# Patient Record
Sex: FEMALE | Race: WHITE | NOT HISPANIC OR LATINO | Employment: OTHER | URBAN - METROPOLITAN AREA
[De-identification: names, ages, dates, MRNs, and addresses within clinical notes are randomized per-mention and may not be internally consistent; named-entity substitution may affect disease eponyms.]

---

## 2017-01-18 ENCOUNTER — ALLSCRIPTS OFFICE VISIT (OUTPATIENT)
Dept: OTHER | Facility: OTHER | Age: 72
End: 2017-01-18

## 2017-01-26 ENCOUNTER — TRANSCRIBE ORDERS (OUTPATIENT)
Dept: LAB | Facility: CLINIC | Age: 72
End: 2017-01-26

## 2017-01-26 ENCOUNTER — APPOINTMENT (OUTPATIENT)
Dept: LAB | Facility: CLINIC | Age: 72
End: 2017-01-26
Payer: MEDICARE

## 2017-01-26 DIAGNOSIS — I10 ESSENTIAL (PRIMARY) HYPERTENSION: ICD-10-CM

## 2017-01-26 DIAGNOSIS — E03.9 HYPOTHYROIDISM: ICD-10-CM

## 2017-01-26 DIAGNOSIS — R20.2 PARESTHESIA OF SKIN: ICD-10-CM

## 2017-01-26 DIAGNOSIS — E11.43 TYPE 2 DIABETES MELLITUS WITH AUTONOMIC NEUROPATHY (HCC): ICD-10-CM

## 2017-01-26 DIAGNOSIS — E13.43 DIABETIC AUTONOMIC NEUROPATHY ASSOCIATED WITH OTHER SPECIFIED DIABETES MELLITUS (HCC): Primary | ICD-10-CM

## 2017-01-26 LAB
ALBUMIN SERPL BCP-MCNC: 3.2 G/DL (ref 3.5–5)
ALP SERPL-CCNC: 70 U/L (ref 46–116)
ALT SERPL W P-5'-P-CCNC: 22 U/L (ref 12–78)
ANION GAP SERPL CALCULATED.3IONS-SCNC: 6 MMOL/L (ref 4–13)
AST SERPL W P-5'-P-CCNC: 19 U/L (ref 5–45)
BILIRUB SERPL-MCNC: 0.4 MG/DL (ref 0.2–1)
BUN SERPL-MCNC: 39 MG/DL (ref 5–25)
CALCIUM SERPL-MCNC: 9.2 MG/DL (ref 8.3–10.1)
CHLORIDE SERPL-SCNC: 104 MMOL/L (ref 100–108)
CHOLEST SERPL-MCNC: 207 MG/DL (ref 50–200)
CO2 SERPL-SCNC: 28 MMOL/L (ref 21–32)
CREAT SERPL-MCNC: 1.74 MG/DL (ref 0.6–1.3)
CREAT UR-MCNC: 218 MG/DL
EST. AVERAGE GLUCOSE BLD GHB EST-MCNC: 163 MG/DL
GFR SERPL CREATININE-BSD FRML MDRD: 28.7 ML/MIN/1.73SQ M
GLUCOSE SERPL-MCNC: 111 MG/DL (ref 65–140)
HBA1C MFR BLD: 7.3 % (ref 4.2–6.3)
HDLC SERPL-MCNC: 48 MG/DL (ref 40–60)
LDLC SERPL CALC-MCNC: 131 MG/DL (ref 0–100)
MICROALBUMIN UR-MCNC: 11.5 MG/L (ref 0–20)
MICROALBUMIN/CREAT 24H UR: 5 MG/G CREATININE (ref 0–30)
POTASSIUM SERPL-SCNC: 3.8 MMOL/L (ref 3.5–5.3)
PROT SERPL-MCNC: 6.4 G/DL (ref 6.4–8.2)
SODIUM SERPL-SCNC: 138 MMOL/L (ref 136–145)
TRIGL SERPL-MCNC: 140 MG/DL
TSH SERPL DL<=0.05 MIU/L-ACNC: 2.18 UIU/ML (ref 0.36–3.74)

## 2017-01-26 PROCEDURE — 82043 UR ALBUMIN QUANTITATIVE: CPT | Performed by: FAMILY MEDICINE

## 2017-01-26 PROCEDURE — 36415 COLL VENOUS BLD VENIPUNCTURE: CPT

## 2017-01-26 PROCEDURE — 84443 ASSAY THYROID STIM HORMONE: CPT

## 2017-01-26 PROCEDURE — 83036 HEMOGLOBIN GLYCOSYLATED A1C: CPT

## 2017-01-26 PROCEDURE — 80053 COMPREHEN METABOLIC PANEL: CPT

## 2017-01-26 PROCEDURE — 80061 LIPID PANEL: CPT

## 2017-01-26 PROCEDURE — 82570 ASSAY OF URINE CREATININE: CPT | Performed by: FAMILY MEDICINE

## 2017-02-07 ENCOUNTER — ALLSCRIPTS OFFICE VISIT (OUTPATIENT)
Dept: OTHER | Facility: OTHER | Age: 72
End: 2017-02-07

## 2017-02-07 DIAGNOSIS — E03.9 HYPOTHYROIDISM: ICD-10-CM

## 2017-02-07 DIAGNOSIS — E11.9 TYPE 2 DIABETES MELLITUS WITHOUT COMPLICATIONS (HCC): ICD-10-CM

## 2017-02-07 DIAGNOSIS — I10 ESSENTIAL (PRIMARY) HYPERTENSION: ICD-10-CM

## 2017-02-13 ENCOUNTER — GENERIC CONVERSION - ENCOUNTER (OUTPATIENT)
Dept: OTHER | Facility: OTHER | Age: 72
End: 2017-02-13

## 2017-03-15 ENCOUNTER — ALLSCRIPTS OFFICE VISIT (OUTPATIENT)
Dept: OTHER | Facility: OTHER | Age: 72
End: 2017-03-15

## 2017-03-23 ENCOUNTER — ALLSCRIPTS OFFICE VISIT (OUTPATIENT)
Dept: OTHER | Facility: OTHER | Age: 72
End: 2017-03-23

## 2017-03-30 ENCOUNTER — GENERIC CONVERSION - ENCOUNTER (OUTPATIENT)
Dept: OTHER | Facility: OTHER | Age: 72
End: 2017-03-30

## 2017-04-12 ENCOUNTER — ALLSCRIPTS OFFICE VISIT (OUTPATIENT)
Dept: OTHER | Facility: OTHER | Age: 72
End: 2017-04-12

## 2017-04-19 ENCOUNTER — ALLSCRIPTS OFFICE VISIT (OUTPATIENT)
Dept: OTHER | Facility: OTHER | Age: 72
End: 2017-04-19

## 2017-04-26 ENCOUNTER — ALLSCRIPTS OFFICE VISIT (OUTPATIENT)
Dept: OTHER | Facility: OTHER | Age: 72
End: 2017-04-26

## 2017-04-28 ENCOUNTER — ALLSCRIPTS OFFICE VISIT (OUTPATIENT)
Dept: OTHER | Facility: OTHER | Age: 72
End: 2017-04-28

## 2017-05-16 ENCOUNTER — ALLSCRIPTS OFFICE VISIT (OUTPATIENT)
Dept: OTHER | Facility: OTHER | Age: 72
End: 2017-05-16

## 2017-05-16 ENCOUNTER — TRANSCRIBE ORDERS (OUTPATIENT)
Dept: ADMINISTRATIVE | Facility: HOSPITAL | Age: 72
End: 2017-05-16

## 2017-05-16 ENCOUNTER — HOSPITAL ENCOUNTER (OUTPATIENT)
Dept: RADIOLOGY | Facility: HOSPITAL | Age: 72
Discharge: HOME | End: 2017-05-16
Payer: MEDICARE

## 2017-05-16 DIAGNOSIS — M75.121 COMPLETE TEAR OF RIGHT ROTATOR CUFF: ICD-10-CM

## 2017-05-16 DIAGNOSIS — M25.512 PAIN IN LEFT SHOULDER: ICD-10-CM

## 2017-05-16 PROCEDURE — 73030 X-RAY EXAM OF SHOULDER: CPT

## 2017-05-21 ENCOUNTER — GENERIC CONVERSION - ENCOUNTER (OUTPATIENT)
Dept: OTHER | Facility: OTHER | Age: 72
End: 2017-05-21

## 2017-05-23 ENCOUNTER — TRANSCRIBE ORDERS (OUTPATIENT)
Dept: ADMINISTRATIVE | Facility: HOSPITAL | Age: 72
End: 2017-05-23

## 2017-05-23 ENCOUNTER — ALLSCRIPTS OFFICE VISIT (OUTPATIENT)
Dept: OTHER | Facility: OTHER | Age: 72
End: 2017-05-23

## 2017-05-23 DIAGNOSIS — M75.22 BICIPITAL TENDINITIS OF LEFT SHOULDER: ICD-10-CM

## 2017-05-23 DIAGNOSIS — M25.512 ACUTE PAIN OF LEFT SHOULDER: Primary | ICD-10-CM

## 2017-05-30 ENCOUNTER — HOSPITAL ENCOUNTER (OUTPATIENT)
Dept: MRI IMAGING | Facility: HOSPITAL | Age: 72
Discharge: HOME | End: 2017-05-30
Payer: MEDICARE

## 2017-05-30 DIAGNOSIS — M75.22 BICIPITAL TENDINITIS OF LEFT SHOULDER: ICD-10-CM

## 2017-05-30 DIAGNOSIS — M25.512 ACUTE PAIN OF LEFT SHOULDER: ICD-10-CM

## 2017-05-30 PROCEDURE — 73221 MRI JOINT UPR EXTREM W/O DYE: CPT

## 2017-05-31 ENCOUNTER — GENERIC CONVERSION - ENCOUNTER (OUTPATIENT)
Dept: OTHER | Facility: OTHER | Age: 72
End: 2017-05-31

## 2017-06-01 ENCOUNTER — APPOINTMENT (OUTPATIENT)
Dept: LAB | Facility: CLINIC | Age: 72
End: 2017-06-01
Payer: MEDICARE

## 2017-06-01 DIAGNOSIS — E03.9 HYPOTHYROIDISM: ICD-10-CM

## 2017-06-01 DIAGNOSIS — I10 ESSENTIAL (PRIMARY) HYPERTENSION: ICD-10-CM

## 2017-06-01 DIAGNOSIS — E11.9 TYPE 2 DIABETES MELLITUS WITHOUT COMPLICATIONS (HCC): ICD-10-CM

## 2017-06-01 LAB
ALBUMIN SERPL BCP-MCNC: 3.1 G/DL (ref 3.5–5)
ALP SERPL-CCNC: 70 U/L (ref 46–116)
ALT SERPL W P-5'-P-CCNC: 26 U/L (ref 12–78)
ANION GAP SERPL CALCULATED.3IONS-SCNC: 8 MMOL/L (ref 4–13)
AST SERPL W P-5'-P-CCNC: 18 U/L (ref 5–45)
BILIRUB SERPL-MCNC: 0.49 MG/DL (ref 0.2–1)
BUN SERPL-MCNC: 26 MG/DL (ref 5–25)
CALCIUM SERPL-MCNC: 9.5 MG/DL (ref 8.3–10.1)
CHLORIDE SERPL-SCNC: 108 MMOL/L (ref 100–108)
CHOLEST SERPL-MCNC: 217 MG/DL (ref 50–200)
CO2 SERPL-SCNC: 29 MMOL/L (ref 21–32)
CREAT SERPL-MCNC: 1.69 MG/DL (ref 0.6–1.3)
EST. AVERAGE GLUCOSE BLD GHB EST-MCNC: 180 MG/DL
GFR SERPL CREATININE-BSD FRML MDRD: 29.7 ML/MIN/1.73SQ M
GLUCOSE P FAST SERPL-MCNC: 111 MG/DL (ref 65–99)
HBA1C MFR BLD: 7.9 % (ref 4.2–6.3)
HDLC SERPL-MCNC: 53 MG/DL (ref 40–60)
LDLC SERPL CALC-MCNC: 131 MG/DL (ref 0–100)
POTASSIUM SERPL-SCNC: 4.1 MMOL/L (ref 3.5–5.3)
PROT SERPL-MCNC: 6.1 G/DL (ref 6.4–8.2)
SODIUM SERPL-SCNC: 145 MMOL/L (ref 136–145)
TRIGL SERPL-MCNC: 165 MG/DL
TSH SERPL DL<=0.05 MIU/L-ACNC: 3.17 UIU/ML (ref 0.36–3.74)

## 2017-06-01 PROCEDURE — 80053 COMPREHEN METABOLIC PANEL: CPT

## 2017-06-01 PROCEDURE — 84443 ASSAY THYROID STIM HORMONE: CPT

## 2017-06-01 PROCEDURE — 80061 LIPID PANEL: CPT

## 2017-06-01 PROCEDURE — 83036 HEMOGLOBIN GLYCOSYLATED A1C: CPT

## 2017-06-01 PROCEDURE — 36415 COLL VENOUS BLD VENIPUNCTURE: CPT

## 2017-06-05 ENCOUNTER — GENERIC CONVERSION - ENCOUNTER (OUTPATIENT)
Dept: OTHER | Facility: OTHER | Age: 72
End: 2017-06-05

## 2017-06-07 ENCOUNTER — ALLSCRIPTS OFFICE VISIT (OUTPATIENT)
Dept: OTHER | Facility: OTHER | Age: 72
End: 2017-06-07

## 2017-06-08 ENCOUNTER — GENERIC CONVERSION - ENCOUNTER (OUTPATIENT)
Dept: OTHER | Facility: OTHER | Age: 72
End: 2017-06-08

## 2017-06-14 ENCOUNTER — ALLSCRIPTS OFFICE VISIT (OUTPATIENT)
Dept: OTHER | Facility: OTHER | Age: 72
End: 2017-06-14

## 2017-06-23 ENCOUNTER — APPOINTMENT (OUTPATIENT)
Dept: PHYSICAL THERAPY | Facility: CLINIC | Age: 72
End: 2017-06-23
Payer: MEDICARE

## 2017-06-23 DIAGNOSIS — M75.121 COMPLETE TEAR OF RIGHT ROTATOR CUFF: ICD-10-CM

## 2017-06-23 PROCEDURE — 97162 PT EVAL MOD COMPLEX 30 MIN: CPT

## 2017-06-23 PROCEDURE — 97110 THERAPEUTIC EXERCISES: CPT

## 2017-06-23 PROCEDURE — G8990 OTHER PT/OT CURRENT STATUS: HCPCS

## 2017-06-23 PROCEDURE — G8991 OTHER PT/OT GOAL STATUS: HCPCS

## 2017-06-26 ENCOUNTER — GENERIC CONVERSION - ENCOUNTER (OUTPATIENT)
Dept: OTHER | Facility: OTHER | Age: 72
End: 2017-06-26

## 2017-06-27 ENCOUNTER — APPOINTMENT (OUTPATIENT)
Dept: PHYSICAL THERAPY | Facility: CLINIC | Age: 72
End: 2017-06-27
Payer: MEDICARE

## 2017-06-29 ENCOUNTER — APPOINTMENT (OUTPATIENT)
Dept: PHYSICAL THERAPY | Facility: CLINIC | Age: 72
End: 2017-06-29
Payer: MEDICARE

## 2017-06-29 PROCEDURE — 97110 THERAPEUTIC EXERCISES: CPT | Performed by: PHYSICAL THERAPIST

## 2017-06-29 PROCEDURE — 97110 THERAPEUTIC EXERCISES: CPT

## 2017-07-03 ENCOUNTER — APPOINTMENT (OUTPATIENT)
Dept: PHYSICAL THERAPY | Facility: CLINIC | Age: 72
End: 2017-07-03
Payer: MEDICARE

## 2017-07-03 PROCEDURE — 97110 THERAPEUTIC EXERCISES: CPT

## 2017-07-06 ENCOUNTER — APPOINTMENT (OUTPATIENT)
Dept: PHYSICAL THERAPY | Facility: CLINIC | Age: 72
End: 2017-07-06
Payer: MEDICARE

## 2017-07-06 PROCEDURE — 97110 THERAPEUTIC EXERCISES: CPT

## 2017-07-10 ENCOUNTER — APPOINTMENT (OUTPATIENT)
Dept: PHYSICAL THERAPY | Facility: CLINIC | Age: 72
End: 2017-07-10
Payer: MEDICARE

## 2017-07-12 ENCOUNTER — GENERIC CONVERSION - ENCOUNTER (OUTPATIENT)
Dept: OTHER | Facility: OTHER | Age: 72
End: 2017-07-12

## 2017-07-13 ENCOUNTER — APPOINTMENT (OUTPATIENT)
Dept: PHYSICAL THERAPY | Facility: CLINIC | Age: 72
End: 2017-07-13
Payer: MEDICARE

## 2017-07-17 ENCOUNTER — APPOINTMENT (OUTPATIENT)
Dept: PHYSICAL THERAPY | Facility: CLINIC | Age: 72
End: 2017-07-17
Payer: MEDICARE

## 2017-07-20 ENCOUNTER — APPOINTMENT (OUTPATIENT)
Dept: PHYSICAL THERAPY | Facility: CLINIC | Age: 72
End: 2017-07-20
Payer: MEDICARE

## 2017-07-24 ENCOUNTER — APPOINTMENT (OUTPATIENT)
Dept: PHYSICAL THERAPY | Facility: CLINIC | Age: 72
End: 2017-07-24
Payer: MEDICARE

## 2017-07-26 ENCOUNTER — ALLSCRIPTS OFFICE VISIT (OUTPATIENT)
Dept: OTHER | Facility: OTHER | Age: 72
End: 2017-07-26

## 2017-07-27 ENCOUNTER — APPOINTMENT (OUTPATIENT)
Dept: PHYSICAL THERAPY | Facility: CLINIC | Age: 72
End: 2017-07-27
Payer: MEDICARE

## 2017-07-31 ENCOUNTER — APPOINTMENT (OUTPATIENT)
Dept: PHYSICAL THERAPY | Facility: CLINIC | Age: 72
End: 2017-07-31
Payer: MEDICARE

## 2017-08-18 RX ORDER — FUROSEMIDE 40 MG/1
40 TABLET ORAL
COMMUNITY
End: 2018-01-29 | Stop reason: SDUPTHER

## 2017-08-18 RX ORDER — LEVOTHYROXINE SODIUM 0.1 MG/1
100 TABLET ORAL DAILY
COMMUNITY
End: 2018-01-24 | Stop reason: SDUPTHER

## 2017-08-18 RX ORDER — GLIMEPIRIDE 4 MG/1
8 TABLET ORAL
COMMUNITY
End: 2018-10-08 | Stop reason: SDUPTHER

## 2017-08-18 RX ORDER — METFORMIN HYDROCHLORIDE 1000 MG/1
1000 TABLET, FILM COATED, EXTENDED RELEASE ORAL 2 TIMES DAILY WITH MEALS
Status: ON HOLD | COMMUNITY
End: 2017-08-21 | Stop reason: ALTCHOICE

## 2017-08-18 RX ORDER — SPIRONOLACTONE 50 MG/1
50 TABLET, FILM COATED ORAL DAILY PRN
COMMUNITY
End: 2018-12-17 | Stop reason: ALTCHOICE

## 2017-08-18 RX ORDER — PREDNISONE 1 MG/1
5 TABLET ORAL DAILY
COMMUNITY
End: 2018-12-17 | Stop reason: ALTCHOICE

## 2017-08-18 RX ORDER — BACLOFEN 10 MG/1
10 TABLET ORAL
Status: ON HOLD | COMMUNITY
End: 2017-08-21 | Stop reason: ALTCHOICE

## 2017-08-18 RX ORDER — DIPHENOXYLATE HYDROCHLORIDE AND ATROPINE SULFATE 2.5; .025 MG/1; MG/1
1 TABLET ORAL DAILY
COMMUNITY
End: 2018-03-28 | Stop reason: SDUPTHER

## 2017-08-18 RX ORDER — GABAPENTIN 300 MG/1
600 CAPSULE ORAL 2 TIMES DAILY
COMMUNITY
End: 2018-09-19 | Stop reason: SDUPTHER

## 2017-08-18 RX ORDER — NEBIVOLOL 5 MG/1
5 TABLET ORAL DAILY
COMMUNITY
End: 2018-08-15 | Stop reason: ALTCHOICE

## 2017-08-18 RX ORDER — POTASSIUM CHLORIDE 1.5 G/1.77G
20 POWDER, FOR SOLUTION ORAL DAILY
COMMUNITY
End: 2018-01-30 | Stop reason: SDUPTHER

## 2017-08-18 RX ORDER — VENLAFAXINE 75 MG/1
75 TABLET ORAL 3 TIMES DAILY
COMMUNITY
End: 2018-08-15 | Stop reason: ALTCHOICE

## 2017-08-18 RX ORDER — ALBUTEROL SULFATE 90 UG/1
2 AEROSOL, METERED RESPIRATORY (INHALATION) EVERY 6 HOURS PRN
COMMUNITY
End: 2018-03-28 | Stop reason: CLARIF

## 2017-08-18 RX ORDER — PANTOPRAZOLE SODIUM 40 MG/1
40 TABLET, DELAYED RELEASE ORAL DAILY
COMMUNITY
End: 2018-03-28 | Stop reason: SDUPTHER

## 2017-08-20 ENCOUNTER — ANESTHESIA EVENT (OUTPATIENT)
Dept: GASTROENTEROLOGY | Facility: HOSPITAL | Age: 72
End: 2017-08-20
Payer: MEDICARE

## 2017-08-20 RX ORDER — SODIUM CHLORIDE, SODIUM LACTATE, POTASSIUM CHLORIDE, CALCIUM CHLORIDE 600; 310; 30; 20 MG/100ML; MG/100ML; MG/100ML; MG/100ML
125 INJECTION, SOLUTION INTRAVENOUS CONTINUOUS
Status: CANCELLED | OUTPATIENT
Start: 2017-08-20

## 2017-08-21 ENCOUNTER — ANESTHESIA (OUTPATIENT)
Dept: GASTROENTEROLOGY | Facility: HOSPITAL | Age: 72
End: 2017-08-21
Payer: MEDICARE

## 2017-08-21 ENCOUNTER — GENERIC CONVERSION - ENCOUNTER (OUTPATIENT)
Dept: OTHER | Facility: OTHER | Age: 72
End: 2017-08-21

## 2017-08-21 ENCOUNTER — HOSPITAL ENCOUNTER (OUTPATIENT)
Facility: HOSPITAL | Age: 72
Setting detail: OUTPATIENT SURGERY
Discharge: HOME | End: 2017-08-21
Attending: COLON & RECTAL SURGERY | Admitting: COLON & RECTAL SURGERY
Payer: MEDICARE

## 2017-08-21 VITALS
HEIGHT: 65 IN | OXYGEN SATURATION: 96 % | WEIGHT: 278 LBS | HEART RATE: 71 BPM | TEMPERATURE: 97.6 F | SYSTOLIC BLOOD PRESSURE: 134 MMHG | DIASTOLIC BLOOD PRESSURE: 64 MMHG | BODY MASS INDEX: 46.32 KG/M2 | RESPIRATION RATE: 20 BRPM

## 2017-08-21 DIAGNOSIS — Z86.010 HISTORY OF COLONIC POLYPS: ICD-10-CM

## 2017-08-21 LAB — GLUCOSE SERPL-MCNC: 186 MG/DL (ref 65–140)

## 2017-08-21 PROCEDURE — 82948 REAGENT STRIP/BLOOD GLUCOSE: CPT

## 2017-08-21 PROCEDURE — 88305 TISSUE EXAM BY PATHOLOGIST: CPT | Performed by: COLON & RECTAL SURGERY

## 2017-08-21 RX ORDER — SODIUM CHLORIDE 9 MG/ML
125 INJECTION, SOLUTION INTRAVENOUS CONTINUOUS
Status: DISCONTINUED | OUTPATIENT
Start: 2017-08-21 | End: 2017-08-21 | Stop reason: HOSPADM

## 2017-08-21 RX ORDER — PROPOFOL 10 MG/ML
INJECTION, EMULSION INTRAVENOUS CONTINUOUS PRN
Status: DISCONTINUED | OUTPATIENT
Start: 2017-08-21 | End: 2017-08-21 | Stop reason: SURG

## 2017-08-21 RX ORDER — PROPOFOL 10 MG/ML
INJECTION, EMULSION INTRAVENOUS AS NEEDED
Status: DISCONTINUED | OUTPATIENT
Start: 2017-08-21 | End: 2017-08-21 | Stop reason: SURG

## 2017-08-21 RX ORDER — GLYCOPYRROLATE 0.2 MG/ML
INJECTION INTRAMUSCULAR; INTRAVENOUS AS NEEDED
Status: DISCONTINUED | OUTPATIENT
Start: 2017-08-21 | End: 2017-08-21 | Stop reason: SURG

## 2017-08-21 RX ORDER — KETAMINE HYDROCHLORIDE 50 MG/ML
INJECTION, SOLUTION, CONCENTRATE INTRAMUSCULAR; INTRAVENOUS AS NEEDED
Status: DISCONTINUED | OUTPATIENT
Start: 2017-08-21 | End: 2017-08-21 | Stop reason: SURG

## 2017-08-21 RX ADMIN — PROPOFOL 130 MG: 10 INJECTION, EMULSION INTRAVENOUS at 09:54

## 2017-08-21 RX ADMIN — SODIUM CHLORIDE: 0.9 INJECTION, SOLUTION INTRAVENOUS at 09:15

## 2017-08-21 RX ADMIN — KETAMINE HYDROCHLORIDE 20 MG: 50 INJECTION, SOLUTION INTRAMUSCULAR; INTRAVENOUS at 10:04

## 2017-08-21 RX ADMIN — PROPOFOL 100 MCG/KG/MIN: 10 INJECTION, EMULSION INTRAVENOUS at 09:54

## 2017-08-21 RX ADMIN — GLYCOPYRROLATE 0.2 MG: 0.2 INJECTION, SOLUTION INTRAMUSCULAR; INTRAVENOUS at 09:54

## 2017-08-21 RX ADMIN — KETAMINE HYDROCHLORIDE 25 MG: 50 INJECTION, SOLUTION INTRAMUSCULAR; INTRAVENOUS at 09:54

## 2017-08-21 RX ADMIN — SODIUM CHLORIDE: 0.9 INJECTION, SOLUTION INTRAVENOUS at 10:14

## 2017-08-31 ENCOUNTER — GENERIC CONVERSION - ENCOUNTER (OUTPATIENT)
Dept: OTHER | Facility: OTHER | Age: 72
End: 2017-08-31

## 2017-09-21 ENCOUNTER — GENERIC CONVERSION - ENCOUNTER (OUTPATIENT)
Dept: OTHER | Facility: OTHER | Age: 72
End: 2017-09-21

## 2017-10-04 ENCOUNTER — GENERIC CONVERSION - ENCOUNTER (OUTPATIENT)
Dept: OTHER | Facility: OTHER | Age: 72
End: 2017-10-04

## 2017-10-04 DIAGNOSIS — E03.9 HYPOTHYROIDISM: ICD-10-CM

## 2017-10-04 DIAGNOSIS — E78.00 PURE HYPERCHOLESTEROLEMIA: ICD-10-CM

## 2017-10-04 DIAGNOSIS — E11.9 TYPE 2 DIABETES MELLITUS WITHOUT COMPLICATIONS (HCC): ICD-10-CM

## 2017-10-04 DIAGNOSIS — I10 ESSENTIAL (PRIMARY) HYPERTENSION: ICD-10-CM

## 2017-10-05 ENCOUNTER — GENERIC CONVERSION - ENCOUNTER (OUTPATIENT)
Dept: OTHER | Facility: OTHER | Age: 72
End: 2017-10-05

## 2017-10-05 ENCOUNTER — APPOINTMENT (OUTPATIENT)
Dept: LAB | Facility: CLINIC | Age: 72
End: 2017-10-05
Payer: MEDICARE

## 2017-10-05 DIAGNOSIS — E78.00 PURE HYPERCHOLESTEROLEMIA: ICD-10-CM

## 2017-10-05 DIAGNOSIS — E11.9 TYPE 2 DIABETES MELLITUS WITHOUT COMPLICATIONS (HCC): ICD-10-CM

## 2017-10-05 DIAGNOSIS — I10 ESSENTIAL (PRIMARY) HYPERTENSION: ICD-10-CM

## 2017-10-05 DIAGNOSIS — E03.9 HYPOTHYROIDISM: ICD-10-CM

## 2017-10-05 LAB
ALBUMIN SERPL BCP-MCNC: 3.1 G/DL (ref 3.5–5)
ALP SERPL-CCNC: 83 U/L (ref 46–116)
ALT SERPL W P-5'-P-CCNC: 18 U/L (ref 12–78)
ANION GAP SERPL CALCULATED.3IONS-SCNC: 7 MMOL/L (ref 4–13)
AST SERPL W P-5'-P-CCNC: 14 U/L (ref 5–45)
BILIRUB SERPL-MCNC: 0.35 MG/DL (ref 0.2–1)
BUN SERPL-MCNC: 32 MG/DL (ref 5–25)
CALCIUM SERPL-MCNC: 9.5 MG/DL (ref 8.3–10.1)
CHLORIDE SERPL-SCNC: 108 MMOL/L (ref 100–108)
CHOLEST SERPL-MCNC: 209 MG/DL (ref 50–200)
CO2 SERPL-SCNC: 26 MMOL/L (ref 21–32)
CREAT SERPL-MCNC: 1.36 MG/DL (ref 0.6–1.3)
GFR SERPL CREATININE-BSD FRML MDRD: 38 ML/MIN/1.73SQ M
GLUCOSE P FAST SERPL-MCNC: 94 MG/DL (ref 65–99)
HDLC SERPL-MCNC: 53 MG/DL (ref 40–60)
LDLC SERPL CALC-MCNC: 131 MG/DL (ref 0–100)
POTASSIUM SERPL-SCNC: 4.8 MMOL/L (ref 3.5–5.3)
PROT SERPL-MCNC: 6.7 G/DL (ref 6.4–8.2)
SODIUM SERPL-SCNC: 141 MMOL/L (ref 136–145)
TRIGL SERPL-MCNC: 123 MG/DL
TSH SERPL DL<=0.05 MIU/L-ACNC: 1.03 UIU/ML (ref 0.36–3.74)

## 2017-10-05 PROCEDURE — 84443 ASSAY THYROID STIM HORMONE: CPT

## 2017-10-05 PROCEDURE — 83036 HEMOGLOBIN GLYCOSYLATED A1C: CPT

## 2017-10-05 PROCEDURE — 80053 COMPREHEN METABOLIC PANEL: CPT

## 2017-10-05 PROCEDURE — 36415 COLL VENOUS BLD VENIPUNCTURE: CPT

## 2017-10-05 PROCEDURE — 80061 LIPID PANEL: CPT

## 2017-10-06 LAB
EST. AVERAGE GLUCOSE BLD GHB EST-MCNC: 166 MG/DL
HBA1C MFR BLD: 7.4 % (ref 4.2–6.3)

## 2017-10-09 ENCOUNTER — ALLSCRIPTS OFFICE VISIT (OUTPATIENT)
Dept: OTHER | Facility: OTHER | Age: 72
End: 2017-10-09

## 2017-10-10 NOTE — PROGRESS NOTES
Assessment  1  Atopic dermatitis (691 8) (L20 9)   2  Controlled diabetes mellitus (250 00) (E11 9)   3  Hypercholesterolemia (272 0) (E78 00)   4  Hypertension (401 9) (I10)   5  Hypothyroidism (244 9) (E03 9)    Plan  Atopic dermatitis    · Triamcinolone Acetonide 0 025 % External Cream; APPLY 2-3 TIMES DAILY TO  AFFECTED AREA(S)  Controlled diabetes mellitus    · *VB - Foot Exam; Status:Complete;   Done: 48KIK8309 11:46AM  Flu vaccine need    · Fluzone High-Dose 0 5 ML Intramuscular Suspension Prefilled Syringe    Discussion/Summary    I will see the patient back in 4 months and recheck her labs prior to that visit  If the rash does not resolve within the next 3-5 days time, the patient is to call  Possible side effects of new medications were reviewed with the patient/guardian today  The treatment plan was reviewed with the patient/guardian  The patient/guardian understands and agrees with the treatment plan      History of Present Illness  Patient presents for four-month follow-up of type 2 diabetes, hypertension, and hypercholesterolemia  She is following closely with podiatry for Charcot foot on the left and is currently in an immobilizer  She does have known diabetic peripheral neuropathy and is on gabapentin therapy which seems to be controlling her symptoms  She is an itchy rash on her right lower leg that has not responded to over-the-counter hydrocortisone cream       Review of Systems    Constitutional: No fever, no chills, feels well, no tiredness, no recent weight gain or weight loss  Eyes: No complaints of eye pain, no red eyes, no eyesight problems, no discharge, no dry eyes, no itching of eyes  ENT: no complaints of earache, no loss of hearing, no nose bleeds, no nasal discharge, no sore throat, no hoarseness  Cardiovascular: No complaints of slow heart rate, no fast heart rate, no chest pain, no palpitations, no leg claudication, no lower extremity edema     Respiratory: No complaints of shortness of breath, no wheezing, no cough, no SOB on exertion, no orthopnea, no PND  Gastrointestinal: No complaints of abdominal pain, no constipation, no nausea or vomiting, no diarrhea, no bloody stools  Genitourinary: No complaints of dysuria, no incontinence, no pelvic pain, no dysmenorrhea, no vaginal discharge or bleeding  Musculoskeletal: No complaints of arthralgias, no myalgias, no joint swelling or stiffness, no limb pain or swelling  Integumentary: a rash,-itching-and-Right lower extremity itchy rash without drainage or blistering, but-No complaints of skin rash or lesions, no itching, no skin wounds, no breast pain or lump  Neurological: No complaints of headache, no confusion, no convulsions, no numbness, no dizziness or fainting, no tingling, no limb weakness, no difficulty walking  Psychiatric: Not suicidal, no sleep disturbance, no anxiety or depression, no change in personality, no emotional problems  Endocrine: No complaints of proptosis, no hot flashes, no muscle weakness, no deepening of the voice, no feelings of weakness  Hematologic/Lymphatic: No complaints of swollen glands, no swollen glands in the neck, does not bleed easily, does not bruise easily  Active Problems  1  Acquired polyneuropathy (356 9) (G62 9)   2  Acute bronchitis (466 0) (J20 9)   3  Acute renal insufficiency (593 9) (N28 9)   4  A-fib (427 31) (I48 91)   5  Arteriosclerosis of coronary artery (414 00) (I25 10)   6  Arthritis (716 90) (M19 90)   7  Asthma (493 90) (J45 909)   8  Asthma with acute exacerbation (493 92) (J45 901)   9  Azotemia (790 6) (R79 89)   10  Bicipital tendinitis of left shoulder (726 12) (M75 22)   11  Bilateral knee pain (719 46) (M25 561,M25 562)   12  Calf cramp (729 82) (R25 2)   13  Candidiasis, cutaneous (112 3) (B37 2)   14  Chest pain (786 50) (R07 9)   15  Complete tear of right rotator cuff (727 61) (M75 121)   16  Contact dermatitis (692 9) (L25 9)   17   Controlled diabetes mellitus (250 00) (E11 9)   18  COPD, mild (496) (J44 9)   19  Cough (786 2) (R05)   20  Cramps of lower extremity (729 82) (R25 2)   21  Degenerative disc disease, lumbar (722 52) (M51 36)   22  Depression (311) (F32 9)   23  Difficulty breathing (786 09) (R06 89)   24  Difficulty breathing (786 09) (R06 89)   25  Edema (782 3) (R60 9)   26  Encounter for screening mammogram for malignant neoplasm of breast (V76 12)    (Z12 31)   27  Esophageal reflux (530 81) (K21 9)   28  Flu vaccine need (V04 81) (Z23)   29  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 83)   30  Herpes simplex infection (054 9) (B00 9)   31  Herpes zoster (053 9) (B02 9)   32  Hypercholesterolemia (272 0) (E78 00)   33  Hypertension (401 9) (I10)   34  Hypothyroidism (244 9) (E03 9)   35  Intermittent claudication (443 9) (I73 9)   36  Left shoulder pain (719 41) (M25 512)   37  Lower back pain (724 2) (M54 5)   38  Medicare annual wellness visit, initial (V70 0) (Z00 00)   39  Medicare annual wellness visit, subsequent (V70 0) (Z00 00)   40  Need for influenza vaccination (V04 81) (Z23)   41  Need for pneumococcal vaccine (V03 82) (Z23)   42  Obesity (278 00) (E66 9)   43  Paresthesia of left foot (782 0) (R20 2)   44  Peripheral retinal edema (362 83) (H35 81)   45  Preoperative clearance (V72 84) (Z01 818)   46  Primary osteoarthritis of both knees (715 16) (M17 0)   47  Rotator cuff tear, left (840 4) (M75 102)   48  Screening for genitourinary condition (V81 6) (Z13 89)   49  Screening for neurological condition (V80 09) (Z13 89)   50  Shortness of breath (786 05) (R06 02)   51  Tendon tear (848 9) (T14 8XXA)   52  Tinea corporis (110 5) (B35 4)   53  Toe abrasion, infected (917 1) (S90 416A,L08 9)    Past Medical History  1  History of Diabetic peripheral neuropathy (250 60,357 2) (E11 42)   2  History of Gallbladder disease (575 9) (K82 9)   3  History of hemorrhoids (V13 89) (Z87 19)   4   Old myocardial infarction (12) (I25 2)   5  History of Type 2 diabetes mellitus with autonomic neuropathy, unspecified long term   insulin use status (250 60,337 1) (E11 43)    The active problems and past medical history were reviewed and updated today  Surgical History  1  History of Cardiovascular Stress Test   2  History of Carotid Artery Catheterization   3  History of Cholecystectomy   4  History of Foot Surgery   5  History of Hemorrhoidectomy   6  History of Knee Arthroscopy (Therapeutic)    The surgical history was reviewed and updated today  Family History  Mother    1  Family history of Stroke Syndrome (V17 1)  Father    2  Family history of Cancer  Son    3  Family history of Diabetes Mellitus (V18 0)  Maternal Grandmother    4  Family history of Diabetes Mellitus (V18 0)  Maternal Grandfather    5  Family history of Heart Disease (V17 49)  Family History    6  Family history of Lymphoma Of The Head, Face, And Neck    The family history was reviewed and updated today  Social History   · Denied: History of Alcohol Use (History)   · Being A Social Drinker   · Denied: History of Daily Cola Consumption (___ Cans/Day)   · Daily Tea Consumption (___ Cups/Day)   · 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)   · Never A Smoker   · Patient has living will (V49 89) (Z78 9)   · Power of  in existence   · Denied: History of Tobacco Use   · Uses Safety Equipment - Seatbelts  The social history was reviewed and updated today  The social history was reviewed and is unchanged  Current Meds   1  Albuterol Sulfate (2 5 MG/3ML) 0 083% Inhalation Nebulization Solution; USE 1 AMPULE   EVERY 6 HOURS AS NEEDED AS DIRECTED BY MD;   Therapy: 26TSC1881 to (Evaluate:24Mar2016)  Requested for: 71JZD3289; Last   Rx:25Nov2015 Ordered   2  Baclofen 10 MG Oral Tablet; TAKE 1 TABLET Bedtime; Therapy: 28Apr2017 to (Chelsie Mccoy)  Requested for: 94AQV0175; Last   Rx:46Vld7733 Ordered   3  Bystolic 5 MG Oral Tablet; 1 TAB PO QD;    Therapy: 83CMU0854 to (Last CR:03USE5057)  Requested for: 97BYB3981 Ordered   4  Daily Multivitamin TABS; Take 1 tablet daily Recorded   5  Diclofenac Sodium 1 % Transdermal Gel; apply sparingly to affected area(s) twice daily; Therapy: 46SUB0997 to (Last Rx:64Ggj8311)  Requested for: 12QEJ4001 Ordered   6  Diclofenac-Misoprostol 50-0 2 MG Oral Tablet Delayed Release; TAKE 1 TABLET BY   MOUTH TWO TIMES DAILY; Therapy: 63GNR6723 to (Evaluate:30Jan2018)  Requested for: 31NQP9381; Last   Rx:02Qpe2665 Ordered   7  Furosemide 40 MG Oral Tablet; TAKE 1 TABLET DAILY; Therapy: 71RSC8944 to (Renew:13Uzn0124)  Requested for: 04CYC6505; Last   Rx:47Bye2719 Ordered   8  Gabapentin 300 MG Oral Capsule; 3 in am, 2 in afternoon, 3 at bedtime MDD:8 TDD:8    Requested for: 93HXM8247; Last SC:29RTX0508 Ordered   9  Glimepiride 4 MG Oral Tablet; TAKE TWO TABLETS BY MOUTH EVERY DAY; Therapy: 69QNX5418 to (Evaluate:23Xmm3991)  Requested for: 75Evk8291; Last   Rx:20Smc7833 Ordered   10  Levothyroxine Sodium 100 MCG Oral Tablet; TAKE 1 TABLET DAILY AS DIRECTED; Therapy: 26FPP1265 to (RQFWRYGY:87FIF2235)  Requested for: 04HQA9052; Last    Rx:75Mjh1900 Ordered   11  MetFORMIN HCl - 1000 MG Oral Tablet; take one tablet by mouth twice daily; Therapy: 56Oxs4916 to (Quilla Beatriz)  Requested for: 92DUH5165; Last    Rx:68Vxq7535 Ordered   12  Microlet Lancets Miscellaneous; ONE  EVERY DAY; Therapy: 80IMU8263 to (Renew:19Hqm3420); Last Rx:15Evc9907 Ordered   13  Pantoprazole Sodium 40 MG Oral Tablet Delayed Release; take 1 tablet by mouth every    day; Therapy: 75TWA4286 to (MJSSVTKO:63PEC5019)  Requested for: 88SPA0618; Last    Rx:56Iro9790 Ordered   14  Potassium Chloride Keiko ER 20 MEQ Oral Tablet Extended Release; Take 1 tablet daily; Therapy: 98JCW9099 to (Cierra Abarca)  Requested for: 26ZZA9727; Last    Rx:18Faq0549 Ordered   15  PredniSONE 5 MG Oral Tablet; TAKE 1 TABLET DAILY AS DIRECTED;     Therapy: 01AGH8703 to (Seema Dire)  Requested for: 92QBQ3730; Last    Rx:28Ixd4769 Ordered   16  ProAir  (90 Base) MCG/ACT Inhalation Aerosol Solution; INHALE 2 PUFFS 4    TIMES A DAY AS NEEDED; Therapy: 27QFB7546 to (Last Rx:45Jmg2439)  Requested for: 78Wge1385 Ordered   17  Spironolactone 50 MG Oral Tablet; TAKE 1 TABLET Daily PRN swelling; Therapy: 90EZB8728 to (Evaluate:07Jun2017)  Requested for: 10YFA5263; Last    Rx:90Esq9723 Ordered   18  TRUEtest Test In Vitro Strip; TEST ONCE DAILY; Therapy: 64UUO8890 to (Evaluate:05Oct2017); Last Rx:79Sjp6677 Ordered   19  ValACYclovir HCl - 1 GM Oral Tablet; TAKE 1 TABLET 3 TIMES DAILY; Therapy: 73MHM4646 to (Evaluate:25Mar2017)  Requested for: 17GPG9280; Last    Rx:15Mar2017 Ordered   20  Venlafaxine HCl ER 75 MG Oral Capsule Extended Release 24 Hour; TAKE 1 CAPSULE    3 times daily; Therapy: 36LIB8176 to (Yani Dan)  Requested for: 28Mar2017; Last    Rx:28Mar2017 Ordered    The medication list was reviewed and updated today  Allergies  1  Lyrica CAPS   2  Sulfa Drugs    Physical Exam    Constitutional   General appearance: No acute distress, well appearing and well nourished  Head and Face   Head and face: Normal     Eyes   Conjunctiva and lids: No swelling, erythema or discharge  Pupils and irises: Equal, round, reactive to light  Ophthalmoscopic examination: Normal fundi and optic discs  Ears, Nose, Mouth, and Throat   External inspection of ears and nose: Normal     Otoscopic examination: Tympanic membranes translucent with normal light reflex  Canals patent without erythema  Nasal mucosa, septum, and turbinates: Normal without edema or erythema  Lips, teeth, and gums: Normal, good dentition  Oropharynx: Normal with no erythema, edema, exudate or lesions  Neck   Neck: Supple, symmetric, trachea midline, no masses  Thyroid: Normal, no thyromegaly      Pulmonary   Respiratory effort: No increased work of breathing or signs of respiratory distress  Auscultation of lungs: Clear to auscultation  Cardiovascular   Auscultation of heart: Normal rate and rhythm, normal S1 and S2, no murmurs  Carotid pulses: 2+ bilaterally  Pedal pulses: 2+ bilaterally  Peripheral vascular exam: Normal     Examination of extremities for edema and/or varicosities: Normal     Abdomen   Abdomen: Non-tender, no masses  Liver and spleen: No hepatomegaly or splenomegaly  Lymphatic   Palpation of lymph nodes in neck: No lymphadenopathy  Musculoskeletal   Digits and nails: Normal without clubbing or cyanosis  Skin   Skin and subcutaneous tissue: Normal without rashes or lesions  Examination of the skin for lesions: Abnormal  -Erythematous papular rash on the right lower calf area with some scattered excoriations noted but no drainage no warmth no edema noted  Palpation of skin and subcutaneous tissue: Normal turgor  Neurologic   Sensation: No sensory loss  Psychiatric   Judgment and insight: Normal     Orientation to person, place, and time: Normal     Recent and remote memory: Intact  Mood and affect: Normal       Socks and shoes removed, Right Foot Findings: normal foot, no swelling, no erythema  The right toes were normal    The sensory exam showed diminished vibratory sensation at the level of the toes-and-diminished position sense at the level of the toes  Normal tactile sensation with monofilament testing throughout the right foot  Diminished tactile sensation with monofilament testing throughout the right foot  Socks and shoes removed, Left Foot Findings: normal foot, no swelling, no erythema  The left toes were normal    The sensory exam showed diminished vibratory sensation at the level of the toes-and-diminished position sense at the level of the toes  Normal tactile sensation with monofilament testing throughout the left foot  Diminished tactile sensation with monofilament testing throughout the left foot  Capillary refills findings on the right were normal in the toes  Pulses:   2+ in the posterior tibialis on the right   2+ in the dorsalis pedis on the right  Capillary refills findings on the left were normal in the toes  Pulses:   2+ in the posterior tibialis on the left   2+ in the dorsalis pedis on the left  Brachial Systolic Pressure: (R) -and-(L)   Ankle Systolic Pressure: (R) -and-(L)   Assign Risk Category: 2: Loss of protective sensation with or without weakness, deformity, callus, pre-ulcer, or history of ulceration  High risk  Future Appointments    Date/Time Provider Specialty Site   02/07/2018 10:00 AM Starlin Sicard, M D   Orthopedic Surgery [facility]     Signatures   Electronically signed by : Jez Onofre DO; Oct  9 2017 11:51AM EST                       (Author)

## 2017-11-09 ENCOUNTER — GENERIC CONVERSION - ENCOUNTER (OUTPATIENT)
Dept: OTHER | Facility: OTHER | Age: 72
End: 2017-11-09

## 2017-11-10 ENCOUNTER — ALLSCRIPTS OFFICE VISIT (OUTPATIENT)
Dept: OTHER | Facility: OTHER | Age: 72
End: 2017-11-10

## 2017-11-10 ENCOUNTER — APPOINTMENT (OUTPATIENT)
Dept: LAB | Facility: CLINIC | Age: 72
End: 2017-11-10
Payer: MEDICARE

## 2017-11-10 DIAGNOSIS — R60.9 EDEMA: ICD-10-CM

## 2017-11-10 DIAGNOSIS — R79.9 ABNORMAL FINDING OF BLOOD CHEMISTRY: ICD-10-CM

## 2017-11-10 DIAGNOSIS — R06.09 OTHER FORMS OF DYSPNEA: ICD-10-CM

## 2017-11-10 LAB — NT-PROBNP SERPL-MCNC: 508 PG/ML

## 2017-11-10 PROCEDURE — 36415 COLL VENOUS BLD VENIPUNCTURE: CPT

## 2017-11-10 PROCEDURE — 83880 ASSAY OF NATRIURETIC PEPTIDE: CPT

## 2017-11-13 NOTE — PROGRESS NOTES
Assessment    1  Peripheral edema (782 3) (R60 9)    Plan   Controlled diabetes mellitus    · MetFORMIN HCl - 500 MG Oral Tablet; TAKE 1 TABLET EVERY MORNING  Edema    · (1) NT- BNP (PRO BRAIN NATRIURETIC PEPTIDE); Status:Resulted - RequiresVerification;   Done: 65DQW8584 11:12AM  ECHO STRESS TEST W CONTRAST IF INDICATED; Status:Hold For - Scheduling; Requested for:10Nov2017;  Perform:[facility] Radiology; [phone]; Ordered;   Christina Khan; Ordered By:Pedro Siegel; Discussion/Summary    Controlled diabetes mellitusMetformin with prescription sent to designated pharmacyedemawith patient and her  treatment plan - requested patient increase her Furosemide to 80 mg a day for next two days along with Potassium Chloride, then return to previous dose  instructed to weight herself today and next three days on home scale and call office on 11/14/17 with readings  instructed to obtain a BNP to check fluid status with lab slip given to patient  Will call patient with results and treat accordinglyinstructed to schedule herself for a Stress Echo to further assess heart contractility and valves - will call patient with results and treat accordinglyPatient to call office on 11/14/17 with weight readings or earlier for problems or concerns  Possible side effects of new medications were reviewed with the patient/guardian today  The treatment plan was reviewed with the patient/guardian  The patient/guardian understands and agrees with the treatment plan      Chief Complaint    1  Edema  c/o edema b/l legs & feet      History of Present Illness  HPI: 74year old female presenting with 5 days of +2 pitting edema in bilateral lower extremities  Patient states that she was a podiatrist yesterday who instructed her to see her PCP to control edema  Patient took an extra furosemide the last three days and also took a spirolactone yesterday   Patient denies chest pain, shortness of breath, increased dyspnea on exertion or orthopnea  Patient does not recall eating any processed food items, pork products, canned soups or other high salt content items over the last week  Patient as not had any recent cardiac testing per the chart and patient's memory  Patient as noted a four pound weight gain in last week  Review of Systems   Constitutional: No fever, no chills, feels well, no tiredness, no recent weight gain or loss  ENT: no ear ache, no loss of hearing, no nosebleeds or nasal discharge, no sore throat or hoarseness  Cardiovascular: lower extremity edema, but-- the heart rate was not slow,-- no chest pain,-- the heart rate was not fast-- and-- no palpitations  Respiratory: no complaints of shortness of breath, no wheezing, no dyspnea on exertion, no orthopnea or PND  Breasts: no complaints of breast pain, breast lump or nipple discharge  Gastrointestinal: no complaints of abdominal pain, no constipation, no nausea or diarrhea, no vomiting, no bloody stools  Genitourinary: no complaints of dysuria, no incontinence, no pelvic pain, no dysmenorrhea, no vaginal discharge or abnormal vaginal bleeding  Musculoskeletal: no complaints of arthralgia, no myalgia, no joint swelling or stiffness, no limb pain or swelling  Integumentary: no complaints of skin rash or lesion, no itching or dry skin, no skin wounds  Neurological: no complaints of headache, no confusion, no numbness or tingling, no dizziness or fainting  Active Problems    1  Acquired polyneuropathy (356 9) (G62 9)   2  Acute bronchitis (466 0) (J20 9)   3  Acute renal insufficiency (593 9) (N28 9)   4  A-fib (427 31) (I48 91)   5  Arteriosclerosis of coronary artery (414 00) (I25 10)   6  Arthritis (716 90) (M19 90)   7  Asthma (493 90) (J45 909)   8  Asthma with acute exacerbation (493 92) (J45 901)   9  Atopic dermatitis (691 8) (L20 9)   10  Azotemia (790 6) (R79 89)   11  Bicipital tendinitis of left shoulder (726 12) (M75 22)   12   Bilateral knee pain (719 46) (M25 561,M25 562)   13  Calf cramp (729 82) (R25 2)   14  Candidiasis, cutaneous (112 3) (B37 2)   15  Chest pain (786 50) (R07 9)   16  Complete tear of right rotator cuff (727 61) (M75 121)   17  Contact dermatitis (692 9) (L25 9)   18  Controlled diabetes mellitus (250 00) (E11 9)   19  COPD, mild (496) (J44 9)   20  Cough (786 2) (R05)   21  Cramps of lower extremity (729 82) (R25 2)   22  Degenerative disc disease, lumbar (722 52) (M51 36)   23  Depression (311) (F32 9)   24  Difficulty breathing (786 09) (R06 89)   25  Difficulty breathing (786 09) (R06 89)   26  Edema (782 3) (R60 9)   27  Encounter for screening mammogram for malignant neoplasm of breast (V76 12)  (Z12 31)   28  Esophageal reflux (530 81) (K21 9)   29  Flu vaccine need (V04 81) (Z23)   30  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 83)   31  Herpes simplex infection (054 9) (B00 9)   32  Herpes zoster (053 9) (B02 9)   33  Hypercholesterolemia (272 0) (E78 00)   34  Hypertension (401 9) (I10)   35  Hypothyroidism (244 9) (E03 9)   36  Intermittent claudication (443 9) (I73 9)   37  Left shoulder pain (719 41) (M25 512)   38  Lower back pain (724 2) (M54 5)   39  Medicare annual wellness visit, initial (V70 0) (Z00 00)   40  Medicare annual wellness visit, subsequent (V70 0) (Z00 00)   41  Need for influenza vaccination (V04 81) (Z23)   42  Need for pneumococcal vaccine (V03 82) (Z23)   43  Obesity (278 00) (E66 9)   44  Paresthesia of left foot (782 0) (R20 2)   45  Peripheral retinal edema (362 83) (H35 81)   46  Preoperative clearance (V72 84) (Z01 818)   47  Primary osteoarthritis of both knees (715 16) (M17 0)   48  Rotator cuff tear, left (840 4) (M75 102)   49  Screening for genitourinary condition (V81 6) (Z13 89)   50  Screening for neurological condition (V80 09) (Z13 89)   51  Shortness of breath (786 05) (R06 02)   52  Tendon tear (848 9) (T14 8XXA)   53  Tinea corporis (110 5) (B35 4)   54   Toe abrasion, infected (917 1) (S90 416A,L08 9)    Past Medical History    1  History of Diabetic peripheral neuropathy (250 60,357 2) (E11 42)   2  History of Gallbladder disease (575 9) (K82 9)   3  History of hemorrhoids (V13 89) (Z87 19)   4  Old myocardial infarction (412) (I25 2)   5  History of Type 2 diabetes mellitus with autonomic neuropathy, unspecified long term insulin use status (250 60,337 1) (E11 43)  Active Problems And Past Medical History Reviewed: The active problems and past medical history were reviewed and updated today  Family History  Mother    1  Family history of Stroke Syndrome (V17 1)  Father    2  Family history of Cancer  Son    3  Family history of Diabetes Mellitus (V18 0)  Maternal Grandmother    4  Family history of Diabetes Mellitus (V18 0)  Maternal Grandfather    5  Family history of Heart Disease (V17 49)  Family History    6  Family history of Lymphoma Of The Head, Face, And Neck  Family History Reviewed: The family history was reviewed and updated today  Social History   · Denied: History of Alcohol Use (History)   · Being A Social Drinker   · Denied: History of Daily Cola Consumption (___ Cans/Day)   · Daily Tea Consumption (___ Cups/Day)   · 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)   · Never A Smoker   · Patient has living will (V49 89) (Z78 9)   · Power of  in existence   · Denied: History of Tobacco Use   · Uses Safety Equipment - Seatbelts  The social history was reviewed and updated today  The social history was reviewed and is unchanged  Surgical History    1  History of Cardiovascular Stress Test   2  History of Carotid Artery Catheterization   3  History of Cholecystectomy   4  History of Foot Surgery   5  History of Hemorrhoidectomy   6  History of Knee Arthroscopy (Therapeutic)  Surgical History Reviewed: The surgical history was reviewed and updated today  Current Meds   1   Albuterol Sulfate (2 5 MG/3ML) 0 083% Inhalation Nebulization Solution; USE 1 AMPULE EVERY 6 HOURS AS NEEDED AS DIRECTED BY MD; Therapy: 89IFK2758 to (Leena Jaeger)  Requested for: 63NQZ5829; Last Rx:25Nov2015 Ordered   2  Baclofen 10 MG Oral Tablet; TAKE 1 TABLET Bedtime; Therapy: 04Phn2687 to (Amparo Bergeron)  Requested for: 53SCR4716; Last Rx:85Yes6310 Ordered   3  Bystolic 5 MG Oral Tablet; 1 TAB PO QD; Therapy: 72KCK1166 to (Last Rx:31Oct2017)  Requested for: 33Ezh4050 Ordered   4  Daily Multivitamin TABS; Take 1 tablet daily Recorded   5  Diclofenac Sodium 1 % Transdermal Gel; apply sparingly to affected area(s) twice daily; Therapy: 67VGG8659 to (Last Rx:31May2017)  Requested for: 22OHQ6900 Ordered   6  Diclofenac-Misoprostol 50-0 2 MG Oral Tablet Delayed Release; TAKE 1 TABLET BY MOUTH TWO TIMES DAILY; Therapy: 47PYC3407 to (Evaluate:30Jan2018)  Requested for: 79DUL4787; Last Rx:02Oct2017 Ordered   7  Furosemide 40 MG Oral Tablet; TAKE 1 TABLET DAILY; Therapy: 66YGV1945 to (Renew:37Ajt1538)  Requested for: 83NMP5596; Last Rx:22Jun2017 Ordered   8  Gabapentin 300 MG Oral Capsule; 3 in am, 2 in afternoon, 3 at bedtime MDD:8 TDD:8  Requested for: 26ZIE8323; Last WP:29RIH5698 Ordered   9  Glimepiride 4 MG Oral Tablet; TAKE 2 TABLETS BY MOUTH EVERY DAY; Therapy: 69IGC8969 to (Maryruth Pallas)  Requested for: 26VWP0160; Last Rx:07Nov2017 Ordered   10  Levothyroxine Sodium 100 MCG Oral Tablet; TAKE 1 TABLET DAILY AS DIRECTED; Therapy: 52YYJ5086 to (FLKBGGJS:22FHK9194)  Requested for: 18WJT8219; Last  Rx:95Rpk4698 Ordered   11  MetFORMIN HCl - 500 MG Oral Tablet; TAKE ONE TABLET BY MOUTH TWICE A DAY  WITH MEALS; Therapy: 68GWC4903 to (Evaluate:11Oct2018)  Requested for: 46JPX1952; Last  Rx:16Oct2017 Ordered   12  Microlet Lancets Miscellaneous; ONE  EVERY DAY; Therapy: 41ZKY3425 to (Renew:60Kbk2133); Last Rx:17Wfp9432 Ordered   13  Pantoprazole Sodium 40 MG Oral Tablet Delayed Release; take 1 tablet by mouth every  day;   Therapy: 83YNP7368 to (UXLBEIHO:04DQR7534)  Requested for: 68VPM1604; Last  Rx:23Dsu1330 Ordered   14  Potassium Chloride Keiko ER 20 MEQ Oral Tablet Extended Release; Take 1 tablet daily; Therapy: 88HYW3420 to (95 321902)  Requested for: 16OPW6851; Last  Rx:99Ena1932 Ordered   15  PredniSONE 5 MG Oral Tablet; TAKE 1 TABLET DAILY AS DIRECTED; Therapy: 29GZF7570 to ([facility])  Requested for: 79MVG0301; Last  Rx:07Oqi5012 Ordered   16  ProAir  (90 Base) MCG/ACT Inhalation Aerosol Solution; INHALE 2 PUFFS 4  TIMES A DAY AS NEEDED; Therapy: 18XXI0425 to (Last Rx:79Poa7437)  Requested for: 29Ruk7647 Ordered   17  Spironolactone 50 MG Oral Tablet; TAKE 1 TABLET Daily PRN swelling; Therapy: 83XTP3594 to (Evaluate:07Jun2017)  Requested for: 83YPD6144; Last  Rx:22Aan8154 Ordered   18  Triamcinolone Acetonide 0 025 % External Cream; APPLY 2-3 TIMES DAILY TO  AFFECTED AREA(S); Therapy: 14JOH9470 to (Last Rx:09Oct2017)  Requested for: 53LYC5678 Ordered   19  TRUEtest Test In Vitro Strip; TEST ONCE DAILY; Therapy: 91FGI7862 to (Evaluate:05Oct2017); Last Rx:94Poq1246 Ordered   20  ValACYclovir HCl - 1 GM Oral Tablet; TAKE 1 TABLET 3 TIMES DAILY; Therapy: 92SHK4929 to (Evaluate:25Mar2017)  Requested for: 01DGF1630; Last  Rx:15Mar2017 Ordered   21  Venlafaxine HCl ER 75 MG Oral Capsule Extended Release 24 Hour; TAKE 1 CAPSULE  3 times daily; Therapy: 14UTA8937 to (Daniela Gomez)  Requested for: 28Mar2017; Last  Rx:28Mar2017 Ordered    The medication list was reviewed and updated today  Allergies  1  Lyrica CAPS   2  Sulfa Drugs    Vitals   Recorded: 94IIZ0990 10:33AM   Temperature 96 7 F   Heart Rate 72   Systolic 98   Diastolic 64   Height 5 ft 2 5 in   Weight 288 lb 2 oz   BMI Calculated 51 86   BSA Calculated 2 25       Physical Exam   Constitutional  General appearance: Abnormal  -- morbidly obese female  Eyes  Conjunctiva and lids: No swelling, erythema or discharge  Pupils and irises: Equal, round and reactive to light     Pulmonary  Respiratory effort: No increased work of breathing or signs of respiratory distress  Auscultation of lungs: Clear to auscultation  Cardiovascular  Auscultation of heart: Normal rate and rhythm, normal S1 and S2, without murmurs  Examination of extremities for edema and/or varicosities: Abnormal  -- + 2 pitting edema bilateral lower extremities -slightly more on left  Carotid pulses: Normal    Abdomen  Abdomen: Non-tender, no masses  Lymphatic  Palpation of lymph nodes in neck: No lymphadenopathy  Musculoskeletal  Digits and nails: Normal without clubbing or cyanosis  Neurologic  Reflexes: 2+ and symmetric  Psychiatric  Orientation to person, place, and time: Normal    Mood and affect: Normal          Results/Data  (1) NT- BNP (PRO BRAIN NATRIURETIC PEPTIDE) 17JDB9123 11:12AM Viridiana Zayas Order Number: [id number]     Test Name Result Flag Reference   NT-PRO  pg/mL H <125       Future Appointments    Date/Time Provider Specialty Site   02/07/2018 10:00 AM AUTUMN [place]   Orthopedic Surgery [facility]       Signatures   Electronically signed by : Rudi Alberto DO; Nov 12 2017  6:25PM EST                       (Author)

## 2017-11-17 ENCOUNTER — GENERIC CONVERSION - ENCOUNTER (OUTPATIENT)
Dept: OTHER | Facility: OTHER | Age: 72
End: 2017-11-17

## 2017-11-17 ENCOUNTER — APPOINTMENT (OUTPATIENT)
Dept: LAB | Facility: CLINIC | Age: 72
End: 2017-11-17
Payer: MEDICARE

## 2017-11-17 DIAGNOSIS — R60.9 EDEMA: ICD-10-CM

## 2017-11-17 LAB
ANION GAP SERPL CALCULATED.3IONS-SCNC: 7 MMOL/L (ref 4–13)
BUN SERPL-MCNC: 33 MG/DL (ref 5–25)
CALCIUM SERPL-MCNC: 9.8 MG/DL (ref 8.3–10.1)
CHLORIDE SERPL-SCNC: 103 MMOL/L (ref 100–108)
CO2 SERPL-SCNC: 31 MMOL/L (ref 21–32)
CREAT SERPL-MCNC: 1.77 MG/DL (ref 0.6–1.3)
GFR SERPL CREATININE-BSD FRML MDRD: 28 ML/MIN/1.73SQ M
GLUCOSE P FAST SERPL-MCNC: 111 MG/DL (ref 65–99)
NT-PROBNP SERPL-MCNC: 246 PG/ML
POTASSIUM SERPL-SCNC: 4 MMOL/L (ref 3.5–5.3)
SODIUM SERPL-SCNC: 141 MMOL/L (ref 136–145)

## 2017-11-17 PROCEDURE — 83880 ASSAY OF NATRIURETIC PEPTIDE: CPT

## 2017-11-17 PROCEDURE — 80048 BASIC METABOLIC PNL TOTAL CA: CPT

## 2017-11-17 PROCEDURE — 36415 COLL VENOUS BLD VENIPUNCTURE: CPT

## 2017-11-22 ENCOUNTER — HOSPITAL ENCOUNTER (OUTPATIENT)
Dept: NON INVASIVE DIAGNOSTICS | Facility: CLINIC | Age: 72
Discharge: HOME | End: 2017-11-22
Payer: MEDICARE

## 2017-11-22 DIAGNOSIS — R60.9 EDEMA: ICD-10-CM

## 2017-11-27 ENCOUNTER — APPOINTMENT (OUTPATIENT)
Dept: LAB | Facility: CLINIC | Age: 72
End: 2017-11-27
Payer: MEDICARE

## 2017-11-27 DIAGNOSIS — R79.9 ABNORMAL FINDING OF BLOOD CHEMISTRY: ICD-10-CM

## 2017-11-27 LAB
CREAT 24H UR-MRATE: 0.9 G/24HR (ref 0.6–1.8)
SPECIMEN VOL UR: 1000 ML

## 2017-11-27 PROCEDURE — 82570 ASSAY OF URINE CREATININE: CPT

## 2017-12-06 ENCOUNTER — ALLSCRIPTS OFFICE VISIT (OUTPATIENT)
Dept: OTHER | Facility: OTHER | Age: 72
End: 2017-12-06

## 2017-12-07 ENCOUNTER — GENERIC CONVERSION - ENCOUNTER (OUTPATIENT)
Dept: FAMILY MEDICINE CLINIC | Facility: CLINIC | Age: 72
End: 2017-12-07

## 2017-12-07 ENCOUNTER — GENERIC CONVERSION - ENCOUNTER (OUTPATIENT)
Dept: OTHER | Facility: OTHER | Age: 72
End: 2017-12-07

## 2017-12-07 ENCOUNTER — HOSPITAL ENCOUNTER (OUTPATIENT)
Dept: NON INVASIVE DIAGNOSTICS | Facility: CLINIC | Age: 72
Discharge: HOME | End: 2017-12-07
Payer: MEDICARE

## 2017-12-07 DIAGNOSIS — R06.09 OTHER FORMS OF DYSPNEA: ICD-10-CM

## 2017-12-07 DIAGNOSIS — R60.9 EDEMA: ICD-10-CM

## 2017-12-07 LAB
MAX DIASTOLIC BP: 74 MMHG
MAX HEART RATE: 61 BPM
MAX PREDICTED HEART RATE: 146 BPM
MAX. SYSTOLIC BP: 164 MMHG
PROTOCOL NAME: NORMAL
REASON FOR TERMINATION: NORMAL
TARGET HR FORMULA: NORMAL
TEST INDICATION: NORMAL
TIME IN EXERCISE PHASE: NORMAL

## 2017-12-07 PROCEDURE — A9502 TC99M TETROFOSMIN: HCPCS

## 2017-12-07 PROCEDURE — 78452 HT MUSCLE IMAGE SPECT MULT: CPT

## 2017-12-07 PROCEDURE — 93017 CV STRESS TEST TRACING ONLY: CPT

## 2017-12-07 PROCEDURE — 93306 TTE W/DOPPLER COMPLETE: CPT

## 2017-12-07 RX ADMIN — REGADENOSON 0.4 MG: 0.08 INJECTION, SOLUTION INTRAVENOUS at 13:49

## 2017-12-12 NOTE — PROGRESS NOTES
Assessment    1  Primary osteoarthritis of both knees (715 16) (M17 0)    Plan  Primary osteoarthritis of both knees    · Synvisc One 48 MG/6ML Intra-articular Solution Prefilled Syringe   · Synvisc One 48 MG/6ML Intra-articular Solution Prefilled Syringe   · Follow-up PRN Evaluation and Treatment  Follow-up  Status: Complete  Done:75Sse1190    Discussion/Summary    Bilateral knee DJD    Synvisc-One injection performed today into the bilateral knees  rest, ice, anti-inflammatories as needed  follow-up as needed  The patient was counseled regarding instructions for management  The patient has the current Goals: Reduced bilateral knee pain  Patient is able to Self-Care  Chief Complaint  1  Knee Pain  Bilateral knee DJD      History of Present Illness  HPI: 26-year-old female returns to the office today for follow-up regarding her bilateral knee DJD  She is here today for a Synvisc ONE injection into bilateral knees  She notes persistent bilateral knee pain  Active Problems    1  Abnormal blood creatinine level (790 99) (R79 9)   2  Acquired polyneuropathy (356 9) (G62 9)   3  Acute bronchitis (466 0) (J20 9)   4  Acute renal insufficiency (593 9) (N28 9)   5  A-fib (427 31) (I48 91)   6  Arteriosclerosis of coronary artery (414 00) (I25 10)   7  Arthritis (716 90) (M19 90)   8  Asthma (493 90) (J45 909)   9  Asthma with acute exacerbation (493 92) (J45 901)   10  Atopic dermatitis (691 8) (L20 9)   11  Azotemia (790 6) (R79 89)   12  Bicipital tendinitis of left shoulder (726 12) (M75 22)   13  Bilateral knee pain (719 46) (M25 561,M25 562)   14  Calf cramp (729 82) (R25 2)   15  Candidiasis, cutaneous (112 3) (B37 2)   16  Chest pain (786 50) (R07 9)   17  Complete tear of right rotator cuff (727 61) (M75 121)   18  Contact dermatitis (692 9) (L25 9)   19  Controlled diabetes mellitus (250 00) (E11 9)   20  COPD, mild (496) (J44 9)   21  Cough (786 2) (R05)   22   Cramps of lower extremity (729 82) (R25 2)   23  Degenerative disc disease, lumbar (722 52) (M51 36)   24  Depression (311) (F32 9)   25  Difficulty breathing (786 09) (R06 89)   26  Difficulty breathing (786 09) (R06 89)   27  Dyspnea on exertion (786 09) (R06 09)   28  Edema (782 3) (R60 9)   29  Edema, peripheral (782 3) (R60 9)   30  Encounter for screening mammogram for malignant neoplasm of breast (V76 12)  (Z12 31)   31  Esophageal reflux (530 81) (K21 9)   32  Flu vaccine need (V04 81) (Z23)   33  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 83)   34  Herpes simplex infection (054 9) (B00 9)   35  Herpes zoster (053 9) (B02 9)   36  Hypercholesterolemia (272 0) (E78 00)   37  Hypertension (401 9) (I10)   38  Hypothyroidism (244 9) (E03 9)   39  Intermittent claudication (443 9) (I73 9)   40  Left shoulder pain (719 41) (M25 512)   41  Lower back pain (724 2) (M54 5)   42  Medicare annual wellness visit, initial (V70 0) (Z00 00)   43  Medicare annual wellness visit, subsequent (V70 0) (Z00 00)   44  Need for influenza vaccination (V04 81) (Z23)   45  Need for pneumococcal vaccine (V03 82) (Z23)   46  Obesity (278 00) (E66 9)   47  Paresthesia of left foot (782 0) (R20 2)   48  Peripheral edema (782 3) (R60 9)   49  Peripheral retinal edema (362 83) (H35 81)   50  Preoperative clearance (V72 84) (Z01 818)   51  Primary osteoarthritis of both knees (715 16) (M17 0)   52  Rotator cuff tear, left (840 4) (M75 102)   53  Screening for genitourinary condition (V81 6) (Z13 89)   54  Screening for neurological condition (V80 09) (Z13 89)   55  Shortness of breath (786 05) (R06 02)   56  Tendon tear (848 9) (T14 8XXA)   57  Tinea corporis (110 5) (B35 4)   58   Toe abrasion, infected (917 1) (S90 416A,L08 9)    Past Medical History     · History of Diabetic peripheral neuropathy (250 60,357 2) (E11 42)   · History of Gallbladder disease (575 9) (K82 9)   · History of hemorrhoids (V13 89) (Z87 19)   · Old myocardial infarction (412) (I25 2)   · History of Type 2 diabetes mellitus with autonomic neuropathy, unspecified long terminsulin use status (250 60,337 1) (E11 43)    The active problems and past medical history were reviewed and updated today  Surgical History   · History of Cardiovascular Stress Test   · History of Carotid Artery Catheterization   · History of Cholecystectomy   · History of Foot Surgery   · History of Hemorrhoidectomy   · History of Knee Arthroscopy (Therapeutic)    The surgical history was reviewed and updated today  Family History  Mother    · Family history of Stroke Syndrome (V17 1)  Father    · Family history of Cancer  Son    · Family history of Diabetes Mellitus (V18 0)  Maternal Grandmother    · Family history of Diabetes Mellitus (V18 0)  Maternal Grandfather    · Family history of Heart Disease (V17 49)  Family History    · Family history of Lymphoma Of The Head, Face, And Neck    The family history was reviewed and updated today  Social History     · Denied: History of Alcohol Use (History)   · Being A Social Drinker   · Denied: History of Daily Cola Consumption (___ Cans/Day)   · Daily Tea Consumption (___ Cups/Day)   · 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)   · Never A Smoker   · Patient has living will (V49 89) (Z78 9)   · Power of  in existence   · Denied: History of Tobacco Use   · Uses Safety Equipment - Seatbelts  The social history was reviewed and updated today  The social history was reviewed and is unchanged  Current Meds   1  Albuterol Sulfate (2 5 MG/3ML) 0 083% Inhalation Nebulization Solution; USE 1 AMPULE EVERY 6 HOURS AS NEEDED AS DIRECTED BY MD; Therapy: 31LIN2580 to (Evaluate:24Mar2016)  Requested for: 22MUX7909; Last Rx:25Nov2015 Ordered   2  Baclofen 10 MG Oral Tablet; TAKE 1 TABLET Bedtime; Therapy: 28Apr2017 to (Rena Swenson)  Requested for: 85KHD1807; Last Rx:58Lnl4656 Ordered   3  Bystolic 5 MG Oral Tablet; 1 TAB PO QD;  Therapy: 94DJL0066 to (Last Rx:31Oct2017) Requested for: 35DAG9094 Ordered   4  Daily Multivitamin TABS; Take 1 tablet daily Recorded   5  Diclofenac Sodium 1 % Transdermal Gel; apply sparingly to affected area(s) twice daily; Therapy: 87KNO7884 to (Last Rx:13Nov2017)  Requested for: 87XLS7627 Ordered   6  Diclofenac-Misoprostol 50-0 2 MG Oral Tablet Delayed Release; TAKE 1 TABLET BY MOUTH TWO TIMES DAILY; Therapy: 21FQF5845 to (Evaluate:30Jan2018)  Requested for: 77BZW6061; Last Rx:54Ikk6135 Ordered   7  Furosemide 40 MG Oral Tablet; TAKE 1 TABLET DAILY; Therapy: 30XDR4219 to (Renew:77Nzv1323)  Requested for: 03CGJ6176; Last Rx:22Jun2017 Ordered   8  Gabapentin 300 MG Oral Capsule; 3 in am, 2 in afternoon, 3 at bedtime MDD:8 TDD:8  Requested for: 54HHY0031; Last Rx:27Nov2017 Ordered   9  Glimepiride 4 MG Oral Tablet; TAKE 2 TABLETS BY MOUTH EVERY DAY; Therapy: 33VMZ2847 to (Matthews Canavan)  Requested for: 90BEC4173; Last Rx:07Nov2017 Ordered   10  Levothyroxine Sodium 100 MCG Oral Tablet; TAKE 1 TABLET DAILY AS DIRECTED; Therapy: 63KDT8623 to (MUOLCFXJ:91JUO2162)  Requested for: 85OLK7343; Last  Rx:56Sqm9038 Ordered   11  MetFORMIN HCl - 500 MG Oral Tablet; TAKE 1 TABLET EVERY MORNING; Therapy: 91MNC8576 to (Matthews Canavan)  Requested for: 11HZN6170; Last  Rx:10Nov2017 Ordered   12  Microlet Lancets Miscellaneous; ONE  EVERY DAY; Therapy: 06GVY8744 to (Renew:35Hzi4818); Last Rx:41Syo0436 Ordered   13  Pantoprazole Sodium 40 MG Oral Tablet Delayed Release; take 1 tablet by mouth every  day; Therapy: 62LXH0874 to (TOOVXTXS:92AEY6740)  Requested for: 63YZE5384; Last  Rx:47Dzk2910 Ordered   14  Potassium Chloride Keiko ER 20 MEQ Oral Tablet Extended Release; Take 1 tablet daily; Therapy: 89INY2671 to (Liliana German)  Requested for: 68SRB2923; Last  Rx:34Vvj5183 Ordered   15  PredniSONE 5 MG Oral Tablet; TAKE 1 TABLET DAILY AS DIRECTED; Therapy: 84OCK8703 to (Minor Villareal)  Requested for: 00OHF0279; Last  Rx:22Hen2853 Ordered   16  ProAir  (90 Base) MCG/ACT Inhalation Aerosol Solution; INHALE 2 PUFFS 4  TIMES A DAY AS NEEDED; Therapy: 66LXA2196 to (Last Rx:81Xgu0418)  Requested for: 02Oob2449 Ordered   17  Spironolactone 50 MG Oral Tablet; TAKE 1 TABLET Daily PRN swelling; Therapy: 39QZR7497 to (Evaluate:07Jun2017)  Requested for: 22ZQI2567; Last  Rx:72Kdg4029 Ordered   18  Triamcinolone Acetonide 0 025 % External Cream; APPLY 2-3 TIMES DAILY TO  AFFECTED AREA(S); Therapy: 75VIZ3497 to (Last Rx:09Oct2017)  Requested for: 80LTO9986 Ordered   19  TRUEtest Test In Vitro Strip; TEST ONCE DAILY; Therapy: 99VOJ0915 to (Evaluate:05Oct2017); Last Rx:68Zfe7190 Ordered   20  ValACYclovir HCl - 1 GM Oral Tablet; TAKE 1 TABLET 3 TIMES DAILY; Therapy: 28MQS9174 to (Evaluate:25Mar2017)  Requested for: 76YXJ7658; Last  Rx:15Mar2017 Ordered   21  Venlafaxine HCl ER 75 MG Oral Capsule Extended Release 24 Hour; TAKE 1 CAPSULE  3 times daily; Therapy: 21NLL6560 to (Lisa Anguiano)  Requested for: 57TSF9069; Last  Rx:24Nov2017 Ordered    The medication list was reviewed and updated today  Allergies  1  Lyrica CAPS   2  Sulfa Drugs    Vitals  Signs     Heart Rate: 80  Systolic: 923  Diastolic: 90  Height: 5 ft 2 5 in  Weight: 288 lb 2 08 oz  BMI Calculated: 51 86  BSA Calculated: 2 25    Procedure    Procedure: Injection of bilateral knee joint  Indication:  Osteoarthritis  Potential complications include bleeding  Risk were discussed with the patient  Verbal consent was obtained prior to the procedure  Alcohol was used to prep the area  ethyl chloride spray was used as a topical anesthetic  Using sterile technique, the aspiration/injection needle was then directed from a Anterolateral aspect  A 20-gauge was used to inject 6 mL Synvisc One   A bandage was applied  the patient tolerated the procedure well  Complications: none        Attending Note  Collaborating Physician Note: I discussed the case with the Advanced Practitioner and reviewed the AP note      Future Appointments    Date/Time Provider Specialty Site   02/07/2018 10:00 AM AUTUMN [place]   Orthopedic Surgery [facility]       Signatures   Electronically signed by : Maisha Christiansen [facility]; Dec  6 2017 11:16AM EST                       (Author)    Electronically signed by : Silverio Sanders DO; Dec 11 2017 11:12AM EST                       (Author)

## 2018-01-11 NOTE — RESULT NOTES
Verified Results  (1) BASIC METABOLIC PROFILE [id number] 08:58AM Star Elliott Order Number: [id number]     Test Name Result Flag Reference   SODIUM 141 mmol/L  136-145   POTASSIUM 4 0 mmol/L  3 5-5 3   CHLORIDE 103 mmol/L  100-108   CARBON DIOXIDE 31 mmol/L  21-32   ANION GAP (CALC) 7 mmol/L  4-13   BLOOD UREA NITROGEN 33 mg/dL H 5-25   CREATININE 1 77 mg/dL H 0 60-1 30   Standardized to IDMS reference method   CALCIUM 9 8 mg/dL  8 3-10 1   eGFR 28 ml/min/1 73sq m     National Kidney Disease Education Program recommendations are as follows:  GFR calculation is accurate only with a steady state creatinine  Chronic Kidney disease less than 60 ml/min/1 73 sq  meters  Kidney failure less than 15 ml/min/1 73 sq  meters  GLUCOSE FASTING 111 mg/dL H 65-99   Specimen collection should occur prior to Sulfasalazine administration due to the potential for falsely depressed results  Specimen collection should occur prior to Sulfapyridine administration due to the potential for falsely elevated results

## 2018-01-12 VITALS
RESPIRATION RATE: 20 BRPM | DIASTOLIC BLOOD PRESSURE: 82 MMHG | SYSTOLIC BLOOD PRESSURE: 154 MMHG | TEMPERATURE: 97.8 F | HEART RATE: 74 BPM | WEIGHT: 293 LBS | BODY MASS INDEX: 50.02 KG/M2 | HEIGHT: 64 IN

## 2018-01-13 VITALS
TEMPERATURE: 96.1 F | HEART RATE: 68 BPM | HEIGHT: 64 IN | SYSTOLIC BLOOD PRESSURE: 150 MMHG | BODY MASS INDEX: 48.51 KG/M2 | RESPIRATION RATE: 24 BRPM | DIASTOLIC BLOOD PRESSURE: 78 MMHG | WEIGHT: 284.13 LBS

## 2018-01-13 VITALS
WEIGHT: 287.38 LBS | BODY MASS INDEX: 49.06 KG/M2 | TEMPERATURE: 96.9 F | SYSTOLIC BLOOD PRESSURE: 142 MMHG | DIASTOLIC BLOOD PRESSURE: 82 MMHG | RESPIRATION RATE: 20 BRPM | HEART RATE: 56 BPM | HEIGHT: 64 IN

## 2018-01-13 VITALS
TEMPERATURE: 97.4 F | RESPIRATION RATE: 21 BRPM | HEART RATE: 58 BPM | HEIGHT: 64 IN | BODY MASS INDEX: 48.49 KG/M2 | WEIGHT: 284 LBS | SYSTOLIC BLOOD PRESSURE: 130 MMHG | DIASTOLIC BLOOD PRESSURE: 82 MMHG

## 2018-01-13 VITALS
HEIGHT: 64 IN | DIASTOLIC BLOOD PRESSURE: 66 MMHG | WEIGHT: 290 LBS | SYSTOLIC BLOOD PRESSURE: 115 MMHG | BODY MASS INDEX: 49.51 KG/M2 | HEART RATE: 63 BPM

## 2018-01-13 NOTE — RESULT NOTES
Verified Results  * XR SHOULDER 2+ VIEW LEFT [id number] 02:34PM Kaitlin Mahan Order Number: [id number]     Test Name Result Flag Reference   XR SHOULDER 2+ VW LEFT (Report)     LEFT SHOULDER     INDICATION: Lateral left shoulder pain     COMPARISON: None     VIEWS: 3     IMAGES: 3     FINDINGS:     There is no acute fracture or dislocation  There are moderate degenerative changes of the acromioclavicular joint  Mild decrease of the acromiohumeral interval      No lytic or blastic lesions are seen  Soft tissues are unremarkable  IMPRESSION:       1  No acute osseous abnormality  Mild narrowing of the acromiohumeral interval which can be indicative of rotator cuff pathology  MRI could be considered if clinically warranted  2  Degenerative changes as described         Workstation performed: [id number]     Signed by:   Jordy Sams MD   5/19/17

## 2018-01-14 VITALS
HEART RATE: 142 BPM | HEIGHT: 64 IN | WEIGHT: 290 LBS | SYSTOLIC BLOOD PRESSURE: 105 MMHG | BODY MASS INDEX: 49.51 KG/M2 | DIASTOLIC BLOOD PRESSURE: 69 MMHG

## 2018-01-14 VITALS
HEIGHT: 64 IN | WEIGHT: 290 LBS | BODY MASS INDEX: 49.51 KG/M2 | SYSTOLIC BLOOD PRESSURE: 106 MMHG | HEART RATE: 70 BPM | DIASTOLIC BLOOD PRESSURE: 65 MMHG

## 2018-01-14 VITALS
BODY MASS INDEX: 50.02 KG/M2 | DIASTOLIC BLOOD PRESSURE: 60 MMHG | WEIGHT: 293 LBS | TEMPERATURE: 98.4 F | HEART RATE: 76 BPM | SYSTOLIC BLOOD PRESSURE: 140 MMHG | HEIGHT: 64 IN

## 2018-01-14 VITALS
BODY MASS INDEX: 48.83 KG/M2 | SYSTOLIC BLOOD PRESSURE: 137 MMHG | HEIGHT: 64 IN | DIASTOLIC BLOOD PRESSURE: 72 MMHG | HEART RATE: 61 BPM | WEIGHT: 286 LBS

## 2018-01-14 VITALS
TEMPERATURE: 96.7 F | HEART RATE: 72 BPM | DIASTOLIC BLOOD PRESSURE: 64 MMHG | HEIGHT: 63 IN | WEIGHT: 288.13 LBS | SYSTOLIC BLOOD PRESSURE: 98 MMHG | BODY MASS INDEX: 51.05 KG/M2

## 2018-01-14 VITALS
HEART RATE: 65 BPM | SYSTOLIC BLOOD PRESSURE: 160 MMHG | BODY MASS INDEX: 47.97 KG/M2 | WEIGHT: 281 LBS | HEIGHT: 64 IN | DIASTOLIC BLOOD PRESSURE: 80 MMHG

## 2018-01-15 VITALS
SYSTOLIC BLOOD PRESSURE: 154 MMHG | HEIGHT: 64 IN | DIASTOLIC BLOOD PRESSURE: 77 MMHG | HEART RATE: 67 BPM | BODY MASS INDEX: 50.02 KG/M2 | WEIGHT: 293 LBS

## 2018-01-16 NOTE — RESULT NOTES
Verified Results  * MRI SHOULDER LEFT WO CONTRAST [id number] 02:33PM Taylor Carcamo     Test Name Result Flag Reference   MRI SHOULDER LEFT WO CONTRAST (Report)     This is a summary report  The complete report is available in the patient's medical record  If you cannot access the medical record, please contact the sending organization for a detailed fax or copy  MRI LEFT SHOULDER     INDICATION: M25 512: Pain in left shoulder   M75 22: Bicipital tendinitis, left shoulder  History taken directly from the electronic ordering system  COMPARISON: None  TECHNIQUE:  The following MR sequences were obtained of the left shoulder: Localizer, axial GRE/PD fat sat, oblique coronal T2 fat sat, oblique sagittal T1/T2 fat sat  Images were acquired on a 1 5 Kelly unit  Gadolinium was not used  FINDINGS:     SUBCUTANEOUS TISSUES: Normal     JOINT EFFUSION: Moderate-sized joint effusion seen     ACROMION PROCESS: Type I acromion seen     ROTATOR CUFF: There is massive rotator cuff tear with the complete tear of the supraspinatus there is associated severe atrophy of the supraspinatus  There is marked retraction of the supraspinatus tendon with the stump of the torn tendon lying at the    level of the glenohumeral joint   There is complete tear of the infraspinatus with severe retraction of the tendon   There is a T2 hyperintensity noted within the infraspinatus muscle belly, this may be due to hematoma or delaminated fibers of the infraspinatus   The teres minor is intact   Atrophy of the infraspinatus seen   There is partial tearing of the subscapularis   SUBACROMIAL/SUBDELTOID BURSA: There is fluid in the subacromial subdeltoid bursa      LONG HEAD OF BICEPS TENDON: There is subluxation of the long head of the biceps tendon which is seen lying perched at the level of the biceps pulley   There is tendinosis of the long head of the biceps tendon     GLENOID LABRUM: Degenerative changes are seen in the anterosuperior and posterior superior labrum     GLENOHUMERAL JOINT: There is superior subluxation of the humeral head  There is articular cartilage wear from the humeral head suggesting early arthritic changes     ACROMIOCLAVICULAR JOINT: Moderate acromioclavicular joint hypertrophy seen     BONE MARROW SIGNAL:    There is no abnormal more marrow signal intensity noted  Mild erosive changes at the level of the greater tuberosity are noted       IMPRESSION:     There is massive rotator cuff tear with this large full-thickness tear of the supraspinatus with severe atrophy] severe retraction of the stump  The stump of the supraspinatus lies at the level of the glenohumeral joint     There is complete full-thickness tear of the infraspinatus     A lobulated T2 hyperintensity noted within the substance the infraspinatus at the level of the infraspinatus fossa and musculotendinous junction may be due to retracted delaminated fibers of infraspinatus or intramuscular hematoma      Partial tear subscapularis with the tendinosis         Workstation performed: [id number]     Signed by:   Rubia Galindo MD   5/30/17

## 2018-01-16 NOTE — RESULT NOTES
Verified Results  (1) COMPREHENSIVE METABOLIC PANEL [id number] 24:38LR Gladis Runner   National Kidney Disease Education Program recommendations are as follows:  GFR calculation is accurate only with a steady state creatinine  Chronic Kidney disease less than 60 ml/min/1 73 sq  meters  Kidney failure less than 15 ml/min/1 73 sq  meters  Test Name Result Flag Reference   GLUCOSE,RANDM 144 mg/dL H    If the patient is fasting, the ADA then defines impaired fasting glucose as > 100 mg/dL and diabetes as > or equal to 123 mg/dL     SODIUM 144 mmol/L  136-145   POTASSIUM 4 4 mmol/L  3 5-5 3   CHLORIDE 106 mmol/L  100-108   CARBON DIOXIDE 29 mmol/L  21-32   ANION GAP (CALC) 9 mmol/L  4-13   BLOOD UREA NITROGEN 27 mg/dL H 5-25   CREATININE 1 57 mg/dL H 0 60-1 30   Standardized to IDMS reference method   CALCIUM 9 2 mg/dL  8 3-10 1   BILI, TOTAL 0 38 mg/dL  0 20-1 00   ALK PHOSPHATAS 70 U/L     ALT (SGPT) 25 U/L  12-78   AST(SGOT) 15 U/L  5-45   ALBUMIN 3 1 g/dL L 3 5-5 0   TOTAL PROTEIN 6 4 g/dL  6 4-8 2   eGFR Non-African American 32 4 ml/min/1 73sq m

## 2018-01-22 VITALS
HEART RATE: 79 BPM | HEIGHT: 64 IN | DIASTOLIC BLOOD PRESSURE: 72 MMHG | SYSTOLIC BLOOD PRESSURE: 114 MMHG | WEIGHT: 288 LBS | BODY MASS INDEX: 49.17 KG/M2

## 2018-01-22 VITALS
RESPIRATION RATE: 20 BRPM | SYSTOLIC BLOOD PRESSURE: 118 MMHG | BODY MASS INDEX: 47.81 KG/M2 | TEMPERATURE: 97 F | WEIGHT: 280.06 LBS | HEIGHT: 64 IN | HEART RATE: 80 BPM | DIASTOLIC BLOOD PRESSURE: 74 MMHG

## 2018-01-23 VITALS
DIASTOLIC BLOOD PRESSURE: 90 MMHG | HEART RATE: 80 BPM | HEIGHT: 63 IN | WEIGHT: 288.13 LBS | BODY MASS INDEX: 51.05 KG/M2 | SYSTOLIC BLOOD PRESSURE: 145 MMHG

## 2018-01-23 NOTE — RESULT NOTES
Verified Results  ECHO COMPLETE WITH CONTRAST IF INDICATED [id number] 10:15AM Francine Negron Order Number: [id number]    - Patient Instructions: To schedule this appointment, please contact Central Scheduling at [phone]  Test Name Result Flag Reference   ECHO COMPLETE WITH CONTRAST IF INDICATED (Report)     [address]   [facility], [address] [place] [address]   [phone]     Transthoracic Echocardiogram   2D, M-mode, Doppler, and Color Doppler     Study date: 07-Dec-2017     Patient: Sean Rollins   MR number: [id number]   Account number: [de-identified]   : 1945   Age: 74 years   Gender: Female   Status: Outpatient   Location: [address] [place] [address] [facility]   Height: 62 in   Weight: 288 lb   BP: 98/ 64 mmHg     Indications: Dyspnea, edema     Diagnoses: R06 09 - Other forms of dyspnea, R60 9 - Edema, unspecified     Sonographer: VERONICA Navarrete   Referring Physician: Charity Kumar DO   Group: [facility]   Interpreting Physician: Paris Cooper DO     SUMMARY     LEFT VENTRICLE:   Systolic function was normal  Ejection fraction was estimated to be 60 %  There were no regional wall motion abnormalities  Wall thickness was mildly increased  Doppler parameters were consistent with abnormal left ventricular relaxation (grade 1 diastolic dysfunction)  LEFT ATRIUM:   The atrium was mildly dilated  MITRAL VALVE:   There was mild regurgitation  AORTIC VALVE:   There was very mild stenosis  HISTORY: PRIOR HISTORY: Hypertension, high cholesterol, CAD, atrial fibrillation, asthma, COPD, DM, edema, hypothyroidism     PROCEDURE: The study was performed in the [address] [facility]  This was a routine study  The transthoracic approach was used  The study included complete 2D imaging, M-mode, complete spectral Doppler, and color Doppler  This   was a technically difficult study       LEFT VENTRICLE: Size was normal  Systolic function was normal  Ejection fraction was estimated to be 60 %  There were no regional wall motion abnormalities  Wall thickness was mildly increased  DOPPLER: Doppler parameters were consistent   with abnormal left ventricular relaxation (grade 1 diastolic dysfunction)  RIGHT VENTRICLE: The size was normal  Systolic function was normal  Wall thickness was normal      LEFT ATRIUM: The atrium was mildly dilated  RIGHT ATRIUM: Size was normal      MITRAL VALVE: Valve structure was normal  There was normal leaflet separation  DOPPLER: The transmitral velocity was within the normal range  There was no evidence for stenosis  There was mild regurgitation  AORTIC VALVE: The valve was trileaflet  Leaflets exhibited mildly increased thickness, mild calcification, and lower normal cuspal separation  DOPPLER: Transaortic velocity was minimally increased  There was very mild stenosis  There was   no significant regurgitation  TRICUSPID VALVE: The valve structure was normal  There was normal leaflet separation  DOPPLER: The transtricuspid velocity was within the normal range  There was no evidence for stenosis  There was no significant regurgitation  PULMONIC VALVE: Leaflets exhibited normal thickness, no calcification, and normal cuspal separation  DOPPLER: The transpulmonic velocity was within the normal range  There was no significant regurgitation  PERICARDIUM: There was no pericardial effusion  The pericardium was normal in appearance  AORTA: The root exhibited normal size  SYSTEMIC VEINS: IVC: The inferior vena cava was normal in size       SYSTEM MEASUREMENT TABLES     2D   %FS: 29 48 %   AV Diam: 3 07 cm   EDV(Teich): 123 01 ml   EF(Cube): 64 93 %   EF(Teich): 56 15 %   ESV(Cube): 46 13 ml   ESV(Teich): 53 94 ml   IVSd: 1 13 cm   LA Area: 22 33 cm2   LA Diam: 3 71 cm   LVEDV MOD A4C: 143 23 ml   LVEF MOD A4C: 59 57 %   LVESV MOD A4C: 57 91 ml   LVIDd: 5 09 cm   LVIDs: 3 59 cm   LVLd A4C: 7 57 cm   LVLs A4C: 6 34 cm   LVOT Diam: 2 09 cm   LVPWd: 1 24 cm   RA Area: 15 34 cm2   RV Diam : 3 79 cm   SV MOD A4C: 85 32 ml   SV(Cube): 85 41 ml   SV(Teich): 69 06 ml     CW   AV Env  Ti: 379 54 ms   AV SV: 364 79 ml   AV VTI: 49 28 cm   AV Vmax: 2 01 m/s   AV Vmean: 1 3 m/s   AV maxP 16 mmHg   AV meanP 79 mmHg     MM   TAPSE: 2 25 cm     PW   ANTONIO (VTI): 1 9 cm2   ANTONIO Vmax: 1 8 cm2   E': 0 05 m/s   E/E': 18 98   LVOT Env  Ti: 423 91 ms   LVOT VTI: 27 28 cm   LVOT Vmax: 1 05 m/s   LVOT Vmean: 0 65 m/s   LVOT maxP 44 mmHg   LVOT meanP 03 mmHg   LVSV Dopp: 93 59 ml   MV A Nadeem: 0 92 m/s   MV Dec [place]: 5 65 m/s2   MV DecT: 163 82 ms   MV E Nadeem: 0 93 m/s   MV E/A Ratio: 1 01     [facility] Accredited Echocardiography Laboratory     Prepared and electronically signed by     Tip Lunsford DO   Signed 07-Dec-2017 13:53:25

## 2018-01-24 DIAGNOSIS — E03.9 HYPOTHYROIDISM, UNSPECIFIED TYPE: Primary | ICD-10-CM

## 2018-01-24 RX ORDER — LEVOTHYROXINE SODIUM 0.1 MG/1
100 TABLET ORAL DAILY
Qty: 30 TABLET | Refills: 0 | Status: SHIPPED | OUTPATIENT
Start: 2018-01-24 | End: 2018-02-23 | Stop reason: SDUPTHER

## 2018-01-24 RX ORDER — LEVOTHYROXINE SODIUM 0.1 MG/1
100 TABLET ORAL DAILY
Qty: 30 TABLET | Refills: 3 | Status: SHIPPED | OUTPATIENT
Start: 2018-01-24 | End: 2018-01-24 | Stop reason: SDUPTHER

## 2018-01-24 RX ORDER — BACLOFEN 10 MG/1
TABLET ORAL
COMMUNITY
Start: 2017-04-28 | End: 2020-10-27 | Stop reason: ALTCHOICE

## 2018-01-26 RX ORDER — ERGOCALCIFEROL (VITAMIN D2) 10 MCG
1 TABLET ORAL DAILY
COMMUNITY
End: 2022-01-02 | Stop reason: ALTCHOICE

## 2018-01-29 ENCOUNTER — OFFICE VISIT (OUTPATIENT)
Dept: CARDIOLOGY CLINIC | Facility: CLINIC | Age: 73
End: 2018-01-29
Payer: MEDICARE

## 2018-01-29 VITALS
HEIGHT: 65 IN | SYSTOLIC BLOOD PRESSURE: 118 MMHG | DIASTOLIC BLOOD PRESSURE: 80 MMHG | HEART RATE: 60 BPM | BODY MASS INDEX: 48.27 KG/M2 | WEIGHT: 289.7 LBS

## 2018-01-29 DIAGNOSIS — I48.0 PAROXYSMAL ATRIAL FIBRILLATION (HCC): ICD-10-CM

## 2018-01-29 DIAGNOSIS — Z71.9 ENCOUNTER FOR CONSULTATION: Primary | ICD-10-CM

## 2018-01-29 DIAGNOSIS — I10 ESSENTIAL HYPERTENSION: ICD-10-CM

## 2018-01-29 DIAGNOSIS — E78.00 HYPERCHOLESTEROLEMIA: ICD-10-CM

## 2018-01-29 DIAGNOSIS — R60.9 EDEMA, UNSPECIFIED TYPE: ICD-10-CM

## 2018-01-29 DIAGNOSIS — I25.10 ARTERIOSCLEROSIS OF CORONARY ARTERY: ICD-10-CM

## 2018-01-29 DIAGNOSIS — I35.0 NONRHEUMATIC AORTIC VALVE STENOSIS: ICD-10-CM

## 2018-01-29 PROCEDURE — 99204 OFFICE O/P NEW MOD 45 MIN: CPT | Performed by: INTERNAL MEDICINE

## 2018-01-29 PROCEDURE — 93000 ELECTROCARDIOGRAM COMPLETE: CPT | Performed by: INTERNAL MEDICINE

## 2018-01-29 RX ORDER — FUROSEMIDE 40 MG/1
40 TABLET ORAL
Qty: 180 TABLET | Refills: 3 | Status: SHIPPED | OUTPATIENT
Start: 2018-01-29 | End: 2018-03-16 | Stop reason: SDUPTHER

## 2018-01-29 NOTE — PROGRESS NOTES
Cardiology Consultation     Elaine Reed  [id number]  1945  [facility] [address]    1  Encounter for consultation  POCT ECG   2  Paroxysmal atrial fibrillation (HCC)     3  Arteriosclerosis of coronary artery     4  Essential hypertension     5  Edema, unspecified type  furosemide (LASIX) 40 mg tablet   6  Hypercholesterolemia     7  Nonrheumatic aortic valve stenosis       HPI: Patient is here for evaluation and management of lower extremity edema  She has been started on furosemide 40mg and spironolactone by her PCP (Dr Blanka Bianchi) - which she does not take every day due to her schedule  She started on her left leg with 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 in August - swollen since then  Her right side does not have Charcot-Gayathri, but it has also started swelling as well  Patient Active Problem List   Diagnosis    Arteriosclerosis of coronary artery    Edema    Hypercholesterolemia    Hypertension    Aortic stenosis     Past Medical History:   Diagnosis Date    Aortic stenosis     Coronary artery disease     Myocardial infarction      Social History     Social History    Marital status: /Civil Union     Spouse name: N/A    Number of children: N/A    Years of education: N/A     Occupational History    Not on file       Social History Main Topics    Smoking status: Never Smoker    Smokeless tobacco: Never Used    Alcohol use No    Drug use: No    Sexual activity: Not on file     Other Topics Concern    Not on file     Social History Narrative    No narrative on file      Family History   Problem Relation Age of Onset    Hypertension Father      Past Surgical History:   Procedure Laterality Date    BUNIONECTOMY      CHOLECYSTECTOMY      KY COLONOSCOPY FLX DX W/COLLJ SPEC WHEN PFRMD N/A 8/21/2017    Procedure: COLONOSCOPY;  Surgeon: Elza Gu MD;  Location: [facility];   Service: Colorectal    REPAIR KNEE LIGAMENT         Current Outpatient Prescriptions:     albuterol (PROVENTIL HFA,VENTOLIN HFA) 90 mcg/act inhaler, Inhale 2 puffs every 6 (six) hours as needed for wheezing, Disp: , Rfl:     diclofenac sodium (VOLTAREN) 1 %, Place 1 g on the skin 2 (two) times a day, Disp: , Rfl:     DICLOFENAC-MISOPROSTOL PO, Take by mouth 2 (two) times a day, Disp: , Rfl:     furosemide (LASIX) 40 mg tablet, Take 1 tablet (40 mg total) by mouth 2 (two) times a day, Disp: 180 tablet, Rfl: 3    gabapentin (NEURONTIN) 300 mg capsule, Take 600 mg by mouth 2 (two) times a day  , Disp: , Rfl:     glimepiride (AMARYL) 4 mg tablet, Take 8 mg by mouth daily with breakfast, Disp: , Rfl:     levothyroxine 100 mcg tablet, Take 1 tablet by mouth daily, Disp: 30 tablet, Rfl: 0    metFORMIN (GLUCOPHAGE) 500 mg tablet, Take 500 mg by mouth daily with breakfast, Disp: , Rfl:     Multiple Vitamin (DAILY VALUE MULTIVITAMIN) TABS, Take 1 tablet by mouth daily, Disp: , Rfl:     multivitamin (THERAGRAN) TABS, Take 1 tablet by mouth daily, Disp: , Rfl:     nebivolol (BYSTOLIC) 5 mg tablet, Take 5 mg by mouth daily, Disp: , Rfl:     pantoprazole (PROTONIX) 40 mg tablet, Take 40 mg by mouth daily, Disp: , Rfl:     potassium chloride (KLOR-CON) 20 mEq packet, Take 20 mEq by mouth daily, Disp: , Rfl:     predniSONE 5 mg tablet, Take 5 mg by mouth daily, Disp: , Rfl:     spironolactone (ALDACTONE) 50 mg tablet, Take 50 mg by mouth daily as needed 1 to 1/2 daily , Disp: , Rfl:     venlafaxine (EFFEXOR) 75 mg tablet, Take 75 mg by mouth 3 (three) times a day, Disp: , Rfl:     baclofen 10 mg tablet, Take by mouth, Disp: , Rfl:   Allergies   Allergen Reactions    Lyrica [Pregabalin] Swelling    Sulfa Antibiotics Swelling     Vitals:    01/29/18 0919   BP: 118/80   BP Location: Left arm   Patient Position: Sitting   Cuff Size: Large   Pulse: 60   Weight: 131 kg (289 lb 11 2 oz)   Height: 5' 5" (1 651 m)       Labs:  Hospital Outpatient Visit on 12/07/2017   Component Date Value    Protocol Name 12/07/2017 NAGI SIT     Time In Exercise Phase 12/07/2017 00:03:00     MAX  SYSTOLIC BP 60/43/8308 238     Max Diastolic Bp 29/38/6637 74     Max Heart Rate 12/07/2017 61     Max Predicted Heart Rate 12/07/2017 146     Reason for Termination 12/07/2017 TEST COMPLETE        Test Indication 12/07/2017 Peripheral edema     Target Hr Formular 12/07/2017 (220 - Age)*100%    Appointment on 11/27/2017   Component Date Value    Creatinine, 24H Ur 11/27/2017 0 9     TOTAL URINE VOLUME 11/27/2017 1000    Appointment on 11/17/2017   Component Date Value    Sodium 11/17/2017 141     Potassium 11/17/2017 4 0     Chloride 11/17/2017 103     CO2 11/17/2017 31     Anion Gap 11/17/2017 7     BUN 11/17/2017 33*    Creatinine 11/17/2017 1 77*    Glucose, Fasting 11/17/2017 111*    Calcium 11/17/2017 9 8     eGFR 11/17/2017 28     NT-proBNP 11/17/2017 246*   Appointment on 11/10/2017   Component Date Value    NT-proBNP 11/10/2017 508*   Appointment on 10/05/2017   Component Date Value    TSH 3RD GENERATON 10/05/2017 1 030     Sodium 10/05/2017 141     Potassium 10/05/2017 4 8     Chloride 10/05/2017 108     CO2 10/05/2017 26     Anion Gap 10/05/2017 7     BUN 10/05/2017 32*    Creatinine 10/05/2017 1 36*    Glucose, Fasting 10/05/2017 94     Calcium 10/05/2017 9 5     AST 10/05/2017 14     ALT 10/05/2017 18     Alkaline Phosphatase 10/05/2017 83     Total Protein 10/05/2017 6 7     Albumin 10/05/2017 3 1*    Total Bilirubin 10/05/2017 0 35     eGFR 10/05/2017 38     Cholesterol 10/05/2017 209*    Triglycerides 10/05/2017 123     HDL, Direct 10/05/2017 53     LDL Calculated 10/05/2017 131*    Hemoglobin A1C 10/05/2017 7 4*    EAG 10/05/2017 166    Admission on 08/21/2017, Discharged on 08/21/2017   Component Date Value    POC Glucose 08/21/2017 186*    Case Report 08/21/2017 Value:Surgical Pathology Report                         Case: [id number]                                   Authorizing Provider:  Nivia Che MD      Collected:           08/21/2017 1008              Ordering Location:     [address]      [facility]            [facility]                                                           Pathologist:           Tonie Murphy MD                                                               Specimen:    Polyp, Colorectal, Recto sigmoid x 3                                                       Final Diagnosis 08/21/2017                      Value: This result contains rich text formatting which cannot be displayed here   Additional Information 08/21/2017                      Value: This result contains rich text formatting which cannot be displayed here  [place] Description 08/21/2017                      Value: This result contains rich text formatting which cannot be displayed here  Lab Results   Component Value Date    CHOL 209 (H) 10/05/2017    CHOL 184 09/19/2015    TRIG 123 10/05/2017    TRIG 113 09/19/2015    HDL 53 10/05/2017    HDL 47 09/19/2015     Imaging: No results found  Review of Systems:  Review of Systems   Constitutional: Negative for activity change, appetite change, chills, diaphoresis, fatigue and unexpected weight change  HENT: Negative for hearing loss, nosebleeds and sore throat  Eyes: Negative for photophobia and visual disturbance  Respiratory: Positive for wheezing  Negative for cough, chest tightness and shortness of breath  Cardiovascular: Positive for leg swelling  Negative for chest pain and palpitations  Gastrointestinal: Negative for abdominal pain, diarrhea, nausea and vomiting  Endocrine: Negative for polyuria  Genitourinary: Negative for dysuria, frequency and hematuria  Musculoskeletal: Positive for arthralgias   Negative for back pain, gait problem and neck pain  Skin: Negative for pallor and rash  Neurological: Negative for dizziness, syncope and headaches  Hematological: Does not bruise/bleed easily  Psychiatric/Behavioral: Negative for behavioral problems and confusion  Physical Exam:  Physical Exam   Constitutional: She is oriented to person, place, and time  She appears well-developed and well-nourished  HENT:   Head: Normocephalic and atraumatic  Nose: Nose normal    Eyes: EOM are normal  Pupils are equal, round, and reactive to light  No scleral icterus  Neck: Normal range of motion  Neck supple  No JVD present  Cardiovascular: Normal rate and regular rhythm  Exam reveals no gallop and no friction rub  Murmur heard  Systolic murmur is present with a grade of 2/6   Pulmonary/Chest: Effort normal and breath sounds normal  No respiratory distress  She has no wheezes  She has no rales  Abdominal: Soft  Bowel sounds are normal  She exhibits no distension  There is no tenderness  Musculoskeletal: Normal range of motion  She exhibits edema  She exhibits no deformity  Neurological: She is alert and oriented to person, place, and time  No cranial nerve deficit  Skin: Skin is warm and dry  No rash noted  She is not diaphoretic  Psychiatric: She has a normal mood and affect  Her behavior is normal    Vitals reviewed  Nuclear stress test: 12/2017 - personally reviewed, small fixed inferoapical defect consistent with prior MI, normal EF  Echo: 12/2017 - personally reviewed, normal EF, no wall motion abnormalities, G1DD, mild MR, very mild AS (mean gradient 8 mmHg)    Discussion/Summary:  1) LE edema: likely resultant from G1DD Agree with continued furosemide and spironolactone  Will re-evaluate in 1 month  She has not noticed that significant of a change in her edema since being on furosemide - will increase dose to BID    Have advised patient to check her weight every day to help check to see how her fluid removal is going  2) Mild aortic stenosis: inconsequential at this time  Continue to follow  3) h/o CAD with prior MI: seen on nuclear stress test in Dec 2017 as well  No ischemia seen on that study  Continue medical management with bystolic  4) HTN: well controlled, continue current regimen  5) HLD: not currently on statin  Currently at goal       6) Afib: no prior history of this - will remove from chart

## 2018-01-29 NOTE — PATIENT INSTRUCTIONS
Edema   WHAT YOU NEED TO KNOW:   What is edema? Edema is swelling throughout your body  Edema is usually a sign that you are retaining fluid  The swelling may be caused by heart failure or kidney, thyroid, or liver disease  It may also be caused by medicines such as antidepressants, blood pressure medicines, or hormones  Sudden swelling around the lips or face may be a sign of a severe allergic reaction  Swelling of an arm or leg may be caused by blockage of your veins  What other signs and symptoms may occur with edema? · Discomfort or tenderness in the swollen areas    · Tight and shiny skin over the swollen areas    · Weight gain  How is edema diagnosed? Your healthcare provider will ask about your symptoms and any other symptoms you have  He may also ask about any medical conditions you have  Your healthcare provider will examine your skin over the swollen areas  He may gently push on the swollen area for a short time to see if this leaves a dimple  He may also order tests to find the cause of your edema  How is edema treated and managed? Treatment for edema depends on the cause  Depending on your medical condition, you may be given medicine to help get rid of extra body fluid  Your healthcare provider may suggest that you do any of the following to help manage edema:  · Elevate  your arms or legs as directed  Raise them above the level of your heart as often as you can  This will help decrease swelling and pain  Prop them on pillows or blankets to keep them elevated comfortably  · Wear pressure stockings as directed  The stockings are tight and put pressure on your legs  This helps to keep fluid from collecting in your legs or ankles  · Limit your salt intake  Salt causes your body to hold water  Ask about any other changes to your diet  · Stay active  Do not stand or sit for long periods of time  Ask your healthcare provider about the best exercise plan for you      · Keep your skin moist using lotion, cream, or ointment  Ask your healthcare provider what to use and how often to use it  When should I contact my healthcare provider? · The swollen area feels cold and is pale or blue in color  · The swollen area feels warm, painful, and is red in color  · You have increased swelling or swelling in other parts of your body  · You have questions or concerns about your condition or care  When should I seek immediate care? · You have shortness of breath at rest, especially when you lie down  · You cough up pink, foamy sputum  · You have chest pain  · Your heartbeat is fast or uneven  CARE AGREEMENT:   You have the right to help plan your care  Learn about your health condition and how it may be treated  Discuss treatment options with your caregivers to decide what care you want to receive  You always have the right to refuse treatment  The above information is an  only  It is not intended as medical advice for individual conditions or treatments  Talk to your doctor, nurse or pharmacist before following any medical regimen to see if it is safe and effective for you  © 2017 [address]  Information is for End User's use only and may not be sold, redistributed or otherwise used for commercial purposes  All illustrations and images included in CareNotes® are the copyrighted property of [employer]  or Rashaad David

## 2018-01-30 DIAGNOSIS — R60.9 EDEMA, UNSPECIFIED TYPE: Primary | ICD-10-CM

## 2018-01-30 RX ORDER — POTASSIUM CHLORIDE 1.5 G/1.77G
20 POWDER, FOR SOLUTION ORAL DAILY
Qty: 90 TABLET | Refills: 3 | Status: SHIPPED | OUTPATIENT
Start: 2018-01-30 | End: 2018-01-31 | Stop reason: SDUPTHER

## 2018-01-31 DIAGNOSIS — R60.9 EDEMA, UNSPECIFIED TYPE: Primary | ICD-10-CM

## 2018-01-31 DIAGNOSIS — R60.9 EDEMA, UNSPECIFIED TYPE: ICD-10-CM

## 2018-01-31 RX ORDER — POTASSIUM CHLORIDE 20 MEQ/1
20 TABLET, EXTENDED RELEASE ORAL DAILY
Qty: 30 TABLET | Refills: 3 | Status: SHIPPED | OUTPATIENT
Start: 2018-01-31 | End: 2018-02-26 | Stop reason: SDUPTHER

## 2018-01-31 RX ORDER — POTASSIUM CHLORIDE 1.5 G/1.77G
20 POWDER, FOR SOLUTION ORAL DAILY
Qty: 90 TABLET | Refills: 0 | Status: SHIPPED | OUTPATIENT
Start: 2018-01-31 | End: 2018-01-31 | Stop reason: CLARIF

## 2018-01-31 RX ORDER — POTASSIUM CHLORIDE 20 MEQ/1
1 TABLET, EXTENDED RELEASE ORAL DAILY
COMMUNITY
Start: 2016-10-04 | End: 2018-01-31 | Stop reason: SDUPTHER

## 2018-02-06 RX ORDER — ALBUTEROL SULFATE 90 UG/1
2 AEROSOL, METERED RESPIRATORY (INHALATION) 4 TIMES DAILY PRN
COMMUNITY
Start: 2015-09-23 | End: 2020-03-24 | Stop reason: SDUPTHER

## 2018-02-06 RX ORDER — DICLOFENAC SODIUM AND MISOPROSTOL 50; 200 MG/1; UG/1
1 TABLET, DELAYED RELEASE ORAL 2 TIMES DAILY
COMMUNITY
Start: 2014-06-17 | End: 2018-09-19 | Stop reason: SDUPTHER

## 2018-02-06 RX ORDER — PANTOPRAZOLE SODIUM 40 MG/1
1 TABLET, DELAYED RELEASE ORAL DAILY
COMMUNITY
Start: 2013-06-27 | End: 2018-03-16 | Stop reason: SDUPTHER

## 2018-02-06 RX ORDER — VALACYCLOVIR HYDROCHLORIDE 1 G/1
1 TABLET, FILM COATED ORAL 3 TIMES DAILY
COMMUNITY
Start: 2017-03-15 | End: 2019-05-09 | Stop reason: ALTCHOICE

## 2018-02-06 RX ORDER — NEBIVOLOL 5 MG/1
1 TABLET ORAL DAILY
COMMUNITY
Start: 2013-06-11 | End: 2018-03-16 | Stop reason: SDUPTHER

## 2018-02-06 RX ORDER — GABAPENTIN 300 MG/1
CAPSULE ORAL
COMMUNITY
End: 2018-03-16 | Stop reason: SDUPTHER

## 2018-02-06 RX ORDER — LANCETS
EACH MISCELLANEOUS
COMMUNITY
Start: 2017-07-07 | End: 2022-03-08

## 2018-02-06 RX ORDER — TRIAMCINOLONE ACETONIDE 0.25 MG/G
CREAM TOPICAL
COMMUNITY
Start: 2017-10-09 | End: 2019-05-09 | Stop reason: ALTCHOICE

## 2018-02-07 ENCOUNTER — TELEPHONE (OUTPATIENT)
Dept: FAMILY MEDICINE CLINIC | Facility: CLINIC | Age: 73
End: 2018-02-07

## 2018-02-07 DIAGNOSIS — E78.00 HYPERCHOLESTEROLEMIA: ICD-10-CM

## 2018-02-07 DIAGNOSIS — E13.9 DIABETES 1.5, MANAGED AS TYPE 2 (HCC): Primary | ICD-10-CM

## 2018-02-12 ENCOUNTER — TRANSCRIBE ORDERS (OUTPATIENT)
Dept: LAB | Facility: CLINIC | Age: 73
End: 2018-02-12

## 2018-02-12 ENCOUNTER — OFFICE VISIT (OUTPATIENT)
Dept: OBGYN CLINIC | Facility: CLINIC | Age: 73
End: 2018-02-12
Payer: MEDICARE

## 2018-02-12 ENCOUNTER — APPOINTMENT (OUTPATIENT)
Dept: LAB | Facility: CLINIC | Age: 73
End: 2018-02-12
Payer: MEDICARE

## 2018-02-12 VITALS
HEIGHT: 63 IN | SYSTOLIC BLOOD PRESSURE: 102 MMHG | BODY MASS INDEX: 51.03 KG/M2 | DIASTOLIC BLOOD PRESSURE: 62 MMHG | WEIGHT: 288 LBS

## 2018-02-12 DIAGNOSIS — E13.9 DIABETES 1.5, MANAGED AS TYPE 2 (HCC): ICD-10-CM

## 2018-02-12 DIAGNOSIS — E13.9 DIABETES 1.5, MANAGED AS TYPE 2 (HCC): Primary | ICD-10-CM

## 2018-02-12 DIAGNOSIS — E78.00 HYPERCHOLESTEROLEMIA: ICD-10-CM

## 2018-02-12 DIAGNOSIS — M17.0 PRIMARY OSTEOARTHRITIS OF BOTH KNEES: Primary | ICD-10-CM

## 2018-02-12 LAB
ALBUMIN SERPL BCP-MCNC: 3.5 G/DL (ref 3.5–5)
ALP SERPL-CCNC: 87 U/L (ref 46–116)
ALT SERPL W P-5'-P-CCNC: 23 U/L (ref 12–78)
ANION GAP SERPL CALCULATED.3IONS-SCNC: 11 MMOL/L (ref 4–13)
AST SERPL W P-5'-P-CCNC: 16 U/L (ref 5–45)
BILIRUB SERPL-MCNC: 0.4 MG/DL (ref 0.2–1)
BUN SERPL-MCNC: 42 MG/DL (ref 5–25)
CALCIUM SERPL-MCNC: 9.3 MG/DL (ref 8.3–10.1)
CHLORIDE SERPL-SCNC: 103 MMOL/L (ref 100–108)
CHOLEST SERPL-MCNC: 230 MG/DL (ref 50–200)
CO2 SERPL-SCNC: 30 MMOL/L (ref 21–32)
CREAT SERPL-MCNC: 1.89 MG/DL (ref 0.6–1.3)
CREAT UR-MCNC: 34.9 MG/DL
EST. AVERAGE GLUCOSE BLD GHB EST-MCNC: 154 MG/DL
GFR SERPL CREATININE-BSD FRML MDRD: 26 ML/MIN/1.73SQ M
GLUCOSE P FAST SERPL-MCNC: 123 MG/DL (ref 65–99)
HBA1C MFR BLD: 7 % (ref 4.2–6.3)
HDLC SERPL-MCNC: 47 MG/DL (ref 40–60)
LDLC SERPL CALC-MCNC: 147 MG/DL (ref 0–100)
MICROALBUMIN UR-MCNC: <5 MG/L (ref 0–20)
MICROALBUMIN/CREAT 24H UR: <14 MG/G CREATININE (ref 0–30)
POTASSIUM SERPL-SCNC: 3.6 MMOL/L (ref 3.5–5.3)
PROT SERPL-MCNC: 7 G/DL (ref 6.4–8.2)
SODIUM SERPL-SCNC: 144 MMOL/L (ref 136–145)
TRIGL SERPL-MCNC: 182 MG/DL

## 2018-02-12 PROCEDURE — 82043 UR ALBUMIN QUANTITATIVE: CPT

## 2018-02-12 PROCEDURE — 36415 COLL VENOUS BLD VENIPUNCTURE: CPT

## 2018-02-12 PROCEDURE — 83036 HEMOGLOBIN GLYCOSYLATED A1C: CPT

## 2018-02-12 PROCEDURE — 99212 OFFICE O/P EST SF 10 MIN: CPT | Performed by: ORTHOPAEDIC SURGERY

## 2018-02-12 PROCEDURE — 20610 DRAIN/INJ JOINT/BURSA W/O US: CPT | Performed by: ORTHOPAEDIC SURGERY

## 2018-02-12 PROCEDURE — 80053 COMPREHEN METABOLIC PANEL: CPT

## 2018-02-12 PROCEDURE — 82570 ASSAY OF URINE CREATININE: CPT

## 2018-02-12 PROCEDURE — 80061 LIPID PANEL: CPT

## 2018-02-12 RX ORDER — BETAMETHASONE SODIUM PHOSPHATE AND BETAMETHASONE ACETATE 3; 3 MG/ML; MG/ML
6 INJECTION, SUSPENSION INTRA-ARTICULAR; INTRALESIONAL; INTRAMUSCULAR; SOFT TISSUE
Status: COMPLETED | OUTPATIENT
Start: 2018-02-12 | End: 2018-02-12

## 2018-02-12 RX ORDER — LIDOCAINE HYDROCHLORIDE 10 MG/ML
1 INJECTION, SOLUTION INFILTRATION; PERINEURAL
Status: COMPLETED | OUTPATIENT
Start: 2018-02-12 | End: 2018-02-12

## 2018-02-12 RX ADMIN — LIDOCAINE HYDROCHLORIDE 1 ML: 10 INJECTION, SOLUTION INFILTRATION; PERINEURAL at 13:23

## 2018-02-12 RX ADMIN — BETAMETHASONE SODIUM PHOSPHATE AND BETAMETHASONE ACETATE 6 MG: 3; 3 INJECTION, SUSPENSION INTRA-ARTICULAR; INTRALESIONAL; INTRAMUSCULAR; SOFT TISSUE at 13:24

## 2018-02-12 RX ADMIN — BETAMETHASONE SODIUM PHOSPHATE AND BETAMETHASONE ACETATE 6 MG: 3; 3 INJECTION, SUSPENSION INTRA-ARTICULAR; INTRALESIONAL; INTRAMUSCULAR; SOFT TISSUE at 13:23

## 2018-02-12 RX ADMIN — LIDOCAINE HYDROCHLORIDE 1 ML: 10 INJECTION, SOLUTION INFILTRATION; PERINEURAL at 13:24

## 2018-02-12 NOTE — PROGRESS NOTES
Assessment:  1  Primary osteoarthritis of both knees       Patient Active Problem List   Diagnosis    Arteriosclerosis of coronary artery    Edema    Hypercholesterolemia    Hypertension    Aortic stenosis    Primary osteoarthritis of both knees           Plan      Cortisone injection given follow up on a p r n  basis we did talk about getting the lubricant injection she reports that she had better relief and liked the Euflexxa injections but unfortunately we had to get her the Synvisc injections the last time  Subjective:     Patient ID:    Chief Complaint:Laura Sarkar 74 y o  female      HPI    Patient comes in with regards to bilateral knee pain bilateral arthritis  She was given Synvisc back in December  She is here for requesting cortisone injection  The following portions of the patient's history were reviewed and updated as appropriate: allergies, current medications, past family history, past social history, past surgical history and problem list         Social History     Social History    Marital status: /Civil Union     Spouse name: N/A    Number of children: N/A    Years of education: N/A     Occupational History    Not on file       Social History Main Topics    Smoking status: Never Smoker    Smokeless tobacco: Never Used    Alcohol use No    Drug use: No    Sexual activity: Not on file     Other Topics Concern    Not on file     Social History Narrative    No narrative on file     Past Medical History:   Diagnosis Date    Aortic stenosis     Arthritis     Asthma     Coronary artery disease     Diabetes mellitus ([facility] [address] [facility]     Heart attack 07/1999    Myocardial infarction      Past Surgical History:   Procedure Laterality Date    BUNIONECTOMY      CHOLECYSTECTOMY      COLONOSCOPY  2017    FOOT ARTHRODESIS, MODIFIED CHARANJIT  2016    GALLBLADDER SURGERY  1970    VA COLONOSCOPY FLX DX W/COLLJ SPEC WHEN PFRMD N/A 8/21/2017    Procedure: COLONOSCOPY; Surgeon: Heather Alvarez MD;  Location: [facility];   Service: Colorectal    REPAIR KNEE LIGAMENT      REPAIR KNEE LIGAMENT Left 1986    REPAIR KNEE LIGAMENT Right 1988     Allergies   Allergen Reactions    Lyrica [Pregabalin] Swelling    Sulfa Antibiotics Swelling     Current Outpatient Prescriptions on File Prior to Visit   Medication Sig Dispense Refill    albuterol (PROAIR HFA) 90 mcg/act inhaler Inhale 2 puffs 4 (four) times a day as needed      albuterol (PROVENTIL HFA,VENTOLIN HFA) 90 mcg/act inhaler Inhale 2 puffs every 6 (six) hours as needed for wheezing      baclofen 10 mg tablet Take by mouth      diclofenac sodium (VOLTAREN) 1 % Place 1 g on the skin 2 (two) times a day      diclofenac-misoprostol (ARTHROTEC 50) 50-0 2 MG per tablet Take 1 tablet by mouth 2 (two) times a day      DICLOFENAC-MISOPROSTOL PO Take by mouth 2 (two) times a day      furosemide (LASIX) 40 mg tablet Take 1 tablet (40 mg total) by mouth 2 (two) times a day 180 tablet 3    gabapentin (NEURONTIN) 300 mg capsule Take 600 mg by mouth 2 (two) times a day        gabapentin (NEURONTIN) 300 mg capsule Take by mouth      glimepiride (AMARYL) 4 mg tablet Take 8 mg by mouth daily with breakfast      glucose blood (TRUETEST TEST) test strip by In Vitro route daily      levothyroxine 100 mcg tablet Take 1 tablet by mouth daily 30 tablet 0    metFORMIN (GLUCOPHAGE) 500 mg tablet Take 500 mg by mouth daily with breakfast      MICROLET LANCETS MISC by Does not apply route      Multiple Vitamin (DAILY VALUE MULTIVITAMIN) TABS Take 1 tablet by mouth daily      multivitamin (THERAGRAN) TABS Take 1 tablet by mouth daily      nebivolol (BYSTOLIC) 5 mg tablet Take 5 mg by mouth daily      nebivolol (BYSTOLIC) 5 mg tablet Take 1 tablet by mouth daily      pantoprazole (PROTONIX) 40 mg tablet Take 40 mg by mouth daily      pantoprazole (PROTONIX) 40 mg tablet Take 1 tablet by mouth daily      potassium chloride (K-DUR,KLOR-CON) 20 mEq tablet Take 1 tablet (20 mEq total) by mouth daily 30 tablet 3    predniSONE 5 mg tablet Take 5 mg by mouth daily      spironolactone (ALDACTONE) 50 mg tablet Take 50 mg by mouth daily as needed 1 to 1/2 daily       triamcinolone (KENALOG) 0 025 % cream Apply topically      valACYclovir (VALTREX) 1,000 mg tablet Take 1 tablet by mouth 3 (three) times a day      venlafaxine (EFFEXOR) 75 mg tablet Take 75 mg by mouth 3 (three) times a day       No current facility-administered medications on file prior to visit  Objective:        Ortho Exam no effusion no ecchymosis pinpoint tenderness on the lateral joint line  Large joint arthrocentesis  Date/Time: 2/12/2018 1:23 PM  Consent given by: patient  Supporting Documentation  Indications: pain   Procedure Details  Location: knee - R knee  Needle size: 22 G  Ultrasound guidance: no  Approach: anterolateral  Medications administered: 1 mL lidocaine 1 %; 6 mg betamethasone acetate-betamethasone sodium phosphate 6 (3-3) mg/mL      Large joint arthrocentesis  Date/Time: 2/12/2018 1:24 PM  Consent given by: patient  Timeout: Immediately prior to procedure a time out was called to verify the correct patient, procedure, equipment, support staff and site/side marked as required   Supporting Documentation  Indications: pain   Procedure Details  Location: knee - L knee  Needle size: 22 G  Ultrasound guidance: no  Approach: anterolateral  Medications administered: 1 mL lidocaine 1 %; 6 mg betamethasone acetate-betamethasone sodium phosphate 6 (3-3) mg/mL                Portions of the record may have been created with voice recognition software   Occasional wrong word or "sound a like" substitutions may have occurred due to the inherent limitations of voice recognition software   Read the chart carefully and recognize, using context, where substitutions have occurred

## 2018-02-20 LAB
LEFT EYE DIABETIC RETINOPATHY: NORMAL
RIGHT EYE DIABETIC RETINOPATHY: NORMAL

## 2018-02-23 DIAGNOSIS — E03.9 HYPOTHYROIDISM, UNSPECIFIED TYPE: ICD-10-CM

## 2018-02-23 RX ORDER — LEVOTHYROXINE SODIUM 0.1 MG/1
100 TABLET ORAL DAILY
Qty: 30 TABLET | Refills: 3 | Status: SHIPPED | OUTPATIENT
Start: 2018-02-23 | End: 2018-07-05 | Stop reason: SDUPTHER

## 2018-02-26 DIAGNOSIS — R60.9 EDEMA, UNSPECIFIED TYPE: ICD-10-CM

## 2018-02-26 RX ORDER — POTASSIUM CHLORIDE 20 MEQ/1
20 TABLET, EXTENDED RELEASE ORAL DAILY
Qty: 30 TABLET | Refills: 5 | Status: SHIPPED | OUTPATIENT
Start: 2018-02-26 | End: 2018-08-27 | Stop reason: SDUPTHER

## 2018-03-16 ENCOUNTER — OFFICE VISIT (OUTPATIENT)
Dept: CARDIOLOGY CLINIC | Facility: CLINIC | Age: 73
End: 2018-03-16
Payer: MEDICARE

## 2018-03-16 VITALS
DIASTOLIC BLOOD PRESSURE: 60 MMHG | HEART RATE: 51 BPM | SYSTOLIC BLOOD PRESSURE: 136 MMHG | BODY MASS INDEX: 50.99 KG/M2 | WEIGHT: 287.8 LBS | HEIGHT: 63 IN | OXYGEN SATURATION: 96 %

## 2018-03-16 DIAGNOSIS — I25.10 ARTERIOSCLEROSIS OF CORONARY ARTERY: ICD-10-CM

## 2018-03-16 DIAGNOSIS — I35.0 NONRHEUMATIC AORTIC VALVE STENOSIS: Primary | ICD-10-CM

## 2018-03-16 DIAGNOSIS — I10 ESSENTIAL HYPERTENSION: ICD-10-CM

## 2018-03-16 DIAGNOSIS — R60.9 EDEMA, UNSPECIFIED TYPE: ICD-10-CM

## 2018-03-16 DIAGNOSIS — E78.00 HYPERCHOLESTEROLEMIA: ICD-10-CM

## 2018-03-16 PROCEDURE — 99214 OFFICE O/P EST MOD 30 MIN: CPT | Performed by: INTERNAL MEDICINE

## 2018-03-16 RX ORDER — FUROSEMIDE 40 MG/1
40 TABLET ORAL DAILY
Qty: 180 TABLET | Refills: 0 | Status: SHIPPED | OUTPATIENT
Start: 2018-03-16 | End: 2018-09-19 | Stop reason: ALTCHOICE

## 2018-03-16 NOTE — PATIENT INSTRUCTIONS

## 2018-03-16 NOTE — PROGRESS NOTES
Cardiology Consultation     Leandra Anderson  [id number]  1945  HEART & VASCULAR 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 in August - swollen since then  Her right side does not have Charcot-Gayathri, but it has also started swelling as well  Edema seems to be improving with diuretics - did lose 10 lbs  Patient Active Problem List   Diagnosis    Arteriosclerosis of coronary artery    Edema    Hypercholesterolemia    Hypertension    Aortic stenosis    Primary osteoarthritis of both knees     Past Medical History:   Diagnosis Date    Aortic stenosis     Arthritis     Asthma     Coronary artery disease     Diabetes mellitus ([facility] [address]     Diabetic peripheral neuropathy ([address] )     Last assessed - 1/27/16    Gallbladder disease     Heart attack 07/1999    Hemorrhoids     Last assessed - 11/16/12    Myocardial infarction     Old myocardial infarction     Type 2 diabetes mellitus with autonomic neuropathy (HCC)     Unspec long term insulin use status, Last assessed - 6/8/17     Social History     Social History    Marital status: /Civil Union     Spouse name: N/A    Number of children: N/A    Years of education: N/A     Occupational History    Not on file       Social History Main Topics    Smoking status: Never Smoker    Smokeless tobacco: Never Used    Alcohol use No      Comment: Social per [employer] Drug use: No    Sexual activity: Not on file     Other Topics Concern    Not on file     Social History Narrative    Hx of daily cola consumption (unk cans/day)    Daily tea consumption (unk cups/day)    Multiple organ donor    Patient has living will    Power of  in existence    Uses safety equipment - Seatbelts           Family History   Problem Relation Age of Onset    Hypertension Father     Diabetes Father     Cancer Father      Throat    Arthritis Father     Stroke Mother     Diabetes Maternal Grandmother     Heart disease Maternal Grandfather     Diabetes Son     Lymphoma Family      Head, Face and Neck      Past Surgical History:   Procedure Laterality Date    BUNIONECTOMY      CARDIAC CATHETERIZATION      Carotid Artery, Resolved - 7/1/13    CARDIOVASCULAR STRESS TEST  09/1999    CHOLECYSTECTOMY      COLONOSCOPY  2017    FOOT ARTHRODESIS, MODIFIED DONOHUE  2016    GALLBLADDER SURGERY  1970    HEMORROIDECTOMY      KNEE ARTHROSCOPY Left 2004    TX COLONOSCOPY FLX DX W/COLLJ SPEC WHEN PFRMD N/A 8/21/2017    Procedure: COLONOSCOPY;  Surgeon: Ean Lawrence MD;  [facility];   Service: Colorectal    REPAIR KNEE LIGAMENT      REPAIR KNEE LIGAMENT Left 1986    REPAIR KNEE LIGAMENT Right 1988       Current Outpatient Prescriptions:     baclofen 10 mg tablet, Take by mouth, Disp: , Rfl:     diclofenac sodium (VOLTAREN) 1 %, Place 1 g on the skin 2 (two) times a day, Disp: , Rfl:     diclofenac-misoprostol (ARTHROTEC 50) 50-0 2 MG per tablet, Take 1 tablet by mouth 2 (two) times a day, Disp: , Rfl:     DICLOFENAC-MISOPROSTOL PO, Take by mouth 2 (two) times a day, Disp: , Rfl:     furosemide (LASIX) 40 mg tablet, Take 1 tablet (40 mg total) by mouth 2 (two) times a day, Disp: 180 tablet, Rfl: 3    gabapentin (NEURONTIN) 300 mg capsule, Take 600 mg by mouth 2 (two) times a day  , Disp: , Rfl:     glimepiride (AMARYL) 4 mg tablet, Take 8 mg by mouth daily with breakfast, Disp: , Rfl:     glucose blood (TRUETEST TEST) test strip, by In Vitro route daily, Disp: , Rfl:     levothyroxine 100 mcg tablet, Take 1 tablet (100 mcg total) by mouth daily, Disp: 30 tablet, Rfl: 3    metFORMIN (GLUCOPHAGE) 500 mg tablet, Take 500 mg by mouth daily with breakfast, Disp: , Rfl:     MICROLET LANCETS MISC, by Does not apply route, Disp: , Rfl:     Multiple Vitamin (DAILY VALUE MULTIVITAMIN) TABS, Take 1 tablet by mouth daily, Disp: , Rfl:     multivitamin (THERAGRAN) TABS, Take 1 tablet by mouth daily, Disp: , Rfl:     nebivolol (BYSTOLIC) 5 mg tablet, Take 5 mg by mouth daily, Disp: , Rfl:     pantoprazole (PROTONIX) 40 mg tablet, Take 40 mg by mouth daily, Disp: , Rfl:     potassium chloride (K-DUR,KLOR-CON) 20 mEq tablet, Take 1 tablet (20 mEq total) by mouth daily, Disp: 30 tablet, Rfl: 5    predniSONE 5 mg tablet, Take 5 mg by mouth daily, Disp: , Rfl:     spironolactone (ALDACTONE) 50 mg tablet, Take 50 mg by mouth daily as needed 1 to 1/2 daily , Disp: , Rfl:     triamcinolone (KENALOG) 0 025 % cream, Apply topically, Disp: , Rfl:     valACYclovir (VALTREX) 1,000 mg tablet, Take 1 tablet by mouth 3 (three) times a day, Disp: , Rfl:     venlafaxine (EFFEXOR) 75 mg tablet, Take 75 mg by mouth 3 (three) times a day, Disp: , Rfl:     albuterol (PROAIR HFA) 90 mcg/act inhaler, Inhale 2 puffs 4 (four) times a day as needed, Disp: , Rfl:     albuterol (PROVENTIL HFA,VENTOLIN HFA) 90 mcg/act inhaler, Inhale 2 puffs every 6 (six) hours as needed for wheezing, Disp: , Rfl:   Allergies   Allergen Reactions    Lyrica [Pregabalin] Swelling    Sulfa Antibiotics Swelling     Vitals:    03/16/18 0917   BP: 136/60   BP Location: Left arm   Patient Position: Sitting   Cuff Size: Standard   Pulse: (!) 51   SpO2: 96%   Weight: 131 kg (287 lb 12 8 oz)   Height: 5' 3" (1 6 m)       Labs:  Transcribe Orders on 02/12/2018   Component Date Value    Creatinine, Ur 02/12/2018 34 9     Microalbum  ,U,Random 02/12/2018 <5 0     Microalb Creat Ratio 02/12/2018 <14    Appointment on 02/12/2018   Component Date Value    Sodium 02/12/2018 144     Potassium 02/12/2018 3 6     Chloride 02/12/2018 103     CO2 02/12/2018 30     Anion Gap 02/12/2018 11     BUN 02/12/2018 42*    Creatinine 02/12/2018 1 89*    Glucose, Fasting 02/12/2018 123*    Calcium 02/12/2018 9 3     AST 02/12/2018 16     ALT 02/12/2018 23     Alkaline Phosphatase 02/12/2018 87     Total Protein 02/12/2018 7 0     Albumin 02/12/2018 3 5     Total Bilirubin 02/12/2018 0 40     eGFR 02/12/2018 26     Hemoglobin A1C 02/12/2018 7 0*    EAG 02/12/2018 154     Cholesterol 02/12/2018 230*    Triglycerides 02/12/2018 182*    HDL, Direct 02/12/2018 47     LDL Calculated 02/12/2018 [address] Outpatient Visit on 12/07/2017   Component Date Value    Protocol Name 12/07/2017 NAGI SIT     Time In Exercise Phase 12/07/2017 00:03:00     MAX  SYSTOLIC BP 16/85/4187 340     Max Diastolic Bp 98/03/5644 74     Max Heart Rate 12/07/2017 61     Max Predicted Heart Rate 12/07/2017 146     Reason for Termination 12/07/2017 TEST COMPLETE        Test Indication 12/07/2017 Peripheral edema     Target Hr Formular 12/07/2017 (220 - Age)*100%    Appointment on 11/27/2017   Component Date Value    Creatinine, 24H Ur 11/27/2017 0 9     TOTAL URINE VOLUME 11/27/2017 1000    Appointment on 11/17/2017   Component Date Value    Sodium 11/17/2017 141     Potassium 11/17/2017 4 0     Chloride 11/17/2017 103     CO2 11/17/2017 31     Anion Gap 11/17/2017 7     BUN 11/17/2017 33*    Creatinine 11/17/2017 1 77*    Glucose, Fasting 11/17/2017 111*    Calcium 11/17/2017 9 8     eGFR 11/17/2017 28     NT-proBNP 11/17/2017 246*   Appointment on 11/10/2017   Component Date Value    NT-proBNP 11/10/2017 508*   Appointment on 10/05/2017   Component Date Value    TSH 3RD GENERATON 10/05/2017 1 030     Sodium 10/05/2017 141     Potassium 10/05/2017 4 8     Chloride 10/05/2017 108     CO2 10/05/2017 26     Anion Gap 10/05/2017 7     BUN 10/05/2017 32*    Creatinine 10/05/2017 1 36*    Glucose, Fasting 10/05/2017 94     Calcium 10/05/2017 9 5     AST 10/05/2017 14     ALT 10/05/2017 18     Alkaline Phosphatase 10/05/2017 83     Total Protein 10/05/2017 6 7     Albumin 10/05/2017 3 1*    Total Bilirubin 10/05/2017 0 35     eGFR 10/05/2017 38     Cholesterol 10/05/2017 209*    Triglycerides 10/05/2017 123     HDL, Direct 10/05/2017 53     LDL Calculated 10/05/2017 131*    Hemoglobin A1C 10/05/2017 7 4*    EAG 10/05/2017 166      Lab Results   Component Value Date    CHOL 230 (H) 02/12/2018    CHOL 184 09/19/2015    TRIG 182 (H) 02/12/2018    TRIG 113 09/19/2015    HDL 47 02/12/2018    HDL 47 09/19/2015     Imaging: No results found  Review of Systems:  Review of Systems   Constitutional: Negative for activity change, appetite change, chills, diaphoresis, fatigue and unexpected weight change  HENT: Negative for hearing loss, nosebleeds and sore throat  Eyes: Negative for photophobia and visual disturbance  Respiratory: Positive for wheezing  Negative for cough, chest tightness and shortness of breath  Cardiovascular: Negative for chest pain, palpitations and leg swelling  Gastrointestinal: Negative for abdominal pain, diarrhea, nausea and vomiting  Endocrine: Negative for polyuria  Genitourinary: Negative for dysuria, frequency and hematuria  Musculoskeletal: Positive for arthralgias  Negative for back pain, gait problem and neck pain  Skin: Negative for pallor and rash  Neurological: Negative for dizziness, syncope and headaches  Hematological: Does not bruise/bleed easily  Psychiatric/Behavioral: Negative for behavioral problems and confusion  Physical Exam:  Physical Exam   Constitutional: She is oriented to person, place, and time  She appears well-developed and well-nourished     HENT: Head: Normocephalic and atraumatic  Nose: Nose normal    Eyes: EOM are normal  Pupils are equal, round, and reactive to light  No scleral icterus  Neck: Normal range of motion  Neck supple  No JVD present  Cardiovascular: Normal rate and regular rhythm  Exam reveals no gallop and no friction rub  Murmur heard  Systolic murmur is present with a grade of 2/6   Pulmonary/Chest: Effort normal and breath sounds normal  No respiratory distress  She has no wheezes  She has no rales  Abdominal: Soft  Bowel sounds are normal  She exhibits no distension  There is no tenderness  Musculoskeletal: Normal range of motion  She exhibits edema  She exhibits no deformity  Neurological: She is alert and oriented to person, place, and time  No cranial nerve deficit  Skin: Skin is warm and dry  No rash noted  She is not diaphoretic  Psychiatric: She has a normal mood and affect  Her behavior is normal    Vitals reviewed  Nuclear stress test: 12/2017 - personally reviewed, small fixed inferoapical defect consistent with prior MI, normal EF  Echo: 12/2017 - personally reviewed, normal EF, no wall motion abnormalities, G1DD, mild MR, very mild AS (mean gradient 8 mmHg)    Discussion/Summary:  1) LE edema: likely resultant from G1DD Agree with continued furosemide and spironolactone, which seems to be improving her symptoms  Have advised patient to check her weight every day to help check to see how her fluid removal is going  Will decrease dose to daily for furosemide since Cr is elevated at 1 89, will recheck BMP at the end of the month  2) Mild aortic stenosis: inconsequential at this time  Continue to follow  3) h/o CAD with prior MI: seen on nuclear stress test in Dec 2017 as well  No ischemia seen on that study  Continue medical management with bystolic  4) HTN: well controlled, continue current regimen  5) HLD: not currently on statin    Currently at goal       6) Afib: no prior history of this - will remove from chart

## 2018-03-28 ENCOUNTER — OFFICE VISIT (OUTPATIENT)
Dept: FAMILY MEDICINE CLINIC | Facility: CLINIC | Age: 73
End: 2018-03-28
Payer: MEDICARE

## 2018-03-28 VITALS
DIASTOLIC BLOOD PRESSURE: 70 MMHG | HEART RATE: 64 BPM | HEIGHT: 63 IN | SYSTOLIC BLOOD PRESSURE: 108 MMHG | BODY MASS INDEX: 51.14 KG/M2 | TEMPERATURE: 96.2 F | WEIGHT: 288.6 LBS

## 2018-03-28 DIAGNOSIS — E78.00 HYPERCHOLESTEROLEMIA: ICD-10-CM

## 2018-03-28 DIAGNOSIS — K21.9 GASTROESOPHAGEAL REFLUX DISEASE WITHOUT ESOPHAGITIS: Primary | ICD-10-CM

## 2018-03-28 DIAGNOSIS — E13.9 DIABETES 1.5, MANAGED AS TYPE 2 (HCC): Primary | ICD-10-CM

## 2018-03-28 DIAGNOSIS — I10 ESSENTIAL HYPERTENSION: ICD-10-CM

## 2018-03-28 PROCEDURE — 99214 OFFICE O/P EST MOD 30 MIN: CPT | Performed by: FAMILY MEDICINE

## 2018-03-28 RX ORDER — PANTOPRAZOLE SODIUM 40 MG/1
40 TABLET, DELAYED RELEASE ORAL DAILY
Qty: 90 TABLET | Refills: 3 | Status: SHIPPED | OUTPATIENT
Start: 2018-03-28 | End: 2018-12-17 | Stop reason: ALTCHOICE

## 2018-03-28 NOTE — PROGRESS NOTES
Patient ID: Jourdan La is a 74 y o  female  HPI: 74 y  o female presenting for f/u htn, niddm, and hypercholesterolemia  She saw Dr Yessenia Gao 2/18 for a diabetic eye exam and is up to date with all preventative testing at this time  SUBJECTIVE    Family History   Problem Relation Age of Onset    Hypertension Father     Diabetes Father     Cancer Father      Throat    Arthritis Father     Stroke Mother     Diabetes Maternal Grandmother     Heart disease Maternal Grandfather     Diabetes Son     Lymphoma Family      Head, Face and Neck      Social History     Social History    Marital status: /Civil Union     Spouse name: N/A    Number of children: N/A    Years of education: N/A     Occupational History    Not on file       Social History Main Topics    Smoking status: Never Smoker    Smokeless tobacco: Never Used    Alcohol use No      Comment: Social per [employer]     Drug use: No    Sexual activity: Not on file     Other Topics Concern    Not on file     Social History Narrative    Hx of daily cola consumption (unk cans/day)    Daily tea consumption (unk cups/day)    Multiple organ donor    Patient has living will    Power of  in existence    Uses safety equipment - Seatbelts          Past Medical History:   Diagnosis Date    Aortic stenosis     Arthritis     Asthma     Coronary artery disease     [facility]     Diabetic peripheral neuropathy ([facility]     Last assessed - 1/27/16    Gallbladder disease     Heart attack 07/1999    Hemorrhoids     Last assessed - 11/16/12    Myocardial infarction     Old myocardial infarction     Type 2 diabetes mellitus with autonomic neuropathy (HCC)     Unspec long term insulin use status, Last assessed - 6/8/17     Past Surgical History:   Procedure Laterality Date    BUNIONECTOMY      CARDIAC CATHETERIZATION      Carotid Artery, Resolved - 7/1/13    CARDIOVASCULAR STRESS TEST  09/1999    CHOLECYSTECTOMY      COLONOSCOPY  2017    FOOT ARTHRODESIS, MODIFIED DONOHUE  2016    GALLBLADDER SURGERY  1970    HEMORROIDECTOMY      KNEE ARTHROSCOPY Left 2004    [place] COLONOSCOPY FLX DX W[facility]LL[facility] WHEN PFRMD N/A 8/21/2017    Procedure: COLONOSCOPY;  Surgeon: Matias Jade MD;  [facility];   Service: Colorectal    REPAIR KNEE LIGAMENT      REPAIR KNEE LIGAMENT Left 1986    REPAIR KNEE LIGAMENT Right 1988     Allergies   Allergen Reactions    Lyrica [Pregabalin] Swelling    Sulfa Antibiotics Swelling       Current Outpatient Prescriptions:     baclofen 10 mg tablet, Take by mouth, Disp: , Rfl:     diclofenac sodium (VOLTAREN) 1 %, Place 1 g on the skin 2 (two) times a day, Disp: , Rfl:     diclofenac-misoprostol (ARTHROTEC 50) 50-0 2 MG per tablet, Take 1 tablet by mouth 2 (two) times a day, Disp: , Rfl:     furosemide (LASIX) 40 mg tablet, Take 1 tablet (40 mg total) by mouth daily, Disp: 180 tablet, Rfl: 0    gabapentin (NEURONTIN) 300 mg capsule, Take 600 mg by mouth 2 (two) times a day  , Disp: , Rfl:     glimepiride (AMARYL) 4 mg tablet, Take 8 mg by mouth daily with breakfast, Disp: , Rfl:     glucose blood (TRUETEST TEST) test strip, by In Vitro route daily, Disp: , Rfl:     levothyroxine 100 mcg tablet, Take 1 tablet (100 mcg total) by mouth daily, Disp: 30 tablet, Rfl: 3    metFORMIN (GLUCOPHAGE) 500 mg tablet, Take 500 mg by mouth daily with breakfast, Disp: , Rfl:     MICROLET LANCETS MISC, by Does not apply route, Disp: , Rfl:     Multiple Vitamin (DAILY VALUE MULTIVITAMIN) TABS, Take 1 tablet by mouth daily, Disp: , Rfl:     nebivolol (BYSTOLIC) 5 mg tablet, Take 5 mg by mouth daily, Disp: , Rfl:     pantoprazole (PROTONIX) 40 mg tablet, Take 40 mg by mouth daily, Disp: , Rfl:     potassium chloride (K-DUR,KLOR-CON) 20 mEq tablet, Take 1 tablet (20 mEq total) by mouth daily, Disp: 30 tablet, Rfl: 5    predniSONE 5 mg tablet, Take 5 mg by mouth daily, Disp: , Rfl:     spironolactone (ALDACTONE) 50 mg tablet, Take 50 mg by mouth daily as needed 1 to 1/2 daily , Disp: , Rfl:     triamcinolone (KENALOG) 0 025 % cream, Apply topically, Disp: , Rfl:     valACYclovir (VALTREX) 1,000 mg tablet, Take 1 tablet by mouth 3 (three) times a day, Disp: , Rfl:     venlafaxine (EFFEXOR) 75 mg tablet, Take 75 mg by mouth 3 (three) times a day, Disp: , Rfl:     albuterol (PROAIR HFA) 90 mcg/act inhaler, Inhale 2 puffs 4 (four) times a day as needed, Disp: , Rfl:     Review of Systems  Constitutional:     Denies fever, chills ,fatigue ,weakness ,weight loss, weight gain     ENT: Denies earache ,loss of hearing ,nosebleed, nasal discharge,nasal congestion ,sore throat ,hoarseness  Pulmonary: Denies shortness of breath ,cough  ,dyspnea on exertion, orthopnea  ,PND   Cardiovascular:  Denies bradycardia , tachycardia  ,palpations, lower extremity edema leg, claudication  Breast:  Denies new or changing breast lumps ,nipple discharge ,nipple changes  Abdomen:  Denies abdominal pain , anorexia , indigestion, nausea, vomiting, constipation, diarrhea  Musculoskeletal: Denies myalgias, arthralgias, joint swelling, joint stiffness , limb pain, limb swelling  Gu: denies dysuria, polyuria  Skin: Denies skin rash, skin lesion, skin wound, itching, dry skin  Neuro: Denies headache, numbness, tingling, confusion, loss of consciousness, dizziness, vertigo  Psychiatric: Denies feelings of depression, suicidal ideation, anxiety, sleep disturbances    OBJECTIVE    Constitutional:   NAD, well appearing and well nourished      ENT:   Conjunctiva and lids: no injection, edema, or discharge     Pupils and iris: ANGELINE bilaterally    External inspection of ears and nose: normal without deformities or discharge  Otoscopic exam: Canals patent without erythema  Nasal mucosa, septum and turbinates: Normal or edema or discharge         Oropharynx:  Moist mucosa, normal tongue and tonsils without lesions   No erythema Pulmonary:Respiratory effort normal rate and rhythm, no increased work of breathing  Auscultation of lungs:  Clear bilaterally with no adventitious breath sounds       Cardiovascular: regular rate and rhythm, S1 and S2, no murmur, no edema and/or varicosities of LE      Abdomen: Soft and non-distended     Positive bowel sounds      No heptomegaly or splenomegaly      Gu: no suprapubic tenderness or CVA tenderness, no urethral discharge  Lymphatic:  No anterior or posterior cervical lymphadenopathy         Musculoskeletal:  Gait and station: Normal gait      Digits and nails normal without clubbing or cyanosis       Inspection/palpation of joints, bones, and muscles:  No joint tenderness, swelling, full active and passive range of motion       Skin: Normal skin turgor and no rashes      Neuro:      Normal reflexes     Normal sensation    Psych:   alert and oriented to person, place and time     normal mood and affect   Patient's shoes and socks removed  Right Foot/Ankle   Right Foot Inspection  Skin Exam: skin normal and skin intact no dry skin, no warmth, no callus, no erythema, no maceration, no abnormal color, no pre-ulcer, no ulcer and no callus                          Toe Exam: ROM and strength within normal limitsno swelling, no tenderness, erythema and  no right toe deformity  Sensory   Vibration: absent  Proprioception: absent   Monofilament testing: absent  Vascular  Capillary refills: < 3 seconds  The right DP pulse is 2+  The right PT pulse is 2+       Left Foot/Ankle  Left Foot Inspection  Skin Exam: skin normal and skin intactno dry skin, no warmth, no erythema, no maceration, normal color, no pre-ulcer, no ulcer and no callus                         Toe Exam: ROM and strength within normal limitsno swelling, no tenderness, no erythema and no left toe deformity                   Sensory   Vibration: absent  Proprioception: absent  Monofilament: absent  Vascular  Capillary refills: < 3 seconds  The left DP pulse is 2+  The left PT pulse is 2+  Assign Risk Category:  No deformity present; Loss of protective sensation; No weak pulses       Risk: 2      Assessment/Plan:Diagnoses and all orders for this visit:    Diabetes 1 5, managed as type 2 ([facility] )  -     HEMOGLOBIN A1C W/ EAG ESTIMATION; Future    Essential hypertension  -     Comprehensive metabolic panel; Future    Hypercholesterolemia  -     Lipid Panel with Direct LDL reflex; Future      I will see patient back in 4 mos or sooner prn

## 2018-04-06 ENCOUNTER — TELEPHONE (OUTPATIENT)
Dept: FAMILY MEDICINE CLINIC | Facility: CLINIC | Age: 73
End: 2018-04-06

## 2018-04-09 NOTE — TELEPHONE ENCOUNTER
I called Paul Mcnamara [phone], the woman I spoke to states she asked the staff and no one owned up to calling our office about this pt  We will keep message in hopes whoever initiated this message will call back

## 2018-04-09 NOTE — TELEPHONE ENCOUNTER
CALL FROM [facility]  DIAGNOSIS LINKED TO ORDER FOR ECHO WAS NOT COVERED BY INSURANCE  ARE THERE ADDITIONAL DIAGNOSIS CODES WE CAN ADD TO THE ORDER  ORIGINAL ORDER FROM 12/7/2017

## 2018-04-17 ENCOUNTER — TELEPHONE (OUTPATIENT)
Dept: FAMILY MEDICINE CLINIC | Facility: CLINIC | Age: 73
End: 2018-04-17

## 2018-05-07 DIAGNOSIS — I10 ESSENTIAL HYPERTENSION: Primary | ICD-10-CM

## 2018-05-07 RX ORDER — NEBIVOLOL 5 MG/1
5 TABLET ORAL DAILY
Qty: 30 TABLET | Refills: 3 | Status: SHIPPED | OUTPATIENT
Start: 2018-05-07 | End: 2018-09-06 | Stop reason: SDUPTHER

## 2018-05-11 DIAGNOSIS — G62.9 NEUROPATHY: Primary | ICD-10-CM

## 2018-05-11 RX ORDER — GABAPENTIN 300 MG/1
CAPSULE ORAL
Qty: 240 CAPSULE | Refills: 3 | Status: SHIPPED | OUTPATIENT
Start: 2018-05-11 | End: 2018-10-11 | Stop reason: SDUPTHER

## 2018-05-15 ENCOUNTER — TELEPHONE (OUTPATIENT)
Dept: OBGYN CLINIC | Facility: HOSPITAL | Age: 73
End: 2018-05-15

## 2018-05-15 NOTE — TELEPHONE ENCOUNTER
Patient is calling    [phone]  Patient sees Dr Carter Chen    Patient is calling to start the auth process for Euflexxa injections

## 2018-05-22 DIAGNOSIS — M19.90 OSTEOARTHRITIS, UNSPECIFIED OSTEOARTHRITIS TYPE, UNSPECIFIED SITE: Primary | ICD-10-CM

## 2018-06-04 ENCOUNTER — OFFICE VISIT (OUTPATIENT)
Dept: OBGYN CLINIC | Facility: CLINIC | Age: 73
End: 2018-06-04
Payer: MEDICARE

## 2018-06-04 VITALS
HEIGHT: 63 IN | DIASTOLIC BLOOD PRESSURE: 73 MMHG | SYSTOLIC BLOOD PRESSURE: 143 MMHG | WEIGHT: 288 LBS | BODY MASS INDEX: 51.03 KG/M2 | HEART RATE: 53 BPM

## 2018-06-04 DIAGNOSIS — M19.90 OSTEOARTHRITIS, UNSPECIFIED OSTEOARTHRITIS TYPE, UNSPECIFIED SITE: Primary | ICD-10-CM

## 2018-06-04 DIAGNOSIS — M17.0 PRIMARY OSTEOARTHRITIS OF BOTH KNEES: Primary | ICD-10-CM

## 2018-06-04 PROCEDURE — 20610 DRAIN/INJ JOINT/BURSA W/O US: CPT | Performed by: PHYSICIAN ASSISTANT

## 2018-06-04 RX ORDER — DICLOFENAC SODIUM AND MISOPROSTOL 50; 200 MG/1; UG/1
1 TABLET, DELAYED RELEASE ORAL 2 TIMES DAILY
Qty: 90 TABLET | Refills: 3 | Status: SHIPPED | OUTPATIENT
Start: 2018-06-04 | End: 2018-11-26 | Stop reason: SDUPTHER

## 2018-06-04 RX ORDER — HYALURONATE SODIUM 10 MG/ML
20 SYRINGE (ML) INTRAARTICULAR
Status: COMPLETED | OUTPATIENT
Start: 2018-06-04 | End: 2018-06-04

## 2018-06-04 RX ADMIN — Medication 20 MG: at 11:18

## 2018-06-04 RX ADMIN — Medication 20 MG: at 11:19

## 2018-06-04 NOTE — PROGRESS NOTES
1  Primary osteoarthritis of both knees       Patient is here for her first injection of Euflexxa into the bilateral knee  Patient reports knee pain  All organ systems normal  Physical exam of the knee shows no effusion no ecchymosis        Large joint arthrocentesis  Date/Time: 6/4/2018 11:18 AM  Consent given by: patient  Supporting Documentation  Indications: pain   Procedure Details  Location: knee - R knee  Needle size: 22 G  Medications administered: 20 mg Sodium Hyaluronate 20 MG/2ML    Patient tolerance: patient tolerated the procedure well with no immediate complications      Large joint arthrocentesis  Date/Time: 6/4/2018 11:19 AM  Consent given by: patient  Supporting Documentation  Indications: pain   Procedure Details  Location: knee - L knee  Needle size: 22 G  Medications administered: 20 mg Sodium Hyaluronate 20 MG/2ML    Patient tolerance: patient tolerated the procedure well with no immediate complications            Patient tolerated procedure follow up 1 week

## 2018-06-11 ENCOUNTER — OFFICE VISIT (OUTPATIENT)
Dept: OBGYN CLINIC | Facility: CLINIC | Age: 73
End: 2018-06-11
Payer: MEDICARE

## 2018-06-11 VITALS
HEART RATE: 76 BPM | DIASTOLIC BLOOD PRESSURE: 65 MMHG | HEIGHT: 63 IN | BODY MASS INDEX: 51.03 KG/M2 | WEIGHT: 288 LBS | SYSTOLIC BLOOD PRESSURE: 105 MMHG

## 2018-06-11 DIAGNOSIS — M17.0 PRIMARY OSTEOARTHRITIS OF BOTH KNEES: Primary | ICD-10-CM

## 2018-06-11 PROCEDURE — 20610 DRAIN/INJ JOINT/BURSA W/O US: CPT | Performed by: ORTHOPAEDIC SURGERY

## 2018-06-11 RX ORDER — HYALURONATE SODIUM 10 MG/ML
20 SYRINGE (ML) INTRAARTICULAR
Status: COMPLETED | OUTPATIENT
Start: 2018-06-11 | End: 2018-06-11

## 2018-06-11 RX ADMIN — Medication 20 MG: at 12:24

## 2018-06-11 NOTE — PROGRESS NOTES
1  Primary osteoarthritis of both knees       Patient is here for her Second injection of  Euflexxa into the bilateral knee  Patient reports improvements in pain and stiffness  All organ systems normal  Physical exam of the knee shows no effusion no ecchymosis        Large joint arthrocentesis  Date/Time: 6/11/2018 12:24 PM  Consent given by: patient  Supporting Documentation  Indications: pain   Procedure Details  Location: knee - R knee  Needle size: 22 G  Ultrasound guidance: no  Approach: anterolateral  Medications administered: 20 mg Sodium Hyaluronate 20 MG/2ML    Patient tolerance: patient tolerated the procedure well with no immediate complications      Large joint arthrocentesis  Date/Time: 6/11/2018 12:24 PM  Consent given by: patient  Timeout: Immediately prior to procedure a time out was called to verify the correct patient, procedure, equipment, support staff and site/side marked as required   Supporting Documentation  Indications: pain   Procedure Details  Location: knee - L knee  Needle size: 22 G  Ultrasound guidance: no  Approach: anterolateral  Medications administered: 20 mg Sodium Hyaluronate 20 MG/2ML    Patient tolerance: patient tolerated the procedure well with no immediate complications            Patient tolerated procedure follow up  1 week

## 2018-06-18 ENCOUNTER — OFFICE VISIT (OUTPATIENT)
Dept: OBGYN CLINIC | Facility: CLINIC | Age: 73
End: 2018-06-18
Payer: MEDICARE

## 2018-06-18 VITALS
SYSTOLIC BLOOD PRESSURE: 162 MMHG | HEART RATE: 51 BPM | WEIGHT: 283 LBS | DIASTOLIC BLOOD PRESSURE: 71 MMHG | BODY MASS INDEX: 50.14 KG/M2 | HEIGHT: 63 IN

## 2018-06-18 DIAGNOSIS — M17.0 PRIMARY OSTEOARTHRITIS OF BOTH KNEES: Primary | ICD-10-CM

## 2018-06-18 PROCEDURE — 20610 DRAIN/INJ JOINT/BURSA W/O US: CPT | Performed by: ORTHOPAEDIC SURGERY

## 2018-06-18 RX ORDER — HYALURONATE SODIUM 10 MG/ML
20 SYRINGE (ML) INTRAARTICULAR
Status: COMPLETED | OUTPATIENT
Start: 2018-06-18 | End: 2018-06-18

## 2018-06-18 RX ADMIN — Medication 20 MG: at 12:29

## 2018-06-18 RX ADMIN — Medication 20 MG: at 12:28

## 2018-06-18 NOTE — PROGRESS NOTES
1  Primary osteoarthritis of both knees       Patient is here for her Third injection of  Euflexxa into the bilateral knee  Patient reports improvement  All organ systems normal  Physical exam of the knee shows no effusion no ecchymosis  Large joint arthrocentesis  Date/Time: 6/18/2018 12:28 PM  Consent given by: patient  Supporting Documentation  Indications: pain   Procedure Details  Location: knee - R knee  Needle size: 22 G  Ultrasound guidance: no  Approach: anterolateral  Medications administered: 20 mg Sodium Hyaluronate 20 MG/2ML    Patient tolerance: patient tolerated the procedure well with no immediate complications      Large joint arthrocentesis  Date/Time: 6/18/2018 12:29 PM  Consent given by: patient  Timeout: Immediately prior to procedure a time out was called to verify the correct patient, procedure, equipment, support staff and site/side marked as required   Supporting Documentation  Indications: pain   Procedure Details  Location: knee - L knee  Needle size: 22 G  Ultrasound guidance: no  Approach: anterolateral  Medications administered: 20 mg Sodium Hyaluronate 20 MG/2ML    Patient tolerance: patient tolerated the procedure well with no immediate complications            Patient tolerated procedure follow up  P r n

## 2018-07-05 DIAGNOSIS — E03.9 HYPOTHYROIDISM, UNSPECIFIED TYPE: ICD-10-CM

## 2018-07-05 RX ORDER — LEVOTHYROXINE SODIUM 0.1 MG/1
100 TABLET ORAL DAILY
Qty: 30 TABLET | Refills: 3 | Status: SHIPPED | OUTPATIENT
Start: 2018-07-05 | End: 2018-11-13 | Stop reason: SDUPTHER

## 2018-07-18 DIAGNOSIS — F32.A DEPRESSION, UNSPECIFIED DEPRESSION TYPE: Primary | ICD-10-CM

## 2018-07-18 RX ORDER — VENLAFAXINE 75 MG/1
75 TABLET ORAL 3 TIMES DAILY
Qty: 90 TABLET | Refills: 3 | Status: SHIPPED | OUTPATIENT
Start: 2018-07-18 | End: 2018-11-20 | Stop reason: SDUPTHER

## 2018-08-09 ENCOUNTER — APPOINTMENT (OUTPATIENT)
Dept: LAB | Facility: CLINIC | Age: 73
End: 2018-08-09
Payer: MEDICARE

## 2018-08-09 DIAGNOSIS — E78.00 HYPERCHOLESTEROLEMIA: ICD-10-CM

## 2018-08-09 DIAGNOSIS — I10 ESSENTIAL HYPERTENSION: ICD-10-CM

## 2018-08-09 DIAGNOSIS — E13.9 DIABETES 1.5, MANAGED AS TYPE 2 (HCC): ICD-10-CM

## 2018-08-09 LAB
ALBUMIN SERPL BCP-MCNC: 3.3 G/DL (ref 3.5–5)
ALP SERPL-CCNC: 91 U/L (ref 46–116)
ALT SERPL W P-5'-P-CCNC: 21 U/L (ref 12–78)
ANION GAP SERPL CALCULATED.3IONS-SCNC: 9 MMOL/L (ref 4–13)
AST SERPL W P-5'-P-CCNC: 14 U/L (ref 5–45)
BILIRUB SERPL-MCNC: 0.4 MG/DL (ref 0.2–1)
BUN SERPL-MCNC: 31 MG/DL (ref 5–25)
CALCIUM SERPL-MCNC: 9.7 MG/DL (ref 8.3–10.1)
CHLORIDE SERPL-SCNC: 104 MMOL/L (ref 100–108)
CHOLEST SERPL-MCNC: 267 MG/DL (ref 50–200)
CO2 SERPL-SCNC: 29 MMOL/L (ref 21–32)
CREAT SERPL-MCNC: 1.99 MG/DL (ref 0.6–1.3)
EST. AVERAGE GLUCOSE BLD GHB EST-MCNC: 166 MG/DL
GFR SERPL CREATININE-BSD FRML MDRD: 24 ML/MIN/1.73SQ M
GLUCOSE P FAST SERPL-MCNC: 107 MG/DL (ref 65–99)
HBA1C MFR BLD: 7.4 % (ref 4.2–6.3)
HDLC SERPL-MCNC: 50 MG/DL (ref 40–60)
LDLC SERPL CALC-MCNC: 181 MG/DL (ref 0–100)
POTASSIUM SERPL-SCNC: 4.2 MMOL/L (ref 3.5–5.3)
PROT SERPL-MCNC: 6.5 G/DL (ref 6.4–8.2)
SODIUM SERPL-SCNC: 142 MMOL/L (ref 136–145)
TRIGL SERPL-MCNC: 182 MG/DL

## 2018-08-09 PROCEDURE — 83036 HEMOGLOBIN GLYCOSYLATED A1C: CPT

## 2018-08-09 PROCEDURE — 80053 COMPREHEN METABOLIC PANEL: CPT

## 2018-08-09 PROCEDURE — 80061 LIPID PANEL: CPT

## 2018-08-09 PROCEDURE — 36415 COLL VENOUS BLD VENIPUNCTURE: CPT

## 2018-08-15 ENCOUNTER — OFFICE VISIT (OUTPATIENT)
Dept: FAMILY MEDICINE CLINIC | Facility: CLINIC | Age: 73
End: 2018-08-15
Payer: MEDICARE

## 2018-08-15 VITALS
HEART RATE: 76 BPM | SYSTOLIC BLOOD PRESSURE: 134 MMHG | DIASTOLIC BLOOD PRESSURE: 62 MMHG | RESPIRATION RATE: 16 BRPM | HEIGHT: 63 IN | BODY MASS INDEX: 51.03 KG/M2 | WEIGHT: 288 LBS | TEMPERATURE: 97.7 F

## 2018-08-15 DIAGNOSIS — E13.9 DIABETES 1.5, MANAGED AS TYPE 2 (HCC): Primary | ICD-10-CM

## 2018-08-15 DIAGNOSIS — E78.00 HYPERCHOLESTEROLEMIA: ICD-10-CM

## 2018-08-15 DIAGNOSIS — I10 ESSENTIAL HYPERTENSION: ICD-10-CM

## 2018-08-15 DIAGNOSIS — Z23 NEED FOR VACCINATION: ICD-10-CM

## 2018-08-15 PROCEDURE — 99214 OFFICE O/P EST MOD 30 MIN: CPT | Performed by: FAMILY MEDICINE

## 2018-08-15 RX ORDER — PRAVASTATIN SODIUM 10 MG
10 TABLET ORAL DAILY
Qty: 30 TABLET | Refills: 3 | Status: SHIPPED | OUTPATIENT
Start: 2018-08-15 | End: 2018-10-25 | Stop reason: ALTCHOICE

## 2018-08-16 NOTE — PROGRESS NOTES
Patient ID: Katerin Rush is a 74 y o  female  HPI: 74 y  o female presenting withfor follow up of NIDDM, htn, and hypercholesterolemika  All are well controlled at this time  She is interested in getting a Shingrex vaccine  She denies any complaints at this time  Most recent hgba1c is 7 4 and she follows regularly with podiatry and opthalmology  SUBJECTIVE    Family History   Problem Relation Age of Onset    Hypertension Father     Diabetes Father     Cancer Father         Throat    Arthritis Father     Stroke Mother     Diabetes Maternal Grandmother     Heart disease Maternal Grandfather     Diabetes Son     Lymphoma Family         Head, Face and Neck      Social History     Social History    Marital status: /Civil Union     Spouse name: N/A    Number of children: N/A    Years of education: N/A     Occupational History    Not on file       Social History Main Topics    Smoking status: Never Smoker    Smokeless tobacco: Never Used    Alcohol use No      Comment: Social per [employer]scripts     Drug use: No    Sexual activity: Not on file     Other Topics Concern    Not on file     Social History Narrative    Hx of daily cola consumption (unk cans/day)    Daily tea consumption (unk cups/day)    Multiple organ donor    Patient has living will    Power of  in existence    Uses safety equipment - Seatbelts          Past Medical History:   Diagnosis Date    Aortic stenosis     Arthritis     Asthma     Coronary artery disease     [facility]     Diabetic peripheral neuropathy ([facility]     Last assessed - 1/27/16    Gallbladder disease     Heart attack ([facility] ) 07/1999    Hemorrhoids     Last assessed - 11/16/12    Myocardial infarction ([facility]     Old myocardial infarction     Type 2 diabetes mellitus with autonomic neuropathy ([facility] )     Unspec long term insulin use status, Last assessed - 6/8/17     Past Surgical History:   Procedure Laterality Date    BUNIONECTOMY  CARDIAC CATHETERIZATION      Carotid Artery, Resolved - 7/1/13    CARDIOVASCULAR STRESS TEST  09/1999    CHOLECYSTECTOMY      COLONOSCOPY  2017    FOOT ARTHRODESIS, MODIFIED DONOHUE  2016    GALLBLADDER SURGERY  1970    HEMORROIDECTOMY      KNEE ARTHROSCOPY Left 2004    [place] COLONOSCOPY FLX DX W/COLLJ [facility] REHABILITATION WHEN PFRMD N/A 8/21/2017    Procedure: COLONOSCOPY;  Surgeon: Zohreh Pandey MD;  [facility];   Service: Colorectal    REPAIR KNEE LIGAMENT      REPAIR KNEE LIGAMENT Left 1986    REPAIR KNEE LIGAMENT Right 1988     Allergies   Allergen Reactions    Lyrica [Pregabalin] Swelling    Sulfa Antibiotics Swelling       Current Outpatient Prescriptions:     albuterol (PROAIR HFA) 90 mcg/act inhaler, Inhale 2 puffs 4 (four) times a day as needed, Disp: , Rfl:     baclofen 10 mg tablet, Take by mouth, Disp: , Rfl:     diclofenac sodium (VOLTAREN) 1 %, Place 1 g on the skin 2 (two) times a day, Disp: , Rfl:     diclofenac sodium (VOLTAREN) 1 %, Apply sparingly to affected area two times daily, Disp: 100 g, Rfl: 0    diclofenac-misoprostol (ARTHROTEC 50) 50-0 2 MG per tablet, Take 1 tablet by mouth 2 (two) times a day, Disp: , Rfl:     diclofenac-misoprostol (ARTHROTEC 50) 50-0 2 MG per tablet, Take 1 tablet by mouth 2 (two) times a day, Disp: 90 tablet, Rfl: 3    furosemide (LASIX) 40 mg tablet, Take 1 tablet (40 mg total) by mouth daily, Disp: 180 tablet, Rfl: 0    gabapentin (NEURONTIN) 300 mg capsule, Take 600 mg by mouth 2 (two) times a day  , Disp: , Rfl:     gabapentin (NEURONTIN) 300 mg capsule, Take 3 tabs AM, 2 tabs afternoon, 3 tabs QHS, Disp: 240 capsule, Rfl: 3    glimepiride (AMARYL) 4 mg tablet, Take 8 mg by mouth daily with breakfast, Disp: , Rfl:     glucose blood (TRUETEST TEST) test strip, by In Vitro route daily, Disp: , Rfl:     levothyroxine 100 mcg tablet, Take 1 tablet (100 mcg total) by mouth daily, Disp: 30 tablet, Rfl: 3    metFORMIN (GLUCOPHAGE) 500 mg tablet, Take 500 mg by mouth daily with breakfast, Disp: , Rfl:     MICROLET LANCETS MISC, by Does not apply route, Disp: , Rfl:     Multiple Vitamin (DAILY VALUE MULTIVITAMIN) TABS, Take 1 tablet by mouth daily, Disp: , Rfl:     nebivolol (BYSTOLIC) 5 mg tablet, Take 1 tablet (5 mg total) by mouth daily, Disp: 30 tablet, Rfl: 3    pantoprazole (PROTONIX) 40 mg tablet, Take 1 tablet (40 mg total) by mouth daily, Disp: 90 tablet, Rfl: 3    potassium chloride (K-DUR,KLOR-CON) 20 mEq tablet, Take 1 tablet (20 mEq total) by mouth daily, Disp: 30 tablet, Rfl: 5    predniSONE 5 mg tablet, Take 5 mg by mouth daily, Disp: , Rfl:     spironolactone (ALDACTONE) 50 mg tablet, Take 50 mg by mouth daily as needed 1 to 1/2 daily , Disp: , Rfl:     triamcinolone (KENALOG) 0 025 % cream, Apply topically, Disp: , Rfl:     valACYclovir (VALTREX) 1,000 mg tablet, Take 1 tablet by mouth 3 (three) times a day, Disp: , Rfl:     venlafaxine (EFFEXOR) 75 mg tablet, Take 1 tablet (75 mg total) by mouth 3 (three) times a day, Disp: 90 tablet, Rfl: 3    pravastatin (PRAVACHOL) 10 mg tablet, Take 1 tablet (10 mg total) by mouth daily for 30 days, Disp: 30 tablet, Rfl: 3    Review of Systems  Constitutional:     Denies fever, chills ,fatigue ,weakness ,weight loss, weight gain     ENT: Denies earache ,loss of hearing ,nosebleed, nasal discharge,nasal congestion ,sore throat ,hoarseness  Pulmonary: Denies shortness of breath ,cough  ,dyspnea on exertion, orthopnea  ,PND   Cardiovascular:  Denies bradycardia , tachycardia  ,palpations, lower extremity edema leg, claudication  Breast:  Denies new or changing breast lumps ,nipple discharge ,nipple changes  Abdomen:  Denies abdominal pain , anorexia , indigestion, nausea, vomiting, constipation, diarrhea  Musculoskeletal: Denies myalgias, arthralgias, joint swelling, joint stiffness , limb pain, limb swelling  Gu: denies dysuria, polyuria  Skin: Denies skin rash, skin lesion, skin wound, itching, dry skin  Neuro: Denies headache, numbness, tingling, confusion, loss of consciousness, dizziness, vertigo  Psychiatric: Denies feelings of depression, suicidal ideation, anxiety, sleep disturbances    OBJECTIVE  /62   Pulse 76   Temp 97 7 °F (36 5 °C)   Resp 16   Ht 5' 3" (1 6 m)   Wt 131 kg (288 lb)   BMI 51 02 kg/m²   Constitutional:   NAD, well appearing and well nourished      ENT:   Conjunctiva and lids: no injection, edema, or discharge     Pupils and iris: ANGELINE bilaterally    External inspection of ears and nose: normal without deformities or discharge  Otoscopic exam: Canals patent without erythema  Nasal mucosa, septum and turbinates: Normal or edema or discharge         Oropharynx:  Moist mucosa, normal tongue and tonsils without lesions  No erythema        Pulmonary:Respiratory effort normal rate and rhythm, no increased work of breathing  Auscultation of lungs:  Clear bilaterally with no adventitious breath sounds       Cardiovascular: regular rate and rhythm, S1 and S2, no murmur, no edema and/or varicosities of LE      Abdomen: Soft and non-distended     Positive bowel sounds      No heptomegaly or splenomegaly      Gu: no suprapubic tenderness or CVA tenderness, no urethral discharge  Lymphatic:  No anterior or posterior cervical lymphadenopathy         Musculoskeletal:  Gait and station: Normal gait      Digits and nails normal without clubbing or cyanosis       Inspection/palpation of joints, bones, and muscles:  No joint tenderness, swelling, full active and passive range of motion       Skin: Normal skin turgor and no rashes      Neuro:    Normal reflexes     Normal sensation    Psych:   alert and oriented to person, place and time     normal mood and affect   Patient's shoes and socks removed  Right Foot/Ankle   Right Foot Inspection  Skin Exam: skin normal and skin intact no dry skin, no warmth, no callus, no erythema, no maceration, no abnormal color, no pre-ulcer, no ulcer and no callus                          Toe Exam: ROM and strength within normal limitsno swelling, no tenderness, erythema and  no right toe deformity  Sensory   Vibration: absent  Proprioception: absent   Monofilament testing: absent  Vascular  Capillary refills: < 3 seconds  The right DP pulse is 2+  The right PT pulse is 2+  Left Foot/Ankle  Left Foot Inspection  Skin Exam: skin normal and skin intactno dry skin, no warmth, no erythema, no maceration, normal color, no pre-ulcer, no ulcer and no callus                         Toe Exam: ROM and strength within normal limitsno swelling, no tenderness, no erythema and no left toe deformity                   Sensory   Vibration: absent  Proprioception: absent  Monofilament: absent  Vascular  Capillary refills: < 3 seconds  The left DP pulse is 2+  The left PT pulse is 2+  Assign Risk Category:  No deformity present; Loss of protective sensation; No weak pulses       Risk: 2      Assessment/Plan:Diagnoses and all orders for this visit:    Diabetes 1 5, managed as type 2 ([facility] )  -     Diabetic Shoe  -     HEMOGLOBIN A1C W/ EAG ESTIMATION; Future    Hypercholesterolemia  -     pravastatin (PRAVACHOL) 10 mg tablet; Take 1 tablet (10 mg total) by mouth daily for 30 days  -     Lipid Panel with Direct LDL reflex; Future    Essential hypertension  -     Comprehensive metabolic panel; Future    Need for vaccination  -     Varicella-Zoster Immune Glob 125 UNIT/1 2ML SOLN; Inject 1 2 mL (125 Units total) into a muscle once for 1 dose Please give shingrex        I will see patient back in 4 mos or sooner prn

## 2018-08-27 DIAGNOSIS — R60.9 EDEMA, UNSPECIFIED TYPE: ICD-10-CM

## 2018-08-27 RX ORDER — POTASSIUM CHLORIDE 20 MEQ/1
20 TABLET, EXTENDED RELEASE ORAL DAILY
Qty: 30 TABLET | Refills: 3 | Status: SHIPPED | OUTPATIENT
Start: 2018-08-27 | End: 2018-12-24 | Stop reason: SDUPTHER

## 2018-09-06 DIAGNOSIS — I10 ESSENTIAL HYPERTENSION: ICD-10-CM

## 2018-09-06 RX ORDER — NEBIVOLOL 5 MG/1
5 TABLET ORAL DAILY
Qty: 30 TABLET | Refills: 0 | Status: SHIPPED | OUTPATIENT
Start: 2018-09-06 | End: 2018-10-26 | Stop reason: SDUPTHER

## 2018-09-19 ENCOUNTER — OFFICE VISIT (OUTPATIENT)
Dept: CARDIOLOGY CLINIC | Facility: CLINIC | Age: 73
End: 2018-09-19
Payer: MEDICARE

## 2018-09-19 VITALS
SYSTOLIC BLOOD PRESSURE: 130 MMHG | OXYGEN SATURATION: 95 % | BODY MASS INDEX: 50.78 KG/M2 | WEIGHT: 286.6 LBS | HEART RATE: 56 BPM | DIASTOLIC BLOOD PRESSURE: 70 MMHG | HEIGHT: 63 IN

## 2018-09-19 DIAGNOSIS — I10 ESSENTIAL HYPERTENSION: ICD-10-CM

## 2018-09-19 DIAGNOSIS — R60.0 LOCALIZED EDEMA: ICD-10-CM

## 2018-09-19 DIAGNOSIS — E78.00 HYPERCHOLESTEROLEMIA: ICD-10-CM

## 2018-09-19 DIAGNOSIS — I25.10 ARTERIOSCLEROSIS OF CORONARY ARTERY: ICD-10-CM

## 2018-09-19 DIAGNOSIS — I35.0 NONRHEUMATIC AORTIC VALVE STENOSIS: Primary | ICD-10-CM

## 2018-09-19 PROCEDURE — 99214 OFFICE O/P EST MOD 30 MIN: CPT | Performed by: INTERNAL MEDICINE

## 2018-09-19 NOTE — PATIENT INSTRUCTIONS
Coronary Artery Disease   WHAT YOU NEED TO KNOW:   What is coronary artery disease? Coronary artery disease (CAD) is narrowing of the arteries to your heart caused by a buildup of plaque  Plaque is made up of cholesterol and other substances  The narrowing in your arteries decreases the amount of blood that can flow to your heart  This causes your heart to get less oxygen  What increases my risk for CAD? · Age 36 years or older     · A family history of CAD     · Smoking or regular exposure to secondhand smoke     · A medical condition, such as high blood pressure, high cholesterol, or diabetes     · Obesity or lack of exercise  What are the signs and symptoms of CAD? You may not have any symptoms of CAD  The most common symptom is chest pain, also called angina  Angina may feel like burning, squeezing, or crushing tightness in your chest  The pain may spread to your neck, jaw, or shoulder blade  You may have other symptoms along with chest pain  These include nausea, vomiting, sweating, fainting, and hands and feet that are cold to the touch  How is CAD diagnosed? Your healthcare provider will ask you if you have a family history of CAD  He will also ask about your symptoms and about the medicines you are taking  You may need any of the following:  · Blood tests  will check for high cholesterol or other medical conditions that may have led to CAD  · An EKG  records the electrical activity of your heart  It is used to check your heart rhythm, and it may show if there is damage to your heart  · An echocardiogram  is a type of ultrasound  Sound waves are used to show the structure, movement, and blood vessels of your heart  · An exercise stress test  helps healthcare providers see the changes that take place in your heart during exercise  An EKG is done while you ride an exercise bike or walk on a treadmill   Healthcare providers will ask if you have chest pain or trouble breathing during the test  An exercise test may be combined with an echocardiogram or nuclear isotope imaging  Nuclear isotopes are very small amounts of radioactive material that are injected into your bloodstream  Images show areas of your heart that have decreased blood flow  · A stress test with medicine  may be done if you cannot do the exercise stress test  You are given medicine that causes your heart to work harder  You will be connected to a stress test machine  This test is combined with the echocardiogram or nuclear isotope imaging  · An angiography, CT scan, or MRI  may be done to take pictures of your blood vessels and arteries  The pictures may show how narrow or blocked your blood vessels are  You may be given contrast liquid to help healthcare providers see the pictures better  Tell the healthcare provider if you have ever had an allergic reaction to contrast liquid  Which medicines are used to treat CAD? · Blood pressure medicines  are given to lower your blood pressure  These medicines may include ACE inhibitors and beta-blockers  ACE inhibitors help keep your blood vessels relaxed and open  This helps keep blood flowing into your heart  Beta-blockers keep your heart pumping strongly and regularly  This helps keep your heart from working too hard to get oxygen  · Cholesterol medicines  help lower blood cholesterol levels  · Nitrates , such as nitroglycerin, relax the arteries of your heart so it gets more oxygen  They help to relieve your chest pain  · Antiplatelets , such as aspirin, help prevent blood clots  Take your antiplatelet medicine exactly as directed  These medicines make it more likely for you to bleed or bruise  If you are told to take aspirin, do not take acetaminophen or ibuprofen instead  · Blood thinners  keep clots from forming in your blood  Clots may cause heart attacks, strokes, or death  This medicine makes it more likely for you to bleed or bruise       · Do not take certain medicines without asking your healthcare provider first   These include NSAIDs, herbal or vitamin supplements, or hormones (estrogen or progestin)  Which procedures are used to treat CAD? · An angioplasty  may be done to open an artery blocked by plaque  A tube with a balloon on the end is threaded into the blocked artery  Once the tube is in the artery, the balloon is inflated  As the balloon inflates, it presses the plaque against the artery wall to open the artery  A stent may be placed in your artery to keep it open  · Coronary artery bypass surgery (CABG)  is open heart surgery  Healthcare providers take arteries or veins from other areas in your body and use them to bypass or go around the blocked arteries of your heart  What is cardiac rehabilitation? Your healthcare provider may recommend that you attend cardiac rehabilitation (rehab)  This is a program run by specialists who will help you safely strengthen your heart and reduce the risk for more heart disease  The plan includes exercise, relaxation, stress management, and heart-healthy nutrition  Healthcare providers will also check to make sure any medicines you are taking are working  What can I do to manage CAD? · Do not smoke  Nicotine and other chemicals in cigarettes and cigars can cause heart and lung damage  Ask your healthcare provider for information if you currently smoke and need help to quit  E-cigarettes or smokeless tobacco still contain nicotine  Talk to your healthcare provider before you use these products  · Exercise regularly  Exercise at least 30 minutes each day, on most days of the week  Exercise helps to lower high cholesterol and high blood pressure  It can also help you maintain a healthy weight  Ask your healthcare provider about the kind of exercise you should do and how to get started  · Maintain a healthy weight  If you are overweight, talk to your healthcare provider about how to lose weight   A weight loss of 10% can improve your heart health  · Eat heart-healthy foods  Include fresh fruits and vegetables in your meal plan  Choose low-fat foods, such as skim or 1% fat milk, low-fat cheese and yogurt, fish, chicken (without skin), and lean meats  Eat two 4-ounce servings of fish high in omega-3 fats each week, such as salmon, fresh tuna, and herring  Do not eat foods that are high in sodium, such as canned foods, potato chips, salty snacks, and cold cuts  Put less table salt on your food  · Limit or do not drink alcohol  A drink of alcohol is 12 ounces of beer, 5 ounces of wine, or 1½ ounces of liquor  · Manage other health conditions  Follow your healthcare provider's advice on how to manage other conditions that can affect your heart health  These include diabetes, high blood pressure, and high cholesterol  You may need to take medicines for these conditions and make other lifestyle changes  · Ask if you should have a flu vaccine  The flu can be dangerous for a person who has CAD  The flu vaccine is available every year in the fall  Call 911 for any of the following:   · You have any of the following signs of a heart attack:      ¨ Squeezing, pressure, or pain in your chest that lasts longer than 5 minutes or returns    ¨ Discomfort or pain in your back, neck, jaw, stomach, or arm     ¨ Trouble breathing    ¨ Nausea or vomiting    ¨ Lightheadedness or a sudden cold sweat, especially with chest pain or trouble breathing    When should I contact my healthcare provider? · You have chest pain that is more frequent, or you have chest pain at rest      · You have questions or concerns about your condition or care  CARE AGREEMENT:   You have the right to help plan your care  Learn about your health condition and how it may be treated  Discuss treatment options with your caregivers to decide what care you want to receive  You always have the right to refuse treatment   The above information is an  only  It is not intended as medical advice for individual conditions or treatments  Talk to your doctor, nurse or pharmacist before following any medical regimen to see if it is safe and effective for you  © 2017 [address] [employer] Information is for End User's use only and may not be sold, redistributed or otherwise used for commercial purposes  All illustrations and images included in CareNotes® are the copyrighted property of [employer]  or Rashaad David

## 2018-09-19 NOTE — PROGRESS NOTES
Cardiology Consultation     Salima Cruz  [id number]  1945  [facility] [address]    1  Nonrheumatic aortic valve stenosis     2  Arteriosclerosis of coronary artery     3  Essential hypertension     4  Localized edema     5  Hypercholesterolemia        Chief Complaint   Patient presents with    Follow-up     patient at the office for 6 months folow up, patient estate to feel good denied any symptoms  HPI: Patient is here for evaluation and management of lower extremity edema  She has been started on furosemide 40mg and spironolactone by her PCP (Dr Pacheco) - which she does not take every day due to her schedule  She started on her left leg with 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 in August - swollen since then  Her right side does not have Charcot-Gayathri, but it has also started swelling as well  Edema seems to be improving with diuretics - did lose 10 lbs  Feels well, no complaints  Patient Active Problem List   Diagnosis    Arteriosclerosis of coronary artery    Edema    Hypercholesterolemia    Hypertension    Aortic stenosis    Primary osteoarthritis of both knees     Past Medical History:   Diagnosis Date    Aortic stenosis     Arthritis     Asthma     Coronary artery disease     Diabetes mellitus ([address]     Diabetic peripheral neuropathy ([address] )     Last assessed - 1/27/16    Gallbladder disease     Heart attack ([address] ) 07/1999    Hemorrhoids     Last assessed - 11/16/12    Myocardial infarction ([address] )     Old myocardial infarction     Type 2 diabetes mellitus with autonomic neuropathy ([address] )     Unspec long term insulin use status, Last assessed - 6/8/17     Social History     Social History    Marital status: /Civil Union     Spouse name: N/A    Number of children: N/A    Years of education: N/A     Occupational History    Not on file       Social History Main Topics    Smoking status: Never Smoker    Smokeless tobacco: Never Used    Alcohol use No      Comment: Social per [employer]     Drug use: No    Sexual activity: Not on file     Other Topics Concern    Not on file     Social History Narrative    Hx of daily cola consumption (unk cans/day)    Daily tea consumption (unk cups/day)    Multiple organ donor    Patient has living will    Power of  in existence    Uses safety equipment - Seatbelts           Family History   Problem Relation Age of Onset    Hypertension Father     Diabetes Father     Cancer Father         Throat    Arthritis Father     Stroke Mother     Diabetes Maternal Grandmother     Heart disease Maternal Grandfather     Diabetes Son     Lymphoma Family         Head, Face and Neck      Past Surgical History:   Procedure Laterality Date    BUNIONECTOMY      CARDIAC CATHETERIZATION      Carotid Artery, Resolved - 7/1/13    CARDIOVASCULAR STRESS TEST  09/1999    CHOLECYSTECTOMY      COLONOSCOPY  2017    FOOT ARTHRODESIS, MODIFIED DONOHUE  2016    GALLBLADDER SURGERY  1970    HEMORROIDECTOMY      KNEE ARTHROSCOPY Left 2004    VT COLONOSCOPY FLX DX W/COLLJ SPEC WHEN PFRMD N/A 8/21/2017    Procedure: COLONOSCOPY;  Surgeon: Inez Marie MD;  Location: [facility];   Service: Colorectal    REPAIR KNEE LIGAMENT      REPAIR KNEE LIGAMENT Left 1986    REPAIR KNEE LIGAMENT Right 1988       Current Outpatient Prescriptions:     albuterol (PROAIR HFA) 90 mcg/act inhaler, Inhale 2 puffs 4 (four) times a day as needed, Disp: , Rfl:     baclofen 10 mg tablet, Take by mouth, Disp: , Rfl:     diclofenac-misoprostol (ARTHROTEC 50) 50-0 2 MG per tablet, Take 1 tablet by mouth 2 (two) times a day, Disp: 90 tablet, Rfl: 3    gabapentin (NEURONTIN) 300 mg capsule, Take 3 tabs AM, 2 tabs afternoon, 3 tabs QHS, Disp: 240 capsule, Rfl: 3    glimepiride (AMARYL) 4 mg tablet, Take 8 mg by mouth daily with breakfast, Disp: , Rfl:     glucose blood (TRUETEST TEST) test strip, by In Vitro route daily, Disp: , Rfl:     levothyroxine 100 mcg tablet, Take 1 tablet (100 mcg total) by mouth daily, Disp: 30 tablet, Rfl: 3    metFORMIN (GLUCOPHAGE) 500 mg tablet, Take 500 mg by mouth daily with breakfast, Disp: , Rfl:     MICROLET LANCETS MISC, by Does not apply route, Disp: , Rfl:     Multiple Vitamin (DAILY VALUE MULTIVITAMIN) TABS, Take 1 tablet by mouth daily, Disp: , Rfl:     nebivolol (BYSTOLIC) 5 mg tablet, Take 1 tablet (5 mg total) by mouth daily, Disp: 30 tablet, Rfl: 0    pantoprazole (PROTONIX) 40 mg tablet, Take 1 tablet (40 mg total) by mouth daily, Disp: 90 tablet, Rfl: 3    potassium chloride (K-DUR,KLOR-CON) 20 mEq tablet, Take 1 tablet (20 mEq total) by mouth daily, Disp: 30 tablet, Rfl: 3    pravastatin (PRAVACHOL) 10 mg tablet, Take 1 tablet (10 mg total) by mouth daily for 30 days, Disp: 30 tablet, Rfl: 3    predniSONE 5 mg tablet, Take 5 mg by mouth daily, Disp: , Rfl:     spironolactone (ALDACTONE) 50 mg tablet, Take 50 mg by mouth daily as needed 1 to 1/2 daily , Disp: , Rfl:     triamcinolone (KENALOG) 0 025 % cream, Apply topically, Disp: , Rfl:     valACYclovir (VALTREX) 1,000 mg tablet, Take 1 tablet by mouth 3 (three) times a day, Disp: , Rfl:     venlafaxine (EFFEXOR) 75 mg tablet, Take 1 tablet (75 mg total) by mouth 3 (three) times a day, Disp: 90 tablet, Rfl: 3  Allergies   Allergen Reactions    Lyrica [Pregabalin] Swelling    Sulfa Antibiotics Swelling     Vitals:    09/19/18 0835   BP: 130/70   BP Location: Right arm   Patient Position: Sitting   Cuff Size: Standard   Pulse: 56   SpO2: 95%   Weight: 130 kg (286 lb 9 6 oz)   Height: 5' 3" (1 6 m)       Labs:  Appointment on 08/09/2018   Component Date Value    Sodium 08/09/2018 142     Potassium 08/09/2018 4 2     Chloride 08/09/2018 104     CO2 08/09/2018 29     ANION GAP 08/09/2018 9     BUN 08/09/2018 31*    Creatinine 08/09/2018 1 99*  Glucose, Fasting 08/09/2018 107*    Calcium 08/09/2018 9 7     AST 08/09/2018 14     ALT 08/09/2018 21     Alkaline Phosphatase 08/09/2018 91     Total Protein 08/09/2018 6 5     Albumin 08/09/2018 3 3*    Total Bilirubin 08/09/2018 0 40     eGFR 08/09/2018 24     Hemoglobin A1C 08/09/2018 7 4*    EAG 08/09/2018 166     Cholesterol 08/09/2018 267*    Triglycerides 08/09/2018 182*    HDL, Direct 08/09/2018 50     LDL Calculated 08/09/2018 181*     Lab Results   Component Value Date    CHOL 184 09/19/2015    TRIG 182 (H) 08/09/2018    TRIG 113 09/19/2015    HDL 50 08/09/2018    HDL 47 09/19/2015     Imaging: No results found  Review of Systems:  Review of Systems   Constitutional: Negative for activity change, appetite change, chills, diaphoresis, fatigue and unexpected weight change  HENT: Negative for hearing loss, nosebleeds and sore throat  Eyes: Negative for photophobia and visual disturbance  Respiratory: Positive for wheezing  Negative for cough, chest tightness and shortness of breath  Cardiovascular: Negative for chest pain, palpitations and leg swelling  Gastrointestinal: Negative for abdominal pain, diarrhea, nausea and vomiting  Endocrine: Negative for polyuria  Genitourinary: Negative for dysuria, frequency and hematuria  Musculoskeletal: Positive for arthralgias  Negative for back pain, gait problem and neck pain  Skin: Negative for pallor and rash  Neurological: Negative for dizziness, syncope and headaches  Hematological: Does not bruise/bleed easily  Psychiatric/Behavioral: Negative for behavioral problems and confusion  Physical Exam:  Physical Exam   Constitutional: She is oriented to person, place, and time  She appears well-developed and well-nourished  HENT:   Head: Normocephalic and atraumatic  Nose: Nose normal    Eyes: EOM are normal  Pupils are equal, round, and reactive to light  No scleral icterus  Neck: Normal range of motion   Neck supple  No JVD present  Cardiovascular: Normal rate and regular rhythm  Exam reveals no gallop and no friction rub  Murmur heard  Systolic murmur is present with a grade of 2/6   Pulmonary/Chest: Effort normal and breath sounds normal  No respiratory distress  She has no wheezes  She has no rales  Abdominal: Soft  Bowel sounds are normal  She exhibits no distension  There is no tenderness  Musculoskeletal: Normal range of motion  She exhibits no edema or deformity  Neurological: She is alert and oriented to person, place, and time  No cranial nerve deficit  Skin: Skin is warm and dry  No rash noted  She is not diaphoretic  Psychiatric: She has a normal mood and affect  Her behavior is normal    Vitals reviewed  Nuclear stress test: 12/2017 - personally reviewed, small fixed inferoapical defect consistent with prior MI, normal EF  Echo: 12/2017 - personally reviewed, normal EF, no wall motion abnormalities, G1DD, mild MR, very mild AS (mean gradient 8 mmHg)    Discussion/Summary:  1) LE edema: likely resultant from G1DD  Agree with continued furosemide and spironolactone, which seems to be improving her symptoms  Have advised patient to check her weight every day to help check to see how her fluid removal is going  We decreased dose to daily for furosemide since Cr was elevated at 1 89, recheck of BMP revealed 1 99 with a BUN Of 31 suggesting dehydration  Will stop furosemide for now and recheck BMP in 2 weeks  2) Mild aortic stenosis: inconsequential at this time  Continue to follow  3) h/o CAD with prior MI: seen on nuclear stress test in Dec 2017 as well  No ischemia seen on that study  Continue medical management with bystolic  4) HTN: well controlled, continue current regimen  5) HLD: not currently on statin    Currently at goal

## 2018-09-20 ENCOUNTER — IMMUNIZATION (OUTPATIENT)
Dept: FAMILY MEDICINE CLINIC | Facility: CLINIC | Age: 73
End: 2018-09-20
Payer: MEDICARE

## 2018-09-20 DIAGNOSIS — Z23 ENCOUNTER FOR IMMUNIZATION: ICD-10-CM

## 2018-09-20 PROCEDURE — G0008 ADMIN INFLUENZA VIRUS VAC: HCPCS

## 2018-09-20 PROCEDURE — 90662 IIV NO PRSV INCREASED AG IM: CPT

## 2018-10-01 ENCOUNTER — APPOINTMENT (OUTPATIENT)
Dept: LAB | Facility: CLINIC | Age: 73
End: 2018-10-01
Payer: MEDICARE

## 2018-10-01 DIAGNOSIS — I10 ESSENTIAL HYPERTENSION: ICD-10-CM

## 2018-10-01 DIAGNOSIS — R60.0 LOCALIZED EDEMA: ICD-10-CM

## 2018-10-01 LAB
ANION GAP SERPL CALCULATED.3IONS-SCNC: 7 MMOL/L (ref 4–13)
BUN SERPL-MCNC: 33 MG/DL (ref 5–25)
CALCIUM SERPL-MCNC: 9.4 MG/DL (ref 8.3–10.1)
CHLORIDE SERPL-SCNC: 109 MMOL/L (ref 100–108)
CO2 SERPL-SCNC: 27 MMOL/L (ref 21–32)
CREAT SERPL-MCNC: 1.73 MG/DL (ref 0.6–1.3)
GFR SERPL CREATININE-BSD FRML MDRD: 28 ML/MIN/1.73SQ M
GLUCOSE P FAST SERPL-MCNC: 77 MG/DL (ref 65–99)
POTASSIUM SERPL-SCNC: 4.4 MMOL/L (ref 3.5–5.3)
SODIUM SERPL-SCNC: 143 MMOL/L (ref 136–145)

## 2018-10-01 PROCEDURE — 36415 COLL VENOUS BLD VENIPUNCTURE: CPT

## 2018-10-01 PROCEDURE — 80048 BASIC METABOLIC PNL TOTAL CA: CPT

## 2018-10-03 ENCOUNTER — TELEPHONE (OUTPATIENT)
Dept: CARDIOLOGY CLINIC | Facility: CLINIC | Age: 73
End: 2018-10-03

## 2018-10-03 NOTE — TELEPHONE ENCOUNTER
I would ask her to resume the lasix at a lower dose of 20mg daily (half the dose she was taking before) - she can cut the pills in half

## 2018-10-03 NOTE — TELEPHONE ENCOUNTER
Brit Browning was advised to stop lasix at last OV  Since then she has gained 5lbs and is experiencing LE edema

## 2018-10-04 DIAGNOSIS — I10 ESSENTIAL HYPERTENSION: Primary | ICD-10-CM

## 2018-10-04 RX ORDER — FUROSEMIDE 20 MG/1
20 TABLET ORAL DAILY
Qty: 90 TABLET | Refills: 3 | Status: SHIPPED | OUTPATIENT
Start: 2018-10-04 | End: 2020-09-28

## 2018-10-08 DIAGNOSIS — E11.9 CONTROLLED TYPE 2 DIABETES MELLITUS WITHOUT COMPLICATION, WITHOUT LONG-TERM CURRENT USE OF INSULIN (HCC): Primary | ICD-10-CM

## 2018-10-08 RX ORDER — GLIMEPIRIDE 4 MG/1
4 TABLET ORAL DAILY
Qty: 180 TABLET | Refills: 1 | Status: SHIPPED | OUTPATIENT
Start: 2018-10-08 | End: 2019-02-11 | Stop reason: SDUPTHER

## 2018-10-11 DIAGNOSIS — G62.9 NEUROPATHY: ICD-10-CM

## 2018-10-11 RX ORDER — GABAPENTIN 300 MG/1
CAPSULE ORAL
Qty: 240 CAPSULE | Refills: 0 | Status: SHIPPED | OUTPATIENT
Start: 2018-10-11 | End: 2018-11-26 | Stop reason: SDUPTHER

## 2018-10-26 DIAGNOSIS — I10 ESSENTIAL HYPERTENSION: ICD-10-CM

## 2018-10-29 ENCOUNTER — OFFICE VISIT (OUTPATIENT)
Dept: OBGYN CLINIC | Facility: CLINIC | Age: 73
End: 2018-10-29
Payer: MEDICARE

## 2018-10-29 VITALS
BODY MASS INDEX: 50.85 KG/M2 | DIASTOLIC BLOOD PRESSURE: 66 MMHG | HEIGHT: 63 IN | SYSTOLIC BLOOD PRESSURE: 112 MMHG | WEIGHT: 287 LBS | HEART RATE: 121 BPM

## 2018-10-29 DIAGNOSIS — G56.02 CARPAL TUNNEL SYNDROME ON LEFT: ICD-10-CM

## 2018-10-29 DIAGNOSIS — M17.0 PRIMARY OSTEOARTHRITIS OF BOTH KNEES: Primary | ICD-10-CM

## 2018-10-29 PROCEDURE — 99214 OFFICE O/P EST MOD 30 MIN: CPT | Performed by: ORTHOPAEDIC SURGERY

## 2018-10-29 PROCEDURE — 20610 DRAIN/INJ JOINT/BURSA W/O US: CPT | Performed by: ORTHOPAEDIC SURGERY

## 2018-10-29 RX ORDER — BUPIVACAINE HYDROCHLORIDE 2.5 MG/ML
1 INJECTION, SOLUTION INFILTRATION; PERINEURAL
Status: COMPLETED | OUTPATIENT
Start: 2018-10-29 | End: 2018-10-29

## 2018-10-29 RX ORDER — LIDOCAINE HYDROCHLORIDE 10 MG/ML
1 INJECTION, SOLUTION INFILTRATION; PERINEURAL
Status: COMPLETED | OUTPATIENT
Start: 2018-10-29 | End: 2018-10-29

## 2018-10-29 RX ORDER — BETAMETHASONE SODIUM PHOSPHATE AND BETAMETHASONE ACETATE 3; 3 MG/ML; MG/ML
12 INJECTION, SUSPENSION INTRA-ARTICULAR; INTRALESIONAL; INTRAMUSCULAR; SOFT TISSUE
Status: COMPLETED | OUTPATIENT
Start: 2018-10-29 | End: 2018-10-29

## 2018-10-29 RX ORDER — NEBIVOLOL 5 MG/1
5 TABLET ORAL DAILY
Qty: 30 TABLET | Refills: 3 | Status: SHIPPED | OUTPATIENT
Start: 2018-10-29 | End: 2019-02-19 | Stop reason: SDUPTHER

## 2018-10-29 RX ADMIN — LIDOCAINE HYDROCHLORIDE 1 ML: 10 INJECTION, SOLUTION INFILTRATION; PERINEURAL at 12:43

## 2018-10-29 RX ADMIN — BUPIVACAINE HYDROCHLORIDE 1 ML: 2.5 INJECTION, SOLUTION INFILTRATION; PERINEURAL at 12:43

## 2018-10-29 RX ADMIN — BETAMETHASONE SODIUM PHOSPHATE AND BETAMETHASONE ACETATE 12 MG: 3; 3 INJECTION, SUSPENSION INTRA-ARTICULAR; INTRALESIONAL; INTRAMUSCULAR; SOFT TISSUE at 12:43

## 2018-10-29 NOTE — PROGRESS NOTES
Assessment/Plan:  1  Primary osteoarthritis of both knees  diclofenac sodium (VOLTAREN) 1 %    Large joint arthrocentesis    Large joint arthrocentesis   2  Carpal tunnel syndrome on left       Patient Active Problem List   Diagnosis    Arteriosclerosis of coronary artery    Edema    Hypercholesterolemia    Hypertension    Aortic stenosis    Primary osteoarthritis of both knees       Discussion/Summary:    74 y o  female the with a bilateral knee osteoarthritis  She received bilateral intra-articular corticosteroid injections today of bilateral knees  The as a oral NSAIDs and Tylenol have not been completely effective for her, we will also try topical Voltaren gel which was estimated to her pharmacy today  New authorizations for Euflexxa were placed which at the earliest would likely be in December  She will follow up with us for those injections once they are authorized  The assessment and plan were formulated by Dr Darcy Phelps and I assisted in carrying it out  Follow Up:  when injections are authorized - b/l euflexxa        Subjective:   Patient ID: Eber Syed is a 74 y o  female   HPI    Patient presents to the office for follow up of bilateral knee osteoarthritis  Since the last visit, the patient reports the no changes in the inner symptoms  Still having bilateral knee pain worse anterior medial most sides  The Euflexxa injection her last visit last about for 4 months  Patient denies any new injuries to the the bilateral knees since the last visit  The following portions of the patient's history were reviewed and updated as appropriate: allergies, current medications, past family history, past social history, past surgical history and problem list     Social History     Social History    Marital status: /Civil Union     Spouse name: N/A    Number of children: N/A    Years of education: N/A     Occupational History    Not on file       Social History Main Topics    Smoking status: Never Smoker    Smokeless tobacco: Never Used    Alcohol use No      Comment: Social per [employer]     Drug use: No    Sexual activity: Not on file     Other Topics Concern    Not on file     Social History Narrative    Hx of daily cola consumption (unk cans/day)    Daily tea consumption (unk cups/day)    Multiple organ donor    Patient has living will    Power of  in existence    Uses safety equipment - Seatbelts          Past Medical History:   Diagnosis Date    Aortic stenosis     Arthritis     Asthma     Coronary artery disease     Diabetes mellitus [place] [address] )     Diabetic peripheral neuropathy [place] [address] )     Last assessed - 1/27/16    Gallbladder disease     Heart attack ([place] [address] ) 07/1999    Hemorrhoids     Last assessed - 11/16/12    Myocardial infarction ([place] [address] )     Old myocardial infarction     Type 2 diabetes mellitus with autonomic neuropathy ([place] [address] )     Unspec long term insulin use status, Last assessed - 6/8/17     Past Surgical History:   Procedure Laterality Date    BUNIONECTOMY      CARDIAC CATHETERIZATION      Carotid Artery, Resolved - 7/1/13    CARDIOVASCULAR STRESS TEST  09/1999    CHOLECYSTECTOMY      COLONOSCOPY  2017    FOOT ARTHRODESIS, MODIFIED DONOHUE  2016    GALLBLADDER SURGERY  1970    HEMORROIDECTOMY      KNEE ARTHROSCOPY Left 2004    ID COLONOSCOPY FLX DX W/COLLJ SPEC WHEN PFRMD N/A 8/21/2017    Procedure: COLONOSCOPY;  Surgeon: Ginna Brownlee MD;  Location: [facility];   Service: Colorectal    REPAIR KNEE LIGAMENT      REPAIR KNEE LIGAMENT Left 1986    REPAIR KNEE LIGAMENT Right 1988     Allergies   Allergen Reactions    Lyrica [Pregabalin] Swelling    Sulfa Antibiotics Swelling     Current Outpatient Prescriptions on File Prior to Visit   Medication Sig Dispense Refill    albuterol (PROAIR HFA) 90 mcg/act inhaler Inhale 2 puffs 4 (four) times a day as needed      baclofen 10 mg tablet Take by mouth      diclofenac-misoprostol (ARTHROTEC 50) 50-0 2 MG per tablet Take 1 tablet by mouth 2 (two) times a day 90 tablet 3    furosemide (LASIX) 20 mg tablet Take 1 tablet (20 mg total) by mouth daily 90 tablet 3    gabapentin (NEURONTIN) 300 mg capsule Take 3 tabs AM, 2 tabs afternoon, 3 tabs  capsule 0    glimepiride (AMARYL) 4 mg tablet Take 1 tablet (4 mg total) by mouth daily 180 tablet 1    glucose blood (TRUETEST TEST) test strip by In Vitro route daily      levothyroxine 100 mcg tablet Take 1 tablet (100 mcg total) by mouth daily 30 tablet 3    metFORMIN (GLUCOPHAGE) 500 mg tablet Take 500 mg by mouth daily with breakfast      MICROLET LANCETS MISC by Does not apply route      Multiple Vitamin (DAILY VALUE MULTIVITAMIN) TABS Take 1 tablet by mouth daily      nebivolol (BYSTOLIC) 5 mg tablet Take 1 tablet (5 mg total) by mouth daily 30 tablet 3    pantoprazole (PROTONIX) 40 mg tablet Take 1 tablet (40 mg total) by mouth daily 90 tablet 3    potassium chloride (K-DUR,KLOR-CON) 20 mEq tablet Take 1 tablet (20 mEq total) by mouth daily 30 tablet 3    predniSONE 5 mg tablet Take 5 mg by mouth daily      spironolactone (ALDACTONE) 50 mg tablet Take 50 mg by mouth daily as needed 1 to 1/2 daily       triamcinolone (KENALOG) 0 025 % cream Apply topically      valACYclovir (VALTREX) 1,000 mg tablet Take 1 tablet by mouth 3 (three) times a day      venlafaxine (EFFEXOR) 75 mg tablet Take 1 tablet (75 mg total) by mouth 3 (three) times a day 90 tablet 3     No current facility-administered medications on file prior to visit  Review of Systems - Negative except as noted in HPI        Objective:    Vitals:    10/29/18 1218   BP: 112/66   Pulse: (!) 121       Physical Exam   Constitutional: She is oriented to person, place, and time  She appears well-developed and well-nourished  Pt is morbidly obese     HENT:   Head: Normocephalic and atraumatic  Eyes: Conjunctivae are normal  No scleral icterus  Neck: Neck supple     Pulmonary/Chest: Effort normal  No stridor  No respiratory distress  Abdominal: She exhibits no distension  Musculoskeletal:        Right knee: She exhibits no effusion  Left knee: She exhibits no effusion  Neurological: She is alert and oriented to person, place, and time  Skin: Skin is warm and dry  No erythema  Psychiatric: She has a normal mood and affect  Her behavior is normal        Right Knee Exam     Tenderness   The patient is experiencing tenderness in the medial joint line and medial retinaculum  Range of Motion   The patient has normal right knee ROM  Extension: normal   Flexion: normal     Muscle Strength     The patient has normal right knee strength  Tests   Walt:  Medial - negative Lateral - negative  Lachman:  Anterior - negative      Drawer:       Posterior - negative  Varus: negative  Valgus: negative  Patellar Apprehension: negative    Other   Erythema: absent  Scars: absent  Sensation: normal  Pulse: present  Swelling: mild  Other tests: no effusion present      Left Knee Exam     Tenderness   The patient is experiencing tenderness in the medial joint line and lateral joint line  Range of Motion   The patient has normal left knee ROM  Extension: normal   Flexion: normal     Muscle Strength     The patient has normal left knee strength  Tests   Watl:  Medial - negative Lateral - negative  Lachman:  Anterior - negative      Drawer:       Posterior - negative  Varus: negative  Valgus: negative  Patellar Apprehension: negative    Other   Erythema: absent  Scars: absent  Sensation: normal  Pulse: present  Swelling: mild  Effusion: no effusion present            I have personally reviewed pertinent films in PACS      Large joint arthrocentesis  Date/Time: 10/29/2018 12:43 PM  Consent given by: patient  Site marked: site marked  Timeout: Immediately prior to procedure a time out was called to verify the correct patient, procedure, equipment, support staff and site/side marked as required   Supporting Documentation  Indications: pain   Procedure Details  Location: knee - R knee  Preparation: Patient was prepped and draped in the usual sterile fashion  Needle size: 22 G  Approach: anterolateral  Medications administered: 1 mL bupivacaine 0 25 %; 1 mL lidocaine 1 %; 12 mg betamethasone acetate-betamethasone sodium phosphate 6 (3-3) mg/mL    Patient tolerance: patient tolerated the procedure well with no immediate complications  Dressing:  Sterile dressing applied  Large joint arthrocentesis  Date/Time: 10/29/2018 12:43 PM  Consent given by: patient  Site marked: site marked  Timeout: Immediately prior to procedure a time out was called to verify the correct patient, procedure, equipment, support staff and site/side marked as required   Supporting Documentation  Indications: pain   Procedure Details  Location: knee - L knee  Preparation: Patient was prepped and draped in the usual sterile fashion  Needle size: 22 G  Approach: anterolateral  Medications administered: 1 mL bupivacaine 0 25 %; 1 mL lidocaine 1 %; 12 mg betamethasone acetate-betamethasone sodium phosphate 6 (3-3) mg/mL    Patient tolerance: patient tolerated the procedure well with no immediate complications  Dressing:  Sterile dressing applied          Portions of the record may have been created with voice recognition software   Occasional wrong word or "sound a like" substitutions may have occurred due to the inherent limitations of voice recognition software   Read the chart carefully and recognize, using context, where substitutions have occurred

## 2018-11-01 ENCOUNTER — OFFICE VISIT (OUTPATIENT)
Dept: CARDIOLOGY CLINIC | Facility: CLINIC | Age: 73
End: 2018-11-01
Payer: MEDICARE

## 2018-11-01 VITALS
HEIGHT: 63 IN | WEIGHT: 286.8 LBS | BODY MASS INDEX: 50.82 KG/M2 | HEART RATE: 50 BPM | SYSTOLIC BLOOD PRESSURE: 130 MMHG | OXYGEN SATURATION: 97 % | DIASTOLIC BLOOD PRESSURE: 60 MMHG

## 2018-11-01 DIAGNOSIS — E78.00 HYPERCHOLESTEROLEMIA: ICD-10-CM

## 2018-11-01 DIAGNOSIS — I10 ESSENTIAL HYPERTENSION: ICD-10-CM

## 2018-11-01 DIAGNOSIS — I35.0 NONRHEUMATIC AORTIC VALVE STENOSIS: Primary | ICD-10-CM

## 2018-11-01 DIAGNOSIS — R60.0 LOCALIZED EDEMA: ICD-10-CM

## 2018-11-01 DIAGNOSIS — I25.10 ARTERIOSCLEROSIS OF CORONARY ARTERY: ICD-10-CM

## 2018-11-01 PROCEDURE — 99214 OFFICE O/P EST MOD 30 MIN: CPT | Performed by: INTERNAL MEDICINE

## 2018-11-01 NOTE — PATIENT INSTRUCTIONS
Aortic Stenosis   WHAT YOU NEED TO KNOW:   What is aortic stenosis? Aortic stenosis is a condition where the aortic valve in your heart is narrowed  The aortic valve is between the left ventricle and the aorta  The left ventricle is the lower left chamber of your heart  The aorta is a blood vessel that pumps blood to your head and body  The aortic valve opens and closes to direct blood flow through your heart  When the aortic valve is narrowed, blood flow may decrease  Your tissues and organs will not have enough oxygen and nutrients to function properly  What causes aortic stenosis? · Calcium buildup:  As you age, calcium can build up on the aortic valve walls  The calcium stiffens and thickens the valve  · Congenital heart defect:  Some people are born with a damaged aortic valve that leads to narrowing and blockage  · Rheumatic fever: This is fever and inflammation of your joints  It can develop after you have a strep throat infection  Rheumatic fever can cause inflammation and damage to your aortic valve  The walls of your aortic valve may narrow, and may even join together  What are the signs and symptoms of aortic stenosis? · Chest pain or tightness    · Fast, jumpy, or fluttery heartbeat    · Shortness of breath during activity or when you lie down    · Severe tiredness    · Dizziness or feeling faint  How is aortic stenosis diagnosed? Your healthcare provider will ask about your signs and symptoms and listen to your heart  He will ask if you have had strep throat or rheumatic fever in the past  You may need any of the following tests:  · Blood tests: You may need blood taken to give caregivers information about how your body is working  The blood may be taken from your hand, arm, or IV  · Chest x-ray: This is used to check the size of your heart and look for fluid around your heart and lungs  · EKG: This test records the electrical activity of your heart   It is used to check for abnormal heart rhythm caused by aortic stenosis  · An echocardiogram  is a type of ultrasound  Sound waves are used to show the structure and function of your heart  · A stress test  may show the changes that take place in your heart while it is under stress  Stress may be placed on your heart with exercise or medicine  Ask for more information about this test     · Cardiac catheterization: This procedure is done to find and treat heart blockages  A thin, bendable tube is inserted into your arm, neck, or groin and moved into your heart  An x-ray may be used to guide the tube to the right place  Dye may be put into your vein so the pictures show up better on a monitor  Tell the healthcare provider if you have ever had an allergic reaction to contrast dye  How is aortic stenosis treated? · Medicines: You may have the following medicines to improve your symptoms or prevent problems caused by aortic stenosis:    ¨ Cholesterol medicine: This medicine will help decrease the amount of cholesterol in your blood  ¨ Antibiotics: This medicine will help fight or prevent an infection  You may need it if you had rheumatic fever in the past  You may need to take the medicine every day, or once a month  · Procedures:      ¨ Valvotomy: This helps widen your aortic valve and allow blood to flow through easier  A catheter (long thin tube) with a balloon on the tip is inserted through a small incision in your arm or groin  The catheter is guided through a blood vessel and into your left atrium near your aortic valve  When the balloon is inflated, it stretches the valve opening  ¨ Valvuloplasty:  Healthcare providers make an incision in your chest to repair and widen your aortic valve  The valve walls are  or calcium buildup is removed  This helps improve the blood flow through your heart      ¨ Replacement:  Healthcare Providers make an incision in your chest to replace your damaged aortic valve  Part or all of your aortic valve is removed, and a new valve is secured in place  The new valve may be from a donor (another person or animal), or may be a manmade valve  What are the risks of aortic stenosis? Aortic stenosis may cause endocarditis  Endocarditis is an infection of the inner lining of the heart  Aortic stenosis can also cause congestive heart failure (CHF)  This is when the heart cannot pump enough blood for the body  This may cause irregular heartbeats and can lead to cardiac arrest (the heart stops beating)  This can be life-threatening  How can I manage my symptoms? · Eat a variety of healthy foods:  Healthy foods include fruits, vegetables, whole-grain breads, low-fat dairy products, beans, lean meats, and fish  Ask if you need to be on a special diet  · Exercise: This will improve your heart health  Ask your healthcare provider about the best exercise plan for you  · Maintain a healthy weight:  Ask your healthcare provider how much you should weigh  Ask him to help you create a weight loss plan if you are overweight  When should I contact my healthcare provider? · You are bleeding from your nose or gums  · The veins in your neck look swollen or are bulging  · You have a fever  · You have blood in your urine or bowel movements  · You have questions or concerns about your condition or care  When should I seek immediate care or call 911? · Your arm or leg feels warm, tender, and painful  It may look swollen and red  · Your heart is beating faster than normal for you, and you feel fluttering in your chest     · You suddenly feel lightheaded and short of breath  · You have chest pain that feels like squeezing, pressure, fullness, or pain  · You have chest pain that lasts for more than a few minutes or returns  · You are nauseated and have trouble breathing  · You have a severe headache, cold sweats, and feel lightheaded or dizzy      · You have weakness or numbness on one side of your arm, leg, or face  · You are confused and cannot speak clearly  CARE AGREEMENT:   You have the right to help plan your care  Learn about your health condition and how it may be treated  Discuss treatment options with your caregivers to decide what care you want to receive  You always have the right to refuse treatment  The above information is an  only  It is not intended as medical advice for individual conditions or treatments  Talk to your doctor, nurse or pharmacist before following any medical regimen to see if it is safe and effective for you  © 2017 [employer] Information is for End User's use only and may not be sold, redistributed or otherwise used for commercial purposes  All illustrations and images included in CareNotes® are the copyrighted property of [employer]  or Rashaad David

## 2018-11-01 NOTE — PROGRESS NOTES
Cardiology Consultation     Juanito Meraz  [id number]  1945  HEART & VASCULAR [facility] [address]    1  Nonrheumatic aortic valve stenosis     2  Arteriosclerosis of coronary artery     3  Essential hypertension     4  Localized edema     5  Hypercholesterolemia        Chief Complaint   Patient presents with    Follow-up     4 weeks, edema is improving    Pre-op Exam     cardiac clearance for catarac Sx by Dr Courtney Nick on 12/ 13/2018 and  01/03/2019       HPI: Patient is here for evaluation and management of lower extremity edema  She has been started on furosemide 40mg and spironolactone by her PCP (Dr Pacheco) - which she does not take every day due to her schedule  She started on her left leg with 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 in August - swollen since then  Her right side does not have Charcot-Gayathri, but it has also started swelling as well  Edema seems to be improving with diuretics - did lose 10 lbs  Feels well, no complaints  Will be having cataracts surgery in December and January        Patient Active Problem List   Diagnosis    Arteriosclerosis of coronary artery    Edema    Hypercholesterolemia    Hypertension    Aortic stenosis    Primary osteoarthritis of both knees     Past Medical History:   Diagnosis Date    Aortic stenosis     Arthritis     Asthma     Coronary artery disease     Diabetes mellitus ([address] )     Diabetic peripheral neuropathy ([facility] [address] )     Last assessed - 1/27/16    Gallbladder disease     Heart attack ([address] ) 07/1999    Hemorrhoids     Last assessed - 11/16/12    Myocardial infarction [facility])     Old myocardial infarction     Type 2 diabetes mellitus with autonomic neuropathy ([facility] [address] )     Unspec long term insulin use status, Last assessed - 6/8/17     Social History     Social History    Marital status: /Civil Union     Spouse name: N/A    Number of children: N/A    Years of education: N/A     Occupational History    Not on file  Social History Main Topics    Smoking status: Never Smoker    Smokeless tobacco: Never Used    Alcohol use No      Comment: Social per [employer]     Drug use: No    Sexual activity: Not on file     Other Topics Concern    Not on file     Social History Narrative    Hx of daily cola consumption (unk cans/day)    Daily tea consumption (unk cups/day)    Multiple organ donor    Patient has living will    Power of  in existence    Uses safety equipment - Seatbelts           Family History   Problem Relation Age of Onset    Hypertension Father     Diabetes Father     Cancer Father         Throat    Arthritis Father     Stroke Mother     Diabetes Maternal Grandmother     Heart disease Maternal Grandfather     Diabetes Son     Lymphoma Family         Head, Face and Neck      Past Surgical History:   Procedure Laterality Date    BUNIONECTOMY      CARDIAC CATHETERIZATION      Carotid Artery, Resolved - 7/1/13    CARDIOVASCULAR STRESS TEST  09/1999    CHOLECYSTECTOMY      COLONOSCOPY  2017    FOOT ARTHRODESIS, MODIFIED DONOHUE  2016    GALLBLADDER SURGERY  1970    HEMORROIDECTOMY      KNEE ARTHROSCOPY Left 2004    NM COLONOSCOPY FLX DX W/COLLJ SPEC WHEN PFRMD N/A 8/21/2017    Procedure: COLONOSCOPY;  Surgeon: Ginna Brownlee MD;  [facility];   Service: Colorectal    REPAIR KNEE LIGAMENT      REPAIR KNEE LIGAMENT Left 1986    REPAIR KNEE LIGAMENT Right 1988       Current Outpatient Prescriptions:     albuterol (PROAIR HFA) 90 mcg/act inhaler, Inhale 2 puffs 4 (four) times a day as needed, Disp: , Rfl:     baclofen 10 mg tablet, Take by mouth, Disp: , Rfl:     diclofenac sodium (VOLTAREN) 1 %, Apply 2 g topically 4 (four) times a day, Disp: 1 Tube, Rfl: 2    diclofenac-misoprostol (ARTHROTEC 50) 50-0 2 MG per tablet, Take 1 tablet by mouth 2 (two) times a day, Disp: 90 tablet, Rfl: 3   furosemide (LASIX) 20 mg tablet, Take 1 tablet (20 mg total) by mouth daily, Disp: 90 tablet, Rfl: 3    gabapentin (NEURONTIN) 300 mg capsule, Take 3 tabs AM, 2 tabs afternoon, 3 tabs QHS, Disp: 240 capsule, Rfl: 0    glimepiride (AMARYL) 4 mg tablet, Take 1 tablet (4 mg total) by mouth daily, Disp: 180 tablet, Rfl: 1    glucose blood (TRUETEST TEST) test strip, by In Vitro route daily, Disp: , Rfl:     levothyroxine 100 mcg tablet, Take 1 tablet (100 mcg total) by mouth daily, Disp: 30 tablet, Rfl: 3    metFORMIN (GLUCOPHAGE) 500 mg tablet, Take 500 mg by mouth daily with breakfast, Disp: , Rfl:     MICROLET LANCETS MISC, by Does not apply route, Disp: , Rfl:     Multiple Vitamin (DAILY VALUE MULTIVITAMIN) TABS, Take 1 tablet by mouth daily, Disp: , Rfl:     nebivolol (BYSTOLIC) 5 mg tablet, Take 1 tablet (5 mg total) by mouth daily, Disp: 30 tablet, Rfl: 3    pantoprazole (PROTONIX) 40 mg tablet, Take 1 tablet (40 mg total) by mouth daily, Disp: 90 tablet, Rfl: 3    potassium chloride (K-DUR,KLOR-CON) 20 mEq tablet, Take 1 tablet (20 mEq total) by mouth daily, Disp: 30 tablet, Rfl: 3    predniSONE 5 mg tablet, Take 5 mg by mouth daily, Disp: , Rfl:     spironolactone (ALDACTONE) 50 mg tablet, Take 50 mg by mouth daily as needed 1 to 1/2 daily , Disp: , Rfl:     triamcinolone (KENALOG) 0 025 % cream, Apply topically, Disp: , Rfl:     valACYclovir (VALTREX) 1,000 mg tablet, Take 1 tablet by mouth 3 (three) times a day, Disp: , Rfl:     venlafaxine (EFFEXOR) 75 mg tablet, Take 1 tablet (75 mg total) by mouth 3 (three) times a day, Disp: 90 tablet, Rfl: 3  Allergies   Allergen Reactions    Lyrica [Pregabalin] Swelling    Sulfa Antibiotics Swelling     Vitals:    11/01/18 0929   BP: 130/60   BP Location: Left arm   Patient Position: Sitting   Cuff Size: Standard   Pulse: (!) 50   SpO2: 97%   Weight: 130 kg (286 lb 12 8 oz)   Height: 5' 3" (1 6 m)       Labs:  Appointment on 10/01/2018   Component Date Value    Sodium 10/01/2018 143     Potassium 10/01/2018 4 4     Chloride 10/01/2018 109*    CO2 10/01/2018 27     ANION GAP 10/01/2018 7     BUN 10/01/2018 33*    Creatinine 10/01/2018 1 73*    Glucose, Fasting 10/01/2018 77     Calcium 10/01/2018 9 4     eGFR 10/01/2018 28    Appointment on 08/09/2018   Component Date Value    Sodium 08/09/2018 142     Potassium 08/09/2018 4 2     Chloride 08/09/2018 104     CO2 08/09/2018 29     ANION GAP 08/09/2018 9     BUN 08/09/2018 31*    Creatinine 08/09/2018 1 99*    Glucose, Fasting 08/09/2018 107*    Calcium 08/09/2018 9 7     AST 08/09/2018 14     ALT 08/09/2018 21     Alkaline Phosphatase 08/09/2018 91     Total Protein 08/09/2018 6 5     Albumin 08/09/2018 3 3*    Total Bilirubin 08/09/2018 0 40     eGFR 08/09/2018 24     Hemoglobin A1C 08/09/2018 7 4*    EAG 08/09/2018 166     Cholesterol 08/09/2018 267*    Triglycerides 08/09/2018 182*    HDL, Direct 08/09/2018 50     LDL Calculated 08/09/2018 181*     Lab Results   Component Value Date    CHOL 184 09/19/2015    TRIG 182 (H) 08/09/2018    TRIG 113 09/19/2015    HDL 50 08/09/2018    HDL 47 09/19/2015     Imaging: No results found  Review of Systems:  Review of Systems   Constitutional: Negative for activity change, appetite change, chills, diaphoresis, fatigue and unexpected weight change  HENT: Negative for hearing loss, nosebleeds and sore throat  Eyes: Negative for photophobia and visual disturbance  Respiratory: Positive for wheezing  Negative for cough, chest tightness and shortness of breath  Cardiovascular: Negative for chest pain, palpitations and leg swelling  Gastrointestinal: Negative for abdominal pain, diarrhea, nausea and vomiting  Endocrine: Negative for polyuria  Genitourinary: Negative for dysuria, frequency and hematuria  Musculoskeletal: Positive for arthralgias  Negative for back pain, gait problem and neck pain     Skin: Negative for pallor and rash    Neurological: Negative for dizziness, syncope and headaches  Hematological: Does not bruise/bleed easily  Psychiatric/Behavioral: Negative for behavioral problems and confusion  Physical Exam:  Physical Exam   Constitutional: She is oriented to person, place, and time  She appears well-developed and well-nourished  HENT:   Head: Normocephalic and atraumatic  Nose: Nose normal    Eyes: Pupils are equal, round, and reactive to light  EOM are normal  No scleral icterus  Neck: Normal range of motion  Neck supple  No JVD present  Cardiovascular: Normal rate and regular rhythm  Exam reveals no gallop and no friction rub  Murmur heard  Systolic murmur is present with a grade of 2/6   Pulmonary/Chest: Effort normal and breath sounds normal  No respiratory distress  She has no wheezes  She has no rales  Abdominal: Soft  Bowel sounds are normal  She exhibits no distension  There is no tenderness  Musculoskeletal: Normal range of motion  She exhibits no edema or deformity  Neurological: She is alert and oriented to person, place, and time  No cranial nerve deficit  Skin: Skin is warm and dry  No rash noted  She is not diaphoretic  Psychiatric: She has a normal mood and affect  Her behavior is normal    Vitals reviewed  Nuclear stress test: 12/2017 - personally reviewed, small fixed inferoapical defect consistent with prior MI, normal EF  Echo: 12/2017 - personally reviewed, normal EF, no wall motion abnormalities, G1DD, mild MR, very mild AS (mean gradient 8 mmHg)    Discussion/Summary:  1) LE edema: likely resultant from G1DD  Agree with continued furosemide and spironolactone, which seems to be improving her symptoms  Have advised patient to check her weight every day to help check to see how her fluid removal is going  We decreased dose to daily for furosemide since Cr was elevated at 1 89, recheck of BMP revealed 1 99 with a BUN Of 31 suggesting dehydration    We stopped furosemide rechecked BMP in 2 weeks - which showed improved Cr - would use furosemide sparingly to prevent TIESHA  2) Mild aortic stenosis: inconsequential at this time  Continue to follow  3) h/o CAD with prior MI: seen on nuclear stress test in Dec 2017 as well  No ischemia seen on that study  Continue medical management with bystolic  4) HTN: well controlled, continue current regimen  5) HLD: not currently on statin    Currently at goal

## 2018-11-13 DIAGNOSIS — E03.9 HYPOTHYROIDISM, UNSPECIFIED TYPE: ICD-10-CM

## 2018-11-13 RX ORDER — LEVOTHYROXINE SODIUM 0.1 MG/1
100 TABLET ORAL DAILY
Qty: 30 TABLET | Refills: 3 | Status: SHIPPED | OUTPATIENT
Start: 2018-11-13 | End: 2019-03-22 | Stop reason: SDUPTHER

## 2018-11-20 DIAGNOSIS — F32.A DEPRESSION, UNSPECIFIED DEPRESSION TYPE: ICD-10-CM

## 2018-11-20 RX ORDER — VENLAFAXINE 75 MG/1
75 TABLET ORAL 3 TIMES DAILY
Qty: 90 TABLET | Refills: 1 | Status: SHIPPED | OUTPATIENT
Start: 2018-11-20 | End: 2019-01-22 | Stop reason: SDUPTHER

## 2018-11-26 DIAGNOSIS — M19.90 OSTEOARTHRITIS, UNSPECIFIED OSTEOARTHRITIS TYPE, UNSPECIFIED SITE: ICD-10-CM

## 2018-11-26 DIAGNOSIS — G62.9 NEUROPATHY: ICD-10-CM

## 2018-11-26 RX ORDER — GABAPENTIN 300 MG/1
CAPSULE ORAL
Qty: 240 CAPSULE | Refills: 3 | Status: SHIPPED | OUTPATIENT
Start: 2018-11-26 | End: 2019-05-01 | Stop reason: SDUPTHER

## 2018-11-26 RX ORDER — DICLOFENAC SODIUM AND MISOPROSTOL 50; 200 MG/1; UG/1
1 TABLET, DELAYED RELEASE ORAL 2 TIMES DAILY
Qty: 180 TABLET | Refills: 3 | Status: SHIPPED | OUTPATIENT
Start: 2018-11-26 | End: 2019-11-25 | Stop reason: SDUPTHER

## 2018-11-29 PROBLEM — E66.01 MORBID OBESITY WITH BODY MASS INDEX (BMI) OF 50.0 TO 59.9 IN ADULT (HCC): Status: ACTIVE | Noted: 2018-11-29

## 2018-12-03 ENCOUNTER — OFFICE VISIT (OUTPATIENT)
Dept: FAMILY MEDICINE CLINIC | Facility: CLINIC | Age: 73
End: 2018-12-03
Payer: MEDICARE

## 2018-12-03 VITALS
WEIGHT: 283.6 LBS | SYSTOLIC BLOOD PRESSURE: 130 MMHG | HEIGHT: 63 IN | DIASTOLIC BLOOD PRESSURE: 60 MMHG | HEART RATE: 68 BPM | BODY MASS INDEX: 50.25 KG/M2 | TEMPERATURE: 96.2 F

## 2018-12-03 DIAGNOSIS — E13.9 DIABETES 1.5, MANAGED AS TYPE 2 (HCC): ICD-10-CM

## 2018-12-03 DIAGNOSIS — I10 ESSENTIAL HYPERTENSION: ICD-10-CM

## 2018-12-03 DIAGNOSIS — H26.9 CATARACT OF RIGHT EYE, UNSPECIFIED CATARACT TYPE: ICD-10-CM

## 2018-12-03 DIAGNOSIS — Z01.818 PREOPERATIVE CLEARANCE: Primary | ICD-10-CM

## 2018-12-03 PROCEDURE — 99213 OFFICE O/P EST LOW 20 MIN: CPT | Performed by: FAMILY MEDICINE

## 2018-12-03 PROCEDURE — 93000 ELECTROCARDIOGRAM COMPLETE: CPT | Performed by: FAMILY MEDICINE

## 2018-12-03 NOTE — PROGRESS NOTES
Patient ID: Agustin Hill is a 74 y o  female  HPI: 74 y  o female is being seen for a preoperative visit  For right cataract extraction     Surgical Risk Assessment:    Prior anesthesia: Adverse Reaction to : Epidural none    General none   Spinal none  Family history of adverse reactions to anesthesia? Pertinent Past Medical History:  None        Exercise Capacity:     Able to walk 4 blocks w/o Sx       yes       Able to walk 2 flights of steps w/o Sx   yes    Lifestyle Factors: Tobacco Use:  none        Pack years  Alcohol Use:  none  Illicit Drug Use:  none  No transfusions : Yarsani   none       Personal history of venous thromboembolic disease:  none  History of Steroid use for >2 weeks within last year? none    Family History   Problem Relation Age of Onset    Hypertension Father     Diabetes Father     Cancer Father         Throat    Arthritis Father     Stroke Mother     Diabetes Maternal Grandmother     Heart disease Maternal Grandfather     Diabetes Son     Lymphoma Family         Head, Face and Neck      Social History     Social History    Marital status: /Civil Union     Spouse name: N/A    Number of children: N/A    Years of education: N/A     Occupational History    Not on file       Social History Main Topics    Smoking status: Never Smoker    Smokeless tobacco: Never Used    Alcohol use No      Comment: Social per [employer] Drug use: No    Sexual activity: Not on file     Other Topics Concern    Not on file     Social History Narrative    Hx of daily cola consumption (unk cans/day)    Daily tea consumption (unk cups/day)    Multiple organ donor    Patient has living will    Power of  in existence    Uses safety equipment - Seatbelts          Past Medical History:   Diagnosis Date    Aortic stenosis     Arthritis     Asthma     Coronary artery disease     Diabetes mellitus [facility] [address] )     Diabetic peripheral neuropathy ([facility] [address] )     Last assessed - 1/27/16    Gallbladder disease     Heart attack ([facility] [address] ) 07/1999    Hemorrhoids     Last assessed - 11/16/12    [facility]ocardial infarction [facility])     Old myocardial infarction     Type 2 diabetes mellitus with autonomic neuropathy ([facility] [address] )     Unspec long term insulin use status, Last assessed - 6/8/17     Past Surgical History:   Procedure Laterality Date    BUNIONECTOMY      CARDIAC CATHETERIZATION      Carotid Artery, Resolved - 7/1/13    CARDIOVASCULAR STRESS TEST  09/1999    CHOLECYSTECTOMY      COLONOSCOPY  2017    FOOT ARTHRODESIS, MODIFIED DONOHUE  2016    GALLBLADDER SURGERY  1970    HEMORROIDECTOMY      KNEE ARTHROSCOPY Left 2004    CO COLONOSCOPY FLX DX W/COLLJ SPEC WHEN PFRMD N/A 8/21/2017    Procedure: COLONOSCOPY;  Surgeon: Chantell Barber MD;  [facility];   Service: Colorectal    REPAIR KNEE LIGAMENT      REPAIR KNEE LIGAMENT Left 1986    REPAIR KNEE LIGAMENT Right 1988     Allergies   Allergen Reactions    Lyrica [Pregabalin] Swelling    Sulfa Antibiotics Swelling       Current Outpatient Prescriptions:     albuterol (PROAIR HFA) 90 mcg/act inhaler, Inhale 2 puffs 4 (four) times a day as needed, Disp: , Rfl:     baclofen 10 mg tablet, Take by mouth, Disp: , Rfl:     diclofenac sodium (VOLTAREN) 1 %, Apply 2 g topically 4 (four) times a day, Disp: 1 Tube, Rfl: 2    diclofenac-misoprostol (ARTHROTEC 50) 50-0 2 MG per tablet, Take 1 tablet by mouth 2 (two) times a day, Disp: 180 tablet, Rfl: 3    furosemide (LASIX) 20 mg tablet, Take 1 tablet (20 mg total) by mouth daily (Patient taking differently: Take 20 mg by mouth as needed  ), Disp: 90 tablet, Rfl: 3    gabapentin (NEURONTIN) 300 mg capsule, Take 3 tabs AM, 2 tabs afternoon, 3 tabs QHS, Disp: 240 capsule, Rfl: 3    glimepiride (AMARYL) 4 mg tablet, Take 1 tablet (4 mg total) by mouth daily, Disp: 180 tablet, Rfl: 1    glucose blood (TRUETEST TEST) test strip, by In Vitro route daily, Disp: , Rfl:     levothyroxine 100 mcg tablet, Take 1 tablet (100 mcg total) by mouth daily, Disp: 30 tablet, Rfl: 3    metFORMIN (GLUCOPHAGE) 500 mg tablet, Take 500 mg by mouth daily with breakfast, Disp: , Rfl:     MICROLET LANCETS MISC, by Does not apply route, Disp: , Rfl:     Multiple Vitamin (DAILY VALUE MULTIVITAMIN) TABS, Take 1 tablet by mouth daily, Disp: , Rfl:     nebivolol (BYSTOLIC) 5 mg tablet, Take 1 tablet (5 mg total) by mouth daily, Disp: 30 tablet, Rfl: 3    pantoprazole (PROTONIX) 40 mg tablet, Take 1 tablet (40 mg total) by mouth daily, Disp: 90 tablet, Rfl: 3    potassium chloride (K-DUR,KLOR-CON) 20 mEq tablet, Take 1 tablet (20 mEq total) by mouth daily, Disp: 30 tablet, Rfl: 3    predniSONE 5 mg tablet, Take 5 mg by mouth daily, Disp: , Rfl:     spironolactone (ALDACTONE) 50 mg tablet, Take 50 mg by mouth daily as needed 1 to 1/2 daily , Disp: , Rfl:     triamcinolone (KENALOG) 0 025 % cream, Apply topically, Disp: , Rfl:     valACYclovir (VALTREX) 1,000 mg tablet, Take 1 tablet by mouth 3 (three) times a day, Disp: , Rfl:     venlafaxine (EFFEXOR) 75 mg tablet, Take 1 tablet (75 mg total) by mouth 3 (three) times a day, Disp: 90 tablet, Rfl: 1    Review of Systems   Constitutional:  No fever or chills, feels well, no tiredness, no recent weight gain or loss  Eyes:  No complaints of eye pain, no red eyes, no discharge from eyes, no itchy eyes + blurred vision both eyes bilaterally  ENT:  No complaints of earache, no hearing loss, nose bleeds, no nasal discharge, no sore throat, no hoarseness  Pulmonary:  No complaints of shortness of breath, no wheezing, no cough, no shortness of breath on exertion, no orthopnea or postnasal drip  Cardiovascular:  No complaints of slow heart rate, no fast heart rate, no chest pain, no palpitations, no leg claudication, no lower extremity swelling  Gastrointestinal:  No complaints of abdominal pain, no constipation, no nausea or vomiting, no diarrhea or bloody stools  Gentiourinary:  No complaints of dysuria, no incontinence, no hesitancy, no nocturia, no genital lesions, no genital pain (no dysmenorrhea, no vaginal discharge or bleeding )  Musculoskeletal:  No complaints of arthralgia, myalgia, no joint swelling or stiffness, no limb pain or swelling  Integumentary:  No complaints of skin rash or skin lesions, no itching, no skin wounds, no dry skin  Neurological:  No complaints of headache, no confusion, no convulsions, no numbness or tingling, no dizziness or fainting, no limb weakness, no difficulty walking  Psychiatric:  Is not suicidal, no sleep disturbances, no anxiety or depression, no change in personality, no emotional problems  Hematologic/Lymphatic:  No complaints of swollen glands, no swollen glands in the neck, does not bleed easily, no easy bruising     Physical Exam:    /60   Pulse 68   Temp (!) 96 2 °F (35 7 °C)   Ht 5' 3" (1 6 m)   Wt 129 kg (283 lb 9 6 oz)   BMI 50 24 kg/m²     Constitutional:   NAD, Pleasant 74year old female well appearing and well nourished in no acute distress     ENT:   Conjunctiva and lids: no injection, edema, or discharge     Pupils and iris: ANGELINE bilaterally  + bilateral cataracts   External inspection of ears and nose: normal without deformities or discharge  Otoscopic exam: Canals patent without erythema  Nasal mucosa, septum and turbinates: Normal or edema or discharge         Oropharynx:  Moist mucosa, normal tongue and tonsils without lesions  No erythema        Pulmonary:Respiratory effort normal rate and rhythm, no increased work of breathing   Auscultation of lungs:  Clear bilaterally with no adventitious breath sounds       Cardiovascular: regular rate and rhythm, S1 and S2, no murmur, no edema and/or varicosities of LE      Abdomen: Soft and non-distended     Positive bowel sounds      No heptomegaly or splenomegaly      Lymphatic:  No anterior or posterior cervical lymphadenopathy Musculoskeletal:  Gait and station: Normal gait      Digits and nails normal without clubbing or cyanosis       Inspection/palpation of joints, bones, and muscles:  No joint tenderness, swelling, full active and passive range of motion       Skin: Normal skin turgor and no rashes      Neuro:      Normal reflexes      Psych:   alert and oriented to person, place and time     normal mood and affect       DATA:  Laboratory Results:     Lab Results   Component Value Date    ALT 21 08/09/2018    AST 14 08/09/2018    BUN 33 (H) 10/01/2018    CALCIUM 9 4 10/01/2018     (H) 10/01/2018    CHOL 184 09/19/2015    CO2 27 10/01/2018    CREATININE 1 73 (H) 10/01/2018    HDL 50 08/09/2018    HCT 39 7 05/25/2016    HGB 12 5 05/25/2016    HGBA1C 7 4 (H) 08/09/2018     05/25/2016    K 4 4 10/01/2018     09/19/2015    TRIG 182 (H) 08/09/2018    WBC 10 60 (H) 05/25/2016     ECG: NSR without acute changes    Current medications which may produce withdrawal symptoms if withheld perioperatively:none    Pre-op Evaluation Plan  1  Further preoperative work-up as follows: none  2  Medication Management/Recommendations: none  3  Prophylaxis for cardiac events with perioperative beta-blockers: none  Clearance:  Patient is CLEARED for surgery without any additional cardiac testing      Assessment/Plan:  Diagnoses and all orders for this visit:    Preoperative clearance  -     POCT ECG    Cataract of right eye, unspecified cataract type    Essential hypertension  Comments:  controlled on current therapy    Diabetes 1 5, managed as type 2 ([facility] [address] )  Comments:  Stable at present time  Other orders  -     Cancel: DXA bone density spine hip and pelvis;  Future

## 2018-12-07 DIAGNOSIS — M25.50 ARTHRALGIA, UNSPECIFIED JOINT: Primary | ICD-10-CM

## 2018-12-07 RX ORDER — PREDNISONE 1 MG/1
5 TABLET ORAL DAILY
Qty: 90 TABLET | Refills: 0 | Status: SHIPPED | OUTPATIENT
Start: 2018-12-07 | End: 2019-03-11 | Stop reason: SDUPTHER

## 2018-12-10 ENCOUNTER — APPOINTMENT (OUTPATIENT)
Dept: LAB | Facility: CLINIC | Age: 73
End: 2018-12-10
Payer: MEDICARE

## 2018-12-10 DIAGNOSIS — E78.00 HYPERCHOLESTEROLEMIA: ICD-10-CM

## 2018-12-10 DIAGNOSIS — E13.9 DIABETES 1.5, MANAGED AS TYPE 2 (HCC): ICD-10-CM

## 2018-12-10 DIAGNOSIS — I10 ESSENTIAL HYPERTENSION: ICD-10-CM

## 2018-12-10 LAB
ALBUMIN SERPL BCP-MCNC: 3.3 G/DL (ref 3.5–5)
ALP SERPL-CCNC: 93 U/L (ref 46–116)
ALT SERPL W P-5'-P-CCNC: 20 U/L (ref 12–78)
ANION GAP SERPL CALCULATED.3IONS-SCNC: 8 MMOL/L (ref 4–13)
AST SERPL W P-5'-P-CCNC: 12 U/L (ref 5–45)
BILIRUB SERPL-MCNC: 0.34 MG/DL (ref 0.2–1)
BUN SERPL-MCNC: 24 MG/DL (ref 5–25)
CALCIUM ALBUM COR SERPL-MCNC: 10.8 MG/DL (ref 8.3–10.1)
CALCIUM SERPL-MCNC: 10.2 MG/DL (ref 8.3–10.1)
CHLORIDE SERPL-SCNC: 107 MMOL/L (ref 100–108)
CHOLEST SERPL-MCNC: 195 MG/DL (ref 50–200)
CO2 SERPL-SCNC: 26 MMOL/L (ref 21–32)
CREAT SERPL-MCNC: 1.6 MG/DL (ref 0.6–1.3)
EST. AVERAGE GLUCOSE BLD GHB EST-MCNC: 174 MG/DL
GFR SERPL CREATININE-BSD FRML MDRD: 31 ML/MIN/1.73SQ M
GLUCOSE P FAST SERPL-MCNC: 104 MG/DL (ref 65–99)
HBA1C MFR BLD: 7.7 % (ref 4.2–6.3)
HDLC SERPL-MCNC: 46 MG/DL (ref 40–60)
LDLC SERPL CALC-MCNC: 108 MG/DL (ref 0–100)
POTASSIUM SERPL-SCNC: 4.3 MMOL/L (ref 3.5–5.3)
PROT SERPL-MCNC: 6.7 G/DL (ref 6.4–8.2)
SODIUM SERPL-SCNC: 141 MMOL/L (ref 136–145)
TRIGL SERPL-MCNC: 205 MG/DL

## 2018-12-10 PROCEDURE — 80053 COMPREHEN METABOLIC PANEL: CPT

## 2018-12-10 PROCEDURE — 83036 HEMOGLOBIN GLYCOSYLATED A1C: CPT

## 2018-12-10 PROCEDURE — 80061 LIPID PANEL: CPT

## 2018-12-10 PROCEDURE — 36415 COLL VENOUS BLD VENIPUNCTURE: CPT

## 2018-12-17 ENCOUNTER — OFFICE VISIT (OUTPATIENT)
Dept: FAMILY MEDICINE CLINIC | Facility: CLINIC | Age: 73
End: 2018-12-17
Payer: MEDICARE

## 2018-12-17 VITALS
HEART RATE: 68 BPM | SYSTOLIC BLOOD PRESSURE: 130 MMHG | BODY MASS INDEX: 49.75 KG/M2 | WEIGHT: 280.8 LBS | HEIGHT: 63 IN | DIASTOLIC BLOOD PRESSURE: 74 MMHG | RESPIRATION RATE: 16 BRPM | TEMPERATURE: 97.9 F

## 2018-12-17 DIAGNOSIS — E11.42 DIABETIC PERIPHERAL NEUROPATHY (HCC): ICD-10-CM

## 2018-12-17 DIAGNOSIS — I10 ESSENTIAL HYPERTENSION: ICD-10-CM

## 2018-12-17 DIAGNOSIS — R60.0 LOCALIZED EDEMA: ICD-10-CM

## 2018-12-17 DIAGNOSIS — E11.22 TYPE 2 DIABETES MELLITUS WITH STAGE 3 CHRONIC KIDNEY DISEASE, WITHOUT LONG-TERM CURRENT USE OF INSULIN (HCC): Primary | ICD-10-CM

## 2018-12-17 DIAGNOSIS — N18.30 TYPE 2 DIABETES MELLITUS WITH STAGE 3 CHRONIC KIDNEY DISEASE, WITHOUT LONG-TERM CURRENT USE OF INSULIN (HCC): Primary | ICD-10-CM

## 2018-12-17 DIAGNOSIS — E78.00 HYPERCHOLESTEROLEMIA: ICD-10-CM

## 2018-12-17 PROCEDURE — 99214 OFFICE O/P EST MOD 30 MIN: CPT | Performed by: FAMILY MEDICINE

## 2018-12-18 PROBLEM — E11.42 DIABETIC PERIPHERAL NEUROPATHY (HCC): Status: ACTIVE | Noted: 2018-12-18

## 2018-12-18 NOTE — PROGRESS NOTES
Patient ID: [id number] Goyal is a 74 y o  female  HPI: 74 y  o female presents for follow up of NIDDm, htn, hypercholesterolemia, and Chronic renal insufficiency  All is stable at this time  Her diabetic peripheral neuropathy symptoms are stable on gabapentin therapy  SUBJECTIVE    Family History   Problem Relation Age of Onset    Hypertension Father     Diabetes Father     Cancer Father         Throat    Arthritis Father     Stroke Mother     Diabetes Maternal Grandmother     Heart disease Maternal Grandfather     Diabetes Son     Lymphoma Family         Head, Face and Neck      Social History     Social History    Marital status: /Civil Union     Spouse name: N/A    Number of children: N/A    Years of education: N/A     Occupational History    Not on file       Social History Main Topics    Smoking status: Never Smoker    Smokeless tobacco: Never Used    Alcohol use No      Comment: Social per [employer]     Drug use: No    Sexual activity: Not on file     Other Topics Concern    Not on file     Social History Narrative    Hx of daily cola consumption (unk cans/day)    Daily tea consumption (unk cups/day)    Multiple organ donor    Patient has living will    Power of  in existence    Uses safety equipment - Seatbelts          Past Medical History:   Diagnosis Date    Aortic stenosis     Arthritis     Asthma     Coronary artery disease     Diabetes [facility]     Diabetic peripheral neuropathy ([facility] )     Last assessed - 1/27/16    Gallbladder disease     Heart attack ([facility] ) 07/1999    Hemorrhoids     Last assessed - 11/16/12    Myocardial infarction ([facility]     Old myocardial infarction     Type 2 diabetes mellitus with autonomic neuropathy ([facility] )     Unspec long term insulin use status, Last assessed - 6/8/17     Past Surgical History:   Procedure Laterality Date    BUNIONECTOMY      CARDIAC CATHETERIZATION      Carotid Artery, Resolved - 7/1/13    CARDIOVASCULAR STRESS TEST  09/1999    CHOLECYSTECTOMY      COLONOSCOPY  2017    FOOT ARTHRODESIS, MODIFIED DONOHUE  2016    GALLBLADDER SURGERY  1970    HEMORROIDECTOMY      KNEE ARTHROSCOPY Left 2004    KS COLONOSCOPY FLX DX W/COLLJ [facility] WHEN PFRMD N/A 8/21/2017    Procedure: COLONOSCOPY;  Surgeon: Matias Jade MD;  Location: [facility];   Service: Colorectal    REPAIR KNEE LIGAMENT      REPAIR KNEE LIGAMENT Left 1986    REPAIR KNEE LIGAMENT Right 1988     Allergies   Allergen Reactions    Lyrica [Pregabalin] Swelling    Sulfa Antibiotics Swelling       Current Outpatient Prescriptions:     albuterol (PROAIR HFA) 90 mcg/act inhaler, Inhale 2 puffs 4 (four) times a day as needed, Disp: , Rfl:     baclofen 10 mg tablet, Take by mouth, Disp: , Rfl:     diclofenac-misoprostol (ARTHROTEC 50) 50-0 2 MG per tablet, Take 1 tablet by mouth 2 (two) times a day, Disp: 180 tablet, Rfl: 3    furosemide (LASIX) 20 mg tablet, Take 1 tablet (20 mg total) by mouth daily (Patient taking differently: Take 20 mg by mouth as needed  ), Disp: 90 tablet, Rfl: 3    gabapentin (NEURONTIN) 300 mg capsule, Take 3 tabs AM, 2 tabs afternoon, 3 tabs QHS, Disp: 240 capsule, Rfl: 3    glimepiride (AMARYL) 4 mg tablet, Take 1 tablet (4 mg total) by mouth daily, Disp: 180 tablet, Rfl: 1    glucose blood (TRUETEST TEST) test strip, by In Vitro route daily, Disp: , Rfl:     levothyroxine 100 mcg tablet, Take 1 tablet (100 mcg total) by mouth daily, Disp: 30 tablet, Rfl: 3    metFORMIN (GLUCOPHAGE) 500 mg tablet, Take 500 mg by mouth daily with breakfast, Disp: , Rfl:     MICROLET LANCETS MISC, by Does not apply route, Disp: , Rfl:     Multiple Vitamin (DAILY VALUE MULTIVITAMIN) TABS, Take 1 tablet by mouth daily, Disp: , Rfl:     nebivolol (BYSTOLIC) 5 mg tablet, Take 1 tablet (5 mg total) by mouth daily, Disp: 30 tablet, Rfl: 3    potassium chloride (K-DUR,KLOR-CON) 20 mEq tablet, Take 1 tablet (20 mEq total) by mouth daily, Disp: 30 tablet, Rfl: 3    predniSONE 5 mg tablet, Take 1 tablet (5 mg total) by mouth daily, Disp: 90 tablet, Rfl: 0    triamcinolone (KENALOG) 0 025 % cream, Apply topically, Disp: , Rfl:     valACYclovir (VALTREX) 1,000 mg tablet, Take 1 tablet by mouth 3 (three) times a day, Disp: , Rfl:     venlafaxine (EFFEXOR) 75 mg tablet, Take 1 tablet (75 mg total) by mouth 3 (three) times a day, Disp: 90 tablet, Rfl: 1    Review of Systems  Constitutional:     Denies fever, chills ,fatigue ,weakness ,weight loss, weight gain     ENT: Denies earache ,loss of hearing ,nosebleed, nasal discharge,nasal congestion ,sore throat ,hoarseness  Pulmonary: Denies shortness of breath ,cough  ,dyspnea on exertion, orthopnea  ,PND   Cardiovascular:  Denies bradycardia , tachycardia  ,palpations, lower extremity edema leg, claudication  Breast:  Denies new or changing breast lumps ,nipple discharge ,nipple changes  Abdomen:  Denies abdominal pain , anorexia , indigestion, nausea, vomiting, constipation, diarrhea  Musculoskeletal: Denies myalgias, arthralgias, joint swelling, joint stiffness , limb pain, limb swelling  Gu: denies dysuria, polyuria  Skin: Denies skin rash, skin lesion, skin wound, itching, dry skin  Neuro: Denies headache, numbness, confusion, loss of consciousness, dizziness, vertigo+ paresthesia and burning of feet bilaterally  Psychiatric: Denies feelings of depression, suicidal ideation, anxiety, sleep disturbances    OBJECTIVE  /74   Pulse 68   Temp 97 9 °F (36 6 °C)   Resp 16   Ht 5' 3" (1 6 m)   Wt 127 kg (280 lb 12 8 oz)   BMI 49 74 kg/m²   Constitutional:   NAD, well appearing and well nourished      ENT:   Conjunctiva and lids: no injection, edema, or discharge     Pupils and iris: ANGELINE bilaterally    External inspection of ears and nose: normal without deformities or discharge  Otoscopic exam: Canals patent without erythema         Nasal mucosa, septum and turbinates: Normal or edema or discharge         Oropharynx:  Moist mucosa, normal tongue and tonsils without lesions  No erythema        Pulmonary:Respiratory effort normal rate and rhythm, no increased work of breathing  Auscultation of lungs:  Clear bilaterally with no adventitious breath sounds       Cardiovascular: regular rate and rhythm, S1 and S2, no murmur, no edema and/or varicosities of LE      Abdomen: Soft and non-distended     Positive bowel sounds      No heptomegaly or splenomegaly      Gu: no suprapubic tenderness or CVA tenderness, no urethral discharge  Lymphatic:  No anterior or posterior cervical lymphadenopathy         Musculoskeletal:  Gait and station: Normal gait      Digits and nails normal without clubbing or cyanosis       Inspection/palpation of joints, bones, and muscles:  No joint tenderness, swelling, full active and passive range of motion       Skin: Normal skin turgor and no rashes      Neuro:    Normal reflexes      Psych:   alert and oriented to person, place and time     normal mood and affect   Patient's shoes and socks removed  Right Foot/Ankle   Right Foot Inspection  Skin Exam: skin normal and skin intact no dry skin, no warmth, no callus, no erythema, no maceration, no abnormal color, no pre-ulcer, no ulcer and no callus                          Toe Exam: ROM and strength within normal limitsno swelling, no tenderness, erythema and  no right toe deformity  Sensory   Vibration: absent  Proprioception: absent   Monofilament testing: absent  Vascular  Capillary refills: < 3 seconds  The right DP pulse is 2+  The right PT pulse is 2+       Left Foot/Ankle  Left Foot Inspection  Skin Exam: skin normal and skin intactno dry skin, no warmth, no erythema, no maceration, normal color, no pre-ulcer, no ulcer and no callus                         Toe Exam: ROM and strength within normal limitsno swelling, no tenderness, no erythema and no left toe deformity                   Sensory   Vibration: absent  Proprioception: absent  Monofilament: absent  Vascular  Capillary refills: < 3 seconds  The left DP pulse is 2+  The left PT pulse is 2+  Assign Risk Category:  No deformity present; Loss of protective sensation; No weak pulses       Risk: 2      Assessment/Plan:Diagnoses and all orders for this visit:    Type 2 diabetes mellitus with stage 3 chronic kidney disease, without long-term current use of insulin ([facility] [facility] )  Comments:  Pt follows regularly with nephrology  Orders:  -     HEMOGLOBIN A1C W/ EAG ESTIMATION; Future  -     Cancel: Microalbumin,Urine  -     Microalbumin,Urine    Hypercholesterolemia  -     Lipid Panel with Direct LDL reflex; Future    Essential hypertension  -     Comprehensive metabolic panel; Future    Localized edema    Diabetic peripheral neuropathy ([facility] )        I will see patient back in 4 mos or sooner prn

## 2018-12-19 DIAGNOSIS — E78.00 HYPERCHOLESTEROLEMIA: Primary | ICD-10-CM

## 2018-12-20 RX ORDER — PRAVASTATIN SODIUM 10 MG
10 TABLET ORAL DAILY
Qty: 90 TABLET | Refills: 1 | Status: SHIPPED | OUTPATIENT
Start: 2018-12-20 | End: 2019-09-13 | Stop reason: SDUPTHER

## 2018-12-24 ENCOUNTER — TELEPHONE (OUTPATIENT)
Dept: OBGYN CLINIC | Facility: CLINIC | Age: 73
End: 2018-12-24

## 2018-12-24 DIAGNOSIS — R60.9 EDEMA, UNSPECIFIED TYPE: ICD-10-CM

## 2018-12-24 RX ORDER — POTASSIUM CHLORIDE 20 MEQ/1
20 TABLET, EXTENDED RELEASE ORAL DAILY
Qty: 30 TABLET | Refills: 6 | Status: SHIPPED | OUTPATIENT
Start: 2018-12-24 | End: 2019-07-25 | Stop reason: SDUPTHER

## 2018-12-24 NOTE — TELEPHONE ENCOUNTER
Call from patient  Call back # [phone]       Patient would like to know if she can have Euflexxa injections in both knees

## 2018-12-26 ENCOUNTER — TELEPHONE (OUTPATIENT)
Dept: OBGYN CLINIC | Facility: CLINIC | Age: 73
End: 2018-12-26

## 2018-12-26 ENCOUNTER — TELEPHONE (OUTPATIENT)
Dept: OBGYN CLINIC | Facility: HOSPITAL | Age: 73
End: 2018-12-26

## 2018-12-26 DIAGNOSIS — M17.0 PRIMARY OSTEOARTHRITIS OF BOTH KNEES: Primary | ICD-10-CM

## 2018-12-26 NOTE — TELEPHONE ENCOUNTER
Milad Zamarripa  [phone]    Dr Pollock Stains    Patient would like to start new Euflexxa series in knees

## 2018-12-26 NOTE — TELEPHONE ENCOUNTER
Called patient and lvm to call us at her convenience and schedule apt for B/L Knee Euflexxa Inj (BUY &N BILL)

## 2018-12-26 NOTE — TELEPHONE ENCOUNTER
Please call the patient and let her know that we started the prior authorization for these injections, she will be contacted once authorization is complete and we have the injections here to give to her

## 2018-12-27 ENCOUNTER — TELEPHONE (OUTPATIENT)
Dept: OBGYN CLINIC | Facility: OTHER | Age: 73
End: 2018-12-27

## 2018-12-27 NOTE — TELEPHONE ENCOUNTER
TO BE COMPLETED BY CENTRAL AUTH TEAM:     Physician: DR Heidi Balderas    Medication: Chloe Berg    Number of Injections in Series (Appointments scheduled 1 week apart from one another):   3  Schedule after this date:   11 OR MORE DAYS FROM CALL BACK DATE    Billing Info: Buy and Bill/Specialty Pharmacy-Patient Supply  OFFICE MAY BUY & BILL    Appointment Message Line:   B/L KNEE EUFLEXXA #1, 2, 3 (BUY & BILL)    (please copy and paste appointment message line when scheduling appointment)    Additional Comments: LEFT MSG TO SCHEDULE VISCO INJECTIONS

## 2019-01-08 RX ORDER — OFLOXACIN 3 MG/ML
SOLUTION/ DROPS OPHTHALMIC
COMMUNITY
Start: 2018-12-23 | End: 2019-05-09 | Stop reason: ALTCHOICE

## 2019-01-08 RX ORDER — PREDNISOLONE ACETATE 10 MG/ML
SUSPENSION/ DROPS OPHTHALMIC
Refills: 1 | COMMUNITY
Start: 2018-12-24 | End: 2019-05-09 | Stop reason: ALTCHOICE

## 2019-01-09 ENCOUNTER — OFFICE VISIT (OUTPATIENT)
Dept: OBGYN CLINIC | Facility: CLINIC | Age: 74
End: 2019-01-09
Payer: MEDICARE

## 2019-01-09 VITALS
DIASTOLIC BLOOD PRESSURE: 84 MMHG | BODY MASS INDEX: 50.68 KG/M2 | HEIGHT: 63 IN | WEIGHT: 286 LBS | SYSTOLIC BLOOD PRESSURE: 172 MMHG | HEART RATE: 60 BPM

## 2019-01-09 DIAGNOSIS — M17.0 PRIMARY OSTEOARTHRITIS OF BOTH KNEES: Primary | ICD-10-CM

## 2019-01-09 PROCEDURE — 20610 DRAIN/INJ JOINT/BURSA W/O US: CPT | Performed by: ORTHOPAEDIC SURGERY

## 2019-01-09 RX ORDER — HYALURONATE SODIUM 10 MG/ML
20 SYRINGE (ML) INTRAARTICULAR
Status: COMPLETED | OUTPATIENT
Start: 2019-01-09 | End: 2019-01-09

## 2019-01-09 RX ADMIN — Medication 20 MG: at 09:12

## 2019-01-09 NOTE — PROGRESS NOTES
1  Primary osteoarthritis of both knees  Large joint arthrocentesis    Large joint arthrocentesis     Patient is here for her  1st injection of  Euflexxa into the bilateral knee  Patient reports  Knee pain that is symmetric on both sides, reports that last Euflexxa injection lasted for about 3 months  Physical exam of the knee shows no effusion no ecchymosis     all other organ systems normal      Large joint arthrocentesis  Date/Time: 1/9/2019 9:12 AM  Consent given by: patient  Site marked: site marked  Timeout: Immediately prior to procedure a time out was called to verify the correct patient, procedure, equipment, support staff and site/side marked as required   Supporting Documentation  Indications: pain   Procedure Details  Location: knee - L knee  Preparation: Patient was prepped and draped in the usual sterile fashion  Needle size: 22 G  Ultrasound guidance: no  Approach: anterolateral  Medications administered: 20 mg Sodium Hyaluronate 20 MG/2ML    Patient tolerance: patient tolerated the procedure well with no immediate complications  Dressing:  Sterile dressing applied  Large joint arthrocentesis  Date/Time: 1/9/2019 9:12 AM  Consent given by: patient  Site marked: site marked  Timeout: Immediately prior to procedure a time out was called to verify the correct patient, procedure, equipment, support staff and site/side marked as required   Supporting Documentation  Indications: pain   Procedure Details  Location: knee - R knee  Preparation: Patient was prepped and draped in the usual sterile fashion  Needle size: 22 G  Ultrasound guidance: no  Approach: anterolateral  Medications administered: 20 mg Sodium Hyaluronate 20 MG/2ML    Patient tolerance: patient tolerated the procedure well with no immediate complications  Dressing:  Sterile dressing applied        Patient tolerated procedure follow up  1 week

## 2019-01-15 RX ORDER — PANTOPRAZOLE SODIUM 40 MG/1
40 TABLET, DELAYED RELEASE ORAL DAILY
Refills: 3 | COMMUNITY
Start: 2018-12-31 | End: 2019-03-25 | Stop reason: SDUPTHER

## 2019-01-16 ENCOUNTER — OFFICE VISIT (OUTPATIENT)
Dept: OBGYN CLINIC | Facility: CLINIC | Age: 74
End: 2019-01-16
Payer: MEDICARE

## 2019-01-16 VITALS
DIASTOLIC BLOOD PRESSURE: 78 MMHG | HEIGHT: 63 IN | BODY MASS INDEX: 50.68 KG/M2 | HEART RATE: 60 BPM | SYSTOLIC BLOOD PRESSURE: 140 MMHG | WEIGHT: 286 LBS

## 2019-01-16 DIAGNOSIS — E66.01 MORBID OBESITY WITH BODY MASS INDEX (BMI) OF 50.0 TO 59.9 IN ADULT (HCC): ICD-10-CM

## 2019-01-16 DIAGNOSIS — M17.0 PRIMARY OSTEOARTHRITIS OF BOTH KNEES: Primary | ICD-10-CM

## 2019-01-16 PROCEDURE — 20610 DRAIN/INJ JOINT/BURSA W/O US: CPT | Performed by: ORTHOPAEDIC SURGERY

## 2019-01-16 RX ORDER — HYALURONATE SODIUM 10 MG/ML
20 SYRINGE (ML) INTRAARTICULAR
Status: COMPLETED | OUTPATIENT
Start: 2019-01-16 | End: 2019-01-16

## 2019-01-16 RX ADMIN — Medication 20 MG: at 09:15

## 2019-01-16 NOTE — PROGRESS NOTES
1  Primary osteoarthritis of both knees     2  Morbid obesity with body mass index (BMI) of 50 0 to 59 9 in adult [facility])       Patient is here for her 2nd injection of Euflexxa into the bilateral knee  Patient reports improvements in pain  All organ systems normal  Physical exam of the knee shows no effusion no ecchymosis        Large joint arthrocentesis  Date/Time: 1/16/2019 9:15 AM  Consent given by: patient  Supporting Documentation  Indications: pain   Procedure Details  Location: knee - R knee  Needle size: 22 G  Ultrasound guidance: no  Approach: anterolateral  Medications administered: 20 mg Sodium Hyaluronate 20 MG/2ML      Large joint arthrocentesis  Date/Time: 1/16/2019 9:15 AM  Consent given by: patient  Timeout: Immediately prior to procedure a time out was called to verify the correct patient, procedure, equipment, support staff and site/side marked as required   Supporting Documentation  Indications: pain   Procedure Details  Location: knee - L knee  Needle size: 22 G  Ultrasound guidance: no  Approach: anterolateral  Medications administered: 20 mg Sodium Hyaluronate 20 MG/2ML            Patient tolerated procedure follow up 1 week

## 2019-01-22 DIAGNOSIS — F32.A DEPRESSION, UNSPECIFIED DEPRESSION TYPE: ICD-10-CM

## 2019-01-22 RX ORDER — VENLAFAXINE 75 MG/1
75 TABLET ORAL 3 TIMES DAILY
Qty: 90 TABLET | Refills: 0 | Status: SHIPPED | OUTPATIENT
Start: 2019-01-22 | End: 2019-02-20 | Stop reason: SDUPTHER

## 2019-01-23 ENCOUNTER — OFFICE VISIT (OUTPATIENT)
Dept: OBGYN CLINIC | Facility: CLINIC | Age: 74
End: 2019-01-23
Payer: MEDICARE

## 2019-01-23 DIAGNOSIS — M17.0 PRIMARY OSTEOARTHRITIS OF BOTH KNEES: Primary | ICD-10-CM

## 2019-01-23 PROCEDURE — 20610 DRAIN/INJ JOINT/BURSA W/O US: CPT | Performed by: ORTHOPAEDIC SURGERY

## 2019-01-23 RX ORDER — HYALURONATE SODIUM 10 MG/ML
20 SYRINGE (ML) INTRAARTICULAR
Status: COMPLETED | OUTPATIENT
Start: 2019-01-23 | End: 2019-01-23

## 2019-01-23 RX ADMIN — Medication 20 MG: at 09:13

## 2019-01-23 RX ADMIN — Medication 20 MG: at 09:12

## 2019-01-23 NOTE — PROGRESS NOTES
1  Primary osteoarthritis of both knees  Large joint arthrocentesis    Large joint arthrocentesis     Patient is here for her 3rd injection of euflexxa into the bilateral knee  Patient reports knee pain slightly improving since first 2 injections  Physical exam of the knee shows no effusion no ecchymosis        Large joint arthrocentesis  Date/Time: 1/23/2019 9:12 AM  Consent given by: patient  Site marked: site marked  Timeout: Immediately prior to procedure a time out was called to verify the correct patient, procedure, equipment, support staff and site/side marked as required   Supporting Documentation  Indications: pain   Procedure Details  Location: knee - R knee  Preparation: Patient was prepped and draped in the usual sterile fashion  Needle size: 22 G  Ultrasound guidance: no  Approach: anterolateral  Medications administered: 20 mg Sodium Hyaluronate 20 MG/2ML    Patient tolerance: patient tolerated the procedure well with no immediate complications  Dressing:  Sterile dressing applied  Large joint arthrocentesis  Date/Time: 1/23/2019 9:13 AM  Consent given by: patient  Site marked: site marked  Timeout: Immediately prior to procedure a time out was called to verify the correct patient, procedure, equipment, support staff and site/side marked as required   Supporting Documentation  Indications: pain   Procedure Details  Location: knee - L knee  Preparation: Patient was prepped and draped in the usual sterile fashion  Needle size: 22 G  Ultrasound guidance: no  Approach: anterolateral  Medications administered: 20 mg Sodium Hyaluronate 20 MG/2ML    Patient tolerance: patient tolerated the procedure well with no immediate complications  Dressing:  Sterile dressing applied        Patient tolerated procedure follow up PRN

## 2019-02-11 ENCOUNTER — TELEPHONE (OUTPATIENT)
Dept: FAMILY MEDICINE CLINIC | Facility: CLINIC | Age: 74
End: 2019-02-11

## 2019-02-11 DIAGNOSIS — E11.9 CONTROLLED TYPE 2 DIABETES MELLITUS WITHOUT COMPLICATION, WITHOUT LONG-TERM CURRENT USE OF INSULIN (HCC): ICD-10-CM

## 2019-02-11 RX ORDER — METFORMIN HYDROCHLORIDE 500 MG/1
500 TABLET, EXTENDED RELEASE ORAL
Qty: 90 TABLET | Refills: 3 | Status: SHIPPED | OUTPATIENT
Start: 2019-02-11 | End: 2019-05-10 | Stop reason: ALTCHOICE

## 2019-02-11 RX ORDER — GLIMEPIRIDE 4 MG/1
4 TABLET ORAL 2 TIMES DAILY
Qty: 180 TABLET | Refills: 1 | Status: SHIPPED | OUTPATIENT
Start: 2019-02-11 | End: 2020-01-06 | Stop reason: SDUPTHER

## 2019-02-11 RX ORDER — METFORMIN HYDROCHLORIDE 500 MG/1
500 TABLET, EXTENDED RELEASE ORAL
COMMUNITY
End: 2019-02-11 | Stop reason: SDUPTHER

## 2019-02-11 NOTE — TELEPHONE ENCOUNTER
[facility] requesting refill Metformin 500 mg twice daily, chart states once daily at breakfast       I called patient and she said she only takes once daily  She also said the Glimeperide 4 mg she was taking twice daily, but chart states once daily, but they sent 180 tabs, please advise correct dosing on both medications

## 2019-02-14 ENCOUNTER — OFFICE VISIT (OUTPATIENT)
Dept: FAMILY MEDICINE CLINIC | Facility: CLINIC | Age: 74
End: 2019-02-14
Payer: MEDICARE

## 2019-02-14 VITALS
SYSTOLIC BLOOD PRESSURE: 132 MMHG | HEART RATE: 68 BPM | BODY MASS INDEX: 50.89 KG/M2 | DIASTOLIC BLOOD PRESSURE: 82 MMHG | WEIGHT: 287.2 LBS | TEMPERATURE: 97.9 F | HEIGHT: 63 IN

## 2019-02-14 DIAGNOSIS — E11.42 DIABETIC PERIPHERAL NEUROPATHY (HCC): ICD-10-CM

## 2019-02-14 DIAGNOSIS — R60.0 LOCALIZED EDEMA: Primary | ICD-10-CM

## 2019-02-14 DIAGNOSIS — D49.89 NEOPLASM OF FACE: ICD-10-CM

## 2019-02-14 PROCEDURE — 99214 OFFICE O/P EST MOD 30 MIN: CPT | Performed by: FAMILY MEDICINE

## 2019-02-14 NOTE — PROGRESS NOTES
Patient ID: Kendell Blue is a 74 y o  female  HPI: 74 y  o female presents with a red, irritated "rash " on her lower legs  She has been taking her furosemide for the past 3 days   She has noticed edema for the past three days  She denies any cough, dyspnea or othopnea  She has a nonhealing lesion on right side of bridge of her nose      SUBJECTIVE    Family History   Problem Relation Age of Onset    Hypertension Father     Diabetes Father     Cancer Father         Throat    Arthritis Father     Stroke Mother     Diabetes Maternal Grandmother     Heart disease Maternal Grandfather     Diabetes Son     Lymphoma Family         Head, Face and Neck      Social History     Socioeconomic History    Marital status: /Civil Union     Spouse name: Not on file    Number of children: Not on file    Years of education: Not on file    Highest education level: Not on file   Occupational History    Not on file   Social Needs    Financial resource strain: Not on file    Food insecurity:     Worry: Not on file     Inability: Not on file    Transportation needs:     Medical: Not on file     Non-medical: Not on file   Tobacco Use    Smoking status: Never Smoker    Smokeless tobacco: Never Used   Substance and Sexual Activity    Alcohol use: No     Comment: Social per [employer]     Drug use: No    Sexual activity: Not on file   Lifestyle    Physical activity:     Days per week: Not on file     Minutes per session: Not on file    Stress: Not on file   Relationships    Social connections:     Talks on phone: Not on file     Gets together: Not on file     Attends Adventism service: Not on file     Active member of club or organization: Not on file     Attends meetings of clubs or organizations: Not on file     Relationship status: Not on file    Intimate partner violence:     Fear of current or ex partner: Not on file     Emotionally abused: Not on file     Physically abused: Not on file     Forced sexual activity: Not on file   Other Topics Concern    Not on file   Social History Narrative    Hx of daily cola consumption (unk cans/day)    Daily tea consumption (unk cups/day)    Multiple organ donor    Patient has living will    Power of  in existence    Uses safety equipment - Seatbelts      Past Medical History:   Diagnosis Date    Aortic stenosis     Arthritis     Asthma     Coronary artery disease     Diabetes mellitus [place] [address] )     Diabetic peripheral neuropathy ([place] [address] )     Last assessed - 1/27/16    Gallbladder disease     Heart attack ([place] [address] ) 07/1999    Hemorrhoids     Last assessed - 11/16/12    Myocardial infarction ([place] [address] )     Old myocardial infarction     Type 2 diabetes mellitus with autonomic neuropathy ([place] [address] )     Unspec long term insulin use status, Last assessed - 6/8/17     Past Surgical History:   Procedure Laterality Date    BUNIONECTOMY      CARDIAC CATHETERIZATION      Carotid Artery, Resolved - 7/1/13    CARDIOVASCULAR STRESS TEST  09/1999    CHOLECYSTECTOMY      COLONOSCOPY  2017    FOOT ARTHRODESIS, MODIFIED DONOHUE  2016    GALLBLADDER SURGERY  1970    HEMORROIDECTOMY      KNEE ARTHROSCOPY Left 2004    [place] COLONOSCOPY FLX DX W/COLLJ SPEC WHEN PFRMD N/A 8/21/2017    Procedure: COLONOSCOPY;  Surgeon: Jf Nazario MD;  Location: [facility];   Service: Colorectal    REPAIR KNEE LIGAMENT      REPAIR KNEE LIGAMENT Left 1986    REPAIR KNEE LIGAMENT Right 1988     Allergies   Allergen Reactions    Lyrica [Pregabalin] Swelling    Sulfa Antibiotics Swelling       Current Outpatient Medications:     albuterol (PROAIR HFA) 90 mcg/act inhaler, Inhale 2 puffs 4 (four) times a day as needed, Disp: , Rfl:     baclofen 10 mg tablet, Take by mouth, Disp: , Rfl:     diclofenac-misoprostol (ARTHROTEC 50) 50-0 2 MG per tablet, Take 1 tablet by mouth 2 (two) times a day, Disp: 180 tablet, Rfl: 3    furosemide (LASIX) 20 mg tablet, Take 1 tablet (20 mg total) by mouth daily (Patient taking differently: Take 20 mg by mouth as needed  ), Disp: 90 tablet, Rfl: 3    gabapentin (NEURONTIN) 300 mg capsule, Take 3 tabs AM, 2 tabs afternoon, 3 tabs QHS, Disp: 240 capsule, Rfl: 3    glimepiride (AMARYL) 4 mg tablet, Take 1 tablet (4 mg total) by mouth 2 (two) times a day, Disp: 180 tablet, Rfl: 1    glucose blood (TRUETEST TEST) test strip, by In Vitro route daily, Disp: , Rfl:     levothyroxine 100 mcg tablet, Take 1 tablet (100 mcg total) by mouth daily, Disp: 30 tablet, Rfl: 3    metFORMIN (GLUCOPHAGE-XR) 500 mg 24 hr tablet, Take 1 tablet (500 mg total) by mouth daily with breakfast, Disp: 90 tablet, Rfl: 3    MICROLET LANCETS MISC, by Does not apply route, Disp: , Rfl:     Multiple Vitamin (DAILY VALUE MULTIVITAMIN) TABS, Take 1 tablet by mouth daily, Disp: , Rfl:     nebivolol (BYSTOLIC) 5 mg tablet, Take 1 tablet (5 mg total) by mouth daily, Disp: 30 tablet, Rfl: 3    ofloxacin (OCUFLOX) 0 3 % ophthalmic solution, , Disp: , Rfl:     pantoprazole (PROTONIX) 40 mg tablet, Take 40 mg by mouth daily, Disp: , Rfl: 3    potassium chloride (K-DUR,KLOR-CON) 20 mEq tablet, Take 1 tablet (20 mEq total) by mouth daily, Disp: 30 tablet, Rfl: 6    pravastatin (PRAVACHOL) 10 mg tablet, Take 1 tablet (10 mg total) by mouth daily, Disp: 90 tablet, Rfl: 1    prednisoLONE acetate (PRED FORTE) 1 % ophthalmic suspension, INSTILL 1 DROP IN THE RIGHT EYE FOUR TIMES DAILY, START AFTER SURGERY, Disp: , Rfl: 1    predniSONE 5 mg tablet, Take 1 tablet (5 mg total) by mouth daily, Disp: 90 tablet, Rfl: 0    triamcinolone (KENALOG) 0 025 % cream, Apply topically, Disp: , Rfl:     valACYclovir (VALTREX) 1,000 mg tablet, Take 1 tablet by mouth 3 (three) times a day, Disp: , Rfl:     venlafaxine (EFFEXOR) 75 mg tablet, Take 1 tablet (75 mg total) by mouth 3 (three) times a day, Disp: 90 tablet, Rfl: 0    Review of Systems  Constitutional:     Denies fever, chills ,fatigue ,weakness ,weight loss, weight gain     ENT: Denies earache ,loss of hearing ,nosebleed, nasal discharge,nasal congestion ,sore throat ,hoarseness  Pulmonary: Denies shortness of breath ,cough  ,dyspnea on exertion, orthopnea  ,PND   Cardiovascular:  Denies bradycardia , tachycardia  ,palpations, lower extremity edema leg, claudication  Breast:  Denies new or changing breast lumps ,nipple discharge ,nipple changes  Abdomen:  Denies abdominal pain , anorexia , indigestion, nausea, vomiting, constipation, diarrhea  Musculoskeletal: Denies myalgias, arthralgias, joint swelling, joint stiffness , limb pain, limb swelling  Gu: denies dysuria, polyuria  Skin: red, blotchy rash on lower legs bilaterally, nonhealing lesion on right bridge of nose  Neuro: Denies headache, numbness, tingling, confusion, loss of consciousness, dizziness, vertigo  Psychiatric: Denies feelings of depression, suicidal ideation, anxiety, sleep disturbances    OBJECTIVE  /82 (BP Location: Right arm, Patient Position: Sitting, Cuff Size: Standard)   Pulse 68   Temp 97 9 °F (36 6 °C)   Ht 5' 3" (1 6 m)   Wt 130 kg (287 lb 3 2 oz)   BMI 50 88 kg/m²   Constitutional:   NAD, well appearing and well nourished      ENT:   Conjunctiva and lids: no injection, edema, or discharge     Pupils and iris: ANGELINE bilaterally    External inspection of ears and nose: normal without deformities or discharge  Otoscopic exam: Canals patent without erythema  Nasal mucosa, septum and turbinates: Normal or edema or discharge         Oropharynx:  Moist mucosa, normal tongue and tonsils without lesions  No erythema        Pulmonary:Respiratory effort normal rate and rhythm, no increased work of breathing   Auscultation of lungs:  Clear bilaterally with no adventitious breath sounds       Cardiovascular: regular rate and rhythm, S1 and S2, no murmur, +2 pitting  edema and/or varicosities of LE     Abdomen: Soft and non-distended     Positive bowel sounds      No heptomegaly or splenomegaly      Gu: no suprapubic tenderness or CVA tenderness, no urethral discharge  Lymphatic:  No anterior or posterior cervical lymphadenopathy         Musculoskeletal:  Gait and station: Normal gait      Digits and nails normal without clubbing or cyanosis       Inspection/palpation of joints, bones, and muscles:  No joint tenderness, swelling, full active and passive range of motion       Skin: Normal skin turgor and erythematous macular rash on lower extremities ; lesion on left bridge of nose 0 5cm in diameter with irregular border and red pigment    Neuro:    Normal reflexes     Psych:   alert and oriented to person, place and time     normal mood and affect       Assessment/Plan:Diagnoses and all orders for this visit:    Localized edema  Comments:  Contiue with diuretics x 72 more hrs and ace wraps were applied from below knee to ankles bilaterally    Neoplasm of face  -     Ambulatory referral to Plastic Surgery; Future    Diabetic peripheral neuropathy [facility])      Reviewed with patient plan to treat with above  Pt to call in 72 hours with update      Patient instructed to call in 72 hours if not feeling better or if symptoms worsen

## 2019-02-18 ENCOUNTER — TELEPHONE (OUTPATIENT)
Dept: FAMILY MEDICINE CLINIC | Facility: CLINIC | Age: 74
End: 2019-02-18

## 2019-02-18 NOTE — TELEPHONE ENCOUNTER
1) update: she is doing much better    2) she didn't get a name of a plastic surg for her nose you want her to see,  She is aware she may not get cb today you are out of the office

## 2019-02-19 DIAGNOSIS — I10 ESSENTIAL HYPERTENSION: ICD-10-CM

## 2019-02-19 RX ORDER — NEBIVOLOL 5 MG/1
5 TABLET ORAL DAILY
Qty: 30 TABLET | Refills: 3 | Status: SHIPPED | OUTPATIENT
Start: 2019-02-19 | End: 2019-06-19 | Stop reason: SDUPTHER

## 2019-02-20 DIAGNOSIS — F32.A DEPRESSION, UNSPECIFIED DEPRESSION TYPE: ICD-10-CM

## 2019-02-20 RX ORDER — VENLAFAXINE 75 MG/1
75 TABLET ORAL 3 TIMES DAILY
Qty: 90 TABLET | Refills: 0 | Status: SHIPPED | OUTPATIENT
Start: 2019-02-20 | End: 2019-03-27 | Stop reason: SDUPTHER

## 2019-03-11 DIAGNOSIS — M25.50 ARTHRALGIA, UNSPECIFIED JOINT: ICD-10-CM

## 2019-03-11 RX ORDER — PREDNISONE 1 MG/1
5 TABLET ORAL DAILY
Qty: 90 TABLET | Refills: 0 | Status: SHIPPED | OUTPATIENT
Start: 2019-03-11 | End: 2019-06-04 | Stop reason: SDUPTHER

## 2019-03-22 DIAGNOSIS — E03.9 HYPOTHYROIDISM, UNSPECIFIED TYPE: ICD-10-CM

## 2019-03-22 RX ORDER — LEVOTHYROXINE SODIUM 0.1 MG/1
100 TABLET ORAL DAILY
Qty: 30 TABLET | Refills: 3 | Status: SHIPPED | OUTPATIENT
Start: 2019-03-22 | End: 2019-07-29 | Stop reason: SDUPTHER

## 2019-03-25 DIAGNOSIS — K21.9 GASTROESOPHAGEAL REFLUX DISEASE WITHOUT ESOPHAGITIS: Primary | ICD-10-CM

## 2019-03-25 RX ORDER — PANTOPRAZOLE SODIUM 40 MG/1
40 TABLET, DELAYED RELEASE ORAL DAILY
Qty: 90 TABLET | Refills: 1 | Status: SHIPPED | OUTPATIENT
Start: 2019-03-25 | End: 2019-09-25 | Stop reason: SDUPTHER

## 2019-03-27 DIAGNOSIS — F32.A DEPRESSION, UNSPECIFIED DEPRESSION TYPE: ICD-10-CM

## 2019-03-27 RX ORDER — VENLAFAXINE 75 MG/1
75 TABLET ORAL 3 TIMES DAILY
Qty: 90 TABLET | Refills: 0 | Status: SHIPPED | OUTPATIENT
Start: 2019-03-27 | End: 2019-05-09 | Stop reason: SDUPTHER

## 2019-05-01 DIAGNOSIS — G62.9 NEUROPATHY: ICD-10-CM

## 2019-05-01 RX ORDER — GABAPENTIN 300 MG/1
CAPSULE ORAL
Qty: 240 CAPSULE | Refills: 3 | Status: SHIPPED | OUTPATIENT
Start: 2019-05-01 | End: 2019-10-04 | Stop reason: SDUPTHER

## 2019-05-02 ENCOUNTER — APPOINTMENT (OUTPATIENT)
Dept: LAB | Facility: CLINIC | Age: 74
End: 2019-05-02
Payer: MEDICARE

## 2019-05-02 DIAGNOSIS — I10 ESSENTIAL HYPERTENSION: ICD-10-CM

## 2019-05-02 DIAGNOSIS — E78.00 HYPERCHOLESTEROLEMIA: ICD-10-CM

## 2019-05-02 DIAGNOSIS — N18.30 TYPE 2 DIABETES MELLITUS WITH STAGE 3 CHRONIC KIDNEY DISEASE, WITHOUT LONG-TERM CURRENT USE OF INSULIN (HCC): ICD-10-CM

## 2019-05-02 DIAGNOSIS — E11.22 TYPE 2 DIABETES MELLITUS WITH STAGE 3 CHRONIC KIDNEY DISEASE, WITHOUT LONG-TERM CURRENT USE OF INSULIN (HCC): ICD-10-CM

## 2019-05-02 LAB
ALBUMIN SERPL BCP-MCNC: 3.4 G/DL (ref 3.5–5)
ALP SERPL-CCNC: 86 U/L (ref 46–116)
ALT SERPL W P-5'-P-CCNC: 18 U/L (ref 12–78)
ANION GAP SERPL CALCULATED.3IONS-SCNC: 8 MMOL/L (ref 4–13)
AST SERPL W P-5'-P-CCNC: 12 U/L (ref 5–45)
BILIRUB SERPL-MCNC: 0.43 MG/DL (ref 0.2–1)
BUN SERPL-MCNC: 27 MG/DL (ref 5–25)
CALCIUM SERPL-MCNC: 9.3 MG/DL (ref 8.3–10.1)
CHLORIDE SERPL-SCNC: 109 MMOL/L (ref 100–108)
CHOLEST SERPL-MCNC: 170 MG/DL (ref 50–200)
CO2 SERPL-SCNC: 28 MMOL/L (ref 21–32)
CREAT SERPL-MCNC: 1.73 MG/DL (ref 0.6–1.3)
CREAT UR-MCNC: 220 MG/DL
EST. AVERAGE GLUCOSE BLD GHB EST-MCNC: 160 MG/DL
GFR SERPL CREATININE-BSD FRML MDRD: 28 ML/MIN/1.73SQ M
GLUCOSE P FAST SERPL-MCNC: 100 MG/DL (ref 65–99)
HBA1C MFR BLD: 7.2 % (ref 4.2–6.3)
HDLC SERPL-MCNC: 48 MG/DL (ref 40–60)
LDLC SERPL CALC-MCNC: 92 MG/DL (ref 0–100)
MICROALBUMIN UR-MCNC: 20.5 MG/L (ref 0–20)
MICROALBUMIN/CREAT 24H UR: 9 MG/G CREATININE (ref 0–30)
POTASSIUM SERPL-SCNC: 4.7 MMOL/L (ref 3.5–5.3)
PROT SERPL-MCNC: 6.5 G/DL (ref 6.4–8.2)
SODIUM SERPL-SCNC: 145 MMOL/L (ref 136–145)
TRIGL SERPL-MCNC: 150 MG/DL

## 2019-05-02 PROCEDURE — 82043 UR ALBUMIN QUANTITATIVE: CPT

## 2019-05-02 PROCEDURE — 36415 COLL VENOUS BLD VENIPUNCTURE: CPT

## 2019-05-02 PROCEDURE — 82570 ASSAY OF URINE CREATININE: CPT

## 2019-05-02 PROCEDURE — 83036 HEMOGLOBIN GLYCOSYLATED A1C: CPT

## 2019-05-02 PROCEDURE — 80053 COMPREHEN METABOLIC PANEL: CPT

## 2019-05-02 PROCEDURE — 80061 LIPID PANEL: CPT

## 2019-05-07 ENCOUNTER — OFFICE VISIT (OUTPATIENT)
Dept: CARDIOLOGY CLINIC | Facility: CLINIC | Age: 74
End: 2019-05-07
Payer: MEDICARE

## 2019-05-07 VITALS
OXYGEN SATURATION: 97 % | SYSTOLIC BLOOD PRESSURE: 110 MMHG | BODY MASS INDEX: 50.5 KG/M2 | HEIGHT: 63 IN | WEIGHT: 285 LBS | DIASTOLIC BLOOD PRESSURE: 60 MMHG | HEART RATE: 52 BPM

## 2019-05-07 DIAGNOSIS — I35.0 NONRHEUMATIC AORTIC VALVE STENOSIS: ICD-10-CM

## 2019-05-07 DIAGNOSIS — E78.00 HYPERCHOLESTEROLEMIA: ICD-10-CM

## 2019-05-07 DIAGNOSIS — R60.0 LOCALIZED EDEMA: Primary | ICD-10-CM

## 2019-05-07 DIAGNOSIS — I25.10 ARTERIOSCLEROSIS OF CORONARY ARTERY: ICD-10-CM

## 2019-05-07 DIAGNOSIS — I10 ESSENTIAL HYPERTENSION: ICD-10-CM

## 2019-05-07 PROCEDURE — 99214 OFFICE O/P EST MOD 30 MIN: CPT | Performed by: INTERNAL MEDICINE

## 2019-05-08 LAB
LEFT EYE DIABETIC RETINOPATHY: NORMAL
RIGHT EYE DIABETIC RETINOPATHY: NORMAL

## 2019-05-09 ENCOUNTER — OFFICE VISIT (OUTPATIENT)
Dept: FAMILY MEDICINE CLINIC | Facility: CLINIC | Age: 74
End: 2019-05-09
Payer: MEDICARE

## 2019-05-09 VITALS
HEART RATE: 58 BPM | BODY MASS INDEX: 50.68 KG/M2 | TEMPERATURE: 98.4 F | DIASTOLIC BLOOD PRESSURE: 78 MMHG | SYSTOLIC BLOOD PRESSURE: 118 MMHG | HEIGHT: 63 IN | WEIGHT: 286 LBS

## 2019-05-09 DIAGNOSIS — L03.115 CELLULITIS OF LEG, RIGHT: Primary | ICD-10-CM

## 2019-05-09 DIAGNOSIS — E78.00 HYPERCHOLESTEROLEMIA: ICD-10-CM

## 2019-05-09 DIAGNOSIS — E13.9 DIABETES 1.5, MANAGED AS TYPE 2 (HCC): ICD-10-CM

## 2019-05-09 DIAGNOSIS — I10 ESSENTIAL HYPERTENSION: ICD-10-CM

## 2019-05-09 DIAGNOSIS — B37.2 CUTANEOUS CANDIDIASIS: ICD-10-CM

## 2019-05-09 DIAGNOSIS — E11.42 DIABETIC PERIPHERAL NEUROPATHY (HCC): ICD-10-CM

## 2019-05-09 DIAGNOSIS — F32.A DEPRESSION, UNSPECIFIED DEPRESSION TYPE: ICD-10-CM

## 2019-05-09 PROCEDURE — 99215 OFFICE O/P EST HI 40 MIN: CPT | Performed by: FAMILY MEDICINE

## 2019-05-09 RX ORDER — CEPHALEXIN 500 MG/1
500 CAPSULE ORAL EVERY 8 HOURS SCHEDULED
Qty: 30 CAPSULE | Refills: 0 | Status: SHIPPED | OUTPATIENT
Start: 2019-05-09 | End: 2019-05-19

## 2019-05-09 RX ORDER — VENLAFAXINE 75 MG/1
75 TABLET ORAL 3 TIMES DAILY
Qty: 90 TABLET | Refills: 0 | Status: SHIPPED | OUTPATIENT
Start: 2019-05-09 | End: 2019-06-19 | Stop reason: SDUPTHER

## 2019-05-10 DIAGNOSIS — E13.9 DIABETES 1.5, MANAGED AS TYPE 2 (HCC): Primary | ICD-10-CM

## 2019-06-04 DIAGNOSIS — M25.50 ARTHRALGIA, UNSPECIFIED JOINT: ICD-10-CM

## 2019-06-04 RX ORDER — PREDNISONE 1 MG/1
5 TABLET ORAL DAILY
Qty: 90 TABLET | Refills: 3 | Status: SHIPPED | OUTPATIENT
Start: 2019-06-04 | End: 2020-06-17 | Stop reason: SDUPTHER

## 2019-06-19 DIAGNOSIS — I10 ESSENTIAL HYPERTENSION: ICD-10-CM

## 2019-06-19 DIAGNOSIS — F32.A DEPRESSION, UNSPECIFIED DEPRESSION TYPE: ICD-10-CM

## 2019-06-19 RX ORDER — NEBIVOLOL HYDROCHLORIDE 5 MG/1
TABLET ORAL
Qty: 30 TABLET | Refills: 3 | Status: SHIPPED | OUTPATIENT
Start: 2019-06-19 | End: 2019-11-25 | Stop reason: SDUPTHER

## 2019-06-19 RX ORDER — VENLAFAXINE 75 MG/1
75 TABLET ORAL 3 TIMES DAILY
Qty: 90 TABLET | Refills: 0 | Status: SHIPPED | OUTPATIENT
Start: 2019-06-19 | End: 2019-08-05 | Stop reason: SDUPTHER

## 2019-07-25 DIAGNOSIS — R60.9 EDEMA, UNSPECIFIED TYPE: ICD-10-CM

## 2019-07-25 RX ORDER — POTASSIUM CHLORIDE 20 MEQ/1
TABLET, EXTENDED RELEASE ORAL
Qty: 30 TABLET | Refills: 6 | Status: SHIPPED | OUTPATIENT
Start: 2019-07-25 | End: 2020-03-16

## 2019-07-29 DIAGNOSIS — E03.9 HYPOTHYROIDISM, UNSPECIFIED TYPE: ICD-10-CM

## 2019-07-29 RX ORDER — LEVOTHYROXINE SODIUM 0.1 MG/1
TABLET ORAL
Qty: 30 TABLET | Refills: 3 | Status: SHIPPED | OUTPATIENT
Start: 2019-07-29 | End: 2020-02-18 | Stop reason: SDUPTHER

## 2019-08-05 DIAGNOSIS — F32.A DEPRESSION, UNSPECIFIED DEPRESSION TYPE: ICD-10-CM

## 2019-08-05 RX ORDER — VENLAFAXINE 75 MG/1
75 TABLET ORAL 3 TIMES DAILY
Qty: 90 TABLET | Refills: 3 | Status: SHIPPED | OUTPATIENT
Start: 2019-08-05 | End: 2019-12-20 | Stop reason: SDUPTHER

## 2019-08-19 ENCOUNTER — TELEPHONE (OUTPATIENT)
Dept: OBGYN CLINIC | Facility: HOSPITAL | Age: 74
End: 2019-08-19

## 2019-08-19 DIAGNOSIS — M17.0 PRIMARY OSTEOARTHRITIS OF BOTH KNEES: Primary | ICD-10-CM

## 2019-08-19 NOTE — TELEPHONE ENCOUNTER
Dr Christensen  #: [phone]        Patient called in requesting euflexxa injections  The last time she was in was 1/22/19 B/L knee   Please advise, thank you

## 2019-08-19 NOTE — TELEPHONE ENCOUNTER
alyssa can you check in on this I jus put the order in on 8/19/19   And let the patient know brandy cross long it will take

## 2019-08-20 NOTE — TELEPHONE ENCOUNTER
SHARAD Jon scheduled patient for her visco b/l knees (euflexxa) on 9/13 @ 1015am, 9/13 @ 10am, and 9/20 @ 10am    Thanks

## 2019-09-06 PROBLEM — E11.22 TYPE 2 DIABETES MELLITUS WITH STAGE 3 CHRONIC KIDNEY DISEASE, WITHOUT LONG-TERM CURRENT USE OF INSULIN (HCC): Status: ACTIVE | Noted: 2019-09-06

## 2019-09-06 PROBLEM — N18.30 TYPE 2 DIABETES MELLITUS WITH STAGE 3 CHRONIC KIDNEY DISEASE, WITHOUT LONG-TERM CURRENT USE OF INSULIN (HCC): Status: ACTIVE | Noted: 2019-09-06

## 2019-09-12 ENCOUNTER — APPOINTMENT (OUTPATIENT)
Dept: LAB | Facility: CLINIC | Age: 74
End: 2019-09-12
Payer: MEDICARE

## 2019-09-12 ENCOUNTER — OFFICE VISIT (OUTPATIENT)
Dept: FAMILY MEDICINE CLINIC | Facility: CLINIC | Age: 74
End: 2019-09-12
Payer: MEDICARE

## 2019-09-12 VITALS
DIASTOLIC BLOOD PRESSURE: 74 MMHG | WEIGHT: 277 LBS | HEIGHT: 63 IN | HEART RATE: 62 BPM | TEMPERATURE: 98 F | SYSTOLIC BLOOD PRESSURE: 118 MMHG | BODY MASS INDEX: 49.08 KG/M2

## 2019-09-12 DIAGNOSIS — I10 ESSENTIAL HYPERTENSION: ICD-10-CM

## 2019-09-12 DIAGNOSIS — E13.9 DIABETES 1.5, MANAGED AS TYPE 2 (HCC): ICD-10-CM

## 2019-09-12 DIAGNOSIS — E11.22 TYPE 2 DIABETES MELLITUS WITH STAGE 3 CHRONIC KIDNEY DISEASE, WITHOUT LONG-TERM CURRENT USE OF INSULIN (HCC): ICD-10-CM

## 2019-09-12 DIAGNOSIS — Z13.820 OSTEOPOROSIS SCREENING: ICD-10-CM

## 2019-09-12 DIAGNOSIS — E78.00 HYPERCHOLESTEROLEMIA: ICD-10-CM

## 2019-09-12 DIAGNOSIS — N18.30 TYPE 2 DIABETES MELLITUS WITH STAGE 3 CHRONIC KIDNEY DISEASE, WITHOUT LONG-TERM CURRENT USE OF INSULIN (HCC): ICD-10-CM

## 2019-09-12 DIAGNOSIS — Z23 NEED FOR VACCINATION: ICD-10-CM

## 2019-09-12 DIAGNOSIS — S81.812A NONINFECTED SKIN TEAR OF LEFT LOWER EXTREMITY, INITIAL ENCOUNTER: Primary | ICD-10-CM

## 2019-09-12 DIAGNOSIS — E03.9 HYPOTHYROIDISM, UNSPECIFIED TYPE: ICD-10-CM

## 2019-09-12 LAB
ALBUMIN SERPL BCP-MCNC: 3.8 G/DL (ref 3.5–5)
ALP SERPL-CCNC: 76 U/L (ref 46–116)
ALT SERPL W P-5'-P-CCNC: 27 U/L (ref 12–78)
ANION GAP SERPL CALCULATED.3IONS-SCNC: 6 MMOL/L (ref 4–13)
AST SERPL W P-5'-P-CCNC: 20 U/L (ref 5–45)
BILIRUB SERPL-MCNC: 0.52 MG/DL (ref 0.2–1)
BUN SERPL-MCNC: 29 MG/DL (ref 5–25)
CALCIUM SERPL-MCNC: 9.7 MG/DL (ref 8.3–10.1)
CHLORIDE SERPL-SCNC: 110 MMOL/L (ref 100–108)
CHOLEST SERPL-MCNC: 179 MG/DL (ref 50–200)
CO2 SERPL-SCNC: 28 MMOL/L (ref 21–32)
CREAT SERPL-MCNC: 1.74 MG/DL (ref 0.6–1.3)
EST. AVERAGE GLUCOSE BLD GHB EST-MCNC: 140 MG/DL
GFR SERPL CREATININE-BSD FRML MDRD: 28 ML/MIN/1.73SQ M
GLUCOSE P FAST SERPL-MCNC: 107 MG/DL (ref 65–99)
HBA1C MFR BLD: 6.5 % (ref 4.2–6.3)
HDLC SERPL-MCNC: 48 MG/DL (ref 40–60)
LDLC SERPL CALC-MCNC: 101 MG/DL (ref 0–100)
POTASSIUM SERPL-SCNC: 4.3 MMOL/L (ref 3.5–5.3)
PROT SERPL-MCNC: 6.5 G/DL (ref 6.4–8.2)
SODIUM SERPL-SCNC: 144 MMOL/L (ref 136–145)
TRIGL SERPL-MCNC: 151 MG/DL
TSH SERPL DL<=0.05 MIU/L-ACNC: 3.32 UIU/ML (ref 0.36–3.74)

## 2019-09-12 PROCEDURE — 83036 HEMOGLOBIN GLYCOSYLATED A1C: CPT

## 2019-09-12 PROCEDURE — 80053 COMPREHEN METABOLIC PANEL: CPT

## 2019-09-12 PROCEDURE — 80061 LIPID PANEL: CPT

## 2019-09-12 PROCEDURE — G0008 ADMIN INFLUENZA VIRUS VAC: HCPCS | Performed by: FAMILY MEDICINE

## 2019-09-12 PROCEDURE — 90714 TD VACC NO PRESV 7 YRS+ IM: CPT | Performed by: FAMILY MEDICINE

## 2019-09-12 PROCEDURE — 84443 ASSAY THYROID STIM HORMONE: CPT

## 2019-09-12 PROCEDURE — 90662 IIV NO PRSV INCREASED AG IM: CPT | Performed by: FAMILY MEDICINE

## 2019-09-12 PROCEDURE — 99214 OFFICE O/P EST MOD 30 MIN: CPT | Performed by: FAMILY MEDICINE

## 2019-09-12 PROCEDURE — 36415 COLL VENOUS BLD VENIPUNCTURE: CPT

## 2019-09-12 PROCEDURE — 90471 IMMUNIZATION ADMIN: CPT | Performed by: FAMILY MEDICINE

## 2019-09-12 RX ORDER — CEPHALEXIN 500 MG/1
500 CAPSULE ORAL EVERY 8 HOURS SCHEDULED
Qty: 30 CAPSULE | Refills: 0 | Status: SHIPPED | OUTPATIENT
Start: 2019-09-12 | End: 2019-09-22

## 2019-09-13 ENCOUNTER — OFFICE VISIT (OUTPATIENT)
Dept: OBGYN CLINIC | Facility: CLINIC | Age: 74
End: 2019-09-13
Payer: MEDICARE

## 2019-09-13 VITALS
WEIGHT: 277 LBS | BODY MASS INDEX: 49.08 KG/M2 | DIASTOLIC BLOOD PRESSURE: 83 MMHG | HEART RATE: 56 BPM | HEIGHT: 63 IN | SYSTOLIC BLOOD PRESSURE: 127 MMHG

## 2019-09-13 DIAGNOSIS — E78.00 HYPERCHOLESTEROLEMIA: ICD-10-CM

## 2019-09-13 DIAGNOSIS — M17.0 PRIMARY OSTEOARTHRITIS OF BOTH KNEES: Primary | ICD-10-CM

## 2019-09-13 PROCEDURE — 20610 DRAIN/INJ JOINT/BURSA W/O US: CPT | Performed by: ORTHOPAEDIC SURGERY

## 2019-09-13 RX ORDER — HYALURONATE SODIUM 10 MG/ML
20 SYRINGE (ML) INTRAARTICULAR
Status: COMPLETED | OUTPATIENT
Start: 2019-09-13 | End: 2019-09-13

## 2019-09-13 RX ORDER — PRAVASTATIN SODIUM 10 MG
10 TABLET ORAL DAILY
Qty: 90 TABLET | Refills: 1 | Status: SHIPPED | OUTPATIENT
Start: 2019-09-13 | End: 2020-03-27

## 2019-09-13 RX ADMIN — Medication 20 MG: at 10:03

## 2019-09-13 NOTE — PROGRESS NOTES
1  Primary osteoarthritis of both knees  Large joint arthrocentesis    Large joint arthrocentesis     Patient is here for her  1st injection of  Euflexxa into the bilateral knee  Patient reports knee pain worse activity better with rest         Physical exam of the knee shows no effusion no ecchymosis    All other organ systems normal    Large joint arthrocentesis: L knee  Date/Time: 9/13/2019 10:03 AM  Consent given by: patient  Site marked: site marked  Timeout: Immediately prior to procedure a time out was called to verify the correct patient, procedure, equipment, support staff and site/side marked as required   Supporting Documentation  Indications: pain   Procedure Details  Location: knee - L knee  Preparation: Patient was prepped and draped in the usual sterile fashion  Needle size: 22 G  Ultrasound guidance: no  Approach: anterolateral  Medications administered: 20 mg Sodium Hyaluronate 20 MG/2ML    Patient tolerance: patient tolerated the procedure well with no immediate complications  Dressing:  Sterile dressing applied    Large joint arthrocentesis: R knee  Date/Time: 9/13/2019 10:03 AM  Consent given by: patient  Site marked: site marked  Timeout: Immediately prior to procedure a time out was called to verify the correct patient, procedure, equipment, support staff and site/side marked as required   Supporting Documentation  Indications: pain   Procedure Details  Location: knee - R knee  Preparation: Patient was prepped and draped in the usual sterile fashion  Needle size: 22 G  Ultrasound guidance: no  Approach: anterolateral  Medications administered: 20 mg Sodium Hyaluronate 20 MG/2ML    Patient tolerance: patient tolerated the procedure well with no immediate complications  Dressing:  Sterile dressing applied          Patient tolerated procedure follow up   One week

## 2019-09-16 NOTE — PROGRESS NOTES
Patient ID: Betty Lawson is a 66 y o  female  HPI: 66 y  o female presents for follow up of niddm with stage 3 renal failure, hypertension, hypothyroidism and she has a skin tear on her right lower shin that is not healing and has slight redness around it        SUBJECTIVE    Family History   Problem Relation Age of Onset    Hypertension Father     Diabetes Father     Cancer Father         Throat    Arthritis Father     Stroke Mother     Diabetes Maternal Grandmother     Heart disease Maternal Grandfather     Diabetes Son     Lymphoma Family         Head, Face and Neck      Social History     Socioeconomic History    Marital status: /Civil Union     Spouse name: Not on file    Number of children: Not on file    Years of education: Not on file    Highest education level: Not on file   Occupational History    Not on file   Social Needs    Financial resource strain: Not on file    Food insecurity:     Worry: Not on file     Inability: Not on file    Transportation needs:     Medical: Not on file     Non-medical: Not on file   Tobacco Use    Smoking status: Never Smoker    Smokeless tobacco: Never Used   Substance and Sexual Activity    Alcohol use: No     Comment: Social per [employer]     Drug use: No    Sexual activity: Not on file   Lifestyle    Physical activity:     Days per week: Not on file     Minutes per session: Not on file    Stress: Not on file   Relationships    Social connections:     Talks on phone: Not on file     Gets together: Not on file     Attends Taoism service: Not on file     Active member of club or organization: Not on file     Attends meetings of clubs or organizations: Not on file     Relationship status: Not on file    Intimate partner violence:     Fear of current or ex partner: Not on file     Emotionally abused: Not on file     Physically abused: Not on file     Forced sexual activity: Not on file   Other Topics Concern    Not on file   Social History Narrative    Hx of daily cola consumption (unk cans/day)    Daily tea consumption (unk cups/day)    Multiple organ donor    Patient has living will    Power of  in existence    Uses safety equipment - Seatbelts      Past Medical History:   Diagnosis Date    Aortic stenosis     Arthritis     Asthma     Coronary artery disease     Diabetes mellitus ([place] [address] )     Diabetic peripheral neuropathy ([place] [address] )     Last assessed - 1/27/16    Gallbladder disease     Heart attack ([place] [address] ) 07/1999    Hemorrhoids     Last assessed - 11/16/12    Myocardial infarction ([place] [address] )     Old myocardial infarction     Type 2 diabetes mellitus with autonomic neuropathy ([place] [address] )     Unspec long term insulin use status, Last assessed - 6/8/17     Past Surgical History:   Procedure Laterality Date    BUNIONECTOMY      CARDIAC CATHETERIZATION      Carotid Artery, Resolved - 7/1/13    CARDIOVASCULAR STRESS TEST  09/1999    CHOLECYSTECTOMY      COLONOSCOPY  2017    FOOT ARTHRODESIS, MODIFIED DONOHUE  2016    GALLBLADDER SURGERY  1970    HEMORROIDECTOMY      KNEE ARTHROSCOPY Left 2004    [place] COLONOSCOPY FLX DX W/COLLJ SPEC WHEN PFRMD N/A 8/21/2017    Procedure: COLONOSCOPY;  Surgeon: Vandana Valentine MD;  Location: [facility];   Service: Colorectal    REPAIR KNEE LIGAMENT      REPAIR KNEE LIGAMENT Left 1986    REPAIR KNEE LIGAMENT Right 1988     Allergies   Allergen Reactions    Lyrica [Pregabalin] Swelling    Sulfa Antibiotics Swelling       Current Outpatient Medications:     albuterol (PROAIR HFA) 90 mcg/act inhaler, Inhale 2 puffs 4 (four) times a day as needed, Disp: , Rfl:     baclofen 10 mg tablet, Take by mouth, Disp: , Rfl:     BYSTOLIC 5 MG tablet, TAKE 1 TABLET (5MG) BY MOUTH EVERY DAY, Disp: 30 tablet, Rfl: 3    diclofenac-misoprostol (ARTHROTEC 50) 50-0 2 MG per tablet, Take 1 tablet by mouth 2 (two) times a day, Disp: 180 tablet, Rfl: 3    furosemide (LASIX) 20 mg tablet, Take 1 tablet (20 mg total) by mouth daily, Disp: 90 tablet, Rfl: 3    gabapentin (NEURONTIN) 300 mg capsule, Take 3 tabs AM, 2 tabs afternoon, 3 tabs QHS, Disp: 240 capsule, Rfl: 3    glimepiride (AMARYL) 4 mg tablet, Take 1 tablet (4 mg total) by mouth 2 (two) times a day, Disp: 180 tablet, Rfl: 1    glucose blood (TRUETEST TEST) test strip, by In Vitro route daily, Disp: , Rfl:     levothyroxine 100 mcg tablet, TAKE 1 TABLET BY MOUTH EVERY DAY, Disp: 30 tablet, Rfl: 3    MICROLET LANCETS MISC, by Does not apply route, Disp: , Rfl:     Multiple Vitamin (DAILY VALUE MULTIVITAMIN) TABS, Take 1 tablet by mouth daily, Disp: , Rfl:     nystatin-triamcinolone (MYCOLOG-II) cream, Apply topically 2 (two) times a day, Disp: 60 g, Rfl: 3    pantoprazole (PROTONIX) 40 mg tablet, Take 1 tablet (40 mg total) by mouth daily, Disp: 90 tablet, Rfl: 1    potassium chloride (K-DUR,KLOR-CON) 20 mEq tablet, TAKE 1 TABLET BY MOUTH EVERY DAY, Disp: 30 tablet, Rfl: 6    predniSONE 5 mg tablet, Take 1 tablet (5 mg total) by mouth daily, Disp: 90 tablet, Rfl: 3    venlafaxine (EFFEXOR) 75 mg tablet, Take 1 tablet (75 mg total) by mouth 3 (three) times a day, Disp: 90 tablet, Rfl: 3    cephalexin (KEFLEX) 500 mg capsule, Take 1 capsule (500 mg total) by mouth every 8 (eight) hours for 10 days, Disp: 30 capsule, Rfl: 0    pravastatin (PRAVACHOL) 10 mg tablet, Take 1 tablet (10 mg total) by mouth daily, Disp: 90 tablet, Rfl: 1    Review of Systems  Constitutional:     Denies fever, chills ,fatigue ,weakness ,weight loss, weight gain     ENT: Denies earache ,loss of hearing ,nosebleed, nasal discharge,nasal congestion ,sore throat ,hoarseness  Pulmonary: Denies shortness of breath ,cough  ,dyspnea on exertion, orthopnea  ,PND   Cardiovascular:  Denies bradycardia , tachycardia  ,palpations, lower extremity edema leg, claudication  Breast:  Denies new or changing breast lumps ,nipple discharge ,nipple changes  Abdomen:  Denies abdominal pain , anorexia , indigestion, nausea, vomiting, constipation, diarrhea  Musculoskeletal: Denies myalgias, arthralgias, joint swelling, joint stiffness , limb pain, limb swelling  Gu: denies dysuria, polyuria  Skin: Denies skin rash, skin lesion, skin wound, itching, dry skin + skin tear of right shin with redness surrounding site  Neuro: Denies headache, numbness, tingling, confusion, loss of consciousness, dizziness, vertigo  Psychiatric: Denies feelings of depression, suicidal ideation, anxiety, sleep disturbances    OBJECTIVE  /74   Pulse 62   Temp 98 °F (36 7 °C)   Ht 5' 3" (1 6 m)   Wt 126 kg (277 lb)   BMI 49 07 kg/m²   Constitutional:   NAD, well appearing and well nourished      ENT:   Conjunctiva and lids: no injection, edema, or discharge     Pupils and iris: ANGELINE bilaterally    External inspection of ears and nose: normal without deformities or discharge  Otoscopic exam: Canals patent without erythema  Nasal mucosa, septum and turbinates: Normal or edema or discharge         Oropharynx:  Moist mucosa, normal tongue and tonsils without lesions  No erythema        Pulmonary:Respiratory effort normal rate and rhythm, no increased work of breathing   Auscultation of lungs:  Clear bilaterally with no adventitious breath sounds       Cardiovascular: regular rate and rhythm, S1 and S2, no murmur, no edema and/or varicosities of LE      Abdomen: Soft and non-distended     Positive bowel sounds      No heptomegaly or splenomegaly      Gu: no suprapubic tenderness or CVA tenderness, no urethral discharge  Lymphatic:  No anterior or posterior cervical lymphadenopathy         Musculoskeletal:  Gait and station: Normal gait      Digits and nails normal without clubbing or cyanosis       Inspection/palpation of joints, bones, and muscles:  No joint tenderness, swelling, full active and passive range of motion       Skin: Normal skin turgor and no rashes + skin tear of right lower leg with pink granulaiton tissue approx 2 cm long with very mild erythema surrounding site     Neuro:      Normal reflexes      Psych:   alert and oriented to person, place and time     normal mood and affect   Patient's shoes and socks removed  Right Foot/Ankle   Right Foot Inspection  Skin Exam: skin normal and skin intact no dry skin, no warmth, no callus, no erythema, no maceration, no abnormal color, no pre-ulcer, no ulcer and no callus                          Toe Exam: ROM and strength within normal limitsno swelling, no tenderness, erythema and  no right toe deformity  Sensory   Vibration: diminished  Proprioception: diminished   Monofilament testing: diminished  Vascular  Capillary refills: < 3 seconds  The right DP pulse is 2+  The right PT pulse is 2+  Left Foot/Ankle  Left Foot Inspection  Skin Exam: skin normal and skin intactno dry skin, no warmth, no erythema, no maceration, normal color, no pre-ulcer, no ulcer and no callus                         Toe Exam: ROM and strength within normal limitsno swelling, no tenderness, no erythema and no left toe deformity                   Sensory   Vibration: diminished  Proprioception: diminished  Monofilament: diminished  Vascular  Capillary refills: < 3 seconds  The left DP pulse is 2+  The left PT pulse is 2+  Assign Risk Category:  No deformity present; Loss of protective sensation; No weak pulses       Risk: 2      Assessment/Plan:Diagnoses and all orders for this visit:    Noninfected skin tear of left lower extremity, initial encounter  -     cephalexin (KEFLEX) 500 mg capsule;  Take 1 capsule (500 mg total) by mouth every 8 (eight) hours for 10 days    Type 2 diabetes mellitus with stage 3 chronic kidney disease, without long-term current use of insulin (HCC)    Essential hypertension    Need for vaccination  -     TD VACCINE GREATER THAN OR EQUAL TO 6YO PRESERVATIVE FREE IM  -     influenza vaccine, [id number], high-dose, PF 0 5 mL (FLUZONE HIGH-DOSE)    Osteoporosis screening  - DXA bone density spine hip and pelvis; Future        Reviewed with patient plan to treat with above plan      Patient instructed to call in 72 hours if not feeling better or if symptoms worsen

## 2019-09-20 ENCOUNTER — OFFICE VISIT (OUTPATIENT)
Dept: OBGYN CLINIC | Facility: CLINIC | Age: 74
End: 2019-09-20
Payer: MEDICARE

## 2019-09-20 VITALS — BODY MASS INDEX: 49.08 KG/M2 | WEIGHT: 277 LBS | HEIGHT: 63 IN

## 2019-09-20 DIAGNOSIS — M17.0 PRIMARY OSTEOARTHRITIS OF BOTH KNEES: Primary | ICD-10-CM

## 2019-09-20 PROCEDURE — 20610 DRAIN/INJ JOINT/BURSA W/O US: CPT | Performed by: ORTHOPAEDIC SURGERY

## 2019-09-20 RX ORDER — HYALURONATE SODIUM 10 MG/ML
20 SYRINGE (ML) INTRAARTICULAR
Status: COMPLETED | OUTPATIENT
Start: 2019-09-20 | End: 2019-09-20

## 2019-09-20 RX ADMIN — Medication 20 MG: at 10:20

## 2019-09-20 NOTE — PROGRESS NOTES
1  Primary osteoarthritis of both knees  Large joint arthrocentesis    Large joint arthrocentesis     Patient is here for her  2nd injection of  Euflexxa into the bilateral knee  Patient reports that her knee pain is slightly proved since last week getting the 1st injection she had no issues with those injections  Physical exam of the knee shows no effusion no ecchymosis        Large joint arthrocentesis: L knee  Date/Time: 9/20/2019 10:20 AM  Consent given by: patient  Site marked: site marked  Timeout: Immediately prior to procedure a time out was called to verify the correct patient, procedure, equipment, support staff and site/side marked as required   Supporting Documentation  Indications: pain   Procedure Details  Location: knee - L knee  Preparation: Patient was prepped and draped in the usual sterile fashion  Needle size: 22 G  Ultrasound guidance: no  Approach: anterolateral  Medications administered: 20 mg Sodium Hyaluronate 20 MG/2ML    Patient tolerance: patient tolerated the procedure well with no immediate complications  Dressing:  Sterile dressing applied    Large joint arthrocentesis: R knee  Date/Time: 9/20/2019 10:20 AM  Consent given by: patient  Site marked: site marked  Timeout: Immediately prior to procedure a time out was called to verify the correct patient, procedure, equipment, support staff and site/side marked as required   Supporting Documentation  Indications: pain   Procedure Details  Location: knee - R knee  Preparation: Patient was prepped and draped in the usual sterile fashion  Needle size: 22 G  Ultrasound guidance: no  Approach: anterolateral  Medications administered: 20 mg Sodium Hyaluronate 20 MG/2ML    Patient tolerance: patient tolerated the procedure well with no immediate complications  Dressing:  Sterile dressing applied          Patient tolerated procedure follow up  1 week

## 2019-09-25 DIAGNOSIS — K21.9 GASTROESOPHAGEAL REFLUX DISEASE WITHOUT ESOPHAGITIS: ICD-10-CM

## 2019-09-25 RX ORDER — PANTOPRAZOLE SODIUM 40 MG/1
40 TABLET, DELAYED RELEASE ORAL DAILY
Qty: 90 TABLET | Refills: 1 | Status: SHIPPED | OUTPATIENT
Start: 2019-09-25 | End: 2020-04-16 | Stop reason: SDUPTHER

## 2019-09-27 ENCOUNTER — OFFICE VISIT (OUTPATIENT)
Dept: OBGYN CLINIC | Facility: CLINIC | Age: 74
End: 2019-09-27
Payer: MEDICARE

## 2019-09-27 VITALS
BODY MASS INDEX: 49.08 KG/M2 | WEIGHT: 277 LBS | HEIGHT: 63 IN | DIASTOLIC BLOOD PRESSURE: 63 MMHG | HEART RATE: 56 BPM | SYSTOLIC BLOOD PRESSURE: 135 MMHG

## 2019-09-27 DIAGNOSIS — M17.0 PRIMARY OSTEOARTHRITIS OF BOTH KNEES: Primary | ICD-10-CM

## 2019-09-27 PROCEDURE — 20610 DRAIN/INJ JOINT/BURSA W/O US: CPT | Performed by: PHYSICIAN ASSISTANT

## 2019-09-27 RX ORDER — HYALURONATE SODIUM 10 MG/ML
20 SYRINGE (ML) INTRAARTICULAR
Status: COMPLETED | OUTPATIENT
Start: 2019-09-27 | End: 2019-09-27

## 2019-09-27 RX ADMIN — Medication 20 MG: at 10:15

## 2019-09-27 NOTE — PROGRESS NOTES
1  Primary osteoarthritis of both knees  Large joint arthrocentesis    Large joint arthrocentesis     Patient is here for her  3rd injection of  Euflexxa into the bilateral knee  Patient reports knee pain  That is improved since the 1st 2 injections  Physical exam of the knee shows no effusion no ecchymosis        Large joint arthrocentesis: L knee  Date/Time: 9/27/2019 10:15 AM  Consent given by: patient  Site marked: site marked  Timeout: Immediately prior to procedure a time out was called to verify the correct patient, procedure, equipment, support staff and site/side marked as required   Supporting Documentation  Indications: pain   Procedure Details  Location: knee - L knee  Preparation: Patient was prepped and draped in the usual sterile fashion  Needle size: 22 G  Ultrasound guidance: no  Approach: anterolateral  Medications administered: 20 mg Sodium Hyaluronate 20 MG/2ML    Patient tolerance: patient tolerated the procedure well with no immediate complications  Dressing:  Sterile dressing applied    Large joint arthrocentesis: R knee  Date/Time: 9/27/2019 10:15 AM  Consent given by: patient  Site marked: site marked  Timeout: Immediately prior to procedure a time out was called to verify the correct patient, procedure, equipment, support staff and site/side marked as required   Supporting Documentation  Indications: pain   Procedure Details  Location: knee - R knee  Preparation: Patient was prepped and draped in the usual sterile fashion  Needle size: 22 G  Ultrasound guidance: no  Approach: anterolateral  Medications administered: 20 mg Sodium Hyaluronate 20 MG/2ML    Patient tolerance: patient tolerated the procedure well with no immediate complications  Dressing:  Sterile dressing applied          Patient tolerated procedure follow up PRN

## 2019-10-04 DIAGNOSIS — G62.9 NEUROPATHY: ICD-10-CM

## 2019-10-04 RX ORDER — GABAPENTIN 300 MG/1
CAPSULE ORAL
Qty: 240 CAPSULE | Refills: 3 | Status: SHIPPED | OUTPATIENT
Start: 2019-10-04 | End: 2020-02-24 | Stop reason: SDUPTHER

## 2019-11-04 ENCOUNTER — TELEPHONE (OUTPATIENT)
Dept: OBGYN CLINIC | Facility: HOSPITAL | Age: 74
End: 2019-11-04

## 2019-11-04 NOTE — TELEPHONE ENCOUNTER
Please contact the patient  Let her know that we can do cortisone injections in her knees if she is still having significant pain, if she is interested in that please schedule her for an appointment

## 2019-11-04 NOTE — TELEPHONE ENCOUNTER
Patient is calling stating that she is still having pain in her knees since the injections  She says that they did not help her and she is wondering what to do now

## 2019-11-06 ENCOUNTER — OFFICE VISIT (OUTPATIENT)
Dept: OBGYN CLINIC | Facility: CLINIC | Age: 74
End: 2019-11-06
Payer: MEDICARE

## 2019-11-06 VITALS
HEART RATE: 63 BPM | BODY MASS INDEX: 50.56 KG/M2 | SYSTOLIC BLOOD PRESSURE: 130 MMHG | WEIGHT: 267.8 LBS | HEIGHT: 61 IN | DIASTOLIC BLOOD PRESSURE: 83 MMHG

## 2019-11-06 DIAGNOSIS — M17.0 PRIMARY OSTEOARTHRITIS OF BOTH KNEES: Primary | ICD-10-CM

## 2019-11-06 PROCEDURE — 99213 OFFICE O/P EST LOW 20 MIN: CPT | Performed by: ORTHOPAEDIC SURGERY

## 2019-11-06 PROCEDURE — 20610 DRAIN/INJ JOINT/BURSA W/O US: CPT | Performed by: ORTHOPAEDIC SURGERY

## 2019-11-06 RX ORDER — BUPIVACAINE HYDROCHLORIDE 2.5 MG/ML
1 INJECTION, SOLUTION INFILTRATION; PERINEURAL
Status: COMPLETED | OUTPATIENT
Start: 2019-11-06 | End: 2019-11-06

## 2019-11-06 RX ORDER — BETAMETHASONE SODIUM PHOSPHATE AND BETAMETHASONE ACETATE 3; 3 MG/ML; MG/ML
12 INJECTION, SUSPENSION INTRA-ARTICULAR; INTRALESIONAL; INTRAMUSCULAR; SOFT TISSUE
Status: COMPLETED | OUTPATIENT
Start: 2019-11-06 | End: 2019-11-06

## 2019-11-06 RX ORDER — LIDOCAINE HYDROCHLORIDE 10 MG/ML
1 INJECTION, SOLUTION INFILTRATION; PERINEURAL
Status: COMPLETED | OUTPATIENT
Start: 2019-11-06 | End: 2019-11-06

## 2019-11-06 RX ADMIN — BETAMETHASONE SODIUM PHOSPHATE AND BETAMETHASONE ACETATE 12 MG: 3; 3 INJECTION, SUSPENSION INTRA-ARTICULAR; INTRALESIONAL; INTRAMUSCULAR; SOFT TISSUE at 14:03

## 2019-11-06 RX ADMIN — LIDOCAINE HYDROCHLORIDE 1 ML: 10 INJECTION, SOLUTION INFILTRATION; PERINEURAL at 14:03

## 2019-11-06 RX ADMIN — BUPIVACAINE HYDROCHLORIDE 1 ML: 2.5 INJECTION, SOLUTION INFILTRATION; PERINEURAL at 14:03

## 2019-11-06 NOTE — PROGRESS NOTES
Patient Name:  Makayla Johnson  MRN:  [id number]    Assessment & Plan     Bilateral knee osteoarthritis   1  Discussed continued weight loss, needs aprox  50 lbs of weight loss to reach a BMI near 40  2  Hemoglobin A1 C is exellent   3  Discussed pain management, she declined at this time  4  She cannot undergo a TKA for 3 months following a CSI   5  A right total knee arthroplasty will not be performed under her lower leg edema and erythema is cleared   6  Bilateral knee CSIs were performed in the office today, without complication   7  Follow up in 3 months time, or sooner if needed       Subjective     66 y o  female presents to the office today for a follow up regarding bilateral knee pain, secondary to bilateral knee osteoarthritis  Jayesh Rodrigues underwent bilateral visco injections, the last being 09/27/19  She states that the visco injections were not significantly beneficial, but the injections did help partially  She states that she has lost aprox  30 lbs in the last 9 months  She is currently working on weight loss  She has an upcoming appt with her PCP with regards to her right leg erythema and edema  Her hematoglobin A1C is excellent at this time  General ROS:  Negative for fever or chills  Neurological ROS:  Negative for numbness or tingling        Objective     /83   Pulse 63   Ht 5' 1" (1 549 m)   Wt 121 kg (267 lb 12 8 oz)   BMI 50 60 kg/m²     Bilateral knee's:    Ambulates with the use of a walker   Slight RLE edema and ecchymosis, none on the left   Tender to palpation lateral joint line bilaterally  ROM WNL bilaterally  Sensation intact to light touch   Skin warm and well perfused          Large joint arthrocentesis: bilateral knee  Date/Time: 11/6/2019 2:03 PM  Consent given by: patient  Site marked: site marked  Timeout: Immediately prior to procedure a time out was called to verify the correct patient, procedure, equipment, support staff and site/side marked as required   Supporting Documentation  Indications: pain   Procedure Details  Location: knee - bilateral knee  Preparation: Patient was prepped and draped in the usual sterile fashion  Needle size: 22 G  Ultrasound guidance: no    Medications (Right): 1 mL bupivacaine 0 25 %; 1 mL lidocaine 1 %; 12 mg betamethasone acetate-betamethasone sodium phosphate 6 (3-3) mg/mLMedications (Left): 1 mL bupivacaine 0 25 %; 1 mL lidocaine 1 %; 12 mg betamethasone acetate-betamethasone sodium phosphate 6 (3-3) mg/mL   Patient tolerance: patient tolerated the procedure well with no immediate complications  Dressing:  Sterile dressing applied          Psychiatric: Mood and affect are appropriate  Data Review     I have personally reviewed pertinent films in PACS, and my interpretation follows      No new imaging to review       Social History     Tobacco Use    Smoking status: Never Smoker    Smokeless tobacco: Never Used   Substance Use Topics    Alcohol use: No     Comment: Social per [employer]     Drug use: No       Scribe Attestation    I,:   Antony Tian am acting as a scribe while in the presence of the attending physician :        I,:   Felipe Clark DO personally performed the services described in this documentation    as scribed in my presence :

## 2019-11-25 DIAGNOSIS — I10 ESSENTIAL HYPERTENSION: ICD-10-CM

## 2019-11-25 DIAGNOSIS — M19.90 OSTEOARTHRITIS, UNSPECIFIED OSTEOARTHRITIS TYPE, UNSPECIFIED SITE: ICD-10-CM

## 2019-11-25 RX ORDER — NEBIVOLOL HYDROCHLORIDE 5 MG/1
TABLET ORAL
Qty: 30 TABLET | Refills: 3 | Status: SHIPPED | OUTPATIENT
Start: 2019-11-25 | End: 2020-04-01

## 2019-11-25 RX ORDER — DICLOFENAC SODIUM AND MISOPROSTOL 50; 200 MG/1; UG/1
1 TABLET, DELAYED RELEASE ORAL 2 TIMES DAILY
Qty: 180 TABLET | Refills: 3 | Status: SHIPPED | OUTPATIENT
Start: 2019-11-25 | End: 2020-10-27 | Stop reason: ALTCHOICE

## 2019-12-20 DIAGNOSIS — F32.A DEPRESSION, UNSPECIFIED DEPRESSION TYPE: ICD-10-CM

## 2019-12-20 RX ORDER — VENLAFAXINE 75 MG/1
75 TABLET ORAL 3 TIMES DAILY
Qty: 90 TABLET | Refills: 3 | Status: SHIPPED | OUTPATIENT
Start: 2019-12-20 | End: 2020-04-23 | Stop reason: SDUPTHER

## 2020-01-03 ENCOUNTER — TELEPHONE (OUTPATIENT)
Dept: FAMILY MEDICINE CLINIC | Facility: CLINIC | Age: 75
End: 2020-01-03

## 2020-01-03 ENCOUNTER — TRANSCRIBE ORDERS (OUTPATIENT)
Dept: LAB | Facility: CLINIC | Age: 75
End: 2020-01-03

## 2020-01-03 DIAGNOSIS — N18.30 TYPE 2 DIABETES MELLITUS WITH STAGE 3 CHRONIC KIDNEY DISEASE, WITHOUT LONG-TERM CURRENT USE OF INSULIN (HCC): ICD-10-CM

## 2020-01-03 DIAGNOSIS — E03.9 HYPOTHYROIDISM, UNSPECIFIED TYPE: ICD-10-CM

## 2020-01-03 DIAGNOSIS — I10 ESSENTIAL HYPERTENSION: ICD-10-CM

## 2020-01-03 DIAGNOSIS — E11.22 TYPE 2 DIABETES MELLITUS WITH STAGE 3 CHRONIC KIDNEY DISEASE, WITHOUT LONG-TERM CURRENT USE OF INSULIN (HCC): ICD-10-CM

## 2020-01-03 DIAGNOSIS — E78.00 HYPERCHOLESTEROLEMIA: Primary | ICD-10-CM

## 2020-01-03 NOTE — TELEPHONE ENCOUNTER
Patient is asking if she needs blood work drawn before her next appointment? She just had her blood work done in Sept 2019 and isn't sure if she is in need of it again before her next appointment?

## 2020-01-03 NOTE — TELEPHONE ENCOUNTER
Yes , Ill put orders in  If she goes to [facility] she doesn't need paper, just has to fast   If goes elsewhere, I'll put labslip in bin to come up front

## 2020-01-06 DIAGNOSIS — E11.9 CONTROLLED TYPE 2 DIABETES MELLITUS WITHOUT COMPLICATION, WITHOUT LONG-TERM CURRENT USE OF INSULIN (HCC): ICD-10-CM

## 2020-01-06 RX ORDER — GLIMEPIRIDE 4 MG/1
4 TABLET ORAL
Qty: 180 TABLET | Refills: 1 | Status: SHIPPED | OUTPATIENT
Start: 2020-01-06 | End: 2021-01-11

## 2020-01-24 ENCOUNTER — APPOINTMENT (OUTPATIENT)
Dept: LAB | Facility: CLINIC | Age: 75
End: 2020-01-24
Payer: MEDICARE

## 2020-01-24 DIAGNOSIS — I10 ESSENTIAL HYPERTENSION: ICD-10-CM

## 2020-01-24 DIAGNOSIS — N18.30 TYPE 2 DIABETES MELLITUS WITH STAGE 3 CHRONIC KIDNEY DISEASE, WITHOUT LONG-TERM CURRENT USE OF INSULIN (HCC): ICD-10-CM

## 2020-01-24 DIAGNOSIS — E11.22 TYPE 2 DIABETES MELLITUS WITH STAGE 3 CHRONIC KIDNEY DISEASE, WITHOUT LONG-TERM CURRENT USE OF INSULIN (HCC): ICD-10-CM

## 2020-01-24 DIAGNOSIS — E78.00 HYPERCHOLESTEROLEMIA: ICD-10-CM

## 2020-01-24 DIAGNOSIS — E03.9 HYPOTHYROIDISM, UNSPECIFIED TYPE: ICD-10-CM

## 2020-01-24 LAB
ALBUMIN SERPL BCP-MCNC: 3.5 G/DL (ref 3.5–5)
ALP SERPL-CCNC: 92 U/L (ref 46–116)
ALT SERPL W P-5'-P-CCNC: 27 U/L (ref 12–78)
ANION GAP SERPL CALCULATED.3IONS-SCNC: 8 MMOL/L (ref 4–13)
AST SERPL W P-5'-P-CCNC: 17 U/L (ref 5–45)
BILIRUB SERPL-MCNC: 0.44 MG/DL (ref 0.2–1)
BUN SERPL-MCNC: 28 MG/DL (ref 5–25)
CALCIUM SERPL-MCNC: 9.7 MG/DL (ref 8.3–10.1)
CHLORIDE SERPL-SCNC: 108 MMOL/L (ref 100–108)
CHOLEST SERPL-MCNC: 189 MG/DL (ref 50–200)
CO2 SERPL-SCNC: 28 MMOL/L (ref 21–32)
CREAT SERPL-MCNC: 1.77 MG/DL (ref 0.6–1.3)
EST. AVERAGE GLUCOSE BLD GHB EST-MCNC: 137 MG/DL
GFR SERPL CREATININE-BSD FRML MDRD: 28 ML/MIN/1.73SQ M
GLUCOSE P FAST SERPL-MCNC: 78 MG/DL (ref 65–99)
HBA1C MFR BLD: 6.4 % (ref 4.2–6.3)
HDLC SERPL-MCNC: 51 MG/DL
LDLC SERPL CALC-MCNC: 115 MG/DL (ref 0–100)
POTASSIUM SERPL-SCNC: 3.6 MMOL/L (ref 3.5–5.3)
PROT SERPL-MCNC: 6.7 G/DL (ref 6.4–8.2)
SODIUM SERPL-SCNC: 144 MMOL/L (ref 136–145)
TRIGL SERPL-MCNC: 117 MG/DL
TSH SERPL DL<=0.05 MIU/L-ACNC: 2.78 UIU/ML (ref 0.36–3.74)

## 2020-01-24 PROCEDURE — 80061 LIPID PANEL: CPT

## 2020-01-24 PROCEDURE — 84443 ASSAY THYROID STIM HORMONE: CPT

## 2020-01-24 PROCEDURE — 36415 COLL VENOUS BLD VENIPUNCTURE: CPT

## 2020-01-24 PROCEDURE — 83036 HEMOGLOBIN GLYCOSYLATED A1C: CPT

## 2020-01-24 PROCEDURE — 80053 COMPREHEN METABOLIC PANEL: CPT

## 2020-01-28 ENCOUNTER — OFFICE VISIT (OUTPATIENT)
Dept: FAMILY MEDICINE CLINIC | Facility: CLINIC | Age: 75
End: 2020-01-28
Payer: MEDICARE

## 2020-01-28 VITALS
SYSTOLIC BLOOD PRESSURE: 122 MMHG | BODY MASS INDEX: 46.82 KG/M2 | OXYGEN SATURATION: 93 % | HEART RATE: 52 BPM | DIASTOLIC BLOOD PRESSURE: 80 MMHG | WEIGHT: 248 LBS | TEMPERATURE: 98 F | HEIGHT: 61 IN

## 2020-01-28 DIAGNOSIS — E78.00 HYPERCHOLESTEROLEMIA: ICD-10-CM

## 2020-01-28 DIAGNOSIS — N18.30 TYPE 2 DIABETES MELLITUS WITH STAGE 3 CHRONIC KIDNEY DISEASE, WITHOUT LONG-TERM CURRENT USE OF INSULIN (HCC): Primary | ICD-10-CM

## 2020-01-28 DIAGNOSIS — I25.10 ARTERIOSCLEROSIS OF CORONARY ARTERY: ICD-10-CM

## 2020-01-28 DIAGNOSIS — E11.42 DIABETIC PERIPHERAL NEUROPATHY (HCC): ICD-10-CM

## 2020-01-28 DIAGNOSIS — E11.22 TYPE 2 DIABETES MELLITUS WITH STAGE 3 CHRONIC KIDNEY DISEASE, WITHOUT LONG-TERM CURRENT USE OF INSULIN (HCC): Primary | ICD-10-CM

## 2020-01-28 PROCEDURE — 99213 OFFICE O/P EST LOW 20 MIN: CPT | Performed by: FAMILY MEDICINE

## 2020-01-29 NOTE — PROGRESS NOTES
Patient ID: Yeni Prather is a 66 y o  female  HPI: 66 y  o female presents for follow up of diabetes, diabetic peripheral neuropathy,stage 3 kidney disease, CAD, hypercholesterolemia  She denies any chest pain, shortness of breath and her neuropathy symptoms are well controlled         SUBJECTIVE    Family History   Problem Relation Age of Onset    Hypertension Father     Diabetes Father     Cancer Father         Throat    Arthritis Father     Stroke Mother     Diabetes Maternal Grandmother     Heart disease Maternal Grandfather     Diabetes Son     Lymphoma Family         Head, Face and Neck      Social History     Socioeconomic History    Marital status: /Civil Union     Spouse name: Not on file    Number of children: Not on file    Years of education: Not on file    Highest education level: Not on file   Occupational History    Not on file   Social Needs    Financial resource strain: Not on file    Food insecurity:     Worry: Not on file     Inability: Not on file    Transportation needs:     Medical: Not on file     Non-medical: Not on file   Tobacco Use    Smoking status: Never Smoker    Smokeless tobacco: Never Used   Substance and Sexual Activity    Alcohol use: No     Comment: Social per [employer]     Drug use: No    Sexual activity: Not on file   Lifestyle    Physical activity:     Days per week: Not on file     Minutes per session: Not on file    Stress: Not on file   Relationships    Social connections:     Talks on phone: Not on file     Gets together: Not on file     Attends [employer] service: Not on file     Active member of club or organization: Not on file     Attends meetings of clubs or organizations: Not on file     Relationship status: Not on file    Intimate partner violence:     Fear of current or ex partner: Not on file     Emotionally abused: Not on file     Physically abused: Not on file     Forced sexual activity: Not on file   Other Topics Concern    Not on file   Social History Narrative    Hx of daily cola consumption (unk cans/day)    Daily tea consumption (unk cups/day)    Multiple organ donor    Patient has living will    Power of  in existence    Uses safety equipment - Seatbelts      Past Medical History:   Diagnosis Date    Aortic stenosis     Arthritis     Asthma     Coronary artery disease     [facility] mellitus ([facility] )     Diabetic peripheral neuropathy ([place] [address] )     Last assessed - 1/27/16    Gallbladder disease     Heart attack ([place] [address] ) 07/1999    Hemorrhoids     Last assessed - 11/16/12    Myocardial infarction ([place] [address] )     Old myocardial infarction     Type 2 diabetes mellitus with autonomic neuropathy ([place] [address] )     Unspec long term insulin use status, Last assessed - 6/8/17     Past Surgical History:   Procedure Laterality Date    BUNIONECTOMY      CARDIAC CATHETERIZATION      Carotid Artery, Resolved - 7/1/13    CARDIOVASCULAR STRESS TEST  09/1999    CHOLECYSTECTOMY      COLONOSCOPY  2017    FOOT ARTHRODESIS, MODIFIED DONOHUE  2016    GALLBLADDER SURGERY  1970    HEMORROIDECTOMY      KNEE ARTHROSCOPY Left 2004    KY COLONOSCOPY FLX DX W/COLLJ SPEC WHEN PFRMD N/A 8/21/2017    Procedure: COLONOSCOPY;  Surgeon: Enmanuel Joy MD;  Location: [facility];   Service: Colorectal    REPAIR KNEE LIGAMENT      REPAIR KNEE LIGAMENT Left 1986    REPAIR KNEE LIGAMENT Right 1988     Allergies   Allergen Reactions    Lyrica [Pregabalin] Swelling    Sulfa Antibiotics Swelling       Current Outpatient Medications:     albuterol (PROAIR HFA) 90 mcg/act inhaler, Inhale 2 puffs 4 (four) times a day as needed, Disp: , Rfl:     baclofen 10 mg tablet, Take by mouth, Disp: , Rfl:     BYSTOLIC 5 MG tablet, TAKE 1 TABLET (5MG) BY MOUTH EVERY DAY, Disp: 30 tablet, Rfl: 3    diclofenac-misoprostol (ARTHROTEC 50) 50-0 2 MG per tablet, Take 1 tablet by mouth 2 (two) times a day, Disp: 180 tablet, Rfl: 3    furosemide (LASIX) 20 mg tablet, Take 1 tablet (20 mg total) by mouth daily, Disp: 90 tablet, Rfl: 3    gabapentin (NEURONTIN) 300 mg capsule, Take 3 tabs AM, 2 tabs afternoon, 3 tabs QHS, Disp: 240 capsule, Rfl: 3    glimepiride (AMARYL) 4 mg tablet, Take 1 tablet (4 mg total) by mouth daily with breakfast, Disp: 180 tablet, Rfl: 1    glucose blood (TRUETEST TEST) test strip, by In Vitro route daily, Disp: , Rfl:     levothyroxine 100 mcg tablet, TAKE 1 TABLET BY MOUTH EVERY DAY, Disp: 30 tablet, Rfl: 3    MICROLET LANCETS MISC, by Does not apply route, Disp: , Rfl:     Multiple Vitamin (DAILY VALUE MULTIVITAMIN) TABS, Take 1 tablet by mouth daily, Disp: , Rfl:     nystatin-triamcinolone (MYCOLOG-II) cream, Apply topically 2 (two) times a day, Disp: 60 g, Rfl: 3    pantoprazole (PROTONIX) 40 mg tablet, Take 1 tablet (40 mg total) by mouth daily, Disp: 90 tablet, Rfl: 1    potassium chloride (K-DUR,KLOR-CON) 20 mEq tablet, TAKE 1 TABLET BY MOUTH EVERY DAY, Disp: 30 tablet, Rfl: 6    pravastatin (PRAVACHOL) 10 mg tablet, Take 1 tablet (10 mg total) by mouth daily, Disp: 90 tablet, Rfl: 1    predniSONE 5 mg tablet, Take 1 tablet (5 mg total) by mouth daily, Disp: 90 tablet, Rfl: 3    venlafaxine (EFFEXOR) 75 mg tablet, Take 1 tablet (75 mg total) by mouth 3 (three) times a day, Disp: 90 tablet, Rfl: 3    Review of Systems  Constitutional:     Denies fever, chills ,fatigue ,weakness ,weight loss, weight gain     ENT: Denies earache ,loss of hearing ,nosebleed, nasal discharge,nasal congestion ,sore throat ,hoarseness  Pulmonary: Denies shortness of breath ,cough  ,dyspnea on exertion, orthopnea  ,PND   Cardiovascular:  Denies bradycardia , tachycardia  ,palpations, lower extremity edema leg, claudication  Breast:  Denies new or changing breast lumps ,nipple discharge ,nipple changes  Abdomen:  Denies abdominal pain , anorexia , indigestion, nausea, vomiting, constipation, diarrhea  Musculoskeletal: Denies myalgias, arthralgias, joint swelling, joint stiffness , limb pain, limb swelling  Gu: denies dysuria, polyuria  Skin: Denies skin rash, skin lesion, skin wound, itching, dry skin  Neuro: Denies headache, numbness confusion, loss of consciousness, dizziness, vertigo+ chronic burning and tingling of feet bilaterally  Psychiatric: Denies feelings of depression, suicidal ideation, anxiety, sleep disturbances    OBJECTIVE  /80   Pulse (!) 52   Temp 98 °F (36 7 °C)   Ht 5' 1" (1 549 m)   Wt 112 kg (248 lb)   SpO2 93%   BMI 46 86 kg/m²   Constitutional:   NAD, well appearing and well nourished      ENT:   Conjunctiva and lids: no injection, edema, or discharge     Pupils and iris: ANGELINE bilaterally    External inspection of ears and nose: normal without deformities or discharge  Otoscopic exam: Canals patent without erythema  Nasal mucosa, septum and turbinates: Normal or edema or discharge         Oropharynx:  Moist mucosa, normal tongue and tonsils without lesions  No erythema        Pulmonary:Respiratory effort normal rate and rhythm, no increased work of breathing   Auscultation of lungs:  Clear bilaterally with no adventitious breath sounds       Cardiovascular: regular rate and rhythm, S1 and S2, no murmur, no edema and/or varicosities of LE      Abdomen: Soft and non-distended     Positive bowel sounds      No heptomegaly or splenomegaly      Gu: no suprapubic tenderness or CVA tenderness, no urethral discharge  Lymphatic:  No anterior or posterior cervical lymphadenopathy         Musculoskeletal:  Gait and station: Normal gait      Digits and nails normal without clubbing or cyanosis       Inspection/palpation of joints, bones, and muscles:  No joint tenderness, swelling, full active and passive range of motion       Skin: Normal skin turgor and no rashes      Neuro:    Normal reflexes       Psych:   alert and oriented to person, place and time     normal mood and affect       Assessment/Plan:Diagnoses and all orders for this visit:    Type 2 diabetes mellitus with stage 3 chronic kidney disease, without long-term current use of insulin (HCC)  Comments:  Hgba1c is well controlled at 6 4  Diabetic peripheral neuropathy (HCC)  Comments:  Stable on current med  Arteriosclerosis of coronary artery  Comments:  Stable at this time  Hypercholesterolemia  Comments:  Controlled on current therapy  Reviewed with patient plan to treat with above plan    I will see her bck in 4 mos or sooner prn

## 2020-02-18 DIAGNOSIS — E03.9 HYPOTHYROIDISM, UNSPECIFIED TYPE: ICD-10-CM

## 2020-02-18 RX ORDER — LEVOTHYROXINE SODIUM 0.1 MG/1
100 TABLET ORAL DAILY
Qty: 30 TABLET | Refills: 3 | Status: SHIPPED | OUTPATIENT
Start: 2020-02-18 | End: 2020-06-17 | Stop reason: SDUPTHER

## 2020-02-24 DIAGNOSIS — G62.9 NEUROPATHY: ICD-10-CM

## 2020-02-24 RX ORDER — GABAPENTIN 300 MG/1
CAPSULE ORAL
Qty: 240 CAPSULE | Refills: 3 | Status: SHIPPED | OUTPATIENT
Start: 2020-02-24 | End: 2020-06-26 | Stop reason: SDUPTHER

## 2020-03-05 ENCOUNTER — OFFICE VISIT (OUTPATIENT)
Dept: CARDIOLOGY CLINIC | Facility: CLINIC | Age: 75
End: 2020-03-05
Payer: MEDICARE

## 2020-03-05 VITALS
BODY MASS INDEX: 47.95 KG/M2 | DIASTOLIC BLOOD PRESSURE: 78 MMHG | HEIGHT: 61 IN | SYSTOLIC BLOOD PRESSURE: 126 MMHG | HEART RATE: 67 BPM | WEIGHT: 254 LBS

## 2020-03-05 DIAGNOSIS — R60.0 LOCALIZED EDEMA: ICD-10-CM

## 2020-03-05 DIAGNOSIS — E78.00 HYPERCHOLESTEROLEMIA: ICD-10-CM

## 2020-03-05 DIAGNOSIS — I35.0 NONRHEUMATIC AORTIC VALVE STENOSIS: ICD-10-CM

## 2020-03-05 DIAGNOSIS — I10 ESSENTIAL HYPERTENSION: Primary | ICD-10-CM

## 2020-03-05 DIAGNOSIS — I25.10 ARTERIOSCLEROSIS OF CORONARY ARTERY: ICD-10-CM

## 2020-03-05 PROCEDURE — 1160F RVW MEDS BY RX/DR IN RCRD: CPT | Performed by: INTERNAL MEDICINE

## 2020-03-05 PROCEDURE — 3044F HG A1C LEVEL LT 7.0%: CPT | Performed by: INTERNAL MEDICINE

## 2020-03-05 PROCEDURE — 1036F TOBACCO NON-USER: CPT | Performed by: INTERNAL MEDICINE

## 2020-03-05 PROCEDURE — 93000 ELECTROCARDIOGRAM COMPLETE: CPT | Performed by: INTERNAL MEDICINE

## 2020-03-05 PROCEDURE — 3008F BODY MASS INDEX DOCD: CPT | Performed by: INTERNAL MEDICINE

## 2020-03-05 PROCEDURE — 3066F NEPHROPATHY DOC TX: CPT | Performed by: INTERNAL MEDICINE

## 2020-03-05 PROCEDURE — 99214 OFFICE O/P EST MOD 30 MIN: CPT | Performed by: INTERNAL MEDICINE

## 2020-03-05 PROCEDURE — 3078F DIAST BP <80 MM HG: CPT | Performed by: INTERNAL MEDICINE

## 2020-03-05 PROCEDURE — 3074F SYST BP LT 130 MM HG: CPT | Performed by: INTERNAL MEDICINE

## 2020-03-05 PROCEDURE — 4040F PNEUMOC VAC/ADMIN/RCVD: CPT | Performed by: INTERNAL MEDICINE

## 2020-03-05 PROCEDURE — 2022F DILAT RTA XM EVC RTNOPTHY: CPT | Performed by: INTERNAL MEDICINE

## 2020-03-05 NOTE — PROGRESS NOTES
Cardiology Consultation     Rafaela Siddiqui  [id number]  1945  HEART & VASCULAR [facility] [address]    1  Essential hypertension  POCT ECG   2  Localized edema     3  Hypercholesterolemia     4  Arteriosclerosis of coronary artery     5  Nonrheumatic aortic valve stenosis        Chief Complaint   Patient presents with    Follow-up    Shortness of Breath     Experienced two weeks ago  HPI: Patient is here for evaluation and management of lower extremity edema - which is improving  She is continued on furosemide 40mg and spironolactone by her PCP - which she does not take every day due to her schedule  She started on her left leg with 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 in August - swollen since then  Her right side does not have Charcot-Gayathri, but it has also started swelling as well  Edema seems to be improving with diuretics - did lose 10 lbs  Patient feels well, without complaints  No reported chest pain, shortness of breath, palpitations, lightheadedness, syncope, orthopnea, PND, or significant weight changes  Patient remains active without any increased fatigue out of the ordinary          Patient Active Problem List   Diagnosis    Arteriosclerosis of coronary artery    Edema    Hypercholesterolemia    Hypertension    Aortic stenosis    Primary osteoarthritis of both knees    Morbid obesity with body mass index (BMI) of 50 0 to 59 9 in adult [facility])    [facility] )    Type 2 diabetes mellitus with stage 3 chronic kidney disease, without long-term current use of insulin (HCC)     Past Medical History:   Diagnosis Date    Aortic stenosis     Arthritis     Asthma     Coronary artery disease     Diabetes mellitus ([facility]     Diabetic peripheral neuropathy ([facility] )     Last assessed - 1/27/16    Gallbladder disease     Heart attack ([facility] ) 07/1999    Hemorrhoids     Last assessed - 11/16/12    Myocardial infarction [facility])     Old myocardial infarction     Type 2 diabetes mellitus with autonomic neuropathy (HCC)     Unspec long term insulin use status, Last assessed - 6/8/17     Social History     Socioeconomic History    Marital status: /Civil Union     Spouse name: Not on file    Number of children: Not on file    Years of education: Not on file    Highest education level: Not on file   Occupational History    Not on file   Social Needs    Financial resource strain: Not on file    Food insecurity:     Worry: Not on file     Inability: Not on file    Transportation needs:     Medical: Not on file     Non-medical: Not on file   Tobacco Use    Smoking status: Never Smoker    Smokeless tobacco: Never Used   Substance and Sexual Activity    Alcohol use: No     Comment: Social per [employer]     Drug use: No    Sexual activity: Not on file   Lifestyle    Physical activity:     Days per week: Not on file     Minutes per session: Not on file    Stress: Not on file   Relationships    Social connections:     Talks on phone: Not on file     Gets together: Not on file     Attends Alevism service: Not on file     Active member of club or organization: Not on file     Attends meetings of clubs or organizations: Not on file     Relationship status: Not on file    Intimate partner violence:     Fear of current or ex partner: Not on file     Emotionally abused: Not on file     Physically abused: Not on file     Forced sexual activity: Not on file   Other Topics Concern    Not on file   Social History Narrative    Hx of daily cola consumption (unk cans/day)    Daily tea consumption (unk cups/day)    Multiple organ donor    Patient has living will    [address] in existence    Uses safety equipment - Seatbelts       Family History   Problem Relation Age of Onset    Hypertension Father     Diabetes Father     Cancer Father         Throat    Arthritis Father    [facility] Stroke Mother     Diabetes Maternal Grandmother     Heart disease Maternal Grandfather     Diabetes Son     Lymphoma Family         Head, Face and Neck      Past Surgical History:   Procedure Laterality Date    BUNIONECTOMY      CARDIAC CATHETERIZATION      Carotid Artery, Resolved - 7/1/13    CARDIOVASCULAR STRESS TEST  09/1999    CHOLECYSTECTOMY      COLONOSCOPY  2017    FOOT ARTHRODESIS, MODIFIED DONOHUE  2016    GALLBLADDER SURGERY  1970    HEMORROIDECTOMY      KNEE ARTHROSCOPY Left 2004    SC COLONOSCOPY FLX DX W/COLLJ SPEC WHEN PFRMD N/A 8/21/2017    Procedure: COLONOSCOPY;  Surgeon: Emerita Castillo MD;  Location: [facility];   Service: Colorectal    REPAIR KNEE LIGAMENT      REPAIR KNEE LIGAMENT Left 1986    REPAIR KNEE LIGAMENT Right 1988       Current Outpatient Medications:     albuterol (PROAIR HFA) 90 mcg/act inhaler, Inhale 2 puffs 4 (four) times a day as needed, Disp: , Rfl:     BYSTOLIC 5 MG tablet, TAKE 1 TABLET (5MG) BY MOUTH EVERY DAY, Disp: 30 tablet, Rfl: 3    diclofenac-misoprostol (ARTHROTEC 50) 50-0 2 MG per tablet, Take 1 tablet by mouth 2 (two) times a day, Disp: 180 tablet, Rfl: 3    furosemide (LASIX) 20 mg tablet, Take 1 tablet (20 mg total) by mouth daily, Disp: 90 tablet, Rfl: 3    gabapentin (NEURONTIN) 300 mg capsule, Take 3 tabs AM, 2 tabs afternoon, 3 tabs QHS, Disp: 240 capsule, Rfl: 3    glimepiride (AMARYL) 4 mg tablet, Take 1 tablet (4 mg total) by mouth daily with breakfast, Disp: 180 tablet, Rfl: 1    glucose blood (TRUETEST TEST) test strip, by In Vitro route daily, Disp: , Rfl:     levothyroxine 100 mcg tablet, Take 1 tablet (100 mcg total) by mouth daily, Disp: 30 tablet, Rfl: 3    MICROLET LANCETS MISC, by Does not apply route, Disp: , Rfl:     Multiple Vitamin (DAILY VALUE MULTIVITAMIN) TABS, Take 1 tablet by mouth daily, Disp: , Rfl:     pantoprazole (PROTONIX) 40 mg tablet, Take 1 tablet (40 mg total) by mouth daily, Disp: 90 tablet, Rfl: 1    potassium chloride (K-DUR,KLOR-CON) 20 mEq tablet, TAKE 1 TABLET BY MOUTH EVERY DAY, Disp: 30 tablet, Rfl: 6    pravastatin (PRAVACHOL) 10 mg tablet, Take 1 tablet (10 mg total) by mouth daily, Disp: 90 tablet, Rfl: 1    predniSONE 5 mg tablet, Take 1 tablet (5 mg total) by mouth daily, Disp: 90 tablet, Rfl: 3    venlafaxine (EFFEXOR) 75 mg tablet, Take 1 tablet (75 mg total) by mouth 3 (three) times a day, Disp: 90 tablet, Rfl: 3    baclofen 10 mg tablet, Take by mouth, Disp: , Rfl:     nystatin-triamcinolone (MYCOLOG-II) cream, Apply topically 2 (two) times a day (Patient not taking: Reported on 3/5/2020), Disp: 60 g, Rfl: 3  Allergies   Allergen Reactions    Lyrica [Pregabalin] Swelling    Sulfa Antibiotics Swelling     Vitals:    03/05/20 0900   BP: 126/78   BP Location: Left arm   Patient Position: Sitting   Cuff Size: Adult   Pulse: 67   Weight: 115 kg (254 lb)   Height: 5' 1" (1 549 m)       Labs:  Appointment on 01/24/2020   Component Date Value    Cholesterol 01/24/2020 189     Triglycerides 01/24/2020 117     HDL, Direct 01/24/2020 51     LDL Calculated 01/24/2020 115*    Sodium 01/24/2020 144     Potassium 01/24/2020 3 6     Chloride 01/24/2020 108     CO2 01/24/2020 28     ANION GAP 01/24/2020 8     BUN 01/24/2020 28*    Creatinine 01/24/2020 1 77*    Glucose, Fasting 01/24/2020 78     Calcium 01/24/2020 9 7     AST 01/24/2020 17     ALT 01/24/2020 27     Alkaline Phosphatase 01/24/2020 92     Total Protein 01/24/2020 6 7     Albumin 01/24/2020 3 5     Total Bilirubin 01/24/2020 0 44     eGFR 01/24/2020 28     Hemoglobin A1C 01/24/2020 6 4*    EAG 01/24/2020 137     TSH 3RD GENERATON 01/24/2020 2 780    Appointment on 09/12/2019   Component Date Value    Hemoglobin A1C 09/12/2019 6 5*    EAG 09/12/2019 140     Sodium 09/12/2019 144     Potassium 09/12/2019 4 3     Chloride 09/12/2019 110*    CO2 09/12/2019 28     ANION GAP 09/12/2019 6     BUN 09/12/2019 29*  Creatinine 09/12/2019 1 74*    Glucose, Fasting 09/12/2019 107*    Calcium 09/12/2019 9 7     AST 09/12/2019 20     ALT 09/12/2019 27     Alkaline Phosphatase 09/12/2019 76     Total Protein 09/12/2019 6 5     Albumin 09/12/2019 3 8     Total Bilirubin 09/12/2019 0 52     eGFR 09/12/2019 28     Cholesterol 09/12/2019 179     Triglycerides 09/12/2019 151*    HDL, Direct 09/12/2019 48     LDL Calculated 09/12/2019 101*    TSH 3RD GENERATON 09/12/2019 3 320      Lab Results   Component Value Date    CHOL 184 09/19/2015    TRIG 117 01/24/2020    TRIG 113 09/19/2015    HDL 51 01/24/2020    HDL 47 09/19/2015     Imaging: No results found  Review of Systems:  Review of Systems   Constitutional: Negative for activity change, appetite change, chills, diaphoresis, fatigue and unexpected weight change  HENT: Negative for hearing loss, nosebleeds and sore throat  Eyes: Negative for photophobia and visual disturbance  Respiratory: Negative for cough, chest tightness, shortness of breath and wheezing  Cardiovascular: Negative for chest pain, palpitations and leg swelling  Gastrointestinal: Negative for abdominal pain, diarrhea, nausea and vomiting  Endocrine: Negative for polyuria  Genitourinary: Negative for dysuria, frequency and hematuria  Musculoskeletal: Negative for arthralgias, back pain, gait problem and neck pain  Skin: Negative for pallor and rash  Neurological: Negative for dizziness, syncope and headaches  Hematological: Does not bruise/bleed easily  Psychiatric/Behavioral: Negative for behavioral problems and confusion  Physical Exam:  Physical Exam   Constitutional: She is oriented to person, place, and time  She appears well-developed and well-nourished  HENT:   Head: Normocephalic and atraumatic  Nose: Nose normal    Eyes: Pupils are equal, round, and reactive to light  EOM are normal  No scleral icterus  Neck: Normal range of motion  Neck supple   No JVD present  Cardiovascular: Normal rate and regular rhythm  Exam reveals no gallop and no friction rub  Murmur heard  Systolic murmur is present with a grade of 2/6  Pulmonary/Chest: Effort normal and breath sounds normal  No respiratory distress  She has no wheezes  She has no rales  Abdominal: Soft  Bowel sounds are normal  She exhibits no distension  There is no tenderness  Musculoskeletal: Normal range of motion  She exhibits no edema or deformity  Neurological: She is alert and oriented to person, place, and time  No cranial nerve deficit  Skin: Skin is warm and dry  No rash noted  She is not diaphoretic  Psychiatric: She has a normal mood and affect  Her behavior is normal    Vitals reviewed  Blood pressure 126/78, pulse 67, height 5' 1" (1 549 m), weight 115 kg (254 lb)  EKG:  Normal sinus rhythm with sinus arrhythmia  Left anterior fascicular block  Left ventricular hypertrophy with QRS widening and repolarization abnormality  Possible lateral infarct, age undetermined  Abnormal ECG    Nuclear stress test: 12/2017 - personally reviewed, small fixed inferoapical defect consistent with prior MI, normal EF  Echo: 12/2017 - personally reviewed, normal EF, no wall motion abnormalities, G1DD, mild MR, very mild AS (mean gradient 8 mmHg)    Discussion/Summary:  1) LE edema: likely resultant from G1DD  Agree with continued furosemide and spironolactone, which seems to be improving her symptoms  Have advised patient to check her weight every day to help check to see how her fluid removal is going  We decreased dose to daily for furosemide since Cr was elevated at 1 89, recheck of BMP revealed 1 99 with a BUN Of 31 suggesting dehydration  We stopped furosemide rechecked BMP in 2 weeks - which showed improved Cr - would use furosemide sparingly to prevent TIESHA  2) Mild aortic stenosis: inconsequential at this time  Continue to follow      3) h/o CAD with prior MI: seen on nuclear stress test in Dec 2017 as well  No ischemia seen on that study  Continue medical management with bystolic  4) HTN: well controlled, continue current regimen  5) HLD: not currently on statin  Currently at goal at 115 in Jan 2020

## 2020-03-13 ENCOUNTER — TELEPHONE (OUTPATIENT)
Dept: FAMILY MEDICINE CLINIC | Facility: CLINIC | Age: 75
End: 2020-03-13

## 2020-03-13 DIAGNOSIS — N39.0 URINARY TRACT INFECTION WITHOUT HEMATURIA, SITE UNSPECIFIED: Primary | ICD-10-CM

## 2020-03-13 RX ORDER — CIPROFLOXACIN 250 MG/1
250 TABLET, FILM COATED ORAL EVERY 12 HOURS SCHEDULED
Qty: 20 TABLET | Refills: 0 | Status: SHIPPED | OUTPATIENT
Start: 2020-03-13 | End: 2020-03-23

## 2020-03-13 NOTE — TELEPHONE ENCOUNTER
Patient called stating she is having burning while urinating  Can something be called in?      [id number]  Aware to check with pharmacy

## 2020-03-16 DIAGNOSIS — R60.9 EDEMA, UNSPECIFIED TYPE: ICD-10-CM

## 2020-03-16 RX ORDER — POTASSIUM CHLORIDE 20 MEQ/1
TABLET, EXTENDED RELEASE ORAL
Qty: 30 TABLET | Refills: 6 | Status: SHIPPED | OUTPATIENT
Start: 2020-03-16 | End: 2020-10-12

## 2020-03-24 DIAGNOSIS — J30.2 SEASONAL ALLERGIES: Primary | ICD-10-CM

## 2020-03-24 RX ORDER — ALBUTEROL SULFATE 90 UG/1
2 AEROSOL, METERED RESPIRATORY (INHALATION) EVERY 6 HOURS PRN
Qty: 1 INHALER | Refills: 1 | Status: SHIPPED | OUTPATIENT
Start: 2020-03-24 | End: 2022-03-08

## 2020-03-26 DIAGNOSIS — E78.00 HYPERCHOLESTEROLEMIA: ICD-10-CM

## 2020-03-27 RX ORDER — PRAVASTATIN SODIUM 10 MG
TABLET ORAL
Qty: 90 TABLET | Refills: 1 | Status: SHIPPED | OUTPATIENT
Start: 2020-03-27 | End: 2020-10-02

## 2020-03-30 ENCOUNTER — TELEMEDICINE (OUTPATIENT)
Dept: FAMILY MEDICINE CLINIC | Facility: CLINIC | Age: 75
End: 2020-03-30
Payer: MEDICARE

## 2020-03-30 DIAGNOSIS — H69.83 EUSTACHIAN TUBE DYSFUNCTION, BILATERAL: Primary | ICD-10-CM

## 2020-03-30 PROCEDURE — 99442 PR PHYS/QHP TELEPHONE EVALUATION 11-20 MIN: CPT | Performed by: FAMILY MEDICINE

## 2020-03-30 RX ORDER — METHYLPREDNISOLONE 4 MG/1
TABLET ORAL
Qty: 21 EACH | Refills: 0 | Status: SHIPPED | OUTPATIENT
Start: 2020-03-30 | End: 2020-06-09 | Stop reason: ALTCHOICE

## 2020-03-30 RX ORDER — MECLIZINE HYDROCHLORIDE 25 MG/1
25 TABLET ORAL 3 TIMES DAILY PRN
Qty: 30 TABLET | Refills: 1 | Status: SHIPPED | OUTPATIENT
Start: 2020-03-30 | End: 2020-10-27 | Stop reason: ALTCHOICE

## 2020-03-30 RX ORDER — FLUTICASONE PROPIONATE 50 MCG
2 SPRAY, SUSPENSION (ML) NASAL DAILY
Qty: 1 BOTTLE | Refills: 3 | Status: SHIPPED | OUTPATIENT
Start: 2020-03-30 | End: 2020-10-27 | Stop reason: ALTCHOICE

## 2020-04-01 DIAGNOSIS — I10 ESSENTIAL HYPERTENSION: ICD-10-CM

## 2020-04-01 RX ORDER — NEBIVOLOL HYDROCHLORIDE 5 MG/1
TABLET ORAL
Qty: 30 TABLET | Refills: 3 | Status: SHIPPED | OUTPATIENT
Start: 2020-04-01 | End: 2020-07-28

## 2020-04-16 DIAGNOSIS — K21.9 GASTROESOPHAGEAL REFLUX DISEASE WITHOUT ESOPHAGITIS: ICD-10-CM

## 2020-04-16 RX ORDER — PANTOPRAZOLE SODIUM 40 MG/1
40 TABLET, DELAYED RELEASE ORAL DAILY
Qty: 90 TABLET | Refills: 1 | Status: SHIPPED | OUTPATIENT
Start: 2020-04-16 | End: 2020-10-27 | Stop reason: ALTCHOICE

## 2020-04-23 DIAGNOSIS — F32.A DEPRESSION, UNSPECIFIED DEPRESSION TYPE: ICD-10-CM

## 2020-04-23 RX ORDER — VENLAFAXINE 75 MG/1
75 TABLET ORAL 3 TIMES DAILY
Qty: 90 TABLET | Refills: 3 | Status: SHIPPED | OUTPATIENT
Start: 2020-04-23 | End: 2020-08-21 | Stop reason: SDUPTHER

## 2020-06-04 ENCOUNTER — TRANSCRIBE ORDERS (OUTPATIENT)
Dept: LAB | Facility: CLINIC | Age: 75
End: 2020-06-04

## 2020-06-04 ENCOUNTER — TELEPHONE (OUTPATIENT)
Dept: FAMILY MEDICINE CLINIC | Facility: CLINIC | Age: 75
End: 2020-06-04

## 2020-06-04 DIAGNOSIS — I10 ESSENTIAL HYPERTENSION: ICD-10-CM

## 2020-06-04 DIAGNOSIS — E78.00 HYPERCHOLESTEROLEMIA: ICD-10-CM

## 2020-06-04 DIAGNOSIS — E11.9 CONTROLLED TYPE 2 DIABETES MELLITUS WITHOUT COMPLICATION, WITHOUT LONG-TERM CURRENT USE OF INSULIN (HCC): Primary | ICD-10-CM

## 2020-06-04 DIAGNOSIS — E03.9 HYPOTHYROIDISM, UNSPECIFIED TYPE: ICD-10-CM

## 2020-06-05 ENCOUNTER — APPOINTMENT (OUTPATIENT)
Dept: LAB | Facility: CLINIC | Age: 75
End: 2020-06-05
Payer: MEDICARE

## 2020-06-05 DIAGNOSIS — E78.00 HYPERCHOLESTEROLEMIA: ICD-10-CM

## 2020-06-05 DIAGNOSIS — I10 ESSENTIAL HYPERTENSION: ICD-10-CM

## 2020-06-05 DIAGNOSIS — E03.9 HYPOTHYROIDISM, UNSPECIFIED TYPE: ICD-10-CM

## 2020-06-05 DIAGNOSIS — E11.9 CONTROLLED TYPE 2 DIABETES MELLITUS WITHOUT COMPLICATION, WITHOUT LONG-TERM CURRENT USE OF INSULIN (HCC): ICD-10-CM

## 2020-06-05 LAB
ALBUMIN SERPL BCP-MCNC: 3.2 G/DL (ref 3.5–5)
ALP SERPL-CCNC: 90 U/L (ref 46–116)
ALT SERPL W P-5'-P-CCNC: 29 U/L (ref 12–78)
ANION GAP SERPL CALCULATED.3IONS-SCNC: 4 MMOL/L (ref 4–13)
AST SERPL W P-5'-P-CCNC: 19 U/L (ref 5–45)
BILIRUB SERPL-MCNC: 0.35 MG/DL (ref 0.2–1)
BUN SERPL-MCNC: 30 MG/DL (ref 5–25)
CALCIUM SERPL-MCNC: 9.7 MG/DL (ref 8.3–10.1)
CHLORIDE SERPL-SCNC: 110 MMOL/L (ref 100–108)
CHOLEST SERPL-MCNC: 172 MG/DL (ref 50–200)
CO2 SERPL-SCNC: 30 MMOL/L (ref 21–32)
CREAT SERPL-MCNC: 1.94 MG/DL (ref 0.6–1.3)
EST. AVERAGE GLUCOSE BLD GHB EST-MCNC: 148 MG/DL
GFR SERPL CREATININE-BSD FRML MDRD: 25 ML/MIN/1.73SQ M
GLUCOSE P FAST SERPL-MCNC: 76 MG/DL (ref 65–99)
HBA1C MFR BLD: 6.8 %
HDLC SERPL-MCNC: 48 MG/DL
LDLC SERPL CALC-MCNC: 95 MG/DL (ref 0–100)
POTASSIUM SERPL-SCNC: 4.1 MMOL/L (ref 3.5–5.3)
PROT SERPL-MCNC: 6.2 G/DL (ref 6.4–8.2)
SODIUM SERPL-SCNC: 144 MMOL/L (ref 136–145)
TRIGL SERPL-MCNC: 146 MG/DL
TSH SERPL DL<=0.05 MIU/L-ACNC: 1.51 UIU/ML (ref 0.36–3.74)

## 2020-06-05 PROCEDURE — 84443 ASSAY THYROID STIM HORMONE: CPT

## 2020-06-05 PROCEDURE — 80061 LIPID PANEL: CPT

## 2020-06-05 PROCEDURE — 36415 COLL VENOUS BLD VENIPUNCTURE: CPT

## 2020-06-05 PROCEDURE — 83036 HEMOGLOBIN GLYCOSYLATED A1C: CPT

## 2020-06-05 PROCEDURE — 80053 COMPREHEN METABOLIC PANEL: CPT

## 2020-06-09 ENCOUNTER — OFFICE VISIT (OUTPATIENT)
Dept: FAMILY MEDICINE CLINIC | Facility: CLINIC | Age: 75
End: 2020-06-09
Payer: MEDICARE

## 2020-06-09 VITALS
HEIGHT: 61 IN | HEART RATE: 64 BPM | DIASTOLIC BLOOD PRESSURE: 78 MMHG | TEMPERATURE: 98 F | SYSTOLIC BLOOD PRESSURE: 118 MMHG | BODY MASS INDEX: 46.07 KG/M2 | WEIGHT: 244 LBS

## 2020-06-09 DIAGNOSIS — R79.89 AZOTEMIA: Primary | ICD-10-CM

## 2020-06-09 DIAGNOSIS — E11.9 CONTROLLED TYPE 2 DIABETES MELLITUS WITHOUT COMPLICATION, WITHOUT LONG-TERM CURRENT USE OF INSULIN (HCC): ICD-10-CM

## 2020-06-09 DIAGNOSIS — E78.00 HYPERCHOLESTEROLEMIA: ICD-10-CM

## 2020-06-09 DIAGNOSIS — I10 ESSENTIAL HYPERTENSION: ICD-10-CM

## 2020-06-09 DIAGNOSIS — E03.9 HYPOTHYROIDISM, UNSPECIFIED TYPE: ICD-10-CM

## 2020-06-09 DIAGNOSIS — M25.512 LEFT SHOULDER PAIN, UNSPECIFIED CHRONICITY: ICD-10-CM

## 2020-06-09 DIAGNOSIS — E66.01 MORBID OBESITY WITH BODY MASS INDEX (BMI) OF 50.0 TO 59.9 IN ADULT (HCC): ICD-10-CM

## 2020-06-09 PROCEDURE — 3008F BODY MASS INDEX DOCD: CPT | Performed by: FAMILY MEDICINE

## 2020-06-09 PROCEDURE — 3066F NEPHROPATHY DOC TX: CPT | Performed by: FAMILY MEDICINE

## 2020-06-09 PROCEDURE — 3074F SYST BP LT 130 MM HG: CPT | Performed by: FAMILY MEDICINE

## 2020-06-09 PROCEDURE — 1036F TOBACCO NON-USER: CPT | Performed by: FAMILY MEDICINE

## 2020-06-09 PROCEDURE — 3078F DIAST BP <80 MM HG: CPT | Performed by: FAMILY MEDICINE

## 2020-06-09 PROCEDURE — 1160F RVW MEDS BY RX/DR IN RCRD: CPT | Performed by: FAMILY MEDICINE

## 2020-06-09 PROCEDURE — 3044F HG A1C LEVEL LT 7.0%: CPT | Performed by: FAMILY MEDICINE

## 2020-06-09 PROCEDURE — 2022F DILAT RTA XM EVC RTNOPTHY: CPT | Performed by: FAMILY MEDICINE

## 2020-06-09 PROCEDURE — 99214 OFFICE O/P EST MOD 30 MIN: CPT | Performed by: FAMILY MEDICINE

## 2020-06-09 PROCEDURE — 4040F PNEUMOC VAC/ADMIN/RCVD: CPT | Performed by: FAMILY MEDICINE

## 2020-06-09 PROCEDURE — 96372 THER/PROPH/DIAG INJ SC/IM: CPT | Performed by: FAMILY MEDICINE

## 2020-06-09 RX ORDER — KETOROLAC TROMETHAMINE 30 MG/ML
30 INJECTION, SOLUTION INTRAMUSCULAR; INTRAVENOUS ONCE
Status: COMPLETED | OUTPATIENT
Start: 2020-06-09 | End: 2020-06-09

## 2020-06-09 RX ADMIN — KETOROLAC TROMETHAMINE 30 MG: 30 INJECTION, SOLUTION INTRAMUSCULAR; INTRAVENOUS at 10:50

## 2020-06-16 ENCOUNTER — APPOINTMENT (OUTPATIENT)
Dept: LAB | Facility: CLINIC | Age: 75
End: 2020-06-16
Payer: MEDICARE

## 2020-06-16 DIAGNOSIS — R79.89 AZOTEMIA: ICD-10-CM

## 2020-06-16 PROCEDURE — 82575 CREATININE CLEARANCE TEST: CPT

## 2020-06-17 ENCOUNTER — TRANSCRIBE ORDERS (OUTPATIENT)
Dept: LAB | Facility: CLINIC | Age: 75
End: 2020-06-17

## 2020-06-17 ENCOUNTER — APPOINTMENT (OUTPATIENT)
Dept: LAB | Facility: CLINIC | Age: 75
End: 2020-06-17
Payer: MEDICARE

## 2020-06-17 DIAGNOSIS — R79.89 HYPOURICEMIA: Primary | ICD-10-CM

## 2020-06-17 DIAGNOSIS — E03.9 HYPOTHYROIDISM, UNSPECIFIED TYPE: ICD-10-CM

## 2020-06-17 DIAGNOSIS — N18.30 STAGE 3 CHRONIC KIDNEY DISEASE (HCC): Primary | ICD-10-CM

## 2020-06-17 DIAGNOSIS — M25.50 ARTHRALGIA, UNSPECIFIED JOINT: ICD-10-CM

## 2020-06-17 LAB
CREAT 24H UR-MRATE: 1 G/24HR (ref 0.6–1.8)
CREAT CL 24H UR+SERPL-VRATE: 30.35 ML/MIN (ref 75–115)
CREAT SERPL-MCNC: 1.88 MG/DL (ref 0.6–1.3)
CREAT UR-MCNC: 104 MG/DL
SPECIMEN VOL UR: 1000 ML

## 2020-06-17 PROCEDURE — 82565 ASSAY OF CREATININE: CPT

## 2020-06-17 PROCEDURE — 36415 COLL VENOUS BLD VENIPUNCTURE: CPT

## 2020-06-17 RX ORDER — LEVOTHYROXINE SODIUM 0.1 MG/1
100 TABLET ORAL DAILY
Qty: 30 TABLET | Refills: 3 | Status: SHIPPED | OUTPATIENT
Start: 2020-06-17 | End: 2020-10-12 | Stop reason: SDUPTHER

## 2020-06-17 RX ORDER — PREDNISONE 1 MG/1
5 TABLET ORAL DAILY
Qty: 90 TABLET | Refills: 3 | Status: SHIPPED | OUTPATIENT
Start: 2020-06-17 | End: 2021-05-18 | Stop reason: ALTCHOICE

## 2020-06-26 DIAGNOSIS — G62.9 NEUROPATHY: ICD-10-CM

## 2020-06-26 RX ORDER — GABAPENTIN 300 MG/1
CAPSULE ORAL
Qty: 240 CAPSULE | Refills: 3 | Status: SHIPPED | OUTPATIENT
Start: 2020-06-26 | End: 2020-11-02 | Stop reason: SDUPTHER

## 2020-07-16 ENCOUNTER — TELEPHONE (OUTPATIENT)
Dept: NEPHROLOGY | Facility: CLINIC | Age: 75
End: 2020-07-16

## 2020-07-16 ENCOUNTER — CONSULT (OUTPATIENT)
Dept: NEPHROLOGY | Facility: CLINIC | Age: 75
End: 2020-07-16
Payer: MEDICARE

## 2020-07-16 VITALS
TEMPERATURE: 98 F | SYSTOLIC BLOOD PRESSURE: 132 MMHG | BODY MASS INDEX: 47.43 KG/M2 | WEIGHT: 251 LBS | DIASTOLIC BLOOD PRESSURE: 64 MMHG

## 2020-07-16 DIAGNOSIS — E11.22 TYPE 2 DIABETES MELLITUS WITH STAGE 3 CHRONIC KIDNEY DISEASE AND HYPERTENSION (HCC): ICD-10-CM

## 2020-07-16 DIAGNOSIS — N18.30 STAGE 3 CHRONIC KIDNEY DISEASE (HCC): ICD-10-CM

## 2020-07-16 DIAGNOSIS — E66.01 MORBID OBESITY WITH BODY MASS INDEX (BMI) OF 50.0 TO 59.9 IN ADULT (HCC): Primary | ICD-10-CM

## 2020-07-16 DIAGNOSIS — I12.9 TYPE 2 DIABETES MELLITUS WITH STAGE 3 CHRONIC KIDNEY DISEASE AND HYPERTENSION (HCC): ICD-10-CM

## 2020-07-16 DIAGNOSIS — N18.30 TYPE 2 DIABETES MELLITUS WITH STAGE 3 CHRONIC KIDNEY DISEASE AND HYPERTENSION (HCC): ICD-10-CM

## 2020-07-16 PROCEDURE — 3075F SYST BP GE 130 - 139MM HG: CPT | Performed by: INTERNAL MEDICINE

## 2020-07-16 PROCEDURE — 3066F NEPHROPATHY DOC TX: CPT | Performed by: INTERNAL MEDICINE

## 2020-07-16 PROCEDURE — 2022F DILAT RTA XM EVC RTNOPTHY: CPT | Performed by: INTERNAL MEDICINE

## 2020-07-16 PROCEDURE — 3078F DIAST BP <80 MM HG: CPT | Performed by: INTERNAL MEDICINE

## 2020-07-16 PROCEDURE — 1160F RVW MEDS BY RX/DR IN RCRD: CPT | Performed by: INTERNAL MEDICINE

## 2020-07-16 PROCEDURE — 99204 OFFICE O/P NEW MOD 45 MIN: CPT | Performed by: INTERNAL MEDICINE

## 2020-07-16 PROCEDURE — 1036F TOBACCO NON-USER: CPT | Performed by: INTERNAL MEDICINE

## 2020-07-16 PROCEDURE — 4040F PNEUMOC VAC/ADMIN/RCVD: CPT | Performed by: INTERNAL MEDICINE

## 2020-07-16 PROCEDURE — 3044F HG A1C LEVEL LT 7.0%: CPT | Performed by: INTERNAL MEDICINE

## 2020-07-16 NOTE — TELEPHONE ENCOUNTER
COVID Pre-Visit Screening     1  Is this a family member screening? No  2  Have you traveled outside of your state in the past 2 weeks? No  3  Do you presently have a fever or flu-like symptoms? No  4  Do you have symptoms of an upper respiratory infection like runny nose, sore throat, or cough? No  5  Are you suffering from new headache that you have not had in the past?  No  6  Do you have/have you experienced any new shortness of breath recently? No  7  Do you have any new diarrhea, nausea or vomiting? No  8  Have you been in contact with anyone who has been sick or diagnosed with COVID-19? No  9  Do you have any new loss of taste or smell? No  10  Are you able to wear a mask without a valve for the entire visit? Yes    COVID Pre-Visit Screening     11  Is this a family member screening? Yes  12  Have you traveled outside of your state in the past 2 weeks? No  13  Do you presently have a fever or flu-like symptoms? No  14  Do you have symptoms of an upper respiratory infection like runny nose, sore throat, or cough? No  15  Are you suffering from new headache that you have not had in the past?  No  16  Do you have/have you experienced any new shortness of breath recently? No  17  Do you have any new diarrhea, nausea or vomiting? No  18  Have you been in contact with anyone who has been sick or diagnosed with COVID-19? No  19  Do you have any new loss of taste or smell? No  20  Are you able to wear a mask without a valve for the entire visit?  Yes

## 2020-07-16 NOTE — PROGRESS NOTES
NEPHROLOGY OUTPATIENT CONSULTATION   Eber Syed 66 y o  female MRN: [id number]  Date: 7/16/2020  Reason for consultation:   Chief Complaint   Patient presents with    Consult       ASSESSMENT and PLAN:    Thank you for the courtesy of this consultation  I had the pleasure of seeing Bree Price today in the renal clinic for the initial management of chronic kidney disease  1 ) Chronic Kidney Disease Stage IIIB  --Imaging:  Checking a renal ultrasound  --Urinalysis:  Checking urinalysis and urine protein creatinine ratio  --Proteinuria:  Checking a urine protein creatinine ratio  --Baseline creatinine:  1 5-2 mg/dL  --Etiology:  Likely in the setting of diabetic nephropathy with component of obesity related renal dysfunction and possible hypertensive nephrosclerosis  --Biopsy Proven:  No  --Status:  Most recent renal function appears to be stable and at baseline  Underwent a 24 hour urine for creatinine clearance but based on her weight, the urine collection was under collected as she only put out 1 L of urine  This is an under collection would not be accurate in the assessment of her renal function  --Management:  Diabetic and blood pressure control, avoiding NSAIDs, caution with dosing of Gabapentin in Baclofen in Kidney Disease, recommended Chronic Kidney Disease education but the patient declined  --Reducing Cardiovascular Risk Factors:  Simvastatin+ Ezetimibe reduced atherosclerotic events in CKD (SHARP trial); Low dose aspirin safe (if no contraindications exist); smoking is an independent risk factor for Chronic Kidney Disease and progression, strongly recommend smoking cessation    2 ) Hypertension  -- Stage I (American College of Cardiology/American Heart Association)  -- with underlying chronic kidney disease and diabetes  -- Body mass index is 47 43 kg/m²    -- Volume status: Euvolemic  -- Etiology:  Essential hypertension with underlying chronic kidney disease and obesity  -- Secondary Work-Up: Not clinically indicated  -- Target Goal: < 130/80 (ACC/AHA, CKD with proteinuria, CKD in diabetics) ; if tolerated can target SBP < 120 mmHg in high cardiovascular risk ( Age > 76, CKD, CVD, No Diabetics SPRINT)  -- Lifestyle Modifications: DASH Diet, Weight Loss for ideal body weight, 45 mins of cardiovascular exercise 3 times a week as tolerated, and if no contraindications exist, smoking cessation, limit alcohol use, avoid NSAIDS, monitor blood pressure at home  -- Status: Blood pressure at target  Well controlled at home  -- Current antihypertensive regiment:  Lasix 20 mg daily, Bystolic 5 mg daily  -- Changes:  Continue current regiment avoid NSAIDs low 2 g sodium diet    3 ) Diabetes mellitus type 2  -hemoglobin A1c:  6 8 (A1C values may be falsely elevated or decreased in those with CKD, assay method should be certified by [employer])  Target A1C < 7  -current medications:  Glimepiride  -proteinuria:  No  -retinopathy:  No  -neuropathy:  Yes  -please follow with an ophthalmologist and podiatrist every year  -continued self monitoring of blood glucose at home  -Management in CKD Recommendations:    Metformin:  Avoid if creatinine clearance is less than 30 cc/min (concern for lactic acidosis)   Sodium-Glucose Cotransporter-2 (SGLT2) Inhibitors: May see an acute drop in the eGFR initially when starting the medication but then a stabilization of the renal function, with slower loss of renal function as compared to placebo  Relative risk of end-stage renal disease, doubling of serum creatinine or death from renal causes were also found to be lower as compared to placebo  ([facility])  Would recommend starting at 100 mg daily, I would avoid use in patients with eGFR < 30 cc/min    If no contraindications exist I would recommend this medication added to the diabetic regiment, if further optimal diabetic control is required   Sulfonylureas:  Preferred short-acting (glipizide, glimepiride, repaglinide), relatively safe in patients with non dialysis CKD   Thiazolidinedones/Alpha-Gluosidase inhibitors/Dipeptidyl peptidase-4 inhibitors:  Generally not considered first-line agents in CKD (limited data in long-term safety and efficacy)   Insulin:  Starting dose may need to be lower than what would ordinarily be used, as there is a decrease metabolism of insulin (no dose adjustment if the GFR > 50 mL/min, dose should be reduced to 75% of baseline if GFR 10-50 mL/min, and 50% baseline if GFR < 10 mL/min)    4 ) Aortic stenosis  --following with Cardiology    5 ) Morbid obesity  --diet exercise in education      PATIENT INSTRUCTIONS:    Patient Instructions     1 )  Low 2 g sodium diet    2 )  Monitor weights at home    3 )  Avoid NSAIDs (ibuprofen, Motrin, Advil, Aleve, naproxen)    4 )  Monitor blood pressure at home, call if blood pressure greater than 150/90 persistently    5 ) Repeat blood work in 4 months    Chronic Kidney Disease   WHAT YOU NEED TO KNOW:   Chronic kidney disease (CKD) is the gradual and permanent loss of kidney function  It is also called chronic kidney failure, or chronic renal insufficiency  Normally, the kidneys remove fluid, chemicals, and waste from your blood  These wastes are turned into urine by your kidneys  CKD may worsen over time and lead to kidney failure  DISCHARGE INSTRUCTIONS:   Return to the emergency department if:   · You are confused and very drowsy  · You have a seizure  · You have shortness of breath  Contact your healthcare provider if:   · You suddenly gain or lose more weight than your healthcare provider has told you is okay  · You have itchy skin or a rash  · You urinate more or less than you normally do  · You have blood in your urine  · You have nausea and repeated vomiting  · You have fatigue or muscle weakness  · You have hiccups that will not stop      · You have questions or concerns about your condition or care  Medicines:   · Medicines  may be given to decrease blood pressure and get rid of extra fluid  You may also receive medicine to manage health conditions that may occur with CKD, such as anemia, diabetes, and heart disease  · Take your medicine as directed  Contact your healthcare provider if you think your medicine is not helping or if you have side effects  Tell him or her if you are allergic to any medicine  Keep a list of the medicines, vitamins, and herbs you take  Include the amounts, and when and why you take them  Bring the list or the pill bottles to follow-up visits  Carry your medicine list with you in case of an emergency  Follow up with your healthcare provider as directed: You will need to return for tests to monitor your kidney function  You may also be referred to a kidney specialist  Write down your questions so you remember to ask them during your visits  Manage other health conditions: Follow your healthcare provider's directions on how to manage diabetes, high blood pressure, and heart disease  These conditions can make CKD worse  Talk to your healthcare provider before you take over-the-counter medicine  Medicines such as NSAIDs, stomach medicine, or laxatives may harm your kidneys  Weigh yourself daily:  Ask your healthcare provider what your weight should be  Ask how much liquid you should drink each day  CKD may cause you to gain or lose weight rapidly  Weigh yourself every day  Write down your weight, how much liquid you drink or eat, and how much you urinate each day  Contact your healthcare provider if your weight is higher or lower than it should be  Manage CKD:   · Maintain a healthy weight  Ask your healthcare provider how much you should weigh  Ask him to help you create a weight loss plan if you are overweight  · Exercise 30 to 60 minutes a day, 4 to 7 times a week, or as directed  Ask about the best exercise plan for you   Regular exercise can help you manage CKD, high blood pressure, and diabetes  · Follow your healthcare provider's advice about what to eat and drink  He may tell you to eat food low in sodium (salt), potassium, phosphorus, or protein  You may need to see a dietitian if you need help planning meals  Ask how much liquid to drink each day and which liquids are best for you  · Limit alcohol  Ask how much alcohol is safe for you to drink  A drink of alcohol is 12 ounces of beer, 5 ounces of wine, or 1½ ounces of liquor  · Do not smoke  Nicotine and other chemicals in cigarettes and cigars can cause lung and kidney damage  Ask your healthcare provider for information if you currently smoke and need help to quit  E-cigarettes or smokeless tobacco still contain nicotine  Talk to your healthcare provider before you use these products  · Ask your healthcare provider if you need vaccines  Infections such as pneumonia, influenza, and hepatitis can be more harmful or more likely to occur in a person who has CKD  Vaccines reduce your risk of infection with these viruses  © 2017 [address] [facility] Information is for End User's use only and may not be sold, redistributed or otherwise used for commercial purposes  All illustrations and images included in CareNotes® are the copyrighted property of A D A M [employer] Inc  or Rashaad David  The above information is an  only  It is not intended as medical advice for individual conditions or treatments  Talk to your doctor, nurse or pharmacist before following any medical regimen to see if it is safe and effective for you  Chronic Kidney Disease Diet   AMBULATORY CARE:   A chronic kidney disease diet  limits protein, phosphorus, sodium, and potassium  Liquids may also need to be limited in later stages of chronic kidney disease  This diet can help slow down the rate of damage to your kidneys  Your diet may change over time as your health condition changes  You may also need to make other diet changes if you have other health problems, such as diabetes  Diet changes: There are 5 stages of chronic kidney disease  The diet changes you need to make are based on your stage of kidney disease  Work with your dietitian or healthcare provider to plan meals that are right for you  You may need any of the following:  · Limit protein  in all stages of kidney disease  Limit the portion sizes of protein you eat to limit the amount of work your kidneys have to do  Foods that are high in protein are meat, poultry (chicken and turkey), fish, eggs, and dairy (milk, cheese, yogurt)  Your healthcare provider will tell you how much protein to eat each day  · Limit sodium  if you have high blood pressure  Limit your sodium intake to less than 2,300 milligrams (mg) each day  Ask your dietitian or healthcare provider how much sodium you can have each day  The amount of sodium you should have depends on your stage of kidney disease  Table salt, canned foods, soups, salted snacks, and processed meats, like deli meats and sausage, are high in sodium  · Limit the amount of phosphorus  you eat  Your kidneys cannot get rid of extra phosphorus that builds up in your blood  This may cause your bones to lose calcium and weaken  Foods that are high in phosphorus are dairy products, beans, peas, nuts, and whole grains  Phosphorus is also found in cocoa, beer, and cola drinks  Your healthcare provider will tell you how much phosphorus you can have each day  · Limit potassium  if your potassium blood levels are too high  Your dietitian or healthcare provider will tell you if you need to limit potassium  Potassium is found in fruits and vegetables  · Limit liquids  as directed  Your healthcare provider may recommend that you limit liquids in stages 4 and 5 of kidney disease  If your body retains fluids, you will have swelling and fluid may build up in your lungs   This can cause other health problems, such as shortness of breath  Foods you may include: Your dietitian will tell you how many servings you can have from each of the food groups below  The approximate amount of these nutrients is listed next to each food group  Read the food label to find the exact amount  · Bread, cereal, and grains: These foods contain about 80 calories, 2 grams (g) of protein, 150 mg of sodium, 50 mg of potassium, and 30 mg of phosphorus  ¨ 1 slice (1 ounce) of bread ([place], [place], raisin, light rye, or sourdough white), small dinner roll, or 6-inch tortilla    ¨ ½ of a hamburger bun, hot dog bun, or English muffin or ¼ of a bagel    ¨ 1 cup of unsweetened cereal or ½ cup of cooked cereal, such as cream of wheat    ¨ ? cup of cooked pasta (noodles, macaroni, or spaghetti) or rice    ¨ 4 (2-inch) unsalted crackers or 3 squares of jefferson crackers    ¨ 3 cups of air-popped, unsalted popcorn    ¨ ¾ ounce of unsalted pretzels    · Vegetables:  A serving of these foods contains about 30 calories, 2 g of protein, 50 mg of sodium, and 50 mg of phosphorus  ¨ Low potassium (less than 150 mg):      ¨ ½ cup cooked green beans, cabbage, cauliflower, beets, or corn    ¨ 1 cup raw cucumber, endive, alfalfa sprouts, cabbage, cauliflower, or watercress    ¨ 1 cup of all types of lettuce    ¨ ¼ cup cooked or ½ cup raw mushrooms or onions    ¨ 1 cup cooked eggplant    ¨ Medium potassium (150 to 250 mg):      ¨ 1 cup raw broccoli, celery, or zucchini    ¨ ½ cup cooked asparagus, broccoli, celery, green peas, summer squash, zucchini, or peppers    ¨ 1 cup cooked kale or turnips    · Fruits:  A serving of these foods contains about 60 calories, 0 g protein, 0 mg sodium, and 150 mg of phosphorus  Each serving is ½ cup, unless another amount is given  ¨ Low potassium (less than 150 mg):       ¨ Apple juice, applesauce, or 1 small apple    ¨ Blueberries    ¨ Cranberries or cranberry juice cocktail    ¨ Fresh or canned pears (light syrup or packed in water)    ¨ Grapes or grape juice    ¨ Canned peaches (light syrup or packed in water)    ¨ Pineapple or strawberries    ¨ 1 tangerine     ¨ Watermelon    ¨ Medium potassium (150 to 250 mg):      ¨ Fresh peaches or pears    ¨ Cherries    ¨ Cantaloupe, moy, or papaya    ¨ Grapefruit or grapefruit juice    · Meat, poultry, and fish: These foods have about 75 calories, 7 g of protein, an average of 65 mg of sodium, 115 mg of potassium, and 70 mg of phosphorus  Do not use salt to prepare these foods  ¨ 1 ounce of cooked beef, pork, or poultry    ¨ 1 ounce of any fresh or frozen fish, lobster, shrimp, crab, clams, tuna, unsalted canned salmon, or unsalted sardines    · Other protein foods: These foods have about 90 calories, 7 g of protein, an average of 100 mg of sodium, 100 mg of potassium, and 120 mg of phosphorus  ¨ 1 large whole egg or ¼ cup of low-cholesterol egg substitute    ¨ 1 ounce of cheese    ¨ ¼ cup of cottage cheese or tofu    ¨ 1 ounce of unsalted nuts or 2 tablespoons of peanut butter    · Fats: These foods have very little protein and about 45 calories, 55 milligrams of sodium, 10 milligrams of potassium, and 5 milligrams of phosphorus  Include healthy fats, such as unsaturated fats, which are listed below  ¨ 1 teaspoon margarine or mayonnaise     ¨ 1 teaspoon oil (safflower, sunflower, corn, soybean, olive, peanut, canola)    ¨ 1 tablespoon oil-based salad dressing (such as [place]) or 2 tablespoons mayonnaise-based salad dressing (such as ranch)  Foods to limit or avoid:   · Starches: The following foods have higher amounts of sodium, potassium, or phosphorus  ¨ Biscuits, muffins, pancakes, and waffles     ¨ Cake and cornbread from boxed mixes    ¨ Oatmeal and whole-wheat cereals    ¨ Salted pretzel sticks or rings and sandwich cookies    · Meat and protein foods: The following are high in sodium and phosphorus       ¨ Deli-style meat, such as roast beef, ham, and turkey    ¨ Canned salmon and sardines    ¨ Processed cheese, such as American cheese and cheese spreads    ¨ Smoked or cured meat, such as corned beef, glynn, ham, hot dogs, and sausage    · Legumes: These foods have about 90 calories, 6 g of protein, less than 10 mg of sodium, 250 mg of potassium, and 100 mg of phosphorus  ¨ ? cup of black beans, red kidney beans, black-eye peas, garbanzos, and lentils    ¨ ¼ cup of green or mature soybeans    · Dairy: The following foods have about 8 g of protein, an average of 120 mg of sodium, 350 mg of potassium, and 220 mg of phosphorus  ¨ 1 cup of milk (fat-free, low-fat, whole, buttermilk, or chocolate milk)    ¨ 1 cup of low-fat plain or sugar-free yogurt or ice cream    ¨ ½ cup of pudding or custard    ¨ Nondairy milk substitutes: These foods have 75 calories, 1 gram of protein, and an average of 40 mg of sodium, 60 mg of potassium, and 60 mg of phosphorus  A serving is ½ cup of almond, rice, or soy milk, or nondairy creamer  · Vegetables: The following vegetables are high in potassium  Each serving has more than 250 mg of potassium  A serving is ½ cup, unless another amount is given  ¨ Artichoke or ¼ of a medium avocado    ¨ [place] sprouts, beets, chard, magdaleno or mustard greens    ¨ Potatoes, sweet potatoes, pumpkin, and yams    ¨ ¾ cup of okra    ¨ Raw tomatoes and low-sodium tomato juice, or tomato sauce    ¨ Winter squash, cooked asparagus, and cooked spinach    · Fruit:  The following fruits are high in potassium  Each serving has more than 250 mg of potassium  ¨ 3 fresh apricots    ¨ 1 small nectarine (2 inches across)    ¨ 1 small orange and ½ cup of orange juice    ¨ ¼ cup of dates     ¨ ? of a small honeydew melon    ¨ 1 six-inch banana     ¨ ½ cup of prune juice or prunes and kiwifruit    · Fats:  Limit unhealthy fats, such as saturated fats, which are listed below       ¨ Butter, lard, cream cheese, whipped cream, and sour cream    ¨ Powdered coffee creamer    · Other: The following foods are high in sodium  ¨ Frozen dinners, soups, and fast foods, such as hamburgers and pizza (see the food label for serving sizes)    ¨ Table salt and seasoned salts, such as onion or garlic salt    ¨ Barbecue sauce, ketchup, mustard, soy sauce, steak sauce, and teriyaki sauce  Contact your healthcare provider if:   · You are gaining or losing weight very quickly  · You have shortness of breath  · You have nausea and are vomiting  · You feel very weak and tired  · You are having trouble following the chronic kidney disease diet  © 2017 [address] [employer]  Information is for End User's use only and may not be sold, redistributed or otherwise used for commercial purposes  All illustrations and images included in CareNotes® are the copyrighted property of A D A M [employer] Inc  or Rashaad David  The above information is an  only  It is not intended as medical advice for individual conditions or treatments  Talk to your doctor, nurse or pharmacist before following any medical regimen to see if it is safe and effective for you  Hypertension   WHAT YOU NEED TO KNOW:   What is hypertension? Hypertension is high blood pressure (BP)  Your BP is the force of your blood moving against the walls of your arteries  Normal BP is less than 120/80  Prehypertension is between 120/80 and 139/89  Hypertension is 140/90 or higher  Hypertension causes your BP to get so high that your heart has to work much harder than normal  This can damage your heart  You can control hypertension with a healthy lifestyle or medicines  A controlled blood pressure helps protect your organs, such as your heart, lungs, brain, and kidneys  What causes hypertension? The cause of hypertension may not be known  This type of hypertension is called essential or primary hypertension   Hypertension can sometimes be caused by other medical conditions, such as kidney disease  This type of hypertension is called secondary hypertension  What increases my risk for hypertension? · Age older than 54 years (men)    · Age older than 72 years (women)    · A family history of hypertension or heart disease     · Obesity or lack of exercise     · Too many high-sodium foods    · Stress     · Use of tobacco, alcohol, or illegal drugs     · A medical condition, such as diabetes, kidney disease, thyroid disease, or adrenal gland disorder     · Certain medicines, such as steroids or birth control pills  What are the signs and symptoms of hypertension? You may have no signs or symptoms, or you may have any of the following:  · Headache     · Blurred vision     · Chest pain     · Dizziness or weakness     · Trouble breathing    · Nosebleeds  How is hypertension diagnosed? Your healthcare provider will ask about your symptoms and the medicines you take  He or she will also ask if you have a family history of high blood pressure and about any health conditions you have  He or she will also check your blood pressure and weight and examine your heart, lungs, and eyes  You may need any of the following tests:  · Blood tests  may help healthcare providers find the cause of your hypertension  Blood tests can also help find other health problems caused by hypertension  · Urine tests  will be done to check your kidney function  Kidney problems can increase your risk for hypertension  How is hypertension treated? Your healthcare provider will recommend lifestyle changes to lower your BP  You may also need the following medicines:  · Medicine  may be used to help lower your BP  You may need more than one type of medicine  Take the medicine exactly as directed  · Diuretics  help decrease extra fluid that collects in your body  This will help lower your BP  You may urinate more often while you take this medicine  · Cholesterol medicine  helps lower your cholesterol level   A low cholesterol level helps prevent heart disease and makes it easier to control your blood pressure  What can I do to manage hypertension? Talk with your healthcare provider about these and other ways to manage hypertension:  · Check your BP at home  Sit and rest for 5 minutes before you take your BP  Extend your arm and support it on a flat surface  Your arm should be at the same level as your heart  Follow the directions that came with your BP monitor  If possible, take at least 2 BP readings each time  Take your BP at least twice a day at the same times each day, such as morning and evening  Keep a record of your BP readings and bring it to your follow-up visits  Ask your healthcare provider what your BP should be  · Limit sodium (salt) as directed  Too much sodium can affect your fluid balance  Check labels to find low-sodium or no-salt-added foods  Some low-sodium foods use potassium salts for flavor  Too much potassium can also cause health problems  Your healthcare provider will tell you how much sodium and potassium are safe for you to have in a day  He or she may recommend that you limit sodium to 2,300 mg a day  · Follow the meal plan recommended by your healthcare provider  A dietitian or your provider can give you more information on low-sodium plans or the DASH (Dietary Approaches to Stop Hypertension) eating plan  The DASH plan is low in sodium, unhealthy fats, and total fat  It is high in potassium, calcium, and fiber  · Exercise to maintain a healthy weight  Exercise at least 30 minutes per day, on most days of the week  This will help decrease your blood pressure  Ask your healthcare provider about the best exercise plan for you  · Decrease stress  This may help lower your BP  Learn ways to relax, such as deep breathing or listening to music  · Limit alcohol  Women should limit alcohol to 1 drink a day  Men should limit alcohol to 2 drinks a day   A drink of alcohol is 12 ounces of beer, 5 ounces of wine, or 1½ ounces of liquor  · Do not smoke  Nicotine and other chemicals in cigarettes and cigars can increase your BP and also cause lung damage  Ask your healthcare provider for information if you currently smoke and need help to quit  E-cigarettes or smokeless tobacco still contain nicotine  Talk to your healthcare provider before you use these products  · Manage any other health conditions you have  Health conditions such as diabetes can increase your risk for hypertension  Follow your healthcare provider's instructions and take all your medicines as directed  Call 911 for any of the following:   · You have discomfort in your chest that feels like squeezing, pressure, fullness, or pain  · You become confused or have difficulty speaking  · You suddenly feel lightheaded or have trouble breathing  · You have pain or discomfort in your back, neck, jaw, stomach, or arm  When should I seek immediate care? · You have a severe headache or vision loss  · You have weakness in an arm or leg  When should I contact my healthcare provider? · You feel faint, dizzy, confused, or drowsy  · You have been taking your BP medicine and your BP is still higher than your healthcare provider says it should be  · You have questions or concerns about your condition or care  CARE AGREEMENT:   You have the right to help plan your care  Learn about your health condition and how it may be treated  Discuss treatment options with your caregivers to decide what care you want to receive  You always have the right to refuse treatment  The above information is an  only  It is not intended as medical advice for individual conditions or treatments  Talk to your doctor, nurse or pharmacist before following any medical regimen to see if it is safe and effective for you    © 2017 [employer] Information is for End User's use only and may not be sold, redistributed or otherwise used for commercial purposes  All illustrations and images included in CareNotes® are the copyrighted property of [employer] , [employer]  or Rashaad David  HISTORY OF PRESENT ILLNESS:  Requesting Physician: DO Clemente Ibrahimarabella La is a 66 y o  female who has a history of hypertension diabetes who was referred to me for chronic kidney disease evaluation  Patient has never seen a nephrologist in the past and has not been aware that she has any underlying renal dysfunction  She has a history of diabetes mellitus type 2 for over 10 years hypertension for unknown duration, obesity  She follows with Cardiology for aortic stenosis and lower extremity swelling which is now resolved  We have reviewed the last 5 years of her blood work  Her baseline creatinine appears to fluctuate between 1 5-2 mg/dL  Patient has no family history of Chronic Kidney Disease  She does take Aleve on a daily basis due to chronic pain  This is more recent occurrence  Reports no chest pain or shortness of breath no swelling the legs and no urinary symptoms  She did undergo a 24 hour urine collection for creatinine clearance but was under collection       PAST MEDICAL HISTORY:  Past Medical History:   Diagnosis Date    Aortic stenosis     Arthritis     Asthma     Coronary artery disease     [facility] [address] [facility]     Diabetic peripheral neuropathy [facility] [address] [facility]     Last assessed - 1/27/16    Gallbladder disease     Heart attack ([facility] [address] ) 07/1999    Hemorrhoids     Last assessed - 11/16/12    Myocardial infarction [facility])     Old myocardial infarction     Type 2 diabetes mellitus with autonomic neuropathy ([facility] [address] )     Unspec long term insulin use status, Last assessed - 6/8/17       PAST SURGICAL HISTORY:  Past Surgical History:   Procedure Laterality Date    BUNIONECTOMY      CARDIAC CATHETERIZATION      Carotid Artery, Resolved - 7/1/13    CARDIOVASCULAR STRESS TEST  09/1999  CHOLECYSTECTOMY      COLONOSCOPY  2017    FOOT ARTHRODESIS, MODIFIED DONOHUE  2016    GALLBLADDER SURGERY  1970    HEMORROIDECTOMY      KNEE ARTHROSCOPY Left 2004    TX COLONOSCOPY FLX DX W/COLLJ [facility] INPATIENT [facility] WHEN PFRMD N/A 8/21/2017    Procedure: COLONOSCOPY;  Surgeon: Pau Ibarra MD;  Location: [facility];   Service: Colorectal    REPAIR KNEE LIGAMENT      REPAIR KNEE LIGAMENT Left 1986    REPAIR KNEE LIGAMENT Right 1988       ALLERGIES:  Allergies   Allergen Reactions    Lyrica [Pregabalin] Swelling    Sulfa Antibiotics Swelling       SOCIAL HISTORY:  Social History     Substance and Sexual Activity   Alcohol Use No    Comment: Social per [employer]      Social History     Substance and Sexual Activity   Drug Use No     Social History     Tobacco Use   Smoking Status Never Smoker   Smokeless Tobacco Never Used       FAMILY HISTORY:  Family History   Problem Relation Age of Onset    Hypertension Father     Diabetes Father     Cancer Father         Throat    Arthritis Father     Stroke Mother     Diabetes Maternal Grandmother     Heart disease Maternal Grandfather     Diabetes Son     Lymphoma Family         Head, Face and Neck        MEDICATIONS:    Current Outpatient Medications:     albuterol (ProAir HFA) 90 mcg/act inhaler, Inhale 2 puffs every 6 (six) hours as needed for wheezing, Disp: 1 Inhaler, Rfl: 1    baclofen 10 mg tablet, Take by mouth, Disp: , Rfl:     BYSTOLIC 5 MG tablet, TAKE 1 TABLET (5MG) BY MOUTH EVERY DAY, Disp: 30 tablet, Rfl: 3    fluticasone (FLONASE) 50 mcg/act nasal spray, 2 sprays into each nostril daily, Disp: 1 Bottle, Rfl: 3    furosemide (LASIX) 20 mg tablet, Take 1 tablet (20 mg total) by mouth daily, Disp: 90 tablet, Rfl: 3    gabapentin (NEURONTIN) 300 mg capsule, Take 3 tabs AM, 2 tabs afternoon, 3 tabs QHS, Disp: 240 capsule, Rfl: 3    glimepiride (AMARYL) 4 mg tablet, Take 1 tablet (4 mg total) by mouth daily with breakfast, Disp: 180 tablet, Rfl: 1    levothyroxine 100 mcg tablet, Take 1 tablet (100 mcg total) by mouth daily, Disp: 30 tablet, Rfl: 3    meclizine (ANTIVERT) 25 mg tablet, Take 1 tablet (25 mg total) by mouth 3 (three) times a day as needed for dizziness, Disp: 30 tablet, Rfl: 1    Multiple Vitamin (DAILY VALUE MULTIVITAMIN) TABS, Take 1 tablet by mouth daily, Disp: , Rfl:     Multiple Vitamins-Minerals (PRESERVISION AREDS PO), Take by mouth daily, Disp: , Rfl:     potassium chloride (K-DUR,KLOR-CON) 20 mEq tablet, TAKE 1 TABLET BY MOUTH EVERY DAY, Disp: 30 tablet, Rfl: 6    pravastatin (PRAVACHOL) 10 mg tablet, TAKE 1 TABLET BY MOUTH EVERY DAY, Disp: 90 tablet, Rfl: 1    predniSONE 5 mg tablet, Take 1 tablet (5 mg total) by mouth daily, Disp: 90 tablet, Rfl: 3    venlafaxine (EFFEXOR) 75 mg tablet, Take 1 tablet (75 mg total) by mouth 3 (three) times a day, Disp: 90 tablet, Rfl: 3    diclofenac-misoprostol (ARTHROTEC 50) 50-0 2 MG per tablet, Take 1 tablet by mouth 2 (two) times a day (Patient not taking: Reported on 7/16/2020), Disp: 180 tablet, Rfl: 3    glucose blood (TRUETEST TEST) test strip, by In Vitro route daily, Disp: , Rfl:     MICROLET LANCETS MISC, by Does not apply route, Disp: , Rfl:     pantoprazole (PROTONIX) 40 mg tablet, Take 1 tablet (40 mg total) by mouth daily (Patient not taking: Reported on 7/16/2020), Disp: 90 tablet, Rfl: 1    REVIEW OF SYSTEMS:  Review of Systems   Constitutional: Negative for activity change and fever  Respiratory: Negative for cough, chest tightness, shortness of breath and wheezing  Cardiovascular: Negative for chest pain and leg swelling  Gastrointestinal: Negative for abdominal pain, diarrhea, nausea and vomiting  Endocrine: Negative for polyuria  Genitourinary: Negative for difficulty urinating, dysuria, flank pain, frequency and urgency  Skin: Negative for rash  Neurological: Negative for dizziness, syncope, light-headedness and headaches       All the systems were reviewed and were negative except as documented on the HPI  PHYSICAL EXAM:  Current Weight: Body mass index is 47 43 kg/m²  Vitals:    07/16/20 1005 07/16/20 1038   BP:  132/64   Temp: 98 °F (36 7 °C)    Weight: 114 kg (251 lb)        Physical Exam   Constitutional: She is oriented to person, place, and time  She appears well-developed and well-nourished  No distress  HENT:   Head: Normocephalic and atraumatic  Eyes: Pupils are equal, round, and reactive to light  No scleral icterus  Neck: Normal range of motion  Neck supple  Cardiovascular: Normal rate, regular rhythm and normal heart sounds  Exam reveals no gallop and no friction rub  No murmur heard  Pulmonary/Chest: Effort normal and breath sounds normal  No respiratory distress  She has no wheezes  She has no rales  She exhibits no tenderness  Abdominal: Soft  Bowel sounds are normal  She exhibits no distension  There is no tenderness  There is no rebound  Musculoskeletal: Normal range of motion  She exhibits no edema  Neurological: She is alert and oriented to person, place, and time  Skin: No rash noted  She is not diaphoretic  Psychiatric: She has a normal mood and affect  Nursing note and vitals reviewed        Laboratory results:   Results for orders placed or performed in visit on 06/16/20   Creatinine Clearance Serum (Do Not Order)   Result Value Ref Range    Creatinine 1 88 (H) 0 60 - 1 30 mg/dL   Creatinine clearance, urine, 24 hour (Do Not Order)   Result Value Ref Range    Creatinine, Ur 104 0 mg/dL    Creatinine Clearance 30 35 (L) 75 00 - 115 00 mL/min    Creatinine, 24H Ur 1 0 0 6 - 1 8 g/24Hr    TOTAL URINE VOLUME 1,000 ml

## 2020-07-16 NOTE — PATIENT INSTRUCTIONS
1  )  Low 2 g sodium diet    2 )  Monitor weights at home    3 )  Avoid NSAIDs (ibuprofen, Motrin, Advil, Aleve, naproxen)    4 )  Monitor blood pressure at home, call if blood pressure greater than 150/90 persistently    5 ) Repeat blood work in 4 months    Chronic Kidney Disease   WHAT YOU NEED TO KNOW:   Chronic kidney disease (CKD) is the gradual and permanent loss of kidney function  It is also called chronic kidney failure, or chronic renal insufficiency  Normally, the kidneys remove fluid, chemicals, and waste from your blood  These wastes are turned into urine by your kidneys  CKD may worsen over time and lead to kidney failure  DISCHARGE INSTRUCTIONS:   Return to the emergency department if:   · You are confused and very drowsy  · You have a seizure  · You have shortness of breath  Contact your healthcare provider if:   · You suddenly gain or lose more weight than your healthcare provider has told you is okay  · You have itchy skin or a rash  · You urinate more or less than you normally do  · You have blood in your urine  · You have nausea and repeated vomiting  · You have fatigue or muscle weakness  · You have hiccups that will not stop  · You have questions or concerns about your condition or care  Medicines:   · Medicines  may be given to decrease blood pressure and get rid of extra fluid  You may also receive medicine to manage health conditions that may occur with CKD, such as anemia, diabetes, and heart disease  · Take your medicine as directed  Contact your healthcare provider if you think your medicine is not helping or if you have side effects  Tell him or her if you are allergic to any medicine  Keep a list of the medicines, vitamins, and herbs you take  Include the amounts, and when and why you take them  Bring the list or the pill bottles to follow-up visits  Carry your medicine list with you in case of an emergency    Follow up with your healthcare provider as directed: You will need to return for tests to monitor your kidney function  You may also be referred to a kidney specialist  Write down your questions so you remember to ask them during your visits  Manage other health conditions: Follow your healthcare provider's directions on how to manage diabetes, high blood pressure, and heart disease  These conditions can make CKD worse  Talk to your healthcare provider before you take over-the-counter medicine  Medicines such as NSAIDs, stomach medicine, or laxatives may harm your kidneys  Weigh yourself daily:  Ask your healthcare provider what your weight should be  Ask how much liquid you should drink each day  CKD may cause you to gain or lose weight rapidly  Weigh yourself every day  Write down your weight, how much liquid you drink or eat, and how much you urinate each day  Contact your healthcare provider if your weight is higher or lower than it should be  Manage CKD:   · Maintain a healthy weight  Ask your healthcare provider how much you should weigh  Ask him to help you create a weight loss plan if you are overweight  · Exercise 30 to 60 minutes a day, 4 to 7 times a week, or as directed  Ask about the best exercise plan for you  Regular exercise can help you manage CKD, high blood pressure, and diabetes  · Follow your healthcare provider's advice about what to eat and drink  He may tell you to eat food low in sodium (salt), potassium, phosphorus, or protein  You may need to see a dietitian if you need help planning meals  Ask how much liquid to drink each day and which liquids are best for you  · Limit alcohol  Ask how much alcohol is safe for you to drink  A drink of alcohol is 12 ounces of beer, 5 ounces of wine, or 1½ ounces of liquor  · Do not smoke  Nicotine and other chemicals in cigarettes and cigars can cause lung and kidney damage   Ask your healthcare provider for information if you currently smoke and need help to quit  E-cigarettes or smokeless tobacco still contain nicotine  Talk to your healthcare provider before you use these products  · Ask your healthcare provider if you need vaccines  Infections such as pneumonia, influenza, and hepatitis can be more harmful or more likely to occur in a person who has CKD  Vaccines reduce your risk of infection with these viruses  © 2017 [address] [employer] Information is for End User's use only and may not be sold, redistributed or otherwise used for commercial purposes  All illustrations and images included in CareNotes® are the copyrighted property of [employer] , [employer]  or Rashaad David  The above information is an  only  It is not intended as medical advice for individual conditions or treatments  Talk to your doctor, nurse or pharmacist before following any medical regimen to see if it is safe and effective for you  Chronic Kidney Disease Diet   AMBULATORY CARE:   A chronic kidney disease diet  limits protein, phosphorus, sodium, and potassium  Liquids may also need to be limited in later stages of chronic kidney disease  This diet can help slow down the rate of damage to your kidneys  Your diet may change over time as your health condition changes  You may also need to make other diet changes if you have other health problems, such as diabetes  Diet changes: There are 5 stages of chronic kidney disease  The diet changes you need to make are based on your stage of kidney disease  Work with your dietitian or healthcare provider to plan meals that are right for you  You may need any of the following:  · Limit protein  in all stages of kidney disease  Limit the portion sizes of protein you eat to limit the amount of work your kidneys have to do  Foods that are high in protein are meat, poultry (chicken and turkey), fish, eggs, and dairy (milk, cheese, yogurt)   Your healthcare provider will tell you how much protein to eat each day     · Limit sodium  if you have high blood pressure  Limit your sodium intake to less than 2,300 milligrams (mg) each day  Ask your dietitian or healthcare provider how much sodium you can have each day  The amount of sodium you should have depends on your stage of kidney disease  Table salt, canned foods, soups, salted snacks, and processed meats, like deli meats and sausage, are high in sodium  · Limit the amount of phosphorus  you eat  Your kidneys cannot get rid of extra phosphorus that builds up in your blood  This may cause your bones to lose calcium and weaken  Foods that are high in phosphorus are dairy products, beans, peas, nuts, and whole grains  Phosphorus is also found in cocoa, beer, and cola drinks  Your healthcare provider will tell you how much phosphorus you can have each day  · Limit potassium  if your potassium blood levels are too high  Your dietitian or healthcare provider will tell you if you need to limit potassium  Potassium is found in fruits and vegetables  · Limit liquids  as directed  Your healthcare provider may recommend that you limit liquids in stages 4 and 5 of kidney disease  If your body retains fluids, you will have swelling and fluid may build up in your lungs  This can cause other health problems, such as shortness of breath  Foods you may include: Your dietitian will tell you how many servings you can have from each of the food groups below  The approximate amount of these nutrients is listed next to each food group  Read the food label to find the exact amount  · Bread, cereal, and grains: These foods contain about 80 calories, 2 grams (g) of protein, 150 mg of sodium, 50 mg of potassium, and 30 mg of phosphorus      ¨ 1 slice (1 ounce) of bread ([place], [place], raisin, light rye, or sourdough white), small dinner roll, or 6-inch tortilla    ¨ ½ of a hamburger bun, hot dog bun, or English muffin or ¼ of a bagel    ¨ 1 cup of unsweetened cereal or ½ cup of cooked cereal, such as cream of wheat    ¨ ? cup of cooked pasta (noodles, macaroni, or spaghetti) or rice    ¨ 4 (2-inch) unsalted crackers or 3 squares of jefferson crackers    ¨ 3 cups of air-popped, unsalted popcorn    ¨ ¾ ounce of unsalted pretzels    · Vegetables:  A serving of these foods contains about 30 calories, 2 g of protein, 50 mg of sodium, and 50 mg of phosphorus  ¨ Low potassium (less than 150 mg):      ¨ ½ cup cooked green beans, cabbage, cauliflower, beets, or corn    ¨ 1 cup raw cucumber, endive, alfalfa sprouts, cabbage, cauliflower, or watercress    ¨ 1 cup of all types of lettuce    ¨ ¼ cup cooked or ½ cup raw mushrooms or onions    ¨ 1 cup cooked eggplant    ¨ Medium potassium (150 to 250 mg):      ¨ 1 cup raw broccoli, celery, or zucchini    ¨ ½ cup cooked asparagus, broccoli, celery, green peas, summer squash, zucchini, or peppers    ¨ 1 cup cooked kale or turnips    · Fruits:  A serving of these foods contains about 60 calories, 0 g protein, 0 mg sodium, and 150 mg of phosphorus  Each serving is ½ cup, unless another amount is given  ¨ Low potassium (less than 150 mg): ¨ Apple juice, applesauce, or 1 small apple    ¨ Blueberries    ¨ Cranberries or cranberry juice cocktail    ¨ Fresh or canned pears (light syrup or packed in water)    ¨ Grapes or grape juice    ¨ Canned peaches (light syrup or packed in water)    ¨ Pineapple or strawberries    ¨ 1 tangerine     ¨ Watermelon    ¨ Medium potassium (150 to 250 mg):      ¨ Fresh peaches or pears    ¨ Cherries    ¨ Cantaloupe, moy, or papaya    ¨ Grapefruit or grapefruit juice    · Meat, poultry, and fish: These foods have about 75 calories, 7 g of protein, an average of 65 mg of sodium, 115 mg of potassium, and 70 mg of phosphorus  Do not use salt to prepare these foods       ¨ 1 ounce of cooked beef, pork, or poultry    ¨ 1 ounce of any fresh or frozen fish, lobster, shrimp, crab, clams, tuna, unsalted canned salmon, or unsalted sardines    · Other protein foods: These foods have about 90 calories, 7 g of protein, an average of 100 mg of sodium, 100 mg of potassium, and 120 mg of phosphorus  ¨ 1 large whole egg or ¼ cup of low-cholesterol egg substitute    ¨ 1 ounce of cheese    ¨ ¼ cup of cottage cheese or tofu    ¨ 1 ounce of unsalted nuts or 2 tablespoons of peanut butter    · Fats: These foods have very little protein and about 45 calories, 55 milligrams of sodium, 10 milligrams of potassium, and 5 milligrams of phosphorus  Include healthy fats, such as unsaturated fats, which are listed below  ¨ 1 teaspoon margarine or mayonnaise     ¨ 1 teaspoon oil (safflower, sunflower, corn, soybean, olive, peanut, canola)    ¨ 1 tablespoon oil-based salad dressing (such as [place]) or 2 tablespoons mayonnaise-based salad dressing (such as ranch)  Foods to limit or avoid:   · Starches: The following foods have higher amounts of sodium, potassium, or phosphorus  ¨ Biscuits, muffins, pancakes, and waffles     ¨ Cake and cornbread from boxed mixes    ¨ Oatmeal and whole-wheat cereals    ¨ Salted pretzel sticks or rings and sandwich cookies    · Meat and protein foods: The following are high in sodium and phosphorus  ¨ Deli-style meat, such as roast beef, ham, and turkey    ¨ Canned salmon and sardines    ¨ Processed cheese, such as American cheese and cheese spreads    ¨ Smoked or cured meat, such as corned beef, glynn, ham, hot dogs, and sausage    · Legumes: These foods have about 90 calories, 6 g of protein, less than 10 mg of sodium, 250 mg of potassium, and 100 mg of phosphorus  ¨ ? cup of black beans, red kidney beans, black-eye peas, garbanzos, and lentils    ¨ ¼ cup of green or mature soybeans    · Dairy: The following foods have about 8 g of protein, an average of 120 mg of sodium, 350 mg of potassium, and 220 mg of phosphorus       ¨ 1 cup of milk (fat-free, low-fat, whole, buttermilk, or chocolate milk)    ¨ 1 cup of low-fat plain or sugar-free yogurt or ice cream    ¨ ½ cup of pudding or custard    ¨ Nondairy milk substitutes: These foods have 75 calories, 1 gram of protein, and an average of 40 mg of sodium, 60 mg of potassium, and 60 mg of phosphorus  A serving is ½ cup of almond, rice, or soy milk, or nondairy creamer  · Vegetables: The following vegetables are high in potassium  Each serving has more than 250 mg of potassium  A serving is ½ cup, unless another amount is given  ¨ Artichoke or ¼ of a medium avocado    ¨ Lucas sprouts, beets, chard, magdaleno or mustard greens    ¨ Potatoes, sweet potatoes, pumpkin, and yams    ¨ ¾ cup of okra    ¨ Raw tomatoes and low-sodium tomato juice, or tomato sauce    ¨ Winter squash, cooked asparagus, and cooked spinach    · Fruit:  The following fruits are high in potassium  Each serving has more than 250 mg of potassium  ¨ 3 fresh apricots    ¨ 1 small nectarine (2 inches across)    ¨ 1 small orange and ½ cup of orange juice    ¨ ¼ cup of dates     ¨ ? of a small honeydew melon    ¨ 1 six-inch banana     ¨ ½ cup of prune juice or prunes and kiwifruit    · Fats:  Limit unhealthy fats, such as saturated fats, which are listed below  ¨ Butter, lard, cream cheese, whipped cream, and sour cream    ¨ Powdered coffee creamer    · Other: The following foods are high in sodium  ¨ Frozen dinners, soups, and fast foods, such as hamburgers and pizza (see the food label for serving sizes)    ¨ Table salt and seasoned salts, such as onion or garlic salt    ¨ Barbecue sauce, ketchup, mustard, soy sauce, steak sauce, and teriyaki sauce  Contact your healthcare provider if:   · You are gaining or losing weight very quickly  · You have shortness of breath  · You have nausea and are vomiting  · You feel very weak and tired  · You are having trouble following the chronic kidney disease diet    © 2017 [address] [employer] Information is for End User's use only and may not be sold, redistributed or otherwise used for commercial purposes  All illustrations and images included in CareNotes® are the copyrighted property of [employer]  or Rashaad David  The above information is an  only  It is not intended as medical advice for individual conditions or treatments  Talk to your doctor, nurse or pharmacist before following any medical regimen to see if it is safe and effective for you  Hypertension   WHAT YOU NEED TO KNOW:   What is hypertension? Hypertension is high blood pressure (BP)  Your BP is the force of your blood moving against the walls of your arteries  Normal BP is less than 120/80  Prehypertension is between 120/80 and 139/89  Hypertension is 140/90 or higher  Hypertension causes your BP to get so high that your heart has to work much harder than normal  This can damage your heart  You can control hypertension with a healthy lifestyle or medicines  A controlled blood pressure helps protect your organs, such as your heart, lungs, brain, and kidneys  What causes hypertension? The cause of hypertension may not be known  This type of hypertension is called essential or primary hypertension  Hypertension can sometimes be caused by other medical conditions, such as kidney disease  This type of hypertension is called secondary hypertension  What increases my risk for hypertension? · Age older than 54 years (men)    · Age older than 72 years (women)    · A family history of hypertension or heart disease     · Obesity or lack of exercise     · Too many high-sodium foods    · Stress     · Use of tobacco, alcohol, or illegal drugs     · A medical condition, such as diabetes, kidney disease, thyroid disease, or adrenal gland disorder     · Certain medicines, such as steroids or birth control pills  What are the signs and symptoms of hypertension?   You may have no signs or symptoms, or you may have any of the following:  · Headache · Blurred vision     · Chest pain     · Dizziness or weakness     · Trouble breathing    · Nosebleeds  How is hypertension diagnosed? Your healthcare provider will ask about your symptoms and the medicines you take  He or she will also ask if you have a family history of high blood pressure and about any health conditions you have  He or she will also check your blood pressure and weight and examine your heart, lungs, and eyes  You may need any of the following tests:  · Blood tests  may help healthcare providers find the cause of your hypertension  Blood tests can also help find other health problems caused by hypertension  · Urine tests  will be done to check your kidney function  Kidney problems can increase your risk for hypertension  How is hypertension treated? Your healthcare provider will recommend lifestyle changes to lower your BP  You may also need the following medicines:  · Medicine  may be used to help lower your BP  You may need more than one type of medicine  Take the medicine exactly as directed  · Diuretics  help decrease extra fluid that collects in your body  This will help lower your BP  You may urinate more often while you take this medicine  · Cholesterol medicine  helps lower your cholesterol level  A low cholesterol level helps prevent heart disease and makes it easier to control your blood pressure  What can I do to manage hypertension? Talk with your healthcare provider about these and other ways to manage hypertension:  · Check your BP at home  Sit and rest for 5 minutes before you take your BP  Extend your arm and support it on a flat surface  Your arm should be at the same level as your heart  Follow the directions that came with your BP monitor  If possible, take at least 2 BP readings each time  Take your BP at least twice a day at the same times each day, such as morning and evening  Keep a record of your BP readings and bring it to your follow-up visits   Ask your healthcare provider what your BP should be  · Limit sodium (salt) as directed  Too much sodium can affect your fluid balance  Check labels to find low-sodium or no-salt-added foods  Some low-sodium foods use potassium salts for flavor  Too much potassium can also cause health problems  Your healthcare provider will tell you how much sodium and potassium are safe for you to have in a day  He or she may recommend that you limit sodium to 2,300 mg a day  · Follow the meal plan recommended by your healthcare provider  A dietitian or your provider can give you more information on low-sodium plans or the DASH (Dietary Approaches to Stop Hypertension) eating plan  The DASH plan is low in sodium, unhealthy fats, and total fat  It is high in potassium, calcium, and fiber  · Exercise to maintain a healthy weight  Exercise at least 30 minutes per day, on most days of the week  This will help decrease your blood pressure  Ask your healthcare provider about the best exercise plan for you  · Decrease stress  This may help lower your BP  Learn ways to relax, such as deep breathing or listening to music  · Limit alcohol  Women should limit alcohol to 1 drink a day  Men should limit alcohol to 2 drinks a day  A drink of alcohol is 12 ounces of beer, 5 ounces of wine, or 1½ ounces of liquor  · Do not smoke  Nicotine and other chemicals in cigarettes and cigars can increase your BP and also cause lung damage  Ask your healthcare provider for information if you currently smoke and need help to quit  E-cigarettes or smokeless tobacco still contain nicotine  Talk to your healthcare provider before you use these products  · Manage any other health conditions you have  Health conditions such as diabetes can increase your risk for hypertension  Follow your healthcare provider's instructions and take all your medicines as directed    Call 911 for any of the following:   · You have discomfort in your chest that feels like squeezing, pressure, fullness, or pain  · You become confused or have difficulty speaking  · You suddenly feel lightheaded or have trouble breathing  · You have pain or discomfort in your back, neck, jaw, stomach, or arm  When should I seek immediate care? · You have a severe headache or vision loss  · You have weakness in an arm or leg  When should I contact my healthcare provider? · You feel faint, dizzy, confused, or drowsy  · You have been taking your BP medicine and your BP is still higher than your healthcare provider says it should be  · You have questions or concerns about your condition or care  CARE AGREEMENT:   You have the right to help plan your care  Learn about your health condition and how it may be treated  Discuss treatment options with your caregivers to decide what care you want to receive  You always have the right to refuse treatment  The above information is an  only  It is not intended as medical advice for individual conditions or treatments  Talk to your doctor, nurse or pharmacist before following any medical regimen to see if it is safe and effective for you  © 2017 [address] [employer]  Information is for End User's use only and may not be sold, redistributed or otherwise used for commercial purposes  All illustrations and images included in CareNotes® are the copyrighted property of [employer]  or Rashaad David

## 2020-07-28 DIAGNOSIS — I10 ESSENTIAL HYPERTENSION: ICD-10-CM

## 2020-07-28 RX ORDER — NEBIVOLOL HYDROCHLORIDE 5 MG/1
TABLET ORAL
Qty: 30 TABLET | Refills: 3 | Status: SHIPPED | OUTPATIENT
Start: 2020-07-28 | End: 2020-12-08 | Stop reason: SDUPTHER

## 2020-08-21 DIAGNOSIS — F32.A DEPRESSION, UNSPECIFIED DEPRESSION TYPE: ICD-10-CM

## 2020-08-21 RX ORDER — VENLAFAXINE 75 MG/1
75 TABLET ORAL 3 TIMES DAILY
Qty: 90 TABLET | Refills: 3 | Status: SHIPPED | OUTPATIENT
Start: 2020-08-21 | End: 2020-12-30 | Stop reason: SDUPTHER

## 2020-09-28 DIAGNOSIS — I10 ESSENTIAL HYPERTENSION: ICD-10-CM

## 2020-09-28 RX ORDER — FUROSEMIDE 20 MG/1
TABLET ORAL
Qty: 90 TABLET | Refills: 3 | Status: SHIPPED | OUTPATIENT
Start: 2020-09-28 | End: 2021-11-18

## 2020-10-01 DIAGNOSIS — E78.00 HYPERCHOLESTEROLEMIA: ICD-10-CM

## 2020-10-02 RX ORDER — PRAVASTATIN SODIUM 10 MG
TABLET ORAL
Qty: 90 TABLET | Refills: 1 | Status: SHIPPED | OUTPATIENT
Start: 2020-10-02 | End: 2021-03-31 | Stop reason: SDUPTHER

## 2020-10-11 DIAGNOSIS — R60.9 EDEMA, UNSPECIFIED TYPE: ICD-10-CM

## 2020-10-12 DIAGNOSIS — E78.00 HYPERCHOLESTEROLEMIA: ICD-10-CM

## 2020-10-12 DIAGNOSIS — E03.9 HYPOTHYROIDISM, UNSPECIFIED TYPE: ICD-10-CM

## 2020-10-12 RX ORDER — LEVOTHYROXINE SODIUM 0.1 MG/1
100 TABLET ORAL DAILY
Qty: 30 TABLET | Refills: 3 | Status: SHIPPED | OUTPATIENT
Start: 2020-10-12 | End: 2020-10-15

## 2020-10-12 RX ORDER — POTASSIUM CHLORIDE 20 MEQ/1
TABLET, EXTENDED RELEASE ORAL
Qty: 30 TABLET | Refills: 6 | Status: SHIPPED | OUTPATIENT
Start: 2020-10-12 | End: 2021-05-12

## 2020-10-15 ENCOUNTER — LAB (OUTPATIENT)
Dept: LAB | Facility: CLINIC | Age: 75
End: 2020-10-15
Payer: MEDICARE

## 2020-10-15 ENCOUNTER — TRANSCRIBE ORDERS (OUTPATIENT)
Dept: LAB | Facility: CLINIC | Age: 75
End: 2020-10-15

## 2020-10-15 DIAGNOSIS — N18.30 TYPE 2 DIABETES MELLITUS WITH STAGE 3 CHRONIC KIDNEY DISEASE AND HYPERTENSION (HCC): ICD-10-CM

## 2020-10-15 DIAGNOSIS — N18.30 STAGE 3 CHRONIC KIDNEY DISEASE (HCC): ICD-10-CM

## 2020-10-15 DIAGNOSIS — E11.9 CONTROLLED TYPE 2 DIABETES MELLITUS WITHOUT COMPLICATION, WITHOUT LONG-TERM CURRENT USE OF INSULIN (HCC): ICD-10-CM

## 2020-10-15 DIAGNOSIS — I12.9 TYPE 2 DIABETES MELLITUS WITH STAGE 3 CHRONIC KIDNEY DISEASE AND HYPERTENSION (HCC): ICD-10-CM

## 2020-10-15 DIAGNOSIS — E78.00 HYPERCHOLESTEROLEMIA: ICD-10-CM

## 2020-10-15 DIAGNOSIS — E03.9 HYPOTHYROIDISM, UNSPECIFIED TYPE: ICD-10-CM

## 2020-10-15 DIAGNOSIS — E03.9 HYPOTHYROIDISM, UNSPECIFIED TYPE: Primary | ICD-10-CM

## 2020-10-15 DIAGNOSIS — E11.22 TYPE 2 DIABETES MELLITUS WITH STAGE 3 CHRONIC KIDNEY DISEASE AND HYPERTENSION (HCC): ICD-10-CM

## 2020-10-15 DIAGNOSIS — I10 ESSENTIAL HYPERTENSION: ICD-10-CM

## 2020-10-15 LAB
25(OH)D3 SERPL-MCNC: 28.9 NG/ML (ref 30–100)
ALBUMIN SERPL BCP-MCNC: 3.4 G/DL (ref 3.5–5)
ALP SERPL-CCNC: 96 U/L (ref 46–116)
ALT SERPL W P-5'-P-CCNC: 42 U/L (ref 12–78)
ANION GAP SERPL CALCULATED.3IONS-SCNC: 6 MMOL/L (ref 4–13)
AST SERPL W P-5'-P-CCNC: 18 U/L (ref 5–45)
BILIRUB SERPL-MCNC: 0.5 MG/DL (ref 0.2–1)
BUN SERPL-MCNC: 32 MG/DL (ref 5–25)
CALCIUM ALBUM COR SERPL-MCNC: 9.7 MG/DL (ref 8.3–10.1)
CALCIUM SERPL-MCNC: 9.2 MG/DL (ref 8.3–10.1)
CHLORIDE SERPL-SCNC: 108 MMOL/L (ref 100–108)
CHOLEST SERPL-MCNC: 186 MG/DL (ref 50–200)
CO2 SERPL-SCNC: 29 MMOL/L (ref 21–32)
CREAT SERPL-MCNC: 1.92 MG/DL (ref 0.6–1.3)
ERYTHROCYTE [DISTWIDTH] IN BLOOD BY AUTOMATED COUNT: 13.8 % (ref 11.6–15.1)
EST. AVERAGE GLUCOSE BLD GHB EST-MCNC: 146 MG/DL
GFR SERPL CREATININE-BSD FRML MDRD: 25 ML/MIN/1.73SQ M
GLUCOSE P FAST SERPL-MCNC: 99 MG/DL (ref 65–99)
HBA1C MFR BLD: 6.7 %
HCT VFR BLD AUTO: 41.3 % (ref 34.8–46.1)
HDLC SERPL-MCNC: 60 MG/DL
HGB BLD-MCNC: 12.6 G/DL (ref 11.5–15.4)
LDLC SERPL CALC-MCNC: 103 MG/DL (ref 0–100)
MCH RBC QN AUTO: 31.7 PG (ref 26.8–34.3)
MCHC RBC AUTO-ENTMCNC: 30.5 G/DL (ref 31.4–37.4)
MCV RBC AUTO: 104 FL (ref 82–98)
PLATELET # BLD AUTO: 244 THOUSANDS/UL (ref 149–390)
PMV BLD AUTO: 11.5 FL (ref 8.9–12.7)
POTASSIUM SERPL-SCNC: 4.1 MMOL/L (ref 3.5–5.3)
PROT SERPL-MCNC: 6.6 G/DL (ref 6.4–8.2)
PTH-INTACT SERPL-MCNC: 161.6 PG/ML (ref 18.4–80.1)
RBC # BLD AUTO: 3.97 MILLION/UL (ref 3.81–5.12)
SODIUM SERPL-SCNC: 143 MMOL/L (ref 136–145)
TRIGL SERPL-MCNC: 116 MG/DL
TSH SERPL DL<=0.05 MIU/L-ACNC: 4.05 UIU/ML (ref 0.36–3.74)
WBC # BLD AUTO: 10.17 THOUSAND/UL (ref 4.31–10.16)

## 2020-10-15 PROCEDURE — 83970 ASSAY OF PARATHORMONE: CPT

## 2020-10-15 PROCEDURE — 82306 VITAMIN D 25 HYDROXY: CPT

## 2020-10-15 PROCEDURE — 36415 COLL VENOUS BLD VENIPUNCTURE: CPT

## 2020-10-15 PROCEDURE — 84443 ASSAY THYROID STIM HORMONE: CPT

## 2020-10-15 PROCEDURE — 85027 COMPLETE CBC AUTOMATED: CPT

## 2020-10-15 PROCEDURE — 80061 LIPID PANEL: CPT

## 2020-10-15 PROCEDURE — 80053 COMPREHEN METABOLIC PANEL: CPT

## 2020-10-15 PROCEDURE — 83036 HEMOGLOBIN GLYCOSYLATED A1C: CPT

## 2020-10-15 RX ORDER — LEVOTHYROXINE SODIUM 112 UG/1
112 TABLET ORAL
Qty: 30 TABLET | Refills: 5 | Status: SHIPPED | OUTPATIENT
Start: 2020-10-15 | End: 2021-04-08 | Stop reason: DRUGHIGH

## 2020-10-23 ENCOUNTER — LAB (OUTPATIENT)
Dept: LAB | Facility: CLINIC | Age: 75
End: 2020-10-23
Payer: MEDICARE

## 2020-10-23 LAB
BACTERIA UR QL AUTO: ABNORMAL /HPF
BILIRUB UR QL STRIP: NEGATIVE
CLARITY UR: CLEAR
COLOR UR: YELLOW
CREAT UR-MCNC: 48.3 MG/DL
GLUCOSE UR STRIP-MCNC: NEGATIVE MG/DL
HGB UR QL STRIP.AUTO: NEGATIVE
HYALINE CASTS #/AREA URNS LPF: ABNORMAL /LPF
KETONES UR STRIP-MCNC: NEGATIVE MG/DL
LEUKOCYTE ESTERASE UR QL STRIP: NEGATIVE
NITRITE UR QL STRIP: NEGATIVE
NON-SQ EPI CELLS URNS QL MICRO: ABNORMAL /HPF
PH UR STRIP.AUTO: 7 [PH]
PROT UR STRIP-MCNC: NEGATIVE MG/DL
PROT UR-MCNC: 7 MG/DL
PROT/CREAT UR: 0.14 MG/G{CREAT} (ref 0–0.1)
RBC #/AREA URNS AUTO: ABNORMAL /HPF
SP GR UR STRIP.AUTO: 1.01 (ref 1–1.03)
UROBILINOGEN UR QL STRIP.AUTO: 0.2 E.U./DL
WBC #/AREA URNS AUTO: ABNORMAL /HPF

## 2020-10-23 PROCEDURE — 84156 ASSAY OF PROTEIN URINE: CPT

## 2020-10-23 PROCEDURE — 82570 ASSAY OF URINE CREATININE: CPT

## 2020-10-23 PROCEDURE — 81001 URINALYSIS AUTO W/SCOPE: CPT

## 2020-10-27 ENCOUNTER — OFFICE VISIT (OUTPATIENT)
Dept: FAMILY MEDICINE CLINIC | Facility: CLINIC | Age: 75
End: 2020-10-27
Payer: MEDICARE

## 2020-10-27 VITALS
SYSTOLIC BLOOD PRESSURE: 138 MMHG | RESPIRATION RATE: 16 BRPM | HEIGHT: 61 IN | TEMPERATURE: 98.3 F | DIASTOLIC BLOOD PRESSURE: 82 MMHG | HEART RATE: 76 BPM | BODY MASS INDEX: 47.28 KG/M2 | WEIGHT: 250.4 LBS

## 2020-10-27 DIAGNOSIS — E11.22 TYPE 2 DIABETES MELLITUS WITH STAGE 3 CHRONIC KIDNEY DISEASE AND HYPERTENSION (HCC): ICD-10-CM

## 2020-10-27 DIAGNOSIS — E03.9 HYPOTHYROIDISM, UNSPECIFIED TYPE: ICD-10-CM

## 2020-10-27 DIAGNOSIS — E28.39 ESTROGEN DEFICIENCY: ICD-10-CM

## 2020-10-27 DIAGNOSIS — Z00.00 MEDICARE ANNUAL WELLNESS VISIT, SUBSEQUENT: Primary | ICD-10-CM

## 2020-10-27 DIAGNOSIS — Z12.31 ENCOUNTER FOR SCREENING MAMMOGRAM FOR BREAST CANCER: ICD-10-CM

## 2020-10-27 DIAGNOSIS — N18.30 TYPE 2 DIABETES MELLITUS WITH STAGE 3 CHRONIC KIDNEY DISEASE AND HYPERTENSION (HCC): ICD-10-CM

## 2020-10-27 DIAGNOSIS — I25.10 ARTERIOSCLEROSIS OF CORONARY ARTERY: ICD-10-CM

## 2020-10-27 DIAGNOSIS — E66.01 MORBID OBESITY WITH BODY MASS INDEX (BMI) OF 50.0 TO 59.9 IN ADULT (HCC): ICD-10-CM

## 2020-10-27 DIAGNOSIS — E78.00 HYPERCHOLESTEROLEMIA: ICD-10-CM

## 2020-10-27 DIAGNOSIS — E11.42 DIABETIC PERIPHERAL NEUROPATHY (HCC): ICD-10-CM

## 2020-10-27 DIAGNOSIS — I10 ESSENTIAL HYPERTENSION: ICD-10-CM

## 2020-10-27 DIAGNOSIS — I12.9 TYPE 2 DIABETES MELLITUS WITH STAGE 3 CHRONIC KIDNEY DISEASE AND HYPERTENSION (HCC): ICD-10-CM

## 2020-10-27 PROCEDURE — G0439 PPPS, SUBSEQ VISIT: HCPCS | Performed by: NURSE PRACTITIONER

## 2020-10-27 PROCEDURE — 99214 OFFICE O/P EST MOD 30 MIN: CPT | Performed by: NURSE PRACTITIONER

## 2020-11-02 DIAGNOSIS — G62.9 NEUROPATHY: ICD-10-CM

## 2020-11-02 RX ORDER — GABAPENTIN 300 MG/1
CAPSULE ORAL
Qty: 240 CAPSULE | Refills: 3 | Status: SHIPPED | OUTPATIENT
Start: 2020-11-02 | End: 2021-03-08 | Stop reason: SDUPTHER

## 2020-12-08 DIAGNOSIS — I10 ESSENTIAL HYPERTENSION: ICD-10-CM

## 2020-12-08 RX ORDER — NEBIVOLOL 5 MG/1
5 TABLET ORAL DAILY
Qty: 30 TABLET | Refills: 3 | Status: SHIPPED | OUTPATIENT
Start: 2020-12-08 | End: 2021-04-12

## 2020-12-23 ENCOUNTER — TELEPHONE (OUTPATIENT)
Dept: FAMILY MEDICINE CLINIC | Facility: CLINIC | Age: 75
End: 2020-12-23

## 2020-12-23 DIAGNOSIS — M19.90 OSTEOARTHRITIS, UNSPECIFIED OSTEOARTHRITIS TYPE, UNSPECIFIED SITE: Primary | ICD-10-CM

## 2020-12-23 RX ORDER — DICLOFENAC SODIUM AND MISOPROSTOL 50; 200 MG/1; UG/1
1 TABLET, DELAYED RELEASE ORAL 2 TIMES DAILY
Qty: 180 TABLET | Refills: 3 | Status: SHIPPED | OUTPATIENT
Start: 2020-12-23 | End: 2021-12-31 | Stop reason: HOSPADM

## 2020-12-30 DIAGNOSIS — F32.A DEPRESSION, UNSPECIFIED DEPRESSION TYPE: ICD-10-CM

## 2020-12-30 RX ORDER — VENLAFAXINE 75 MG/1
75 TABLET ORAL 3 TIMES DAILY
Qty: 90 TABLET | Refills: 3 | Status: SHIPPED | OUTPATIENT
Start: 2020-12-30 | End: 2021-04-26 | Stop reason: SDUPTHER

## 2021-01-11 DIAGNOSIS — E11.9 CONTROLLED TYPE 2 DIABETES MELLITUS WITHOUT COMPLICATION, WITHOUT LONG-TERM CURRENT USE OF INSULIN (HCC): ICD-10-CM

## 2021-01-11 RX ORDER — GLIMEPIRIDE 4 MG/1
TABLET ORAL
Qty: 180 TABLET | Refills: 1 | Status: SHIPPED | OUTPATIENT
Start: 2021-01-11 | End: 2021-12-31 | Stop reason: HOSPADM

## 2021-03-08 DIAGNOSIS — G62.9 NEUROPATHY: ICD-10-CM

## 2021-03-08 RX ORDER — GABAPENTIN 300 MG/1
CAPSULE ORAL
Qty: 240 CAPSULE | Refills: 3 | Status: SHIPPED | OUTPATIENT
Start: 2021-03-08 | End: 2021-07-09 | Stop reason: SDUPTHER

## 2021-03-09 ENCOUNTER — TELEPHONE (OUTPATIENT)
Dept: ADMINISTRATIVE | Facility: OTHER | Age: 76
End: 2021-03-09

## 2021-03-09 ENCOUNTER — OFFICE VISIT (OUTPATIENT)
Dept: FAMILY MEDICINE CLINIC | Facility: CLINIC | Age: 76
End: 2021-03-09
Payer: MEDICARE

## 2021-03-09 VITALS
BODY MASS INDEX: 49.09 KG/M2 | WEIGHT: 260 LBS | OXYGEN SATURATION: 97 % | TEMPERATURE: 97.8 F | DIASTOLIC BLOOD PRESSURE: 82 MMHG | HEART RATE: 58 BPM | SYSTOLIC BLOOD PRESSURE: 122 MMHG | HEIGHT: 61 IN

## 2021-03-09 DIAGNOSIS — M25.511 BILATERAL SHOULDER PAIN, UNSPECIFIED CHRONICITY: ICD-10-CM

## 2021-03-09 DIAGNOSIS — I10 ESSENTIAL HYPERTENSION: ICD-10-CM

## 2021-03-09 DIAGNOSIS — E78.00 HYPERCHOLESTEROLEMIA: ICD-10-CM

## 2021-03-09 DIAGNOSIS — M25.512 BILATERAL SHOULDER PAIN, UNSPECIFIED CHRONICITY: ICD-10-CM

## 2021-03-09 DIAGNOSIS — E11.9 CONTROLLED TYPE 2 DIABETES MELLITUS WITHOUT COMPLICATION, WITHOUT LONG-TERM CURRENT USE OF INSULIN (HCC): Primary | ICD-10-CM

## 2021-03-09 DIAGNOSIS — E03.9 HYPOTHYROIDISM, UNSPECIFIED TYPE: ICD-10-CM

## 2021-03-09 LAB — SL AMB POCT HEMOGLOBIN AIC: 6.9 (ref ?–6.5)

## 2021-03-09 PROCEDURE — 99214 OFFICE O/P EST MOD 30 MIN: CPT | Performed by: FAMILY MEDICINE

## 2021-03-09 PROCEDURE — 83036 HEMOGLOBIN GLYCOSYLATED A1C: CPT | Performed by: FAMILY MEDICINE

## 2021-03-09 RX ORDER — HYDROCODONE BITARTRATE AND ACETAMINOPHEN 5; 325 MG/1; MG/1
1 TABLET ORAL EVERY 6 HOURS PRN
Qty: 40 TABLET | Refills: 0 | Status: SHIPPED | OUTPATIENT
Start: 2021-03-09 | End: 2021-04-12 | Stop reason: SDUPTHER

## 2021-03-09 NOTE — LETTER
Diabetic Eye Exam Form    Date Requested: 03/10/21  Patient: Kalpesh Isidro  Patient : 1945   Referring Provider: Gomez García,     Dilated Retinal Exam, Optomap-Iris Exam, or Fundus Photography Done         Yes ([place] one above)         No     Date of Diabetic Eye Exam ______________________________  Left Eye      Exam did show retinopathy    Exam did not show retinopathy         Mild       Moderate       None       Proliferative       Severe     Right Eye     Exam did show retinopathy    Exam did not show retinopathy         Mild       Moderate       None       Proliferative       Severe     Comments __________________________________________________________    Practice Providing Exam ______________________________________________    Exam Performed By (print name) _______________________________________      Provider Signature ___________________________________________________      These reports are needed for  compliance    Please fax this completed form and a copy of the Diabetic Eye Exam report to our office located at [place] [address] as soon as possible to [phone] attention [place]: [phone]    We thank you for your assistance in treating our mutual patient     [facility] [phone]

## 2021-03-09 NOTE — TELEPHONE ENCOUNTER
----- Message from Karoline Shelton sent at 3/9/2021  1:54 PM EST -----  Regarding: dm eye exam - [facility] erin  Contact: [phone]  03/09/21 1:54 PM    Hello, our patient Amy Chamorro has had Diabetic Eye Exam completed/performed  Please assist in updating the patient chart by making an External outreach to Dr Ingram Repress facility located in [place], [place]       Thank you,  Liam Russ MA  [facility] [place]

## 2021-03-10 NOTE — TELEPHONE ENCOUNTER
Upon review of the In Basket request and the patient's chart, initial outreach has been made via fax, please see Contacts section for details       Thank you  Johnathan Amaral

## 2021-03-11 NOTE — PROGRESS NOTES
Patient ID: Cresencio Khalil is a 66 y o  female  HPI: 66 y  o female presents for follow up of niddm, hypertension and hypercholesterolemia  Hgba1c is  6 9  and patient's issues are well controlled  Patient deneis any dyspnea, chest pain or palpitations  She has sever pain in both shoulders and impaired rom  She was told by her orthopedic surgeon that nothing else can be done        SUBJECTIVE    Family History   Problem Relation Age of Onset    Hypertension Father     Diabetes Father     Cancer Father         Throat    Arthritis Father     Stroke Mother     Diabetes Maternal Grandmother     Heart disease Maternal Grandfather     Diabetes Son     Lymphoma Family         Head, Face and Neck      Social History     Socioeconomic History    Marital status: /Civil Union     Spouse name: Not on file    Number of children: Not on file    Years of education: Not on file    Highest education level: Not on file   Occupational History    Occupation: RETIRED   Social Needs    Financial resource strain: Not on file    Food insecurity     Worry: Not on file     Inability: Not on file   [employer] needs     Medical: Not on file     Non-medical: Not on file   Tobacco Use    Smoking status: Never Smoker    Smokeless tobacco: Never Used   Substance and Sexual Activity    Alcohol use: No     Comment: Social per Allscripts     Drug use: No    Sexual activity: Not on file   Lifestyle    Physical activity     Days per week: Not on file     Minutes per session: Not on file    Stress: Not on file   Relationships    Social connections     Talks on phone: Not on file     Gets together: Not on file     Attends Christian service: Not on file     Active member of club or organization: Not on file     Attends meetings of clubs or organizations: Not on file     Relationship status: Not on file    Intimate partner violence     Fear of current or ex partner: Not on file     Emotionally abused: Not on file Physically abused: Not on file     Forced sexual activity: Not on file   Other Topics Concern    Not on file   Social History Narrative    Hx of daily cola consumption (unk cans/day)    Daily tea consumption (unk cups/day)    Multiple organ donor    Patient has living will    Power of  in existence    Uses safety equipment - Seatbelts      Past Medical History:   Diagnosis Date    Aortic stenosis     Arthritis     Asthma     Coronary artery disease     Diabetes mellitus ([facility] )     Diabetic peripheral neuropathy ([place] [address] )     Last assessed - 1/27/16    Gallbladder disease     Heart attack ([facility] ) 07/1999    Hemorrhoids     Last assessed - 11/16/12    Myocardial infarction ([place] [address] )     Old myocardial infarction     Type 2 diabetes mellitus with autonomic neuropathy ([place] [address] )     Unspec long term insulin use status, Last assessed - 6/8/17     Past Surgical History:   Procedure Laterality Date    BUNIONECTOMY      CARDIAC CATHETERIZATION      Carotid Artery, Resolved - 7/1/13    CARDIOVASCULAR STRESS TEST  09/1999    CHOLECYSTECTOMY      COLONOSCOPY  2017    FOOT ARTHRODESIS, MODIFIED [facility]  2016    GALLBLADDER SURGERY  1970    HEMORROIDECTOMY      KNEE ARTHROSCOPY Left 2004    [place] COLONOSCOPY FLX DX W/COLLJ SPEC WHEN PFRMD N/A 8/21/2017    Procedure: COLONOSCOPY;  Surgeon: Adalgisa Garcia MD;  [facility];   Service: Colorectal    REPAIR KNEE LIGAMENT      REPAIR KNEE LIGAMENT Left 1986    REPAIR KNEE LIGAMENT Right 1988     Allergies   Allergen Reactions    Lyrica [Pregabalin] Swelling    Sulfa Antibiotics Swelling       Current Outpatient Medications:     albuterol (ProAir HFA) 90 mcg/act inhaler, Inhale 2 puffs every 6 (six) hours as needed for wheezing, Disp: 1 Inhaler, Rfl: 1    diclofenac-misoprostol (ARTHROTEC 50) 50-0 2 MG per tablet, Take 1 tablet by mouth 2 (two) times a day, Disp: 180 tablet, Rfl: 3    furosemide (LASIX) 20 mg tablet, TAKE 1 TABLET BY MOUTH EVERY DAY, Disp: 90 tablet, Rfl: 3    gabapentin (NEURONTIN) 300 mg capsule, Take 3 tabs AM, 2 tabs afternoon, 3 tabs QHS, Disp: 240 capsule, Rfl: 3    glimepiride (AMARYL) 4 mg tablet, TAKE 1 TABLET BY MOUTH EVERY DAY WITH BREAKFAST, Disp: 180 tablet, Rfl: 1    glucose blood (TRUETEST TEST) test strip, by In Vitro route daily, Disp: , Rfl:     levothyroxine 112 mcg tablet, Take 1 tablet (112 mcg total) by mouth daily in the early morning, Disp: 30 tablet, Rfl: 5    MICROLET LANCETS MISC, by Does not apply route, Disp: , Rfl:     Multiple Vitamin (DAILY VALUE MULTIVITAMIN) TABS, Take 1 tablet by mouth daily, Disp: , Rfl:     Multiple Vitamins-Minerals (PRESERVISION AREDS PO), Take by mouth daily, Disp: , Rfl:     nebivolol (Bystolic) 5 mg tablet, Take 1 tablet (5 mg total) by mouth daily, Disp: 30 tablet, Rfl: 3    potassium chloride (K-DUR,KLOR-CON) 20 mEq tablet, TAKE 1 TABLET BY MOUTH EVERY DAY, Disp: 30 tablet, Rfl: 6    pravastatin (PRAVACHOL) 10 mg tablet, TAKE 1 TABLET BY MOUTH EVERY DAY, Disp: 90 tablet, Rfl: 1    predniSONE 5 mg tablet, Take 1 tablet (5 mg total) by mouth daily, Disp: 90 tablet, Rfl: 3    venlafaxine (EFFEXOR) 75 mg tablet, Take 1 tablet (75 mg total) by mouth 3 (three) times a day, Disp: 90 tablet, Rfl: 3    HYDROcodone-acetaminophen (NORCO) 5-325 mg per tablet, Take 1 tablet by mouth every 6 (six) hours as needed for painMax Daily Amount: 4 tablets, Disp: 40 tablet, Rfl: 0    Review of Systems  Constitutional:     Denies fever, chills ,fatigue ,weakness ,weight loss, weight gain     ENT: Denies earache ,loss of hearing ,nosebleed, nasal discharge,nasal congestion ,sore throat ,hoarseness  Pulmonary: Denies shortness of breath ,cough  ,dyspnea on exertion, orthopnea  ,PND   Cardiovascular:  Denies bradycardia , tachycardia  ,palpations, lower extremity edema leg, claudication  Breast:  Denies new or changing breast lumps ,nipple discharge ,nipple changes  Abdomen:  Denies abdominal pain , anorexia , indigestion, nausea, vomiting, constipation, diarrhea  Musculoskeletal: Denies myalgias, , joint swelling,limb pain, limb swelling+ bilateral shoulder pain and impaired rom   Gu: denies dysuria, polyuria  Skin: Denies skin rash, skin lesion, skin wound, itching, dry skin  Neuro: Denies headache, numbness, tingling, confusion, loss of consciousness, dizziness, vertigo  Psychiatric: Denies feelings of depression, suicidal ideation, anxiety, sleep disturbances    OBJECTIVE  /82   Pulse 58   Temp 97 8 °F (36 6 °C)   Ht 5' 1" (1 549 m)   Wt 118 kg (260 lb)   SpO2 97%   BMI 49 13 kg/m²   Constitutional:   NAD, well appearing and well nourished      ENT:   Conjunctiva and lids: no injection, edema, or discharge     Pupils and iris: ANGELINE bilaterally    External inspection of ears and nose: normal without deformities or discharge  Otoscopic exam: Canals patent without erythema  Nasal mucosa, septum and turbinates: Normal or edema or discharge         Oropharynx:  Moist mucosa, normal tongue and tonsils without lesions  No erythema        Pulmonary:Respiratory effort normal rate and rhythm, no increased work of breathing   Auscultation of lungs:  Clear bilaterally with no adventitious breath sounds       Cardiovascular: regular rate and rhythm, S1 and S2, no murmur, no edema and/or varicosities of LE      Abdomen: Soft and non-distended     Positive bowel sounds      No heptomegaly or splenomegaly      Gu: no suprapubic tenderness or CVA tenderness, no urethral discharge  Lymphatic:  No anterior or posterior cervical lymphadenopathy         Musculoskeletal:  Gait and station: Normal gait      Digits and nails normal without clubbing or cyanosis       Inspection/palpation of joints, bones, and muscles:  +generalized tenderness of both shoulders impaired  active and passive range of motion - frozen shoulders      Skin: Normal skin turgor and no rashes      Neuro:    Normal reflexes     Psych: alert and oriented to person, place and time     normal mood and affect       Assessment/Plan:Diagnoses and all orders for this visit:    Controlled type 2 diabetes mellitus without complication, without long-term current use of insulin ([facility] )  Comments:  Continue current therapy   Orders:  -     POCT hemoglobin A1c    Bilateral shoulder pain, unspecified chronicity  Comments:  Pt to call if no relief in pain  Orders:  -     HYDROcodone-acetaminophen (NORCO) 5-325 mg per tablet; Take 1 tablet by mouth every 6 (six) hours as needed for painMax Daily Amount: 4 tablets    Hypercholesterolemia  Comments:  Continue current therpay   Orders:  -     Lipid Panel with Direct LDL reflex; Future    Essential hypertension  Comments:  Stable on present therpay  Orders:  -     Comprehensive metabolic panel; Future    Hypothyroidism, unspecified type  Comments:  continue levothyroxine therapy  Orders:  -     TSH, 3rd generation; Future    I will see patient back in 4 mos or sooner prn

## 2021-03-17 ENCOUNTER — IMMUNIZATIONS (OUTPATIENT)
Dept: FAMILY MEDICINE CLINIC | Facility: HOSPITAL | Age: 76
End: 2021-03-17

## 2021-03-17 DIAGNOSIS — Z23 ENCOUNTER FOR IMMUNIZATION: Primary | ICD-10-CM

## 2021-03-17 PROCEDURE — 91300 SARS-COV-2 / COVID-19 MRNA VACCINE (PFIZER-BIONTECH) 30 MCG: CPT

## 2021-03-17 PROCEDURE — 0001A SARS-COV-2 / COVID-19 MRNA VACCINE (PFIZER-BIONTECH) 30 MCG: CPT

## 2021-03-31 DIAGNOSIS — E78.00 HYPERCHOLESTEROLEMIA: ICD-10-CM

## 2021-03-31 RX ORDER — PRAVASTATIN SODIUM 10 MG
10 TABLET ORAL DAILY
Qty: 90 TABLET | Refills: 1 | Status: SHIPPED | OUTPATIENT
Start: 2021-03-31 | End: 2021-10-11 | Stop reason: SDUPTHER

## 2021-04-08 ENCOUNTER — IMMUNIZATIONS (OUTPATIENT)
Dept: FAMILY MEDICINE CLINIC | Facility: HOSPITAL | Age: 76
End: 2021-04-08

## 2021-04-08 ENCOUNTER — TRANSCRIBE ORDERS (OUTPATIENT)
Dept: LAB | Facility: CLINIC | Age: 76
End: 2021-04-08

## 2021-04-08 ENCOUNTER — APPOINTMENT (OUTPATIENT)
Dept: LAB | Facility: CLINIC | Age: 76
End: 2021-04-08
Payer: MEDICARE

## 2021-04-08 DIAGNOSIS — E11.22 TYPE 2 DIABETES MELLITUS WITH STAGE 3 CHRONIC KIDNEY DISEASE AND HYPERTENSION (HCC): ICD-10-CM

## 2021-04-08 DIAGNOSIS — N18.30 TYPE 2 DIABETES MELLITUS WITH STAGE 3 CHRONIC KIDNEY DISEASE AND HYPERTENSION (HCC): ICD-10-CM

## 2021-04-08 DIAGNOSIS — E03.9 HYPOTHYROIDISM, UNSPECIFIED TYPE: ICD-10-CM

## 2021-04-08 DIAGNOSIS — I10 ESSENTIAL HYPERTENSION: ICD-10-CM

## 2021-04-08 DIAGNOSIS — I12.9 TYPE 2 DIABETES MELLITUS WITH STAGE 3 CHRONIC KIDNEY DISEASE AND HYPERTENSION (HCC): ICD-10-CM

## 2021-04-08 DIAGNOSIS — E03.9 HYPOTHYROIDISM, UNSPECIFIED TYPE: Primary | ICD-10-CM

## 2021-04-08 DIAGNOSIS — Z23 ENCOUNTER FOR IMMUNIZATION: Primary | ICD-10-CM

## 2021-04-08 DIAGNOSIS — E78.00 HYPERCHOLESTEROLEMIA: ICD-10-CM

## 2021-04-08 LAB
ALBUMIN SERPL BCP-MCNC: 3.3 G/DL (ref 3.5–5)
ALP SERPL-CCNC: 85 U/L (ref 46–116)
ALT SERPL W P-5'-P-CCNC: 28 U/L (ref 12–78)
ANION GAP SERPL CALCULATED.3IONS-SCNC: 8 MMOL/L (ref 4–13)
AST SERPL W P-5'-P-CCNC: 20 U/L (ref 5–45)
BILIRUB SERPL-MCNC: 0.47 MG/DL (ref 0.2–1)
BUN SERPL-MCNC: 29 MG/DL (ref 5–25)
CALCIUM ALBUM COR SERPL-MCNC: 10.6 MG/DL (ref 8.3–10.1)
CALCIUM SERPL-MCNC: 10 MG/DL (ref 8.3–10.1)
CHLORIDE SERPL-SCNC: 105 MMOL/L (ref 100–108)
CHOLEST SERPL-MCNC: 187 MG/DL (ref 50–200)
CO2 SERPL-SCNC: 32 MMOL/L (ref 21–32)
CREAT SERPL-MCNC: 1.84 MG/DL (ref 0.6–1.3)
GFR SERPL CREATININE-BSD FRML MDRD: 26 ML/MIN/1.73SQ M
GLUCOSE P FAST SERPL-MCNC: 100 MG/DL (ref 65–99)
HDLC SERPL-MCNC: 52 MG/DL
LDLC SERPL CALC-MCNC: 106 MG/DL (ref 0–100)
POTASSIUM SERPL-SCNC: 4.2 MMOL/L (ref 3.5–5.3)
PROT SERPL-MCNC: 6.5 G/DL (ref 6.4–8.2)
SODIUM SERPL-SCNC: 145 MMOL/L (ref 136–145)
TRIGL SERPL-MCNC: 143 MG/DL
TSH SERPL DL<=0.05 MIU/L-ACNC: 4.9 UIU/ML (ref 0.36–3.74)

## 2021-04-08 PROCEDURE — 80061 LIPID PANEL: CPT

## 2021-04-08 PROCEDURE — 91300 SARS-COV-2 / COVID-19 MRNA VACCINE (PFIZER-BIONTECH) 30 MCG: CPT

## 2021-04-08 PROCEDURE — 84443 ASSAY THYROID STIM HORMONE: CPT

## 2021-04-08 PROCEDURE — 80053 COMPREHEN METABOLIC PANEL: CPT

## 2021-04-08 PROCEDURE — 36415 COLL VENOUS BLD VENIPUNCTURE: CPT

## 2021-04-08 PROCEDURE — 0002A SARS-COV-2 / COVID-19 MRNA VACCINE (PFIZER-BIONTECH) 30 MCG: CPT

## 2021-04-08 RX ORDER — LEVOTHYROXINE SODIUM 0.12 MG/1
125 TABLET ORAL
Qty: 30 TABLET | Refills: 5 | Status: SHIPPED | OUTPATIENT
Start: 2021-04-08 | End: 2021-08-16

## 2021-04-12 DIAGNOSIS — M25.512 BILATERAL SHOULDER PAIN, UNSPECIFIED CHRONICITY: ICD-10-CM

## 2021-04-12 DIAGNOSIS — I10 ESSENTIAL HYPERTENSION: ICD-10-CM

## 2021-04-12 DIAGNOSIS — M25.511 BILATERAL SHOULDER PAIN, UNSPECIFIED CHRONICITY: ICD-10-CM

## 2021-04-12 RX ORDER — NEBIVOLOL HYDROCHLORIDE 5 MG/1
TABLET ORAL
Qty: 30 TABLET | Refills: 3 | Status: SHIPPED | OUTPATIENT
Start: 2021-04-12 | End: 2021-08-09

## 2021-04-12 RX ORDER — HYDROCODONE BITARTRATE AND ACETAMINOPHEN 5; 325 MG/1; MG/1
1 TABLET ORAL EVERY 6 HOURS PRN
Qty: 40 TABLET | Refills: 0 | Status: SHIPPED | OUTPATIENT
Start: 2021-04-12 | End: 2021-05-07 | Stop reason: SDUPTHER

## 2021-04-12 NOTE — TELEPHONE ENCOUNTER
Last OV 3/9/2021  Next OV 7/15/2021            03/09/2021  1  03/09/2021  HYDROCODONE-ACETAMIN 5-325 MG  40 0  10  [facility]  [id number]  [id number]  0  20 0 MME  Comm Ins  [place]

## 2021-04-26 DIAGNOSIS — F32.A DEPRESSION, UNSPECIFIED DEPRESSION TYPE: ICD-10-CM

## 2021-04-26 RX ORDER — VENLAFAXINE 75 MG/1
75 TABLET ORAL 3 TIMES DAILY
Qty: 90 TABLET | Refills: 3 | Status: SHIPPED | OUTPATIENT
Start: 2021-04-26 | End: 2021-08-30 | Stop reason: SDUPTHER

## 2021-05-07 DIAGNOSIS — M25.512 BILATERAL SHOULDER PAIN, UNSPECIFIED CHRONICITY: ICD-10-CM

## 2021-05-07 DIAGNOSIS — M25.511 BILATERAL SHOULDER PAIN, UNSPECIFIED CHRONICITY: ICD-10-CM

## 2021-05-09 RX ORDER — HYDROCODONE BITARTRATE AND ACETAMINOPHEN 5; 325 MG/1; MG/1
1 TABLET ORAL EVERY 6 HOURS PRN
Qty: 40 TABLET | Refills: 0 | Status: SHIPPED | OUTPATIENT
Start: 2021-05-09 | End: 2021-06-02 | Stop reason: SDUPTHER

## 2021-05-12 DIAGNOSIS — R60.9 EDEMA, UNSPECIFIED TYPE: ICD-10-CM

## 2021-05-12 RX ORDER — POTASSIUM CHLORIDE 20 MEQ/1
TABLET, EXTENDED RELEASE ORAL
Qty: 30 TABLET | Refills: 6 | Status: SHIPPED | OUTPATIENT
Start: 2021-05-12 | End: 2022-01-20 | Stop reason: HOSPADM

## 2021-05-18 ENCOUNTER — OFFICE VISIT (OUTPATIENT)
Dept: FAMILY MEDICINE CLINIC | Facility: CLINIC | Age: 76
End: 2021-05-18
Payer: MEDICARE

## 2021-05-18 VITALS
WEIGHT: 263 LBS | SYSTOLIC BLOOD PRESSURE: 126 MMHG | TEMPERATURE: 98.4 F | OXYGEN SATURATION: 96 % | DIASTOLIC BLOOD PRESSURE: 74 MMHG | HEIGHT: 61 IN | BODY MASS INDEX: 49.65 KG/M2 | HEART RATE: 78 BPM

## 2021-05-18 DIAGNOSIS — L20.9 ATOPIC DERMATITIS, UNSPECIFIED TYPE: Primary | ICD-10-CM

## 2021-05-18 DIAGNOSIS — E66.01 MORBID OBESITY WITH BODY MASS INDEX (BMI) OF 50.0 TO 59.9 IN ADULT (HCC): ICD-10-CM

## 2021-05-18 DIAGNOSIS — N18.4 CHRONIC RENAL DISEASE, STAGE IV (HCC): ICD-10-CM

## 2021-05-18 DIAGNOSIS — E11.43 TYPE 2 DIABETES MELLITUS WITH DIABETIC AUTONOMIC NEUROPATHY, WITHOUT LONG-TERM CURRENT USE OF INSULIN (HCC): ICD-10-CM

## 2021-05-18 PROCEDURE — 99214 OFFICE O/P EST MOD 30 MIN: CPT | Performed by: FAMILY MEDICINE

## 2021-05-18 RX ORDER — PREDNISONE 10 MG/1
TABLET ORAL
Qty: 26 TABLET | Refills: 0 | Status: SHIPPED | OUTPATIENT
Start: 2021-05-18 | End: 2021-06-11 | Stop reason: SDUPTHER

## 2021-05-26 NOTE — PROGRESS NOTES
Patient ID: Caridad Sandifer is a 66 y o  female  HPI: 66 y  o female presents for evaluation of atopic dermatitis of lower right ankle  Its red, itchy and spreading up leg  She denies a rash anywehre else  Her diabetes, cRF (stage 4) is stable and she follows with nephrology closely  Her hgba1c is 6 9 and she denies any other complaints at this time        SUBJECTIVE    Family History   Problem Relation Age of Onset    Hypertension Father     Diabetes Father     Cancer Father         Throat    Arthritis Father     Stroke Mother     Diabetes Maternal Grandmother     Heart disease Maternal Grandfather     Diabetes Son     Lymphoma Family         Head, Face and Neck      Social History     Socioeconomic History    Marital status: /Civil Union     Spouse name: Not on file    Number of children: Not on file    Years of education: Not on file    Highest education level: Not on file   Occupational History    Occupation: RETIRED   Social Needs    Financial resource strain: Not on file    Food insecurity     Worry: Not on file     Inability: Not on file   [employer] needs     Medical: Not on file     Non-medical: Not on file   Tobacco Use    Smoking status: Never Smoker    Smokeless tobacco: Never Used   Substance and Sexual Activity    Alcohol use: No     Comment: Social per Allscripts     Drug use: No    Sexual activity: Not on file   Lifestyle    Physical activity     Days per week: Not on file     Minutes per session: Not on file    Stress: Not on file   Relationships    Social connections     Talks on phone: Not on file     Gets together: Not on file     Attends Che[employer] service: Not on file     Active member of club or organization: Not on file     Attends meetings of clubs or organizations: Not on file     Relationship status: Not on file    Intimate partner violence     Fear of current or ex partner: Not on file     Emotionally abused: Not on file     Physically abused: Not on file     Forced sexual activity: Not on file   Other Topics Concern    Not on file   Social History Narrative    Hx of daily cola consumption (unk cans/day)    Daily tea consumption (unk cups/day)    Multiple organ donor    Patient has living will    Power of  in existence    Uses safety equipment - Seatbelts      Past Medical History:   Diagnosis Date    Aortic stenosis     Arthritis     Asthma     Coronary artery disease     Diabetes mellitus ([place] [address] )     Diabetic peripheral neuropathy ([place] [address] )     Last assessed - 1/27/16    Gallbladder disease     Heart attack ([place] [address] ) 07/1999    Hemorrhoids     Last assessed - 11/16/12    Myocardial infarction ([place] [address] )     Old myocardial infarction     Type 2 diabetes mellitus with autonomic neuropathy ([place] [address] )     Unspec long term insulin use status, Last assessed - 6/8/17     Past Surgical History:   Procedure Laterality Date    BUNIONECTOMY      CARDIAC CATHETERIZATION      Carotid Artery, Resolved - 7/1/13    CARDIOVASCULAR STRESS TEST  09/1999    CHOLECYSTECTOMY      COLONOSCOPY  2017    FOOT ARTHRODESIS, MODIFIED [facility]  2016    GALLBLADDER SURGERY  1970    HEMORROIDECTOMY      KNEE ARTHROSCOPY Left 2004    [place] COLONOSCOPY FLX DX W/COLLJ SPEC WHEN PFRMD N/A 8/21/2017    Procedure: COLONOSCOPY;  Surgeon: Huey Lima MD;  [facility];   Service: Colorectal    REPAIR KNEE LIGAMENT      REPAIR KNEE LIGAMENT Left 1986    REPAIR KNEE LIGAMENT Right 1988     Allergies   Allergen Reactions    Lyrica [Pregabalin] Swelling    Sulfa Antibiotics Swelling       Current Outpatient Medications:     albuterol (ProAir HFA) 90 mcg/act inhaler, Inhale 2 puffs every 6 (six) hours as needed for wheezing, Disp: 1 Inhaler, Rfl: 1    Bystolic 5 MG tablet, TAKE 1 TABLET BY MOUTH EVERY DAY, Disp: 30 tablet, Rfl: 3    diclofenac-misoprostol (ARTHROTEC 50) 50-0 2 MG per tablet, Take 1 tablet by mouth 2 (two) times a day, Disp: 180 tablet, Rfl: 3    furosemide (LASIX) 20 mg tablet, TAKE 1 TABLET BY MOUTH EVERY DAY, Disp: 90 tablet, Rfl: 3    gabapentin (NEURONTIN) 300 mg capsule, Take 3 tabs AM, 2 tabs afternoon, 3 tabs QHS, Disp: 240 capsule, Rfl: 3    glimepiride (AMARYL) 4 mg tablet, TAKE 1 TABLET BY MOUTH EVERY DAY WITH BREAKFAST, Disp: 180 tablet, Rfl: 1    glucose blood (TRUETEST TEST) test strip, by In Vitro route daily, Disp: , Rfl:     HYDROcodone-acetaminophen (NORCO) 5-325 mg per tablet, Take 1 tablet by mouth every 6 (six) hours as needed for painMax Daily Amount: 4 tablets, Disp: 40 tablet, Rfl: 0    levothyroxine 125 mcg tablet, Take 1 tablet (125 mcg total) by mouth daily in the early morning, Disp: 30 tablet, Rfl: 5    MICROLET LANCETS MISC, by Does not apply route, Disp: , Rfl:     Multiple Vitamin (DAILY VALUE MULTIVITAMIN) TABS, Take 1 tablet by mouth daily, Disp: , Rfl:     Multiple Vitamins-Minerals (PRESERVISION AREDS PO), Take by mouth daily, Disp: , Rfl:     potassium chloride (K-DUR,KLOR-CON) 20 mEq tablet, TAKE 1 TABLET BY MOUTH EVERY DAY, Disp: 30 tablet, Rfl: 6    pravastatin (PRAVACHOL) 10 mg tablet, Take 1 tablet (10 mg total) by mouth daily, Disp: 90 tablet, Rfl: 1    venlafaxine (EFFEXOR) 75 mg tablet, Take 1 tablet (75 mg total) by mouth 3 (three) times a day, Disp: 90 tablet, Rfl: 3    predniSONE 10 mg tablet, 3 tabs po bid x2 days, then 2 tabs po bid x2 days, then 1 tab bid x2 days, then 1 daily until done , Disp: 26 tablet, Rfl: 0    Review of Systems  Constitutional:     Denies fever, chills ,fatigue ,weakness ,weight loss, weight gain     ENT: Denies earache ,loss of hearing ,nosebleed, nasal discharge,nasal congestion ,sore throat ,hoarseness  Pulmonary: Denies shortness of breath ,cough  ,dyspnea on exertion, orthopnea  ,PND   Cardiovascular:  Denies bradycardia , tachycardia  ,palpations, lower extremity edema leg, claudication  Breast:  Denies new or changing breast lumps ,nipple discharge ,nipple changes  Abdomen: Denies abdominal pain , anorexia , indigestion, nausea, vomiting, constipation, diarrhea  Musculoskeletal: Denies myalgias, arthralgias, joint swelling, joint stiffness , limb pain, limb swelling  Gu: denies dysuria, polyuria  Skin: red, itchy rash on right ankle  Neuro: Denies headache, numbness, tingling, confusion, loss of consciousness, dizziness, vertigo  Psychiatric: Denies feelings of depression, suicidal ideation, anxiety, sleep disturbances    OBJECTIVE  /74   Pulse 78   Temp 98 4 °F (36 9 °C)   Ht 5' 1" (1 549 m)   Wt 119 kg (263 lb)   SpO2 96%   BMI 49 69 kg/m²   Constitutional:   NAD, well appearing and well nourished      ENT:   Conjunctiva and lids: no injection, edema, or discharge     Pupils and iris: ANGELINE bilaterally    External inspection of ears and nose: normal without deformities or discharge  Otoscopic exam: Canals patent without erythema  Nasal mucosa, septum and turbinates: Normal or edema or discharge         Oropharynx:  Moist mucosa, normal tongue and tonsils without lesions  No erythema        Pulmonary:Respiratory effort normal rate and rhythm, no increased work of breathing   Auscultation of lungs:  Clear bilaterally with no adventitious breath sounds       Cardiovascular: regular rate and rhythm, S1 and S2, no murmur, no edema and/or varicosities of LE      Abdomen: Soft and non-distended     Positive bowel sounds      No heptomegaly or splenomegaly      Gu: no suprapubic tenderness or CVA tenderness, no urethral discharge  Lymphatic:  No anterior or posterior cervical lymphadenopathy         Musculoskeletal:  Gait and station: Normal gait      Digits and nails normal without clubbing or cyanosis       Inspection/palpation of joints, bones, and muscles:  No joint tenderness, swelling, full active and passive range of motion       Skin: Normal skin turgor and erythematous , papular rash on right ankle noted    Neuro:    Normal reflexes       Psych:   alert and oriented to person, place and time     normal mood and affect       Assessment/Plan:Diagnoses and all orders for this visit:    Atopic dermatitis, unspecified type  -     predniSONE 10 mg tablet; 3 tabs po bid x2 days, then 2 tabs po bid x2 days, then 1 tab bid x2 days, then 1 daily until done  Type 2 diabetes mellitus with diabetic autonomic neuropathy, without long-term current use of insulin ([facility])  Comments:  Continue with current diabetic meds  Chronic renal disease, stage IV ([facility])  Comments:  Continue with nephrology surveillence    Morbid obesity with body mass index (BMI) of 50 0 to 59 9 in adult [facility])  Comments:  Pt to try to cut down on carb intake         Reviewed with patient plan to treat with above plan    Patient instructed to call in 72 hours if not feeling better or if symptoms worsen

## 2021-06-02 DIAGNOSIS — M25.512 BILATERAL SHOULDER PAIN, UNSPECIFIED CHRONICITY: ICD-10-CM

## 2021-06-02 DIAGNOSIS — M25.511 BILATERAL SHOULDER PAIN, UNSPECIFIED CHRONICITY: ICD-10-CM

## 2021-06-02 RX ORDER — HYDROCODONE BITARTRATE AND ACETAMINOPHEN 5; 325 MG/1; MG/1
1 TABLET ORAL EVERY 6 HOURS PRN
Qty: 40 TABLET | Refills: 0 | Status: SHIPPED | OUTPATIENT
Start: 2021-06-02 | End: 2021-06-22 | Stop reason: SDUPTHER

## 2021-06-11 DIAGNOSIS — L20.9 ATOPIC DERMATITIS, UNSPECIFIED TYPE: ICD-10-CM

## 2021-06-11 RX ORDER — PREDNISONE 1 MG/1
TABLET ORAL
Qty: 26 TABLET | Refills: 0 | Status: SHIPPED | OUTPATIENT
Start: 2021-06-11 | End: 2021-08-04 | Stop reason: ALTCHOICE

## 2021-06-22 DIAGNOSIS — M25.511 BILATERAL SHOULDER PAIN, UNSPECIFIED CHRONICITY: ICD-10-CM

## 2021-06-22 DIAGNOSIS — M25.512 BILATERAL SHOULDER PAIN, UNSPECIFIED CHRONICITY: ICD-10-CM

## 2021-06-22 RX ORDER — HYDROCODONE BITARTRATE AND ACETAMINOPHEN 5; 325 MG/1; MG/1
1 TABLET ORAL EVERY 6 HOURS PRN
Qty: 40 TABLET | Refills: 0 | Status: SHIPPED | OUTPATIENT
Start: 2021-06-22 | End: 2021-07-16 | Stop reason: SDUPTHER

## 2021-07-09 DIAGNOSIS — G62.9 NEUROPATHY: ICD-10-CM

## 2021-07-09 RX ORDER — GABAPENTIN 300 MG/1
CAPSULE ORAL
Qty: 240 CAPSULE | Refills: 0 | Status: SHIPPED | OUTPATIENT
Start: 2021-07-09 | End: 2021-08-09

## 2021-07-16 DIAGNOSIS — M25.511 BILATERAL SHOULDER PAIN, UNSPECIFIED CHRONICITY: ICD-10-CM

## 2021-07-16 DIAGNOSIS — M25.512 BILATERAL SHOULDER PAIN, UNSPECIFIED CHRONICITY: ICD-10-CM

## 2021-07-16 RX ORDER — HYDROCODONE BITARTRATE AND ACETAMINOPHEN 5; 325 MG/1; MG/1
1 TABLET ORAL EVERY 6 HOURS PRN
Qty: 40 TABLET | Refills: 0 | Status: SHIPPED | OUTPATIENT
Start: 2021-07-16 | End: 2021-08-06 | Stop reason: SDUPTHER

## 2021-07-16 NOTE — TELEPHONE ENCOUNTER
Last OV 7/16/2021       05/10/2021      1          05/10/2021      HYDROCODONE-ACETAMIN 5-325 MG      40 0     10            [facility]          [id number]          [facility] ([id number])          0          20 0 MME        Comm Ins      [place]      04/12/2021      1          04/12/2021      HYDROCODONE-ACETAMIN 5-325 MG      40 0     10            [facility]          [id number]          [facility] ([id number])          0          20 0 MME        Comm Ins      [place]      03/09/2021      1          03/09/2021      HYDROCODONE-ACETAMIN 5-325 MG      40 0     10            [facility]          [id number]          [facility] ([id number])          0          20 0 MME        Comm Ins      [place]

## 2021-08-04 ENCOUNTER — OFFICE VISIT (OUTPATIENT)
Dept: FAMILY MEDICINE CLINIC | Facility: CLINIC | Age: 76
End: 2021-08-04
Payer: MEDICARE

## 2021-08-04 VITALS
HEART RATE: 67 BPM | HEIGHT: 61 IN | DIASTOLIC BLOOD PRESSURE: 82 MMHG | TEMPERATURE: 99.1 F | BODY MASS INDEX: 49.84 KG/M2 | SYSTOLIC BLOOD PRESSURE: 122 MMHG | WEIGHT: 264 LBS | OXYGEN SATURATION: 95 %

## 2021-08-04 DIAGNOSIS — E11.43 TYPE 2 DIABETES MELLITUS WITH DIABETIC AUTONOMIC NEUROPATHY, WITHOUT LONG-TERM CURRENT USE OF INSULIN (HCC): Primary | ICD-10-CM

## 2021-08-04 DIAGNOSIS — L98.9 FACIAL LESION: ICD-10-CM

## 2021-08-04 DIAGNOSIS — E78.00 HYPERCHOLESTEROLEMIA: ICD-10-CM

## 2021-08-04 DIAGNOSIS — R60.9 EDEMA, UNSPECIFIED TYPE: ICD-10-CM

## 2021-08-04 DIAGNOSIS — L98.491 SKIN ULCER, LIMITED TO BREAKDOWN OF SKIN (HCC): ICD-10-CM

## 2021-08-04 DIAGNOSIS — E03.9 HYPOTHYROIDISM, UNSPECIFIED TYPE: ICD-10-CM

## 2021-08-04 DIAGNOSIS — I10 ESSENTIAL HYPERTENSION: ICD-10-CM

## 2021-08-04 DIAGNOSIS — B37.2 CUTANEOUS CANDIDIASIS: ICD-10-CM

## 2021-08-04 LAB — SL AMB POCT HEMOGLOBIN AIC: 6.5 (ref ?–6.5)

## 2021-08-04 PROCEDURE — 83036 HEMOGLOBIN GLYCOSYLATED A1C: CPT | Performed by: FAMILY MEDICINE

## 2021-08-04 PROCEDURE — 99215 OFFICE O/P EST HI 40 MIN: CPT | Performed by: FAMILY MEDICINE

## 2021-08-04 RX ORDER — NYSTATIN 100000 [USP'U]/G
POWDER TOPICAL 2 TIMES DAILY
Qty: 60 G | Refills: 3 | Status: SHIPPED | OUTPATIENT
Start: 2021-08-04 | End: 2022-03-08

## 2021-08-04 RX ORDER — EMOLLIENT COMBINATION NO.10
1 EMULSION (GRAM) TOPICAL 2 TIMES DAILY
Qty: 90 G | Refills: 3 | Status: SHIPPED | OUTPATIENT
Start: 2021-08-04 | End: 2022-01-20 | Stop reason: HOSPADM

## 2021-08-05 ENCOUNTER — TELEPHONE (OUTPATIENT)
Dept: ADMINISTRATIVE | Facility: OTHER | Age: 76
End: 2021-08-05

## 2021-08-05 NOTE — TELEPHONE ENCOUNTER
Upon review of the In Basket request and the patient's chart, initial outreach has been made via fax, please see Contacts section for details       Thank you  Leopoldo Hymen, MA

## 2021-08-05 NOTE — TELEPHONE ENCOUNTER
----- Message from Javy Hooper, Leigh Wells Gilbert sent at 8/4/2021  1:27 PM EDT -----  Regarding: dm eye exam - [facility] med erin  08/04/21 1:27 PM    Hello, our patient Endy Angel has had Diabetic Eye Exam completed/performed  Please assist in updating the patient chart by making an External outreach to Dr Melissa Austin facility located [address] [place] [address], [address] [place] [address]       Thank you,  Javy Hooper MA  [facility] Darren Kim

## 2021-08-05 NOTE — LETTER
Diabetic Eye Exam Form    Date Requested: 21  Patient: Daisy Shankar  Patient : 1945   Referring Provider: Bob Ray,     Dilated Retinal Exam, Optomap-Iris Exam, or Fundus Photography Done         Yes (Ruby one above)         No     Date of Diabetic Eye Exam ______________________________  Left Eye      Exam did show retinopathy    Exam did not show retinopathy         Mild       Moderate       None       Proliferative       Severe     Right Eye     Exam did show retinopathy    Exam did not show retinopathy         Mild       Moderate       None       Proliferative       Severe     Comments __________________________________________________________    Practice Providing Exam ______________________________________________    Exam Performed By (print name) _______________________________________      Provider Signature ___________________________________________________      These reports are needed for  compliance    Please fax this completed form and a copy of the Diabetic Eye Exam report to our office located at [place] [address] as soon as possible to [phone] attention Nasrin: Phone [phone]    We thank you for your assistance in treating our mutual patient     (Sent to [facility])

## 2021-08-06 DIAGNOSIS — M25.512 BILATERAL SHOULDER PAIN, UNSPECIFIED CHRONICITY: ICD-10-CM

## 2021-08-06 DIAGNOSIS — M25.511 BILATERAL SHOULDER PAIN, UNSPECIFIED CHRONICITY: ICD-10-CM

## 2021-08-06 RX ORDER — HYDROCODONE BITARTRATE AND ACETAMINOPHEN 5; 325 MG/1; MG/1
1 TABLET ORAL EVERY 6 HOURS PRN
Qty: 40 TABLET | Refills: 0 | Status: SHIPPED | OUTPATIENT
Start: 2021-08-06 | End: 2021-10-08 | Stop reason: SDUPTHER

## 2021-08-06 NOTE — TELEPHONE ENCOUNTER
Received Diabetic Eye Exam fax back request with DOS 2-; this was resulted on 3- from a 3-9-2021 request

## 2021-08-06 NOTE — PROGRESS NOTES
Patient ID: Emre Sarah is a 64 y o  female  HPI: 64 y  o female presents for follow up of niddm, hypothyroidism,hypertension and hypercholesterolemia  Hgba1c is  6 5   and patient's issues are well controlled  Patient deneis any dyspnea, chest pain or palpitations  The site of her prior surgery on right side of nose is swollen again and pt  concerned very a reoccurrence  She has breakdown of skin on both buttocks superficially from sitting all day  She has try some balmex and I have suggested she try a prescription  Barrier cream (prutech) and she sits on a donut  She reports her swelling is under control and no dyspnea or orthopnea  She has a red, sore rash in the creases of her groin that have no respoded to mycostatin powder        SUBJECTIVE    Family History   Problem Relation Age of Onset    Hypertension Father     Diabetes Father     Cancer Father         Throat    Arthritis Father     Stroke Mother     Diabetes Maternal Grandmother     Heart disease Maternal Grandfather     Diabetes Son     Lymphoma Family         Head, Face and Neck      Social History     Socioeconomic History    Marital status: /Civil Union     Spouse name: Not on file    Number of children: Not on file    Years of education: Not on file    Highest education level: Not on file   Occupational History    Occupation: RETIRED   Tobacco Use    Smoking status: Never Smoker    Smokeless tobacco: Never Used   Substance and Sexual Activity    Alcohol use: No     Comment: Social per Allscripts     Drug use: No    Sexual activity: Not on file   Other Topics Concern    Not on file   Social History Narrative    Hx of daily cola consumption (unk cans/day)    Daily tea consumption (unk cups/day)    Multiple organ donor    Patient has living will    [address] 72 in existence    Uses safety equipment - Seatbelts      Social Determinants of Health     Financial Resource Strain:     Difficulty of Paying Living Expenses:    Food Insecurity:     Worried About Running Out of Food in the Last Year:     [address] in the Last Year:    Transportation Needs:     Lack of Transportation (Medical):  Lack of Transportation (Non-Medical):    Physical Activity:     Days of Exercise per Week:     Minutes of Exercise per Session:    Stress:     Feeling of Stress :    Social Connections:     Frequency of Communication with Friends and Family:     Frequency of Social Gatherings with Friends and Family:     Attends Faith Services:     Active Member of Clubs or Organizations:     Attends Club or Organization Meetings:     Marital Status:    Intimate Partner Violence:     Fear of Current or Ex-Partner:     Emotionally Abused:     Physically Abused:     Sexually Abused:      Past Medical History:   Diagnosis Date    Aortic stenosis     Arthritis     Asthma     Coronary artery disease     Diabetes mellitus ([facility]     Diabetic peripheral neuropathy ([facility] )     Last assessed - 1/27/16    Gallbladder disease     Heart attack ([facility] ) 07/1999    Hemorrhoids     Last assessed - 11/16/12    Myocardial infarction ([facility] )     Old myocardial infarction     Type 2 diabetes mellitus with autonomic neuropathy ([facility] )     Unspec long term insulin use status, Last assessed - 6/8/17     Past Surgical History:   Procedure Laterality Date    BUNIONECTOMY      CARDIAC CATHETERIZATION      Carotid Artery, Resolved - 7/1/13    CARDIOVASCULAR STRESS TEST  09/1999    CHOLECYSTECTOMY      COLONOSCOPY  2017    FOOT ARTHRODESIS, MODIFIED CHARANJIT  2016    GALLBLADDER SURGERY  1970    HEMORROIDECTOMY      KNEE ARTHROSCOPY Left 2004    WA COLONOSCOPY FLX DX W/COLLJ SPEC WHEN PFRMD N/A 8/21/2017    Procedure: COLONOSCOPY;  Surgeon: Prabhakar Amin MD;  [facility];   Service: Colorectal    REPAIR KNEE LIGAMENT      REPAIR KNEE LIGAMENT Left 1986    REPAIR KNEE LIGAMENT Right 1988     Allergies   Allergen Reactions    Lyrica [Pregabalin] Swelling    Sulfa Antibiotics Swelling       Current Outpatient Medications:     albuterol (ProAir HFA) 90 mcg/act inhaler, Inhale 2 puffs every 6 (six) hours as needed for wheezing, Disp: 1 Inhaler, Rfl: 1    Bystolic 5 MG tablet, TAKE 1 TABLET BY MOUTH EVERY DAY, Disp: 30 tablet, Rfl: 3    diclofenac-misoprostol (ARTHROTEC 50) 50-0 2 MG per tablet, Take 1 tablet by mouth 2 (two) times a day, Disp: 180 tablet, Rfl: 3    furosemide (LASIX) 20 mg tablet, TAKE 1 TABLET BY MOUTH EVERY DAY, Disp: 90 tablet, Rfl: 3    gabapentin (NEURONTIN) 300 mg capsule, Take 3 tabs AM, 2 tabs afternoon, 3 tabs QHS, Disp: 240 capsule, Rfl: 0    glimepiride (AMARYL) 4 mg tablet, TAKE 1 TABLET BY MOUTH EVERY DAY WITH BREAKFAST, Disp: 180 tablet, Rfl: 1    glucose blood (TRUETEST TEST) test strip, by In Vitro route daily, Disp: , Rfl:     HYDROcodone-acetaminophen (NORCO) 5-325 mg per tablet, Take 1 tablet by mouth every 6 (six) hours as needed for painMax Daily Amount: 4 tablets, Disp: 40 tablet, Rfl: 0    levothyroxine 125 mcg tablet, Take 1 tablet (125 mcg total) by mouth daily in the early morning, Disp: 30 tablet, Rfl: 5    MICROLET LANCETS MISC, by Does not apply route, Disp: , Rfl:     Multiple Vitamin (DAILY VALUE MULTIVITAMIN) TABS, Take 1 tablet by mouth daily, Disp: , Rfl:     Multiple Vitamins-Minerals (PRESERVISION AREDS PO), Take by mouth daily, Disp: , Rfl:     potassium chloride (K-DUR,KLOR-CON) 20 mEq tablet, TAKE 1 TABLET BY MOUTH EVERY DAY, Disp: 30 tablet, Rfl: 6    pravastatin (PRAVACHOL) 10 mg tablet, Take 1 tablet (10 mg total) by mouth daily, Disp: 90 tablet, Rfl: 1    venlafaxine (EFFEXOR) 75 mg tablet, Take 1 tablet (75 mg total) by mouth 3 (three) times a day, Disp: 90 tablet, Rfl: 3    nystatin (MYCOSTATIN) powder, Apply topically 2 (two) times a day, Disp: 60 g, Rfl: 3    Wound Dressings (PruTect) EMUL, Apply 1 Dose topically 2 (two) times a day, Disp: 90 g, Rfl: 3    Review of Systems  Constitutional:     Denies fever, chills ,fatigue ,weakness ,weight loss, weight gain     ENT: Denies earache ,loss of hearing ,nosebleed, nasal discharge,nasal congestion ,sore throat ,hoarseness  Pulmonary: Denies shortness of breath ,cough  ,dyspnea on exertion, orthopnea  ,PND   Cardiovascular:  Denies bradycardia , tachycardia  ,palpations,+1 pitting   Edema of both  legs  Breast:  Denies new or changing breast lumps ,nipple discharge ,nipple changes  Abdomen:  Denies abdominal pain , anorexia , indigestion, nausea, vomiting, constipation, diarrhea  Musculoskeletal: Denies myalgias, arthralgias, joint swelling, joint stiffness , limb pain, limb swelling  Gu: denies dysuria, polyuria  Skin:Red, sore rash in creases of her groin, left bridge of nose edematous with slight erythema of skin , sores on buttocks bilaterally   Neuro: Denies headache, numbness, tingling, confusion, loss of consciousness, dizziness, vertigo  Psychiatric: Denies feelings of depression, suicidal ideation, anxiety, sleep disturbances    OBJECTIVE  /82   Pulse 67   Temp 99 1 °F (37 3 °C)   Ht 5' 1" (1 549 m)   Wt 120 kg (264 lb)   SpO2 95%   BMI 49 88 kg/m²   Constitutional:   NAD, well appearing and well nourished      ENT:   Conjunctiva and lids: no injection, edema, or discharge     Pupils and iris: ANGELINE bilaterally    External inspection of ears and nose: normal without deformities or discharge  Otoscopic exam: Canals patent without erythema  Nasal mucosa, septum and turbinates: Normal or edema or discharge         Oropharynx:  Moist mucosa, normal tongue and tonsils without lesions  No erythema        Pulmonary:Respiratory effort normal rate and rhythm, no increased work of breathing   Auscultation of lungs:  Clear bilaterally with no adventitious breath sounds       Cardiovascular: regular rate and rhythm, S1 and S2, no murmur, +1 pitting  edema  of LE   Bilaterally    Abdomen: Soft and non-distended     Positive bowel sounds      No heptomegaly or splenomegaly      Gu: no suprapubic tenderness or CVA tenderness, no urethral discharge  Lymphatic:  No anterior or posterior cervical lymphadenopathy         Musculoskeletal:  Gait and station: Normal gait      Digits and nails normal without clubbing or cyanosis       Inspection/palpation of joints, bones, and muscles:  No joint tenderness, swelling, full active and passive range of motion       Skin: Normal skin turgor + multiple stage I and II ulcerations along medial aspect of buttocks bilaterally; left bridge of nose with raised area of prior surgery and erythema, ertythemaotous, macerated rash of groin bilaterally   Neuro:    Normal reflexes     Psych:   alert and oriented to person, place and time     normal mood and affect       Assessment/Plan:Diagnoses and all orders for this visit:    Type 2 diabetes mellitus with diabetic autonomic neuropathy, without long-term current use of insulin ([facility])  -     POCT hemoglobin A1c    Facial lesion  -     Ambulatory referral to Plastic Surgery; Future    Cutaneous candidiasis  -     nystatin (MYCOSTATIN) powder; Apply topically 2 (two) times a day    Skin ulcer, limited to breakdown of skin ([facility])  -     Wound Dressings (PruTect) EMUL; Apply 1 Dose topically 2 (two) times a day    Hypercholesterolemia  Comments:  Stable on statin therapy  Orders:  -     Lipid Panel with Direct LDL reflex; Future    Essential hypertension  Comments:  Contineuw tih current antihypertensive therapy  Orders:  -     Comprehensive metabolic panel; Future    Hypothyroidism, unspecified type  Comments:  Continue levothyroxine therapy   Orders:  -     TSH, 3rd generation; Future    Edema, unspecified type  Comments:  Continue daily weights, diuretics, and low sodium diet  Orders:  -     NT-BNP PRO; Future      I will see patient back in 4 mos or sooner prn  hour(s)

## 2021-08-09 DIAGNOSIS — G62.9 NEUROPATHY: ICD-10-CM

## 2021-08-09 RX ORDER — GABAPENTIN 300 MG/1
CAPSULE ORAL
Qty: 240 CAPSULE | Refills: 0 | Status: SHIPPED | OUTPATIENT
Start: 2021-08-09 | End: 2021-09-07

## 2021-08-11 ENCOUNTER — APPOINTMENT (OUTPATIENT)
Dept: LAB | Facility: CLINIC | Age: 76
End: 2021-08-11
Payer: MEDICARE

## 2021-08-11 DIAGNOSIS — R60.9 EDEMA, UNSPECIFIED TYPE: ICD-10-CM

## 2021-08-11 DIAGNOSIS — I10 ESSENTIAL HYPERTENSION: ICD-10-CM

## 2021-08-11 DIAGNOSIS — E78.00 HYPERCHOLESTEROLEMIA: ICD-10-CM

## 2021-08-11 DIAGNOSIS — E03.9 HYPOTHYROIDISM, UNSPECIFIED TYPE: ICD-10-CM

## 2021-08-11 LAB
ALBUMIN SERPL BCP-MCNC: 2.9 G/DL (ref 3.5–5)
ALP SERPL-CCNC: 100 U/L (ref 46–116)
ALT SERPL W P-5'-P-CCNC: 22 U/L (ref 12–78)
ANION GAP SERPL CALCULATED.3IONS-SCNC: 4 MMOL/L (ref 4–13)
AST SERPL W P-5'-P-CCNC: 22 U/L (ref 5–45)
BILIRUB SERPL-MCNC: 0.33 MG/DL (ref 0.2–1)
BUN SERPL-MCNC: 28 MG/DL (ref 5–25)
CALCIUM ALBUM COR SERPL-MCNC: 10.7 MG/DL (ref 8.3–10.1)
CALCIUM SERPL-MCNC: 9.8 MG/DL (ref 8.3–10.1)
CHLORIDE SERPL-SCNC: 112 MMOL/L (ref 100–108)
CHOLEST SERPL-MCNC: 161 MG/DL (ref 50–200)
CO2 SERPL-SCNC: 27 MMOL/L (ref 21–32)
CREAT SERPL-MCNC: 2.03 MG/DL (ref 0.6–1.3)
GFR SERPL CREATININE-BSD FRML MDRD: 23 ML/MIN/1.73SQ M
GLUCOSE P FAST SERPL-MCNC: 86 MG/DL (ref 65–99)
HDLC SERPL-MCNC: 45 MG/DL
LDLC SERPL CALC-MCNC: 86 MG/DL (ref 0–100)
NT-PROBNP SERPL-MCNC: 662 PG/ML
POTASSIUM SERPL-SCNC: 4.6 MMOL/L (ref 3.5–5.3)
PROT SERPL-MCNC: 6.3 G/DL (ref 6.4–8.2)
SODIUM SERPL-SCNC: 143 MMOL/L (ref 136–145)
TRIGL SERPL-MCNC: 151 MG/DL
TSH SERPL DL<=0.05 MIU/L-ACNC: 6.14 UIU/ML (ref 0.36–3.74)

## 2021-08-11 PROCEDURE — 80061 LIPID PANEL: CPT

## 2021-08-11 PROCEDURE — 84443 ASSAY THYROID STIM HORMONE: CPT

## 2021-08-11 PROCEDURE — 80053 COMPREHEN METABOLIC PANEL: CPT

## 2021-08-11 PROCEDURE — 36415 COLL VENOUS BLD VENIPUNCTURE: CPT

## 2021-08-11 PROCEDURE — 83880 ASSAY OF NATRIURETIC PEPTIDE: CPT

## 2021-08-16 ENCOUNTER — OFFICE VISIT (OUTPATIENT)
Dept: CARDIOLOGY CLINIC | Facility: CLINIC | Age: 76
End: 2021-08-16
Payer: MEDICARE

## 2021-08-16 VITALS
SYSTOLIC BLOOD PRESSURE: 122 MMHG | DIASTOLIC BLOOD PRESSURE: 60 MMHG | WEIGHT: 271.6 LBS | HEART RATE: 52 BPM | HEIGHT: 61 IN | BODY MASS INDEX: 51.28 KG/M2

## 2021-08-16 DIAGNOSIS — I25.10 ARTERIOSCLEROSIS OF CORONARY ARTERY: Primary | ICD-10-CM

## 2021-08-16 DIAGNOSIS — E78.00 HYPERCHOLESTEROLEMIA: ICD-10-CM

## 2021-08-16 DIAGNOSIS — N18.9 CHRONIC KIDNEY DISEASE, UNSPECIFIED CKD STAGE: Primary | ICD-10-CM

## 2021-08-16 DIAGNOSIS — E03.9 HYPOTHYROIDISM, UNSPECIFIED TYPE: ICD-10-CM

## 2021-08-16 DIAGNOSIS — I10 ESSENTIAL HYPERTENSION: ICD-10-CM

## 2021-08-16 DIAGNOSIS — I35.0 NONRHEUMATIC AORTIC VALVE STENOSIS: ICD-10-CM

## 2021-08-16 PROCEDURE — 93000 ELECTROCARDIOGRAM COMPLETE: CPT | Performed by: INTERNAL MEDICINE

## 2021-08-16 PROCEDURE — 99214 OFFICE O/P EST MOD 30 MIN: CPT | Performed by: INTERNAL MEDICINE

## 2021-08-16 RX ORDER — LEVOTHYROXINE SODIUM 0.15 MG/1
150 TABLET ORAL
Qty: 30 TABLET | Refills: 5 | Status: ON HOLD | OUTPATIENT
Start: 2021-08-16 | End: 2022-01-20 | Stop reason: SDUPTHER

## 2021-08-16 NOTE — PROGRESS NOTES
Cardiology Consultation     Nasreen Landin  [id number]  1945  HEART & VASCULAR [facility] [address]    1  Arteriosclerosis of coronary artery  POCT ECG   2  Nonrheumatic aortic valve stenosis     3  Essential hypertension     4  Hypercholesterolemia        Chief Complaint   Patient presents with    Follow-up     over 1 yr f/u    Edema     for about 8+ month, been taking pain meds     HPI: Patient feels well, without complaints other than arm pains with rotator cuff tears  No reported chest pain, shortness of breath, palpitations, lightheadedness, syncope, LE edema, orthopnea, PND, or significant weight changes  Patient remains active without any increased fatigue out of the ordinary          Patient Active Problem List   Diagnosis    Arteriosclerosis of coronary artery    Edema    Hypercholesterolemia    Hypertension    Aortic stenosis    Primary osteoarthritis of both knees    Morbid obesity with body mass index (BMI) of 50 0 to 59 9 in adult [facility])    Diabetic [facility]    Type 2 diabetes mellitus with stage 3 chronic kidney disease and hypertension ([facility]    Chronic renal disease, stage IV (HCC)     Past Medical History:   Diagnosis Date    Aortic stenosis     Arthritis     Asthma     Coronary artery disease     Diabetes mellitus ([facility]     Diabetic peripheral neuropathy ([facility] )     Last assessed - 1/27/16    Gallbladder disease     Heart attack ([facility] ) 07/1999    Hemorrhoids     Last assessed - 11/16/12    Myocardial infarction [facility])     Old myocardial infarction     Type 2 diabetes mellitus with autonomic neuropathy (HCC)     Unspec long term insulin use status, Last assessed - 6/8/17     Social History     Socioeconomic History    Marital status: /Civil Union     Spouse name: Not on file    Number of children: Not on file    Years of education: Not on file    Highest education level: Not on file   Occupational History    Occupation: RETIRED   Tobacco Use    Smoking status: Never Smoker    Smokeless tobacco: Never Used   Substance and Sexual Activity    Alcohol use: No     Comment: Social per Allscripts     Drug use: No    Sexual activity: Not on file   Other Topics Concern    Not on file   Social History Narrative    Hx of daily cola consumption (unk cans/day)    Daily tea consumption (unk cups/day)    Multiple organ donor    Patient has living will    Power of  in existence    Uses safety equipment - Seatbelts      Social Determinants of Health     Financial Resource Strain:     Difficulty of Paying Living Expenses:    Food Insecurity:     Worried About Running Out of Food in the Last Year:     Ran Out of Food in the Last Year:    Transportation Needs:     Lack of Transportation (Medical):      Lack of Transportation (Non-Medical):    Physical Activity:     Days of Exercise per Week:     Minutes of Exercise per Session:    Stress:     Feeling of Stress :    Social Connections:     Frequency of Communication with Friends and Family:     Frequency of Social Gatherings with Friends and Family:     Attends [employer] Services:     Active Member of Clubs or Organizations:     Attends Club or Organization Meetings:     Marital Status:    Intimate Partner Violence:     Fear of Current or Ex-Partner:     Emotionally Abused:     Physically Abused:     Sexually Abused:       Family History   Problem Relation Age of Onset    Hypertension Father     Diabetes Father     Cancer Father         Throat    Arthritis Father     Stroke Mother     Diabetes Maternal Grandmother     Heart disease Maternal Grandfather     Diabetes Son     Lymphoma Family         Head, Face and Neck      Past Surgical History:   Procedure Laterality Date    BUNIONECTOMY      CARDIAC CATHETERIZATION      Carotid Artery, Resolved - 7/1/13    CARDIOVASCULAR STRESS TEST  09/1999    CHOLECYSTECTOMY      COLONOSCOPY  2017    FOOT ARTHRODESIS, MODIFIED DONOHUE  2016    GALLBLADDER SURGERY  1970    HEMORROIDECTOMY      KNEE ARTHROSCOPY Left 2004    VT COLONOSCOPY FLX DX W/COLLJ [facility] WHEN PFRMD N/A 8/21/2017    Procedure: COLONOSCOPY;  Surgeon: Weston Alvarez MD;  [facility];   Service: Colorectal    REPAIR KNEE LIGAMENT      REPAIR KNEE LIGAMENT Left 1986    REPAIR KNEE LIGAMENT Right 1988       Current Outpatient Medications:     albuterol (ProAir HFA) 90 mcg/act inhaler, Inhale 2 puffs every 6 (six) hours as needed for wheezing, Disp: 1 Inhaler, Rfl: 1    Bystolic 5 MG tablet, TAKE 1 TABLET BY MOUTH EVERY DAY, Disp: 30 tablet, Rfl: 3    diclofenac-misoprostol (ARTHROTEC 50) 50-0 2 MG per tablet, Take 1 tablet by mouth 2 (two) times a day, Disp: 180 tablet, Rfl: 3    furosemide (LASIX) 20 mg tablet, TAKE 1 TABLET BY MOUTH EVERY DAY, Disp: 90 tablet, Rfl: 3    gabapentin (NEURONTIN) 300 mg capsule, TAKE 3 CAPSULES BY MOUTH EVERY MORNING , THEN TAKE 2 CAPSULES EVERY DAY IN THE AFTERNOON, THEN TAKE 3 CAPSULES AT BEDTIME , Disp: 240 capsule, Rfl: 0    glimepiride (AMARYL) 4 mg tablet, TAKE 1 TABLET BY MOUTH EVERY DAY WITH BREAKFAST, Disp: 180 tablet, Rfl: 1    glucose blood (TRUETEST TEST) test strip, by In Vitro route daily, Disp: , Rfl:     HYDROcodone-acetaminophen (NORCO) 5-325 mg per tablet, Take 1 tablet by mouth every 6 (six) hours as needed for painMax Daily Amount: 4 tablets, Disp: 40 tablet, Rfl: 0    levothyroxine 150 mcg tablet, Take 1 tablet (150 mcg total) by mouth daily in the early morning, Disp: 30 tablet, Rfl: 5    MICROLET LANCETS MISC, by Does not apply route, Disp: , Rfl:     Multiple Vitamin (DAILY VALUE MULTIVITAMIN) TABS, Take 1 tablet by mouth daily, Disp: , Rfl:     Multiple Vitamins-Minerals (PRESERVISION AREDS PO), Take by mouth daily, Disp: , Rfl:     potassium chloride (K-DUR,KLOR-CON) 20 mEq tablet, TAKE 1 TABLET BY MOUTH EVERY DAY, Disp: 30 tablet, Rfl: 6    pravastatin (PRAVACHOL) 10 mg tablet, Take 1 tablet (10 mg total) by mouth daily, Disp: 90 tablet, Rfl: 1    venlafaxine (EFFEXOR) 75 mg tablet, Take 1 tablet (75 mg total) by mouth 3 (three) times a day, Disp: 90 tablet, Rfl: 3    Wound Dressings (PruTect) EMUL, Apply 1 Dose topically 2 (two) times a day, Disp: 90 g, Rfl: 3    nystatin (MYCOSTATIN) powder, Apply topically 2 (two) times a day (Patient not taking: Reported on 8/16/2021), Disp: 60 g, Rfl: 3  Allergies   Allergen Reactions    Lyrica [Pregabalin] Swelling    Sulfa Antibiotics Swelling     Vitals:    08/16/21 1356   BP: 122/60   BP Location: Right arm   Patient Position: Sitting   Cuff Size: Large   Pulse: (!) 52   Weight: 123 kg (271 lb 9 6 oz)   Height: 5' 1" (1 549 m)       Labs:  Appointment on 08/11/2021   Component Date Value    TSH 3RD GENERATON 08/11/2021 6 140*    Sodium 08/11/2021 143     Potassium 08/11/2021 4 6     Chloride 08/11/2021 112*    CO2 08/11/2021 27     ANION GAP 08/11/2021 4     BUN 08/11/2021 28*    Creatinine 08/11/2021 2 03*    Glucose, Fasting 08/11/2021 86     Calcium 08/11/2021 9 8     Corrected Calcium 08/11/2021 10 7*    AST 08/11/2021 22     ALT 08/11/2021 22     Alkaline Phosphatase 08/11/2021 100     Total Protein 08/11/2021 6 3*    Albumin 08/11/2021 2 9*    Total Bilirubin 08/11/2021 0 33     eGFR 08/11/2021 23     Cholesterol 08/11/2021 161     Triglycerides 08/11/2021 151*    HDL, Direct 08/11/2021 45     LDL Calculated 08/11/2021 86     NT-proBNP 08/11/2021 662*   Office Visit on 08/04/2021   Component Date Value    Hemoglobin A1C 08/04/2021 6 5    Appointment on 04/08/2021   Component Date Value    TSH 3RD GENERATON 04/08/2021 4 898*    Sodium 04/08/2021 145     Potassium 04/08/2021 4 2     Chloride 04/08/2021 105     CO2 04/08/2021 32     ANION GAP 04/08/2021 8     BUN 04/08/2021 29*    Creatinine 04/08/2021 1 84*    Glucose, Fasting 04/08/2021 100*    Calcium 04/08/2021 10 0     Corrected Calcium 04/08/2021 10 6*    AST 04/08/2021 20     ALT 04/08/2021 28     Alkaline Phosphatase 04/08/2021 85     Total Protein 04/08/2021 6 5     Albumin 04/08/2021 3 3*    Total Bilirubin 04/08/2021 0 47     eGFR 04/08/2021 26     Cholesterol 04/08/2021 187     Triglycerides 04/08/2021 143     HDL, Direct 04/08/2021 52     LDL Calculated 04/08/2021 106*   Telephone on 03/09/2021   Component Date Value    Right Eye Diabetic Retin* 02/20/2018 None     Left Eye Diabetic Retino* 02/20/2018 None    Office Visit on 03/09/2021   Component Date Value    Hemoglobin A1C 03/09/2021 6 9*     Lab Results   Component Value Date    CHOL 184 09/19/2015    TRIG 151 (H) 08/11/2021    TRIG 113 09/19/2015    HDL 45 08/11/2021    HDL 47 09/19/2015     Imaging: No results found  Review of Systems:  Review of Systems   Constitutional: Negative for activity change, appetite change, chills, diaphoresis, fatigue and unexpected weight change  HENT: Negative for hearing loss, nosebleeds and sore throat  Eyes: Negative for photophobia and visual disturbance  Respiratory: Negative for cough, chest tightness, shortness of breath and wheezing  Cardiovascular: Negative for chest pain, palpitations and leg swelling  Gastrointestinal: Negative for abdominal pain, diarrhea, nausea and vomiting  Endocrine: Negative for polyuria  Genitourinary: Negative for dysuria, frequency and hematuria  Musculoskeletal: Negative for arthralgias, back pain, gait problem and neck pain  Skin: Negative for pallor and rash  Neurological: Negative for dizziness, syncope and headaches  Hematological: Does not bruise/bleed easily  Psychiatric/Behavioral: Negative for behavioral problems and confusion  Physical Exam:  Physical Exam  Vitals reviewed  Constitutional:       Appearance: She is well-developed  She is not diaphoretic     HENT:      Head: Normocephalic and atraumatic  Nose: Nose normal    Eyes:      General: No scleral icterus  Pupils: Pupils are equal, round, and reactive to light  Neck:      Vascular: No JVD  Cardiovascular:      Rate and Rhythm: Normal rate and regular rhythm  Heart sounds: Murmur heard  Systolic murmur is present with a grade of 2/6  No friction rub  No gallop  Pulmonary:      Effort: Pulmonary effort is normal  No respiratory distress  Breath sounds: Normal breath sounds  No wheezing or rales  Abdominal:      General: Bowel sounds are normal  There is no distension  Palpations: Abdomen is soft  Tenderness: There is no abdominal tenderness  Musculoskeletal:         General: No deformity  Normal range of motion  Cervical back: Normal range of motion and neck supple  Skin:     General: Skin is warm and dry  Findings: No rash  Neurological:      Mental Status: She is alert and oriented to person, place, and time  Cranial Nerves: No cranial nerve deficit  Psychiatric:         Behavior: Behavior normal         Blood pressure 122/60, pulse (!) 52, height 5' 1" (1 549 m), weight 123 kg (271 lb 9 6 oz)  EKG:  Sinus bradycardia  Left axis deviation  Left bundle branch block  Abnormal ECG    Nuclear stress test: 12/2017 - personally reviewed, small fixed inferoapical defect consistent with prior MI, normal EF  Echo: 12/2017 - personally reviewed, normal EF, no wall motion abnormalities, G1DD, mild MR, very mild AS (mean gradient 8 mmHg)    Discussion/Summary:  1) LE edema: likely resultant from G1DD  Agree with continued furosemide and spironolactone, which seems to be improving her symptoms  Have advised patient to check her weight every day to help check to see how her fluid removal is going  We decreased dose to daily for furosemide since Cr was elevated at 1 89, recheck of BMP revealed 1 99 with a BUN Of 31 suggesting dehydration    We stopped furosemide rechecked BMP in 2 weeks - which showed improved Cr - would use furosemide sparingly to prevent TIESHA  Currently stable and euvolemic  2) Mild aortic stenosis: inconsequential at this time  Continue to follow for symptoms to suggest progression - recheck echo after next year's visit  3) h/o CAD with prior MI: seen on nuclear stress test in Dec 2017 as well  No ischemia seen on that study  Continue medical management with bystolic  No new symptoms to suggest progression  4) HTN: well controlled, continue current regimen  5) HLD: not currently on statin  Currently at goal at 115 in Jan 2020  LDL 86 in Aug 2021

## 2021-08-16 NOTE — PATIENT INSTRUCTIONS
Coronary Artery Disease   AMBULATORY CARE:   Coronary artery disease (CAD)  is narrowing of the arteries to your heart caused by a buildup of plaque  Plaque is made up of cholesterol and other substances  The narrowing in your arteries decreases the amount of blood that can flow to your heart  This causes your heart to get less oxygen  You may not have any symptoms of CAD  It is important for you to manage CAD even if you feel well  CAD can lead to a heart attack if it is not managed  Common symptoms include the following:   · Chest pain (angina), causing burning, squeezing, or crushing tightness in your chest    · Pain that spreads to your neck, jaw, or shoulder blade    · Nausea, vomiting, sweating, fainting, and hands and feet that are cold to the touch    Call 911 for any of the following:   · You have any of the following signs of a heart attack:      ? Squeezing, pressure, or pain in your chest    ? You may  also have any of the following:     § Discomfort or pain in your back, neck, jaw, stomach, or arm    § Shortness of breath    § Nausea or vomiting    § Lightheadedness or a sudden cold sweat      Contact your healthcare provider if:   · You have chest pain that is more frequent, or you have chest pain at rest     · You have questions or concerns about your condition or care  Medicines used to treat CAD:   · Blood pressure medicines  are given to lower your blood pressure  ACE inhibitors help keep your blood vessels relaxed and open to help keep blood flowing into your heart  Beta-blockers keep your heart pumping strongly and regularly so it does not work too hard to get oxygen  · Cholesterol medicines  help lower blood cholesterol levels  · Nitrates , such as nitroglycerin, relax the arteries of your heart so it gets more oxygen  They help to relieve your chest pain  · Antiplatelets , such as aspirin, help prevent blood clots  Take your antiplatelet medicine exactly as directed   These medicines make it more likely for you to bleed or bruise  If you are told to take aspirin, do not take acetaminophen or ibuprofen instead  · Blood thinners  keep clots from forming in your blood  Clots may cause heart attacks, strokes, or death  This medicine makes it more likely for you to bleed or bruise  · Do not take certain medicines without asking your healthcare provider first   These include NSAIDs, herbal or vitamin supplements, or hormones (estrogen or progestin)  Procedures used to treat CAD:   · Angioplasty  may be done to open an artery blocked by plaque  A tube with a balloon on the end is threaded into the blocked artery  Once the tube is in the artery, the balloon is inflated  As the balloon inflates, it presses the plaque against the artery wall to open the artery  A stent may be placed in your artery to keep it open  · Coronary artery bypass graft surgery (CABG)  is open heart surgery  Healthcare providers take arteries or veins from other areas in your body and use them to bypass or go around the blocked arteries of your heart  Cardiac rehabilitation:  Your healthcare provider may recommend that you attend cardiac rehabilitation (rehab)  This is a program run by specialists who will help you safely strengthen your heart and reduce the risk for more heart disease  The plan includes exercise, relaxation, stress management, and heart-healthy nutrition  Healthcare providers will also check to make sure any medicines you are taking are working  Manage CAD to prevent a heart attack:   · Do not smoke  Nicotine and other chemicals in cigarettes and cigars can cause heart and lung damage  Ask your healthcare provider for information if you currently smoke and need help to quit  E-cigarettes or smokeless tobacco still contain nicotine  Talk to your healthcare provider before you use these products  · Exercise regularly    Exercise at least 30 minutes each day, on most days of the week  Exercise helps to lower high cholesterol and high blood pressure  It can also help you maintain a healthy weight  Ask your healthcare provider about the kind of exercise you should do and how to get started  · Maintain a healthy weight  If you are overweight, talk to your healthcare provider about how to lose weight  A weight loss of 10% can improve your heart health  · Eat heart-healthy foods  Include fresh fruits and vegetables in your meal plan  Choose low-fat foods, such as skim or 1% fat milk, low-fat cheese and yogurt, fish, chicken (without skin), and lean meats  Eat two 4-ounce servings of fish high in omega-3 fats each week, such as salmon, fresh tuna, and herring  Do not eat foods that are high in sodium, such as canned foods, potato chips, salty snacks, and cold cuts  Put less table salt on your food  · Limit or do not drink alcohol  A drink of alcohol is 12 ounces of beer, 5 ounces of wine, or 1½ ounces of liquor  · Manage other health conditions  Follow your healthcare provider's advice on how to manage other conditions that can affect your heart health  These include diabetes, high blood pressure, and high cholesterol  You may need to take medicines for these conditions and make other lifestyle changes  · Ask if you should have a flu vaccine  The flu can be dangerous for a person who has CAD  The flu vaccine is available every year in the fall  Follow up with your healthcare provider as directed: You may need to return for other tests  You may also be referred to a cardiac surgeon  Write down your questions so you remember to ask them during your visits  © Copyright [employer] 2021 Information is for End User's use only and may not be sold, redistributed or otherwise used for commercial purposes   All illustrations and images included in CareNotes® are the copyrighted property of A D A M [employer] Inc  or Jorge Morillo   The above information is an educational aid only  It is not intended as medical advice for individual conditions or treatments  Talk to your doctor, nurse or pharmacist before following any medical regimen to see if it is safe and effective for you

## 2021-08-30 DIAGNOSIS — F32.A DEPRESSION, UNSPECIFIED DEPRESSION TYPE: ICD-10-CM

## 2021-08-30 RX ORDER — VENLAFAXINE 75 MG/1
75 TABLET ORAL 3 TIMES DAILY
Qty: 90 TABLET | Refills: 3 | Status: SHIPPED | OUTPATIENT
Start: 2021-08-30 | End: 2022-03-29 | Stop reason: SDUPTHER

## 2021-09-01 DIAGNOSIS — L89.302 PRESSURE INJURY OF BUTTOCK, STAGE 2, UNSPECIFIED LATERALITY (HCC): Primary | ICD-10-CM

## 2021-09-06 DIAGNOSIS — G62.9 NEUROPATHY: ICD-10-CM

## 2021-09-07 RX ORDER — GABAPENTIN 300 MG/1
CAPSULE ORAL
Qty: 240 CAPSULE | Refills: 0 | Status: SHIPPED | OUTPATIENT
Start: 2021-09-07 | End: 2021-10-11 | Stop reason: SDUPTHER

## 2021-09-21 ENCOUNTER — TELEPHONE (OUTPATIENT)
Dept: FAMILY MEDICINE CLINIC | Facility: CLINIC | Age: 76
End: 2021-09-21

## 2021-09-21 NOTE — TELEPHONE ENCOUNTER
Bean Lackey went to see her today   Both blisters on both legs are open wounds now     She wants to speak to the Dr   About this      Please call

## 2021-09-22 NOTE — TELEPHONE ENCOUNTER
Spoke with Jolene Cuellar [facility] nurse - she will apply calcium algenate and aby wrap to the blisters for now and if no improvement she will get wound care nurse to go out from [facility]  Is the wound care okay with you? She needs a verbal okay from you  I will call her back with your answer   Thank you

## 2021-09-22 NOTE — TELEPHONE ENCOUNTER
Can we get the wound care nurse to see her? Can you reach out to [facility] and see if this division can see her?

## 2021-10-08 DIAGNOSIS — M25.511 BILATERAL SHOULDER PAIN, UNSPECIFIED CHRONICITY: ICD-10-CM

## 2021-10-08 DIAGNOSIS — M25.512 BILATERAL SHOULDER PAIN, UNSPECIFIED CHRONICITY: ICD-10-CM

## 2021-10-08 RX ORDER — HYDROCODONE BITARTRATE AND ACETAMINOPHEN 5; 325 MG/1; MG/1
1 TABLET ORAL EVERY 6 HOURS PRN
Qty: 40 TABLET | Refills: 0 | Status: SHIPPED | OUTPATIENT
Start: 2021-10-08 | End: 2021-12-31 | Stop reason: HOSPADM

## 2021-10-11 DIAGNOSIS — G62.9 NEUROPATHY: ICD-10-CM

## 2021-10-11 DIAGNOSIS — E78.00 HYPERCHOLESTEROLEMIA: ICD-10-CM

## 2021-10-11 RX ORDER — PRAVASTATIN SODIUM 10 MG
10 TABLET ORAL DAILY
Qty: 90 TABLET | Refills: 1 | Status: SHIPPED | OUTPATIENT
Start: 2021-10-11 | End: 2022-02-28

## 2021-10-11 RX ORDER — GABAPENTIN 300 MG/1
CAPSULE ORAL
Qty: 240 CAPSULE | Refills: 0 | Status: SHIPPED | OUTPATIENT
Start: 2021-10-11 | End: 2021-11-15 | Stop reason: SDUPTHER

## 2021-10-21 ENCOUNTER — TELEPHONE (OUTPATIENT)
Dept: NEPHROLOGY | Facility: CLINIC | Age: 76
End: 2021-10-21

## 2021-10-27 ENCOUNTER — TELEPHONE (OUTPATIENT)
Dept: FAMILY MEDICINE CLINIC | Facility: CLINIC | Age: 76
End: 2021-10-27

## 2021-10-28 DIAGNOSIS — Z78.9 NEED FOR FOLLOW-UP BY SOCIAL WORKER: Primary | ICD-10-CM

## 2021-11-15 DIAGNOSIS — G62.9 NEUROPATHY: ICD-10-CM

## 2021-11-15 RX ORDER — GABAPENTIN 300 MG/1
CAPSULE ORAL
Qty: 240 CAPSULE | Refills: 0 | Status: SHIPPED | OUTPATIENT
Start: 2021-11-15 | End: 2022-03-07 | Stop reason: SDUPTHER

## 2021-11-18 DIAGNOSIS — I10 ESSENTIAL HYPERTENSION: ICD-10-CM

## 2021-11-18 RX ORDER — FUROSEMIDE 20 MG/1
TABLET ORAL
Qty: 90 TABLET | Refills: 3 | Status: ON HOLD | OUTPATIENT
Start: 2021-11-18 | End: 2022-01-20 | Stop reason: SDUPTHER

## 2021-11-23 ENCOUNTER — TELEPHONE (OUTPATIENT)
Dept: FAMILY MEDICINE CLINIC | Facility: CLINIC | Age: 76
End: 2021-11-23

## 2021-11-23 DIAGNOSIS — R19.7 DIARRHEA, UNSPECIFIED TYPE: Primary | ICD-10-CM

## 2021-11-23 RX ORDER — DIPHENOXYLATE HYDROCHLORIDE AND ATROPINE SULFATE 2.5; .025 MG/1; MG/1
1 TABLET ORAL 4 TIMES DAILY PRN
Qty: 40 TABLET | Refills: 1 | Status: SHIPPED | OUTPATIENT
Start: 2021-11-23 | End: 2022-03-08

## 2021-12-22 ENCOUNTER — HOSPITAL ENCOUNTER (INPATIENT)
Facility: HOSPITAL | Age: 76
LOS: 8 days | Discharge: NURSING, REHAB OR LONG-TERM CARE | DRG: 287 | End: 2021-12-31
Attending: EMERGENCY MEDICINE | Admitting: INTERNAL MEDICINE
Payer: MEDICARE

## 2021-12-22 ENCOUNTER — APPOINTMENT (EMERGENCY)
Dept: CT IMAGING | Facility: HOSPITAL | Age: 76
DRG: 287 | End: 2021-12-22
Payer: MEDICARE

## 2021-12-22 ENCOUNTER — APPOINTMENT (OUTPATIENT)
Dept: CT IMAGING | Facility: HOSPITAL | Age: 76
DRG: 287 | End: 2021-12-22
Payer: MEDICARE

## 2021-12-22 ENCOUNTER — APPOINTMENT (EMERGENCY)
Dept: RADIOLOGY | Facility: HOSPITAL | Age: 76
DRG: 287 | End: 2021-12-22
Payer: MEDICARE

## 2021-12-22 DIAGNOSIS — I50.21 ACUTE SYSTOLIC HEART FAILURE (HCC): ICD-10-CM

## 2021-12-22 DIAGNOSIS — R29.6 FALLS FREQUENTLY: ICD-10-CM

## 2021-12-22 DIAGNOSIS — S09.90XA CLOSED HEAD INJURY: ICD-10-CM

## 2021-12-22 DIAGNOSIS — N18.4 CHRONIC RENAL DISEASE, STAGE IV (HCC): ICD-10-CM

## 2021-12-22 DIAGNOSIS — S00.83XA CONTUSION OF FACE, INITIAL ENCOUNTER: ICD-10-CM

## 2021-12-22 DIAGNOSIS — N18.30 TYPE 2 DIABETES MELLITUS WITH STAGE 3 CHRONIC KIDNEY DISEASE AND HYPERTENSION (HCC): ICD-10-CM

## 2021-12-22 DIAGNOSIS — R77.8 ELEVATED TROPONIN: ICD-10-CM

## 2021-12-22 DIAGNOSIS — R55 SYNCOPE: ICD-10-CM

## 2021-12-22 DIAGNOSIS — I50.41 ACUTE COMBINED SYSTOLIC AND DIASTOLIC HEART FAILURE (HCC): Primary | ICD-10-CM

## 2021-12-22 DIAGNOSIS — I12.9 TYPE 2 DIABETES MELLITUS WITH STAGE 3 CHRONIC KIDNEY DISEASE AND HYPERTENSION (HCC): ICD-10-CM

## 2021-12-22 DIAGNOSIS — E11.22 TYPE 2 DIABETES MELLITUS WITH STAGE 3 CHRONIC KIDNEY DISEASE AND HYPERTENSION (HCC): ICD-10-CM

## 2021-12-22 PROBLEM — R26.2 AMBULATORY DYSFUNCTION: Status: ACTIVE | Noted: 2021-12-22

## 2021-12-22 PROBLEM — D72.829 LEUKOCYTOSIS: Status: ACTIVE | Noted: 2021-12-22

## 2021-12-22 PROBLEM — R79.89 ELEVATED TROPONIN: Status: ACTIVE | Noted: 2021-12-22

## 2021-12-22 LAB
2HR DELTA HS TROPONIN: 23 NG/L
4HR DELTA HS TROPONIN: 47 NG/L
ALBUMIN SERPL BCP-MCNC: 3 G/DL (ref 3.5–5)
ALP SERPL-CCNC: 93 U/L (ref 46–116)
ALT SERPL W P-5'-P-CCNC: 42 U/L (ref 12–78)
ANION GAP SERPL CALCULATED.3IONS-SCNC: 9 MMOL/L (ref 4–13)
AST SERPL W P-5'-P-CCNC: 70 U/L (ref 5–45)
BASOPHILS # BLD AUTO: 0.04 THOUSANDS/ΜL (ref 0–0.1)
BASOPHILS NFR BLD AUTO: 0 % (ref 0–1)
BILIRUB SERPL-MCNC: 0.6 MG/DL (ref 0.2–1)
BUN SERPL-MCNC: 16 MG/DL (ref 5–25)
CALCIUM ALBUM COR SERPL-MCNC: 10.1 MG/DL (ref 8.3–10.1)
CALCIUM SERPL-MCNC: 9.3 MG/DL (ref 8.3–10.1)
CARDIAC TROPONIN I PNL SERPL HS: 28 NG/L
CARDIAC TROPONIN I PNL SERPL HS: 51 NG/L
CARDIAC TROPONIN I PNL SERPL HS: 75 NG/L
CHLORIDE SERPL-SCNC: 106 MMOL/L (ref 100–108)
CO2 SERPL-SCNC: 28 MMOL/L (ref 21–32)
CREAT SERPL-MCNC: 1.38 MG/DL (ref 0.6–1.3)
EOSINOPHIL # BLD AUTO: 0.26 THOUSAND/ΜL (ref 0–0.61)
EOSINOPHIL NFR BLD AUTO: 2 % (ref 0–6)
ERYTHROCYTE [DISTWIDTH] IN BLOOD BY AUTOMATED COUNT: 15.4 % (ref 11.6–15.1)
GFR SERPL CREATININE-BSD FRML MDRD: 36 ML/MIN/1.73SQ M
GLUCOSE SERPL-MCNC: 83 MG/DL (ref 65–140)
HCT VFR BLD AUTO: 43.6 % (ref 34.8–46.1)
HGB BLD-MCNC: 14 G/DL (ref 11.5–15.4)
IMM GRANULOCYTES # BLD AUTO: 0.1 THOUSAND/UL (ref 0–0.2)
IMM GRANULOCYTES NFR BLD AUTO: 1 % (ref 0–2)
LYMPHOCYTES # BLD AUTO: 1.2 THOUSANDS/ΜL (ref 0.6–4.47)
LYMPHOCYTES NFR BLD AUTO: 8 % (ref 14–44)
MCH RBC QN AUTO: 30.6 PG (ref 26.8–34.3)
MCHC RBC AUTO-ENTMCNC: 32.1 G/DL (ref 31.4–37.4)
MCV RBC AUTO: 95 FL (ref 82–98)
MONOCYTES # BLD AUTO: 1.05 THOUSAND/ΜL (ref 0.17–1.22)
MONOCYTES NFR BLD AUTO: 7 % (ref 4–12)
NEUTROPHILS # BLD AUTO: 12.39 THOUSANDS/ΜL (ref 1.85–7.62)
NEUTS SEG NFR BLD AUTO: 82 % (ref 43–75)
NRBC BLD AUTO-RTO: 0 /100 WBCS
PLATELET # BLD AUTO: 232 THOUSANDS/UL (ref 149–390)
PMV BLD AUTO: 12.6 FL (ref 8.9–12.7)
POTASSIUM SERPL-SCNC: 5.6 MMOL/L (ref 3.5–5.3)
PROT SERPL-MCNC: 6.4 G/DL (ref 6.4–8.2)
RBC # BLD AUTO: 4.57 MILLION/UL (ref 3.81–5.12)
SODIUM SERPL-SCNC: 143 MMOL/L (ref 136–145)
WBC # BLD AUTO: 15.04 THOUSAND/UL (ref 4.31–10.16)

## 2021-12-22 PROCEDURE — 85025 COMPLETE CBC W/AUTO DIFF WBC: CPT | Performed by: EMERGENCY MEDICINE

## 2021-12-22 PROCEDURE — G1004 CDSM NDSC: HCPCS

## 2021-12-22 PROCEDURE — 1123F ACP DISCUSS/DSCN MKR DOCD: CPT | Performed by: INTERNAL MEDICINE

## 2021-12-22 PROCEDURE — 99285 EMERGENCY DEPT VISIT HI MDM: CPT

## 2021-12-22 PROCEDURE — 84484 ASSAY OF TROPONIN QUANT: CPT | Performed by: EMERGENCY MEDICINE

## 2021-12-22 PROCEDURE — 93005 ELECTROCARDIOGRAM TRACING: CPT

## 2021-12-22 PROCEDURE — 99285 EMERGENCY DEPT VISIT HI MDM: CPT | Performed by: EMERGENCY MEDICINE

## 2021-12-22 PROCEDURE — 99220 PR INITIAL OBSERVATION CARE/DAY 70 MINUTES: CPT

## 2021-12-22 PROCEDURE — 71045 X-RAY EXAM CHEST 1 VIEW: CPT

## 2021-12-22 PROCEDURE — 70486 CT MAXILLOFACIAL W/O DYE: CPT

## 2021-12-22 PROCEDURE — 36415 COLL VENOUS BLD VENIPUNCTURE: CPT | Performed by: EMERGENCY MEDICINE

## 2021-12-22 PROCEDURE — 71250 CT THORAX DX C-: CPT

## 2021-12-22 PROCEDURE — 70450 CT HEAD/BRAIN W/O DYE: CPT

## 2021-12-22 PROCEDURE — 80053 COMPREHEN METABOLIC PANEL: CPT | Performed by: EMERGENCY MEDICINE

## 2021-12-22 PROCEDURE — 83735 ASSAY OF MAGNESIUM: CPT

## 2021-12-22 RX ORDER — PRAVASTATIN SODIUM 10 MG
10 TABLET ORAL DAILY
Status: DISCONTINUED | OUTPATIENT
Start: 2021-12-23 | End: 2021-12-31 | Stop reason: HOSPADM

## 2021-12-22 RX ORDER — ASPIRIN 81 MG/1
324 TABLET, CHEWABLE ORAL ONCE
Status: COMPLETED | OUTPATIENT
Start: 2021-12-22 | End: 2021-12-22

## 2021-12-22 RX ORDER — GABAPENTIN 300 MG/1
600 CAPSULE ORAL 3 TIMES DAILY
Status: DISCONTINUED | OUTPATIENT
Start: 2021-12-23 | End: 2021-12-31 | Stop reason: HOSPADM

## 2021-12-22 RX ORDER — NYSTATIN 100000 [USP'U]/G
POWDER TOPICAL 2 TIMES DAILY
Status: DISCONTINUED | OUTPATIENT
Start: 2021-12-23 | End: 2021-12-31 | Stop reason: HOSPADM

## 2021-12-22 RX ORDER — HEPARIN SODIUM 5000 [USP'U]/ML
5000 INJECTION, SOLUTION INTRAVENOUS; SUBCUTANEOUS EVERY 8 HOURS SCHEDULED
Status: DISCONTINUED | OUTPATIENT
Start: 2021-12-22 | End: 2021-12-31 | Stop reason: HOSPADM

## 2021-12-22 RX ORDER — NEBIVOLOL 5 MG/1
5 TABLET ORAL DAILY
Status: DISCONTINUED | OUTPATIENT
Start: 2021-12-23 | End: 2021-12-23

## 2021-12-22 RX ORDER — LEVOTHYROXINE SODIUM 0.15 MG/1
150 TABLET ORAL
Status: DISCONTINUED | OUTPATIENT
Start: 2021-12-23 | End: 2021-12-31 | Stop reason: HOSPADM

## 2021-12-22 RX ORDER — FUROSEMIDE 20 MG/1
20 TABLET ORAL DAILY
Status: DISCONTINUED | OUTPATIENT
Start: 2021-12-23 | End: 2021-12-23

## 2021-12-22 RX ORDER — VENLAFAXINE 37.5 MG/1
75 TABLET ORAL 3 TIMES DAILY
Status: DISCONTINUED | OUTPATIENT
Start: 2021-12-22 | End: 2021-12-31 | Stop reason: HOSPADM

## 2021-12-22 RX ADMIN — ASPIRIN 81 MG CHEWABLE TABLET 324 MG: 81 TABLET CHEWABLE at 22:12

## 2021-12-22 NOTE — Clinical Note
Prepped: right groin and right radial  Prepped with: ChloraPrep and Betadine  The site was clipped  The patient was draped   Groin prepped with chloroprep and betadine, radial site with chloroprep

## 2021-12-22 NOTE — Clinical Note
The right coronary artery was injected and visualized  Multiple views of the injected vessel were taken

## 2021-12-22 NOTE — Clinical Note
The left coronary artery was injected and visualized  Multiple views of the injected vessel were taken

## 2021-12-23 ENCOUNTER — APPOINTMENT (OUTPATIENT)
Dept: NON INVASIVE DIAGNOSTICS | Facility: HOSPITAL | Age: 76
DRG: 287 | End: 2021-12-23
Payer: MEDICARE

## 2021-12-23 PROBLEM — E16.2 HYPOGLYCEMIA: Status: ACTIVE | Noted: 2021-12-23

## 2021-12-23 LAB
ANION GAP SERPL CALCULATED.3IONS-SCNC: 8 MMOL/L (ref 4–13)
AORTIC ROOT: 3.2 CM
APICAL FOUR CHAMBER EJECTION FRACTION: 43 %
ASCENDING AORTA: 3.6 CM
ATRIAL RATE: 99 BPM
AV LVOT PEAK GRADIENT: 2 MMHG
AV PEAK GRADIENT: 10 MMHG
BASOPHILS # BLD AUTO: 0.05 THOUSANDS/ΜL (ref 0–0.1)
BASOPHILS NFR BLD AUTO: 0 % (ref 0–1)
BUN SERPL-MCNC: 18 MG/DL (ref 5–25)
CALCIUM SERPL-MCNC: 9.3 MG/DL (ref 8.3–10.1)
CHLORIDE SERPL-SCNC: 108 MMOL/L (ref 100–108)
CO2 SERPL-SCNC: 28 MMOL/L (ref 21–32)
CREAT SERPL-MCNC: 1.69 MG/DL (ref 0.6–1.3)
CREAT UR-MCNC: 47.9 MG/DL
DOP CALC RVOT PEAK VEL: 0.47 M/S
E WAVE DECELERATION TIME: 192 MS
EOSINOPHIL # BLD AUTO: 0.29 THOUSAND/ΜL (ref 0–0.61)
EOSINOPHIL NFR BLD AUTO: 2 % (ref 0–6)
ERYTHROCYTE [DISTWIDTH] IN BLOOD BY AUTOMATED COUNT: 15.9 % (ref 11.6–15.1)
FRACTIONAL SHORTENING: 24 % (ref 28–44)
GFR SERPL CREATININE-BSD FRML MDRD: 28 ML/MIN/1.73SQ M
GLUCOSE P FAST SERPL-MCNC: 44 MG/DL (ref 65–99)
GLUCOSE SERPL-MCNC: 123 MG/DL (ref 65–140)
GLUCOSE SERPL-MCNC: 126 MG/DL (ref 65–140)
GLUCOSE SERPL-MCNC: 142 MG/DL (ref 65–140)
GLUCOSE SERPL-MCNC: 33 MG/DL (ref 65–140)
GLUCOSE SERPL-MCNC: 39 MG/DL (ref 65–140)
GLUCOSE SERPL-MCNC: 44 MG/DL (ref 65–140)
GLUCOSE SERPL-MCNC: 77 MG/DL (ref 65–140)
GLUCOSE SERPL-MCNC: 89 MG/DL (ref 65–140)
GLUCOSE SERPL-MCNC: 92 MG/DL (ref 65–140)
GLUCOSE SERPL-MCNC: 98 MG/DL (ref 65–140)
HCT VFR BLD AUTO: 38.9 % (ref 34.8–46.1)
HGB BLD-MCNC: 12.2 G/DL (ref 11.5–15.4)
IMM GRANULOCYTES # BLD AUTO: 0.05 THOUSAND/UL (ref 0–0.2)
IMM GRANULOCYTES NFR BLD AUTO: 0 % (ref 0–2)
INTERVENTRICULAR SEPTUM IN DIASTOLE (PARASTERNAL SHORT AXIS VIEW): 0.8 CM
LEFT ATRIUM AREA SYSTOLE SINGLE PLANE A4C: 26.9 CM2
LEFT INTERNAL DIMENSION IN SYSTOLE: 4.2 CM (ref 2.1–4)
LEFT VENTRICULAR INTERNAL DIMENSION IN DIASTOLE: 5.5 CM (ref 5.72–8.52)
LEFT VENTRICULAR POSTERIOR WALL IN END DIASTOLE: 1 CM
LEFT VENTRICULAR STROKE VOLUME: 70 ML
LYMPHOCYTES # BLD AUTO: 1.51 THOUSANDS/ΜL (ref 0.6–4.47)
LYMPHOCYTES NFR BLD AUTO: 12 % (ref 14–44)
MAGNESIUM SERPL-MCNC: 1.4 MG/DL (ref 1.6–2.6)
MCH RBC QN AUTO: 30.3 PG (ref 26.8–34.3)
MCHC RBC AUTO-ENTMCNC: 31.4 G/DL (ref 31.4–37.4)
MCV RBC AUTO: 97 FL (ref 82–98)
MICROALBUMIN UR-MCNC: 14.8 MG/L (ref 0–20)
MICROALBUMIN/CREAT 24H UR: 31 MG/G CREATININE (ref 0–30)
MONOCYTES # BLD AUTO: 1.1 THOUSAND/ΜL (ref 0.17–1.22)
MONOCYTES NFR BLD AUTO: 9 % (ref 4–12)
MV E'TISSUE VEL-SEP: 5 CM/S
MV PEAK A VEL: 0.62 M/S
MV PEAK E VEL: 63 CM/S
MV STENOSIS PRESSURE HALF TIME: 0 MS
NEUTROPHILS # BLD AUTO: 9.36 THOUSANDS/ΜL (ref 1.85–7.62)
NEUTS SEG NFR BLD AUTO: 77 % (ref 43–75)
NRBC BLD AUTO-RTO: 0 /100 WBCS
P AXIS: 82 DEGREES
PLATELET # BLD AUTO: 205 THOUSANDS/UL (ref 149–390)
PMV BLD AUTO: 12.1 FL (ref 8.9–12.7)
POTASSIUM SERPL-SCNC: 4.3 MMOL/L (ref 3.5–5.3)
PR INTERVAL: 160 MS
PV PEAK GRADIENT: 3 MMHG
QRS AXIS: -66 DEGREES
QRSD INTERVAL: 126 MS
QT INTERVAL: 356 MS
QTC INTERVAL: 456 MS
RBC # BLD AUTO: 4.02 MILLION/UL (ref 3.81–5.12)
RIGHT ATRIUM AREA SYSTOLE A4C: 14.2 CM2
RIGHT VENTRICLE ID DIMENSION: 4.6 CM
SL CV LV EF: 30
SL CV PED ECHO LEFT VENTRICLE DIASTOLIC VOLUME (MOD BIPLANE) 2D: 147 ML
SL CV PED ECHO LEFT VENTRICLE SYSTOLIC VOLUME (MOD BIPLANE) 2D: 77 ML
SL CV RVOT MAX GRADIENT: 1 MMHG
SODIUM SERPL-SCNC: 144 MMOL/L (ref 136–145)
T WAVE AXIS: 68 DEGREES
TRICUSPID VALVE PEAK REGURGITATION VELOCITY: 1.5 M/S
TRICUSPID VALVE S': 0.6 CM/S
TSH SERPL DL<=0.05 MIU/L-ACNC: 2.22 UIU/ML (ref 0.36–3.74)
TV PEAK GRADIENT: 9 MMHG
VENTRICULAR RATE: 99 BPM
WBC # BLD AUTO: 12.36 THOUSAND/UL (ref 4.31–10.16)
Z-SCORE OF LEFT VENTRICULAR DIMENSION IN END SYSTOLE: -2.38

## 2021-12-23 PROCEDURE — C8929 TTE W OR WO FOL WCON,DOPPLER: HCPCS

## 2021-12-23 PROCEDURE — 82948 REAGENT STRIP/BLOOD GLUCOSE: CPT

## 2021-12-23 PROCEDURE — 93306 TTE W/DOPPLER COMPLETE: CPT | Performed by: INTERNAL MEDICINE

## 2021-12-23 PROCEDURE — G0008 ADMIN INFLUENZA VIRUS VAC: HCPCS | Performed by: INTERNAL MEDICINE

## 2021-12-23 PROCEDURE — 93010 ELECTROCARDIOGRAM REPORT: CPT | Performed by: INTERNAL MEDICINE

## 2021-12-23 PROCEDURE — 90662 IIV NO PRSV INCREASED AG IM: CPT | Performed by: INTERNAL MEDICINE

## 2021-12-23 PROCEDURE — 82570 ASSAY OF URINE CREATININE: CPT | Performed by: INTERNAL MEDICINE

## 2021-12-23 PROCEDURE — 85025 COMPLETE CBC W/AUTO DIFF WBC: CPT

## 2021-12-23 PROCEDURE — 80048 BASIC METABOLIC PNL TOTAL CA: CPT

## 2021-12-23 PROCEDURE — 97163 PT EVAL HIGH COMPLEX 45 MIN: CPT

## 2021-12-23 PROCEDURE — 99223 1ST HOSP IP/OBS HIGH 75: CPT | Performed by: INTERNAL MEDICINE

## 2021-12-23 PROCEDURE — 97530 THERAPEUTIC ACTIVITIES: CPT

## 2021-12-23 PROCEDURE — 82043 UR ALBUMIN QUANTITATIVE: CPT | Performed by: INTERNAL MEDICINE

## 2021-12-23 PROCEDURE — 99222 1ST HOSP IP/OBS MODERATE 55: CPT | Performed by: INTERNAL MEDICINE

## 2021-12-23 PROCEDURE — 99232 SBSQ HOSP IP/OBS MODERATE 35: CPT | Performed by: INTERNAL MEDICINE

## 2021-12-23 PROCEDURE — 84443 ASSAY THYROID STIM HORMONE: CPT | Performed by: NURSE PRACTITIONER

## 2021-12-23 RX ORDER — DEXTROSE MONOHYDRATE 25 G/50ML
INJECTION, SOLUTION INTRAVENOUS
Status: COMPLETED
Start: 2021-12-23 | End: 2021-12-23

## 2021-12-23 RX ORDER — METOPROLOL SUCCINATE 25 MG/1
25 TABLET, EXTENDED RELEASE ORAL DAILY
Status: DISCONTINUED | OUTPATIENT
Start: 2021-12-23 | End: 2021-12-31 | Stop reason: HOSPADM

## 2021-12-23 RX ORDER — SODIUM CHLORIDE 9 MG/ML
75 INJECTION, SOLUTION INTRAVENOUS CONTINUOUS
Status: DISCONTINUED | OUTPATIENT
Start: 2021-12-23 | End: 2021-12-25

## 2021-12-23 RX ADMIN — DEXTROSE MONOHYDRATE 50 ML: 25 INJECTION, SOLUTION INTRAVENOUS at 16:14

## 2021-12-23 RX ADMIN — PERFLUTREN 0.45 ML/MIN: 6.52 INJECTION, SUSPENSION INTRAVENOUS at 13:51

## 2021-12-23 RX ADMIN — HEPARIN SODIUM 5000 UNITS: 5000 INJECTION INTRAVENOUS; SUBCUTANEOUS at 05:20

## 2021-12-23 RX ADMIN — VENLAFAXINE 75 MG: 37.5 TABLET ORAL at 20:46

## 2021-12-23 RX ADMIN — SODIUM CHLORIDE 75 ML/HR: 0.9 INJECTION, SOLUTION INTRAVENOUS at 17:47

## 2021-12-23 RX ADMIN — SACUBITRIL AND VALSARTAN 1 TABLET: 24; 26 TABLET, FILM COATED ORAL at 17:42

## 2021-12-23 RX ADMIN — METOPROLOL SUCCINATE 25 MG: 25 TABLET, EXTENDED RELEASE ORAL at 17:42

## 2021-12-23 RX ADMIN — HEPARIN SODIUM 5000 UNITS: 5000 INJECTION INTRAVENOUS; SUBCUTANEOUS at 20:46

## 2021-12-23 RX ADMIN — VENLAFAXINE 75 MG: 37.5 TABLET ORAL at 08:20

## 2021-12-23 RX ADMIN — HEPARIN SODIUM 5000 UNITS: 5000 INJECTION INTRAVENOUS; SUBCUTANEOUS at 01:23

## 2021-12-23 RX ADMIN — GABAPENTIN 600 MG: 300 CAPSULE ORAL at 08:20

## 2021-12-23 RX ADMIN — INFLUENZA A VIRUS A/VICTORIA/2570/2019 IVR-215 (H1N1) ANTIGEN (FORMALDEHYDE INACTIVATED), INFLUENZA A VIRUS A/TASMANIA/503/2020 IVR-221 (H3N2) ANTIGEN (FORMALDEHYDE INACTIVATED), INFLUENZA B VIRUS B/PHUKET/3073/2013 ANTIGEN (FORMALDEHYDE INACTIVATED), AND INFLUENZA B VIRUS B/WASHINGTON/02/2019 ANTIGEN (FORMALDEHYDE INACTIVATED) 0.7 ML: 60; 60; 60; 60 INJECTION, SUSPENSION INTRAMUSCULAR at 05:22

## 2021-12-23 RX ADMIN — DEXTROSE MONOHYDRATE 50 ML: 25 INJECTION, SOLUTION INTRAVENOUS at 07:15

## 2021-12-23 RX ADMIN — LEVOTHYROXINE SODIUM 150 MCG: 150 TABLET ORAL at 05:20

## 2021-12-23 RX ADMIN — PRAVASTATIN SODIUM 10 MG: 10 TABLET ORAL at 08:21

## 2021-12-23 RX ADMIN — VENLAFAXINE 75 MG: 37.5 TABLET ORAL at 17:41

## 2021-12-23 RX ADMIN — HEPARIN SODIUM 5000 UNITS: 5000 INJECTION INTRAVENOUS; SUBCUTANEOUS at 14:30

## 2021-12-23 RX ADMIN — GABAPENTIN 600 MG: 300 CAPSULE ORAL at 17:40

## 2021-12-23 RX ADMIN — FUROSEMIDE 20 MG: 20 TABLET ORAL at 08:21

## 2021-12-23 RX ADMIN — NEBIVOLOL HYDROCHLORIDE 5 MG: 5 TABLET ORAL at 08:21

## 2021-12-23 RX ADMIN — GABAPENTIN 600 MG: 300 CAPSULE ORAL at 20:46

## 2021-12-23 NOTE — PROGRESS NOTES
[facility]  Progress Note - Smitha Narayan 1945, 64 y o  female MRN: [id number]  Unit/Bed#: S -01 Encounter: [id number]  Primary Care Provider: Leroy Contreras DO   Date and time admitted to hospital: 12/22/2021  4:57 PM    * Syncope  Assessment & Plan  · Patient was sleeping in recliner and woke up on floor on left side +LOC and +head trauma  Arrived to ED via EMS  · Patient with known cardiac history, LBBB, and questionable ST changes on EMS 12 lead  · In ED, patient dizzy with movement and elevated trop, denies any CP  · CT face - no acute fractures  · CT head - no acute intracranial abnormality  · CXR shows fracture of the L 8th rib   · Bruising noted on left side of face  Patient denies taking blood thinners but reports taking Aspirin for pain  · Neuro checks Q4  · Orthostatic BP ws positive 12/23/21  · Cardiology following    Hypoglycemia  Assessment & Plan  Patient hypoglycemic at 0500 and 0700 on 12/23/21   Received dextrose Likely in setting of decreased oral intake vs adrenal insufficiency    Plan:   · Monitor glucose   · F/u AM cortisol level  · Holding SSI for now     Type 2 diabetes mellitus with stage 3 chronic kidney disease and hypertension [facility])  Assessment & Plan  Lab Results   Component Value Date    HGBA1C 6 5 08/04/2021       Recent Labs     12/23/21  0859 12/23/21  1007 12/23/21  1122 12/23/21  1345   POCGLU 98 126 92 89       Blood Sugar Average: Last 72 hrs:  (P) 41 [id number]    Hold PO medications while inpatient   Monitor accu-checks AC and HS   Holding SSI coverage in setting of hypoglycemia nd decreased PO intake   Hypoglycemia protocol      Hypertension  Assessment & Plan  · BP controlled in ED  · Holding home Nebivolol 5 mg and Lasix 20 mg in setting of positive orthostatic blood pressures and syncopal event  · F/u ECHO    Diabetic peripheral neuropathy ([facility] [address] )  Assessment & Plan  · Patient on high dose Gabapentin for 6+ years  · Home regimen: 300 mg x3 in AM, 300 mg x2 in afternoon, 300 mg x3 in evening  · May be contributing to frequent falls  · Consult Gerontology for help managing medication regimen in setting of frequent falls    Leukocytosis  Assessment & Plan  Lab Results   Component Value Date    WBC 12 36 (H) 12/23/2021    WBC 15 04 (H) 12/22/2021     · Leukocytosis present  · Likely reactive  · CXR with questionable area in left lung field  Patient fell and was down on left side  · Also shows b/l apical opacities possible representative of early interstitial lung disease; will require outpatient pulmonology referral  · Possible contusion vs aspiration post fall  · Currently afebrile  · Trend CBC     Elevated troponin  Assessment & Plan  Lab Results   Component Value Date    HSTNI0 28 12/22/2021    HSTNID2 23 12/22/2021    HSTNI2 51 (HH) 12/22/2021    HSTNID4 47 12/22/2021    HSTNI4 75 (HH) 12/22/2021      · 4 hour hs troponin pending  · Patient denies CP, N/V, diaphoresis, arm or jaw pain  · Hx of MI, known CAD, KIEL score 4  · Cardiology following  · F/u ECHO     Ambulatory dysfunction  Assessment & Plan  · Patient reports falling "multiple times a day"  · Has area rugs, doesn't feel comfortable walking on hardwood floors despite wearing shoes  · F/u PT and OT recs    Chronic renal disease, stage IV [facility])  Assessment & Plan  Lab Results   Component Value Date    EGFR 28 12/23/2021    EGFR 36 12/22/2021    EGFR 23 08/11/2021    CREATININE 1 69 (H) 12/23/2021    CREATININE 1 38 (H) 12/22/2021    CREATININE 2 03 (H) 08/11/2021     · Baseline Cr appears to be 1 7-1 9  · Monitor Cr while admitted, renal adjust medications  · Avoid hypotension and nephrotoxic agents        VTE Pharmacologic Prophylaxis: VTE Score: 6 High Risk (Score >/= 5) - Pharmacological DVT Prophylaxis Ordered: heparin  Sequential Compression Devices Ordered  Patient Centered Rounds: I performed bedside rounds with nursing staff today    Discussions with Specialists or Other Care Team Provider: Cardiology     Education and Discussions with Family / Patient: Attempted to update  (Grandchild) via phone  Unable to contact  Current Length of Stay: 0 day(s)  Current Patient Status: Inpatient   Discharge Plan: TBD    Code Status: Level 2 - DNAR: but accepts endotracheal intubation    Subjective:   No acute events overnight  Discussed possible etiologies of syncope with patient as well as imaging findings  Reviewed indications for therapy with patient and discussed history or recent bereavement and decreased appetite/PO intake  Patient AOX3 in no acute distress  Will continue to monitor  Objective:     Vitals:   Temp (24hrs), Av 8 °F (36 6 °C), Min:97 4 °F (36 3 °C), Max:98 5 °F (36 9 °C)    Temp:  [97 4 °F (36 3 °C)-98 5 °F (36 9 °C)] 98 5 °F (36 9 °C)  HR:  [54-93] 54  Resp:  [18] 18  BP: (106-148)/(58-72) 118/58  SpO2:  [92 %-100 %] 98 %  Body mass index is 42 32 kg/m²  Input and Output Summary (last 24 hours): Intake/Output Summary (Last 24 hours) at 2021 1548  Last data filed at 2021 1248  Gross per 24 hour   Intake 240 ml   Output 100 ml   Net 140 ml       Physical Exam:   Physical Exam  Constitutional:       Appearance: She is obese  HENT:      Head: Normocephalic and atraumatic  Right Ear: Ear canal and external ear normal       Left Ear: Ear canal and external ear normal       Nose: Nose normal       Mouth/Throat:      Mouth: Mucous membranes are dry  Pharynx: Oropharynx is clear  Eyes:      Conjunctiva/sclera: Conjunctivae normal       Pupils: Pupils are equal, round, and reactive to light  Cardiovascular:      Rate and Rhythm: Normal rate and regular rhythm  Pulses: Normal pulses  Heart sounds: Normal heart sounds  Pulmonary:      Effort: Pulmonary effort is normal       Breath sounds: Normal breath sounds  Abdominal:      General: Abdomen is flat   Bowel sounds are normal  Musculoskeletal:      Cervical back: Normal range of motion  Skin:     Findings: Bruising present  Comments: Patient with evidence of bruising on the left side of her face   Neurological:      Mental Status: She is alert  Psychiatric:         Mood and Affect: Mood normal          Behavior: Behavior normal          Thought Content: Thought content normal          Judgment: Judgment normal           Additional Data:     Labs:  Results from last 7 days   Lab Units 12/23/21  0509   WBC Thousand/uL 12 36*   HEMOGLOBIN g/dL 12 2   HEMATOCRIT % 38 9   PLATELETS Thousands/uL 205   NEUTROS PCT % 77*   LYMPHS PCT % 12*   MONOS PCT % 9   EOS PCT % 2     Results from last 7 days   Lab Units 12/23/21  0509 12/22/21  1753 12/22/21  1753   SODIUM mmol/L 144   < > 143   POTASSIUM mmol/L 4 3   < > 5 6*   CHLORIDE mmol/L 108   < > 106   CO2 mmol/L 28   < > 28   BUN mg/dL 18   < > 16   CREATININE mg/dL 1 69*   < > 1 38*   ANION GAP mmol/L 8   < > 9   CALCIUM mg/dL 9 3   < > 9 3   ALBUMIN g/dL  --   --  3 0*   TOTAL BILIRUBIN mg/dL  --   --  0 60   ALK PHOS U/L  --   --  93   ALT U/L  --   --  42   AST U/L  --   --  70*   GLUCOSE RANDOM mg/dL 44*   < > 83    < > = values in this interval not displayed  Results from last 7 days   Lab Units 12/23/21  1345 12/23/21  1122 12/23/21  1007 12/23/21  0859 12/23/21  0734 12/23/21  0710   POC GLUCOSE mg/dl 89 92 126 98 142* 33*               Lines/Drains:  Invasive Devices  Report    Peripheral Intravenous Line            Peripheral IV 12/22/21 Dorsal (posterior); Right Hand 1 day    Peripheral IV 12/22/21 Right Antecubital <1 day                  Telemetry:  Telemetry Orders (From admission, onward)             48 Hour Telemetry Monitoring  Continuous x 48 hours        References:    Telemetry Guidelines   Question:  Reason for 48 Hour Telemetry  Answer:  Acute Decompensated CHF (continuous diuretic infusion or total diuretic dose > 200 mg daily, associated electrolyte derangement, ionotropic drip, history of ventricular arrhythmia, or new EF <35%)                 Telemetry Reviewed: Sinus Bradycardia  Indication for Continued Telemetry Use: Syncope             Imaging: Reviewed radiology reports from this admission including: chest xray    Recent Cultures (last 7 days):         Last 24 Hours Medication List:   Current Facility-Administered Medications   Medication Dose Route Frequency Provider Last Rate    gabapentin  600 mg Oral TID Levora Day, CRNP      heparin (porcine)  5,000 Units Subcutaneous [place], [place]      levothyroxine  150 mcg Oral Early Morning [place], [place]      metoprolol succinate  25 mg Oral Daily PONCE May      nystatin   Topical BID [place], [place]      pravastatin  10 mg Oral Daily [place], [place]      sacubitril-valsartan  1 tablet Oral BID PONCE May      sodium chloride  75 mL/hr Intravenous Continuous James Kay MD      venlafaxine  75 mg Oral TID Levora Day, PONCE          Today, Patient Was Seen By: Le Thompson MD    **Please Note: This note may have been constructed using a voice recognition system  **

## 2021-12-23 NOTE — ASSESSMENT & PLAN NOTE
Lab Results   Component Value Date    HGBA1C 6 5 08/04/2021       Recent Labs     12/23/21  0859 12/23/21  1007 12/23/21  1122 12/23/21  1345   POCGLU 98 126 92 89       Blood Sugar Average: Last 72 hrs:  (P) 07 [id number]    Hold PO medications while inpatient   Monitor accu-checks AC and HS   Holding SSI coverage in setting of hypoglycemia nd decreased PO intake   Hypoglycemia protocol

## 2021-12-23 NOTE — CONSULTS
Consultation - Geriatric Medicine   Jose White 64 y o  female MRN: [id number]  Unit/Bed#: S -01 Encounter: [id number]        Inpatient consult to Gerontology for Lauri Jennifer performed by: Raffy Jimenez MD  Consult ordered by: Robert Prado            Assessment/Plan   1 -syncope -patient states that she was sleeping in her recliner and awakened on the floor on her left side  She had increased discomfort in the left side of her face and came into the emergency room for evaluation  Patient does have a previous cardiac history of left bundle branch block echocardiogram pending at this time  CT scan of head revealed no evidence of any intracranial abnormality, CT of her facial bones revealed some left facial and periorbital soft tissue swelling  CT scan of her chest revealed a nondisplaced fracture of the anterolateral left 8th rib  Also probable evidence of early interstitial lung disease was noted because of mild diffuse reticulation throughout the lungs  Etiology of her syncope could be multifactorial patient does have evidence of orthostasis and she also has demonstrated periods of hypoglycemia  2 -falls  -prior to current syncopal events she states she has had a couple of falls at her home environment  She does utilize a walker to help her with her ambulation    Will need to follow fall precautions while she is here in the hospital   - Assess patient frequently for physical needs, encourage use of assistant devices as needed and directed by PT/OT  -  Identify cognitive and physical deficits and behaviors that affect risk of falls  - consider moving patient closer to nursing station to monitor more closely for impulsive behavior which may increase risk of falls  -  [place] fall precautions   - Educate patient/family on patient safety including physical limitations and importance of using call bell for assistance   - Modify environment to reduce risk of injury including cap IV and disconnect from pole when not in use, ensuring adequate lighting in room and restroom, ensuring that path to restroom is clear and free of trip hazards  - PT/OT consulted to assist with strengthening/mobility and assist with discharge planning to appropriate level of care    3 -diabetes mellitus type 2 with episodes of hypoglycemia  -patient's Amaryl should be discontinued try to avoid sulfonylureas in the elderly  Specially with a low GFR  Would replace with Januvia 25 mg orally daily  4 -chronic renal failure stage 4 -patient's current GFR is 28 in the past her GFR is have ranged from 25 to 36  Patient's creatinine is 1 69 her creatinine has ranged from 1 38-2 03 in the past   She was not aware that she had chronic renal failure  I have asked her if she was taking anti-inflammatory agents which were on her medication list however she tells me she has not taken any anti-inflammatories  Will need to check an ultrasound of her kidneys and I urine microalbumin creatinine ratio  5 -weight loss -patient has had a 50 lb weight loss which has occurred after the death of her daughter and   She tells me she has no appetite  This also probably has had it to the fact that she is developing hypoglycemia  6 -depression -I did a depression scale on her using the PHQ-9 and she scored a 9 even though she is taking venlafaxine 75 mg  Will increase her dosage to 150 mg and see if that makes a difference we will notify her family primary care provider of the switch in dosage  Dr Jasper Wright  7 -leukocytosis patient's white count is 60846  No evidence of fever  8 -hypothyroidism -continue with levothyroxine 150 mcg daily TSH within normal range  9 -orthostatic hypotension -continue to monitor closely if it continues will need to start midodrine      Recommendations    1 -discontinue Amaryl    2 -placed on Januvia 25 mg as a replacement medication    3 -increase venlafaxine to 150 mg orally daily reassess in 2 weeks if no improvement consider switching antidepressants to Remeron 15 mg orally daily at bedtime  If in 4 weeks there is no improvement patient will also need a workup to make sure she has no underlying malignancy  4 -physical and occupational therapy to further evaluate patient patient has evidence of orthostasis needs to take her time when she changes positions before she starts ambulating  5 -check renal ultrasound and urine for microalbumin creatinine ratio  Will need follow-up with Nephrology to evaluate underlying cause of renal dysfunction  6 -echocardiogram    7 -avoid NSAIDs, IV contrast, hypotension  History of Present Illness   Physician Requesting Consult: Gabrielle Lew DO  Reason for Consult / Principal Problem:  Syncope  Hx and PE limited by:  No limitations  Additional history obtained from:  Patient was able to elaborate an excellent history      HPI: Caridad Sandifer is a 64y o  year old  female who presents with a history of a syncopal event  The patient apparently was asleep in her recliner and later awakened on the floor having fallen on her left side  Patient does not recall the length of time that she was on the ground she denies any stool or urine incontinence  She denies any chest pain or shortness of breath  Patient relates to me that she had fallen previously on 2 different occasions within the past month  At that time she attributed her falls to tripping  Patient has history of diabetes mellitus type 2, chronic renal failure stage 4, left bundle branch block, orthostatic hypotension, depression and a recent history over 50 lb weight loss in the last few months  Patient lives by herself she has lost her  in October and lost a daughter earlier this year  She also had 1 son who  in the past as a sequela of his diabetes    Patient lives alone tells me she feels depressed and was tearful at the time of my examination  Review of Systems   Constitutional: Negative  HENT: Negative  Eyes: Negative  Respiratory: Negative  Cardiovascular: Negative  Gastrointestinal: Negative  Endocrine: Negative  Genitourinary: Negative  Musculoskeletal: Positive for gait problem  Skin: Positive for color change  Neurological: Positive for weakness  Psychiatric/Behavioral:        Patient with history of depression       Memory/Cognitive screening:  Patient has no issues with memory she still working as a   Mobility:  She utilizes a walker to ambulate  Falls:  Patient has been having recurrent falls  Assistive Devices:  Uses a front wheeled walker  Fraility:  Developing increased frailty  Nutrition/weight loss/grocery shopping/meal preparation:  Poor nutrition with a 50 lb weight loss in the last 3 months  Vision impairment:  No visual impairment  Hearing impairment:  No hearing impairment  Incontinence:  No urinary incontinence  Delirium:  No history of delirium  Polypharmacy:   No current facility-administered medications on file prior to encounter       Current Outpatient Medications on File Prior to Encounter   Medication Sig Dispense Refill    Bystolic 5 MG tablet TAKE 1 TABLET BY MOUTH EVERY DAY 30 tablet 3    diclofenac-misoprostol (ARTHROTEC 50) 50-0 2 MG per tablet Take 1 tablet by mouth 2 (two) times a day 180 tablet 3    diphenoxylate-atropine (LOMOTIL) 2 5-0 025 mg per tablet Take 1 tablet by mouth 4 (four) times a day as needed for diarrhea 40 tablet 1    furosemide (LASIX) 20 mg tablet TAKE 1 TABLET BY MOUTH EVERY DAY 90 tablet 3    gabapentin (NEURONTIN) 300 mg capsule Take 3 capsules by mouth every morning, then 2 capsules by mouth every day in the afternoon, then take 3 capsules at bedtime 240 capsule 0    glimepiride (AMARYL) 4 mg tablet TAKE 1 TABLET BY MOUTH EVERY DAY WITH BREAKFAST 180 tablet 1    HYDROcodone-acetaminophen (NORCO) 5-325 mg per tablet Take 1 tablet by mouth every 6 (six) hours as needed for painMax Daily Amount: 4 tablets 40 tablet 0    levothyroxine 150 mcg tablet Take 1 tablet (150 mcg total) by mouth daily in the early morning 30 tablet 5    Multiple Vitamin (DAILY VALUE MULTIVITAMIN) TABS Take 1 tablet by mouth daily      nystatin (MYCOSTATIN) powder Apply topically 2 (two) times a day 60 g 3    potassium chloride (K-DUR,KLOR-CON) 20 mEq tablet TAKE 1 TABLET BY MOUTH EVERY DAY 30 tablet 6    pravastatin (PRAVACHOL) 10 mg tablet Take 1 tablet (10 mg total) by mouth daily 90 tablet 1    venlafaxine (EFFEXOR) 75 mg tablet Take 1 tablet (75 mg total) by mouth 3 (three) times a day 90 tablet 3    albuterol (ProAir HFA) 90 mcg/act inhaler Inhale 2 puffs every 6 (six) hours as needed for wheezing (Patient not taking: Reported on 12/22/2021 ) 1 Inhaler 1    glucose blood (TRUETEST TEST) test strip by In Vitro route daily      MICROLET LANCETS MISC by Does not apply route      Multiple Vitamins-Minerals (PRESERVISION AREDS PO) Take by mouth daily      Wound Dressings (PruTect) EMUL Apply 1 Dose topically 2 (two) times a day 90 g 3     Patients primary residence:  Patient lives in her own home Lives with:  Lives by herself  iADL's:  Patient no longer drives 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  ADL's:  Patient is able to do her basic activities of daily living    She does get some help from her sister to make sure that she comes in and out of the shower room without problems    Historical Information   Past medical history:   Past Medical History:   Diagnosis Date    Aortic stenosis     Arthritis     Asthma     Coronary artery disease     Diabetes mellitus [facility]     Diabetic peripheral neuropathy ([facility] )     Last assessed - 1/27/16    Gallbladder disease     Heart attack ([facility] ) 07/1999    Hemorrhoids     Last assessed - 11/16/12    Myocardial infarction ([facility] [facility] )     Old myocardial infarction     Type 2 diabetes mellitus with autonomic neuropathy ([facility] [address] )     Unspec long term insulin use status, Last assessed - 6/8/17     Past surgical history:   Past Surgical History:   Procedure Laterality Date    BUNIONECTOMY      CARDIAC CATHETERIZATION      Carotid Artery, Resolved - 7/1/13    CARDIOVASCULAR STRESS TEST  09/1999    CHOLECYSTECTOMY      COLONOSCOPY  2017    FOOT ARTHRODESIS, MODIFIED DONOHUE  2016    GALLBLADDER SURGERY  1970    HEMORROIDECTOMY      KNEE ARTHROSCOPY Left 2004    CA COLONOSCOPY FLX DX W/COLLJ SPEC WHEN PFRMD N/A 8/21/2017    Procedure: COLONOSCOPY;  Surgeon: Kayla Bliss MD;  Location: [facility]; Service: Colorectal    REPAIR KNEE LIGAMENT      REPAIR KNEE LIGAMENT Left 1986    REPAIR KNEE LIGAMENT Right 1988     Social history:  She currently is  for the past 3 months, she lost her daughter also this year, she lost her son secondary to severe diabetes  Denies any tobacco or alcohol use    Social History     Socioeconomic History    Marital status: /Civil Union     Spouse name: Not on file    Number of children: Not on file    Years of education: Not on file    Highest education level: Not on file   Occupational History    Occupation: RETIRED   Tobacco Use    Smoking status: Never Smoker    Smokeless tobacco: Never Used   Vaping Use    Vaping Use: Never used   Substance and Sexual Activity    Alcohol use: Never     Comment: Social per [employer]     Drug use: Never    Sexual activity: Not Currently   Other Topics Concern    Not on file   Social History Narrative    Hx of daily cola consumption (unk cans/day)    Daily tea consumption (unk cups/day)    Multiple organ donor    Patient has living will    [facility]    Uses safety equipment - Seatbelts      Social Determinants of Health     Financial Resource Strain: Not on file   Food Insecurity: Not on file   Transportation Needs: Not on file   Physical Activity: Not on file   Stress: Not on file   Social Connections: Not on file   Intimate Partner Violence: Not on file   Housing Stability: Not on file     Family history:   Family History   Problem Relation Age of Onset    Hypertension Father     Diabetes Father     Cancer Father         Throat    Arthritis Father     Stroke Mother     Diabetes Maternal Grandmother     Heart disease Maternal Grandfather     Diabetes Son     Lymphoma Family         Head, Face and Neck        Meds/Allergies   All current active meds have been reviewed  Allergies   Allergen Reactions    Lyrica [Pregabalin] Swelling    Sulfa Antibiotics Swelling       Objective   Vitals:    12/23/21 0800   BP: 118/64   Pulse:    Resp:    Temp:    SpO2:      No intake or output data in the 24 hours ending 12/23/21 1128  Invasive Devices  Report    Peripheral Intravenous Line            Peripheral IV 12/22/21 Dorsal (posterior); Right Hand 1 day    Peripheral IV 12/22/21 Right Antecubital <1 day                Physical Exam  Constitutional:       Appearance: She is obese  HENT:      Head: Normocephalic and atraumatic  Right Ear: Ear canal and external ear normal       Left Ear: Ear canal and external ear normal       Nose: Nose normal       Mouth/Throat:      Mouth: Mucous membranes are dry  Pharynx: Oropharynx is clear  Eyes:      Conjunctiva/sclera: Conjunctivae normal       Pupils: Pupils are equal, round, and reactive to light  Cardiovascular:      Rate and Rhythm: Normal rate and regular rhythm  Pulses: Normal pulses  Heart sounds: Normal heart sounds  Pulmonary:      Effort: Pulmonary effort is normal       Breath sounds: Normal breath sounds  Abdominal:      General: Abdomen is flat  Bowel sounds are normal    Musculoskeletal:      Cervical back: Normal range of motion  Skin:     Findings: Bruising present  Comments: Patient with evidence of bruising on the left side of her face   Neurological:      Mental Status: She is alert  Psychiatric:         Mood and Affect: Mood normal          Behavior: Behavior normal          Thought Content: Thought content normal          Judgment: Judgment normal          Lab Results:   I have personally reviewed pertinent lab and imaging results       VTE Prophylaxis:  Patient on heparin 5000 International Units q 8 hours    Code Status: Level 2 - DNAR: but accepts endotracheal intubation  Advance Directive and Living Will:      Power of :    POLST:      Family and Social Support:  She has a sister who is very supportive  Living Arrangements: Lives Alone  Support Systems: Self; Family members  Assistance Needed: walker for mobility  Type of Current Residence: Private residence  Current Home Care Services: Yes  Type of Current Home Care Services: Nurse visit (wound on buttocks)

## 2021-12-23 NOTE — ASSESSMENT & PLAN NOTE
Lab Results   Component Value Date    EGFR 36 12/22/2021    EGFR 23 08/11/2021    EGFR 26 04/08/2021    CREATININE 1 38 (H) 12/22/2021    CREATININE 2 03 (H) 08/11/2021    CREATININE 1 84 (H) 04/08/2021     · Baseline Cr appears to be 1 7-1 9  · Monitor Cr while admitted, renal adjust medications  · Avoid hypotension and nephrotoxic agents

## 2021-12-23 NOTE — ASSESSMENT & PLAN NOTE
Lab Results   Component Value Date    WBC 12 36 (H) 12/23/2021    WBC 15 04 (H) 12/22/2021     · Leukocytosis present  · Likely reactive  · CXR with questionable area in left lung field   Patient fell and was down on left side  · Also shows b/l apical opacities possible representative of early interstitial lung disease; will require outpatient pulmonology referral  · Possible contusion vs aspiration post fall  · Currently afebrile  · Trend CBC

## 2021-12-23 NOTE — CONSULTS
Consultation - Cardiology Team One  Jeffry Jade 64 y o  female MRN: [id number]  Unit/Bed#: S -01 Encounter: [id number]    Inpatient consult to Cardiology  Consult performed by: PONCE Reeves  Consult ordered by: [place] [employer], [place]      Physician Requesting Consult: Candice Antonio DO  Reason for Consult / Principal Problem: syncope, elevated troponin    Assessment/ Plan    1  Syncope- unclear etiology  Patient reports sleeping in her recliner and waking up early in the morning on the ground next to the recliner  She has no recollection of getting up for any reason and is not sure how long she was down on the ground for  Once awake, she did have to wait for someone to come help her up for a little while  ECG- NSR with LBBB (chronic)  No cardiac complaints  Updated echo ordered by primary team  Concerned for possible hypoglycemia considering serum glucose 83 on admission and was 33 this morning  She did have some dizziness in the ED yesterday with ambulation- orthostatic VS ordered but not yet completed  Do not anticipate any further cardiac work up at this time  2  Non-ischemic myocardial injury- in setting of syncope with unknown down time  Hs troponin 28--51--75  ECG without concerning ischemic changes  No anginal complaints  Updated echo ordered    3  Presumed CAD with prior MI (scar/infarct seen on nuclear stress test 2017)  No anginal complaints  ECG- NSR with LBBB (chronic)  Continue statin and beta blocker    4  HTN- controlled, average /65  5  HLD- , HDL 45, LDL 86 in August  Continue statin  6  Chronic diastolic CHF  On furosemide 20 mg daily   Euvolemic on exam with no c/o HF symptoms    7  Type 2 DM- A1c 6 5% in August  Presented with serum glucose 83 and was hypoglycemic with POCT glucose 33 this morning  She is only on glimepiride as an outpatient   She reports that the event happened early in the morning and I'm concerned that hypoglycemia may have been contributing  8  CKD IV- does not follow with outpatient nephrologist  Cr  1 3 on admission, now 1 6  Previously baseline seems to range from 1 7-2 0      9  Mild AS- very mild AS reported on TTE from 2017  Updated echo pending  10  Hypothyroidism- TSH pending    11  Morbid obesity- BMI 42 36     History of Present Illness   HPI: Pj Mathur is a 64y o  year old female with CAD with prior MI (seen on nuclear stress test in 12/2017), HTN, HLD, COPD, type 2 DM, CKD IV, mild AS, hypothyroidism, and morbid obesity  She follows with cardiologist Dr Clara Lopez and was last seen in the office in August 2021  She had no cardiac complaints at the time and was continued on her same medications  Her last echocardiogram on file is from 2017 demonstrating LVEF 60% with grade 1 DD, mild MR, and very mild AS  Nuclear stress test also in 2017 showed a small, fixed inferoapical defect consistent with prior MI/scar  There was no evidence of ischemia  She presented to the ED yesterday after a syncopal event at home  She was sleeping in her recliner and woke up early in the morning on the ground next to the chair  She has no recollection of falling or getting up for any reason  Has never had something like this happen before  She has been in her normal state of health leading up to the event and is without cardiac complaints  Labs: hs troponin 28--51--75  Cr 1 3 on admission but up to 1 69 this morning (baseline seems to range 1 7-2)  WBC 12K, hemoglobin 12 2  Serum glucose was 83 on admission and was 33 on finger stick this morning  Orthostatic BPs ordered but not completed yet  Cardiology consulted for further evaluation of elevated troponin  EKG reviewed personally: NSR with LBBB- also present on ECG from office in August    Telemetry reviewed personally: NSR with LBBB    Review of Systems   Constitutional: Negative for chills, malaise/fatigue and weight gain  Cardiovascular: Positive for syncope   Negative for chest pain, dyspnea on exertion, leg swelling, orthopnea and palpitations  Respiratory: Negative for cough, shortness of breath, sleep disturbances due to breathing and sputum production  Gastrointestinal: Negative for bloating, nausea and vomiting  Neurological: Positive for dizziness  Negative for light-headedness and weakness  Psychiatric/Behavioral: Negative for altered mental status  All other systems reviewed and are negative  Historical Information   Past Medical History:   Diagnosis Date    Aortic stenosis     Arthritis     Asthma     Coronary artery disease     Diabetes mellitus ([place] [address] )     Diabetic peripheral neuropathy ([place] [address] )     Last assessed - 1/27/16    Gallbladder disease     Heart attack ([place] [address] ) 07/1999    Hemorrhoids     Last assessed - 11/16/12    Myocardial infarction ([place] [address] )     Old myocardial infarction     Type 2 diabetes mellitus with autonomic neuropathy ([place] [address] )     Unspec long term insulin use status, Last assessed - 6/8/17     Past Surgical History:   Procedure Laterality Date    BUNIONECTOMY      CARDIAC CATHETERIZATION      Carotid Artery, Resolved - 7/1/13    CARDIOVASCULAR STRESS TEST  09/1999    CHOLECYSTECTOMY      COLONOSCOPY  2017    FOOT ARTHRODESIS, MODIFIED DONOHUE  2016    GALLBLADDER SURGERY  1970    HEMORROIDECTOMY      KNEE ARTHROSCOPY Left 2004    ME COLONOSCOPY FLX DX W/COLLJ SPEC WHEN PFRMD N/A 8/21/2017    Procedure: COLONOSCOPY;  Surgeon: Heather Alvarez MD;  Location: [facility];   Service: Colorectal    REPAIR KNEE LIGAMENT      REPAIR KNEE LIGAMENT Left 1986    REPAIR KNEE LIGAMENT Right 1988     Social History     Substance and Sexual Activity   Alcohol Use Never    Comment: Social per [employer]      Social History     Substance and Sexual Activity   Drug Use Never     Social History     Tobacco Use   Smoking Status Never Smoker   Smokeless Tobacco Never Used     Family History:   Family History   Problem Relation Age of Onset    Hypertension Father     Diabetes Father     Cancer Father         Throat    Arthritis Father     Stroke Mother     Diabetes Maternal Grandmother     Heart disease Maternal Grandfather     Diabetes Son     Lymphoma Family         Head, Face and Neck      Meds/Allergies   all current active meds have been reviewed and current meds:   Current Facility-Administered Medications   Medication Dose Route Frequency    furosemide (LASIX) tablet 20 mg  20 mg Oral Daily    gabapentin (NEURONTIN) capsule 600 mg  600 mg Oral TID    heparin (porcine) subcutaneous injection 5,000 Units  5,000 Units Subcutaneous Q8H [address]    insulin lispro (HumaLOG) 100 units/mL subcutaneous injection 1-6 Units  1-6 Units Subcutaneous TID AC    levothyroxine tablet 150 mcg  150 mcg Oral Early Morning    nebivolol (BYSTOLIC) tablet 5 mg  5 mg Oral Daily    nystatin (MYCOSTATIN) powder   Topical BID    pravastatin (PRAVACHOL) tablet 10 mg  10 mg Oral Daily    venlafaxine (EFFEXOR) tablet 75 mg  75 mg Oral TID        Allergies   Allergen Reactions    Lyrica [Pregabalin] Swelling    Sulfa Antibiotics Swelling     Objective   Vitals: Blood pressure 106/66, pulse 76, temperature (!) 97 4 °F (36 3 °C), temperature source Oral, resp  rate 18, height 5' 1" (1 549 m), weight 102 kg (224 lb 3 3 oz), SpO2 100 %  , Body mass index is 42 36 kg/m²  ,     Systolic (21LZX), RVM:000 , Min:106 , KYN:870     Diastolic (93YJI), CYQ:09, Min:60, Max:72    No intake or output data in the 24 hours ending 12/23/21 0823  Wt Readings from Last 3 Encounters:   12/22/21 102 kg (224 lb 3 3 oz)   08/16/21 123 kg (271 lb 9 6 oz)   08/04/21 120 kg (264 lb)     Invasive Devices  Report    Peripheral Intravenous Line            Peripheral IV 12/22/21 Dorsal (posterior); Right Hand 1 day    Peripheral IV 12/22/21 Right Antecubital <1 day              Physical Exam  Vitals reviewed  Constitutional:       General: She is not in acute distress       Appearance: She is obese       Comments: Pleasant, elderly female sitting up in bed in no acute distress  Neck:      Vascular: No hepatojugular reflux or JVD  Cardiovascular:      Rate and Rhythm: Normal rate and regular rhythm  Pulses: Normal pulses  Heart sounds: Murmur (soft, systolic murmur) heard  No friction rub  No gallop  Pulmonary:      Effort: Pulmonary effort is normal  No respiratory distress  Breath sounds: Normal breath sounds  No rales  Abdominal:      General: Bowel sounds are normal  There is no distension  Palpations: Abdomen is soft  Tenderness: There is no abdominal tenderness  Musculoskeletal:         General: No tenderness  Normal range of motion  Cervical back: Neck supple  Right lower leg: No edema  Left lower leg: No edema  Skin:     General: Skin is warm and dry  Capillary Refill: Capillary refill takes less than 2 seconds  Findings: Bruising (L face) present  No erythema  Neurological:      Mental Status: She is alert and oriented to person, place, and time     Psychiatric:         Mood and Affect: Mood normal      LABORATORY RESULTS:      CBC with diff:   Results from last 7 days   Lab Units 12/23/21  0509 12/22/21  1753   WBC Thousand/uL 12 36* 15 04*   HEMOGLOBIN g/dL 12 2 14 0   HEMATOCRIT % 38 9 43 6   MCV fL 97 95   PLATELETS Thousands/uL 205 232   MCH pg 30 3 30 6   MCHC g/dL 31 4 32 1   RDW % 15 9* 15 4*   MPV fL 12 1 12 6   NRBC AUTO /100 WBCs 0 0     CMP:  Results from last 7 days   Lab Units 12/23/21  0509 12/22/21  1753   POTASSIUM mmol/L 4 3 5 6*   CHLORIDE mmol/L 108 106   CO2 mmol/L 28 28   BUN mg/dL 18 16   CREATININE mg/dL 1 69* 1 38*   CALCIUM mg/dL 9 3 9 3   AST U/L  --  70*   ALT U/L  --  42   ALK PHOS U/L  --  93   EGFR ml/min/1 73sq m 28 36     BMP:  Results from last 7 days   Lab Units 12/23/21  0509 12/22/21  1753   POTASSIUM mmol/L 4 3 5 6*   CHLORIDE mmol/L 108 106   CO2 mmol/L 28 28   BUN mg/dL 18 16   CREATININE mg/dL 1 69* 1 38*   CALCIUM mg/dL 9 3 9 3     Lab Results   Component Value Date    CREATININE 1 69 (H) 2021    CREATININE 1 38 (H) 2021    CREATININE 2 03 (H) 2021     Lab Results   Component Value Date    NTBNP 662 (H) 2021    NTBNP 246 (H) 2017    NTBNP 508 (H) 11/10/2017      Results from last 7 days   Lab Units 21  1753   MAGNESIUM mg/dL 1 4*                         Lipid Profile:   Lab Results   Component Value Date    CHOL 184 2015     Lab Results   Component Value Date    HDL 45 2021    HDL 52 2021    HDL 60 10/15/2020     Lab Results   Component Value Date    LDLCALC 86 2021    LDLCALC 106 (H) 2021    LDLCALC 103 (H) 10/15/2020     Lab Results   Component Value Date    TRIG 151 (H) 2021    TRIG 143 2021    TRIG 116 10/15/2020     Cardiac testing:   Results for orders placed in visit on 17    Echo complete with contrast if indicated    Narrative  [facility] [address]  [phone]    Transthoracic Echocardiogram  2D, M-mode, Doppler, and Color Doppler    Study date:  07-Dec-2017    Patient: Linda Brambila  MR number: [id number]  Account number: [de-identified]  : 1945  Age: 74 years  Gender: Female  Status: Outpatient  Location: [address] [place] [address] [facility]  Height: 62 in  Weight: 288 lb  BP: 98/ 64 mmHg    Indications: Dyspnea, edema    Diagnoses: R06 09 - Other forms of dyspnea, R60 9 - Edema, unspecified    Sonographer:  VERONICA Martinez  Referring Physician:  Flako Kim DO  Group:  [facility]  Interpreting Physician:  Marianela Manuel DO    SUMMARY    LEFT VENTRICLE:  Systolic function was normal  Ejection fraction was estimated to be 60 %  There were no regional wall motion abnormalities  Wall thickness was mildly increased    Doppler parameters were consistent with abnormal left ventricular relaxation (grade 1 diastolic dysfunction)  LEFT ATRIUM:  The atrium was mildly dilated  MITRAL VALVE:  There was mild regurgitation  AORTIC VALVE:  There was very mild stenosis  HISTORY: PRIOR HISTORY: Hypertension, high cholesterol, CAD, atrial fibrillation, asthma, COPD, DM, edema, hypothyroidism    PROCEDURE: The study was performed in the [address] [facility] [place]  This was a routine study  The transthoracic approach was used  The study included complete 2D imaging, M-mode, complete spectral Doppler, and color Doppler  This  was a technically difficult study  LEFT VENTRICLE: Size was normal  Systolic function was normal  Ejection fraction was estimated to be 60 %  There were no regional wall motion abnormalities  Wall thickness was mildly increased  DOPPLER: Doppler parameters were consistent  with abnormal left ventricular relaxation (grade 1 diastolic dysfunction)  RIGHT VENTRICLE: The size was normal  Systolic function was normal  Wall thickness was normal     LEFT ATRIUM: The atrium was mildly dilated  RIGHT ATRIUM: Size was normal     MITRAL VALVE: Valve structure was normal  There was normal leaflet separation  DOPPLER: The transmitral velocity was within the normal range  There was no evidence for stenosis  There was mild regurgitation  AORTIC VALVE: The valve was trileaflet  Leaflets exhibited mildly increased thickness, mild calcification, and lower normal cuspal separation  DOPPLER: Transaortic velocity was minimally increased  There was very mild stenosis  There was  no significant regurgitation  TRICUSPID VALVE: The valve structure was normal  There was normal leaflet separation  DOPPLER: The transtricuspid velocity was within the normal range  There was no evidence for stenosis  There was no significant regurgitation  PULMONIC VALVE: Leaflets exhibited normal thickness, no calcification, and normal cuspal separation  DOPPLER: The transpulmonic velocity was within the normal range  There was no significant regurgitation  PERICARDIUM: There was no pericardial effusion  The pericardium was normal in appearance  AORTA: The root exhibited normal size  SYSTEMIC VEINS: IVC: The inferior vena cava was normal in size  SYSTEM MEASUREMENT TABLES    2D  %FS: 29 48 %  AV Diam: 3 07 cm  EDV(Teich): 123 01 ml  EF(Cube): 64 93 %  EF(Teich): 56 15 %  ESV(Cube): 46 13 ml  ESV(Teich): 53 94 ml  IVSd: 1 13 cm  LA Area: 22 33 cm2  LA Diam: 3 71 cm  LVEDV MOD A4C: 143 23 ml  LVEF MOD A4C: 59 57 %  LVESV MOD A4C: 57 91 ml  LVIDd: 5 09 cm  LVIDs: 3 59 cm  LVLd A4C: 7 57 cm  LVLs A4C: 6 34 cm  LVOT Diam: 2 09 cm  LVPWd: 1 24 cm  RA Area: 15 34 cm2  RV Diam : 3 79 cm  SV MOD A4C: 85 32 ml  SV(Cube): 85 41 ml  SV(Teich): 69 06 ml    CW  AV Env  Ti: 379 54 ms  AV SV: 364 79 ml  AV VTI: 49 28 cm  AV Vmax: 2 01 m/s  AV Vmean: 1 3 m/s  AV maxP 16 mmHg  AV meanP 79 mmHg    MM  TAPSE: 2 25 cm    PW  ANTONIO (VTI): 1 9 cm2  ANTONIO Vmax: 1 8 cm2  E': 0 05 m/s  E/E': 18 98  LVOT Env  Ti: 423 91 ms  LVOT VTI: 27 28 cm  LVOT Vmax: 1 05 m/s  LVOT Vmean: 0 65 m/s  LVOT maxP 44 mmHg  LVOT meanP 03 mmHg  LVSV Dopp: 93 59 ml  MV A Nadeem: 0 92 m/s  MV Dec [place]: 5 65 m/s2  MV DecT: 163 82 ms  MV E Nadeem: 0 93 m/s  MV E/A Ratio: 1 01    [employer] Accredited Echocardiography Laboratory    Prepared and electronically signed by    Turner Lakhani DO  Signed 07-Dec-2017 13:53:25    Imaging: I have personally reviewed pertinent reports  and I have personally reviewed pertinent films in PACS  CT head without contrast    Result Date: 2021  Narrative: CT BRAIN - WITHOUT CONTRAST INDICATION:   Head trauma, moderate-severe Facial trauma, blunt trauma  COMPARISON:  None  TECHNIQUE:  CT examination of the brain was performed  In addition to axial images, sagittal and coronal 2D reformatted images were created and submitted for interpretation   Radiation dose length product (DLP) for this visit:  003 mGy-cm   This examination, like all CT scans performed in the [facility], was performed utilizing techniques to minimize radiation dose exposure, including the use of iterative reconstruction and automated exposure control  IMAGE QUALITY:  Diagnostic  FINDINGS: PARENCHYMA:  No intracranial mass, mass effect or midline shift  No CT signs of acute infarction  No acute parenchymal hemorrhage  VENTRICLES AND EXTRA-AXIAL SPACES:  Normal for the patient's age  VISUALIZED ORBITS AND PARANASAL SINUSES:  Unremarkable  CALVARIUM AND EXTRACRANIAL SOFT TISSUES:  Left temporal and periorbital scalp swelling  No scalp hematoma  Impression: No acute intracranial abnormality  Workstation performed: [id number]     CT facial bones without contrast    Result Date: 12/22/2021  Narrative: CT FACIAL BONES WITHOUT INTRAVENOUS CONTRAST INDICATION:   Facial trauma, penetrating trauma  COMPARISON: None  TECHNIQUE:  Axial CT images were obtained through the facial bones with additional sagittal and coronal reconstructions  Radiation dose length product (DLP) for this visit:  501 mGy-cm   This examination, like all CT scans performed in the [facility], was performed utilizing techniques to minimize radiation dose exposure, including the use of iterative reconstruction and automated exposure control  IMAGE QUALITY:  Diagnostic  FINDINGS: FACIAL BONES:   No facial bone fracture identified  Normal alignment of the temporomandibular joints  No lytic or blastic lesion  ORBITS:  Orbital globes, optic nerves, and extraocular muscles appear symmetric and normal  There is no evidence of retrobulbar mass, abscess, or hematoma  SINUSES:  Normal  SOFT TISSUES:  Left facial and periorbital soft tissue swelling  No soft tissue hematoma  Feel history     Impression: No acute fracture   Workstation performed: [id number]     CT chest wo contrast    Result Date: 12/23/2021  Narrative: CT CHEST WITHOUT IV CONTRAST INDICATION:   "Pneumonia, effusion or abscess suspected, xray done, abnormal finding on CXR, left lung field  Eval for pulm contusion vs asp pna   " Sleeping and fell out of chair, struck head and face with loss of consciousness  COMPARISON:  Chest radiograph from 12/22/2021  TECHNIQUE: Chest CT without intravenous contrast   Axial, sagittal, coronal 2D reformats and coronal MIPS from source data  Radiation dose length product (DLP):  466 mGy-cm   Radiation dose exposure minimized using iterative reconstruction and automated exposure control  FINDINGS: LUNGS:  No focal airspace consolidation  Mild diffuse reticulation throughout both lungs with mild bronchiectasis in the left upper lobe and lingula  AIRWAYS: No significant filling defects  PLEURA:  Unremarkable  HEART/GREAT VESSELS:  Heart at upper limit of normal in size  Mild coronary artery calcification indicating atherosclerotic heart disease  Moderate lipomatous hypertrophy of the interatrial septum  Pulmonary artery enlargement  MEDIASTINUM AND JOHNNY:  Calcified mediastinal and left hilar nodes due to benign granulomatous disease  Small hiatal hernia  CHEST WALL AND LOWER NECK: Unremarkable  UPPER ABDOMEN:  Cholecystectomy  Benign calcification in the spleen  OSSEOUS STRUCTURES: Nondisplaced fracture of the anterolateral left 8th rib (3/89)  Mild degenerative disease in the spine  Benign bone island in T5  Impression: Nothing to suggest pneumonia  Nondisplaced fracture of the anterolateral left 8th rib  Correlate with tenderness to determine if acute or old  Mild diffuse reticulation throughout the lungs with mild bronchiectasis in the left upper lobe and lingula, most likely due due to pulmonary fibrosis  Consider referral to pulmonology for further evaluation and management of probable early interstitial lung disease  Pulmonary artery enlargement which can be seen with pulmonary hypertension    Workstation performed: [id number]     Thank you for allowing us to participate in this patient's care  This pt will follow up with Dr Reymundo Clark once discharged  Counseling / Coordination of Care  Total floor / unit time spent today 45 minutes  Greater than 50% of total time was spent with the patient and / or family counseling and / or coordination of care  A description of the counseling / coordination of care: Review of history, current assessment, development of a plan  Code Status: Level 2 - DNAR: but accepts endotracheal intubation    ** Please Note: Dragon 360 Dictation voice to text software may have been used in the creation of this document   **

## 2021-12-23 NOTE — PHYSICAL THERAPY NOTE
PHYSICAL THERAPY Evaluation    Physical Therapy Evaluation    Performed at least 2 patient identifiers during session:  Patient Active Problem List   Diagnosis    Arteriosclerosis of coronary artery    Edema    Hypercholesterolemia    Hypertension    Aortic stenosis    Primary osteoarthritis of both knees    Morbid obesity with body mass index (BMI) of 50 0 to 59 9 in adult [facility])    Diabetic peripheral neuropathy ([facility] )    Type 2 diabetes mellitus with stage 3 chronic kidney disease and hypertension ([place] [address] )    Chronic renal disease, stage IV ([place] [address] )    Ambulatory dysfunction    Elevated troponin    Syncope    Leukocytosis       Past Medical History:   Diagnosis Date    Aortic stenosis     Arthritis     Asthma     Coronary artery disease     Diabetes mellitus ([place] [address] )     Diabetic peripheral neuropathy ([place] [address] )     Last assessed - 1/27/16    Gallbladder disease     Heart attack ([place] [address] ) 07/1999    Hemorrhoids     Last assessed - 11/16/12    Myocardial infarction ([place] [address] )     Old myocardial infarction     Type 2 diabetes mellitus with autonomic neuropathy ([place] [address] )     Unspec long term insulin use status, Last assessed - 6/8/17       Past Surgical History:   Procedure Laterality Date    BUNIONECTOMY      CARDIAC CATHETERIZATION      Carotid Artery, Resolved - 7/1/13    CARDIOVASCULAR STRESS TEST  09/1999    CHOLECYSTECTOMY      COLONOSCOPY  2017    FOOT ARTHRODESIS, MODIFIED [facility]  2016    GALLBLADDER SURGERY  1970    HEMORROIDECTOMY      KNEE ARTHROSCOPY Left 2004    IA COLONOSCOPY FLX DX W/COLLJ SPEC WHEN PFRMD N/A 8/21/2017    Procedure: COLONOSCOPY;  Surgeon: Kilo Ramos MD;  [facility]: [facility];   Service: Colorectal    REPAIR KNEE LIGAMENT      REPAIR KNEE LIGAMENT Left 1986    REPAIR KNEE LIGAMENT Right 1988 12/23/21 1135   PT Last Visit   PT Visit Date 12/23/21   Note Type   Note type Evaluation Pain Assessment   Pain Assessment Tool 0-10   Pain Score No Pain   Restrictions/Precautions   Weight Bearing Precautions Per Order No   Home Living   Type of [address] [facility] level;Ramped entrance   Bathroom Shower/Tub Walk-in shower   Bathroom Equipment Shower chair;Grab bars in [facility]  (pt uses RW for all mobility)   Prior Function   Level of [place] Independent with ADLs and functional mobility   Lives With Alone   Receives Help From Family   ADL Assistance Independent   IADLs Needs assistance  (Friend or sister drive and grocery shop; pt does cook/clean)   Falls in the last 6 months 1 to 4   Vocational Full time employment  (works from home as an  )   Comments pt works full-time as an , works from home;  pt sleeps and spends her day in the [id number] chair  (buttock wounds noted)   General   Additional Pertinent History Orthostatic BP measurements;  Standin/64mmHg;  sittin/50; Unable to obtain standing BP measurement secondary to fatigue, pt denies light headedness  (once pt sitting again BP:  110/58mmHg )   Family/Caregiver Present No   Cognition   Overall Cognitive Status WFL   Arousal/Participation Alert   Orientation Level Oriented X4   Memory Within functional limits   Following Commands Follows one step commands without difficulty   Comments pt works full-time as an , works from home;  pt sleeps and spends her day in the recliner chair  Subjective   Subjective Pt states she feels ok, no pain  RUE Assessment   RUE Assessment WFL   LUE Assessment   LUE Assessment WFL   RLE Assessment   RLE Assessment   (3-/5 at hip flexion, 3+/5 at knee extension and ankle df)   LLE Assessment   LLE Assessment   (3-/5 at hip flexion, 3+/5 at knee extension and ankle df)   Bed Mobility   Supine to Sit 4  Minimal assistance   Additional items Assist x 1; Increased time required;HOB elevated; Bedrails;LE management   Additional Comments pt sleeps in recliner chair at baseline;  pt remains seated in chair at EOB at end of PT session  Transfers   Sit to Stand 4  Minimal assistance   Additional items Assist x 1; Increased time required   Stand to Sit 5  Supervision   Additional items Assist x 1; Increased time required   Stand pivot 4  Minimal assistance   Additional items Assist x 1; Increased time required   Toilet transfer 4  Minimal assistance   Additional items Assist x 1; Increased time required;Commode   Additional Comments pt had bowel movement and urinated on BSC;  pt requires max A for hygiene  Ambulation/Elevation   Gait pattern Forward Flexion;Decreased foot clearance   Gait Assistance 4  Minimal assist   Additional items Assist x 1   Assistive Device Rolling walker   Distance 5ft x 2;  min A for balance and RW management;  pt limited by fatigued, denies light headed  unable to obtain standing BP secondary to fatigue and needed to sit;  bed to commode, commode to chair  Balance   Static Sitting Fair   Dynamic Sitting Fair -   Static Standing Fair -   Dynamic Standing [address],4, & 5 -   Activity Tolerance   Activity Tolerance Patient limited by fatigue;Treatment limited secondary to medical complications (Comment)  (orthostatic BP)   Medical Staff Made Aware [facility] resident Bigg Pereira for OT orders   Nurse Made Aware [facility]   Assessment   Prognosis Fair   Problem List Decreased strength;Decreased range of motion;Decreased endurance; Impaired balance;Decreased mobility; Decreased coordination;Obesity; Decreased skin integrity   Assessment Pt is a 64 y o  female who presented to ED after falling from her recliner chair to the floor, unaware of what happened;  Bruising to L side of face  Dx:  Closed head injury, contusion of face, elevated troponin, falls, syncope  Comorbidities affecting pt's physical performance at time of assessment include: L BBB, neuropathy, obesity   Personal factors affecting pt at time of IE include: obesity, skin integrity with wounds on buttock, unable to tolerate standing >2 minutes, hypotension  Prior to admission, pt was mod I for mobility with RW in home, sleeps in recliner chair, works full-time from home as an   Upon evaluation: Pt requires min A for bed mobility, min A for sit to stand, and min A for stepping to commode and to chair with RW  Full objective findings from PT assessment regarding body systems outlined above  Current limitations include impaired balance, decreased endurance, gait deviations, decreased activity tolerance, fall risk and orthostatic BP  Pt's clinical presentation is currently unstable/unpredictable seen in pt's presentation of continuous monitoring, fall risk, and orthostatic BP  Pt had bowel movement and urinated on [facility] however unable to perform thorough hygiene;  [facility] resident for OT consult for ADLs  Pt would benefit from continued PT while in hospital and follow up with inpt rehab at D/C to increase strength, balance, endurance, independence with funcitonal mobility to return to [facility], maximize independence  The patient's AM-PAC Basic Mobility Inpatient Short Form Raw Score is 15  A Raw score of less than or equal to 16 suggests the patient may benefit from discharge to post-acute rehabilitation services  Please also refer to the recommendation of the Physical Therapist for safe discharge planning  Barriers to Discharge Inaccessible home environment;Decreased caregiver support   Goals   Patient Goals to go home   STG Expiration Date 01/06/22   Short Term Goal #1 1   mod I for sit to/from supine (pt sleeps in recliner, not necessary to return home); 2   mod I for sit to/from standing with RW;  3   mod I to ambulate 150ft with RW  Plan   Treatment/Interventions ADL retraining;Functional transfer training;LE strengthening/ROM; Elevations; Therapeutic exercise; Endurance training;Patient/family training;Equipment eval/education; Bed mobility;Gait training; Compensatory technique education;Continued evaluation;Spoke to nursing;OT   PT Frequency 4-6x/wk   Recommendation   PT Discharge Recommendation Post acute rehabilitation services   Additional Comments pt requires physical assist for all mobility, limited standing tolerance secondary to orthostatic BP measurements and unable to perform ADL/hygiene after use of toilet/commode independently in a pt who has skin break down on buttock  Not safe for mod I home alone DC      AM-PAC Basic Mobility Inpatient   Turning in Bed Without Bedrails 2   Lying on Back to Sitting on Edge of Flat Bed 2   Moving Bed to Chair 3   Standing Up From Chair 3   Walk in Room 3   Climb 3-5 Stairs 2   Basic Mobility Inpatient Raw Score 15   Basic Mobility Standardized Score 36 97   Highest Level Of Mobility   JH-HLM Goal 4: Move to chair/commode   JH-HLM Highest Level of Mobility 4: Move to chair/commode   JH-HLM Goal Achieved Yes     Giovana Mata, PT      Patient Name: Kenton Owen  [id number] Date: 12/23/2021

## 2021-12-23 NOTE — ASSESSMENT & PLAN NOTE
· Patient reports falling "multiple times a day"  · Has area rugs, doesn't feel comfortable walking on hardwood floors despite wearing shoes  · F/u PT and OT recs

## 2021-12-23 NOTE — ASSESSMENT & PLAN NOTE
· Patient on high dose Gabapentin for 6+ years  · Home regimen: 300 mg x3 in AM, 300 mg x2 in afternoon, 300 mg x3 in evening  · May be contributing to frequent falls  · Consult Gerontology for help managing medication regimen in setting of frequent falls

## 2021-12-23 NOTE — ASSESSMENT & PLAN NOTE
Lab Results   Component Value Date    EGFR 28 12/23/2021    EGFR 36 12/22/2021    EGFR 23 08/11/2021    CREATININE 1 69 (H) 12/23/2021    CREATININE 1 38 (H) 12/22/2021    CREATININE 2 03 (H) 08/11/2021     · Baseline Cr appears to be 1 7-1 9  · Monitor Cr while admitted, renal adjust medications  · Avoid hypotension and nephrotoxic agents

## 2021-12-23 NOTE — ASSESSMENT & PLAN NOTE
Patient hypoglycemic at 0500 and 0700 on 12/23/21   Received dextrose Likely in setting of decreased oral intake vs adrenal insufficiency    Plan:   · Monitor glucose   · F/u AM cortisol level  · Holding SSI for now

## 2021-12-23 NOTE — ASSESSMENT & PLAN NOTE
Lab Results   Component Value Date    HSTNI0 28 12/22/2021    HSTNID2 23 12/22/2021    HSTNI2 51 () 12/22/2021    HSTNID4 47 12/22/2021    HSTNI4 75 () 12/22/2021      · 4 hour hs troponin pending  · Patient denies CP, N/V, diaphoresis, arm or jaw pain  · Hx of MI, known CAD, KIEL score 4  · Cardiology following  · F/u ECHO

## 2021-12-23 NOTE — ASSESSMENT & PLAN NOTE
· BP controlled in ED  · Holding home Nebivolol 5 mg and Lasix 20 mg in setting of positive orthostatic blood pressures and syncopal event  · F/u ECHO

## 2021-12-23 NOTE — ED PROVIDER NOTES
History  Chief Complaint   Patient presents with    Loss of Consciousness     Pt brought by ems, pt was sleeping and fell out of chair onto carpet, struck head and face, LOC, and sustained contusion to L side of face  Pt also fell today due to unrelated circumstances  65 yo female with h/o CAD s/p MI, aortic stenosis, IDDM, HTN, HLD presents via EMS after fall  Pt reports that she was asleep in her recliner and fall onto the floor injuring her left face  Denies LOC, denies other associated symptoms including chest pain, SOB, URI symptoms, cough, sick contacts, n/v/d  Pt reportedly lives alone after death of  in October and does admit to multiple recent falls  She states that falls are generally "because I trip", denies LOC or passing out  Denies exertional chest pain or dyspnea, states she is eating/drinking at baseline and feeling generally well  History provided by:  Medical records and patient   used: No    Trauma  Mechanism of injury: fall  Injury location: head/neck and face  Injury location detail: L eye and L cheek  Incident location: home  Arrived directly from scene: yes     Fall:       Impact surface: hard floor       Point of impact: head and face       Entrapped after fall: no    Protective equipment:        None       Suspicion of alcohol use: no       Suspicion of drug use: no    EMS/PTA data:       Bystander interventions: none       Ambulatory at scene: no       Blood loss: minimal       Responsiveness: alert       Oriented to: person, place, situation and time       Loss of consciousness: yes       Amnesic to event: no       Airway interventions: none       Breathing interventions: none    Current symptoms:       Pain scale: 5/10       Pain quality: aching       Pain timing: constant       Associated symptoms:             Reports loss of consciousness  Denies chest pain, nausea and vomiting       Relevant PMH:       Medical risk factors: CAD and diabetes  Pharmacological risk factors:             No anticoagulation therapy  Tetanus status: UTD       The patient has not been admitted to the hospital due to injury in the past year, and has not been treated and released from the ED due to injury in the past year  Prior to Admission Medications   Prescriptions Last Dose Informant Patient Reported? Taking?    Bystolic 5 MG tablet 02/95/1652 at Unknown time Self No Yes   Sig: TAKE 1 TABLET BY MOUTH EVERY DAY   HYDROcodone-acetaminophen (NORCO) 5-325 mg per tablet 12/22/2021 at Unknown time  No Yes   Sig: Take 1 tablet by mouth every 6 (six) hours as needed for painMax Daily Amount: 4 tablets   MICROLET LANCETS MISC  Self Yes No   Sig: by Does not apply route   Multiple Vitamin (DAILY VALUE MULTIVITAMIN) TABS 12/22/2021 at Unknown time Self Yes Yes   Sig: Take 1 tablet by mouth daily   Multiple Vitamins-Minerals (PRESERVISION AREDS PO)  Self Yes No   Sig: Take by mouth daily   Wound Dressings (PruTect) EMUL  Self No No   Sig: Apply 1 Dose topically 2 (two) times a day   albuterol (ProAir HFA) 90 mcg/act inhaler Not Taking at Unknown time Self No No   Sig: Inhale 2 puffs every 6 (six) hours as needed for wheezing   Patient not taking: Reported on 12/22/2021    diclofenac-misoprostol (ARTHROTEC 50) 50-0 2 MG per tablet 12/22/2021 at Unknown time Self No Yes   Sig: Take 1 tablet by mouth 2 (two) times a day   diphenoxylate-atropine (LOMOTIL) 2 5-0 025 mg per tablet 12/22/2021 at Unknown time  No Yes   Sig: Take 1 tablet by mouth 4 (four) times a day as needed for diarrhea   furosemide (LASIX) 20 mg tablet 12/22/2021 at Unknown time  No Yes   Sig: TAKE 1 TABLET BY MOUTH EVERY DAY   gabapentin (NEURONTIN) 300 mg capsule 12/22/2021 at Unknown time  No Yes   Sig: Take 3 capsules by mouth every morning, then 2 capsules by mouth every day in the afternoon, then take 3 capsules at bedtime   glimepiride (AMARYL) 4 mg tablet 12/22/2021 at Unknown time Self No Yes   Sig: TAKE 1 TABLET BY MOUTH EVERY DAY WITH BREAKFAST   glucose blood (TRUETEST TEST) test strip  Self Yes No   Sig: by In Vitro route daily   levothyroxine 150 mcg tablet 12/22/2021 at Unknown time Self No Yes   Sig: Take 1 tablet (150 mcg total) by mouth daily in the early morning   nystatin (MYCOSTATIN) powder 12/22/2021 at Unknown time Self No Yes   Sig: Apply topically 2 (two) times a day   potassium chloride (K-DUR,KLOR-CON) 20 mEq tablet 12/22/2021 at Unknown time Self No Yes   Sig: TAKE 1 TABLET BY MOUTH EVERY DAY   pravastatin (PRAVACHOL) 10 mg tablet 12/22/2021 at Unknown time  No Yes   Sig: Take 1 tablet (10 mg total) by mouth daily   venlafaxine (EFFEXOR) 75 mg tablet 12/22/2021 at Unknown time  No Yes   Sig: Take 1 tablet (75 mg total) by mouth 3 (three) times a day      Facility-Administered Medications: None       Past Medical History:   Diagnosis Date    Aortic stenosis     Arthritis     Asthma     Coronary artery disease     [facility] mellitus [facility]     Diabetic peripheral neuropathy ([facility]     Last assessed - 1/27/16    Gallbladder disease     Heart attack ([facility] [facility] ) 07/1999    Hemorrhoids     Last assessed - 11/16/12    Myocardial infarction ([facility] [address] [facility]     Old myocardial infarction     Type 2 diabetes mellitus with autonomic neuropathy ([facility] [address] )     Unspec long term insulin use status, Last assessed - 6/8/17       Past Surgical History:   Procedure Laterality Date    BUNIONECTOMY      CARDIAC CATHETERIZATION      Carotid Artery, Resolved - 7/1/13    CARDIOVASCULAR STRESS TEST  09/1999    CHOLECYSTECTOMY      COLONOSCOPY  2017    FOOT ARTHRODESIS, MODIFIED DONOHUE  2016    GALLBLADDER SURGERY  1970    HEMORROIDECTOMY      KNEE ARTHROSCOPY Left 2004    NM COLONOSCOPY FLX DX W/COLLJ SPEC WHEN PFRMD N/A 8/21/2017    Procedure: COLONOSCOPY;  Surgeon: Tete Robin MD;  Location: [facility];   Service: Colorectal    REPAIR KNEE LIGAMENT      REPAIR KNEE LIGAMENT Left 1986    REPAIR KNEE LIGAMENT Right 1988       Family History   Problem Relation Age of Onset    Hypertension Father     Diabetes Father     Cancer Father         Throat    Arthritis Father     Stroke Mother     Diabetes Maternal Grandmother     Heart disease Maternal Grandfather     Diabetes Son     Lymphoma Family         Head, Face and Neck      I have reviewed and agree with the history as documented  E-Cigarette/Vaping     E-Cigarette/Vaping Substances     Social History     Tobacco Use    Smoking status: Never Smoker    Smokeless tobacco: Never Used   Substance Use Topics    Alcohol use: No     Comment: Social per [employer]     Drug use: No       Review of Systems   Constitutional: Negative for chills, fatigue and fever  HENT: Negative for rhinorrhea and sore throat  Respiratory: Negative for cough and shortness of breath  Cardiovascular: Negative for chest pain  Gastrointestinal: Negative for diarrhea, nausea and vomiting  Genitourinary: Negative for dysuria, frequency and urgency  Skin: Positive for wound  Negative for rash  Neurological: Positive for loss of consciousness  Negative for dizziness, weakness and numbness  All other systems reviewed and are negative  Physical Exam  Physical Exam  Vitals and nursing note reviewed  Constitutional:       General: She is not in acute distress  Appearance: She is well-developed  She is obese  She is not ill-appearing, toxic-appearing or diaphoretic  HENT:      Head: Normocephalic  Contusion (left face) present  No raccoon eyes  Jaw: There is normal jaw occlusion  Comments: No significant TTP of max face, no mid face instability      Right Ear: Tympanic membrane, ear canal and external ear normal       Left Ear: Tympanic membrane, ear canal and external ear normal       Nose: Nose normal  No nasal deformity or septal deviation  Right Nostril: No septal hematoma  Left Nostril: No septal hematoma  Mouth/Throat:      Mouth: Mucous membranes are moist       Comments: No dental injury  Eyes:      General: Lids are normal  No scleral icterus  Extraocular Movements:      Right eye: Normal extraocular motion and no nystagmus  Left eye: Normal extraocular motion and no nystagmus  Conjunctiva/sclera:      Right eye: Right conjunctiva is not injected  No chemosis, exudate or hemorrhage  Left eye: Left conjunctiva is injected  No chemosis, exudate or hemorrhage  Cardiovascular:      Rate and Rhythm: Normal rate and regular rhythm  Pulses: Normal pulses  Heart sounds: Normal heart sounds  No murmur heard  Pulmonary:      Effort: Pulmonary effort is normal  No respiratory distress  Breath sounds: Normal breath sounds  Abdominal:      Palpations: Abdomen is soft  Tenderness: There is no abdominal tenderness  There is no guarding or rebound  Musculoskeletal:         General: No deformity  Normal range of motion  Cervical back: Normal, full passive range of motion without pain and normal range of motion  No spinous process tenderness or muscular tenderness  Thoracic back: Normal       Lumbar back: Normal       Right lower leg: No edema  Left lower leg: No edema  Skin:     General: Skin is warm and dry  Neurological:      Mental Status: She is alert and oriented to person, place, and time  GCS: GCS eye subscore is 4  GCS verbal subscore is 5  GCS motor subscore is 6  Cranial Nerves: Cranial nerves are intact  Sensory: Sensation is intact  Motor: Motor function is intact  Psychiatric:         Behavior: Behavior normal          Thought Content:  Thought content normal          Judgment: Judgment normal          Vital Signs  ED Triage Vitals   Temperature Pulse Respirations Blood Pressure SpO2   12/22/21 1708 12/22/21 1708 12/22/21 1708 12/22/21 1708 12/22/21 1708   97 6 °F (36 4 °C) 93 18 119/60 92 %      Temp Source Heart Rate Source Patient Position - Orthostatic VS BP Location FiO2 (%)   12/22/21 1708 12/22/21 2126 12/22/21 1708 12/22/21 1708 --   Oral Monitor Lying Left arm       Pain Score       12/22/21 1708       No Pain           Vitals:    12/22/21 1708 12/22/21 2126   BP: 119/60 120/63   Pulse: 93 81   Patient Position - Orthostatic VS: Lying Lying         Visual Acuity      ED Medications  Medications - No data to display    Diagnostic Studies  Results Reviewed     Procedure Component Value Units Date/Time    HS Troponin I 2hr [[id number]]  (Abnormal) Collected: 12/22/21 1951    Lab Status: Final result Specimen: Blood from Arm, Right Updated: 12/22/21 2112     hs TnI 2hr 51 ng/L      Delta 2hr hsTnI 23 ng/L     HS Troponin I 4hr [[id number]]     Lab Status: No result Specimen: Blood     HS Troponin 0hr (reflex protocol) [[id number]]  (Normal) Collected: 12/22/21 1753    Lab Status: Final result Specimen: Blood from Arm, Right Updated: 12/22/21 1839     hs TnI 0hr 28 ng/L     Comprehensive metabolic panel [[id number]]  (Abnormal) Collected: 12/22/21 1753    Lab Status: Final result Specimen: Blood from Arm, Right Updated: 12/22/21 1832     Sodium 143 mmol/L      Potassium 5 6 mmol/L      Chloride 106 mmol/L      CO2 28 mmol/L      ANION GAP 9 mmol/L      BUN 16 mg/dL      Creatinine 1 38 mg/dL      Glucose 83 mg/dL      Calcium 9 3 mg/dL      Corrected Calcium 10 1 mg/dL      AST 70 U/L      ALT 42 U/L      Alkaline Phosphatase 93 U/L      Total Protein 6 4 g/dL      Albumin 3 0 g/dL      Total Bilirubin 0 60 mg/dL      eGFR 36 ml/min/1 73sq m     Narrative:      [place] guidelines for Chronic Kidney Disease (CKD):     Stage 1 with normal or high GFR (GFR > 90 mL/min/1 73 square meters)    Stage 2 Mild CKD (GFR = 60-89 mL/min/1 73 square meters)    Stage 3A Moderate CKD (GFR = 45-59 mL/min/1 73 square meters)    Stage 3B Moderate CKD (GFR = 30-44 mL/min/1 73 square meters)    Stage 4 Severe CKD (GFR = 15-29 mL/min/1 73 square meters)    Stage 5 End Stage CKD (GFR <15 mL/min/1 73 square meters)  Note: GFR calculation is accurate only with a steady state creatinine    CBC and differential [[id number]]  (Abnormal) Collected: 12/22/21 1753    Lab Status: Final result Specimen: Blood from Arm, Right Updated: 12/22/21 1806     WBC 15 04 Thousand/uL      RBC 4 57 Million/uL      Hemoglobin 14 0 g/dL      Hematocrit 43 6 %      MCV 95 fL      MCH 30 6 pg      MCHC 32 1 g/dL      RDW 15 4 %      MPV 12 6 fL      Platelets 399 Thousands/uL      nRBC 0 /100 WBCs      Neutrophils Relative 82 %      Immat GRANS % 1 %      Lymphocytes Relative 8 %      Monocytes Relative 7 %      Eosinophils Relative 2 %      Basophils Relative 0 %      Neutrophils Absolute 12 39 Thousands/µL      Immature Grans Absolute 0 10 Thousand/uL      Lymphocytes Absolute 1 20 Thousands/µL      Monocytes Absolute 1 05 Thousand/µL      Eosinophils Absolute 0 26 Thousand/µL      Basophils Absolute 0 04 Thousands/µL                  CT head without contrast   Final Result by Emery Palencia MD (12/22 1936)      No acute intracranial abnormality  Workstation performed: [id number]         CT facial bones without contrast   Final Result by Emery Palencia MD (12/22 1938)      No acute fracture  Workstation performed: [id number]         XR chest 1 view portable   ED Interpretation by Rita Ann MD (12/22 1853)   Poor inspiratory effort, possible Left infiltrate?                   Procedures  ECG 12 Lead Documentation Only    Date/Time: 12/22/2021 10:33 PM  Performed by: Rita Ann MD  Authorized by: Rita Ann MD     Indications / Diagnosis:  Fall versus syncope  ECG reviewed by me, the ED Provider: yes    Patient location:  ED  Previous ECG:     Previous ECG:  Compared to current    Comparison ECG info:  5/25/2016    Similarity:  Changes noted  Interpretation:     Interpretation: abnormal Rate:     ECG rate:  99 BPM    ECG rate assessment: normal    Rhythm:     Rhythm: sinus rhythm    Ectopy:     Ectopy: none    QRS:     QRS axis:  Left  Conduction:     Conduction: abnormal      Abnormal conduction: complete LBBB    Comments:      No STEMI             ED Course  ED Course as of 12/22/21 2130   Wed Dec 22, 2021   1815 CBC and differential(!)  Leukocytosis without banding, no anemia, normal plts   1832 Potassium(!): 5 6  Elevated by moderately hemolyzed   1832 Creatinine(!): 1 38  Improved from baseline   1832 AST(!): 70  Minimal elevation   1833 Comprehensive metabolic panel(!)  Largely reassuring, no AG, normal bicarb   1842 hs TnI 0hr: 28  noted   1938 CT head without contrast  IMPRESSION:     No acute intracranial abnormality      1952 CT facial bones without contrast  IMPRESSION:     No acute fracture       2042 Tetanus UTD   2114 HS Troponin I 2hr(!!)  Increasing delta                               SBIRT 22yo+      Most Recent Value   SBIRT (22 yo +)    In order to provide better care to our patients, we are screening all of our patients for alcohol and drug use  Would it be okay to ask you these screening questions? Unable to answer at this time Filed at: 12/22/2021 1951                    MDM  Number of Diagnoses or Management Options  Closed head injury  Contusion of face, initial encounter  Elevated troponin  Falls frequently  Syncope  Diagnosis management comments: Increasing delta trop in setting fall versus syncope  Denies chest pain or SOB throughout ED stay, admitted for further work up and eval   HD stable throughout          Amount and/or Complexity of Data Reviewed  Clinical lab tests: ordered and reviewed  Tests in the radiology section of CPT®: ordered and reviewed  Tests in the medicine section of CPT®: ordered and reviewed  Review and summarize past medical records: yes  Independent visualization of images, tracings, or specimens: yes    Risk of Complications, Morbidity, and/or Mortality  Presenting problems: moderate  Diagnostic procedures: moderate  Management options: moderate    Patient Progress  Patient progress: stable      Disposition  Final diagnoses:   Elevated troponin   Falls frequently   Closed head injury   Contusion of face, initial encounter   Syncope - possible     Time reflects when diagnosis was documented in both MDM as applicable and the Disposition within this note     Time User Action Codes Description Comment    12/22/2021  9:24 PM Mulflur, Arthea Miss Add [R77 8] Elevated troponin     12/22/2021  9:24 PM Mulflur, Alexa Add [R29 6] Falls frequently     12/22/2021  9:24 PM Mulflur, Aelxa Add [S09 90XA] Closed head injury     12/22/2021  9:24 PM Mulflur, Alexa Add [S00 [id number]] Contusion of face, initial encounter     12/22/2021  9:25 PM Mulflur, Alexa Add [R55] Syncope     12/22/2021  9:25 PM Mulflur, Alexa Modify [R55] Syncope possible      ED Disposition     ED Disposition Condition Date/Time Comment    Admit Stable Wed Dec 22, 2021 2124 Case was discussed with [facility] and the patient's admission status was agreed to be Admission Status: observation status to the service of Dr Nichol Gama   Follow-up Information    None         Patient's Medications   Discharge Prescriptions    No medications on file       No discharge procedures on file      PDMP Review       Value Time User    PDMP Reviewed  Yes 11/23/2021  3:05 PM Gerry Blair DO          ED Provider  Electronically Signed by           Varsha Amaral MD  12/22/21 8359

## 2021-12-23 NOTE — ASSESSMENT & PLAN NOTE
Lab Results   Component Value Date    HSTNI0 28 12/22/2021    HSTNID2 23 12/22/2021    HSTNI2 51 (HH) 12/22/2021      · 4 hour hs troponin pending  · Patient denies CP, N/V, diaphoresis, arm or jaw pain  · Hx of MI, known CAD, KIEL score 4  · ECHO ordered  · Cardiology consulted

## 2021-12-23 NOTE — PLAN OF CARE
Problem: Potential for Falls  Goal: Patient will remain free of falls  Description: INTERVENTIONS:  - Educate patient/family on patient safety including physical limitations  - Instruct patient to call for assistance with activity   - Consult OT/PT to assist with strengthening/mobility   - Keep Call bell within reach  - Keep bed low and locked with side rails adjusted as appropriate  - Keep care items and personal belongings within reach  - Initiate and maintain comfort rounds  - Make Fall Risk Sign visible to staff  - Offer Toileting every  Hours, in advance of need  - Initiate/Maintain alarm  - Obtain necessary fall risk management equipment:   - Apply yellow socks and bracelet for high fall risk patients  - Consider moving patient to room near nurses station  Outcome: Progressing     Problem: MOBILITY - ADULT  Goal: Maintain or return to baseline ADL function  Description: INTERVENTIONS:  -  Assess patient's ability to carry out ADLs; assess patient's baseline for ADL function and identify physical deficits which impact ability to perform ADLs (bathing, care of mouth/teeth, toileting, grooming, dressing, etc )  - Assess/evaluate cause of self-care deficits   - Assess range of motion  - Assess patient's mobility; develop plan if impaired  - Assess patient's need for assistive devices and provide as appropriate  - Encourage maximum independence but intervene and supervise when necessary  - Involve family in performance of ADLs  - Assess for home care needs following discharge   - Consider OT consult to assist with ADL evaluation and planning for discharge  - Provide patient education as appropriate  Outcome: Progressing  Goal: Maintains/Returns to pre admission functional level  Description: INTERVENTIONS:  - Perform BMAT or MOVE assessment daily    - Set and communicate daily mobility goal to care team and patient/family/caregiver     - Collaborate with rehabilitation services on mobility goals if consulted  - Perform Range of Motion  times a day  - Reposition patient every  hours  - Dangle patient  times a day  - Stand patient  times a day  - Ambulate patient  times a day  - Out of bed to chair  times a day   - Out of bed for meals times a day  - Out of bed for toileting  - Record patient progress and toleration of activity level   Outcome: Progressing     Problem: Nutrition/Hydration-ADULT  Goal: Nutrient/Hydration intake appropriate for improving, restoring or maintaining nutritional needs  Description: Monitor and assess patient's nutrition/hydration status for malnutrition  Collaborate with interdisciplinary team and initiate plan and interventions as ordered  Monitor patient's weight and dietary intake as ordered or per policy  Utilize nutrition screening tool and intervene as necessary  Determine patient's food preferences and provide high-protein, high-caloric foods as appropriate       INTERVENTIONS:  - Monitor oral intake, urinary output, labs, and treatment plans  - Assess nutrition and hydration status and recommend course of action  - Evaluate amount of meals eaten  - Assist patient with eating if necessary   - Allow adequate time for meals  - Recommend/ encourage appropriate diets, oral nutritional supplements, and vitamin/mineral supplements  - Order, calculate, and assess calorie counts as needed  - Recommend, monitor, and adjust tube feedings and TPN/PPN based on assessed needs  - Assess need for intravenous fluids  - Provide specific nutrition/hydration education as appropriate  - Include patient/family/caregiver in decisions related to nutrition  Outcome: Progressing     Problem: Prexisting or High Potential for Compromised Skin Integrity  Goal: Skin integrity is maintained or improved  Description: INTERVENTIONS:  - Identify patients at risk for skin breakdown  - Assess and monitor skin integrity  - Assess and monitor nutrition and hydration status  - Monitor labs   - Assess for incontinence   - Turn and reposition patient  - Assist with mobility/ambulation  - Relieve pressure over bony prominences  - Avoid friction and shearing  - Provide appropriate hygiene as needed including keeping skin clean and dry  - Evaluate need for skin moisturizer/barrier cream  - Collaborate with interdisciplinary team   - Patient/family teaching  - Consider wound care consult   Outcome: Progressing

## 2021-12-23 NOTE — PLAN OF CARE
Problem: PHYSICAL THERAPY ADULT  Goal: Performs mobility at highest level of function for planned discharge setting  See evaluation for individualized goals  Description: Treatment/Interventions: ADL retraining,Functional transfer training,LE strengthening/ROM,Elevations,Therapeutic exercise,Endurance training,Patient/family training,Equipment eval/education,Bed mobility,Gait training,Compensatory technique education,Continued evaluation,Spoke to nursing,OT          See flowsheet documentation for full assessment, interventions and recommendations  Note: Prognosis: Fair  Problem List: Decreased strength,Decreased range of motion,Decreased endurance,Impaired balance,Decreased mobility,Decreased coordination,Obesity,Decreased skin integrity  Assessment: Pt is a 64 y o  female who presented to ED after falling from her recliner chair to the floor, unaware of what happened;  Bruising to L side of face  Dx:  Closed head injury, contusion of face, elevated troponin, falls, syncope  Comorbidities affecting pt's physical performance at time of assessment include: L BBB, neuropathy, obesity  Personal factors affecting pt at time of IE include: obesity, skin integrity with wounds on buttock, unable to tolerate standing >2 minutes, hypotension  Prior to admission, pt was mod I for mobility with RW in home, sleeps in recliner chair, works full-time from home as an   Upon evaluation: Pt requires min A for bed mobility, min A for sit to stand, and min A for stepping to commode and to chair with RW  Full objective findings from PT assessment regarding body systems outlined above  Current limitations include impaired balance, decreased endurance, gait deviations, decreased activity tolerance, fall risk and orthostatic BP  Pt's clinical presentation is currently unstable/unpredictable seen in pt's presentation of continuous monitoring, fall risk, and orthostatic BP   Pt had bowel movement and urinated on [facility] however unable to perform thorough hygiene;  TT resident for OT consult for ADLs  Pt would benefit from continued PT while in hospital and follow up with inpt rehab at D/C to increase strength, balance, endurance, independence with funcitonal mobility to return to PLOF, maximize independence  The patient's AM-PAC Basic Mobility Inpatient Short Form Raw Score is 15  A Raw score of less than or equal to 16 suggests the patient may benefit from discharge to post-acute rehabilitation services  Please also refer to the recommendation of the Physical Therapist for safe discharge planning  Barriers to Discharge: Inaccessible home environment,Decreased caregiver support        PT Discharge Recommendation: Post acute rehabilitation services          See flowsheet documentation for full assessment

## 2021-12-23 NOTE — ASSESSMENT & PLAN NOTE
Lab Results   Component Value Date    WBC 15 04 (H) 12/22/2021    WBC 10 17 (H) 10/15/2020     · Leukocytosis present  Likely reactive, however abnormal CXR present   · CXR with questionable area in left lung field   Patient fell and was down on left side  · Possible contusion vs aspiration post fall  · Currently afebrile  · CT chest pending  · Repeat CBC in AM

## 2021-12-23 NOTE — H&P
[facility]  H&P- Leisa Land 1945, 64 y o  female MRN: [id number]  Unit/Bed#: ED 19 Encounter: [id number]  Primary Care Provider: Darrion Pulido DO   Date and time admitted to hospital: 12/22/2021  4:57 PM    * Syncope  Assessment & Plan  · Patient was sleeping in recliner and woke up on floor on left side +LOC and +head trauma  Arrived to ED via EMS  · Patient with known cardiac history, LBBB, and questionable ST changes on EMS 12 lead  · In ED, patient dizzy with movement and elevated trop, denies any CP  · CT face - no acute fractures  · CT head - no acute intracranial abnormality  · Bruising noted on left side of face  Patient denies taking blood thinners but reports taking Aspirin for pain  · Neuro checks Q4  · Check orthostatic BP  · Cardiology consulted for elevated trop     Elevated troponin  Assessment & Plan  Lab Results   Component Value Date    HSTNI0 28 12/22/2021    HSTNID2 23 12/22/2021    HSTNI2 51 (HH) 12/22/2021      · 4 hour hs troponin pending  · Patient denies CP, N/V, diaphoresis, arm or jaw pain  · Hx of MI, known CAD, KIEL score 4  · ECHO ordered  · Cardiology consulted    Leukocytosis  Assessment & Plan  Lab Results   Component Value Date    WBC 15 04 (H) 12/22/2021    WBC 10 17 (H) 10/15/2020     · Leukocytosis present  Likely reactive, however abnormal CXR present   · CXR with questionable area in left lung field   Patient fell and was down on left side  · Possible contusion vs aspiration post fall  · Currently afebrile  · CT chest pending  · Repeat CBC in AM    Ambulatory dysfunction  Assessment & Plan  · Patient reports falling "multiple times a day"  · Has area rugs, doesn't feel comfortable walking on hardwood floors despite wearing shoes  · Consult PT    Type 2 diabetes mellitus with stage 3 chronic kidney disease and hypertension ([facility] [address] )  Assessment & Plan  Lab Results   Component Value Date    HGBA1C 6 5 08/04/2021       No results for input(s): POCGLU in the last 72 hours  Blood Sugar Average: Last 72 hrs:      Hold PO medications while inpatient   Monitor accu-checks AC and HS   SSI coverage   Hypoglycemia protocol  Diabetic peripheral neuropathy ([facility] [address] )  Assessment & Plan  · Patient on high dose Gabapentin for 6+ years  · Home regimen: 300 mg x3 in AM, 300 mg x2 in afternoon, 300 mg x3 in evening  · May be contributing to frequent falls  · Consult Gerontology for help managing medication regimen in setting of frequent falls    Chronic renal disease, stage IV [facility])  Assessment & Plan  Lab Results   Component Value Date    EGFR 36 12/22/2021    EGFR 23 08/11/2021    EGFR 26 04/08/2021    CREATININE 1 38 (H) 12/22/2021    CREATININE 2 03 (H) 08/11/2021    CREATININE 1 84 (H) 04/08/2021     · Baseline Cr appears to be 1 7-1 9  · Monitor Cr while admitted, renal adjust medications  · Avoid hypotension and nephrotoxic agents    Hypertension  Assessment & Plan  · BP controlled in ED  · Continue home Nebivolol 5 mg and Lasix 20 mg    VTE Pharmacologic Prophylaxis: VTE Score: 6 High Risk (Score >/= 5) - Pharmacological DVT Prophylaxis Ordered: heparin  Sequential Compression Devices Ordered  Code Status: Level 2 - DNAR: but accepts endotracheal intubation   Discussion with family: Patient declined call to   Anticipated Length of Stay: Patient will be admitted on an observation basis with an anticipated length of stay of less than 2 midnights secondary to syncopal episode work up  Total Time for Visit, including Counseling / Coordination of Care: 60 minutes Greater than 50% of this total time spent on direct patient counseling and coordination of care  Chief Complaint: Loss of Consciousness    History of Present Illness:  Eric Bennett is a 64 y o  female with a PMH of CAD s/p MI, aortic stenosis, DM, HTN, HLD who presents via EMS after a fall    Per patient, she was asleep in her recliner and woke up on the floor on the left side of her body injuring the left side of her face  She has no recollection of how she fell and has no guess as to how long she was on the floor for  She denies taking any prescribed blood thinners but uses Aspirin frequently for pain relief  She denies any chest pain, SOB, dizziness, weakness, N/V/D, or recent illness prior to the fall  Post fall she reports dizziness with movement but denies any CP, SOB, or chest tightness  Patient lives alone after the death of her  in October and reports falling multiple times a day requiring her neighbors to assist her off the floor  She attributes the fall to "just tripping, I guess I just don't  my feet"  Overall, she reports feeling well with no recent illness or sick contacts  For EMS, prior to arrival, she had a questionable EKG change on the 12 lead in the ambulance that was not repeated on the 12 lead collected in the ED  In the ED, she did have a 1st troponin delta of 23 with a 2 hr hs trop of 51  A CT head and face was completed due to known head trauma with bruising, and no acute fractures or intracranial abnormalities were noted  Review of Systems:  Review of Systems   Constitutional: Negative for activity change, appetite change, chills, diaphoresis, fatigue and fever  HENT: Positive for facial swelling (left side)  Negative for congestion, ear pain, postnasal drip, sinus pressure, sinus pain, sore throat, tinnitus and trouble swallowing  Eyes: Negative for pain and visual disturbance  Respiratory: Negative for cough, chest tightness, shortness of breath and wheezing  Cardiovascular: Negative for chest pain, palpitations and leg swelling  Gastrointestinal: Negative for abdominal distention, abdominal pain, constipation, diarrhea, nausea and vomiting  Genitourinary: Negative for dysuria and hematuria  Musculoskeletal: Positive for arthralgias (left knee) and gait problem  Negative for neck pain     Skin: Positive for wound (RLE)  Negative for rash  Neurological: Positive for dizziness and syncope  Negative for tremors, seizures, weakness, light-headedness, numbness and headaches  Psychiatric/Behavioral: Negative for confusion, hallucinations and sleep disturbance  The patient is not nervous/anxious  Past Medical and Surgical History:   Past Medical History:   Diagnosis Date    Aortic stenosis     Arthritis     Asthma     Coronary artery disease     Diabetes mellitus ([facility] )     Diabetic peripheral neuropathy (HCC)     Last assessed - 1/27/16    Gallbladder disease     Heart attack ([facility] ) 07/1999    Hemorrhoids     Last assessed - 11/16/12    Myocardial infarction ([place] [address] )     Old myocardial infarction     Type 2 diabetes mellitus with autonomic neuropathy ([place] [address] )     Unspec long term insulin use status, Last assessed - 6/8/17       Past Surgical History:   Procedure Laterality Date    BUNIONECTOMY      CARDIAC CATHETERIZATION      Carotid Artery, Resolved - 7/1/13    CARDIOVASCULAR STRESS TEST  09/1999    CHOLECYSTECTOMY      COLONOSCOPY  2017    FOOT ARTHRODESIS, MODIFIED DONOHUE  2016    GALLBLADDER SURGERY  1970    HEMORROIDECTOMY      KNEE ARTHROSCOPY Left 2004    [place] COLONOSCOPY FLX DX W/COLLJ SPEC WHEN PFRMD N/A 8/21/2017    Procedure: COLONOSCOPY;  Surgeon: Jared Casey MD;  Location: [facility]; Service: Colorectal    REPAIR KNEE LIGAMENT      REPAIR KNEE LIGAMENT Left 1986    REPAIR KNEE LIGAMENT Right 1988       Meds/Allergies:  Prior to Admission medications    Medication Sig Start Date End Date Taking?  Authorizing Provider   Bystolic 5 MG tablet TAKE 1 TABLET BY MOUTH EVERY DAY 8/9/21  Yes Anice Larve, CRNP   diclofenac-misoprostol (ARTHROTEC 50) 50-0 2 MG per tablet Take 1 tablet by mouth 2 (two) times a day 12/23/20  Yes Anice PONCE Campbell   diphenoxylate-atropine (LOMOTIL) 2 5-0 025 mg per tablet Take 1 tablet by mouth 4 (four) times a day as needed for diarrhea 11/23/21  Yes Kalpana Siegel DO   furosemide (LASIX) 20 mg tablet TAKE 1 TABLET BY MOUTH EVERY DAY 11/18/21  Yes Jacqueline Talbert MD   gabapentin (NEURONTIN) 300 mg capsule Take 3 capsules by mouth every morning, then 2 capsules by mouth every day in the afternoon, then take 3 capsules at bedtime 11/15/21  Yes Kalpana Siegel DO   glimepiride (AMARYL) 4 mg tablet TAKE 1 TABLET BY MOUTH EVERY DAY WITH BREAKFAST 1/11/21  Yes Kalpana Siegel DO   HYDROcodone-acetaminophen (NORCO) 5-325 mg per tablet Take 1 tablet by mouth every 6 (six) hours as needed for painMax Daily Amount: 4 tablets 10/8/21  Yes Munira Siegel DO   levothyroxine 150 mcg tablet Take 1 tablet (150 mcg total) by mouth daily in the early morning 8/16/21  Yes Munira Siegel DO   Multiple Vitamin (DAILY VALUE MULTIVITAMIN) TABS Take 1 tablet by mouth daily   Yes Historical Provider, MD   nystatin (MYCOSTATIN) powder Apply topically 2 (two) times a day 8/4/21  Yes Kalpana Siegel DO   potassium chloride (K-DUR,KLOR-CON) 20 mEq tablet TAKE 1 TABLET BY MOUTH EVERY DAY 5/12/21  Yes Carilyn Goldberg, CRNP   pravastatin (PRAVACHOL) 10 mg tablet Take 1 tablet (10 mg total) by mouth daily 10/11/21  Yes Kalpana Siegel DO   venlafaxine (EFFEXOR) 75 mg tablet Take 1 tablet (75 mg total) by mouth 3 (three) times a day 8/30/21  Yes Munira Siegel DO   albuterol (ProAir HFA) 90 mcg/act inhaler Inhale 2 puffs every 6 (six) hours as needed for wheezing  Patient not taking: Reported on 12/22/2021  3/24/20   Munira Siegel DO   glucose blood (TRUETEST TEST) test strip by In Vitro route daily 7/7/17   Historical Provider, MD Kenny Quivers by Does not apply route 7/7/17   Historical Provider, MD   Multiple Vitamins-Minerals (PRESERVISION AREDS PO) Take by mouth daily    Historical Provider, MD   Wound Dressings (PruTect) EMUL Apply 1 Dose topically 2 (two) times a day 8/4/21   Amanda Job, DO     I have reviewed home medications with patient personally  Allergies: Allergies   Allergen Reactions    Lyrica [Pregabalin] Swelling    Sulfa Antibiotics Swelling       Social History:  Marital Status: /Civil Union   Occupation:   Patient Pre-hospital Living Situation: Home, Alone  Patient Pre-hospital Level of Mobility: walks with walker  Patient Pre-hospital Diet Restrictions: None  Substance Use History:   Social History     Substance and Sexual Activity   Alcohol Use No    Comment: Social per [employer]      Social History     Tobacco Use   Smoking Status Never Smoker   Smokeless Tobacco Never Used     Social History     Substance and Sexual Activity   Drug Use No       Family History:  Family History   Problem Relation Age of Onset    Hypertension Father     Diabetes Father     Cancer Father         Throat    Arthritis Father     Stroke Mother     Diabetes Maternal Grandmother     Heart disease Maternal Grandfather     Diabetes Son     Lymphoma Family         Head, Face and Neck        Physical Exam:     Vitals:   Blood Pressure: 120/63 (12/22/21 2126)  Pulse: 81 (12/22/21 2126)  Temperature: 97 6 °F (36 4 °C) (12/22/21 1708)  Temp Source: Oral (12/22/21 1708)  Respirations: 18 (12/22/21 2126)  Height: 5' 1" (154 9 cm) (12/22/21 1708)  Weight - Scale: 102 kg (224 lb 3 3 oz) (12/22/21 1708)  SpO2: 95 % (12/22/21 2126)    Physical Exam  Vitals reviewed  Constitutional:       General: She is not in acute distress  Appearance: She is obese  She is not ill-appearing, toxic-appearing or diaphoretic  Comments: Pleasant, talkative female resting on stretcher   HENT:      Head: Normocephalic  Contusion (left side) present  Mouth/Throat:      Mouth: Mucous membranes are moist       Pharynx: Oropharynx is clear  Eyes:      Extraocular Movements: Extraocular movements intact        Pupils: Pupils are equal, round, and reactive to light  Cardiovascular:      Rate and Rhythm: Normal rate and regular rhythm  Pulses: Normal pulses  Heart sounds: Murmur heard  Pulmonary:      Effort: Pulmonary effort is normal  No respiratory distress  Breath sounds: Decreased breath sounds present  No wheezing, rhonchi or rales  Abdominal:      General: Bowel sounds are normal  There is no distension  Palpations: Abdomen is soft  Tenderness: There is no abdominal tenderness  There is no guarding or rebound  Musculoskeletal:         General: Tenderness (L knee) present  Normal range of motion  Cervical back: Normal range of motion and neck supple  Right lower leg: No edema  Left lower leg: No edema  Skin:     General: Skin is warm and dry  Findings: Bruising (L face, L wrist, L shin) and wound (RLE) present  Neurological:      General: No focal deficit present  Mental Status: She is alert and oriented to person, place, and time  Cranial Nerves: No cranial nerve deficit  Motor: No weakness     Psychiatric:         Mood and Affect: Mood normal          Behavior: Behavior normal           Additional Data:     Lab Results:  Results from last 7 days   Lab Units 12/22/21  1753   WBC Thousand/uL 15 04*   HEMOGLOBIN g/dL 14 0   HEMATOCRIT % 43 6   PLATELETS Thousands/uL 232   NEUTROS PCT % 82*   LYMPHS PCT % 8*   MONOS PCT % 7   EOS PCT % 2     Results from last 7 days   Lab Units 12/22/21  1753   SODIUM mmol/L 143   POTASSIUM mmol/L 5 6*   CHLORIDE mmol/L 106   CO2 mmol/L 28   BUN mg/dL 16   CREATININE mg/dL 1 38*   ANION GAP mmol/L 9   CALCIUM mg/dL 9 3   ALBUMIN g/dL 3 0*   TOTAL BILIRUBIN mg/dL 0 60   ALK PHOS U/L 93   ALT U/L 42   AST U/L 70*   GLUCOSE RANDOM mg/dL 83                       Imaging: Reviewed radiology reports from this admission including: CT head and Personally reviewed the following imaging: chest xray  CT head without contrast   Final Result by Eliane Roman MD (12/22 1936)      No acute intracranial abnormality  Workstation performed: [id number]         CT facial bones without contrast   Final Result by González Fields MD (12/22 1938)      No acute fracture  Workstation performed: [id number]         XR chest 1 view portable   ED Interpretation by Dwayne Hutchison MD (12/22 1853)   Poor inspiratory effort, possible Left infiltrate? CT chest wo contrast    (Results Pending)       EKG and Other Studies Reviewed on Admission:   · EKG: NSR  HR 99  LBBB present  ** Please Note: This note has been constructed using a voice recognition system   **

## 2021-12-23 NOTE — ASSESSMENT & PLAN NOTE
Lab Results   Component Value Date    HGBA1C 6 5 08/04/2021       No results for input(s): POCGLU in the last 72 hours  Blood Sugar Average: Last 72 hrs:      Hold PO medications while inpatient   Monitor accu-checks AC and HS   SSI coverage   Hypoglycemia protocol

## 2021-12-23 NOTE — PLAN OF CARE
Problem: Potential for Falls  Goal: Patient will remain free of falls  Description: INTERVENTIONS:  - Educate patient/family on patient safety including physical limitations  - Instruct patient to call for assistance with activity   - Consult OT/PT to assist with strengthening/mobility   - Keep Call bell within reach  - Keep bed low and locked with side rails adjusted as appropriate  - Keep care items and personal belongings within reach  - Initiate and maintain comfort rounds  - Make Fall Risk Sign visible to staff  - Offer Toileting every 2 Hours, in advance of need  - Initiate/Maintain bed alarm  - Apply yellow socks and bracelet for high fall risk patients  - Consider moving patient to room near nurses station  Outcome: Progressing     Problem: MOBILITY - ADULT  Goal: Maintain or return to baseline ADL function  Description: INTERVENTIONS:  -  Assess patient's ability to carry out ADLs; assess patient's baseline for ADL function and identify physical deficits which impact ability to perform ADLs (bathing, care of mouth/teeth, toileting, grooming, dressing, etc )  - Assess/evaluate cause of self-care deficits   - Assess range of motion  - Assess patient's mobility; develop plan if impaired  - Assess patient's need for assistive devices and provide as appropriate  - Encourage maximum independence but intervene and supervise when necessary  - Involve family in performance of ADLs  - Assess for home care needs following discharge   - Consider OT consult to assist with ADL evaluation and planning for discharge  - Provide patient education as appropriate  Outcome: Progressing  Goal: Maintains/Returns to pre admission functional level  Description: INTERVENTIONS:  - Perform BMAT or MOVE assessment daily    - Set and communicate daily mobility goal to care team and patient/family/caregiver  - Collaborate with rehabilitation services on mobility goals if consulted  - Perform Range of Motion 3 times a day    - Reposition patient every 2 hours  - Dangle patient 3 times a day  - Stand patient 3 times a day  - Ambulate patient 3 times a day  - Out of bed to chair 3 times a day   - Out of bed for meals 3 times a day  - Out of bed for toileting  - Record patient progress and toleration of activity level   Outcome: Progressing     Problem: Nutrition/Hydration-ADULT  Goal: Nutrient/Hydration intake appropriate for improving, restoring or maintaining nutritional needs  Description: Monitor and assess patient's nutrition/hydration status for malnutrition  Collaborate with interdisciplinary team and initiate plan and interventions as ordered  Monitor patient's weight and dietary intake as ordered or per policy  Utilize nutrition screening tool and intervene as necessary  Determine patient's food preferences and provide high-protein, high-caloric foods as appropriate       INTERVENTIONS:  - Monitor oral intake, urinary output, labs, and treatment plans  - Assess nutrition and hydration status and recommend course of action  - Evaluate amount of meals eaten  - Assist patient with eating if necessary   - Allow adequate time for meals  - Recommend/ encourage appropriate diets, oral nutritional supplements, and vitamin/mineral supplements  - Order, calculate, and assess calorie counts as needed  - Recommend, monitor, and adjust tube feedings and TPN/PPN based on assessed needs  - Assess need for intravenous fluids  - Provide specific nutrition/hydration education as appropriate  - Include patient/family/caregiver in decisions related to nutrition  Outcome: Progressing     Problem: Prexisting or High Potential for Compromised Skin Integrity  Goal: Skin integrity is maintained or improved  Description: INTERVENTIONS:  - Identify patients at risk for skin breakdown  - Assess and monitor skin integrity  - Assess and monitor nutrition and hydration status  - Monitor labs   - Assess for incontinence   - Turn and reposition patient  - Assist with mobility/ambulation  - Relieve pressure over bony prominences  - Avoid friction and shearing  - Provide appropriate hygiene as needed including keeping skin clean and dry  - Evaluate need for skin moisturizer/barrier cream  - Collaborate with interdisciplinary team   - Patient/family teaching  - Consider wound care consult   Outcome: Progressing

## 2021-12-23 NOTE — ASSESSMENT & PLAN NOTE
· Patient reports falling "multiple times a day"  · Has area rugs, doesn't feel comfortable walking on hardwood floors despite wearing shoes  · Consult PT

## 2021-12-23 NOTE — ASSESSMENT & PLAN NOTE
· Patient was sleeping in recliner and woke up on floor on left side +LOC and +head trauma  Arrived to ED via EMS  · Patient with known cardiac history, LBBB, and questionable ST changes on EMS 12 lead  · In ED, patient dizzy with movement and elevated trop, denies any CP  · CT face - no acute fractures  · CT head - no acute intracranial abnormality  · CXR shows fracture of the L 8th rib   · Bruising noted on left side of face    Patient denies taking blood thinners but reports taking Aspirin for pain  · Neuro checks Q4  · Orthostatic BP ws positive 12/23/21  · Cardiology following

## 2021-12-24 PROBLEM — I50.9 HEART FAILURE (HCC): Status: ACTIVE | Noted: 2021-12-24

## 2021-12-24 LAB
ANION GAP SERPL CALCULATED.3IONS-SCNC: 6 MMOL/L (ref 4–13)
BASOPHILS # BLD AUTO: 0.03 THOUSANDS/ΜL (ref 0–0.1)
BASOPHILS NFR BLD AUTO: 0 % (ref 0–1)
BUN SERPL-MCNC: 18 MG/DL (ref 5–25)
CALCIUM SERPL-MCNC: 8.5 MG/DL (ref 8.3–10.1)
CHLORIDE SERPL-SCNC: 106 MMOL/L (ref 100–108)
CO2 SERPL-SCNC: 30 MMOL/L (ref 21–32)
CORTIS AM PEAK SERPL-MCNC: 17.5 UG/DL (ref 4.2–22.4)
CREAT SERPL-MCNC: 1.51 MG/DL (ref 0.6–1.3)
EOSINOPHIL # BLD AUTO: 0.41 THOUSAND/ΜL (ref 0–0.61)
EOSINOPHIL NFR BLD AUTO: 5 % (ref 0–6)
ERYTHROCYTE [DISTWIDTH] IN BLOOD BY AUTOMATED COUNT: 16.1 % (ref 11.6–15.1)
GFR SERPL CREATININE-BSD FRML MDRD: 32 ML/MIN/1.73SQ M
GLUCOSE SERPL-MCNC: 101 MG/DL (ref 65–140)
GLUCOSE SERPL-MCNC: 141 MG/DL (ref 65–140)
GLUCOSE SERPL-MCNC: 142 MG/DL (ref 65–140)
GLUCOSE SERPL-MCNC: 188 MG/DL (ref 65–140)
GLUCOSE SERPL-MCNC: 209 MG/DL (ref 65–140)
GLUCOSE SERPL-MCNC: 215 MG/DL (ref 65–140)
GLUCOSE SERPL-MCNC: 250 MG/DL (ref 65–140)
GLUCOSE SERPL-MCNC: 53 MG/DL (ref 65–140)
GLUCOSE SERPL-MCNC: 65 MG/DL (ref 65–140)
GLUCOSE SERPL-MCNC: 87 MG/DL (ref 65–140)
GLUCOSE SERPL-MCNC: 88 MG/DL (ref 65–140)
GLUCOSE SERPL-MCNC: 91 MG/DL (ref 65–140)
GLUCOSE SERPL-MCNC: 91 MG/DL (ref 65–140)
HCT VFR BLD AUTO: 36.3 % (ref 34.8–46.1)
HGB BLD-MCNC: 11.6 G/DL (ref 11.5–15.4)
IMM GRANULOCYTES # BLD AUTO: 0.03 THOUSAND/UL (ref 0–0.2)
IMM GRANULOCYTES NFR BLD AUTO: 0 % (ref 0–2)
LYMPHOCYTES # BLD AUTO: 1.55 THOUSANDS/ΜL (ref 0.6–4.47)
LYMPHOCYTES NFR BLD AUTO: 18 % (ref 14–44)
MCH RBC QN AUTO: 30.7 PG (ref 26.8–34.3)
MCHC RBC AUTO-ENTMCNC: 32 G/DL (ref 31.4–37.4)
MCV RBC AUTO: 96 FL (ref 82–98)
MONOCYTES # BLD AUTO: 0.91 THOUSAND/ΜL (ref 0.17–1.22)
MONOCYTES NFR BLD AUTO: 10 % (ref 4–12)
NEUTROPHILS # BLD AUTO: 5.9 THOUSANDS/ΜL (ref 1.85–7.62)
NEUTS SEG NFR BLD AUTO: 67 % (ref 43–75)
NRBC BLD AUTO-RTO: 0 /100 WBCS
PLATELET # BLD AUTO: 185 THOUSANDS/UL (ref 149–390)
PMV BLD AUTO: 12.4 FL (ref 8.9–12.7)
POTASSIUM SERPL-SCNC: 4.5 MMOL/L (ref 3.5–5.3)
RBC # BLD AUTO: 3.78 MILLION/UL (ref 3.81–5.12)
SODIUM SERPL-SCNC: 142 MMOL/L (ref 136–145)
WBC # BLD AUTO: 8.83 THOUSAND/UL (ref 4.31–10.16)

## 2021-12-24 PROCEDURE — 82948 REAGENT STRIP/BLOOD GLUCOSE: CPT

## 2021-12-24 PROCEDURE — 99233 SBSQ HOSP IP/OBS HIGH 50: CPT | Performed by: INTERNAL MEDICINE

## 2021-12-24 PROCEDURE — 99232 SBSQ HOSP IP/OBS MODERATE 35: CPT | Performed by: INTERNAL MEDICINE

## 2021-12-24 PROCEDURE — 85025 COMPLETE CBC W/AUTO DIFF WBC: CPT | Performed by: INTERNAL MEDICINE

## 2021-12-24 PROCEDURE — 82533 TOTAL CORTISOL: CPT | Performed by: INTERNAL MEDICINE

## 2021-12-24 PROCEDURE — 97167 OT EVAL HIGH COMPLEX 60 MIN: CPT

## 2021-12-24 PROCEDURE — 80048 BASIC METABOLIC PNL TOTAL CA: CPT | Performed by: NURSE PRACTITIONER

## 2021-12-24 RX ADMIN — METOPROLOL SUCCINATE 25 MG: 25 TABLET, EXTENDED RELEASE ORAL at 10:41

## 2021-12-24 RX ADMIN — HEPARIN SODIUM 5000 UNITS: 5000 INJECTION INTRAVENOUS; SUBCUTANEOUS at 21:22

## 2021-12-24 RX ADMIN — PRAVASTATIN SODIUM 10 MG: 10 TABLET ORAL at 10:41

## 2021-12-24 RX ADMIN — GABAPENTIN 600 MG: 300 CAPSULE ORAL at 21:22

## 2021-12-24 RX ADMIN — SODIUM CHLORIDE 75 ML/HR: 0.9 INJECTION, SOLUTION INTRAVENOUS at 19:37

## 2021-12-24 RX ADMIN — VENLAFAXINE 75 MG: 37.5 TABLET ORAL at 10:41

## 2021-12-24 RX ADMIN — SACUBITRIL AND VALSARTAN 1 TABLET: 24; 26 TABLET, FILM COATED ORAL at 17:00

## 2021-12-24 RX ADMIN — GABAPENTIN 600 MG: 300 CAPSULE ORAL at 10:42

## 2021-12-24 RX ADMIN — GABAPENTIN 600 MG: 300 CAPSULE ORAL at 15:32

## 2021-12-24 RX ADMIN — HEPARIN SODIUM 5000 UNITS: 5000 INJECTION INTRAVENOUS; SUBCUTANEOUS at 15:32

## 2021-12-24 RX ADMIN — HEPARIN SODIUM 5000 UNITS: 5000 INJECTION INTRAVENOUS; SUBCUTANEOUS at 05:50

## 2021-12-24 RX ADMIN — LEVOTHYROXINE SODIUM 150 MCG: 150 TABLET ORAL at 05:50

## 2021-12-24 RX ADMIN — SODIUM CHLORIDE 75 ML/HR: 0.9 INJECTION, SOLUTION INTRAVENOUS at 07:45

## 2021-12-24 RX ADMIN — VENLAFAXINE 75 MG: 37.5 TABLET ORAL at 15:32

## 2021-12-24 RX ADMIN — SACUBITRIL AND VALSARTAN 1 TABLET: 24; 26 TABLET, FILM COATED ORAL at 10:41

## 2021-12-24 RX ADMIN — NYSTATIN 1 APPLICATION: 100000 POWDER TOPICAL at 10:43

## 2021-12-24 RX ADMIN — NYSTATIN 1 APPLICATION: 100000 POWDER TOPICAL at 17:01

## 2021-12-24 RX ADMIN — VENLAFAXINE 75 MG: 37.5 TABLET ORAL at 21:22

## 2021-12-24 NOTE — PROGRESS NOTES
Progress Note - Geriatric Medicine   Inge Kee 64 y o  female MRN: [id number]  Unit/Bed#: S -01 Encounter: [id number]      Assessment/Plan:  1 -syncope -patient's echocardiogram revealed severe global hypokinesis  Diastolic function was moderately abnormal consistent with a grade 2 pseudonormal relaxation  Patient's left ventricular ejection fraction was 30% with a severely reduced systolic function  There was also evidence of mild to moderate regurgitation of the mitral valve  Coronary artery disease anatomy will need to be determined  With present ejection fraction patient would be at high risk of sudden death from a malignant arrhythmia  Further cardiac workup will need to be done  Other factors that could have contributed to her syncope would be her orthostatic changes which were noted to be positive and hypoglycemia  2 -chronic renal failure stage IIIB  -patient's GFR today is 32 with a creatinine 1 51  I did order a urine microalbumin creatinine ratio and it is slightly elevated at 31  Patient will need renal ultrasound and I would obtain a Nephrology consult in view of the patient's current renal function  3 -cardiomyopathy suspect ischemic -patient had a perfusion study done in 2017 at that time her perfusion imaging revealed a small severe fixed myocardial perfusion defect involving the inferoapical wall  The calculated left ventricular ejection fraction at that time was 68%  Patient currently has been started on Entresto 24-26 mg to take 1 tablet orally twice daily  4 -diabetes mellitus type 2 with episodes of hypoglycemia  -patient currently off Amaryl blood sugars have been stable off medications  5 -orthostatic hypotension -continue to monitor closely    6 -depression  -patient scored a 9 on her PHQ 9 depression scale I did speak with her family physician she will see her and increase her venlafaxine accordingly and monitor response      7 -weight loss -patient had lost approximately 50 lb I suspect this was secondary to increased depression secondary to the loss of her  and daughter within the past year    Recommendations    1 -patient will need further cardiac workup per Cardiology recommendations    2 -recommend Nephrology consult    3 -I spoke with family physician in regards to the patient's depression score and current presentation to hospital       Subjective:   Patient in good spirits today denies any active discomfort or pain she tells me she slept well last night and there her appetite has improved  Review of Systems   Constitutional: Negative for chills and fever  HENT: Negative for ear pain and sore throat  Eyes: Negative for pain and visual disturbance  Respiratory: Negative for cough, chest tightness and shortness of breath  Cardiovascular: Negative for chest pain and palpitations  Gastrointestinal: Negative for abdominal pain and vomiting  Genitourinary: Negative for dysuria and hematuria  Musculoskeletal: Negative for arthralgias and back pain  Skin: Negative for color change and rash  Neurological: Negative for seizures and syncope  All other systems reviewed and are negative  Objective:     Vitals: Blood pressure 115/56, pulse (!) 50, temperature 98 3 °F (36 8 °C), temperature source Oral, resp  rate 18, height 5' 1" (1 549 m), weight 102 kg (224 lb), SpO2 97 %  ,Body mass index is 42 32 kg/m²  Intake/Output Summary (Last 24 hours) at 12/24/2021 0945  Last data filed at 12/24/2021 0745  Gross per 24 hour   Intake 1720 ml   Output 600 ml   Net 1120 ml       Current Medications: Reviewed    Physical Exam:   Physical Exam  Constitutional:       Appearance: She is obese  HENT:      Head: Normocephalic  Right Ear: Ear canal and external ear normal       Left Ear: Ear canal and external ear normal       Nose: Nose normal       Mouth/Throat:      Mouth: Mucous membranes are moist       Pharynx: Oropharynx is clear  Eyes:      General:         Right eye: Discharge present  Pupils: Pupils are equal, round, and reactive to light  Cardiovascular:      Rate and Rhythm: Normal rate and regular rhythm  Pulses: Normal pulses  Heart sounds: Normal heart sounds  Pulmonary:      Effort: Pulmonary effort is normal       Breath sounds: Normal breath sounds  Abdominal:      Palpations: Abdomen is soft  Musculoskeletal:      Cervical back: Neck supple  Skin:     General: Skin is warm  Neurological:      Mental Status: She is alert and oriented to person, place, and time  Psychiatric:         Behavior: Behavior normal           Invasive Devices  Report    Peripheral Intravenous Line            Peripheral IV 12/23/21 Left;Ventral (anterior) Forearm <1 day                Lab, Imaging and other studies: I have personally reviewed pertinent reports

## 2021-12-24 NOTE — ASSESSMENT & PLAN NOTE
Patient hypoglycemic 12 a, overnight  Received dextrose Likely in setting of decreased oral intake with Glimperide home medications in the setting of CKD and old age     AM cortisol normal  Plan:   · Monitor glucose   · Holding SSI for now

## 2021-12-24 NOTE — ASSESSMENT & PLAN NOTE
· Patient was sleeping in recliner and woke up on floor on left side +LOC and +head trauma  Arrived to ED via EMS  · Patient with known cardiac history, LBBB, and questionable ST changes on EMS 12 lead  Workups  Previous echo:   · Echo 12/23/2021 30% LVEF grade 2 diastolic relaxation, with right ventricle dilation, left atrium dilation, moderate mitral regurgitation  · Patient had another hyperglycemia even 12 am tonight  Latest glucose 90 today  · In ED, patient dizzy with movement and elevated trop, denies any CP  · CT face - no acute fractures  · CT head - no acute intracranial abnormality  · CXR shows fracture of the L 8th rib   Etiology: Cardiogenic vs Hypoglycemia vs orthostatic    Plan  · Neuro checks Q4   · Repeat orthostatics was positive, Will continue gentle hydration for one more day  · Cardiology following:  Started on Entresto and metoprolol XL     Will undergo cardiac catheterization to evaluate ischemia on monday

## 2021-12-24 NOTE — OCCUPATIONAL THERAPY NOTE
Occupational Therapy Evaluation     Patient Name: Eber Syed  [id number] Date: 12/24/2021  Problem List  Principal Problem:    Syncope  Active Problems:    Hypertension    Di[facility] peripheral neuropathy ([facility] )    Type 2 diabetes mellitus with stage 3 chronic kidney disease and hypertension (HCC)    Chronic renal disease, stage IV (HCC)    Ambulatory dysfunction    Elevated troponin    Leukocytosis    Hypoglycemia    Heart failure ([facility] ) secondary to cardiomyopathy    Past Medical History  Past Medical History:   Diagnosis Date    Aortic stenosis     Arthritis     Asthma     Coronary artery disease     Diabetes mellitus ([facility] )     Diabetic peripheral neuropathy ([facility] )     Last assessed - 1/27/16    Gallbladder disease     Heart attack ([facility] ) 07/1999    Hemorrhoids     Last assessed - 11/16/12    Myocardial infarction ([place] [address] )     Old myocardial infarction     Type 2 diabetes mellitus with autonomic neuropathy ([facility] )     Unspec long term insulin use status, Last assessed - 6/8/17     Past Surgical History  Past Surgical History:   Procedure Laterality Date    BUNIONECTOMY      CARDIAC CATHETERIZATION      Carotid Artery, Resolved - 7/1/13    CARDIOVASCULAR STRESS TEST  09/1999    CHOLECYSTECTOMY      COLONOSCOPY  2017    FOOT ARTHRODESIS, MODIFIED DONOHUE  2016    GALLBLADDER SURGERY  1970    HEMORROIDECTOMY      KNEE ARTHROSCOPY Left 2004    IL COLONOSCOPY FLX DX W/COLLJ SPEC WHEN PFRMD N/A 8/21/2017    Procedure: COLONOSCOPY;  Surgeon: Heather Alvarez MD;  Location: [facility]; Service: Colorectal    REPAIR KNEE LIGAMENT      REPAIR KNEE LIGAMENT Left 1986    REPAIR KNEE LIGAMENT Right 1988 12/24/21 1318   OT Last Visit   OT Visit Date 12/24/21  (Friday)   Note Type   Note type Evaluation   Restrictions/Precautions   Weight Bearing Precautions Per Order No   Other Precautions Bed Alarm; Fall Risk;Pain   Pain Assessment   Pain Assessment Tool 0-10   Pain Score No Pain   Home Living Type of [address] One level;Ramped entrance;Elevator   Bathroom Shower/Tub Walk-in shower   Bathroom Toilet Standard   Bathroom Equipment Shower chair;Grab bars in shower   Bathroom Accessibility Accessible via walker   Home Equipment Walker;Grab bars   Additional Comments Pt reports living alone   Prior Function   Level of [place] Needs assistance with IADLs   Lives With Alone   Receives Help From Family; Other (Comment)  (supportive sister)   ADL Assistance Needs assistance   IADLs Needs assistance   Falls in the last 6 months 1 to 4   Vocational Full time employment   Comments Pt reports using RW for functional mobility and sleeps in her recliner chair  Pt's sister A w/ bathing  Pt completes dressing, grooming, toielting w/ out assistance  Lifestyle   Autonomy Pt reports I w/ ADL except bathing at baseline  Needs assistance w/ IADL   Reciprocal Relationships Pt reports living alone and supportive sister assist w/ bathing and IADLs   Service to Others Pt reports working full time from home and is an    Intrinsic Gratification Pt reports enjoying reading and watching tv   ADL   Where Assessed Edge of bed  (vs commode)   Eating Assistance 6  Modified independent  (finished lunch prior to therapist arrival)   Eating Deficit Setup   Grooming Assistance 6  Modified Independent  (seated at EOB after set- up)   Grooming Deficit Setup   UB Bathing Assistance   (anticipate mod I seated after set- up)   UB Bathing Deficit Setup   LB Bathing Assistance 3  Moderate Assistance   LB Bathing Deficit Buttocks; Setup;Steadying; Increased time to complete   UB Dressing Assistance 6  Modified independent   UB Dressing Deficit Setup; Increased time to complete   LB Dressing Assistance Unable to assess   Toileting Assistance  3  Moderate Assistance   Toileting Deficit Bedside commode;Perineal hygiene;Setup   Bed Mobility   Supine to Sit 4  Minimal assistance   Additional items Assist x 1;LE management; Increased time required;HOB elevated; Bedrails   Sit to Supine Unable to assess   Additional Comments Pt seated OOB in chair post eval w/ needs met, call bell in reach  Pt required A x1 to complete bed mobility w/ + time and reports sleeping in recliner chair at baseline   Transfers   Sit to Stand 4  Minimal assistance   Additional items Assist x 1; Increased time required;Verbal cues; Bedrails   Stand to Sit 5  Supervision   Additional items Assist x 1; Armrests; Increased time required   Stand pivot 4  Minimal assistance  (min HHA)   Additional items Assist x 1; Increased time required   Toilet transfer 4  Minimal assistance   Additional items Assist x 1;Standard toilet; Increased time required;Armrests   Additional Comments Pt performed SPT EOB to commode and commode to chair  Voided seated on commode and required max A for hygiene  Functional Mobility   Additional Comments NT  Pt declined reporting fatigue  Pt reports using RW at baseline   Additional items Rolling walker   Balance   Static Sitting Good   Static Standing Poor +   Activity Tolerance   Activity Tolerance Patient limited by fatigue   Medical Staff Made Aware spoke to CM   Nurse Made Aware per Massimo AGUILERA appropriate to see pt   RUE Assessment   RUE Assessment WFL   RUE Strength   RUE Overall Strength Within Functional Limits - able to perform ADL tasks with strength  (observed during ADLs)   LUE Assessment   LUE Assessment WFL   LUE Strength   LUE Overall Strength Within Functional Limits - able to perform ADL tasks with strength  (observed during ADLs)   Hand Function   Gross Motor Coordination Functional   Fine Motor Coordination Functional   Sensation   Light Touch No apparent deficits  (B UE to light touch)   Sharp/Dull Not tested   Cognition   Overall Cognitive Status   (questionable insight into deficits)   Arousal/Participation Alert; Cooperative   Attention Attends with cues to redirect   Orientation Level Oriented X4   Memory Within functional limits   Following Commands Follows multistep commands without difficulty   Comments Identified pt by full name and birthdate  Alert and oriented but presents w/ flat affect  Demonstrated questionable insight into deficits   Assessment   Limitation Decreased ADL status; Decreased Safe judgement during ADL;Decreased self-care trans;Decreased high-level ADLs   Assessment Pt is a 68yo female [facility]  Pt presents following a fall and diagnosed w/ syncope, elevated troponin, ambulatory dysfunction  Significant PMH  Impacting her occupational performance includes DM, CAD, aortic stenosis, MI, L knee arthroscopy, B knee ligament repair, diabetic peripheral neuropathy  Pt w/ active OT orders and activity orders  Personal factors impacting performance includes difficulty completing IADLs, difficulty completing ADLs, assist required at baseline, lives alone, difficulty managing steps  Pt reports living alone in [facility] w/ ramped entrance vs elevator  Pt reports using RW for functional mobility and sleeps in recliner chair  Pt reports needing assistance w/ bathing and IADL from supportive sister  Upon eval, pt alert and oriented  Able to participate in conversation and communicate wants / needs  Demonstrated questionable insight into deficits  Pt required min A to stand and complete SPT w/ HHA from EOB <> commode, chair  Pt required mod A to complete toileting  Pt required set- up to complete UBD and grooming while seated  Pt presents w/decreased standing tolerance, decreased standing balance, decreased endurance, limited insight into deficits, decreased activity tolerance, generalized weakness /deconditioning impacting her I w/ dressing, bathing, oral hygiene, functional mobility, functional transfers, activity engagement, clothing mgmt, food prep /clean up  Pt completing ADL below baseline level of I and would benefit from OT while in acute care to address deficits   Recommend DC home to PLOF w/ Home OT when medically stable for discharge from acute care  Will continue to follow   Goals   Patient Goals Pt stated that she would like to go home and has a lot of work to do and a test to take before the end of the year   Plan   Treatment Interventions ADL retraining;Functional transfer training;Patient/family training;Equipment evaluation/education; Compensatory technique education; Endurance training; Activityengagement; Energy conservation;Continued evaluation   Goal Expiration Date 01/03/22   OT Frequency 2-3x/wk   Recommendation   OT Discharge Recommendation Home with home health rehabilitation  (Home OT)   Equipment Recommended Bedside commode; Shower/Tub chair with back ($)   AM-PAC Daily Activity Inpatient   Lower Body Dressing 2   Bathing 2   Toileting 2   Upper Body Dressing 4   Grooming 4   Eating 4   Daily Activity Raw Score 18   Daily Activity Standardized Score (Calc for Raw Score >=11) 38 66   AM-[facility] Applied Cognition Inpatient   Following a Speech/Presentation 4   Understanding Ordinary Conversation 4   Taking Medications 4   Remembering Where Things Are Placed or Put Away 4   Remembering List of 4-5 Errands 4   Taking Care of Complicated Tasks 4   Applied Cognition Raw Score 24   Applied Cognition Standardized Score 62 21   Barthel Index   Feeding 10   Bathing 0   Grooming Score 5   Dressing Score 5   Bladder Score 10   Bowels Score 10   Toilet Use Score 5   Transfers (Bed/Chair) Score 10   Mobility (Level Surface) Score 0   Stairs Score 0   Barthel Index Score 55   Modified Jeddo Scale   Modified Jeddo Scale 4      The patient's raw score on the AM-PAC Daily Activity inpatient short form is 18, standardized score is 38 66, less than 39 4  Patients at this level are likely to benefit from discharge to home  Please refer to the recommendation of the Occupational Therapist for safe discharge planning      Pt goals to be met by 1/3/22:  -Pt will demonstrate good attention and understanding EC tech to max I w/ ADLs and return home    -Pt will complete functional transfers to all surfaces w/ mod I to max I w/ ADLs    -Pt will complete LBD w/ S using LHAE as needed to max I w/ ADLs    -Pt will complete UBD and grooming w/ mod I w/ out rest break or sign /symptoms of fatigue    -Pt will demonstrate improved functional standing tolerance for at least 5 minutes using AD as needed w/ fair balance to max I w/ ADLs and return home    -Pt will consistently engage in functional mobility using AD to / from bathroom w/ mod I to max I w/ ADLs and return home    -Pt will demonstrate good attention and participation in continued evaluation to assess health literacy to assist in [address], OTR/L

## 2021-12-24 NOTE — ASSESSMENT & PLAN NOTE
Lab Results   Component Value Date    EGFR 32 12/24/2021    EGFR 28 12/23/2021    EGFR 36 12/22/2021    CREATININE 1 51 (H) 12/24/2021    CREATININE 1 69 (H) 12/23/2021    CREATININE 1 38 (H) 12/22/2021     · Baseline Cr appears to be 1 7-1 9  · Monitor Cr while admitted, renal adjust medications  · Avoid hypotension and nephrotoxic agents

## 2021-12-24 NOTE — PLAN OF CARE
Problem: Potential for Falls  Goal: Patient will remain free of falls  Description: INTERVENTIONS:  - Educate patient/family on patient safety including physical limitations  - Instruct patient to call for assistance with activity   - Consult OT/PT to assist with strengthening/mobility   - Keep Call bell within reach  - Keep bed low and locked with side rails adjusted as appropriate  - Keep care items and personal belongings within reach  - Initiate and maintain comfort rounds  - Make Fall Risk Sign visible to staff  - Offer Toileting every 2 Hours, in advance of need  - Apply yellow socks and bracelet for high fall risk patients  - Consider moving patient to room near nurses station  Outcome: Progressing     Problem: MOBILITY - ADULT  Goal: Maintain or return to baseline ADL function  Description: INTERVENTIONS:  -  Assess patient's ability to carry out ADLs; assess patient's baseline for ADL function and identify physical deficits which impact ability to perform ADLs (bathing, care of mouth/teeth, toileting, grooming, dressing, etc )  - Assess/evaluate cause of self-care deficits   - Assess range of motion  - Assess patient's mobility; develop plan if impaired  - Assess patient's need for assistive devices and provide as appropriate  - Encourage maximum independence but intervene and supervise when necessary  - Involve family in performance of ADLs  - Assess for home care needs following discharge   - Consider OT consult to assist with ADL evaluation and planning for discharge  - Provide patient education as appropriate  Outcome: Progressing  Goal: Maintains/Returns to pre admission functional level  Description: INTERVENTIONS:  - Perform BMAT or MOVE assessment daily    - Set and communicate daily mobility goal to care team and patient/family/caregiver  - Collaborate with rehabilitation services on mobility goals if consulted  - Perform Range of Motion 3 times a day  - Reposition patient every 2 hours    - Dangle patient 3 times a day  - Stand patient 3 times a day  - Ambulate patient 3 times a day  - Out of bed to chair 3 times a day   - Out of bed for meals 3 times a day  - Out of bed for toileting  - Record patient progress and toleration of activity level   Outcome: Progressing     Problem: Nutrition/Hydration-ADULT  Goal: Nutrient/Hydration intake appropriate for improving, restoring or maintaining nutritional needs  Description: Monitor and assess patient's nutrition/hydration status for malnutrition  Collaborate with interdisciplinary team and initiate plan and interventions as ordered  Monitor patient's weight and dietary intake as ordered or per policy  Utilize nutrition screening tool and intervene as necessary  Determine patient's food preferences and provide high-protein, high-caloric foods as appropriate       INTERVENTIONS:  - Monitor oral intake, urinary output, labs, and treatment plans  - Assess nutrition and hydration status and recommend course of action  - Evaluate amount of meals eaten  - Assist patient with eating if necessary   - Allow adequate time for meals  - Recommend/ encourage appropriate diets, oral nutritional supplements, and vitamin/mineral supplements  - Order, calculate, and assess calorie counts as needed  - Recommend, monitor, and adjust tube feedings and TPN/PPN based on assessed needs  - Assess need for intravenous fluids  - Provide specific nutrition/hydration education as appropriate  - Include patient/family/caregiver in decisions related to nutrition  Outcome: Progressing     Problem: Prexisting or High Potential for Compromised Skin Integrity  Goal: Skin integrity is maintained or improved  Description: INTERVENTIONS:  - Identify patients at risk for skin breakdown  - Assess and monitor skin integrity  - Assess and monitor nutrition and hydration status  - Monitor labs   - Assess for incontinence   - Turn and reposition patient  - Assist with mobility/ambulation  - Relieve pressure over bony prominences  - Avoid friction and shearing  - Provide appropriate hygiene as needed including keeping skin clean and dry  - Evaluate need for skin moisturizer/barrier cream  - Collaborate with interdisciplinary team   - Patient/family teaching  - Consider wound care consult   Outcome: Progressing

## 2021-12-24 NOTE — ASSESSMENT & PLAN NOTE
Wt Readings from Last 3 Encounters:   12/23/21 102 kg (224 lb)   08/16/21 123 kg (271 lb 9 6 oz)   08/04/21 120 kg (264 lb)     · Nuclear stress test: 12/7/2017 - small fixed inferoapical defect consistent with prior MI,  · Echo: 12/7/2017 - 60% EF, no wall motion abnormalities, G1DD, mild MR, mild AS   · Echo 12/23/2021 30% LVEF grade 2 diastolic relaxation, with right ventricle dilation, left atrium dilation, moderate mitral regurgitation  Etiology : Probably ischemic in origin  Plan:  Cardiology on board  Started on  sacubitril- valsartan OD  Started on Metroprolol 25 OD  Will undergo cardiac cath on Monday to rule out ischemia

## 2021-12-24 NOTE — ASSESSMENT & PLAN NOTE
Lab Results   Component Value Date    WBC 8 83 12/24/2021    WBC 12 36 (H) 12/23/2021     · Leukocytosis present  · Likely reactive  · CXR with questionable area in left lung field   Patient fell and was down on left side  · Also shows b/l apical opacities possible representative of early interstitial lung disease; will require outpatient pulmonology referral  · Possible contusion vs aspiration post fall  · Currently afebrile  · Trend CBC

## 2021-12-24 NOTE — PROGRESS NOTES
Cardiology Progress Note - Smitha Narayan 64 y o  female MRN: [id number]    Unit/Bed#: S -01 Encounter: [id number]      Assessment/Recommendations:  1  Syncope: With new cardiomyopathy noted on echocardiogram   Unclear whether this is related with the potential episode of ventricular tachycardia  Currently on monitor, no significant arrhythmias  Continue to follow on telemetry  Could potentially also have been precipitated by low blood sugar  2  Elevated troponin:  Likely secondary to new cardiomyopathy  Will need eventual ischemic evaluation  Likely on Monday with cardiac catheterization  3  Presumed CAD:  With prior MI and scar with infarct seen on nuclear stress testing 2017  However, her ejection fraction at that time was normal   4  Hypertension:  Well controlled, continue current regimen  5  New cardiomyopathy:  Unclear etiology  Now started on Entresto and Toprol XL for GDMT  Volume status appears stable, continue maintenance diuretic  Will need ischemic evaluation cardiac catheterization:  Likely Monday  6  Hyperlipidemia:  Continue on statin  7  Type 2 diabetes mellitus:  Blood sugar control as per primary team, could have also been contributing to episode of syncope  8  Mild aortic stenosis:  Not seen to be significant on echocardiogram, likely aortic sclerosis  9  Hypothyroidism:  As per primary team   10  Morbid obesity  Subjective:   Patient seen and examined  No significant events overnight   ; pertinent negatives - chest pain, chest pressure/discomfort, dyspnea, irregular heart beat, lower extremity edema and palpitations    Objective:     Vitals: Blood pressure 115/56, pulse (!) 50, temperature 98 3 °F (36 8 °C), temperature source Oral, resp  rate 18, height 5' 1" (1 549 m), weight 102 kg (224 lb), SpO2 97 %  , Body mass index is 42 32 kg/m² ,   Orthostatic Blood Pressures      Most Recent Value   Blood Pressure 115/56 filed at 12/24/2021 7608   Patient Position - Orthostatic VS Lying filed at 12/24/2021 0904            Intake/Output Summary (Last 24 hours) at 12/24/2021 1007  Last data filed at 12/24/2021 0745  Gross per 24 hour   Intake 1720 ml   Output 600 ml   Net 1120 ml       TELE: No significant arrhythmias seen      Physical Exam:    GEN: Smitha Narayan appears well, alert and oriented x 3, pleasant and cooperative   HEENT: pupils equal, round, and reactive to light; extraocular muscles intact  NECK: supple, no carotid bruits   HEART: regular rhythm, normal S1 and S2, +systolic murmur, no clicks, gallops or rubs   LUNGS: clear to auscultation bilaterally; no wheezes, rales, or rhonchi   ABDOMEN: normal bowel sounds, soft, no tenderness, no distention  EXTREMITIES: peripheral pulses normal; no clubbing, cyanosis, or edema  NEURO: no focal findings   SKIN: normal without suspicious lesions on exposed skin    Medications:      Current Facility-Administered Medications:     gabapentin (NEURONTIN) capsule 600 mg, 600 mg, Oral, TID, [employer], CRNP, 600 mg at 12/23/21 2046    heparin (porcine) subcutaneous injection 5,000 Units, 5,000 Units, Subcutaneous, Q8H [address], 5,000 Units at 12/24/21 0550 **AND** Platelet count, , , Once, [employer], CRNP    levothyroxine tablet 150 mcg, 150 mcg, Oral, Early Morning, PONCE Lundy, 150 mcg at 12/24/21 0550    metoprolol succinate (TOPROL-XL) 24 hr tablet 25 mg, 25 mg, Oral, Daily, PONCE Cruz, 25 mg at 12/23/21 1742    nystatin (MYCOSTATIN) powder, , Topical, BID, PONCE Lundy    pravastatin (PRAVACHOL) tablet 10 mg, 10 mg, Oral, Daily, PONCE Lundy, 10 mg at 12/23/21 1442    sacubitril-valsartan (ENTRESTO) 24-26 MG per tablet 1 tablet, 1 tablet, Oral, BID, PONCE Alfonso, 1 tablet at 12/23/21 1742    sodium chloride 0 9 % infusion, 75 mL/hr, Intravenous, Continuous, Tiki Segura, MD, Last Rate: 75 mL/hr at 12/24/21 0745, 75 mL/hr at 12/24/21 0745    venlafaxine (EFFEXOR) tablet 75 mg, 75 mg, Oral, TID, [employer], CRNP, 75 mg at 12/23/21 2046     Labs & Results:        Results from last 7 days   Lab Units 12/24/21  0559 12/23/21  0509 12/22/21  1753   WBC Thousand/uL 8 83 12 36* 15 04*   HEMOGLOBIN g/dL 11 6 12 2 14 0   HEMATOCRIT % 36 3 38 9 43 6   PLATELETS Thousands/uL 185 205 232         Results from last 7 days   Lab Units 12/24/21  0559 12/23/21  0509 12/22/21  1753   POTASSIUM mmol/L 4 5 4 3 5 6*   CHLORIDE mmol/L 106 108 106   CO2 mmol/L 30 28 28   BUN mg/dL 18 18 16   CREATININE mg/dL 1 51* 1 69* 1 38*   CALCIUM mg/dL 8 5 9 3 9 3   ALK PHOS U/L  --   --  93   ALT U/L  --   --  42   AST U/L  --   --  70*         Results from last 7 days   Lab Units 12/22/21  1753   MAGNESIUM mg/dL 1 4*       Echo: personally reviewed - normal LV size with severe global hypokinesis with ejection fraction of 30%  Grade 2 diastolic dysfunction  Dilated RV with normal function  Left atrial dilation  Mild to moderate mitral regurgitation      EKG personally reviewed by Joseph Stanley MD

## 2021-12-24 NOTE — PLAN OF CARE
Problem: OCCUPATIONAL THERAPY ADULT  Goal: Performs self-care activities at highest level of function for planned discharge setting  See evaluation for individualized goals  Description: Treatment Interventions: ADL retraining,Functional transfer training,Patient/family training,Equipment evaluation/education,Compensatory technique education,Endurance training,Activityengagement,Energy conservation,Continued evaluation  Equipment Recommended: Bedside commode,Shower/Tub chair with back ($)       See flowsheet documentation for full assessment, interventions and recommendations  Note: Limitation: Decreased ADL status,Decreased Safe judgement during ADL,Decreased self-care trans,Decreased high-level ADLs     Assessment: Pt is a 68yo female [facility] on 12/22/21  Pt presents following a fall and diagnosed w/ syncope, elevated troponin, ambulatory dysfunction  Significant PMH  Impacting her occupational performance includes DM, CAD, aortic stenosis, MI, L knee arthroscopy, B knee ligament repair, diabetic peripheral neuropathy  Pt w/ active OT orders and activity orders  Personal factors impacting performance includes difficulty completing IADLs, difficulty completing ADLs, assist required at baseline, lives alone, difficulty managing steps  Pt reports living alone in [facility] w/ ramped entrance vs elevator  Pt reports using RW for functional mobility and sleeps in recliner chair  Pt reports needing assistance w/ bathing and IADL from supportive sister  Upon eval, pt alert and oriented  Able to participate in conversation and communicate wants / needs  Demonstrated questionable insight into deficits  Pt required min A to stand and complete SPT w/ HHA from EOB <> commode, chair  Pt required mod A to complete toileting  Pt required set- up to complete UBD and grooming while seated   Pt presents w/decreased standing tolerance, decreased standing balance, decreased endurance, limited insight into deficits, decreased activity tolerance, generalized weakness /deconditioning impacting her I w/ dressing, bathing, oral hygiene, functional mobility, functional transfers, activity engagement, clothing mgmt, food prep /clean up  Pt completing ADL below baseline level of I and would benefit from OT while in acute care to address deficits  Recommend DC home to PLOF w/ Home OT when medically stable for discharge from acute care   Will continue to follow     OT Discharge Recommendation: Home with home health rehabilitation (Home OT)

## 2021-12-24 NOTE — ASSESSMENT & PLAN NOTE
Lab Results   Component Value Date    HSTNI0 28 12/22/2021    HSTNID2 23 12/22/2021    HSTNI2 51 ([facility]) 12/22/2021    HSTNID4 47 12/22/2021    HSTNI4 75 () 12/22/2021    Probably in the setting of cardiomyopathy  · 4 hour hs troponin pending  · Patient denies CP, N/V, diaphoresis, arm or jaw pain  · Hx of MI, known CAD, KIEL score 4  · Cardiology following

## 2021-12-24 NOTE — ASSESSMENT & PLAN NOTE
Lab Results   Component Value Date    HGBA1C 6 5 08/04/2021       Recent Labs     12/24/21  0547 12/24/21  0836 12/24/21  1038 12/24/21  1224   POCGLU 91 91 142* 141*       Blood Sugar Average: Last 72 hrs:  (P) 11 [id number]    Hold PO medications while inpatient   Monitor accu-checks AC and HS   Holding SSI coverage in setting of hypoglycemia nd decreased PO intake   Hypoglycemia protocol

## 2021-12-24 NOTE — PROGRESS NOTES
[facility]  Progress Note - cruzito Gains 1945, 64 y o  female MRN: [id number]  Unit/Bed#: S -01 Encounter: [id number]  Primary Care Provider: Bharati Gaston DO   Date and time admitted to hospital: 12/22/2021  4:57 PM    * Syncope  Assessment & Plan  · Patient was sleeping in recliner and woke up on floor on left side +LOC and +head trauma  Arrived to ED via EMS  · Patient with known cardiac history, LBBB, and questionable ST changes on EMS 12 lead  Workups  Previous echo:   · Echo 12/23/2021 30% LVEF grade 2 diastolic relaxation, with right ventricle dilation, left atrium dilation, moderate mitral regurgitation  · Patient had another hyperglycemia even 12 am tonight  Latest glucose 90 today  · In ED, patient dizzy with movement and elevated trop, denies any CP  · CT face - no acute fractures  · CT head - no acute intracranial abnormality  · CXR shows fracture of the L 8th rib   Etiology: Cardiogenic vs Hypoglycemia vs orthostatic    Plan  · Neuro checks Q4   · Repeat orthostatics was positive, Will continue gentle hydration for one more day  · Cardiology following:  Started on Entresto and metoprolol XL     Will undergo cardiac catheterization to evaluate ischemia on monday    Heart failure (HCC) secondary to cardiomyopathy  Assessment & Plan  Wt Readings from Last 3 Encounters:   12/23/21 102 kg (224 lb)   08/16/21 123 kg (271 lb 9 6 oz)   08/04/21 120 kg (264 lb)     · Nuclear stress test: 12/7/2017 - small fixed inferoapical defect consistent with prior MI,  · Echo: 12/7/2017 - 60% EF, no wall motion abnormalities, G1DD, mild MR, mild AS   · Echo 12/23/2021 30% LVEF grade 2 diastolic relaxation, with right ventricle dilation, left atrium dilation, moderate mitral regurgitation  Etiology : Probably ischemic in origin  Plan:  Cardiology on board  Started on  sacubitril- valsartan OD  Started on Metroprolol 25 OD  Will undergo cardiac cath on Monday to rule out ischemia        Hypoglycemia  Assessment & Plan  Patient hypoglycemic 12 a, overnight  Received dextrose Likely in setting of decreased oral intake with Glimperide home medications in the setting of CKD and old age  AM cortisol normal  Plan:   · Monitor glucose   · Holding SSI for now     Leukocytosis  Assessment & Plan  Lab Results   Component Value Date    WBC 8 83 12/24/2021    WBC 12 36 (H) 12/23/2021     · Leukocytosis present  · Likely reactive  · CXR with questionable area in left lung field   Patient fell and was down on left side  · Also shows b/l apical opacities possible representative of early interstitial lung disease; will require outpatient pulmonology referral  · Possible contusion vs aspiration post fall  · Currently afebrile  · Trend CBC     Elevated troponin  Assessment & Plan  Lab Results   Component Value Date    HSTNI0 28 12/22/2021    HSTNID2 23 12/22/2021    HSTNI2 51 ([place]) 12/22/2021    HSTNID4 47 12/22/2021    [facility]STNI4 75 ([facility] 12/22/2021    Probably in the setting of cardiomyopathy  · 4 hour hs troponin pending  · Patient denies CP, N/V, diaphoresis, arm or jaw pain  · Hx of MI, known CAD, KIEL score 4  · Cardiology following      Ambulatory dysfunction  Assessment & Plan  · Patient reports falling "multiple times a day"  · Has area rugs, doesn't feel comfortable walking on hardwood floors despite wearing shoes  · F/u PT and OT recs    Chronic renal disease, stage IV [facility])  Assessment & Plan  Lab Results   Component Value Date    EGFR 32 12/24/2021    EGFR 28 12/23/2021    EGFR 36 12/22/2021    CREATININE 1 51 (H) 12/24/2021    CREATININE 1 69 (H) 12/23/2021    CREATININE 1 38 (H) 12/22/2021     · Baseline Cr appears to be 1 7-1 9  · Monitor Cr while admitted, renal adjust medications  · Avoid hypotension and nephrotoxic agents    Type 2 diabetes mellitus with stage 3 chronic kidney disease and hypertension ([facility] [address] )  Assessment & Plan  Lab Results   Component Value Date    HGBA1C 6 5 2021       Recent Labs     21  0547 21  0836 21  1038 21  1224   POCGLU 91 91 142* 141*       Blood Sugar Average: Last 72 hrs:  (P) 50 [id number]    Hold PO medications while inpatient   Monitor accu-checks AC and HS   Holding SSI coverage in setting of hypoglycemia nd decreased PO intake   Hypoglycemia protocol  Diabetic peripheral neuropathy ([facility] [address] )  Assessment & Plan  · Patient on high dose Gabapentin for 6+ years  · Home regimen: 300 mg x3 in AM, 300 mg x2 in afternoon, 300 mg x3 in evening  · May be contributing to frequent falls  · Consult Gerontology for help managing medication regimen in setting of frequent falls    Hypertension  Assessment & Plan  · BP controlled in ED  · Holding home Nebivolol 5 mg and Lasix 20 mg in setting of positive orthostatic blood pressures and syncopal event  · F/u ECHO        VTE Pharmacologic Prophylaxis: VTE Score: 6 High Risk (Score >/= 5) - Pharmacological DVT Prophylaxis Ordered: heparin  Sequential Compression Devices Ordered  Patient Centered Rounds: I performed bedside rounds with nursing staff today  Discussions with Specialists or Other Care Team Provider: Cardiology     Education and Discussions with Family / Patient: Attempted to update  (Grandchild) via phone  Unable to contact  Current Length of Stay: 1 day(s)  Current Patient Status: Inpatient   Discharge Plan: TBD    Code Status: Level 2 - DNAR: but accepts endotracheal intubation    Subjective:   Patient feels fine  No more recurrence of syncope  Has no lightheadedness no chest pain during admission  Is quite comfortable now  No other subjective complaints noted      Objective:     Vitals:   Temp (24hrs), Av 4 °F (36 9 °C), Min:98 3 °F (36 8 °C), Max:98 5 °F (36 9 °C)    Temp:  [98 3 °F (36 8 °C)-98 5 °F (36 9 °C)] 98 3 °F (36 8 °C)  HR:  [50-87] 87  Resp:  [18] 18  BP: ()/(53-58) 99/56  SpO2:  [97 %-98 %] 97 %  Body mass index is 42 32 kg/m²  Input and Output Summary (last 24 hours): Intake/Output Summary (Last 24 hours) at 12/24/2021 1354  Last data filed at 12/24/2021 0745  Gross per 24 hour   Intake 1480 ml   Output 500 ml   Net 980 ml       Physical Exam:   Physical Exam  Constitutional:       Appearance: She is obese  HENT:      Head: Normocephalic and atraumatic  Right Ear: Ear canal and external ear normal       Left Ear: Ear canal and external ear normal       Nose: Nose normal       Mouth/Throat:      Mouth: Mucous membranes are dry  Pharynx: Oropharynx is clear  Eyes:      Conjunctiva/sclera: Conjunctivae normal       Pupils: Pupils are equal, round, and reactive to light  Cardiovascular:      Rate and Rhythm: Normal rate and regular rhythm  Pulses: Normal pulses  Heart sounds: Normal heart sounds  Pulmonary:      Effort: Pulmonary effort is normal       Breath sounds: Normal breath sounds  Abdominal:      General: Abdomen is flat  Bowel sounds are normal    Musculoskeletal:      Cervical back: Normal range of motion  Right lower leg: Edema present  Left lower leg: Edema present  Skin:     Findings: Bruising (Over left side of face) present  Comments: Patient with evidence of bruising on the left side of her face   Neurological:      Mental Status: She is alert  Psychiatric:         Mood and Affect: Mood normal          Behavior: Behavior normal          Thought Content:  Thought content normal          Judgment: Judgment normal           Additional Data:     Labs:  Results from last 7 days   Lab Units 12/24/21  0559   WBC Thousand/uL 8 83   HEMOGLOBIN g/dL 11 6   HEMATOCRIT % 36 3   PLATELETS Thousands/uL 185   NEUTROS PCT % 67   LYMPHS PCT % 18   MONOS PCT % 10   EOS PCT % 5     Results from last 7 days   Lab Units 12/24/21  0559 12/23/21  0509 12/22/21  1753   SODIUM mmol/L 142   < > 143   POTASSIUM mmol/L 4 5   < > 5 6*   CHLORIDE mmol/L 106   < > 106   CO2 mmol/L 30   < > 28 BUN mg/dL 18   < > 16   CREATININE mg/dL 1 51*   < > 1 38*   ANION GAP mmol/L 6   < > 9   CALCIUM mg/dL 8 5   < > 9 3   ALBUMIN g/dL  --   --  3 0*   TOTAL BILIRUBIN mg/dL  --   --  0 60   ALK PHOS U/L  --   --  93   ALT U/L  --   --  42   AST U/L  --   --  70*   GLUCOSE RANDOM mg/dL 87   < > 83    < > = values in this interval not displayed           Results from last 7 days   Lab Units 12/24/21  1224 12/24/21  1038 12/24/21  0836 12/24/21  0547 12/24/21  0205 12/24/21  0055 12/24/21  0019 12/23/21  2149 12/23/21  1915 12/23/21  1641 12/23/21  1607 12/23/21  1345   POC GLUCOSE mg/dl 141* 142* 91 91 88 65 53* 77 101 123 39* 89               Lines/Drains:  Invasive Devices  Report    Peripheral Intravenous Line            Peripheral IV 12/23/21 Left;Ventral (anterior) Forearm <1 day                  Telemetry:  Telemetry Orders (From admission, onward)             48 Hour Telemetry Monitoring  Continuous x 48 hours        References:    Telemetry Guidelines   Question:  Reason for 48 Hour Telemetry  Answer:  Acute Decompensated CHF (continuous diuretic infusion or total diuretic dose > 200 mg daily, associated electrolyte derangement, ionotropic drip, history of ventricular arrhythmia, or new EF <35%)                 Telemetry Reviewed: Sinus Bradycardia  Indication for Continued Telemetry Use: Syncope             Imaging: Reviewed radiology reports from this admission including: chest xray    Recent Cultures (last 7 days):         Last 24 Hours Medication List:   Current Facility-Administered Medications   Medication Dose Route Frequency Provider Last Rate    gabapentin  600 mg Oral TID Levora DayPONCE      heparin (porcine)  5,000 Units Subcutaneous [facility] Mon[address]      levothyroxine  150 mcg Oral Early Morning Moncho [address]      metoprolol succinate  25 mg Oral Daily PONCE May      nystatin   Topical BID PONCE Lund[address]      pravastatin 10 mg Oral Daily Robyn Chilel, [address]      sacubitril-valsartan  1 tablet Oral BID PONCE Levine      sodium chloride  75 mL/hr Intravenous Continuous Nemo Bro MD 75 mL/hr (12/24/21 0745)    venlafaxine  75 mg Oral TID PONCE Ring          Today, Patient Was Seen By: Cleophus Baumgarten, MD    **Please Note: This note may have been constructed using a voice recognition system  **

## 2021-12-25 PROBLEM — R79.89 ELEVATED TROPONIN: Status: RESOLVED | Noted: 2021-12-22 | Resolved: 2021-12-25

## 2021-12-25 PROBLEM — E16.2 HYPOGLYCEMIA: Status: RESOLVED | Noted: 2021-12-23 | Resolved: 2021-12-25

## 2021-12-25 PROBLEM — F32.A DEPRESSION: Status: ACTIVE | Noted: 2021-12-25

## 2021-12-25 PROBLEM — D72.829 LEUKOCYTOSIS: Status: RESOLVED | Noted: 2021-12-22 | Resolved: 2021-12-25

## 2021-12-25 PROBLEM — R77.8 ELEVATED TROPONIN: Status: RESOLVED | Noted: 2021-12-22 | Resolved: 2021-12-25

## 2021-12-25 LAB
ANION GAP SERPL CALCULATED.3IONS-SCNC: 6 MMOL/L (ref 4–13)
BUN SERPL-MCNC: 17 MG/DL (ref 5–25)
CALCIUM SERPL-MCNC: 9 MG/DL (ref 8.3–10.1)
CHLORIDE SERPL-SCNC: 108 MMOL/L (ref 100–108)
CO2 SERPL-SCNC: 29 MMOL/L (ref 21–32)
CREAT SERPL-MCNC: 1.35 MG/DL (ref 0.6–1.3)
GFR SERPL CREATININE-BSD FRML MDRD: 37 ML/MIN/1.73SQ M
GLUCOSE SERPL-MCNC: 130 MG/DL (ref 65–140)
GLUCOSE SERPL-MCNC: 139 MG/DL (ref 65–140)
GLUCOSE SERPL-MCNC: 150 MG/DL (ref 65–140)
GLUCOSE SERPL-MCNC: 170 MG/DL (ref 65–140)
GLUCOSE SERPL-MCNC: 175 MG/DL (ref 65–140)
POTASSIUM SERPL-SCNC: 4.7 MMOL/L (ref 3.5–5.3)
SODIUM SERPL-SCNC: 143 MMOL/L (ref 136–145)

## 2021-12-25 PROCEDURE — 99232 SBSQ HOSP IP/OBS MODERATE 35: CPT | Performed by: INTERNAL MEDICINE

## 2021-12-25 PROCEDURE — 82948 REAGENT STRIP/BLOOD GLUCOSE: CPT

## 2021-12-25 PROCEDURE — 80048 BASIC METABOLIC PNL TOTAL CA: CPT | Performed by: INTERNAL MEDICINE

## 2021-12-25 PROCEDURE — 99233 SBSQ HOSP IP/OBS HIGH 50: CPT | Performed by: INTERNAL MEDICINE

## 2021-12-25 RX ORDER — MIDODRINE HYDROCHLORIDE 2.5 MG/1
2.5 TABLET ORAL
Status: DISCONTINUED | OUTPATIENT
Start: 2021-12-25 | End: 2021-12-26

## 2021-12-25 RX ADMIN — GABAPENTIN 600 MG: 300 CAPSULE ORAL at 21:46

## 2021-12-25 RX ADMIN — GABAPENTIN 600 MG: 300 CAPSULE ORAL at 09:46

## 2021-12-25 RX ADMIN — MIDODRINE HYDROCHLORIDE 2.5 MG: 2.5 TABLET ORAL at 17:38

## 2021-12-25 RX ADMIN — GABAPENTIN 600 MG: 300 CAPSULE ORAL at 17:38

## 2021-12-25 RX ADMIN — METOPROLOL SUCCINATE 25 MG: 25 TABLET, EXTENDED RELEASE ORAL at 09:46

## 2021-12-25 RX ADMIN — HEPARIN SODIUM 5000 UNITS: 5000 INJECTION INTRAVENOUS; SUBCUTANEOUS at 05:08

## 2021-12-25 RX ADMIN — INSULIN LISPRO 1 UNITS: 100 INJECTION, SOLUTION INTRAVENOUS; SUBCUTANEOUS at 12:57

## 2021-12-25 RX ADMIN — VENLAFAXINE 75 MG: 37.5 TABLET ORAL at 17:38

## 2021-12-25 RX ADMIN — SACUBITRIL AND VALSARTAN 1 TABLET: 24; 26 TABLET, FILM COATED ORAL at 09:46

## 2021-12-25 RX ADMIN — SACUBITRIL AND VALSARTAN 1 TABLET: 24; 26 TABLET, FILM COATED ORAL at 17:38

## 2021-12-25 RX ADMIN — VENLAFAXINE 75 MG: 37.5 TABLET ORAL at 21:46

## 2021-12-25 RX ADMIN — PRAVASTATIN SODIUM 10 MG: 10 TABLET ORAL at 09:46

## 2021-12-25 RX ADMIN — VENLAFAXINE 75 MG: 37.5 TABLET ORAL at 09:46

## 2021-12-25 RX ADMIN — HEPARIN SODIUM 5000 UNITS: 5000 INJECTION INTRAVENOUS; SUBCUTANEOUS at 21:46

## 2021-12-25 RX ADMIN — NYSTATIN: 100000 POWDER TOPICAL at 17:38

## 2021-12-25 RX ADMIN — NYSTATIN: 100000 POWDER TOPICAL at 09:46

## 2021-12-25 RX ADMIN — HEPARIN SODIUM 5000 UNITS: 5000 INJECTION INTRAVENOUS; SUBCUTANEOUS at 13:00

## 2021-12-25 RX ADMIN — LEVOTHYROXINE SODIUM 150 MCG: 150 TABLET ORAL at 05:08

## 2021-12-25 NOTE — PROGRESS NOTES
Cardiology Progress Note - Jeffry Jade 64 y o  female MRN: [id number]    Unit/Bed#: S -01 Encounter: [id number]      Assessment/Recommendations:  1  Syncope: With new cardiomyopathy noted on echocardiogram   Unclear whether this is related with the potential episode of ventricular tachycardia  Currently on monitor, no significant arrhythmias  Continue to follow on telemetry  Could potentially also have been precipitated by low blood sugar  No significant events overnight on telemetry  2  Elevated troponin:  Likely secondary to new cardiomyopathy  Will need eventual ischemic evaluation  Likely on Monday with cardiac catheterization  3  Presumed CAD:  With prior MI and scar with infarct seen on nuclear stress testing 2017  However, her ejection fraction at that time was normal   4  Hypertension:  Well controlled, continue current regimen  5  New cardiomyopathy:  Unclear etiology  Now started on Entresto and Toprol XL for GDMT  Volume status appears stable, continue maintenance diuretic  Will need ischemic evaluation cardiac catheterization:  Likely Monday  Will need LifeVest prior to discharge as well  6  Hyperlipidemia:  Continue on statin  7  Type 2 diabetes mellitus:  Blood sugar control as per primary team, could have also been contributing to episode of syncope  8  Mild aortic stenosis:  Not seen to be significant on echocardiogram, likely aortic sclerosis  9  Hypothyroidism:  As per primary team   10  Morbid obesity  Subjective:   Patient seen and examined  No significant events overnight   ; pertinent negatives - chest pain, chest pressure/discomfort, dyspnea, irregular heart beat, lower extremity edema and palpitations    Objective:     Vitals: Blood pressure 128/60, pulse 63, temperature 97 5 °F (36 4 °C), temperature source Oral, resp  rate 20, height 5' 1" (1 549 m), weight 102 kg (224 lb), SpO2 94 %  , Body mass index is 42 32 kg/m² ,   Orthostatic Blood Pressures      Most Recent Value   Blood Pressure 128/60 filed at 12/25/2021 0636   Patient Position - Orthostatic VS Lying filed at 12/25/2021 0742            Intake/Output Summary (Last 24 hours) at 12/25/2021 1113  Last data filed at 12/25/2021 0430  Gross per 24 hour   Intake 900 ml   Output 550 ml   Net 350 ml       TELE: No significant arrhythmias seen      Physical Exam:    GEN: Rip Shadow appears well, alert and oriented x 3, pleasant and cooperative   HEENT: pupils equal, round, and reactive to light; extraocular muscles intact  NECK: supple, no carotid bruits   HEART: regular rhythm, normal S1 and S2, +systolic murmur, no clicks, gallops or rubs   LUNGS: clear to auscultation bilaterally; no wheezes, rales, or rhonchi   ABDOMEN: normal bowel sounds, soft, no tenderness, no distention  EXTREMITIES: peripheral pulses normal; no clubbing, cyanosis, or edema  NEURO: no focal findings   SKIN: normal without suspicious lesions on exposed skin      Medications:      Current Facility-Administered Medications:     gabapentin (NEURONTIN) capsule 600 mg, 600 mg, Oral, TID, [employer], CRNP, 600 mg at 12/25/21 2667    heparin (porcine) subcutaneous injection 5,000 Units, 5,000 Units, Subcutaneous, Q8H [facility], 5,000 Units at 12/25/21 0508 **AND** Platelet count, , , Once, [employer], CRNP    insulin lispro (HumaLOG) 100 units/mL subcutaneous injection 1-6 Units, 1-6 Units, Subcutaneous, TID AC **AND** Fingerstick Glucose (POCT), , , TID AC, Konrad Jansen MD    levothyroxine tablet 150 mcg, 150 mcg, Oral, Early Morning, PONCE Lundy, 150 mcg at 12/25/21 0921    metoprolol succinate (TOPROL-XL) 24 hr tablet 25 mg, 25 mg, Oral, Daily, PONCE Cruz, 25 mg at 12/25/21 0376    nystatin (MYCOSTATIN) powder, , Topical, BID, PONCE Lundy, Given at 12/25/21 0946    pravastatin (PRAVACHOL) tablet 10 mg, 10 mg, Oral, Daily, PONCE Lundy, 10 mg at 12/25/21 [address] (ENTRESTO) 24-26 MG per tablet 1 tablet, 1 tablet, Oral, BID, Grzegorz Cardoso, CRNP, 1 tablet at 12/25/21 0946    venlafaxine [facility]) tablet 75 mg, 75 mg, Oral, TID, [employer], CRNP, 75 mg at 12/25/21 0946     Labs & Results:        Results from last 7 days   Lab Units 12/24/21  0559 12/23/21  0509 12/22/21  1753   WBC Thousand/uL 8 83 12 36* 15 04*   HEMOGLOBIN g/dL 11 6 12 2 14 0   HEMATOCRIT % 36 3 38 9 43 6   PLATELETS Thousands/uL 185 205 232         Results from last 7 days   Lab Units 12/25/21  0436 12/24/21  0559 12/23/21  0509 12/22/21  1753 12/22/21  1753   POTASSIUM mmol/L 4 7 4 5 4 3   < > 5 6*   CHLORIDE mmol/L 108 106 108   < > 106   CO2 mmol/L 29 30 28   < > 28   BUN mg/dL 17 18 18   < > 16   CREATININE mg/dL 1 35* 1 51* 1 69*   < > 1 38*   CALCIUM mg/dL 9 0 8 5 9 3   < > 9 3   ALK PHOS U/L  --   --   --   --  93   ALT U/L  --   --   --   --  42   AST U/L  --   --   --   --  70*    < > = values in this interval not displayed  Results from last 7 days   Lab Units 12/22/21  1753   MAGNESIUM mg/dL 1 4*       Echo: personally reviewed - normal LV size with severe global hypokinesis with ejection fraction of 30%  Grade 2 diastolic dysfunction  Dilated RV with normal function  Left atrial dilation  Mild to moderate mitral regurgitation      EKG personally reviewed by Scout Armendariz MD

## 2021-12-25 NOTE — ASSESSMENT & PLAN NOTE
Lab Results   Component Value Date    EGFR 37 12/25/2021    EGFR 32 12/24/2021    EGFR 28 12/23/2021    CREATININE 1 35 (H) 12/25/2021    CREATININE 1 51 (H) 12/24/2021    CREATININE 1 69 (H) 12/23/2021     · Baseline Cr appears to be 1 7-1 9  · Monitor Cr while admitted, renal adjust medications  · Avoid hypotension and nephrotoxic agents

## 2021-12-25 NOTE — ASSESSMENT & PLAN NOTE
Gerontology on board   patient scored a 9 on her PHQ 9 depression scale  For dose increase on outpatient visit

## 2021-12-25 NOTE — ASSESSMENT & PLAN NOTE
No more hypoglycemic episodes noted, blood glucose levels range from 142-250  Received dextrose Likely in setting of decreased oral intake with Glimperide home medications in the setting of CKD and old age     AM cortisol normal  Plan:   · Monitor glucose   · Starting back SSI

## 2021-12-25 NOTE — ASSESSMENT & PLAN NOTE
· Patient was sleeping in recliner and woke up on floor on left side +LOC and +head trauma  Arrived to ED via EMS  · Patient with known cardiac history, LBBB, and questionable ST changes on EMS 12 lead    Workups  Previous echo:   · Echo 12/23/2021 30% LVEF grade 2 diastolic relaxation, with right ventricle dilation, left atrium dilation, moderate mitral regurgitation  · Patient had another hyperglycemia even 12 am tonight  Latest glucose 90 today  · In ED, patient dizzy with movement and elevated trop, denies any CP  · CT face - no acute fractures  · CT head - no acute intracranial abnormality  · CXR shows fracture of the L 8th rib   Etiology: Cardiogenic vs orthostatic    Plan  · Neuro checks Q4   · Repeat orthostatics today positive   Needed to DC fluids due to heart failure and fear for fluid overload  · Will start midodrine 2 5 mg TID  · Cardiology following:  Started on Entresto and metoprolol XL     Will undergo cardiac catheterization to evaluate ischemia on monday

## 2021-12-25 NOTE — ASSESSMENT & PLAN NOTE
· Patient reports falling "multiple times a day"  · Has area rugs, doesn't feel comfortable walking on hardwood floors despite wearing shoes  · Patient was advised post acute rehab by PT however patient refuses for now  Talk to family about this issue    Gil Akhtar that they will try to talk her into going because they also feel that it is for the best

## 2021-12-25 NOTE — PROGRESS NOTES
[facility]  Progress Note - cruzito Gains 1945, 64 y o  female MRN: [id number]  Unit/Bed#: S -01 Encounter: [id number]  Primary Care Provider: Bharati Gaston DO   Date and time admitted to hospital: 12/22/2021  4:57 PM    * Syncope  Assessment & Plan  · Patient was sleeping in recliner and woke up on floor on left side +LOC and +head trauma  Arrived to ED via EMS  · Patient with known cardiac history, LBBB, and questionable ST changes on EMS 12 lead    Workups  Previous echo:   · Echo 12/23/2021 30% LVEF grade 2 diastolic relaxation, with right ventricle dilation, left atrium dilation, moderate mitral regurgitation  · Patient had another hyperglycemia even 12 am tonight  Latest glucose 90 today  · In ED, patient dizzy with movement and elevated trop, denies any CP  · CT face - no acute fractures  · CT head - no acute intracranial abnormality  · CXR shows fracture of the L 8th rib   Etiology: Cardiogenic vs orthostatic    Plan  · Neuro checks Q4   · Repeat orthostatics today positive   Needed to DC fluids due to heart failure and fear for fluid overload  · Will start midodrine 2 5 mg TID  · Cardiology following:  Started on Entresto and metoprolol XL     Will undergo cardiac catheterization to evaluate ischemia on monday    Heart failure (HCC) secondary to cardiomyopathy  Assessment & Plan  Wt Readings from Last 3 Encounters:   12/23/21 102 kg (224 lb)   08/16/21 123 kg (271 lb 9 6 oz)   08/04/21 120 kg (264 lb)     · Nuclear stress test: 12/7/2017 - small fixed inferoapical defect consistent with prior MI,  · Echo: 12/7/2017 - 60% EF, no wall motion abnormalities, G1DD, mild MR, mild AS   · Echo 12/23/2021 30% LVEF grade 2 diastolic relaxation, with right ventricle dilation, left atrium dilation, moderate mitral regurgitation  Etiology : Probably ischemic in origin  Plan:  Cardiology on board  Continue on  sacubitril- valsartan OD  Continue on Metroprolol 25 OD  Will undergo cardiac cath on Monday to rule out ischemia        Hypoglycemia-resolved as of 12/25/2021  Assessment & Plan  No more hypoglycemic episodes noted, blood glucose levels range from 142-250  Received dextrose Likely in setting of decreased oral intake with Glimperide home medications in the setting of CKD and old age  AM cortisol normal  Plan:   · Monitor glucose   · Starting back SSI    Depression  Assessment & Plan  Gerontology on board  patient scored a 9 on her PHQ 9 depression scale  For dose increase on outpatient visit       Ambulatory dysfunction  Assessment & Plan  · Patient reports falling "multiple times a day"  · Has area rugs, doesn't feel comfortable walking on hardwood floors despite wearing shoes  · Patient was advised post acute rehab by PT however patient refuses for now  Talk to family about this issue  Said that they will try to talk her into going because they also feel that it is for the best     Chronic renal disease, stage IV [facility])  Assessment & Plan  Lab Results   Component Value Date    EGFR 37 12/25/2021    EGFR 32 12/24/2021    EGFR 28 12/23/2021    CREATININE 1 35 (H) 12/25/2021    CREATININE 1 51 (H) 12/24/2021    CREATININE 1 69 (H) 12/23/2021     · Baseline Cr appears to be 1 7-1 9  · Monitor Cr while admitted, renal adjust medications  · Avoid hypotension and nephrotoxic agents    Type 2 diabetes mellitus with stage 3 chronic kidney disease and hypertension [facility])  Assessment & Plan  Lab Results   Component Value Date    HGBA1C 6 5 08/04/2021       Recent Labs     12/24/21  1810 12/24/21  2000 12/25/21  0741 12/25/21  1050   POCGLU 215* 250* 130 175*       Blood Sugar Average: Last 72 hrs:  (P) 217 [id number]    Hold PO medications while inpatient   Monitor accu-checks AC and HS   Holding SSI coverage in setting of hypoglycemia nd decreased PO intake   Hypoglycemia protocol      Diabetic peripheral neuropathy [facility])  Assessment & Plan  · Patient on high dose Gabapentin for 6+ years  · Home regimen: 300 mg x3 in AM, 300 mg x2 in afternoon, 300 mg x3 in evening  · May be contributing to frequent falls  · Consult Gerontology for help managing medication regimen in setting of frequent falls    Hypertension  Assessment & Plan  · BP controlled in ED  · Holding home Nebivolol 5 mg and Lasix 20 mg in setting of positive orthostatic blood pressures and syncopal event  · F/u ECHO    Leukocytosis-resolved as of 12/25/2021  Assessment & Plan  Lab Results   Component Value Date    WBC 8 83 12/24/2021    WBC 12 36 (H) 12/23/2021     · Leukocytosis present  · Likely reactive  · CXR with questionable area in left lung field  Patient fell and was down on left side  · Also shows b/l apical opacities possible representative of early interstitial lung disease; will require outpatient pulmonology referral  · Possible contusion vs aspiration post fall  · Currently afebrile  · Trend CBC     Elevated troponin-resolved as of 12/25/2021  Assessment & Plan  Lab Results   Component Value Date    HSTNI0 28 12/22/2021    HSTNID2 23 12/22/2021    HSTNI2 51 ([place]) 12/22/2021    HSTNID4 47 12/22/2021    HSTNI4 75 ([place]) 12/22/2021    Probably in the setting of cardiomyopathy  · 4 hour hs troponin pending  · Patient denies CP, N/V, diaphoresis, arm or jaw pain  · Hx of MI, known CAD, KIEL score 4  · Cardiology following          VTE Pharmacologic Prophylaxis: VTE Score: 6 High Risk (Score >/= 5) - Pharmacological DVT Prophylaxis Ordered: heparin  Sequential Compression Devices Ordered  Patient Centered Rounds: I performed bedside rounds with nursing staff today  Discussions with Specialists or Other Care Team Provider: Cardiology     Education and Discussions with Family / Patient: Attempted to update  (Grandchild) via phone  Unable to contact      Current Length of Stay: 2 day(s)  Current Patient Status: Inpatient   Discharge Plan: TBD    Code Status: Level 2 - DNAR: but accepts endotracheal intubation    Subjective:   Patient feels very comfortable  Still no recurrence of syncope, lightheadedness  Patient was advised by Physical therapy to undergo post acute rehab  However she refuses this  Reached out to family regarding this and family said that they will talk to her and try to convince her to go to rehab  No other subjective complaints noted  Objective:     Vitals:   Temp (24hrs), Av 1 °F (36 7 °C), Min:97 5 °F (36 4 °C), Max:98 7 °F (37 1 °C)    Temp:  [97 5 °F (36 4 °C)-98 7 °F (37 1 °C)] 97 5 °F (36 4 °C)  HR:  [52-63] 63  Resp:  [18-20] 20  BP: (120-129)/(57-60) 128/60  SpO2:  [94 %-98 %] 94 %  Body mass index is 42 32 kg/m²  Input and Output Summary (last 24 hours): Intake/Output Summary (Last 24 hours) at 2021 1406  Last data filed at 2021 1300  Gross per 24 hour   Intake 1320 ml   Output 550 ml   Net 770 ml       Physical Exam:   Physical Exam  Constitutional:       Appearance: She is obese  HENT:      Head: Normocephalic and atraumatic  Right Ear: Ear canal and external ear normal       Left Ear: Ear canal and external ear normal       Nose: Nose normal       Mouth/Throat:      Mouth: Mucous membranes are dry  Pharynx: Oropharynx is clear  Eyes:      Conjunctiva/sclera: Conjunctivae normal       Pupils: Pupils are equal, round, and reactive to light  Cardiovascular:      Rate and Rhythm: Normal rate and regular rhythm  Pulses: Normal pulses  Heart sounds: Normal heart sounds  Pulmonary:      Effort: Pulmonary effort is normal       Breath sounds: Normal breath sounds  Abdominal:      General: Abdomen is flat  Bowel sounds are normal    Musculoskeletal:      Cervical back: Normal range of motion  Right lower leg: Edema present  Left lower leg: Edema present  Skin:     Findings: Bruising (Over left side of face) present        Comments: Patient with evidence of bruising on the left side of her face Neurological:      Mental Status: She is alert  Psychiatric:         Mood and Affect: Mood normal          Behavior: Behavior normal          Thought Content: Thought content normal          Judgment: Judgment normal           Additional Data:     Labs:  Results from last 7 days   Lab Units 12/24/21  0559   WBC Thousand/uL 8 83   HEMOGLOBIN g/dL 11 6   HEMATOCRIT % 36 3   PLATELETS Thousands/uL 185   NEUTROS PCT % 67   LYMPHS PCT % 18   MONOS PCT % 10   EOS PCT % 5     Results from last 7 days   Lab Units 12/25/21  0436 12/23/21  0509 12/22/21  1753   SODIUM mmol/L 143   < > 143   POTASSIUM mmol/L 4 7   < > 5 6*   CHLORIDE mmol/L 108   < > 106   CO2 mmol/L 29   < > 28   BUN mg/dL 17   < > 16   CREATININE mg/dL 1 35*   < > 1 38*   ANION GAP mmol/L 6   < > 9   CALCIUM mg/dL 9 0   < > 9 3   ALBUMIN g/dL  --   --  3 0*   TOTAL BILIRUBIN mg/dL  --   --  0 60   ALK PHOS U/L  --   --  93   ALT U/L  --   --  42   AST U/L  --   --  70*   GLUCOSE RANDOM mg/dL 139   < > 83    < > = values in this interval not displayed           Results from last 7 days   Lab Units 12/25/21  1050 12/25/21  0741 12/24/21  2000 12/24/21  1810 12/24/21  1608 12/24/21  1433 12/24/21  1224 12/24/21  1038 12/24/21  0836 12/24/21  0547 12/24/21  0205 12/24/21  0055   POC GLUCOSE mg/dl 175* 130 250* 215* 209* 188* 141* 142* 91 91 88 65               Lines/Drains:  Invasive Devices  Report    Peripheral Intravenous Line            Peripheral IV 12/23/21 Left;Ventral (anterior) Forearm 1 day                  Telemetry:  Telemetry Orders (From admission, onward)             48 Hour Telemetry Monitoring  Continuous x 48 hours        References:    Telemetry Guidelines   Question:  Reason for 48 Hour Telemetry  Answer:  Acute Decompensated CHF (continuous diuretic infusion or total diuretic dose > 200 mg daily, associated electrolyte derangement, ionotropic drip, history of ventricular arrhythmia, or new EF <35%)                 Telemetry Reviewed: Sinus Bradycardia  Indication for Continued Telemetry Use: Syncope             Imaging: Reviewed radiology reports from this admission including: chest xray    Recent Cultures (last 7 days):         Last 24 Hours Medication List:   Current Facility-Administered Medications   Medication Dose Route Frequency Provider Last Rate    gabapentin  600 mg Oral TID PONCE Chan      heparin (porcine)  5,000 Units Subcutaneous [facility]      insulin lispro  1-6 Units Subcutaneous TID [facility] Silvina Borrego MD      levothyroxine  150 mcg Oral Early Morning [facility]      metoprolol succinate  25 mg Oral Daily PONCE Kraus      midodrine  2 5 mg Oral TID [facility] Silvina Borrego MD      nystatin   Topical BID [facility]      pravastatin  10 mg Oral Daily [facility]      sacubitril-valsartan  1 tablet Oral BID PONCE Kraus      venlafaxine  75 mg Oral TID PONCE Chan          Today, Patient Was Seen By: Silvina Borrego MD    **Please Note: This note may have been constructed using a voice recognition system  **

## 2021-12-25 NOTE — ASSESSMENT & PLAN NOTE
Lab Results   Component Value Date    HGBA1C 6 5 08/04/2021       Recent Labs     12/24/21  1810 12/24/21 2000 12/25/21  0741 12/25/21  1050   POCGLU 215* 250* 130 175*       Blood Sugar Average: Last 72 hrs:  (P) 994 [id number]    Hold PO medications while inpatient   Monitor accu-checks AC and HS   Holding SSI coverage in setting of hypoglycemia nd decreased PO intake   Hypoglycemia protocol

## 2021-12-25 NOTE — ASSESSMENT & PLAN NOTE
Wt Readings from Last 3 Encounters:   12/23/21 102 kg (224 lb)   08/16/21 123 kg (271 lb 9 6 oz)   08/04/21 120 kg (264 lb)     · Nuclear stress test: 12/7/2017 - small fixed inferoapical defect consistent with prior MI,  · Echo: 12/7/2017 - 60% EF, no wall motion abnormalities, G1DD, mild MR, mild AS   · Echo 12/23/2021 30% LVEF grade 2 diastolic relaxation, with right ventricle dilation, left atrium dilation, moderate mitral regurgitation  Etiology : Probably ischemic in origin  Plan:  Cardiology on board  Continue on  sacubitril- valsartan OD  Continue on Metroprolol 25 OD  Will undergo cardiac cath on Monday to rule out ischemia

## 2021-12-26 LAB
GLUCOSE SERPL-MCNC: 131 MG/DL (ref 65–140)
GLUCOSE SERPL-MCNC: 163 MG/DL (ref 65–140)
GLUCOSE SERPL-MCNC: 252 MG/DL (ref 65–140)

## 2021-12-26 PROCEDURE — 99232 SBSQ HOSP IP/OBS MODERATE 35: CPT | Performed by: INTERNAL MEDICINE

## 2021-12-26 PROCEDURE — 99233 SBSQ HOSP IP/OBS HIGH 50: CPT | Performed by: INTERNAL MEDICINE

## 2021-12-26 PROCEDURE — 82948 REAGENT STRIP/BLOOD GLUCOSE: CPT

## 2021-12-26 RX ORDER — MIDODRINE HYDROCHLORIDE 5 MG/1
5 TABLET ORAL
Status: DISCONTINUED | OUTPATIENT
Start: 2021-12-26 | End: 2021-12-28

## 2021-12-26 RX ADMIN — INSULIN LISPRO 3 UNITS: 100 INJECTION, SOLUTION INTRAVENOUS; SUBCUTANEOUS at 13:22

## 2021-12-26 RX ADMIN — SACUBITRIL AND VALSARTAN 1 TABLET: 24; 26 TABLET, FILM COATED ORAL at 18:22

## 2021-12-26 RX ADMIN — GABAPENTIN 600 MG: 300 CAPSULE ORAL at 16:35

## 2021-12-26 RX ADMIN — VENLAFAXINE 75 MG: 37.5 TABLET ORAL at 09:27

## 2021-12-26 RX ADMIN — PRAVASTATIN SODIUM 10 MG: 10 TABLET ORAL at 09:28

## 2021-12-26 RX ADMIN — VENLAFAXINE 75 MG: 37.5 TABLET ORAL at 16:35

## 2021-12-26 RX ADMIN — MIDODRINE HYDROCHLORIDE 5 MG: 5 TABLET ORAL at 16:35

## 2021-12-26 RX ADMIN — MIDODRINE HYDROCHLORIDE 2.5 MG: 2.5 TABLET ORAL at 13:22

## 2021-12-26 RX ADMIN — NYSTATIN: 100000 POWDER TOPICAL at 09:28

## 2021-12-26 RX ADMIN — VENLAFAXINE 75 MG: 37.5 TABLET ORAL at 21:31

## 2021-12-26 RX ADMIN — GABAPENTIN 600 MG: 300 CAPSULE ORAL at 21:31

## 2021-12-26 RX ADMIN — HEPARIN SODIUM 5000 UNITS: 5000 INJECTION INTRAVENOUS; SUBCUTANEOUS at 21:31

## 2021-12-26 RX ADMIN — MIDODRINE HYDROCHLORIDE 2.5 MG: 2.5 TABLET ORAL at 09:27

## 2021-12-26 RX ADMIN — LEVOTHYROXINE SODIUM 150 MCG: 150 TABLET ORAL at 05:22

## 2021-12-26 RX ADMIN — INSULIN LISPRO 1 UNITS: 100 INJECTION, SOLUTION INTRAVENOUS; SUBCUTANEOUS at 18:23

## 2021-12-26 RX ADMIN — METOPROLOL SUCCINATE 25 MG: 25 TABLET, EXTENDED RELEASE ORAL at 09:27

## 2021-12-26 RX ADMIN — NYSTATIN 1 APPLICATION: 100000 POWDER TOPICAL at 18:23

## 2021-12-26 RX ADMIN — SACUBITRIL AND VALSARTAN 1 TABLET: 24; 26 TABLET, FILM COATED ORAL at 09:28

## 2021-12-26 RX ADMIN — HEPARIN SODIUM 5000 UNITS: 5000 INJECTION INTRAVENOUS; SUBCUTANEOUS at 05:22

## 2021-12-26 RX ADMIN — GABAPENTIN 600 MG: 300 CAPSULE ORAL at 09:27

## 2021-12-26 RX ADMIN — HEPARIN SODIUM 5000 UNITS: 5000 INJECTION INTRAVENOUS; SUBCUTANEOUS at 13:22

## 2021-12-26 NOTE — ASSESSMENT & PLAN NOTE
· Patient was sleeping in recliner and woke up on floor on left side +LOC and +head trauma  Arrived to ED via EMS  · Patient with known cardiac history, LBBB, and questionable ST changes on EMS 12 lead    Workups  Previous echo:   · Echo 12/23/2021 30% LVEF grade 2 diastolic relaxation, with right ventricle dilation, left atrium dilation, moderate mitral regurgitation  · Patient had another hyperglycemia even 12 am tonight  Latest glucose 90 today  · In ED, patient dizzy with movement and elevated trop, denies any CP  · CT face - no acute fractures  · CT head - no acute intracranial abnormality  · CXR shows fracture of the L 8th rib   Etiology: Cardiogenic vs orthostatic    Plan  · Neuro checks Q4   · Repeat orthostatics today still positive   Increased dose of midodrine to 5 mg TID  · Cardiology following:  Started on Entresto and metoprolol XL     Will undergo cardiac catheterization to evaluate ischemia on monday

## 2021-12-26 NOTE — PROGRESS NOTES
Cardiology Progress Note - Leandra Anderson 64 y o  female MRN: [id number]    Unit/Bed#: S -01 Encounter: [id number]      Assessment/Recommendations:  1  Syncope: With new cardiomyopathy noted on echocardiogram   Unclear whether this is related with the potential episode of ventricular tachycardia  Currently on monitor, no significant arrhythmias, but has some isolated PVCs  Continue to follow on telemetry  Could potentially also have been precipitated by low blood sugar  2  Elevated troponin:  Likely secondary to new cardiomyopathy  Will need ischemic evaluation  Likely on Monday with cardiac catheterization - will arrange for tomorrow  3  Presumed CAD:  With prior MI and scar with infarct seen on nuclear stress testing 2017  However, her ejection fraction at that time was normal   4  Hypertension:  Well controlled, continue current regimen  5  New cardiomyopathy:  Unclear etiology  Now started on Entresto and Toprol XL for GDMT  Volume status appears stable, continue maintenance diuretic  Will need ischemic evaluation cardiac catheterization:  Likely Monday  Will need LifeVest prior to discharge as well  6  Hyperlipidemia:  Continue on statin  7  Type 2 diabetes mellitus:  Blood sugar control as per primary team, could have also been contributing to episode of syncope  8  Mild aortic stenosis:  Not seen to be significant on echocardiogram, likely aortic sclerosis  9  Hypothyroidism:  As per primary team   10  Morbid obesity  Subjective:   Patient seen and examined  No significant events overnight   ; pertinent negatives - chest pain, chest pressure/discomfort, dyspnea, irregular heart beat, lower extremity edema and palpitations    Objective:     Vitals: Blood pressure 142/63, pulse 56, temperature 98 1 °F (36 7 °C), temperature source Oral, resp   rate 18, height 5' 1" (1 549 m), weight 102 kg (224 lb), SpO2 91 % , Body mass index is 42 32 kg/m² ,   Orthostatic Blood Pressures Most Recent Value   Blood Pressure 142/63 filed at 12/26/2021 5448   Patient Position - Orthostatic VS Lying filed at 12/26/2021 0810            Intake/Output Summary (Last 24 hours) at 12/26/2021 0849  Last data filed at 12/25/2021 1900  Gross per 24 hour   Intake 670 ml   Output 300 ml   Net 370 ml       TELE: Occasional PVCs    Physical Exam:    GEN: Royal Granado appears well, alert and oriented x 3, pleasant and cooperative   HEENT: pupils equal, round, and reactive to light; extraocular muscles intact  NECK: supple, no carotid bruits   HEART: regular rhythm, normal S1 and S2, +systolic murmur, no clicks, gallops or rubs   LUNGS: clear to auscultation bilaterally; no wheezes, rales, or rhonchi   ABDOMEN: normal bowel sounds, soft, no tenderness, no distention  EXTREMITIES: peripheral pulses normal; no clubbing, cyanosis, or edema  NEURO: no focal findings   SKIN: normal without suspicious lesions on exposed skin        Medications:      Current Facility-Administered Medications:     gabapentin (NEURONTIN) capsule 600 mg, 600 mg, Oral, TID, [employer], CRNP, 600 mg at 12/25/21 2146    heparin (porcine) subcutaneous injection 5,000 Units, 5,000 Units, Subcutaneous, Q[address]H [address], 5,000 Units at 12/26/21 0522 **AND** Platelet count, , , Once, [employer], CRNP    insulin lispro (HumaLOG) 100 units/mL subcutaneous injection 1-6 Units, 1-6 Units, Subcutaneous, TID AC, 1 Units at 12/25/21 1257 **AND** Fingerstick Glucose (POCT), , , TID Edith COLON MD    levothyroxine tablet 150 mcg, 150 mcg, Oral, Early Morning, PONCE Lundy, 150 mcg at 12/26/21 0522    metoprolol succinate (TOPROL-XL) 24 hr tablet 25 mg, 25 mg, Oral, Daily, PONCE Cruz, 25 mg at 12/25/21 0946    midodrine (PROAMATINE) tablet 2 5 mg, 2 5 mg, Oral, TID Edith COLON MD, 2 5 mg at 12/25/21 1738    nystatin (MYCOSTATIN) powder, , Topical, BID, [facility], 10 [facility], Given at 12/25/21 1738    pravastatin (PRAVACHOL) tablet 10 mg, 10 mg, Oral, Daily, [place], CRNP, 10 mg at 12/25/21 7492    sacubitril-valsartan (ENTRESTO) 24-26 MG per tablet 1 tablet, 1 tablet, Oral, BID, PONCE Reeves, 1 tablet at 12/25/21 1738    venlafaxine [facility]) tablet 75 mg, 75 mg, Oral, TID, RajwinderPONCE Reagan, 75 mg at 12/25/21 2146     Labs & Results:        Results from last 7 days   Lab Units 12/24/21  0559 12/23/21  0509 12/22/21  1753   WBC Thousand/uL 8 83 12 36* 15 04*   HEMOGLOBIN g/dL 11 6 12 2 14 0   HEMATOCRIT % 36 3 38 9 43 6   PLATELETS Thousands/uL 185 205 232         Results from last 7 days   Lab Units 12/25/21  0436 12/24/21  0559 12/23/21  0509 12/22/21  1753 12/22/21  1753   POTASSIUM mmol/L 4 7 4 5 4 3   < > 5 6*   CHLORIDE mmol/L 108 106 108   < > 106   CO2 mmol/L 29 30 28   < > 28   BUN mg/dL 17 18 18   < > 16   CREATININE mg/dL 1 35* 1 51* 1 69*   < > 1 38*   CALCIUM mg/dL 9 0 8 5 9 3   < > 9 3   ALK PHOS U/L  --   --   --   --  93   ALT U/L  --   --   --   --  42   AST U/L  --   --   --   --  70*    < > = values in this interval not displayed  Results from last 7 days   Lab Units 12/22/21  1753   MAGNESIUM mg/dL 1 4*       Echo: personally reviewed - normal LV size with severe global hypokinesis with ejection fraction of 30%  Grade 2 diastolic dysfunction  Dilated RV with normal function  Left atrial dilation  Mild to moderate mitral regurgitation      EKG personally reviewed by Bernadette Echeverria MD

## 2021-12-26 NOTE — ASSESSMENT & PLAN NOTE
Lab Results   Component Value Date    HGBA1C 6 5 08/04/2021       Recent Labs     12/25/21  1549 12/25/21  2222 12/26/21  0808 12/26/21  1203   POCGLU 150* 170* 131 252*       Blood Sugar Average: Last 72 hrs:  (P) 130 [id number]    Hold PO medications while inpatient   Monitor accu-checks AC and HS   Holding SSI coverage in setting of hypoglycemia nd decreased PO intake   Hypoglycemia protocol

## 2021-12-26 NOTE — PROGRESS NOTES
[facility]  Progress Note - Eber Mouse 1945, 64 y o  female MRN: [id number]  Unit/Bed#: S -01 Encounter: [id number]  Primary Care Provider: Shelley Thomas DO   Date and time admitted to hospital: 12/22/2021  4:57 PM    * Syncope  Assessment & Plan  · Patient was sleeping in recliner and woke up on floor on left side +LOC and +head trauma  Arrived to ED via EMS  · Patient with known cardiac history, LBBB, and questionable ST changes on EMS 12 lead    Workups  Previous echo:   · Echo 12/23/2021 30% LVEF grade 2 diastolic relaxation, with right ventricle dilation, left atrium dilation, moderate mitral regurgitation  · Patient had another hyperglycemia even 12 am tonight  Latest glucose 90 today  · In ED, patient dizzy with movement and elevated trop, denies any CP  · CT face - no acute fractures  · CT head - no acute intracranial abnormality  · CXR shows fracture of the L 8th rib   Etiology: Cardiogenic vs orthostatic    Plan  · Neuro checks Q4   · Repeat orthostatics today still positive   Increased dose of midodrine to 5 mg TID  · Cardiology following:  Started on Entresto and metoprolol XL     Will undergo cardiac catheterization to evaluate ischemia on monday    Heart failure (HCC) secondary to cardiomyopathy  Assessment & Plan  Wt Readings from Last 3 Encounters:   12/23/21 102 kg (224 lb)   08/16/21 123 kg (271 lb 9 6 oz)   08/04/21 120 kg (264 lb)     · Nuclear stress test: 12/7/2017 - small fixed inferoapical defect consistent with prior MI,  · Echo: 12/7/2017 - 60% EF, no wall motion abnormalities, G1DD, mild MR, mild AS   · Echo 12/23/2021 30% LVEF grade 2 diastolic relaxation, with right ventricle dilation, left atrium dilation, moderate mitral regurgitation  Etiology : Probably ischemic in origin  Plan:  Cardiology on board  Continue on  sacubitril- valsartan OD  Continue on Metroprolol 25 OD  Will undergo cardiac cath on Monday to rule out ischemia        Depression  Assessment & Plan  Gerontology on board  patient scored a 9 on her PHQ 9 depression scale  For dose increase on outpatient visit       Ambulatory dysfunction  Assessment & Plan  · Patient reports falling "multiple times a day"  · Has area rugs, doesn't feel comfortable walking on hardwood floors despite wearing shoes  · Patient was advised post acute rehab by PT however patient refuses for now  Talk to family about this issue  Said that they will try to talk her into going because they also feel that it is for the best     Chronic renal disease, stage IV [facility])  Assessment & Plan  Lab Results   Component Value Date    EGFR 37 12/25/2021    EGFR 32 12/24/2021    EGFR 28 12/23/2021    CREATININE 1 35 (H) 12/25/2021    CREATININE 1 51 (H) 12/24/2021    CREATININE 1 69 (H) 12/23/2021     · Baseline Cr appears to be 1 7-1 9  · Monitor Cr while admitted, renal adjust medications  · Avoid hypotension and nephrotoxic agents    Type 2 diabetes mellitus with stage 3 chronic kidney disease and hypertension [facility])  Assessment & Plan  Lab Results   Component Value Date    HGBA1C 6 5 08/04/2021       Recent Labs     12/25/21  1549 12/25/21  2222 12/26/21  0808 12/26/21  1203   POCGLU 150* 170* 131 252*       Blood Sugar Average: Last 72 hrs:  (P) 128 [id number]    Hold PO medications while inpatient   Monitor accu-checks AC and HS   Holding SSI coverage in setting of hypoglycemia nd decreased PO intake   Hypoglycemia protocol      Diabetic peripheral neuropathy ([facility] [address] )  Assessment & Plan  · Patient on high dose Gabapentin for 6+ years  · Home regimen: 300 mg x3 in AM, 300 mg x2 in afternoon, 300 mg x3 in evening  · May be contributing to frequent falls  · Consult Gerontology for help managing medication regimen in setting of frequent falls    Hypertension  Assessment & Plan  · BP controlled in ED  · Holding home Nebivolol 5 mg and Lasix 20 mg in setting of positive orthostatic blood pressures and syncopal event  · F/u ECHO        VTE Pharmacologic Prophylaxis: VTE Score: 6 High Risk (Score >/= 5) - Pharmacological DVT Prophylaxis Ordered: heparin  Sequential Compression Devices Ordered  Patient Centered Rounds: I performed bedside rounds with nursing staff today  Discussions with Specialists or Other Care Team Provider: Cardiology     Education and Discussions with Family / Patient: Called daughter multiple times  Was not able to   Left message  Will try to call damari am instead/    Current Length of Stay: 3 day(s)  Current Patient Status: Inpatient   Discharge Plan: TBD    Code Status: Level 2 - DNAR: but accepts endotracheal intubation    Subjective:    Patient feels very comfortable  Still no recurrence of syncope, lightheadedness  Tried to convince the patient today to do post acute rehab  Patient became very teary exclaiming she does not want rehab  We told her that it was ultimately her choice but it will be very beneficial to her if she goes  Objective:     Vitals:   Temp (24hrs), Av 1 °F (36 7 °C), Min:97 9 °F (36 6 °C), Max:98 2 °F (36 8 °C)    Temp:  [97 9 °F (36 6 °C)-98 2 °F (36 8 °C)] 98 1 °F (36 7 °C)  HR:  [53-56] 56  Resp:  [15-18] 18  BP: ()/(50-63) 90/50  SpO2:  [91 %-94 %] 91 %  Body mass index is 42 32 kg/m²  Input and Output Summary (last 24 hours): Intake/Output Summary (Last 24 hours) at 2021 1437  Last data filed at 2021 1900  Gross per 24 hour   Intake 250 ml   Output 300 ml   Net -50 ml       Physical Exam:   Physical Exam  Constitutional:       Appearance: She is obese  HENT:      Head: Normocephalic and atraumatic  Right Ear: Ear canal and external ear normal       Left Ear: Ear canal and external ear normal       Nose: Nose normal       Mouth/Throat:      Mouth: Mucous membranes are dry  Pharynx: Oropharynx is clear     Eyes:      Conjunctiva/sclera: Conjunctivae normal       Pupils: Pupils are equal, round, and reactive to light  Cardiovascular:      Rate and Rhythm: Normal rate and regular rhythm  Pulses: Normal pulses  Heart sounds: Normal heart sounds  Pulmonary:      Effort: Pulmonary effort is normal       Breath sounds: Normal breath sounds  Abdominal:      General: Abdomen is flat  Bowel sounds are normal    Musculoskeletal:      Cervical back: Normal range of motion  Right lower leg: Edema present  Left lower leg: Edema present  Skin:     Findings: Bruising (Over left side of face) present  Comments: Patient with evidence of bruising on the left side of her face   Neurological:      Mental Status: She is alert  Psychiatric:         Mood and Affect: Mood normal          Behavior: Behavior normal          Thought Content: Thought content normal          Judgment: Judgment normal           Additional Data:     Labs:  Results from last 7 days   Lab Units 12/24/21  0559   WBC Thousand/uL 8 83   HEMOGLOBIN g/dL 11 6   HEMATOCRIT % 36 3   PLATELETS Thousands/uL 185   NEUTROS PCT % 67   LYMPHS PCT % 18   MONOS PCT % 10   EOS PCT % 5     Results from last 7 days   Lab Units 12/25/21  0436 12/23/21  0509 12/22/21  1753   SODIUM mmol/L 143   < > 143   POTASSIUM mmol/L 4 7   < > 5 6*   CHLORIDE mmol/L 108   < > 106   CO2 mmol/L 29   < > 28   BUN mg/dL 17   < > 16   CREATININE mg/dL 1 35*   < > 1 38*   ANION GAP mmol/L 6   < > 9   CALCIUM mg/dL 9 0   < > 9 3   ALBUMIN g/dL  --   --  3 0*   TOTAL BILIRUBIN mg/dL  --   --  0 60   ALK PHOS U/L  --   --  93   ALT U/L  --   --  42   AST U/L  --   --  70*   GLUCOSE RANDOM mg/dL 139   < > 83    < > = values in this interval not displayed           Results from last 7 days   Lab Units 12/26/21  1203 12/26/21  0808 12/25/21  2222 12/25/21  1549 12/25/21  1050 12/25/21  0741 12/24/21  2000 12/24/21  1810 12/24/21  1608 12/24/21  1433 12/24/21  1224 12/24/21  1038   POC GLUCOSE mg/dl 252* 131 170* 150* 175* 130 250* 215* 209* 188* 141* 142* Lines/Drains:  Invasive Devices  Report    Peripheral Intravenous Line            Peripheral IV 12/23/21 Left;Ventral (anterior) Forearm 2 days                  Telemetry:  Telemetry Orders (From admission, onward)             48 Hour Telemetry Monitoring  Continuous x 48 hours        References:    Telemetry Guidelines   Question:  Reason for 48 Hour Telemetry  Answer:  Acute Decompensated CHF (continuous diuretic infusion or total diuretic dose > 200 mg daily, associated electrolyte derangement, ionotropic drip, history of ventricular arrhythmia, or new EF <35%)                 Telemetry Reviewed: Sinus Bradycardia  Indication for Continued Telemetry Use: Syncope             Imaging: Reviewed radiology reports from this admission including: chest xray    Recent Cultures (last 7 days):         Last 24 Hours Medication List:   Current Facility-Administered Medications   Medication Dose Route Frequency Provider Last Rate    gabapentin  600 mg Oral TID PONCE Virk      heparin (porcine)  5,000 Units Subcutaneous [address] Robyn [place]l, [address]      insulin lispro  1-6 Units Subcutaneous TID [facility] Isma Crowe MD      levothyroxine  150 mcg Oral Early Morning Robyn Chilel, [address]      metoprolol succinate  25 mg Oral Daily PONCE David      midodrine  5 mg Oral TID [facility] Isma Crowe MD      nystatin   Topical BID Robyn Chilel, [address]      pravastatin  10 mg Oral Daily Robyn Chilel, [address]      sacubitril-valsartan  1 tablet Oral BID PONCE David      venlafaxine  75 mg Oral TID PONCE Virk          Today, Patient Was Seen By: Isma Crowe MD    **Please Note: This note may have been constructed using a voice recognition system  **

## 2021-12-26 NOTE — ASSESSMENT & PLAN NOTE
· Patient reports falling "multiple times a day"  · Has area rugs, doesn't feel comfortable walking on hardwood floors despite wearing shoes  · Patient was advised post acute rehab by PT however patient refuses for now  Talk to family about this issue    Leela Hawkins that they will try to talk her into going because they also feel that it is for the best

## 2021-12-27 LAB
ANION GAP SERPL CALCULATED.3IONS-SCNC: 7 MMOL/L (ref 4–13)
BUN SERPL-MCNC: 22 MG/DL (ref 5–25)
CALCIUM SERPL-MCNC: 9.2 MG/DL (ref 8.3–10.1)
CHLORIDE SERPL-SCNC: 107 MMOL/L (ref 100–108)
CO2 SERPL-SCNC: 28 MMOL/L (ref 21–32)
CREAT SERPL-MCNC: 1.21 MG/DL (ref 0.6–1.3)
ERYTHROCYTE [DISTWIDTH] IN BLOOD BY AUTOMATED COUNT: 16.1 % (ref 11.6–15.1)
GFR SERPL CREATININE-BSD FRML MDRD: 42 ML/MIN/1.73SQ M
GLUCOSE SERPL-MCNC: 129 MG/DL (ref 65–140)
GLUCOSE SERPL-MCNC: 134 MG/DL (ref 65–140)
GLUCOSE SERPL-MCNC: 151 MG/DL (ref 65–140)
GLUCOSE SERPL-MCNC: 181 MG/DL (ref 65–140)
GLUCOSE SERPL-MCNC: 97 MG/DL (ref 65–140)
HCT VFR BLD AUTO: 36.3 % (ref 34.8–46.1)
HGB BLD-MCNC: 11.6 G/DL (ref 11.5–15.4)
MCH RBC QN AUTO: 30.9 PG (ref 26.8–34.3)
MCHC RBC AUTO-ENTMCNC: 32 G/DL (ref 31.4–37.4)
MCV RBC AUTO: 97 FL (ref 82–98)
PLATELET # BLD AUTO: 192 THOUSANDS/UL (ref 149–390)
PMV BLD AUTO: 12.5 FL (ref 8.9–12.7)
POTASSIUM SERPL-SCNC: 4.3 MMOL/L (ref 3.5–5.3)
RBC # BLD AUTO: 3.75 MILLION/UL (ref 3.81–5.12)
SODIUM SERPL-SCNC: 142 MMOL/L (ref 136–145)
WBC # BLD AUTO: 7.63 THOUSAND/UL (ref 4.31–10.16)

## 2021-12-27 PROCEDURE — C1769 GUIDE WIRE: HCPCS | Performed by: INTERNAL MEDICINE

## 2021-12-27 PROCEDURE — 99152 MOD SED SAME PHYS/QHP 5/>YRS: CPT | Performed by: INTERNAL MEDICINE

## 2021-12-27 PROCEDURE — 93454 CORONARY ARTERY ANGIO S&I: CPT | Performed by: INTERNAL MEDICINE

## 2021-12-27 PROCEDURE — 80048 BASIC METABOLIC PNL TOTAL CA: CPT

## 2021-12-27 PROCEDURE — 99222 1ST HOSP IP/OBS MODERATE 55: CPT | Performed by: STUDENT IN AN ORGANIZED HEALTH CARE EDUCATION/TRAINING PROGRAM

## 2021-12-27 PROCEDURE — B2111ZZ FLUOROSCOPY OF MULTIPLE CORONARY ARTERIES USING LOW OSMOLAR CONTRAST: ICD-10-PCS | Performed by: INTERNAL MEDICINE

## 2021-12-27 PROCEDURE — C1894 INTRO/SHEATH, NON-LASER: HCPCS | Performed by: INTERNAL MEDICINE

## 2021-12-27 PROCEDURE — 82948 REAGENT STRIP/BLOOD GLUCOSE: CPT

## 2021-12-27 PROCEDURE — 85027 COMPLETE CBC AUTOMATED: CPT

## 2021-12-27 PROCEDURE — 99232 SBSQ HOSP IP/OBS MODERATE 35: CPT | Performed by: INTERNAL MEDICINE

## 2021-12-27 RX ORDER — NITROGLYCERIN 20 MG/100ML
INJECTION INTRAVENOUS AS NEEDED
Status: DISCONTINUED | OUTPATIENT
Start: 2021-12-27 | End: 2021-12-27 | Stop reason: HOSPADM

## 2021-12-27 RX ORDER — SODIUM CHLORIDE 9 MG/ML
125 INJECTION, SOLUTION INTRAVENOUS CONTINUOUS
Status: DISPENSED | OUTPATIENT
Start: 2021-12-27 | End: 2021-12-27

## 2021-12-27 RX ORDER — MIDAZOLAM HYDROCHLORIDE 2 MG/2ML
INJECTION, SOLUTION INTRAMUSCULAR; INTRAVENOUS AS NEEDED
Status: DISCONTINUED | OUTPATIENT
Start: 2021-12-27 | End: 2021-12-27 | Stop reason: HOSPADM

## 2021-12-27 RX ORDER — EMPAGLIFLOZIN 10 MG/1
10 TABLET, FILM COATED ORAL EVERY MORNING
Qty: 30 TABLET | Refills: 0 | Status: SHIPPED | OUTPATIENT
Start: 2021-12-27 | End: 2021-12-31 | Stop reason: HOSPADM

## 2021-12-27 RX ORDER — LIDOCAINE HYDROCHLORIDE 10 MG/ML
INJECTION, SOLUTION EPIDURAL; INFILTRATION; INTRACAUDAL; PERINEURAL AS NEEDED
Status: DISCONTINUED | OUTPATIENT
Start: 2021-12-27 | End: 2021-12-27 | Stop reason: HOSPADM

## 2021-12-27 RX ORDER — HEPARIN SODIUM 1000 [USP'U]/ML
INJECTION, SOLUTION INTRAVENOUS; SUBCUTANEOUS AS NEEDED
Status: DISCONTINUED | OUTPATIENT
Start: 2021-12-27 | End: 2021-12-27 | Stop reason: HOSPADM

## 2021-12-27 RX ORDER — SODIUM CHLORIDE 9 MG/ML
INJECTION, SOLUTION INTRAVENOUS
Status: COMPLETED | OUTPATIENT
Start: 2021-12-27 | End: 2021-12-27

## 2021-12-27 RX ORDER — FENTANYL CITRATE 50 UG/ML
INJECTION, SOLUTION INTRAMUSCULAR; INTRAVENOUS AS NEEDED
Status: DISCONTINUED | OUTPATIENT
Start: 2021-12-27 | End: 2021-12-27 | Stop reason: HOSPADM

## 2021-12-27 RX ORDER — DAPAGLIFLOZIN 5 MG/1
5 TABLET, FILM COATED ORAL DAILY
Qty: 30 TABLET | Refills: 0 | Status: SHIPPED | OUTPATIENT
Start: 2021-12-27 | End: 2021-12-31 | Stop reason: SDUPTHER

## 2021-12-27 RX ORDER — SODIUM CHLORIDE 9 MG/ML
100 INJECTION, SOLUTION INTRAVENOUS CONTINUOUS
Status: DISPENSED | OUTPATIENT
Start: 2021-12-27 | End: 2021-12-27

## 2021-12-27 RX ORDER — ASPIRIN 81 MG/1
TABLET, CHEWABLE ORAL AS NEEDED
Status: DISCONTINUED | OUTPATIENT
Start: 2021-12-27 | End: 2021-12-27 | Stop reason: HOSPADM

## 2021-12-27 RX ADMIN — LEVOTHYROXINE SODIUM 150 MCG: 150 TABLET ORAL at 05:07

## 2021-12-27 RX ADMIN — SODIUM CHLORIDE 200 ML: 0.9 INJECTION, SOLUTION INTRAVENOUS at 11:20

## 2021-12-27 RX ADMIN — GABAPENTIN 600 MG: 300 CAPSULE ORAL at 15:59

## 2021-12-27 RX ADMIN — INSULIN LISPRO 1 UNITS: 100 INJECTION, SOLUTION INTRAVENOUS; SUBCUTANEOUS at 11:25

## 2021-12-27 RX ADMIN — MIDODRINE HYDROCHLORIDE 5 MG: 5 TABLET ORAL at 06:11

## 2021-12-27 RX ADMIN — SODIUM CHLORIDE 125 ML/HR: 0.9 INJECTION, SOLUTION INTRAVENOUS at 15:52

## 2021-12-27 RX ADMIN — NYSTATIN: 100000 POWDER TOPICAL at 09:27

## 2021-12-27 RX ADMIN — VENLAFAXINE 75 MG: 37.5 TABLET ORAL at 09:22

## 2021-12-27 RX ADMIN — GABAPENTIN 600 MG: 300 CAPSULE ORAL at 21:32

## 2021-12-27 RX ADMIN — NYSTATIN 1 APPLICATION: 100000 POWDER TOPICAL at 18:07

## 2021-12-27 RX ADMIN — SODIUM CHLORIDE 100 ML/HR: 0.9 INJECTION, SOLUTION INTRAVENOUS at 14:47

## 2021-12-27 RX ADMIN — SACUBITRIL AND VALSARTAN 1 TABLET: 24; 26 TABLET, FILM COATED ORAL at 09:22

## 2021-12-27 RX ADMIN — METOPROLOL SUCCINATE 25 MG: 25 TABLET, EXTENDED RELEASE ORAL at 09:22

## 2021-12-27 RX ADMIN — GABAPENTIN 600 MG: 300 CAPSULE ORAL at 09:22

## 2021-12-27 RX ADMIN — VENLAFAXINE 75 MG: 37.5 TABLET ORAL at 15:59

## 2021-12-27 RX ADMIN — PRAVASTATIN SODIUM 10 MG: 10 TABLET ORAL at 09:22

## 2021-12-27 RX ADMIN — VENLAFAXINE 75 MG: 37.5 TABLET ORAL at 21:32

## 2021-12-27 RX ADMIN — HEPARIN SODIUM 5000 UNITS: 5000 INJECTION INTRAVENOUS; SUBCUTANEOUS at 05:07

## 2021-12-27 NOTE — QUICK NOTE
#Risk of Contrast Induced Nephropathy    Risk Score: 14%   Risk of Need for Dialysis 0 12%   I discussed with patient the risks of TIESHA/requiring HD after IV contrast in the case catheterization is needed    Patient understands the risk and agree to procedure if needed   Recommend:   o Minimize Contrast Volume   o NaCl 0 9% 200ml 1hr iv pre-procedure  o Continue IV NS 100ml/h x 6 hours post-procure  o Continue holding Entresto for today   o Will bennie BMP 24-48h after IV contrast        Formal consult to follow      Aminata Meng MD  Nephrology Attending

## 2021-12-27 NOTE — CASE MANAGEMENT
Case Management Progress Note    Patient name Jeffry Jade  [facility] S /S -01 MRN [id number]  : 1945 Date 2021       LOS (days): 4  Geometric Mean LOS (GMLOS) (days): 2 80  Days to GMLOS:-1 3        OBJECTIVE:        Current admission status: Inpatient  Preferred Pharmacy:   [address] [facility] [address]  Phone: [phone] Fax: [phone]    Primary Care Provider: Evelia Huang DO    Primary Insurance: MEDICARE  Secondary Insurance: BLUE CROSS    PROGRESS NOTE:    Patient off floor for CATH  Per SLIM, patient to need LifeVest at d/c; discussed with Mary cardiac NP - awaiting orders for same  Will need to follow-up with patient/family re: discharge recommendations as PT recommending rehab, but OT recommending home with home care services

## 2021-12-27 NOTE — ASSESSMENT & PLAN NOTE
· Patient reports falling "multiple times a day"  · Has area rugs, doesn't feel comfortable walking on hardwood floors despite wearing shoes  · Patient was advised post acute rehab by PT however patient refuses for now  Talk to family about this issue    Joanie Dahl that they will try to talk her into going because they also feel that it is for the best

## 2021-12-27 NOTE — ASSESSMENT & PLAN NOTE
Lab Results   Component Value Date    EGFR 42 12/27/2021    EGFR 37 12/25/2021    EGFR 32 12/24/2021    CREATININE 1 21 12/27/2021    CREATININE 1 35 (H) 12/25/2021    CREATININE 1 51 (H) 12/24/2021     · Baseline Cr appears to be 1 7-1 9  · Monitor Cr while admitted, renal adjust medications  · Avoid hypotension and nephrotoxic agents

## 2021-12-27 NOTE — CONSULTS
Consultation - Nephrology   Chase Truong 64 y o  female MRN: [id number]  Unit/Bed#: AN CATH LAB ROOM Encounter: [id number]    ASSESSMENT AND PLAN:  63 yo woman CKD G3b/4 (bl creatinine 1 5-1 8 mg/dL , eGFR 29-30 ) , hypertension, HFrEF 30%, hyperlipidemia, DM type 2, obesity, aortic stenosis , hypothyroidism presents after a fall, found to have changes in EKG with low EF, new diagnosis  Nephrology is consulted for prevention of TIESHA as IV contrast is required    PLAN  #CKD  G3b/4 A1   Baseline creatinine: 1 5-1 8 mg/dL , eGFR 29-30   Current creatinine:  1 21 mg/dL, baseline   Etiology:  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 in the settings of hypertension   Plan:   Maintain mean arterial pressure above 65 mmHg to avoid hypotension/hypoperfusion   Avoid nephrotoxic agents such as NSAIDs and contrast if at all possible     Avoid opioids    Adjust medications to GFR   Renal diet     #Volume/hypertension:   Volume:  Appears euvolemic   Blood pressure:  Normotensive /59 mmHg    Low sodium diet    Metoprolol 25 mg once a day   Midodrine 5 mg t i d    Entresto 24-26 mg b i d  #Acid base disorder   serum HCO3 73PQDS/R   Metabolic alkalosis       #CKD-MBD   Calcium 9 2 mg/dL   Will check Phosphorus with next labs (goal <5 5)   25-OH vitamin D (goal >30ng/ml) by KDIGO guidelines 2017    #Secondary Hyperparathyroidism    iPTH 161 6 in 2020  guidelines levels KDIGO 2017   Low levels of 25OH vit D as a potential driven of hyperparathyroidism    Will check vitamin-D levels    #Anemia:  · Current hemoglobin:  11 6 mg dL  · Secondary to kidney disease  · Treatment:  · Transfuse for hemoglobin less than 7 0 per primary service      # cardiomyopathy  · EF 30%  · Cardiac catheterization today  · See kidney protection protocol on previous note (11:04h)  · Management as per Cardiology        HISTORY OF PRESENT ILLNESS:  Requesting Physician: Brittny Carmona,   Reason for Consult:   TIESHA prevention    Keri Ocampo is a 64 y o   female with PMH of CKD G3b/4 (bl creatinine 1 5-1 8 mg/dL , eGFR ) , hypertension, HFrEF 30%, hyperlipidemia, DM type 2, obesity, aortic stenosis , hypothyroidism  Patient presents to the ED after a fall, workup on the floor  Patient denies chest pain, no shortness of breath, no fever  In the ED EKG with changes , patient is scheduled for cardiac catheterization today  A renal consultation is requested today for assistance in the management of TIESHA prevention    PAST MEDICAL HISTORY:  Past Medical History:   Diagnosis Date    Aortic stenosis     Arthritis     Asthma     Coronary artery disease     Diabetes mellitus ([facility] )     Diabetic peripheral neuropathy ([place] [address] )     Last assessed - 1/27/16    Gallbladder disease     Heart attack ([facility] ) 07/1999    Hemorrhoids     Last assessed - 11/16/12    Myocardial infarction ([place] [address] )     Old myocardial infarction     Type 2 diabetes mellitus with autonomic neuropathy ([place] [address] )     Unspec long term insulin use status, Last assessed - 6/8/17       PAST SURGICAL HISTORY:  Past Surgical History:   Procedure Laterality Date    BUNIONECTOMY      CARDIAC CATHETERIZATION      Carotid Artery, Resolved - 7/1/13    CARDIOVASCULAR STRESS TEST  09/1999    CHOLECYSTECTOMY      COLONOSCOPY  2017    FOOT ARTHRODESIS, MODIFIED DONOHUE  2016    GALLBLADDER SURGERY  1970    HEMORROIDECTOMY      KNEE ARTHROSCOPY Left 2004    MA COLONOSCOPY FLX DX W/COLLJ SPEC WHEN PFRMD N/A 8/21/2017    Procedure: COLONOSCOPY;  Surgeon: Dipika Martines MD;  Location: [facility];   Service: Colorectal    REPAIR KNEE LIGAMENT      REPAIR KNEE LIGAMENT Left 1986    REPAIR KNEE LIGAMENT Right 1988       ALLERGIES:  Allergies   Allergen Reactions    Lyrica [Pregabalin] Swelling    Sulfa Antibiotics Swelling       SOCIAL HISTORY:  Social History     Substance and Sexual Activity   Alcohol Use Never    Comment: Social per [employer]      Social History Substance and Sexual Activity   Drug Use Never     Social History     Tobacco Use   Smoking Status Never Smoker   Smokeless Tobacco Never Used       FAMILY HISTORY:  Family History   Problem Relation Age of Onset    Hypertension Father     Diabetes Father     Cancer Father         Throat    Arthritis Father     Stroke Mother     Diabetes Maternal Grandmother     Heart disease Maternal Grandfather     Diabetes Son     Lymphoma Family         Head, Face and Neck        MEDICATIONS:    Current Facility-Administered Medications:     gabapentin (NEURONTIN) capsule 600 mg, 600 mg, Oral, TID, [employer], CRNP, 600 mg at 12/27/21 2603    heparin (porcine) subcutaneous injection 5,000 Units, 5,000 Units, Subcutaneous, [address], 5,000 Units at 12/27/21 0507 **AND** Platelet count, , , Once, [employer], CRNP    insulin lispro (HumaLOG) 100 units/mL subcutaneous injection 1-6 Units, 1-6 Units, Subcutaneous, TID AC, 1 Units at 12/27/21 1125 **AND** Fingerstick Glucose (POCT), , , TID AC, Jermaine Springer MD    levothyroxine tablet 150 mcg, 150 mcg, Oral, Early Morning, PONCE Lundy, 150 mcg at 12/27/21 0507    metoprolol succinate (TOPROL-XL) 24 hr tablet 25 mg, 25 mg, Oral, Daily, PONCE Cruz, 25 mg at 12/27/21 6394    midodrine (PROAMATINE) tablet 5 mg, 5 mg, Oral, TID AC, Jermaine Springer MD, 5 mg at 12/27/21 0952    nystatin (MYCOSTATIN) powder, , Topical, BID, PONCE Lundy, Given at 12/27/21 1875    pravastatin (PRAVACHOL) tablet 10 mg, 10 mg, Oral, Daily, [employer], CRNP, 10 mg at 12/27/21 4003    [START ON 12/28/2021] sacubitril-valsartan (ENTRESTO) 24-26 MG per tablet 1 tablet, 1 tablet, Oral, BID, PONCE Sanchez    sodium chloride 0 9 % bolus 200 mL, 200 mL, Intravenous, Once, Carolee Peters MD, Last Rate: 100 mL/hr at 12/27/21 1120, 200 mL at 12/27/21 1120    sodium chloride 0 9 % infusion, 100 mL/hr, Intravenous, Continuous, MD Rojelio Morrow [facility]) tablet 75 mg, 75 mg, Oral, TID, [employer], CRNP, 75 mg at 12/27/21 4443    REVIEW OF SYSTEMS:  Complete 10 point review of systems were obtained and discussed in length with the patient  Complete review of systems were negative / unremarkable except mentioned in the HPI section       Review of Systems - Psychological ROS: negative  Ophthalmic ROS: negative  ENT ROS: negative  Hematological and Lymphatic ROS: negative  Endocrine ROS: negative  Respiratory ROS: no cough, shortness of breath, or wheezing  Cardiovascular ROS: no chest pain or dyspnea on exertion  Gastrointestinal ROS: no abdominal pain, change in bowel habits, or black or bloody stools  Genito-Urinary ROS: no dysuria, trouble voiding, or hematuria  Musculoskeletal ROS: negative  Neurological ROS: no TIA or stroke symptoms  Dermatological ROS: negative     PHYSICAL EXAM:  Current Weight: Weight - Scale: 102 kg (224 lb)  First Weight: Weight - Scale: 102 kg (224 lb 3 3 oz)  Vitals:    12/27/21 0930   BP: 134/59   Pulse:    Resp:    Temp:    SpO2:        Intake/Output Summary (Last 24 hours) at 12/27/2021 1315  Last data filed at 12/27/2021 1120  Gross per 24 hour   Intake 560 ml   Output 575 ml   Net -15 ml     Wt Readings from Last 3 Encounters:   12/23/21 102 kg (224 lb)   08/16/21 123 kg (271 lb 9 6 oz)   08/04/21 120 kg (264 lb)     Temp Readings from Last 3 Encounters:   12/27/21 99 2 °F (37 3 °C) (Oral)   08/04/21 99 1 °F (37 3 °C)   05/18/21 98 4 °F (36 9 °C)     BP Readings from Last 3 Encounters:   12/27/21 134/59   08/16/21 122/60   08/04/21 122/82     Pulse Readings from Last 3 Encounters:   12/27/21 58   08/16/21 (!) 52   08/04/21 67     General:  Obese, no acute distress at this time  Skin:  No acute rash  Eyes:  No scleral icterus and noninjected  ENT:  Normocephalic, atraumatic, mucous membranes moist  Neck:  Supple, no jugular venous distention  Chest:  Clear to auscultation percussion, good respiratory effort, no use of accessory respiratory muscles  CVS:  Regular rate and rhythm without a murmur rub   Abdomen:  Obese, soft and nontender   Extremities:  No LE edema   Neuro:  No gross focality  Psych:  Alert and cooperative      Invasive Devices:      Lab Results:   Results from last 7 days   Lab Units 12/27/21  0540 12/25/21  0436 12/24/21  0559 12/23/21  0509 12/22/21  1753   WBC Thousand/uL 7 63  --  8 83 12 36* 15 04*   HEMOGLOBIN g/dL 11 6  --  11 6 12 2 14 0   HEMATOCRIT % 36 3  --  36 3 38 9 43 6   PLATELETS Thousands/uL 192  --  185 205 232   POTASSIUM mmol/L 4 3 4 7 4 5 4 3 5 6*   CHLORIDE mmol/L 107 108 106 108 106   CO2 mmol/L 28 29 30 28 28   BUN mg/dL 22 17 18 18 16   CREATININE mg/dL 1 21 1 35* 1 51* 1 69* 1 38*   CALCIUM mg/dL 9 2 9 0 8 5 9 3 9 3   MAGNESIUM mg/dL  --   --   --   --  1 4*       Other Studies:   CT chest wo contrast   Final Result by Jacques Gonzalez MD (12/23 3352)      Nothing to suggest pneumonia  Nondisplaced fracture of the anterolateral left 8th rib  Correlate with tenderness to determine if acute or old  Mild diffuse reticulation throughout the lungs with mild bronchiectasis in the left upper lobe and lingula, most likely due due to pulmonary fibrosis  Consider referral to pulmonology for further evaluation and management of probable early interstitial    lung disease  Pulmonary artery enlargement which can be seen with pulmonary hypertension  Workstation performed: [id number]         CT head without contrast   Final Result by Basilia Browning MD (12/22 1936)      No acute intracranial abnormality  Workstation performed: [id number]         CT facial bones without contrast   Final Result by Basilia Browning MD (12/22 1938)      No acute fracture                 Workstation performed: [id number]         XR chest 1 view portable   ED Interpretation by Elisha Edwards MD (12/22 1853)   Poor inspiratory effort, possible Left infiltrate? Final Result by Joon Cooley MD (12/23 8719)      No acute cardiopulmonary disease  Workstation performed: [id number]             Portions of the record may have been created with voice recognition software  Occasional wrong word or "sound a like" substitutions may have occurred due to the inherent limitations of voice recognition software  Read the chart carefully and recognize, using context, where substitutions have occurred

## 2021-12-27 NOTE — PROGRESS NOTES
[facility]  Progress Note - Kendell Blue 1945, 64 y o  female MRN: [id number]  Unit/Bed#: S -01 Encounter: [id number]  Primary Care Provider: Nadege Pierson,    Date and time admitted to hospital: 12/22/2021  4:57 PM    * Syncope  Assessment & Plan  · Patient was sleeping in recliner and woke up on floor on left side +LOC and +head trauma  Arrived to ED via EMS  · Patient with known cardiac history, LBBB, and questionable ST changes on EMS 12 lead    Workups  Previous echo:   · Echo 12/23/2021 30% LVEF grade 2 diastolic relaxation, with right ventricle dilation, left atrium dilation, moderate mitral regurgitation  · Patient had another hyperglycemia even 12 am tonight  Latest glucose 90 today  · In ED, patient dizzy with movement and elevated trop, denies any CP  · CT face - no acute fractures  · CT head - no acute intracranial abnormality  · CXR shows fracture of the L 8th rib   · Cardiac catheterization done, no obstruction, no intervention done 12/27/2021  Etiology: Cardiogenic vs orthostatic versus hypoglycemia  Orthostatics persistently positive  Plan  · Will recheck orthostatics again tomorrow  · Continue dose of midodrine to 5 mg TID, for orthostatics tomorrow  · Cardiology following:  Started on Entresto and metoprolol XL     Will undergo cardiac catheterization to evaluate ischemia on monday    Heart failure (HCC) secondary to cardiomyopathy  Assessment & Plan  Wt Readings from Last 3 Encounters:   12/23/21 102 kg (224 lb)   08/16/21 123 kg (271 lb 9 6 oz)   08/04/21 120 kg (264 lb)     · Nuclear stress test: 12/7/2017 - small fixed inferoapical defect consistent with prior MI,  · Echo: 12/7/2017 - 60% EF, no wall motion abnormalities, G1DD, mild MR, mild AS   · Echo 12/23/2021 30% LVEF grade 2 diastolic relaxation, with right ventricle dilation, left atrium dilation, moderate mitral regurgitation  · Status post cardiac catheterization 12/27/2021, no obstruction noted  No intervention done  Etiology :  Nonischemic  Plan:  Cardiology on board  Continue on  sacubitril- valsartan OD  Continue on Metroprolol 25 OD  Pending LifeVest, request will be sent by case management          Depression  Assessment & Plan  Gerontology on board  patient scored a 9 on her PHQ 9 depression scale  For dose increase on outpatient visit       Ambulatory dysfunction  Assessment & Plan  · Patient reports falling "multiple times a day"  · Has area rugs, doesn't feel comfortable walking on hardwood floors despite wearing shoes  · Patient was advised post acute rehab by PT however patient refuses for now  Talk to family about this issue  Said that they will try to talk her into going because they also feel that it is for the best     Chronic renal disease, stage IV [facility])  Assessment & Plan  Lab Results   Component Value Date    EGFR 42 12/27/2021    EGFR 37 12/25/2021    EGFR 32 12/24/2021    CREATININE 1 21 12/27/2021    CREATININE 1 35 (H) 12/25/2021    CREATININE 1 51 (H) 12/24/2021     · Baseline Cr appears to be 1 7-1 9  · Monitor Cr while admitted, renal adjust medications  · Avoid hypotension and nephrotoxic agents    Type 2 diabetes mellitus with stage 3 chronic kidney disease and hypertension [facility])  Assessment & Plan  Lab Results   Component Value Date    HGBA1C 6 5 08/04/2021       Recent Labs     12/26/21  1203 12/26/21  1522 12/27/21  0737 12/27/21  1059   POCGLU 252* 163* 134 151*       Blood Sugar Average: Last 72 hrs:  (P) 149 45    Hold PO medications while inpatient   Monitor accu-checks AC and HS   Holding SSI coverage in setting of hypoglycemia nd decreased PO intake   Hypoglycemia protocol     Discontinue glimepiride as outpatient   Price check was sent for empagliflozin and dapagliflozin    Diabetic peripheral neuropathy ([facility] [address] )  Assessment & Plan  · Patient on high dose Gabapentin for 6+ years  · Home regimen: 300 mg x3 in AM, 300 mg x2 in afternoon, 300 mg x3 in evening  · May be contributing to frequent falls  · Consult Gerontology for help managing medication regimen in setting of frequent falls    Hypertension  Assessment & Plan  · BP controlled in ED  · Holding home Nebivolol 5 mg and Lasix 20 mg in setting of positive orthostatic blood pressures and syncopal event  · F/u ECHO        VTE Pharmacologic Prophylaxis: VTE Score: 6 High Risk (Score >/= 5) - Pharmacological DVT Prophylaxis Ordered: heparin  Sequential Compression Devices Ordered  Patient Centered Rounds: I performed bedside rounds with nursing staff today  Discussions with Specialists or Other Care Team Provider: Cardiology     Education and Discussions with Family / Patient:  Called daughter via telephone  Current Length of Stay: 4 day(s)  Current Patient Status: Inpatient   Discharge Plan: TBD    Code Status: Level 2 - DNAR: but accepts endotracheal intubation    Subjective:    Patient is comfortable, was able to tolerate procedure well  Informed that there was no clot or obstruction found in her heart vessels  Patient may be amendable attending post acute rehab if at arc at [facility]  Also price check will be done for empagliflozin the paddle flow was not SGLT 2 inhibitors because they would benefit both her heart failure and diabetes  Per cardiology she will also need a LifeVest   These were related to the case management team   Objective:     Vitals:   Temp (24hrs), Av 7 °F (37 1 °C), Min:98 3 °F (36 8 °C), Max:99 2 °F (37 3 °C)    Temp:  [98 3 °F (36 8 °C)-99 2 °F (37 3 °C)] 99 2 °F (37 3 °C)  HR:  [45-58] 46  Resp:  [16-18] 18  BP: (104-134)/(56-62) 127/57  SpO2:  [93 %-99 %] 99 %  Body mass index is 42 32 kg/m²  Input and Output Summary (last 24 hours):      Intake/Output Summary (Last 24 hours) at 2021 1450  Last data filed at 2021 1341  Gross per 24 hour   Intake 320 ml   Output 575 ml   Net -255 ml       Physical Exam:   Physical Exam  Vitals and nursing note reviewed  Constitutional:       General: She is not in acute distress  Appearance: She is well-developed  She is obese  She is ill-appearing (Appears deconditioned, difficult to move around the bed)  HENT:      Head: Normocephalic and atraumatic  Eyes:      Conjunctiva/sclera: Conjunctivae normal    Cardiovascular:      Rate and Rhythm: Normal rate and regular rhythm  Heart sounds: No murmur heard  Pulmonary:      Effort: Pulmonary effort is normal  No respiratory distress  Breath sounds: Normal breath sounds  Abdominal:      Palpations: Abdomen is soft  Tenderness: There is no abdominal tenderness  Musculoskeletal:      Cervical back: Neck supple  Right lower leg: Edema present  Left lower leg: Edema present  Skin:     General: Skin is warm and dry  Neurological:      Mental Status: She is alert  Additional Data:     Labs:  Results from last 7 days   Lab Units 12/27/21  0540 12/24/21  0559 12/24/21  0559   WBC Thousand/uL 7 63   < > 8 83   HEMOGLOBIN g/dL 11 6   < > 11 6   HEMATOCRIT % 36 3   < > 36 3   PLATELETS Thousands/uL 192   < > 185   NEUTROS PCT %  --   --  67   LYMPHS PCT %  --   --  18   MONOS PCT %  --   --  10   EOS PCT %  --   --  5    < > = values in this interval not displayed  Results from last 7 days   Lab Units 12/27/21  0540 12/23/21  0509 12/22/21  1753   SODIUM mmol/L 142   < > 143   POTASSIUM mmol/L 4 3   < > 5 6*   CHLORIDE mmol/L 107   < > 106   CO2 mmol/L 28   < > 28   BUN mg/dL 22   < > 16   CREATININE mg/dL 1 21   < > 1 38*   ANION GAP mmol/L 7   < > 9   CALCIUM mg/dL 9 2   < > 9 3   ALBUMIN g/dL  --   --  3 0*   TOTAL BILIRUBIN mg/dL  --   --  0 60   ALK PHOS U/L  --   --  93   ALT U/L  --   --  42   AST U/L  --   --  70*   GLUCOSE RANDOM mg/dL 129   < > 83    < > = values in this interval not displayed           Results from last 7 days   Lab Units 12/27/21  1059 12/27/21  0737 12/26/21  1522 12/26/21  1203 12/26/21  9640 12/25/21  2222 12/25/21  1549 12/25/21  1050 12/25/21  0741 12/24/21  2000 12/24/21  1810 12/24/21  1608   POC GLUCOSE mg/dl 151* 134 163* 252* 131 170* 150* 175* 130 250* 215* 209*               Lines/Drains:  Invasive Devices  Report    Peripheral Intravenous Line            Peripheral IV 12/27/21 Left Antecubital <1 day                  Telemetry:  Telemetry Orders (From admission, onward)             48 Hour Telemetry Monitoring  Continuous x 48 hours        References:    Telemetry Guidelines   Question:  Reason for 48 Hour Telemetry  Answer:  Acute Decompensated CHF (continuous diuretic infusion or total diuretic dose > 200 mg daily, associated electrolyte derangement, ionotropic drip, history of ventricular arrhythmia, or new EF <35%)                 Telemetry Reviewed: Sinus Bradycardia  Indication for Continued Telemetry Use: Syncope             Imaging: Reviewed radiology reports from this admission including: chest xray    Recent Cultures (last 7 days):         Last 24 Hours Medication List:   Current Facility-Administered Medications   Medication Dose Route Frequency Provider Last Rate    gabapentin  600 mg Oral TID PONCE Dowell      heparin (porcine)  5,000 Units Subcutaneous [facility] Robyn Chilel, [address]      insulin lispro  1-6 Units Subcutaneous TID [facility] Saint Novel, MD      levothyroxine  150 mcg Oral Early Morning Robyn Chilel, [address]      metoprolol succinate  25 mg Oral Daily PONCE Delatorre      midodrine  5 mg Oral TID [facility] Saint Novel, MD      nystatin   Topical BID Robyn Chilel [address]       pravastatin  10 mg Oral Daily Cindy Antonio [address] Rojelio Shirleydella Fransisco ON 12/28/2021] sacubitril-valsartan  1 tablet Oral BID PONCE Delatorre      sodium chloride  100 mL/hr Intravenous Continuous Luis Felipe Sims  mL/hr (12/27/21 1447)    venlafaxine  75 mg Oral TID PONCE Dowell          Today, Patient Was Seen By: Cheyanne Hardy MD    **Please Note: This note may have been constructed using a voice recognition system  **

## 2021-12-27 NOTE — PLAN OF CARE
Problem: Potential for Falls  Goal: Patient will remain free of falls  Description: INTERVENTIONS:  - Educate patient/family on patient safety including physical limitations  - Instruct patient to call for assistance with activity   - Consult OT/PT to assist with strengthening/mobility   - Keep Call bell within reach  - Keep bed low and locked with side rails adjusted as appropriate  - Keep care items and personal belongings within reach  - Initiate and maintain comfort rounds  - Make Fall Risk Sign visible to staff  - Offer Toileting every 2 Hours, in advance of need  - Initiate/Maintain bed alarm  - Apply yellow socks and bracelet for high fall risk patients  - Consider moving patient to room near nurses station  Outcome: Progressing     Problem: MOBILITY - ADULT  Goal: Maintain or return to baseline ADL function  Description: INTERVENTIONS:  -  Assess patient's ability to carry out ADLs; assess patient's baseline for ADL function and identify physical deficits which impact ability to perform ADLs (bathing, care of mouth/teeth, toileting, grooming, dressing, etc )  - Assess/evaluate cause of self-care deficits   - Assess range of motion  - Assess patient's mobility; develop plan if impaired  - Assess patient's need for assistive devices and provide as appropriate  - Encourage maximum independence but intervene and supervise when necessary  - Involve family in performance of ADLs  - Assess for home care needs following discharge   - Consider OT consult to assist with ADL evaluation and planning for discharge  - Provide patient education as appropriate  Outcome: Progressing  Goal: Maintains/Returns to pre admission functional level  Description: INTERVENTIONS:  - Perform BMAT or MOVE assessment daily    - Set and communicate daily mobility goal to care team and patient/family/caregiver  - Collaborate with rehabilitation services on mobility goals if consulted  - Perform Range of Motion 3 times a day    - Reposition patient every 2 hours  - Dangle patient 2 times a day  - Stand patient 2 times a day  - Ambulate patient 3 times a day  - Out of bed to chair 3 times a day   - Out of bed for meals 4 times a day  - Out of bed for toileting  - Record patient progress and toleration of activity level   Outcome: Progressing     Problem: Nutrition/Hydration-ADULT  Goal: Nutrient/Hydration intake appropriate for improving, restoring or maintaining nutritional needs  Description: Monitor and assess patient's nutrition/hydration status for malnutrition  Collaborate with interdisciplinary team and initiate plan and interventions as ordered  Monitor patient's weight and dietary intake as ordered or per policy  Utilize nutrition screening tool and intervene as necessary  Determine patient's food preferences and provide high-protein, high-caloric foods as appropriate       INTERVENTIONS:  - Monitor oral intake, urinary output, labs, and treatment plans  - Assess nutrition and hydration status and recommend course of action  - Evaluate amount of meals eaten  - Assist patient with eating if necessary   - Allow adequate time for meals  - Recommend/ encourage appropriate diets, oral nutritional supplements, and vitamin/mineral supplements  - Order, calculate, and assess calorie counts as needed  - Recommend, monitor, and adjust tube feedings and TPN/PPN based on assessed needs  - Assess need for intravenous fluids  - Provide specific nutrition/hydration education as appropriate  - Include patient/family/caregiver in decisions related to nutrition  Outcome: Progressing

## 2021-12-27 NOTE — ASSESSMENT & PLAN NOTE
Lab Results   Component Value Date    HGBA1C 6 5 08/04/2021       Recent Labs     12/26/21  1203 12/26/21  1522 12/27/21  0737 12/27/21  1059   POCGLU 252* 163* 134 151*       Blood Sugar Average: Last 72 hrs:  (P) 149 45    Hold PO medications while inpatient   Monitor accu-checks AC and HS   Holding SSI coverage in setting of hypoglycemia nd decreased PO intake   Hypoglycemia protocol     Discontinue glimepiride as outpatient   Price check was sent for empagliflozin and dapagliflozin

## 2021-12-27 NOTE — ASSESSMENT & PLAN NOTE
· Patient was sleeping in recliner and woke up on floor on left side +LOC and +head trauma  Arrived to ED via EMS  · Patient with known cardiac history, LBBB, and questionable ST changes on EMS 12 lead    Workups  Previous echo:   · Echo 12/23/2021 30% LVEF grade 2 diastolic relaxation, with right ventricle dilation, left atrium dilation, moderate mitral regurgitation  · Patient had another hyperglycemia even 12 am tonight  Latest glucose 90 today  · In ED, patient dizzy with movement and elevated trop, denies any CP  · CT face - no acute fractures  · CT head - no acute intracranial abnormality  · CXR shows fracture of the L 8th rib   · Cardiac catheterization done, no obstruction, no intervention done 12/27/2021  Etiology: Cardiogenic vs orthostatic versus hypoglycemia  Orthostatics persistently positive  Plan  · Will recheck orthostatics again tomorrow  · Continue dose of midodrine to 5 mg TID, for orthostatics tomorrow  · Cardiology following:  Started on Entresto and metoprolol XL     Will undergo cardiac catheterization to evaluate ischemia on monday

## 2021-12-27 NOTE — PROGRESS NOTES
General Cardiology   Progress Note -  Team One   Royal Granado 64 y o  female MRN: [id number]  Unit/Bed#: S -01 Encounter: [id number]    Assessment/ Plan    1  Syncope  New cardiomyopathy noted on echocardiogram   Unclear whether this is related or not, cannot rule out ventricular tachycardia  Could potentially also have been precipitated by low blood sugar  Tele reviewed- Sinus bradycardia, isolated PVCs  Continue to follow on telemetry  For [facility] today    2  Elevated troponin  Likely secondary to new cardiomyopathy  For cardiac catheterization today    3  Presumed CAD  Prior MI and scar with infarct seen on nuclear stress testing 2017  LV function normal at that time  For cardiac cath today given new cardiomyopathy    4  Hypertension- controlled, average /57  Continue current regimen  5  New cardiomyopathy, LVEF 30%  Unclear etiology  GDMT- started on Entresto and Toprol XL    Volume status is stable, resume maintenance diuretic (Lasix 20 mg daily) post cath tomorrow if renal function stable  For ischemic evaluation today   Will need LifeVest prior to discharge as well- forms will be completed today  Patient agreeable  6  Hyperlipidemia - Continue on statin  7  Type 2 DM- A1c 6 5%, management per primary team, hypoglycemia may have also been contributing to episode of syncope  8  Mild aortic stenosis- Not seen to be significant on echocardiogram, likely aortic sclerosis  9  Hypothyroidism- TSH WNL, per primary team     10  Morbid obesity- BMI 42 32    Subjective  No complaints offered this morning, anxious to get cardiac cath done  Review of Systems   Constitutional: Negative for chills, malaise/fatigue and weight gain  Cardiovascular: Negative for chest pain, dyspnea on exertion, leg swelling, orthopnea, palpitations and syncope  Respiratory: Negative for cough, shortness of breath, sleep disturbances due to breathing and sputum production      Gastrointestinal: Negative for bloating and nausea  Neurological: Negative for dizziness, light-headedness and weakness  Psychiatric/Behavioral: Negative for altered mental status  All other systems reviewed and are negative  Objective:   Vitals: Blood pressure 134/59, pulse 58, temperature 99 2 °F (37 3 °C), temperature source Oral, resp  rate 18, height 5' 1" (1 549 m), weight 102 kg (224 lb), SpO2 95 %  , Body mass index is 42 32 kg/m²  ,     Systolic (61IGL), NTH:927 , Min:90 , FHO:957     Diastolic (50UQK), DZV:75, Min:50, Max:62    Intake/Output Summary (Last 24 hours) at 12/27/2021 1213  Last data filed at 12/27/2021 0930  Gross per 24 hour   Intake 560 ml   Output 575 ml   Net -15 ml     Wt Readings from Last 3 Encounters:   12/23/21 102 kg (224 lb)   08/16/21 123 kg (271 lb 9 6 oz)   08/04/21 120 kg (264 lb)     Telemetry Review: sinus bradycardia, HR in 40s-50s    Physical Exam  Vitals reviewed  Constitutional:       General: She is not in acute distress  Appearance: She is obese  Neck:      Vascular: No hepatojugular reflux or JVD  Cardiovascular:      Rate and Rhythm: Regular rhythm  Bradycardia present  Pulses: Normal pulses  Heart sounds: Normal heart sounds  No murmur heard  No friction rub  No gallop  Pulmonary:      Effort: Pulmonary effort is normal  No respiratory distress  Breath sounds: No rales  Comments: Clear, room air  Abdominal:      General: Bowel sounds are normal  There is no distension  Palpations: Abdomen is soft  Tenderness: There is no abdominal tenderness  Musculoskeletal:         General: No tenderness  Normal range of motion  Cervical back: Neck supple  Right lower leg: No edema  Left lower leg: No edema  Skin:     General: Skin is warm and dry  Capillary Refill: Capillary refill takes less than 2 seconds  Findings: No erythema  Neurological:      Mental Status: She is alert and oriented to person, place, and time  Psychiatric:         Mood and Affect: Mood normal      LABORATORY RESULTS      CBC with diff:   Results from last 7 days   Lab Units 12/27/21  0540 12/24/21  0559 12/23/21  0509 12/22/21  1753   WBC Thousand/uL 7 63 8 83 12 36* 15 04*   HEMOGLOBIN g/dL 11 6 11 6 12 2 14 0   HEMATOCRIT % 36 3 36 3 38 9 43 6   MCV fL 97 96 97 95   PLATELETS Thousands/uL 192 185 205 232   MCH pg 30 9 30 7 30 3 30 6   MCHC g/dL 32 0 32 0 31 4 32 1   RDW % 16 1* 16 1* 15 9* 15 4*   MPV fL 12 5 12 4 12 1 12 6   NRBC AUTO /100 WBCs  --  0 0 0     CMP:  Results from last 7 days   Lab Units 12/27/21  0540 12/25/21  0436 12/24/21  0559 12/23/21  0509 12/22/21  1753   POTASSIUM mmol/L 4 3 4 7 4 5 4 3 5 6*   CHLORIDE mmol/L 107 108 106 108 106   CO2 mmol/L 28 29 30 28 28   BUN mg/dL 22 17 18 18 16   CREATININE mg/dL 1 21 1 35* 1 51* 1 69* 1 38*   CALCIUM mg/dL 9 2 9 0 8 5 9 3 9 3   AST U/L  --   --   --   --  70*   ALT U/L  --   --   --   --  42   ALK PHOS U/L  --   --   --   --  93   EGFR ml/min/1 73sq m 42 37 32 28 36     BMP:  Results from last 7 days   Lab Units 12/27/21  0540 12/25/21  0436 12/24/21  0559 12/23/21  0509 12/22/21  1753   POTASSIUM mmol/L 4 3 4 7 4 5 4 3 5 6*   CHLORIDE mmol/L 107 108 106 108 106   CO2 mmol/L 28 29 30 28 28   BUN mg/dL 22 17 18 18 16   CREATININE mg/dL 1 21 1 35* 1 51* 1 69* 1 38*   CALCIUM mg/dL 9 2 9 0 8 5 9 3 9 3     Lab Results   Component Value Date    CREATININE 1 21 12/27/2021    CREATININE 1 35 (H) 12/25/2021    CREATININE 1 51 (H) 12/24/2021     Lab Results   Component Value Date    NTBNP 662 (H) 08/11/2021    NTBNP 246 (H) 11/17/2017    NTBNP 508 (H) 11/10/2017      Results from last 7 days   Lab Units 12/22/21  1753   MAGNESIUM mg/dL 1 4*      Results from last 7 days   Lab Units 12/23/21  0509   TSH 3RD GENERATON uIU/mL 2 221     Lipid Profile:   Lab Results   Component Value Date    CHOL 184 09/19/2015     Lab Results   Component Value Date    HDL 45 08/11/2021    HDL 52 04/08/2021    HDL 60 10/15/2020     Lab Results   Component Value Date    LDLCALC 86 2021    LDLCALC 106 (H) 2021    LDLCALC 103 (H) 10/15/2020     Lab Results   Component Value Date    TRIG 151 (H) 2021    TRIG 143 2021    TRIG 116 10/15/2020       Cardiac testing:   Results for orders placed in visit on 17    Echo complete with contrast if indicated    Narrative  [place] [address]  [phone]    Transthoracic Echocardiogram  2D, M-mode, Doppler, and Color Doppler    Study date:  07-Dec-2017    Patient: Layla Hood  MR number: [id number]  Account number: [de-identified]  : 1945  Age: 74 years  Gender: Female  Status: Outpatient  Location: [address] [place] [address] [facility] [place]  Height: 62 in  Weight: 288 lb  BP: 98/ 64 mmHg    Indications: Dyspnea, edema    Diagnoses: R06 09 - Other forms of dyspnea, R60 9 - Edema, unspecified    Sonographer:  VERONICA Reed  Referring Physician:  Mildred Zaldivar DO  Group:  [facility]  Interpreting Physician:  Eddie To DO    SUMMARY    LEFT VENTRICLE:  Systolic function was normal  Ejection fraction was estimated to be 60 %  There were no regional wall motion abnormalities  Wall thickness was mildly increased  Doppler parameters were consistent with abnormal left ventricular relaxation (grade 1 diastolic dysfunction)  LEFT ATRIUM:  The atrium was mildly dilated  MITRAL VALVE:  There was mild regurgitation  AORTIC VALVE:  There was very mild stenosis  HISTORY: PRIOR HISTORY: Hypertension, high cholesterol, CAD, atrial fibrillation, asthma, COPD, DM, edema, hypothyroidism    PROCEDURE: The study was performed in the [address] [facility] [place]  This was a routine study  The transthoracic approach was used  The study included complete 2D imaging, M-mode, complete spectral Doppler, and color Doppler   This  was a technically difficult study     LEFT VENTRICLE: Size was normal  Systolic function was normal  Ejection fraction was estimated to be 60 %  There were no regional wall motion abnormalities  Wall thickness was mildly increased  DOPPLER: Doppler parameters were consistent  with abnormal left ventricular relaxation (grade 1 diastolic dysfunction)  RIGHT VENTRICLE: The size was normal  Systolic function was normal  Wall thickness was normal     LEFT ATRIUM: The atrium was mildly dilated  RIGHT ATRIUM: Size was normal     MITRAL VALVE: Valve structure was normal  There was normal leaflet separation  DOPPLER: The transmitral velocity was within the normal range  There was no evidence for stenosis  There was mild regurgitation  AORTIC VALVE: The valve was trileaflet  Leaflets exhibited mildly increased thickness, mild calcification, and lower normal cuspal separation  DOPPLER: Transaortic velocity was minimally increased  There was very mild stenosis  There was  no significant regurgitation  TRICUSPID VALVE: The valve structure was normal  There was normal leaflet separation  DOPPLER: The transtricuspid velocity was within the normal range  There was no evidence for stenosis  There was no significant regurgitation  PULMONIC VALVE: Leaflets exhibited normal thickness, no calcification, and normal cuspal separation  DOPPLER: The transpulmonic velocity was within the normal range  There was no significant regurgitation  PERICARDIUM: There was no pericardial effusion  The pericardium was normal in appearance  AORTA: The root exhibited normal size  SYSTEMIC VEINS: IVC: The inferior vena cava was normal in size      SYSTEM MEASUREMENT TABLES    2D  %FS: 29 48 %  AV Diam: 3 07 cm  EDV(Teich): 123 01 ml  EF(Cube): 64 93 %  EF(Teich): 56 15 %  ESV(Cube): 46 13 ml  ESV(Teich): 53 94 ml  IVSd: 1 13 cm  LA Area: 22 33 cm2  LA Diam: 3 71 cm  LVEDV MOD A4C: 143 23 ml  LVEF MOD A4C: 59 57 %  LVESV MOD A4C: 57 91 ml  LVIDd: 5 09 cm  LVIDs: 3 59 cm  LVLd A4C: 7 57 cm  LVLs A4C: 6 34 cm  LVOT Diam: 2 09 cm  LVPWd: 1 24 cm  RA Area: 15 34 cm2  RV Diam : 3 79 cm  SV MOD A4C: 85 32 ml  SV(Cube): 85 41 ml  SV(Teich): 69 06 ml    CW  AV Env  Ti: 379 54 ms  AV SV: 364 79 ml  AV VTI: 49 28 cm  AV Vmax: 2 01 m/s  AV Vmean: 1 3 m/s  AV maxP 16 mmHg  AV meanP 79 mmHg    MM  TAPSE: 2 25 cm    PW  ANTONIO (VTI): 1 9 cm2  ANTONIO Vmax: 1 8 cm2  E': 0 05 m/s  E/E': 18 98  LVOT Env  Ti: 423 91 ms  LVOT VTI: 27 28 cm  LVOT Vmax: 1 05 m/s  LVOT Vmean: 0 65 m/s  LVOT maxP 44 mmHg  LVOT meanP 03 mmHg  LVSV Dopp: 93 59 ml  MV A Nadeem: 0 92 m/s  MV Dec Loving: 5 65 m/s2  MV DecT: 163 82 ms  MV E Nadeem: 0 93 m/s  MV E/A Ratio: 1 01    [facility] Accredited Ech[facility]    Prepared and electronically signed by    Landy Viera DO  Signed 07-Dec-2017 13:53:25    Meds/Allergies   all current active meds have been reviewed and current meds:   Current Facility-Administered Medications   Medication Dose Route Frequency    gabapentin (NEURONTIN) capsule 600 mg  600 mg Oral TID    heparin (porcine) subcutaneous injection 5,000 Units  5,000 Units Subcutaneous Q8H [facility]    insulin lispro (HumaLOG) 100 units/mL subcutaneous injection 1-6 Units  1-6 Units Subcutaneous TID AC    levothyroxine tablet 150 mcg  150 mcg Oral Early Morning    metoprolol succinate (TOPROL-XL) 24 hr tablet 25 mg  25 mg Oral Daily    midodrine (PROAMATINE) tablet 5 mg  5 mg Oral TID AC    nystatin (MYCOSTATIN) powder   Topical BID    pravastatin (PRAVACHOL) tablet 10 mg  10 mg Oral Daily    [START ON 2021] sacubitril-valsartan (ENTRESTO) 24-26 MG per tablet 1 tablet  1 tablet Oral BID    sodium chloride 0 9 % bolus 200 mL  200 mL Intravenous Once    sodium chloride 0 9 % infusion  100 mL/hr Intravenous Continuous    venlafaxine (EFFEXOR) tablet 75 mg  75 mg Oral TID     Medications Prior to Admission   Medication    Bystolic 5 MG tablet    diclofenac-misoprostol (ARTHROTEC 50) 50-0 2 MG per tablet    diphenoxylate-atropine (LOMOTIL) 2 5-0 025 mg per tablet    furosemide (LASIX) 20 mg tablet    gabapentin (NEURONTIN) 300 mg capsule    glimepiride (AMARYL) 4 mg tablet    HYDROcodone-acetaminophen (NORCO) 5-325 mg per tablet    levothyroxine 150 mcg tablet    Multiple Vitamin (DAILY VALUE MULTIVITAMIN) TABS    nystatin (MYCOSTATIN) powder    potassium chloride (K-DUR,KLOR-CON) 20 mEq tablet    pravastatin (PRAVACHOL) 10 mg tablet    venlafaxine (EFFEXOR) 75 mg tablet    albuterol (ProAir HFA) 90 mcg/act inhaler    glucose blood (TRUETEST TEST) test strip    MICROLET LANCETS MISC    Multiple Vitamins-Minerals (PRESERVISION AREDS PO)    Wound Dressings (PruTect) EMUL     sodium chloride, 100 mL/hr    Counseling / Coordination of Care  Total floor / unit time spent today 20 minutes  Greater than 50% of total time was spent with the patient and / or family counseling and / or coordination of care  ** Please Note: Dragon 360 Dictation voice to text software may have been used in the creation of this document   **

## 2021-12-27 NOTE — ASSESSMENT & PLAN NOTE
Wt Readings from Last 3 Encounters:   12/23/21 102 kg (224 lb)   08/16/21 123 kg (271 lb 9 6 oz)   08/04/21 120 kg (264 lb)     · Nuclear stress test: 12/7/2017 - small fixed inferoapical defect consistent with prior MI,  · Echo: 12/7/2017 - 60% EF, no wall motion abnormalities, G1DD, mild MR, mild AS   · Echo 12/23/2021 30% LVEF grade 2 diastolic relaxation, with right ventricle dilation, left atrium dilation, moderate mitral regurgitation  · Status post cardiac catheterization 12/27/2021, no obstruction noted    No intervention done  Etiology :  Nonischemic  Plan:  Cardiology on board  Continue on  sacubitril- valsartan OD  Continue on Metroprolol 25 OD  Pending LifeVest, request will be sent by case management

## 2021-12-28 LAB
25(OH)D3 SERPL-MCNC: 43.8 NG/ML (ref 30–100)
ANION GAP SERPL CALCULATED.3IONS-SCNC: 5 MMOL/L (ref 4–13)
BUN SERPL-MCNC: 21 MG/DL (ref 5–25)
CALCIUM SERPL-MCNC: 9.1 MG/DL (ref 8.3–10.1)
CHLORIDE SERPL-SCNC: 109 MMOL/L (ref 100–108)
CO2 SERPL-SCNC: 28 MMOL/L (ref 21–32)
CREAT SERPL-MCNC: 1.34 MG/DL (ref 0.6–1.3)
ERYTHROCYTE [DISTWIDTH] IN BLOOD BY AUTOMATED COUNT: 16.1 % (ref 11.6–15.1)
GFR SERPL CREATININE-BSD FRML MDRD: 37 ML/MIN/1.73SQ M
GLUCOSE SERPL-MCNC: 131 MG/DL (ref 65–140)
GLUCOSE SERPL-MCNC: 138 MG/DL (ref 65–140)
GLUCOSE SERPL-MCNC: 141 MG/DL (ref 65–140)
GLUCOSE SERPL-MCNC: 141 MG/DL (ref 65–140)
GLUCOSE SERPL-MCNC: 188 MG/DL (ref 65–140)
GLUCOSE SERPL-MCNC: 204 MG/DL (ref 65–140)
HCT VFR BLD AUTO: 34.9 % (ref 34.8–46.1)
HGB BLD-MCNC: 10.8 G/DL (ref 11.5–15.4)
MCH RBC QN AUTO: 30.3 PG (ref 26.8–34.3)
MCHC RBC AUTO-ENTMCNC: 30.9 G/DL (ref 31.4–37.4)
MCV RBC AUTO: 98 FL (ref 82–98)
PHOSPHATE SERPL-MCNC: 3.6 MG/DL (ref 2.3–4.1)
PLATELET # BLD AUTO: 139 THOUSANDS/UL (ref 149–390)
PMV BLD AUTO: 12.8 FL (ref 8.9–12.7)
POTASSIUM SERPL-SCNC: 4.2 MMOL/L (ref 3.5–5.3)
RBC # BLD AUTO: 3.57 MILLION/UL (ref 3.81–5.12)
SODIUM SERPL-SCNC: 142 MMOL/L (ref 136–145)
WBC # BLD AUTO: 6.7 THOUSAND/UL (ref 4.31–10.16)

## 2021-12-28 PROCEDURE — 85027 COMPLETE CBC AUTOMATED: CPT

## 2021-12-28 PROCEDURE — 97116 GAIT TRAINING THERAPY: CPT

## 2021-12-28 PROCEDURE — 80048 BASIC METABOLIC PNL TOTAL CA: CPT | Performed by: INTERNAL MEDICINE

## 2021-12-28 PROCEDURE — 99232 SBSQ HOSP IP/OBS MODERATE 35: CPT | Performed by: INTERNAL MEDICINE

## 2021-12-28 PROCEDURE — 97110 THERAPEUTIC EXERCISES: CPT

## 2021-12-28 PROCEDURE — 84100 ASSAY OF PHOSPHORUS: CPT | Performed by: STUDENT IN AN ORGANIZED HEALTH CARE EDUCATION/TRAINING PROGRAM

## 2021-12-28 PROCEDURE — 82306 VITAMIN D 25 HYDROXY: CPT | Performed by: STUDENT IN AN ORGANIZED HEALTH CARE EDUCATION/TRAINING PROGRAM

## 2021-12-28 PROCEDURE — 82948 REAGENT STRIP/BLOOD GLUCOSE: CPT

## 2021-12-28 PROCEDURE — 99232 SBSQ HOSP IP/OBS MODERATE 35: CPT | Performed by: STUDENT IN AN ORGANIZED HEALTH CARE EDUCATION/TRAINING PROGRAM

## 2021-12-28 RX ADMIN — NYSTATIN: 100000 POWDER TOPICAL at 09:17

## 2021-12-28 RX ADMIN — NYSTATIN: 100000 POWDER TOPICAL at 17:58

## 2021-12-28 RX ADMIN — VENLAFAXINE 75 MG: 37.5 TABLET ORAL at 17:58

## 2021-12-28 RX ADMIN — LEVOTHYROXINE SODIUM 150 MCG: 150 TABLET ORAL at 06:34

## 2021-12-28 RX ADMIN — HEPARIN SODIUM 5000 UNITS: 5000 INJECTION INTRAVENOUS; SUBCUTANEOUS at 21:49

## 2021-12-28 RX ADMIN — VENLAFAXINE 75 MG: 37.5 TABLET ORAL at 21:49

## 2021-12-28 RX ADMIN — HEPARIN SODIUM 5000 UNITS: 5000 INJECTION INTRAVENOUS; SUBCUTANEOUS at 15:11

## 2021-12-28 RX ADMIN — SACUBITRIL AND VALSARTAN 1 TABLET: 24; 26 TABLET, FILM COATED ORAL at 17:58

## 2021-12-28 RX ADMIN — GABAPENTIN 600 MG: 300 CAPSULE ORAL at 09:16

## 2021-12-28 RX ADMIN — PRAVASTATIN SODIUM 10 MG: 10 TABLET ORAL at 09:16

## 2021-12-28 RX ADMIN — VENLAFAXINE 75 MG: 37.5 TABLET ORAL at 09:16

## 2021-12-28 RX ADMIN — SACUBITRIL AND VALSARTAN 1 TABLET: 24; 26 TABLET, FILM COATED ORAL at 09:16

## 2021-12-28 RX ADMIN — METOPROLOL SUCCINATE 25 MG: 25 TABLET, EXTENDED RELEASE ORAL at 09:16

## 2021-12-28 RX ADMIN — GABAPENTIN 600 MG: 300 CAPSULE ORAL at 17:58

## 2021-12-28 RX ADMIN — GABAPENTIN 600 MG: 300 CAPSULE ORAL at 21:49

## 2021-12-28 RX ADMIN — HEPARIN SODIUM 5000 UNITS: 5000 INJECTION INTRAVENOUS; SUBCUTANEOUS at 06:34

## 2021-12-28 RX ADMIN — INSULIN LISPRO 2 UNITS: 100 INJECTION, SOLUTION INTRAVENOUS; SUBCUTANEOUS at 12:40

## 2021-12-28 NOTE — DISCHARGE SUMMARY
[facility]  Discharge- Pita Newer 1945, 64 y o  female MRN: [id number]  Unit/Bed#: S -01 Encounter: [id number]  Primary Care Provider: Marycarmen Garcia,    Date and time admitted to hospital: 12/22/2021  4:57 PM    * Syncope  Assessment & Plan  · Patient with known cardiac history, LBBB, and questionable ST changes on EMS 12 lead  · Echo 12/23/2021 30% LVEF grade 2 diastolic relaxation, with right ventricle dilation, left atrium dilation, moderate mitral regurgitation  · In ED, patient dizzy with movement and elevated trop, denied any CP  · CT face & CT head - no acute abnormalities   · CXR - fracture of the L 8th rib   · Cardiac catheterization 12/27: no obstruction, no intervention  · Etiology: Cardiogenic vs orthostatic vs hypoglycemia  · Orthostatics persistently positive, probably secondary to diabetic neuropathy    Plan  - Cardiology following: Started on Entresto and metoprolol XL   - Fitted for Life vest, currently in patient room   - Per geriatrics consult, discontinue amaryl on discharge  - Otherwise medically stable for discharge to rehab facility     [facility]  on board  patient scored a 9 on her PHQ 9 depression scale  Gerontology recommend dose increase of venlafaxine on outpatient visit        Heart failure ([facility] [address] ) secondary to cardiomyopathy  Assessment & Plan  · Nuclear stress test: 12/7/2017 - small fixed inferoapical defect consistent with prior MI,  · Echo: 12/7/2017 - 60% EF, no wall motion abnormalities, G1DD, mild MR, mild AS   · Echo 12/23/2021 - 30% LVEF grade 2 diastolic relaxation, with right ventricle dilation, left atrium dilation, moderate mitral regurgitation  · Status post cardiac catheterization 12/27/2021, no obstruction noted    No intervention done  · Etiology: Nonischemic    Plan:  - Cardiology on board  - Continue on sacubitril- valsartan  - Continue on Metroprolol 25   - Continue home lasix 20mg daily   - Life vest fitted and in patient room     Ambulatory dysfunction  Assessment & Plan  · Patient reports falling "multiple times a day"  · Has area rugs, doesn't feel comfortable walking on hardwood floors despite wearing shoes  · Patient agreed to post acute rehab  Patient being discharged to [facility], [facility] today    Chronic renal disease, stage IV [facility])  Assessment & Plan  Lab Results   Component Value Date    EGFR 37 12/30/2021    EGFR 37 12/29/2021    EGFR 37 12/28/2021    CREATININE 1 34 (H) 12/30/2021    CREATININE 1 36 (H) 12/29/2021    CREATININE 1 34 (H) 12/28/2021     · Baseline Cr appears to be 1 7-1 9  · Avoid hypotension and nephrotoxic agents    Plan:  - per nephrology, patient will need outpatient follow-up with Nephrology with BMP within 2-3 weeks of discharge  - Continue home lasix dose  -Creatinine at baseline     Type 2 diabetes mellitus with stage 3 chronic kidney disease and hypertension [facility])  Assessment & Plan  Lab Results   Component Value Date    HGBA1C 6 5 08/04/2021       Recent Labs     12/30/21  1124 12/30/21  1555 12/30/21  2111 12/31/21  0838   POCGLU 169* 149* 215* 185*       Blood Sugar Average: Last 72 hrs:  (P) 165 [id number]    Monitor accu-checks AC and HS      discontinue amaryl on discharge   Price check was sent for empagliflozin and dapagliflozin, price is about 140 dollars for 1 month supply  o SGLT2 inhibitors will be beneficial to both heart failure and diabetes  o This will be a lot cheaper in January, because currently out of funds and her Medicare will renew after the new year    Diabetic peripheral neuropathy ([facility] [address] )  Assessment & Plan  · Patient on high dose Gabapentin for 6+ years  · Home regimen: 300 mg x3 in AM, 300 mg x2 in afternoon, 300 mg x3 in evening    Hypertension  Assessment & Plan  BP in control    Plan:  - started on metoprolol succinate 25mg daily given new CHF (in place of home nebivolol)   - continue home lasix 20mg daily Medical Problems             Resolved Problems  Date Reviewed: 12/31/2021          Resolved    Elevated troponin 12/25/2021     Resolved by  Silvina Borrego MD    Leukocytosis 12/25/2021     Resolved by  Silvina Borrego MD    Hypoglycemia 12/25/2021     Resolved by  Silvina Borrego MD              Discharging Resident: Ammy Dan MD  Discharging Attending: Jelly Prather MD  PCP: Amanda Hancock DO  Admission Date:   Admission Orders (From admission, onward)     Ordered        12/23/21 1250  Inpatient Admission  Once            12/22/21 2125  Place in Observation  Once                      Discharge Date: 12/31/21    Consultations During Hospital Stay:  · Gerontology  · Cardiology   · Nephrology    Procedures Performed:   · Cardiac catheterization done on 12/27/2021  No obstruction no intervention done  Significant Findings / Test Results:   · Echo 12/23/2021 30% LVEF grade 2 diastolic relaxation, with right ventricle dilation, left atrium dilation, moderate mitral regurgitation  · Hypoglycemia 48 POGC throughout first and second hospital days, resovled  · Positive orthostatics daily despite midodrine and IV infusion    Incidental Findings:   · None     Test Results Pending at Discharge (will require follow up): · None     Outpatient Tests Requested:  · BMP within 2-3 weeks to be followed up by nephrology     Complications:  No    Reason for Admission: syncope     Hospital Course:   Kenton Owen is a 64 y o  female patient who originally presented to the hospital on 12/22/2021 due to syncope  Patient was found to be hypoglycemic in the emergency department  Patient had mildly elevated troponins 10/23/2051 and was up trending  Patient was also found to be orthostatic positive in the 1st day  Patient was given IV D5 infusion for hypoglycemia and home glimepiride was discontinued and will be started on SLG2 on discharge    Hypoglycemia was resolved by the 3rd hospital day  Cardiology was consulted  Echo was done which revealed LVEF 52%, grade 2 diastolic relaxation with right ventricle dilation and moderate mitral regurgitation  Previous EF was 60% 2017  Upon chart review, patient has small fixed inferior apical defect consistent with prior MI  Patient was asymptomatic  Cardiology suspected cardiomyopathy secondary to infarction, hence cardiac catheterization was ordered  Cardiac catheterization was done on 12/27, and was negative and no interventions were performed  For positive orthostatics, patient was given IV fluids, and ultimately resolved  Orthostatic hypotension was thought to be secondary to neuropathy in the setting of diabetes  Patient was provided with a life vest and started on metoprolol and entresto and will be following up with cardiology in clinic  Renal function was monitored and remained at baseline with lasix 20 mg daily  PT/OT recommended post acute rehab and she is being discharged to [facility] today as she is medically stable for discharge  She was advised to follow up with PCP in 1 week with repeat BMP in 1 week  Please see above list of diagnoses and related plan for additional information  Condition at Discharge: stable     Discharge Day Visit / Exam:   Subjective:  Patient denies any complaints  No acute events overnight  Vitals: Blood Pressure: 101/50 (12/31/21 0835)  Pulse: 60 (12/31/21 0835)  Temperature: 97 9 °F (36 6 °C) (12/31/21 0835)  Temp Source: Oral (12/31/21 0835)  Respirations: 18 (12/31/21 0835)  Height: 5' 1" (154 9 cm) (12/23/21 1456)  Weight - Scale: 100 kg (221 lb 1 9 oz) (12/31/21 0600)  SpO2: 91 % (12/31/21 0835)  Exam:   Physical Exam  Vitals and nursing note reviewed  Constitutional:       General: She is not in acute distress  Appearance: Normal appearance  She is not diaphoretic     HENT:      Head:      Comments: Resolving bruising across left side of face     Mouth/Throat:      Mouth: Mucous membranes are moist    Eyes:      Conjunctiva/sclera: Conjunctivae normal    Cardiovascular:      Rate and Rhythm: Normal rate and regular rhythm  Pulses: Normal pulses  Heart sounds: Normal heart sounds  No murmur heard  No friction rub  No gallop  Pulmonary:      Effort: Pulmonary effort is normal       Breath sounds: Normal breath sounds  No stridor  No wheezing, rhonchi or rales  Abdominal:      General: Bowel sounds are normal       Palpations: Abdomen is soft  There is no mass  Tenderness: There is no abdominal tenderness  There is no guarding  Musculoskeletal:      Right lower leg: Edema (trace) present  Left lower leg: Edema (trace) present  Skin:     General: Skin is warm and dry  Findings: Lesion (right LE with small wound on anterior shin, dressing clean, dry, and intact) present  Neurological:      General: No focal deficit present  Mental Status: She is alert and oriented to person, place, and time  Psychiatric:         Mood and Affect: Mood normal          Behavior: Behavior normal           Discussion with Family: Patient declined call to   Discharge instructions/Information to patient and family:   See after visit summary for information provided to patient and family  Provisions for Follow-Up Care:  See after visit summary for information related to follow-up care and any pertinent home health orders  Disposition:   Acute Rehab at [facility], [facility]    Planned Readmission: No    Discharge Medications:  See after visit summary for reconciled discharge medications provided to patient and/or family        **Please Note: This note may have been constructed using a voice recognition system**

## 2021-12-28 NOTE — CASE MANAGEMENT
Case Management Progress Note    Patient name Eber Syed  Location S /S -01 MRN [id number]  : 1945 Date 2021       LOS (days): 5  Geometric Mean LOS (GMLOS) (days): 2 20  Days to GMLOS:-2 8        OBJECTIVE:        Current admission status: Inpatient  Preferred Pharmacy:   [address]  [address] [place] [address]  Phone: [phone] Fax: [phone]    Primary Care Provider: Shelley Thomas DO    Primary Insurance: MEDICARE  Secondary Insurance: BLUE CROSS    PROGRESS NOTE:    Spoke with Marybel Beltran at [facility]; reports that order for LifeVest needs to indicate patient has dilated cardiomyopathy and Medicare will deny order the way current form has been completed (only states patient with new cardiomyopathy, LVEF 30%)  Reports that NP is able to complete order form per Medicare guidelines  TT sent to NP to request form be updated so that it can be resubmitted  Will forward form to [employer] once received

## 2021-12-28 NOTE — ASSESSMENT & PLAN NOTE
Wt Readings from Last 3 Encounters:   12/28/21 102 kg (224 lb 13 9 oz)   08/16/21 123 kg (271 lb 9 6 oz)   08/04/21 120 kg (264 lb)     · Nuclear stress test: 12/7/2017 - small fixed inferoapical defect consistent with prior MI,  · Echo: 12/7/2017 - 60% EF, no wall motion abnormalities, G1DD, mild MR, mild AS   · Echo 12/23/2021 30% LVEF grade 2 diastolic relaxation, with right ventricle dilation, left atrium dilation, moderate mitral regurgitation  · Status post cardiac catheterization 12/27/2021, no obstruction noted    No intervention done  Etiology :  Nonischemic  Plan:  Cardiology on board  Continue on  sacubitril- valsartan OD  Continue on Metroprolol 25 OD  Pending LifeVest

## 2021-12-28 NOTE — CASE MANAGEMENT
Case Management Assessment & Discharge Planning Note    Patient name Caridad Sandifer  Location S /S -01 MRN [id number]  : 1945 Date 2021       Current Admission Date: 2021  Current Admission Diagnosis:Syncope   Patient Active Problem List    Diagnosis Date Noted    Depression 2021    Heart failure ([facility] ) secondary to cardiomyopathy 2021    Ambulatory dysfunction 2021    Syncope 2021    Chronic renal disease, stage IV ([facility] ) 2021    Type 2 diabetes mellitus with stage 3 chronic kidney disease and hypertension ([facility] [address] [facility] ) 2019    Diabetic peripheral neuropathy ([facility] ) 2018    Morbid obesity with body mass index (BMI) of 50 0 to 59 9 in adult [facility]) 2018    Primary osteoarthritis of both knees 2018    Aortic stenosis 2018    Hypercholesterolemia 10/04/2017    Edema 10/08/2014    Arteriosclerosis of coronary artery 2013    Hypertension 2012      LOS (days): 5  Geometric Mean LOS (GMLOS) (days): 2 20  Days to GMLOS:-2 7     OBJECTIVE:    Risk of Unplanned Readmission Score: 10         Current admission status: Inpatient  Referral Reason: Other (post-acute d/c needs)    Preferred Pharmacy:   [address] [place] [address]  Phone: [phone] Fax: [phone]    Primary Care Provider: Pete Darling DO    Primary Insurance: MEDICARE  Secondary Insurance: BLUE CROSS    ASSESSMENT:  Active Health Care Agents    There are no active Health Care Agents on file         Advance Directives  Does patient have Advance Directives?: Yes  Advance Directives: Power of  for health care,Power of  for finance  Primary Contact: granddaughters              Patient Information  Admitted from[de-identified] Home  Mental Status: Alert  During Assessment patient was accompanied by: Not accompanied during assessment  Assessment information provided by[de-identified] Patient  Primary Caregiver: Self  Support Systems: Self,Friends/neighbors,Family members  Home entry access options   Select all that apply : Ramp  Type of Current Residence: 2 story home  Upon entering residence, is there a bedroom on the main floor (no further steps)?: Yes  Upon entering residence, is there a bathroom on the main floor (no further steps)?: Yes  In the last 12 months, was there a time when you were not able to pay the mortgage or rent on time?: No  In the last 12 months, how many places have you lived?: 1  In the last 12 months, was there a time when you did not have a steady place to sleep or slept in a shelter (including now)?: No  Homeless/housing insecurity resource given?: N/A  Living Arrangements: Lives Alone    Activities of Daily Living Prior to Admission  Functional Status: Independent  Completes ADLs independently?: Yes  Ambulates independently?: Yes  Does patient use assisted devices?: Yes  Assisted Devices (DME) used: [employer] Chair  Does patient currently own DME?: Yes  What DME does the patient currently own?: Jamie Angel  Does patient have a history of Outpatient Therapy (PT/OT)?: No  Does the patient have a history of Short-Term Rehab?: No  Does patient have a history of HHC?: Yes  Does patient currently have [facility]?: Yes (current with [facility])    [facility]  Type of Current Home Care Services: Nurse visit  [address][de-identified] [address] [facility] Provider[de-identified] PCP    Patient Information Continued  Does patient have prescription coverage?: Yes (preference for Helane Stalls)  Does patient receive dialysis treatments?: No  Does patient have a history of substance abuse?: No  Does patient have a history of Mental Health Diagnosis?: No    PHQ 2/9 Screening   Reviewed PHQ 2/9 Depression Screening Score?: No    Means of Transportation  Means of Transport to Appts[de-identified] Friends  In the past 12 months, has lack of transportation kept you from medical appointments or from getting medications?: No  In the past 12 months, has lack of transportation kept you from meetings, work, or from getting things needed for daily living?: No  Was application for public transport provided?: N/A        DISCHARGE DETAILS:    Discharge planning discussed with[de-identified] patient at bedside  [place] of Choice: Yes (re: rehab)  Comments - [place] of Choice: requesting referral only to [address] [place] [address] at this time  CM contacted family/caregiver?: No- see comments (patient declined)  Were Treatment Team discharge recommendations reviewed with patient/caregiver?: Yes  Did patient/caregiver verbalize understanding of patient care needs?: Yes  Were patient/caregiver advised of the risks associated with not following Treatment Team discharge recommendations?: Yes    Contacts  Patient Contacts: granddaughters  Relationship to Patient[de-identified] Family  Reason/Outcome: Emergency Contact              Other Referral/Resources/Interventions Provided:  Interventions: Short Term Rehab,DME  Referral Comments: Patient admitted due to syncope, fall; cards following and recommending LifeVest at discharge  PT eval recommending rehab; OT recommending home with home care services  Met with patient at bedside to complete assessment  Patient reports that she lives alone; spouse recently passed away unexpectedly in October  States that she has a ramp to get in the home  Home is 2-stories, but she remains on the 1st floor  States that she's current with [facility] for wound care  Relays that she ambulates with a walker; has a shower seat when she bathes  Patient does not drive, but she has a friend who transports to grocery stores, appointments, etc when needed  Patient's sister also visits frequently to help  Patient does report that her granddaughters have POA; offer made to contact them, but patient declines at this time   Discussed PT recommendation for rehab  At this time, patient reports that she only wants to consider [facility] as her  had been there in the past  Patient aware of recommendation for LifeVest; agreeable to same  Patient confirmed she has both COVID vaccines, but has not yet gotten her booster  Referral sent to [facility] and to [facility]; awaiting responses from both  Reached out to PT/OT for updated notes today; both confirmed they will work with patient  Call made to [facility]'s [facility], and spoke with Hollice Meckel, pharmacist - patient's Lynnann Breeding comes to $141 98 and [place] is $146 16 as patient is currently in her "donut hole"- copayment will likely go down in January, but unable to run cost until that time  States that patient has automatic delivery set for her medication - will place these meds on hold until d/c date and disposition determined      Would you like to participate in our [address] service program?  : No - Declined    Treatment Team Recommendation: Short Term Rehab

## 2021-12-28 NOTE — ASSESSMENT & PLAN NOTE
· Patient was sleeping in recliner and woke up on floor on left side +LOC and +head trauma  Arrived to ED via EMS  · Patient with known cardiac history, LBBB, and questionable ST changes on EMS 12 lead    Workups  Previous echo:   · Echo 12/23/2021 30% LVEF grade 2 diastolic relaxation, with right ventricle dilation, left atrium dilation, moderate mitral regurgitation  · Patient had another hyperglycemia even 12 am tonight  Latest glucose 90 today  · In ED, patient dizzy with movement and elevated trop, denies any CP  · CT face - no acute fractures  · CT head - no acute intracranial abnormality  · CXR shows fracture of the L 8th rib   · Cardiac catheterization done, no obstruction, no intervention done 12/27/2021  Etiology: Cardiogenic vs orthostatic versus hypoglycemia  Orthostatics persistently positive, probably secondary to diabetic neuropathy    Plan  · Will DC midodrine  · Cardiology following:  Started on Entresto and metoprolol XL  Will undergo cardiac catheterization to evaluate ischemia on Monday  · Currently waiting for placement to post acute rehab   And Life vest prior to discharge  · Otherwise medically stable

## 2021-12-28 NOTE — PROGRESS NOTES
NEPHROLOGY PROGRESS NOTE   Kendell Blue 64 y o  female MRN: [id number]  Unit/Bed#: S -01 Encounter: [id number]  Reason for Consult:  Prevention of TIESHA    ASSESSMENT AND PLAN:  63 yo woman CKD G3b/4 (bl creatinine 1 5-1 8 mg/dL , eGFR 29-30 ) , hypertension, HFrEF 30%, hyperlipidemia, DM type 2, obesity, aortic stenosis , hypothyroidism presents after a fall, found to have changes in EKG with low EF, new diagnosis    Nephrology is consulted for prevention of TIESHA as IV contrast is required     PLAN  #CKD  G3b/4 A1  · Baseline creatinine: 1 5-1 8 mg/dL , eGFR 29-30  · Current creatinine:   1 3 mg/dL baseline  · Etiology:  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 i in the settings of hypertension  · Plan:  · BMP tomorrow to monitor kidney function after IV contrast  · Maintain mean arterial pressure above 65 mmHg to avoid hypotension/hypoperfusion  · Avoid nephrotoxic agents such as NSAIDs and contrast if at all possible    · Avoid opioids   · Adjust medications to GFR  · Renal diet     #Volume/hypertension:  · Volume:   euvolemic  · Blood pressure:  No normotensive /65   · Low sodium diet   · Metoprolol 25 mg once a day  · Midodrine 5 mg t i d   · Entresto 24-26 mg b i d      #Acid base disorder  · serum HCO3  remains 23JHCQ/Q  · Metabolic alkalosis        #CKD-MBD  · Calcium  9 1 mg/dL  · Phosphorus  at goal 3 6 (goal <5 5)  · 25-OH vitamin D at goal 43 8 (goal >30ng/ml) by KDIGO guidelines 2017     #Secondary Hyperparathyroidism   · iPTH 161 6 in 2020  guidelines levels KDIGO 2017  · Low phos diet     #Anemia:  · Current hemoglobin:   10 8 mg/dL dropped from 11 6 after cardiac catheterization  · Secondary to kidney disease  · Treatment:  · No indication of EPO at this time  · Transfuse for hemoglobin less than 7 0 per primary service       # cardiomyopathy  · EF 30%  · Cardiac catheterization yesterday without complications  · Management as per Cardiology       SUBJECTIVE:  Patient seen and examined at bedside  No chest pain, shortness of breath, nausea, vomiting, abdominal pain or diarrhea  No urinary complaints         OBJECTIVE:  Current Weight: Weight - Scale: 102 kg (224 lb 13 9 oz)  Vitals:    12/28/21 0713   BP: 139/65   Pulse: 55   Resp: 20   Temp: 97 9 °F (36 6 °C)   SpO2: 92%       Intake/Output Summary (Last 24 hours) at 12/28/2021 1332  Last data filed at 12/28/2021 0900  Gross per 24 hour   Intake 0 ml   Output 200 ml   Net -200 ml     Wt Readings from Last 3 Encounters:   12/28/21 102 kg (224 lb 13 9 oz)   08/16/21 123 kg (271 lb 9 6 oz)   08/04/21 120 kg (264 lb)     Temp Readings from Last 3 Encounters:   12/28/21 97 9 °F (36 6 °C) (Oral)   08/04/21 99 1 °F (37 3 °C)   05/18/21 98 4 °F (36 9 °C)     BP Readings from Last 3 Encounters:   12/28/21 139/65   08/16/21 122/60   08/04/21 122/82     Pulse Readings from Last 3 Encounters:   12/28/21 55   08/16/21 (!) 52   08/04/21 67      General:  no acute distress at this time  Skin:  No acute rash  Eyes:  No scleral icterus and noninjected  ENT:  Normocephalic, atraumatic  Neck:  Supple, no jugular venous distention  Chest:  Clear to auscultation percussion, good respiratory effort, no use of accessory respiratory muscles  CVS:  Regular rate and rhythm without a murmur rub or  Abdomen:  , soft and nontender   Extremities:  No lower extremity edema  Neuro:  No gross focality  Psych:  Alert and very cooperative    Medications:    Current Facility-Administered Medications:     gabapentin (NEURONTIN) capsule 600 mg, 600 mg, Oral, TID, [employer], CRNP, 600 mg at 12/28/21 9339    heparin (porcine) subcutaneous injection 5,000 Units, 5,000 Units, Subcutaneous, Q[address], 5,000 Units at 12/28/21 3819 **AND** Platelet count, , , Once, [employer], CRNP    insulin lispro (HumaLOG) 100 units/mL subcutaneous injection 1-6 Units, 1-6 Units, Subcutaneous, TID AC, 2 Units at 12/28/21 1240 **AND** Fingerstick Glucose (POCT), , , DEANA COLON, Lizzy García Bry Whitaker MD    levothyroxine tablet 150 mcg, 150 mcg, Oral, Early Morning, PONCE Lundy, 150 mcg at 12/28/21 8575    metoprolol succinate (TOPROL-XL) 24 hr tablet 25 mg, 25 mg, Oral, Daily, PONCE Sanchez, 25 mg at 12/28/21 9638    nystatin (MYCOSTATIN) powder, , Topical, BID, PONCE Lundy, Given at 12/28/21 4152    pravastatin (PRAVACHOL) tablet 10 mg, 10 mg, Oral, Daily, PONCE Lundy, 10 mg at 12/28/21 5776    sacubitril-valsartan (ENTRESTO) 24-26 MG per tablet 1 tablet, 1 tablet, Oral, BID, PONCE Sanchez, 1 tablet at 12/28/21 0916    venlafaxine [facility]) tablet 75 mg, 75 mg, Oral, TID, [employer], PONCE, 75 mg at 12/28/21 3572    Laboratory Results:  Results from last 7 days   Lab Units 12/28/21  0514 12/27/21  0540 12/25/21  0436 12/24/21  0559 12/23/21  0509 12/22/21  1753   WBC Thousand/uL 6 70 7 63  --  8 83 12 36* 15 04*   HEMOGLOBIN g/dL 10 8* 11 6  --  11 6 12 2 14 0   HEMATOCRIT % 34 9 36 3  --  36 3 38 9 43 6   PLATELETS Thousands/uL 139* 192  --  185 205 232   SODIUM mmol/L 142 142 143 142 144 143   POTASSIUM mmol/L 4 2 4 3 4 7 4 5 4 3 5 6*   CHLORIDE mmol/L 109* 107 108 106 108 106   CO2 mmol/L 28 28 29 30 28 28   BUN mg/dL 21 22 17 18 18 16   CREATININE mg/dL 1 34* 1 21 1 35* 1 51* 1 69* 1 38*   CALCIUM mg/dL 9 1 9 2 9 0 8 5 9 3 9 3   MAGNESIUM mg/dL  --   --   --   --   --  1 4*   PHOSPHORUS mg/dL 3 6  --   --   --   --   --        CT chest wo contrast   Final Result by lBayne Contreras MD (12/23 9687)      Nothing to suggest pneumonia  Nondisplaced fracture of the anterolateral left 8th rib  Correlate with tenderness to determine if acute or old  Mild diffuse reticulation throughout the lungs with mild bronchiectasis in the left upper lobe and lingula, most likely due due to pulmonary fibrosis    Consider referral to pulmonology for further evaluation and management of probable early interstitial    lung disease  Pulmonary artery enlargement which can be seen with pulmonary hypertension  Workstation performed: [id number]         CT head without contrast   Final Result by Ariadne Todd MD (12/22 1936)      No acute intracranial abnormality  Workstation performed: [id number]         CT facial bones without contrast   Final Result by Ariadne Todd MD (12/22 1938)      No acute fracture  Workstation performed: [id number]         XR chest 1 view portable   ED Interpretation by Elisha Edwards MD (12/22 1853)   Poor inspiratory effort, possible Left infiltrate? Final Result by Joon Cooley MD (12/23 0259)      No acute cardiopulmonary disease  Workstation performed: [id number]             Portions of the record may have been created with voice recognition software  Occasional wrong word or "sound a like" substitutions may have occurred due to the inherent limitations of voice recognition software  Read the chart carefully and recognize, using context, where substitutions have occurred

## 2021-12-28 NOTE — CASE MANAGEMENT
Case Management Progress Note    Patient name Elaine Reed  Location S /S -01 MRN [id number]  : 1945 Date 2021       LOS (days): 5  Geometric Mean LOS (GMLOS) (days): 2 20  Days to GMLOS:-2 8        OBJECTIVE:        Current admission status: Inpatient  Preferred Pharmacy:   [address] [facility] [address] [place] [address]  Phone: [phone] Fax: [phone]    Primary Care Provider: Boo Long DO    Primary Insurance: MEDICARE  Secondary Insurance: BLUE CROSS    PROGRESS NOTE:    Return call received from Lockhart Caller; states that LifeVest order was able to be processed and approved without corrected order  States that he was able to schedule LifeVest fitting for between 5:30-6:00pm tonight  Response received from [facility]; reports that patient would not qualify for their program - recommendation is to a TCF/SNF  Met with patient at bedside to discuss same; patient aware that [facility] does not feel patient would meet criteria for their program and recommendations is for subacute rehab at this time  Listing of subacute facilities provided and patient agreeable for referrals to be sent to same; will follow-up re: bed offers/denials and availability for facilities to accept LifeVest at discharge

## 2021-12-28 NOTE — PLAN OF CARE
Problem: PHYSICAL THERAPY ADULT  Goal: Performs mobility at highest level of function for planned discharge setting  See evaluation for individualized goals  Description: Treatment/Interventions: ADL retraining,Functional transfer training,LE strengthening/ROM,Elevations,Therapeutic exercise,Endurance training,Patient/family training,Equipment eval/education,Bed mobility,Gait training,Compensatory technique education,Continued evaluation,Spoke to nursing,OT          See flowsheet documentation for full assessment, interventions and recommendations  Outcome: Progressing  Note: Prognosis: Fair  Problem List: Decreased strength,Decreased range of motion,Decreased endurance,Impaired balance,Decreased mobility,Decreased coordination,Obesity,Decreased skin integrity  Assessment: pt began tx session lying supine in the bed and was agreeable to participate in PT intervention  Slight progress was made in [facility] tx session as pt was able to complete a supine<>sit EOB transfer with a decrease in level of asisstance required /s with VC's for hand placement and useage of bed rails to pull up into a seated EOB position  pt was able to increase static/dynamic sitting EOB balance by performing TE activities w/o LOB for 8 minutes  multiple STS were performed in [facility] tx session in order to strengthen LE's and increase endurance and safety w/ all functional transfers to and from RW  pt remained consistant w/ min Ax1 for all functional transfers in [facility] tx session  pt continues to be functioning below baseline level and remains limited with functional mobility including the inability to ambulate household distances in [facility] tx session pt continues to demonstrate a decrease in activity tolerance  pt would benefit from continued focus on OOB mobility with progression of ambulation distance as appropriate   continue to recommend post acute rehab services at the time of D/C in order to maximize pt functional independence and safety w/ all OOB mobility  post tx session pt lying supien in the bed w/ call bell and all pt needs met   Barriers to Discharge: Inaccessible home environment,Decreased caregiver support        PT Discharge Recommendation: Post acute rehabilitation services          See flowsheet documentation for full assessment

## 2021-12-28 NOTE — PROGRESS NOTES
[facility]  Progress Note - Alesegun Milford 1945, 64 y o  female MRN: [id number]  Unit/Bed#: S -01 Encounter: [id number]  Primary Care Provider: Ramone Monzon DO   Date and time admitted to hospital: 12/22/2021  4:57 PM    * Syncope  Assessment & Plan  · Patient was sleeping in recliner and woke up on floor on left side +LOC and +head trauma  Arrived to ED via EMS  · Patient with known cardiac history, LBBB, and questionable ST changes on EMS 12 lead    Workups  Previous echo:   · Echo 12/23/2021 30% LVEF grade 2 diastolic relaxation, with right ventricle dilation, left atrium dilation, moderate mitral regurgitation  · Patient had another hyperglycemia even 12 am tonight  Latest glucose 90 today  · In ED, patient dizzy with movement and elevated trop, denies any CP  · CT face - no acute fractures  · CT head - no acute intracranial abnormality  · CXR shows fracture of the L 8th rib   · Cardiac catheterization done, no obstruction, no intervention done 12/27/2021  Etiology: Cardiogenic vs orthostatic versus hypoglycemia  Orthostatics persistently positive, probably secondary to diabetic neuropathy    Plan  · Will DC midodrine  · Cardiology following:  Started on Entresto and metoprolol XL  Will undergo cardiac catheterization to evaluate ischemia on Monday  · Currently waiting for placement to post acute rehab   And Life vest prior to discharge  · Otherwise medically stable    Heart failure ([address] ) secondary to cardiomyopathy  Assessment & Plan  Wt Readings from Last 3 Encounters:   12/28/21 102 kg (224 lb 13 9 oz)   08/16/21 123 kg (271 lb 9 6 oz)   08/04/21 120 kg (264 lb)     · Nuclear stress test: 12/7/2017 - small fixed inferoapical defect consistent with prior MI,  · Echo: 12/7/2017 - 60% EF, no wall motion abnormalities, G1DD, mild MR, mild AS   · Echo 12/23/2021 30% LVEF grade 2 diastolic relaxation, with right ventricle dilation, left atrium dilation, moderate mitral regurgitation  · Status post cardiac catheterization 12/27/2021, no obstruction noted  No intervention done  Etiology :  Nonischemic  Plan:  Cardiology on board  Continue on  sacubitril- valsartan OD  Continue on Metroprolol 25 OD  Pending LifeVest          Depression  Assessment & Plan  Gerontology on board  patient scored a 9 on her PHQ 9 depression scale  For dose increase on outpatient visit       Ambulatory dysfunction  Assessment & Plan  · Patient reports falling "multiple times a day"  · Has area rugs, doesn't feel comfortable walking on hardwood floors despite wearing shoes  · Patient agreed to post acute rehab  Looking for placement  Chronic renal disease, stage IV [facility])  Assessment & Plan  Lab Results   Component Value Date    EGFR 37 12/28/2021    EGFR 42 12/27/2021    EGFR 37 12/25/2021    CREATININE 1 34 (H) 12/28/2021    CREATININE 1 21 12/27/2021    CREATININE 1 35 (H) 12/25/2021     · Baseline Cr appears to be 1 7-1 9  · Monitor Cr while admitted, renal adjust medications  · Avoid hypotension and nephrotoxic agents    Type 2 diabetes mellitus with stage 3 chronic kidney disease and hypertension [facility])  Assessment & Plan  Lab Results   Component Value Date    HGBA1C 6 5 08/04/2021       Recent Labs     12/27/21  2157 12/28/21  0003 12/28/21  0717 12/28/21  1124   POCGLU 181* 138 141* 204*       Blood Sugar Average: Last 72 hrs:  (P) 158 [id number]    Hold PO medications while inpatient   Monitor accu-checks AC and HS   Holding SSI coverage in setting of hypoglycemia nd decreased PO intake   Hypoglycemia protocol     Discontinue glimepiride as outpatient   Price check was sent for empagliflozin and dapagliflozin, price is about 140 dollars for 1 month supply  o These SGLT2 inhibitors will be beneficial to both heart failure and diabetes  o This will be a lot cheaper in January, because is currently out of funds and her Medicare will renew after the new year    Diabetic peripheral neuropathy ([facility] [address] )  Assessment & Plan  · Patient on high dose Gabapentin for 6+ years  · Home regimen: 300 mg x3 in AM, 300 mg x2 in afternoon, 300 mg x3 in evening  · May be contributing to frequent falls      Hypertension  Assessment & Plan  BP in control  Continue medicaitons        VTE Pharmacologic Prophylaxis: VTE Score: 6 High Risk (Score >/= 5) - Pharmacological DVT Prophylaxis Ordered: heparin  Sequential Compression Devices Ordered  Patient Centered Rounds: I performed bedside rounds with nursing staff today  Discussions with Specialists or Other Care Team Provider: Cardiology , Gerontology  Education and Discussions with Family / Patient:  Called daughter via telephone  Current Length of Stay: 5 day(s)  Current Patient Status: Inpatient   Discharge Plan: TBD    Code Status: Level 2 - DNAR: but accepts endotracheal intubation    Subjective:    Patient is comfortable  No dyspnea no chest, no lightheadedness  Patient is agreeable to rehab  Understands that we are just waiting for the LifeVest prior to discharge and post acute rehab placement  No other subjective complaints noted  Objective:     Vitals:   Temp (24hrs), Av 7 °F (36 5 °C), Min:97 2 °F (36 2 °C), Max:97 9 °F (36 6 °C)    Temp:  [97 2 °F (36 2 °C)-97 9 °F (36 6 °C)] 97 9 °F (36 6 °C)  HR:  [45-63] 55  Resp:  [18-20] 20  BP: (112-143)/(54-65) 139/65  SpO2:  [92 %-97 %] 92 %  Body mass index is 42 49 kg/m²  Input and Output Summary (last 24 hours): Intake/Output Summary (Last 24 hours) at 2021 1359  Last data filed at 2021 0900  Gross per 24 hour   Intake 0 ml   Output 200 ml   Net -200 ml       Physical Exam:   Physical Exam  Vitals and nursing note reviewed  Constitutional:       General: She is not in acute distress  Appearance: She is well-developed  She is obese  She is ill-appearing (Appears deconditioned has a difficult time moving around the bed )     HENT:      Head: Normocephalic and atraumatic  Eyes:      Conjunctiva/sclera: Conjunctivae normal    Cardiovascular:      Rate and Rhythm: Normal rate and regular rhythm  Heart sounds: No murmur heard  Pulmonary:      Effort: Pulmonary effort is normal  No respiratory distress  Breath sounds: Normal breath sounds  Abdominal:      Palpations: Abdomen is soft  Tenderness: There is no abdominal tenderness  Musculoskeletal:      Cervical back: Neck supple  Right lower leg: Edema present  Left lower leg: Edema present  Skin:     General: Skin is warm and dry  Neurological:      Mental Status: She is alert  Additional Data:     Labs:  Results from last 7 days   Lab Units 12/28/21  0514 12/27/21  0540 12/24/21  0559   WBC Thousand/uL 6 70   < > 8 83   HEMOGLOBIN g/dL 10 8*   < > 11 6   HEMATOCRIT % 34 9   < > 36 3   PLATELETS Thousands/uL 139*   < > 185   NEUTROS PCT %  --   --  67   LYMPHS PCT %  --   --  18   MONOS PCT %  --   --  10   EOS PCT %  --   --  5    < > = values in this interval not displayed  Results from last 7 days   Lab Units 12/28/21  0514 12/23/21  0509 12/22/21  1753   SODIUM mmol/L 142   < > 143   POTASSIUM mmol/L 4 2   < > 5 6*   CHLORIDE mmol/L 109*   < > 106   CO2 mmol/L 28   < > 28   BUN mg/dL 21   < > 16   CREATININE mg/dL 1 34*   < > 1 38*   ANION GAP mmol/L 5   < > 9   CALCIUM mg/dL 9 1   < > 9 3   ALBUMIN g/dL  --   --  3 0*   TOTAL BILIRUBIN mg/dL  --   --  0 60   ALK PHOS U/L  --   --  93   ALT U/L  --   --  42   AST U/L  --   --  70*   GLUCOSE RANDOM mg/dL 131   < > 83    < > = values in this interval not displayed           Results from last 7 days   Lab Units 12/28/21  1124 12/28/21  0717 12/28/21  0003 12/27/21  2157 12/27/21  1611 12/27/21  1059 12/27/21  0737 12/26/21  1522 12/26/21  1203 12/26/21  0808 12/25/21  2222 12/25/21  1549   POC GLUCOSE mg/dl 204* 141* 138 181* 97 151* 134 163* 252* 131 170* 150*               Lines/Drains:  Invasive Devices  Report Peripheral Intravenous Line            Peripheral IV 12/27/21 Left Antecubital 1 day                  Telemetry:  Telemetry Orders (From admission, onward)             48 Hour Telemetry Monitoring  Continuous x 48 hours        References:    Telemetry Guidelines   Question:  Reason for 48 Hour Telemetry  Answer:  Acute Decompensated CHF (continuous diuretic infusion or total diuretic dose > 200 mg daily, associated electrolyte derangement, ionotropic drip, history of ventricular arrhythmia, or new EF <35%)                 Telemetry Reviewed: Sinus Bradycardia  Indication for Continued Telemetry Use: Syncope             Imaging:  None    Recent Cultures (last 7 days):         Last 24 Hours Medication List:   Current Facility-Administered Medications   Medication Dose Route Frequency Provider Last Rate    gabapentin  600 mg Oral TID PONCE Gardner      heparin (porcine)  5,000 Units Subcutaneous [place], [place]      insulin lispro  1-6 Units Subcutaneous TID [facility] Luciano Allen MD      levothyroxine  150 mcg Oral Early Morning [place], [place]      metoprolol succinate  25 mg Oral Daily PONCE Arguello      nystatin   Topical BID [place], [place]      pravastatin  10 mg Oral Daily [place], [place]      sacubitril-valsartan  1 tablet Oral BID PONCE Arguello      venlafaxine  75 mg Oral TID PONCE Gardnre          Today, Patient Was Seen By: Luciano Allen MD    **Please Note: This note may have been constructed using a voice recognition system  **

## 2021-12-28 NOTE — PROGRESS NOTES
Progress Note - Geriatric Medicine   Gerard Reddy 64 y o  female MRN: [id number]  Unit/Bed#: S -01 Encounter: [id number]      Assessment/Plan:  1 -dilated cardiomyopathy -patient had catheterization revealing clean coronaries  Etiology of her cardiomyopathy could be secondary to idiopathic which is about 50% of the cases, infiltrative process, stress-induced, genetic, toxic, tachycardia mediated, sarcoidosis, end-stage renal disease, autoimmune disease, endocrine dysfunction, nutritional deficiencies  Patient has had increased stress with the loss of her daughter and  within this past year  However her echo does not demonstrate apical ballooning of the ventricle which is seen in [place]'s  Patient will be getting a life jacket papers are being filled out     2 -syncope -possible etiologies include orthostasis, hypoglycemia, cardiac arrhythmia  3 -depression -she scored a 9 on the PHQ-9 have spoken with her family physician about increasing venlafaxine to 150 mg when she returns to her service  4 -diabetes mellitus type 2 with episodes of hypoglycemia  -patient's Amaryl should be discontinued to avoid hypoglycemia specially with a low GFR  Consider replacement with low-dose Januvia 25 mg orally daily  5 -chronic renal failure stage 3B -patient's GFR is 37 her creatinine is back to baseline at 1 34     6 -weight loss -patient had a 50 lb weight loss after the loss of her daughter and   7 -hypothyroidism -continue with levothyroxine 150 mcg daily TSH within normal limits  Subjective:   Patient will be going to physical therapy social service looking into different sites that will be able to accommodate her life jacket  Review of Systems   Constitutional: Negative  HENT: Negative  Eyes: Negative  Respiratory: Negative  Cardiovascular: Negative  Gastrointestinal: Negative  Endocrine: Negative  Genitourinary: Negative  Musculoskeletal: Negative      Skin: Negative  Neurological: Negative  Hematological: Negative  Psychiatric/Behavioral: Negative  Objective:     Vitals: Blood pressure 139/65, pulse 55, temperature 97 9 °F (36 6 °C), temperature source Oral, resp  rate 20, height 5' 1" (1 549 m), weight 102 kg (224 lb 13 9 oz), SpO2 92 %  ,Body mass index is 42 49 kg/m²  Intake/Output Summary (Last 24 hours) at 12/28/2021 1341  Last data filed at 12/28/2021 0900  Gross per 24 hour   Intake 0 ml   Output 200 ml   Net -200 ml       Current Medications: Reviewed    Physical Exam:   Physical Exam  Vitals and nursing note reviewed  Constitutional:       Appearance: She is obese  HENT:      Head: Normocephalic  Right Ear: Ear canal and external ear normal       Left Ear: Ear canal and external ear normal       Nose: Nose normal       Mouth/Throat:      Mouth: Mucous membranes are moist       Pharynx: Oropharynx is clear  Eyes:      Conjunctiva/sclera: Conjunctivae normal       Pupils: Pupils are equal, round, and reactive to light  Cardiovascular:      Rate and Rhythm: Normal rate  Pulses: Normal pulses  Heart sounds: Normal heart sounds  Pulmonary:      Effort: Pulmonary effort is normal    Abdominal:      General: Bowel sounds are normal    Musculoskeletal:         General: Normal range of motion  Cervical back: Normal range of motion  Skin:     General: Skin is warm  Neurological:      General: No focal deficit present  Mental Status: She is alert and oriented to person, place, and time  Mental status is at baseline  Psychiatric:         Behavior: Behavior normal          Thought Content: Thought content normal          Judgment: Judgment normal           Invasive Devices  Report    Peripheral Intravenous Line            Peripheral IV 12/27/21 Left Antecubital 1 day                Lab, Imaging and other studies: I have personally reviewed pertinent reports

## 2021-12-28 NOTE — ASSESSMENT & PLAN NOTE
· Patient reports falling "multiple times a day"  · Has area rugs, doesn't feel comfortable walking on hardwood floors despite wearing shoes  · Patient agreed to post acute rehab  Looking for placement

## 2021-12-28 NOTE — ASSESSMENT & PLAN NOTE
Lab Results   Component Value Date    EGFR 37 12/28/2021    EGFR 42 12/27/2021    EGFR 37 12/25/2021    CREATININE 1 34 (H) 12/28/2021    CREATININE 1 21 12/27/2021    CREATININE 1 35 (H) 12/25/2021     · Baseline Cr appears to be 1 7-1 9  · Monitor Cr while admitted, renal adjust medications  · Avoid hypotension and nephrotoxic agents

## 2021-12-28 NOTE — PHYSICAL THERAPY NOTE
PHYSICAL THERAPY NOTE          Patient Name: Arlin Pond  [id number] Date: 2021 1146   PT Last Visit   PT Visit Date 21   Note Type   Note Type Treatment   Pain Assessment   Pain Assessment Tool 0-10   Pain Score No Pain   Hospital Pain Intervention(s) Repositioned; Ambulation/increased activity; Emotional support; Rest   Multiple Pain Sites No   Restrictions/Precautions   Weight Bearing Precautions Per Order No   Other Precautions Bed Alarm; Fall Risk;Pain   General   Chart Reviewed Yes   Response to Previous Treatment Patient with no complaints from previous session  Family/Caregiver Present No   Cognition   Overall Cognitive Status Unable to assess   Arousal/Participation Alert; Responsive; Cooperative   Attention Attends with cues to redirect   Orientation Level Oriented X4   Memory Within functional limits   Following Commands Follows multistep commands without difficulty   Comments pt identified by name and     Subjective   Subjective pt was agreeable to participate in PT intervention    Bed Mobility   Supine to Sit 5  Supervision   Additional items Assist x 1; Increased time required; Bedrails;HOB elevated;Verbal cues   Sit to Supine 4  Minimal assistance   Additional items Assist x 1;HOB elevated; Bedrails; Increased time required;Verbal cues;LE management   Additional Comments post tx session pt in supine w/ call bell and all pt needs met    Transfers   Sit to Stand 4  Minimal assistance   Additional items Assist x 1; Increased time required;Verbal cues   Stand to Sit 4  Minimal assistance   Additional items Assist x 1; Increased time required;Verbal cues   Stand pivot Unable to assess   Toilet transfer Unable to assess   Additional Comments pt continues to require rW to complete all functional transfers safely ion todays tx session    Ambulation/Elevation   Gait pattern Decreased foot clearance; Foward Patient Education        Learning About Hysterectomy Surgery  What is a hysterectomy? A hysterectomy is surgery to take out the uterus. Sometimes the ovaries and fallopian tubes also are removed at the same time. How is this surgery done? There are many ways to do the surgery. The type you have depends on your medical condition. It also depends on your overall health. Talk with your doctor about which type is right for you. Abdominal surgery  This is done through a cut, called an incision, that the doctor makes in the lower belly. Your doctor may do surgery this way if:  · You have a lot of scar tissue around your uterus. · Your uterus is very large. · You have cancer of the uterus or cervix. · Your doctor needs to check the area around your uterus for problems. Vaginal surgery  This is done through the vagina. Your doctor may do surgery this way if:  · Your uterus is normal in size. The smaller size means it can be removed easily. · You do not have endometriosis. (It is a problem that causes tissue in the uterus to grow in other parts of the body.)  · You do not have cancer of the uterus or cervix. Laparoscopically assisted vaginal surgery  This is done through the vagina and one or more small cuts in the belly. The doctor will insert a laparoscope and special tools through the cuts in the belly. The scope and tools help free the uterus. Then it is removed through the vagina. Laparoscopic supracervical surgery  This is done through several small cuts in the belly. The doctor inserts a scope and special tools through the cuts in the belly. He or she takes out the uterus in small pieces through the cuts. The cervix is left in place. This is an option when cancer of the cervix is not a concern. In rare cases, during a vaginal or laparoscopic surgery, the doctor may find a problem that makes abdominal surgery a better choice.  If this happens, the doctor will take out the uterus through a cut in the lower belly.  What can you expect after your surgery? Recovery can take from 4 to 6 weeks. It depends on which type of surgery you have. You will need help around the house while you get better. You won't be able to do any heavy lifting. And you will have to take it easy for a few weeks. It is normal to feel more tired than usual. You also may notice that your emotions go up and down more than usual for a while. After surgery, you will no longer have periods. You will not be able to get pregnant. If there is a chance that you will want to have a baby in the future, talk to your doctor about other treatment options. The surgery should not lower your interest in sex after you have healed. In fact, some women enjoy sex more. They no longer have to worry about birth control or heavy bleeding. Some women have vaginal dryness after the surgery. It can make sex less comfortable. A vaginal lubricant, such as Astroglide or K-Y Jelly, can help. You may need to take hormone pills after your surgery if your ovaries are removed and you have not gone through menopause. Taking out the ovaries before menopause causes a sudden drop in the hormone estrogen. This increases your risk of getting weak and brittle bones (osteoporosis). Although estrogen therapy lowers this risk, it does slightly raise the risk of some other problems. These include breast cancer and stroke. Talk with your doctor about the pros and cons of taking estrogen if you have your ovaries removed. Follow-up care is a key part of your treatment and safety. Be sure to make and go to all appointments, and call your doctor if you are having problems. It is also a good idea to know your test results and keep a list of the medicines you take. Where can you learn more? Go to http://elena-sima.info/. Enter 450 [id number] in the search box to learn more about \"Learning About Hysterectomy Surgery. \"  Current as of: February 19, 2019  Content Version: 12.1  © flexed; Short stride; Excessively slow   Gait Assistance 4  Minimal assist   Additional items Assist x 1;Verbal cues   Assistive Device Rolling walker   Distance 25'x2 RW    Balance   Static Sitting Good   Dynamic Sitting Fair -   Static Standing Poor +   Dynamic Standing Fair -   Ambulatory Fair -   Activity Tolerance   Activity Tolerance Patient limited by fatigue   Nurse Made Aware Spoke to RN    Exercises   Quad Sets Supine;15 reps;AROM; Bilateral   Heelslides Supine;10 reps;AROM; Bilateral   Hip Adduction Sitting;20 reps;AROM; Bilateral  (pillow squeezes )   Knee AROM Short Arc Quad Supine;15 reps;AROM; Bilateral   Knee AROM Long Arc Quad Sitting;20 reps;AROM; Bilateral   Ankle Pumps Sitting;20 reps;AROM; Bilateral   Marching Sitting;10 reps;AROM; Bilateral   Assessment   Prognosis Fair   Problem List Decreased strength;Decreased range of motion;Decreased endurance; Impaired balance;Decreased mobility; Decreased coordination;Obesity; Decreased skin integrity   Assessment pt began tx session lying supine in the bed and was agreeable to participate in PT intervention  Slight progress was made in [facility] tx session as pt was able to complete a supine<>sit EOB transfer with a decrease in level of asisstance required /s with VC's for hand placement and useage of bed rails to pull up into a seated EOB position  pt was able to increase static/dynamic sitting EOB balance by performing TE activities w/o LOB for 8 minutes  multiple STS were performed in [facility] tx session in order to strengthen LE's and increase endurance and safety w/ all functional transfers to and from RW  pt remained consistant w/ min Ax1 for all functional transfers in [facility] tx session  pt continues to be functioning below baseline level and remains limited with functional mobility including the inability to ambulate household distances in [facility] tx session pt continues to demonstrate a decrease in activity tolerance   pt would benefit from continued focus on OOB 8836-0023 [employer]. Care instructions adapted under license by [employer] (which disclaims liability or warranty for this information). If you have questions about a medical condition or this instruction, always ask your healthcare professional. [address] any warranty or liability for your use of this information. mobility with progression of ambulation distance as appropriate  continue to recommend post acute rehab services at the time of D/C in order to maximize pt functional independence and safety w/ all OOB mobility  post tx session pt lying supien in the bed w/ call bell and all pt needs met    Goals   Patient Goals pt stated that she would like to go home and post tx session would like to get back into bed    STG Expiration Date 01/06/21   Plan   Treatment/Interventions ADL retraining;Functional transfer training;LE strengthening/ROM; Elevations; Therapeutic exercise; Endurance training;Patient/family training;Equipment eval/education; Bed mobility;Gait training;Spoke to nursing   PT Frequency 4-6x/wk   Recommendation   PT Discharge Recommendation Post acute rehabilitation services   AM-PAC Basic Mobility Inpatient   Turning in Bed Without Bedrails 2   Lying on Back to Sitting on Edge of Flat Bed 2   Moving Bed to Chair 3   Standing Up From Chair 3   Walk in Room 3   Climb 3-5 Stairs 2   Basic Mobility Inpatient Raw Score 15   Basic Mobility Standardized Score 36 97   Highest Level Of Mobility   JH-HL Goal 4: Move to chair/commode   JH-HLM Highest Level of Mobility 7: Walk 25 feet or more   JH-HLM Goal Achieved Yes   Education   Education Provided Mobility training;Assistive device   Patient Demonstrates acceptance/verbal understanding   End of Consult   Patient Position at End of Consult Supine   The patient's AM-PAC Basic Mobility Inpatient Short Form Raw Score is 15  A Raw score of less than or equal to 16 suggests the patient may benefit from discharge to post-acute rehabilitation services  Please also refer to the recommendation of the Physical Therapist for safe discharge planning        Princess Zhao, PTA

## 2021-12-28 NOTE — ASSESSMENT & PLAN NOTE
· Patient on high dose Gabapentin for 6+ years  · Home regimen: 300 mg x3 in AM, 300 mg x2 in afternoon, 300 mg x3 in evening  · May be contributing to frequent falls

## 2021-12-28 NOTE — PROGRESS NOTES
Cardiology Progress Note - Team 1  Kendell Blue 64 y o  female MRN: [id number]  Unit/Bed#: S -01 Encounter: [id number]      Assessment/Plan:  1   Syncope  - secondary to new cardiomyopathy verses ventricular tachycardia verses hypoglycemia  - echo 12/23/2021 - LVEF 30%, normal wall motion, severe global hypokinesis with regional variation, grade 2 DD, mild to moderate MR  - presenting EKG - NSR, left axis deviation, LBBB  - telemetry review - sinus bradycardia, heart rates in the 40s to 50s, no significant events noted  - continue to monitor on telemetry     2  Elevated troponin  - very minimally elevated  - likely secondary to new cardiomyopathy  - HS troponin 28 > 51 > 75  - cardiac catheterization yesterday to rule out ischemia - no CAD noted      3  Hypertension  - last documented blood pressure 139/65; stable  - continue Entresto, Toprol XL     5  New CM w/ LVEF 30%  - unclear etiology  - catheterization as noted above  - continue Entresto, Toprol XL for GDMT  - volume status stable -hold home diuretic regimen given slight bump in kidney function this morning (am Cr 1 34) likely contrast induced from catheterization  - orders placed for Life Vest at discharge     6  Hyperlipidemia   - lipid profile 08/11/2021 - / /triglycerides 151/ HDL 45/ LDL 86  - continue pravastatin 10 mg p o  Daily     7  Type 2 DM  - last hemoglobin A1c 6 5%  - management per primary team  - hypoglycemia may have also been contributing to episode of syncope (random glucose on a 12/23 - 44)     8  Mild aortic stenosis  - not seen to be significant on echocardiogram, likely aortic sclerosis  - echo as noted above  - continue outpatient surveillance     9  Hypothyroidism  - TSH 2 221  - continue levothyroxine     10  Morbid obesity  - BMI 42 32  - recommend lifestyle/diet modifications    Subjective:   Patient seen and examined  No significant events overnight  Patient feels well this morning    States that she does have physical therapy and it went excellent    Patient denies any lightheadedness, dizziness, shortness of breath, or chest pain  Objective:   Vitals: Blood pressure 139/65, pulse 55, temperature 97 9 °F (36 6 °C), temperature source Oral, resp  rate 20, height 5' 1" (1 549 m), weight 102 kg (224 lb 13 9 oz), SpO2 92 %  , Body mass index is 42 49 kg/m² ,   Orthostatic Blood Pressures      Most Recent Value   Blood Pressure 139/65 filed at 12/28/2021 0648   Patient Position - Orthostatic VS Lying filed at 12/28/2021 9192          Intake/Output Summary (Last 24 hours) at 12/28/2021 1146  Last data filed at 12/28/2021 0900  Gross per 24 hour   Intake 0 ml   Output 200 ml   Net -200 ml     Physical Exam:  GEN: Pj Mathur appears well, alert and oriented x 3, pleasant and cooperative   HEENT: pupils equal, round, and reactive to light; extraocular muscles intact, bruising to left side of face  NECK: supple, no carotid bruits   HEART: regular rhythm, bradycardic, normal S1 and S2, no murmurs, clicks, gallops or rubs   LUNGS: clear to auscultation bilaterally; no wheezes, rales, or rhonchi   ABDOMEN: normal bowel sounds, soft, no tenderness, no distention, obese  EXTREMITIES: peripheral pulses normal; no clubbing, cyanosis, or edema    Medications:    Current Facility-Administered Medications:     gabapentin (NEURONTIN) capsule 600 mg, 600 mg, Oral, TID, PONCE Montesinos, 600 mg at 12/28/21 0350    heparin (porcine) subcutaneous injection 5,000 Units, 5,000 Units, Subcutaneous, Q8H [facility], 5,000 Units at 12/28/21 3962 **AND** Platelet count, , , Once, PONCE Montesinos    insulin lispro (HumaLOG) 100 units/mL subcutaneous injection 1-6 Units, 1-6 Units, Subcutaneous, TID AC, 1 Units at 12/27/21 1125 **AND** Fingerstick Glucose (POCT), , , TID AC, Ching Polo MD    levothyroxine tablet 150 mcg, 150 mcg, Oral, Early Morning, PONCE Lundy, 150 mcg at 12/28/21 6948  [facility] metoprolol succinate (TOPROL-XL) 24 hr tablet 25 mg, 25 mg, Oral, Daily, PONCE Cruz, 25 mg at 12/28/21 3470    nystatin (MYCOSTATIN) powder, , Topical, BID, PONCE Lundy, Given at 12/28/21 0867    pravastatin (PRAVACHOL) tablet 10 mg, 10 mg, Oral, Daily, PONCE Crooks, 10 mg at 12/28/21 2533    sacubitril-valsartan (ENTRESTO) 24-26 MG per tablet 1 tablet, 1 tablet, Oral, BID, PONCE Lunsford, 1 tablet at 12/28/21 0916    venlafaxine [facility]) tablet 75 mg, 75 mg, Oral, TID, PONCE Crooks, 75 mg at 12/28/21 6338     Labs & Results:      Results from last 7 days   Lab Units 12/28/21  0514 12/27/21  0540 12/24/21  0559   WBC Thousand/uL 6 70 7 63 8 83   HEMOGLOBIN g/dL 10 8* 11 6 11 6   HEMATOCRIT % 34 9 36 3 36 3   PLATELETS Thousands/uL 139* 192 185         Results from last 7 days   Lab Units 12/28/21  0514 12/27/21  0540 12/25/21  0436 12/23/21  0509 12/22/21  1753   POTASSIUM mmol/L 4 2 4 3 4 7   < > 5 6*   CHLORIDE mmol/L 109* 107 108   < > 106   CO2 mmol/L 28 28 29   < > 28   BUN mg/dL 21 22 17   < > 16   CREATININE mg/dL 1 34* 1 21 1 35*   < > 1 38*   CALCIUM mg/dL 9 1 9 2 9 0   < > 9 3   ALK PHOS U/L  --   --   --   --  93   ALT U/L  --   --   --   --  42   AST U/L  --   --   --   --  70*    < > = values in this interval not displayed           Results from last 7 days   Lab Units 12/22/21  1753   MAGNESIUM mg/dL 1 4*

## 2021-12-29 LAB
ANION GAP SERPL CALCULATED.3IONS-SCNC: 8 MMOL/L (ref 4–13)
BUN SERPL-MCNC: 20 MG/DL (ref 5–25)
CALCIUM SERPL-MCNC: 9 MG/DL (ref 8.3–10.1)
CHLORIDE SERPL-SCNC: 106 MMOL/L (ref 100–108)
CO2 SERPL-SCNC: 26 MMOL/L (ref 21–32)
CREAT SERPL-MCNC: 1.36 MG/DL (ref 0.6–1.3)
ERYTHROCYTE [DISTWIDTH] IN BLOOD BY AUTOMATED COUNT: 15.9 % (ref 11.6–15.1)
GFR SERPL CREATININE-BSD FRML MDRD: 37 ML/MIN/1.73SQ M
GLUCOSE SERPL-MCNC: 125 MG/DL (ref 65–140)
GLUCOSE SERPL-MCNC: 140 MG/DL (ref 65–140)
GLUCOSE SERPL-MCNC: 163 MG/DL (ref 65–140)
GLUCOSE SERPL-MCNC: 213 MG/DL (ref 65–140)
GLUCOSE SERPL-MCNC: 221 MG/DL (ref 65–140)
HCT VFR BLD AUTO: 36.5 % (ref 34.8–46.1)
HGB BLD-MCNC: 11.3 G/DL (ref 11.5–15.4)
MCH RBC QN AUTO: 31.3 PG (ref 26.8–34.3)
MCHC RBC AUTO-ENTMCNC: 31 G/DL (ref 31.4–37.4)
MCV RBC AUTO: 101 FL (ref 82–98)
PLATELET # BLD AUTO: 154 THOUSANDS/UL (ref 149–390)
PMV BLD AUTO: 12.1 FL (ref 8.9–12.7)
POTASSIUM SERPL-SCNC: 4.4 MMOL/L (ref 3.5–5.3)
RBC # BLD AUTO: 3.61 MILLION/UL (ref 3.81–5.12)
SODIUM SERPL-SCNC: 140 MMOL/L (ref 136–145)
WBC # BLD AUTO: 5.38 THOUSAND/UL (ref 4.31–10.16)

## 2021-12-29 PROCEDURE — 99232 SBSQ HOSP IP/OBS MODERATE 35: CPT | Performed by: INTERNAL MEDICINE

## 2021-12-29 PROCEDURE — 85027 COMPLETE CBC AUTOMATED: CPT | Performed by: INTERNAL MEDICINE

## 2021-12-29 PROCEDURE — 82948 REAGENT STRIP/BLOOD GLUCOSE: CPT

## 2021-12-29 PROCEDURE — 80048 BASIC METABOLIC PNL TOTAL CA: CPT | Performed by: INTERNAL MEDICINE

## 2021-12-29 RX ORDER — FUROSEMIDE 20 MG/1
20 TABLET ORAL DAILY
Status: DISCONTINUED | OUTPATIENT
Start: 2021-12-29 | End: 2021-12-31 | Stop reason: HOSPADM

## 2021-12-29 RX ADMIN — VENLAFAXINE 75 MG: 37.5 TABLET ORAL at 09:20

## 2021-12-29 RX ADMIN — GABAPENTIN 600 MG: 300 CAPSULE ORAL at 21:05

## 2021-12-29 RX ADMIN — FUROSEMIDE 20 MG: 20 TABLET ORAL at 12:07

## 2021-12-29 RX ADMIN — PRAVASTATIN SODIUM 10 MG: 10 TABLET ORAL at 09:20

## 2021-12-29 RX ADMIN — GABAPENTIN 600 MG: 300 CAPSULE ORAL at 09:20

## 2021-12-29 RX ADMIN — HEPARIN SODIUM 5000 UNITS: 5000 INJECTION INTRAVENOUS; SUBCUTANEOUS at 21:05

## 2021-12-29 RX ADMIN — SACUBITRIL AND VALSARTAN 1 TABLET: 24; 26 TABLET, FILM COATED ORAL at 17:17

## 2021-12-29 RX ADMIN — VENLAFAXINE 75 MG: 37.5 TABLET ORAL at 21:05

## 2021-12-29 RX ADMIN — NYSTATIN: 100000 POWDER TOPICAL at 09:20

## 2021-12-29 RX ADMIN — NYSTATIN 1 APPLICATION: 100000 POWDER TOPICAL at 17:17

## 2021-12-29 RX ADMIN — INSULIN LISPRO 2 UNITS: 100 INJECTION, SOLUTION INTRAVENOUS; SUBCUTANEOUS at 12:07

## 2021-12-29 RX ADMIN — GABAPENTIN 600 MG: 300 CAPSULE ORAL at 16:21

## 2021-12-29 RX ADMIN — METOPROLOL SUCCINATE 25 MG: 25 TABLET, EXTENDED RELEASE ORAL at 09:20

## 2021-12-29 RX ADMIN — HEPARIN SODIUM 5000 UNITS: 5000 INJECTION INTRAVENOUS; SUBCUTANEOUS at 15:01

## 2021-12-29 RX ADMIN — HEPARIN SODIUM 5000 UNITS: 5000 INJECTION INTRAVENOUS; SUBCUTANEOUS at 05:53

## 2021-12-29 RX ADMIN — LEVOTHYROXINE SODIUM 150 MCG: 150 TABLET ORAL at 05:53

## 2021-12-29 RX ADMIN — VENLAFAXINE 75 MG: 37.5 TABLET ORAL at 16:21

## 2021-12-29 RX ADMIN — SACUBITRIL AND VALSARTAN 1 TABLET: 24; 26 TABLET, FILM COATED ORAL at 09:20

## 2021-12-29 NOTE — ASSESSMENT & PLAN NOTE
· Patient was sleeping in recliner and woke up on floor on left side +LOC and +head trauma  Arrived to ED via EMS  · Patient with known cardiac history, LBBB, and questionable ST changes on EMS 12 lead  · Echo 12/23/2021 30% LVEF grade 2 diastolic relaxation, with right ventricle dilation, left atrium dilation, moderate mitral regurgitation  · In ED, patient dizzy with movement and elevated trop, denies any CP  · CT face - no acute fractures  · CT head - no acute intracranial abnormality  · CXR shows fracture of the L 8th rib   · Cardiac catheterization 12/27: no obstruction, no intervention  · Etiology: Cardiogenic vs orthostatic versus hypoglycemia  · Orthostatics persistently positive, probably secondary to diabetic neuropathy    Plan  - Cardiology following:  Started on Entresto and metoprolol XL   - Currently waiting for placement to post acute rehab     - Fitted for Life vest, currently in patient room   - Per geriatrics consult, discontinue amaryl on discharge, start on Januvia 25mg daily   - Otherwise medically stable

## 2021-12-29 NOTE — CASE MANAGEMENT
Case Management Progress Note    Patient name Aung MYERS /S -01 MRN [id number]  : 1945 Date 2021       LOS (days): 6  Geometric Mean LOS (GMLOS) (days): 2 20  Days to GMLOS:-4        OBJECTIVE:        Current admission status: Inpatient  Preferred Pharmacy:   [address] [place] [address]  Phone: [phone] Fax: [phone]    Primary Care Provider: Edi Calixto DO    Primary Insurance: MEDICARE  Secondary Insurance: BLUE CROSS    PROGRESS NOTE:    Response received from [facility] stating that they will not be able to accept patient until Friday,  and will need COVID test/resulted within 48 hours of discharge date  Patient will also need to arrive to facility by noon in order to confirm acceptance for that day  No other bed offers received at this time  Updates sent via [facility]; awaiting responses  Will follow-up with patient re: bed at [facility] vs any other offers received

## 2021-12-29 NOTE — PROGRESS NOTES
[address] [facility] NOTE   Jeffry Jade 64 y o  female MRN: [id number]  Unit/Bed#: S -01 Encounter: [id number]  Reason for Consult: CKD    ASSESSMENT and PLAN:  1  CKD IV:  · Baseline creatinine 1 7 to 1 9    · Saw Dr Urvashi Sheets in July 2020  · Renal function stable  SCr 1 36 today  · No evidence of contrast nephropathy after cardiac cath  2  HTN:  · BP is controlled  · Continue Metoprolol succ 25 mg daily,    3  Cardiomyopathy  · S/p cardiac cath on 12/27/21 - no significant CAD  · Continue Metoprolol succ 25 mg daily, Entresto 24/26 mg daily  4  MBD:  · PTH was above goal based on Oct 2020 labs  · Will need to repeat as an outpatient after discharge  · Phos is at goal      DISPOSITION:  · Renal function is stable  · No evidence of contrast neph at this time  · May be discharged from renal standpoint  · Check RFP, Mg, PTH 1 week after discharge  · Will need to reestablish nephrology care upon discharge  SUBJECTIVE / 24H INTERVAL HISTORY:  No new complaints  No CP or SOB  OBJECTIVE:  Current Weight: Weight - Scale: 102 kg (224 lb 13 9 oz)  Vitals:    12/28/21 0624 12/28/21 0713 12/28/21 1520 12/29/21 0700   BP: 143/62 139/65 128/59 139/65   BP Location: Right arm Right arm Right arm Right arm   Pulse: 57 55 (!) 50 57   Resp:  20 19 18   Temp: 97 9 °F (36 6 °C) 97 9 °F (36 6 °C) 97 8 °F (36 6 °C) 98 2 °F (36 8 °C)   TempSrc: Oral Oral Oral Oral   SpO2: 94% 92% 93% 92%   Weight:       Height:         No intake or output data in the 24 hours ending 12/29/21 1045  General: conscious, cooperative, no distress  Skin: dry  Eyes: pink conjunctivae  ENT: moist mucous membranes  Chest/Lungs: equal chest expansion, clear breath sounds  CVS: distinct heart sounds, normal rate, regular rhythm, no rub  Abdomen: soft, non tender, non distended, normal bowel sounds  Extremities: no edema  : no lares catheter  Neuro: awake, alert     Psych: appropriate affect    Medications:    Current Facility-Administered Medications:     gabapentin (NEURONTIN) capsule 600 mg, 600 mg, Oral, TID, [employer], PONCE, 600 mg at 12/29/21 0920    heparin (porcine) subcutaneous injection 5,000 Units, 5,000 Units, Subcutaneous, Q8H [facility], 5,000 Units at 12/29/21 0553 **AND** Platelet count, , , Once, [employer], PONCE    insulin lispro (HumaLOG) 100 units/mL subcutaneous injection 1-6 Units, 1-6 Units, Subcutaneous, TID AC, 2 Units at 12/28/21 1240 **AND** Fingerstick Glucose (POCT), , , TID AC, Cheyanne Hardy MD    levothyroxine tablet 150 mcg, 150 mcg, Oral, Early Morning, PONCE Lundy, 150 mcg at 12/29/21 3340    metoprolol succinate (TOPROL-XL) 24 hr tablet 25 mg, 25 mg, Oral, Daily, PONCE Snell, 25 mg at 12/29/21 0920    nystatin (MYCOSTATIN) powder, , Topical, BID, PONCE Lundy, Given at 12/29/21 0920    pravastatin (PRAVACHOL) tablet 10 mg, 10 mg, Oral, Daily, PONCE Lundy, 10 mg at 12/29/21 0920    sacubitril-valsartan (ENTRESTO) 24-26 MG per tablet 1 tablet, 1 tablet, Oral, BID, PONCE Snell, 1 tablet at 12/29/21 0920    venlafaxine [facility]) tablet 75 mg, 75 mg, Oral, TID, [employer], CRNP, 75 mg at 12/29/21 0920    Laboratory Results:  Results from last 7 days   Lab Units 12/29/21  0956 12/28/21  0514 12/27/21  0540 12/25/21  0436 12/24/21  0559 12/23/21  0509 12/22/21  1753   WBC Thousand/uL 5 38 6 70 7 63  --  8 83 12 36* 15 04*   HEMOGLOBIN g/dL 11 3* 10 8* 11 6  --  11 6 12 2 14 0   HEMATOCRIT % 36 5 34 9 36 3  --  36 3 38 9 43 6   PLATELETS Thousands/uL 154 139* 192  --  185 205 232   POTASSIUM mmol/L 4 4 4 2 4 3 4 7 4 5 4 3 5 6*   CHLORIDE mmol/L 106 109* 107 108 106 108 106   CO2 mmol/L 26 28 28 29 30 28 28   BUN mg/dL 20 21 22 17 18 18 16   CREATININE mg/dL 1 36* 1 34* 1 21 1 35* 1 51* 1 69* 1 38*   CALCIUM mg/dL 9 0 9 1 9 2 9 0 8 5 9 3 9 3   MAGNESIUM mg/dL  --   --   --   --   --   --  1 4*   PHOSPHORUS mg/dL  --  3 6  --   --   --   --   --

## 2021-12-29 NOTE — ASSESSMENT & PLAN NOTE
BP in control    Plan:  - started on metoprolol succinate 25mg daily given new CHF (in place of home nebivolol)   - restarted home lasix 20mg daily

## 2021-12-29 NOTE — PROGRESS NOTES
[facility]  Progress Note - Arlin Pond 1945, 64 y o  female MRN: [id number]  Unit/Bed#: S -01 Encounter: [id number]  Primary Care Provider: Trudy Cosby DO   Date and time admitted to hospital: 12/22/2021  4:57 PM    * Syncope  Assessment & Plan  · Patient was sleeping in recliner and woke up on floor on left side +LOC and +head trauma  Arrived to ED via EMS  · Patient with known cardiac history, LBBB, and questionable ST changes on EMS 12 lead  · Echo 12/23/2021 30% LVEF grade 2 diastolic relaxation, with right ventricle dilation, left atrium dilation, moderate mitral regurgitation  · In ED, patient dizzy with movement and elevated trop, denies any CP  · CT face - no acute fractures  · CT head - no acute intracranial abnormality  · CXR shows fracture of the L 8th rib   · Cardiac catheterization 12/27: no obstruction, no intervention  · Etiology: Cardiogenic vs orthostatic versus hypoglycemia  · Orthostatics persistently positive, probably secondary to diabetic neuropathy    Plan  - Cardiology following:  Started on Entresto and metoprolol XL   - Currently waiting for placement to post acute rehab  - Fitted for Life vest, currently in patient room   - Per geriatrics consult, discontinue amaryl on discharge, start on Januvia 25mg daily   - Otherwise medically stable    Heart failure ([address] ) secondary to cardiomyopathy  Assessment & Plan  · Nuclear stress test: 12/7/2017 - small fixed inferoapical defect consistent with prior MI,  · Echo: 12/7/2017 - 60% EF, no wall motion abnormalities, G1DD, mild MR, mild AS   · Echo 12/23/2021 - 30% LVEF grade 2 diastolic relaxation, with right ventricle dilation, left atrium dilation, moderate mitral regurgitation  · Status post cardiac catheterization 12/27/2021, no obstruction noted    No intervention done  · Etiology: Nonischemic    Plan:  - Cardiology on board  - Continue on  sacubitril- valsartan OD  - Continue on Metroprolol 25 OD  - resume home lasix 20mg daily   - Life vest fitted and in patient room     Depression  Assessment & Plan  Gerontology on board  patient scored a 9 on her PHQ 9 depression scale  For dose increase of venlafaxine on outpatient visit per geriatrics recommendations       Chronic renal disease, stage IV [facility])  Assessment & Plan  Lab Results   Component Value Date    EGFR 37 12/29/2021    EGFR 37 12/28/2021    EGFR 42 12/27/2021    CREATININE 1 36 (H) 12/29/2021    CREATININE 1 34 (H) 12/28/2021    CREATININE 1 21 12/27/2021     · Baseline Cr appears to be 1 7-1 9  · Monitor Cr while admitted, renal adjust medications  · Avoid hypotension and nephrotoxic agents    Hypertension  Assessment & Plan  BP in control    Plan:  - started on metoprolol succinate 25mg daily given new CHF (in place of home nebivolol)   - restarted home lasix 20mg daily     Ambulatory dysfunction  Assessment & Plan  · Patient reports falling "multiple times a day"  · Has area rugs, doesn't feel comfortable walking on hardwood floors despite wearing shoes  · Patient agreed to post acute rehab  Looking for placement  Type 2 diabetes mellitus with stage 3 chronic kidney disease and hypertension [facility])  Assessment & Plan  Lab Results   Component Value Date    HGBA1C 6 5 08/04/2021       Recent Labs     12/28/21  1614 12/28/21  2113 12/29/21  0709 12/29/21  1056   POCGLU 141* 188* 140 213*       Blood Sugar Average: Last 72 hrs:  (P) 162 [id number]    Monitor accu-checks AC and HS      Per geriatrics consult, discontinue amaryl on discharge, start on Januvia 25mg daily    Price check was sent for empagliflozin and dapagliflozin, price is about 140 dollars for 1 month supply  o These SGLT2 inhibitors will be beneficial to both heart failure and diabetes  o This will be a lot cheaper in January, because is currently out of funds and her Medicare will renew after the new year    Diabetic peripheral neuropathy ([facility] [address] )  Assessment & Plan  · Patient on high dose Gabapentin for 6+ years  · Home regimen: 300 mg x3 in AM, 300 mg x2 in afternoon, 300 mg x3 in evening  · May be contributing to frequent falls      VTE Pharmacologic Prophylaxis: VTE Score: 6 High Risk (Score >/= 5) - Pharmacological DVT Prophylaxis Ordered: heparin  Sequential Compression Devices Ordered  Patient Centered Rounds: I performed bedside rounds with nursing staff today  Discussions with Specialists or Other Care Team Provider: cardiology, nephrology    Education and Discussions with Family / Patient: Patient declined call to   Current Length of Stay: 6 day(s)  Current Patient Status: Inpatient   Discharge Plan: Anticipate discharge tomorrow to rehab facility  Code Status: Level 2 - DNAR: but accepts endotracheal intubation    Subjective:   Doing well this morning  Denies any complaints  Denies any lightheadedness with ambulation to the bathroom  Denies any chest pain or shortness of breath  Endorses good appetite  Objective:     Vitals:   Temp (24hrs), Av 1 °F (36 7 °C), Min:97 8 °F (36 6 °C), Max:98 2 °F (36 8 °C)    Temp:  [97 8 °F (36 6 °C)-98 2 °F (36 8 °C)] 98 2 °F (36 8 °C)  HR:  [50-61] 61  Resp:  [16-19] 16  BP: (117-139)/(58-65) 122/58  SpO2:  [92 %-95 %] 95 %  Body mass index is 42 49 kg/m²  Input and Output Summary (last 24 hours): Intake/Output Summary (Last 24 hours) at 2021 1456  Last data filed at 2021 1300  Gross per 24 hour   Intake 120 ml   Output --   Net 120 ml       Physical Exam:   Physical Exam  Vitals and nursing note reviewed  Constitutional:       General: She is not in acute distress  Appearance: Normal appearance  She is not diaphoretic  HENT:      Head:      Comments: Significant bruising (healing) across left face     Mouth/Throat:      Mouth: Mucous membranes are moist    Eyes:      Conjunctiva/sclera: Conjunctivae normal    Cardiovascular:      Rate and Rhythm: Regular rhythm  Bradycardia present  Pulses: Normal pulses  Heart sounds: Normal heart sounds  No murmur heard  No friction rub  No gallop  Pulmonary:      Effort: Pulmonary effort is normal       Breath sounds: Normal breath sounds  No stridor  No wheezing, rhonchi or rales  Abdominal:      General: Bowel sounds are normal       Palpations: Abdomen is soft  There is no mass  Tenderness: There is no abdominal tenderness  There is no guarding  Musculoskeletal:         General: No tenderness  Right lower leg: Edema (trace) present  Left lower leg: Edema (trace) present  Skin:     General: Skin is warm and dry  Capillary Refill: Capillary refill takes less than 2 seconds  Findings: Bruising (across left sided face) and lesion (bandaged wound of RLE, dressing clean dry and intact) present  Neurological:      General: No focal deficit present  Mental Status: She is alert and oriented to person, place, and time  Psychiatric:         Mood and Affect: Mood normal          Behavior: Behavior normal         Additional Data:     Labs:  Results from last 7 days   Lab Units 12/29/21  0956 12/27/21  0540 12/24/21  0559   WBC Thousand/uL 5 38   < > 8 83   HEMOGLOBIN g/dL 11 3*   < > 11 6   HEMATOCRIT % 36 5   < > 36 3   PLATELETS Thousands/uL 154   < > 185   NEUTROS PCT %  --   --  67   LYMPHS PCT %  --   --  18   MONOS PCT %  --   --  10   EOS PCT %  --   --  5    < > = values in this interval not displayed       Results from last 7 days   Lab Units 12/29/21  0956 12/23/21  0509 12/22/21  1753   SODIUM mmol/L 140   < > 143   POTASSIUM mmol/L 4 4   < > 5 6*   CHLORIDE mmol/L 106   < > 106   CO2 mmol/L 26   < > 28   BUN mg/dL 20   < > 16   CREATININE mg/dL 1 36*   < > 1 38*   ANION GAP mmol/L 8   < > 9   CALCIUM mg/dL 9 0   < > 9 3   ALBUMIN g/dL  --   --  3 0*   TOTAL BILIRUBIN mg/dL  --   --  0 60   ALK PHOS U/L  --   --  93   ALT U/L  --   --  42   AST U/L  --   --  70*   GLUCOSE RANDOM mg/dL 221*   < > 83    < > = values in this interval not displayed  Results from last 7 days   Lab Units 12/29/21  1056 12/29/21  0709 12/28/21  2113 12/28/21  1614 12/28/21  1124 12/28/21  0717 12/28/21  0003 12/27/21  2157 12/27/21  1611 12/27/21  1059 12/27/21  0737 12/26/21  1522   POC GLUCOSE mg/dl 213* 140 188* 141* 204* 141* 138 181* 97 151* 134 163*     Lines/Drains:  Invasive Devices  Report    Peripheral Intravenous Line            Peripheral IV 12/27/21 Left Antecubital 2 days              Imaging:   CT chest wo contrast   Final Result by Payam Rayo MD (12/23 8725)      Nothing to suggest pneumonia  Nondisplaced fracture of the anterolateral left 8th rib  Correlate with tenderness to determine if acute or old  Mild diffuse reticulation throughout the lungs with mild bronchiectasis in the left upper lobe and lingula, most likely due due to pulmonary fibrosis  Consider referral to pulmonology for further evaluation and management of probable early interstitial    lung disease  Pulmonary artery enlargement which can be seen with pulmonary hypertension  CT head without contrast   Final Result by Fabricio Fajardo MD (12/22 1936)      No acute intracranial abnormality  CT facial bones without contrast   Final Result by Fabricio Fajardo MD (12/22 1938)      No acute fracture  XR chest 1 view portable   ED Interpretation by Nati Olmos MD (12/22 8013)   Poor inspiratory effort, possible Left infiltrate? Final Result by Tc Jj MD (12/23 9269)      No acute cardiopulmonary disease          Recent Cultures (last 7 days):         Last 24 Hours Medication List:   Current Facility-Administered Medications   Medication Dose Route Frequency Provider Last Rate    furosemide  20 mg Oral Daily PONCE Dooley      gabapentin  600 mg Oral TID PONCE Ring      heparin (porcine)  5,000 Units Subcutaneous [address] PONCE Carrillo      insulin lispro  1-6 Units Subcutaneous TID [facility] Nataly Sam MD      levothyroxine  150 mcg Oral Early Morning Robyn Chilel, [address]      metoprolol succinate  25 mg Oral Daily PONCE Bruce      nystatin   Topical BID [facility] [place], [address]      pravastatin  10 mg Oral Daily [facility] [place], [address]      sacubitril-valsartan  1 tablet Oral BID PONCE Bruce      venlafaxine  75 mg Oral TID PONCE Oneil          Today, Patient Was Seen By: Anjana Rivera MD    **Please Note: This note may have been constructed using a voice recognition system  **

## 2021-12-29 NOTE — ASSESSMENT & PLAN NOTE
Lab Results   Component Value Date    EGFR 37 12/29/2021    EGFR 37 12/28/2021    EGFR 42 12/27/2021    CREATININE 1 36 (H) 12/29/2021    CREATININE 1 34 (H) 12/28/2021    CREATININE 1 21 12/27/2021     · Baseline Cr appears to be 1 7-1 9  · Monitor Cr while admitted, renal adjust medications  · Avoid hypotension and nephrotoxic agents

## 2021-12-29 NOTE — ASSESSMENT & PLAN NOTE
Gerontology on board   patient scored a 9 on her PHQ 9 depression scale  For dose increase of venlafaxine on outpatient visit per geriatrics recommendations

## 2021-12-29 NOTE — ASSESSMENT & PLAN NOTE
Lab Results   Component Value Date    HGBA1C 6 5 08/04/2021       Recent Labs     12/28/21  1614 12/28/21  2113 12/29/21  0709 12/29/21  1056   POCGLU 141* 188* 140 213*       Blood Sugar Average: Last 72 hrs:  (P) 162 [id number]    Monitor accu-checks AC and HS      Per geriatrics consult, discontinue amaryl on discharge, start on Januvia 25mg daily    Price check was sent for empagliflozin and dapagliflozin, price is about 140 dollars for 1 month supply  o These SGLT2 inhibitors will be beneficial to both heart failure and diabetes  o This will be a lot cheaper in January, because is currently out of funds and her Medicare will renew after the new year

## 2021-12-29 NOTE — PROGRESS NOTES
Cardiology Progress Note - Team 1  Suzen Phalen 64 y o  female MRN: [id number]  Unit/Bed#: S -01 Encounter: [id number]      Assessment/Plan:  1   Syncope  - secondary to new cardiomyopathy verses ventricular tachycardia verses hypoglycemia  - echo 12/23/2021 - LVEF 30%, normal wall motion, severe global hypokinesis with regional variation, grade 2 DD, mild to moderate MR  - presenting EKG - NSR, left axis deviation, LBBB  - telemetry review - sinus bradycardia, heart rates in the 40s to 50s, no significant events noted  - continue to monitor on telemetry     2  Elevated troponin  - very minimally elevated  - likely secondary to new cardiomyopathy  - HS troponin 28 > 51 > 75  - cardiac catheterization 12/27 to rule out ischemia - no CAD noted      3  Hypertension  - last documented blood pressure 139/65; stable  - continue Entresto, Toprol XL     5  New CM w/ LVEF 30%  - unclear etiology  - catheterization as noted above  - continue Entresto, Toprol XL for GDMT  - volume status stable - resume home diuretic lasix 20 mg po daily  - LifeVest at bedside     6  Hyperlipidemia   - lipid profile 08/11/2021 - / /triglycerides 151/ HDL 45/ LDL 86  - continue pravastatin 10 mg p o  Daily     7  Type 2 DM  - last hemoglobin A1c 6 5%  - management per primary team  - hypoglycemia may have also been contributing to episode of syncope (random glucose on a 12/23 - 44)     8  Mild aortic stenosis  - not seen to be significant on echocardiogram, likely aortic sclerosis  - echo as noted above  - continue outpatient surveillance     9  Hypothyroidism  - TSH 2 221  - continue levothyroxine     10  Morbid obesity  - BMI 42 32  - recommend lifestyle/diet modifications    At this time, Cardiology will sign off  Please contact us with any further questions or concerns  Subjective:   Patient seen and examined  No significant events overnight  Patient feels well this morning   Denies any shortness of breath, chest pain, palpitations, or diaphoresis  Objective:   Vitals: Blood pressure 139/65, pulse 57, temperature 98 2 °F (36 8 °C), temperature source Oral, resp  rate 18, height 5' 1" (1 549 m), weight 102 kg (224 lb 13 9 oz), SpO2 92 %  , Body mass index is 42 49 kg/m² ,   Orthostatic Blood Pressures      Most Recent Value   Blood Pressure 139/65 filed at 12/29/2021 0700   Patient Position - Orthostatic VS Lying filed at 12/29/2021 0700        No intake or output data in the 24 hours ending 12/29/21 1029    Physical Exam:  GEN: Betty Grace appears well, alert and oriented x 3, pleasant and cooperative   HEENT: pupils equal, round, and reactive to light; extraocular muscles intact, bruising to left side of face  NECK: supple, no carotid bruits   HEART: regular rhythm, bradycardic, normal S1 and S2, no murmurs, clicks, gallops or rubs   LUNGS: clear to auscultation bilaterally; no wheezes, rales, or rhonchi   ABDOMEN: normal bowel sounds, soft, no tenderness, no distention, obese  EXTREMITIES: peripheral pulses normal; no clubbing, cyanosis, trace b/l lower extremity edema    Medications:    Current Facility-Administered Medications:     gabapentin (NEURONTIN) capsule 600 mg, 600 mg, Oral, TID, [employer], CRNP, 600 mg at 12/29/21 0920    heparin (porcine) subcutaneous injection 5,000 Units, 5,000 Units, Subcutaneous, [address], 5,000 Units at 12/29/21 0553 **AND** Platelet count, , , Once, [employer], CRNP    insulin lispro (HumaLOG) 100 units/mL subcutaneous injection 1-6 Units, 1-6 Units, Subcutaneous, TID AC, 2 Units at 12/28/21 1240 **AND** Fingerstick Glucose (POCT), , , TID AC, Bebe Lane MD    levothyroxine tablet 150 mcg, 150 mcg, Oral, Early Morning, PONCE Lundy, 150 mcg at 12/29/21 4053    metoprolol succinate (TOPROL-XL) 24 hr tablet 25 mg, 25 mg, Oral, Daily, PONCE Cruz, 25 mg at 12/29/21 0920    nystatin (MYCOSTATIN) powder, , Topical, BID, Coral Balo Mickiel Beam, CRNP, Given at 12/29/21 0920    pravastatin (PRAVACHOL) tablet 10 mg, 10 mg, Oral, Daily, PONCE Lundy, 10 mg at 12/29/21 0920    sacubitril-valsartan (ENTRESTO) 24-26 MG per tablet 1 tablet, 1 tablet, Oral, BID, EliezersukhdevPONCE Yang, 1 tablet at 12/29/21 0920    venlafaxine [facility]) tablet 75 mg, 75 mg, Oral, TID, [facility], CRNP, 75 mg at 12/29/21 0920     Labs & Results:      Results from last 7 days   Lab Units 12/29/21  0956 12/28/21  0514 12/27/21  0540   WBC Thousand/uL 5 38 6 70 7 63   HEMOGLOBIN g/dL 11 3* 10 8* 11 6   HEMATOCRIT % 36 5 34 9 36 3   PLATELETS Thousands/uL 154 139* 192         Results from last 7 days   Lab Units 12/28/21  0514 12/27/21  0540 12/25/21  0436 12/23/21  0509 12/22/21  1753   POTASSIUM mmol/L 4 2 4 3 4 7   < > 5 6*   CHLORIDE mmol/L 109* 107 108   < > 106   CO2 mmol/L 28 28 29   < > 28   BUN mg/dL 21 22 17   < > 16   CREATININE mg/dL 1 34* 1 21 1 35*   < > 1 38*   CALCIUM mg/dL 9 1 9 2 9 0   < > 9 3   ALK PHOS U/L  --   --   --   --  93   ALT U/L  --   --   --   --  42   AST U/L  --   --   --   --  70*    < > = values in this interval not displayed           Results from last 7 days   Lab Units 12/22/21  1753   MAGNESIUM mg/dL 1 4*

## 2021-12-29 NOTE — ASSESSMENT & PLAN NOTE
· Nuclear stress test: 12/7/2017 - small fixed inferoapical defect consistent with prior MI,  · Echo: 12/7/2017 - 60% EF, no wall motion abnormalities, G1DD, mild MR, mild AS   · Echo 12/23/2021 - 30% LVEF grade 2 diastolic relaxation, with right ventricle dilation, left atrium dilation, moderate mitral regurgitation  · Status post cardiac catheterization 12/27/2021, no obstruction noted    No intervention done  · Etiology: Nonischemic    Plan:  - Cardiology on board  - Continue on  sacubitril- valsartan OD  - Continue on Metroprolol 25 OD  - resume home lasix 20mg daily   - Life vest fitted and in patient room

## 2021-12-29 NOTE — CASE MANAGEMENT
Case Management Progress Note    Patient name Gerard Reddy  Location S /S -01 MRN [id number]  : 1945 Date 2021       LOS (days): 6  Geometric Mean LOS (GMLOS) (days): 2 20  Days to GMLOS:-4        OBJECTIVE:        Current admission status: Inpatient  Preferred Pharmacy:   [address] [address]  Phone: [phone] Fax: [phone]    Primary Care Provider: Jaimee Cox DO    Primary Insurance: MEDICARE  Secondary Insurance: BLUE CROSS    PROGRESS NOTE:    Patient's LifeVest was fit yesterday evening,   Only rehab bed offers received at this time are for [address] [place] [address] and [facility]; [facility] states they do not have anything available today, but will follow for admission Thurs/Friday  Other facilities continuing to review, but no determinations received at this time  Met with patient at bedside to discuss same  Patient reports that she hasn't had a chance to review listing, but states that she does not want to go to [address] [place] [address] secondary to poor experiences she's heard from others  Patient aware of bed offer from [facility] would not be today  Requesting to review listing and see if any other bed offers are received  Will follow-up with patient if any additional offers received

## 2021-12-30 ENCOUNTER — TELEPHONE (OUTPATIENT)
Dept: NEPHROLOGY | Facility: CLINIC | Age: 76
End: 2021-12-30

## 2021-12-30 DIAGNOSIS — N18.4 CHRONIC RENAL DISEASE, STAGE IV (HCC): Primary | ICD-10-CM

## 2021-12-30 LAB
ALBUMIN SERPL BCP-MCNC: 2.4 G/DL (ref 3.5–5)
ALP SERPL-CCNC: 89 U/L (ref 46–116)
ALT SERPL W P-5'-P-CCNC: 42 U/L (ref 12–78)
ANION GAP SERPL CALCULATED.3IONS-SCNC: 7 MMOL/L (ref 4–13)
AST SERPL W P-5'-P-CCNC: 34 U/L (ref 5–45)
BILIRUB SERPL-MCNC: 0.45 MG/DL (ref 0.2–1)
BUN SERPL-MCNC: 21 MG/DL (ref 5–25)
CALCIUM ALBUM COR SERPL-MCNC: 10.3 MG/DL (ref 8.3–10.1)
CALCIUM SERPL-MCNC: 9 MG/DL (ref 8.3–10.1)
CHLORIDE SERPL-SCNC: 106 MMOL/L (ref 100–108)
CO2 SERPL-SCNC: 28 MMOL/L (ref 21–32)
CREAT SERPL-MCNC: 1.34 MG/DL (ref 0.6–1.3)
ERYTHROCYTE [DISTWIDTH] IN BLOOD BY AUTOMATED COUNT: 15.9 % (ref 11.6–15.1)
FLUAV RNA RESP QL NAA+PROBE: NEGATIVE
FLUBV RNA RESP QL NAA+PROBE: NEGATIVE
GFR SERPL CREATININE-BSD FRML MDRD: 37 ML/MIN/1.73SQ M
GLUCOSE SERPL-MCNC: 141 MG/DL (ref 65–140)
GLUCOSE SERPL-MCNC: 142 MG/DL (ref 65–140)
GLUCOSE SERPL-MCNC: 149 MG/DL (ref 65–140)
GLUCOSE SERPL-MCNC: 169 MG/DL (ref 65–140)
GLUCOSE SERPL-MCNC: 215 MG/DL (ref 65–140)
HCT VFR BLD AUTO: 34.7 % (ref 34.8–46.1)
HGB BLD-MCNC: 11 G/DL (ref 11.5–15.4)
MCH RBC QN AUTO: 30.9 PG (ref 26.8–34.3)
MCHC RBC AUTO-ENTMCNC: 31.7 G/DL (ref 31.4–37.4)
MCV RBC AUTO: 98 FL (ref 82–98)
PLATELET # BLD AUTO: 173 THOUSANDS/UL (ref 149–390)
PMV BLD AUTO: 12.3 FL (ref 8.9–12.7)
POTASSIUM SERPL-SCNC: 4.3 MMOL/L (ref 3.5–5.3)
PROT SERPL-MCNC: 5.1 G/DL (ref 6.4–8.2)
RBC # BLD AUTO: 3.56 MILLION/UL (ref 3.81–5.12)
RSV RNA RESP QL NAA+PROBE: NEGATIVE
SARS-COV-2 RNA RESP QL NAA+PROBE: NEGATIVE
SODIUM SERPL-SCNC: 141 MMOL/L (ref 136–145)
WBC # BLD AUTO: 6.4 THOUSAND/UL (ref 4.31–10.16)

## 2021-12-30 PROCEDURE — 82948 REAGENT STRIP/BLOOD GLUCOSE: CPT

## 2021-12-30 PROCEDURE — 85027 COMPLETE CBC AUTOMATED: CPT

## 2021-12-30 PROCEDURE — 0241U HB NFCT DS VIR RESP RNA 4 TRGT: CPT | Performed by: INTERNAL MEDICINE

## 2021-12-30 PROCEDURE — 99232 SBSQ HOSP IP/OBS MODERATE 35: CPT | Performed by: INTERNAL MEDICINE

## 2021-12-30 PROCEDURE — 80053 COMPREHEN METABOLIC PANEL: CPT

## 2021-12-30 RX ADMIN — GABAPENTIN 600 MG: 300 CAPSULE ORAL at 22:00

## 2021-12-30 RX ADMIN — SACUBITRIL AND VALSARTAN 1 TABLET: 24; 26 TABLET, FILM COATED ORAL at 10:10

## 2021-12-30 RX ADMIN — GABAPENTIN 600 MG: 300 CAPSULE ORAL at 17:46

## 2021-12-30 RX ADMIN — INSULIN LISPRO 1 UNITS: 100 INJECTION, SOLUTION INTRAVENOUS; SUBCUTANEOUS at 12:18

## 2021-12-30 RX ADMIN — HEPARIN SODIUM 5000 UNITS: 5000 INJECTION INTRAVENOUS; SUBCUTANEOUS at 22:00

## 2021-12-30 RX ADMIN — VENLAFAXINE 75 MG: 37.5 TABLET ORAL at 17:46

## 2021-12-30 RX ADMIN — SACUBITRIL AND VALSARTAN 1 TABLET: 24; 26 TABLET, FILM COATED ORAL at 17:46

## 2021-12-30 RX ADMIN — GABAPENTIN 600 MG: 300 CAPSULE ORAL at 10:10

## 2021-12-30 RX ADMIN — METOPROLOL SUCCINATE 25 MG: 25 TABLET, EXTENDED RELEASE ORAL at 10:10

## 2021-12-30 RX ADMIN — LEVOTHYROXINE SODIUM 150 MCG: 150 TABLET ORAL at 05:26

## 2021-12-30 RX ADMIN — PRAVASTATIN SODIUM 10 MG: 10 TABLET ORAL at 10:10

## 2021-12-30 RX ADMIN — NYSTATIN: 100000 POWDER TOPICAL at 17:46

## 2021-12-30 RX ADMIN — HEPARIN SODIUM 5000 UNITS: 5000 INJECTION INTRAVENOUS; SUBCUTANEOUS at 13:35

## 2021-12-30 RX ADMIN — FUROSEMIDE 20 MG: 20 TABLET ORAL at 10:10

## 2021-12-30 RX ADMIN — NYSTATIN: 100000 POWDER TOPICAL at 10:17

## 2021-12-30 RX ADMIN — VENLAFAXINE 75 MG: 37.5 TABLET ORAL at 10:10

## 2021-12-30 RX ADMIN — HEPARIN SODIUM 5000 UNITS: 5000 INJECTION INTRAVENOUS; SUBCUTANEOUS at 05:25

## 2021-12-30 RX ADMIN — VENLAFAXINE 75 MG: 37.5 TABLET ORAL at 22:00

## 2021-12-30 NOTE — ASSESSMENT & PLAN NOTE
Lab Results   Component Value Date    HGBA1C 6 5 08/04/2021       Recent Labs     12/29/21  1056 12/29/21  1542 12/29/21  2212 12/30/21  0727   POCGLU 213* 125 163* 142*       Blood Sugar Average: Last 72 hrs:  (P) 154 [id number]    Monitor accu-checks AC and HS      discontinue amaryl on discharge   Price check was sent for empagliflozin and dapagliflozin, price is about 140 dollars for 1 month supply  o SGLT2 inhibitors will be beneficial to both heart failure and diabetes  o This will be a lot cheaper in January, because currently out of funds and her Medicare will renew after the new year

## 2021-12-30 NOTE — PROGRESS NOTES
[facility]  Progress Note - Marilee Price 1945, 64 y o  female MRN: [id number]  Unit/Bed#: S -01 Encounter: [id number]  Primary Care Provider: Chon Smallwood DO   Date and time admitted to hospital: 12/22/2021  4:57 PM    * Syncope  Assessment & Plan  · Patient with known cardiac history, LBBB, and questionable ST changes on EMS 12 lead  · Echo 12/23/2021 30% LVEF grade 2 diastolic relaxation, with right ventricle dilation, left atrium dilation, moderate mitral regurgitation  · In ED, patient dizzy with movement and elevated trop, denied any CP  · CT face & CT head - no acute abnormalities   · CXR - fracture of the L 8th rib   · Cardiac catheterization 12/27: no obstruction, no intervention  · Etiology: Cardiogenic vs orthostatic vs hypoglycemia  · Orthostatics persistently positive, probably secondary to diabetic neuropathy    Plan  - Cardiology following: Started on Entresto and metoprolol XL   - Fitted for Life vest, currently in patient room   - Per geriatrics consult, discontinue amaryl on discharge, start on Januvia 25mg daily   - Otherwise medically stable for discharge to rehab facility     Heart failure ([facility] [address] ) secondary to cardiomyopathy  Assessment & Plan  · Nuclear stress test: 12/7/2017 - small fixed inferoapical defect consistent with prior MI,  · Echo: 12/7/2017 - 60% EF, no wall motion abnormalities, G1DD, mild MR, mild AS   · Echo 12/23/2021 - 30% LVEF grade 2 diastolic relaxation, with right ventricle dilation, left atrium dilation, moderate mitral regurgitation  · Status post cardiac catheterization 12/27/2021, no obstruction noted    No intervention done  · Etiology: Nonischemic    Plan:  - Cardiology on board  - Continue on sacubitril- valsartan  - Continue on Metroprolol 25   - Continue home lasix 20mg daily   - Life vest fitted and in patient room     Chronic renal disease, stage IV [facility])  Assessment & Plan  Lab Results   Component Value Date EGFR 37 12/30/2021    EGFR 37 12/29/2021    EGFR 37 12/28/2021    CREATININE 1 34 (H) 12/30/2021    CREATININE 1 36 (H) 12/29/2021    CREATININE 1 34 (H) 12/28/2021     · Baseline Cr appears to be 1 7-1 9  · Monitor Cr while admitted, renal adjust medications  · Avoid hypotension and nephrotoxic agents    Plan:  - per nephrology, patient will need outpatient follow-up with Nephrology with BMP within 2-3 weeks of discharge    Depression  Assessment & Plan  Gerontology on board  patient scored a 9 on her PHQ 9 depression scale  Gerontology recommend dose increase of venlafaxine on outpatient visit        Hypertension  Assessment & Plan  BP in control    Plan:  - started on metoprolol succinate 25mg daily given new CHF (in place of home nebivolol)   - continue home lasix 20mg daily     Ambulatory dysfunction  Assessment & Plan  · Patient reports falling "multiple times a day"  · Has area rugs, doesn't feel comfortable walking on hardwood floors despite wearing shoes  · Patient agreed to post acute rehab  Looking for placement  Type 2 diabetes mellitus with stage 3 chronic kidney disease and hypertension [facility])  Assessment & Plan  Lab Results   Component Value Date    HGBA1C 6 5 08/04/2021       Recent Labs     12/29/21  1056 12/29/21  1542 12/29/21  2212 12/30/21  0727   POCGLU 213* 125 163* 142*       Blood Sugar Average: Last 72 hrs:  (P) 154 [id number]    Monitor accu-checks AC and HS      Per geriatrics consult, discontinue amaryl on discharge, start on Januvia 25mg daily    Price check was sent for empagliflozin and dapagliflozin, price is about 140 dollars for 1 month supply  o SGLT2 inhibitors will be beneficial to both heart failure and diabetes  o This will be a lot cheaper in January, because currently out of funds and her Medicare will renew after the new year    Diabetic peripheral neuropathy ([facility] [address] )  Assessment & Plan  · Patient on high dose Gabapentin for 6+ years  · Home regimen: 300 mg x3 in AM, 300 mg x2 in afternoon, 300 mg x3 in evening  · May be contributing to frequent falls      VTE Pharmacologic Prophylaxis: VTE Score: 6 High Risk (Score >/= 5) - Pharmacological DVT Prophylaxis Ordered: heparin  Sequential Compression Devices Ordered  Patient Centered Rounds: I evaluated the patient without nursing staff present due to nursing staff busy assisting other patients  Discussions with Specialists or Other Care Team Provider: nephrology     Education and Discussions with Family / Patient: Updated  (granddaughter) via phone  Current Length of Stay: 7 day(s)  Current Patient Status: Inpatient   Discharge Plan: Anticipate discharge tomorrow to rehab facility  Code Status: Level 2 - DNAR: but accepts endotracheal intubation    Subjective:   Doing well this morning  Does not have any complaints  States that she is still not 100% sure about rehab  Again reiterated the importance of rehab for improving strength and balance  Spoke with patient's granddaughter who agreed with the importance of the patient attending rehab  States she will also help to impress the importance of rehab on the patient  Objective:     Vitals:   Temp (24hrs), Av 3 °F (36 8 °C), Min:98 1 °F (36 7 °C), Max:98 6 °F (37 °C)    Temp:  [98 1 °F (36 7 °C)-98 6 °F (37 °C)] 98 2 °F (36 8 °C)  HR:  [54-62] 59  Resp:  [16-20] 20  BP: (114-122)/(51-58) 122/58  SpO2:  [91 %-96 %] 91 %  Body mass index is 42 49 kg/m²  Input and Output Summary (last 24 hours): Intake/Output Summary (Last 24 hours) at 2021 1124  Last data filed at 2021 0600  Gross per 24 hour   Intake 360 ml   Output 400 ml   Net -40 ml       Physical Exam:   Physical Exam  Vitals and nursing note reviewed  Constitutional:       General: She is not in acute distress  Appearance: Normal appearance  She is not diaphoretic     HENT:      Head:      Comments: Resolving bruising across left side of face     Mouth/Throat:      Mouth: Mucous membranes are moist    Eyes:      Conjunctiva/sclera: Conjunctivae normal    Cardiovascular:      Rate and Rhythm: Normal rate and regular rhythm  Pulses: Normal pulses  Heart sounds: Normal heart sounds  No murmur heard  No friction rub  No gallop  Pulmonary:      Effort: Pulmonary effort is normal       Breath sounds: Normal breath sounds  No stridor  No wheezing, rhonchi or rales  Abdominal:      General: Bowel sounds are normal       Palpations: Abdomen is soft  There is no mass  Tenderness: There is no abdominal tenderness  There is no guarding  Musculoskeletal:      Right lower leg: Edema (trace) present  Left lower leg: Edema (trace) present  Skin:     General: Skin is warm and dry  Findings: Lesion (right LE with small wound on anterior shin, dressing clean, dry, and intact) present  Neurological:      General: No focal deficit present  Mental Status: She is alert and oriented to person, place, and time  Psychiatric:         Mood and Affect: Mood normal          Behavior: Behavior normal           Additional Data:     Labs:  Results from last 7 days   Lab Units 12/30/21  0554 12/27/21  0540 12/24/21  0559   WBC Thousand/uL 6 40   < > 8 83   HEMOGLOBIN g/dL 11 0*   < > 11 6   HEMATOCRIT % 34 7*   < > 36 3   PLATELETS Thousands/uL 173   < > 185   NEUTROS PCT %  --   --  67   LYMPHS PCT %  --   --  18   MONOS PCT %  --   --  10   EOS PCT %  --   --  5    < > = values in this interval not displayed       Results from last 7 days   Lab Units 12/30/21  0554   SODIUM mmol/L 141   POTASSIUM mmol/L 4 3   CHLORIDE mmol/L 106   CO2 mmol/L 28   BUN mg/dL 21   CREATININE mg/dL 1 34*   ANION GAP mmol/L 7   CALCIUM mg/dL 9 0   ALBUMIN g/dL 2 4*   TOTAL BILIRUBIN mg/dL 0 45   ALK PHOS U/L 89   ALT U/L 42   AST U/L 34   GLUCOSE RANDOM mg/dL 141*         Results from last 7 days   Lab Units 12/30/21  0727 12/29/21  2212 12/29/21  1542 12/29/21  1056 12/29/21  0709 12/28/21  2113 12/28/21  1614 12/28/21  1124 12/28/21  0717 12/28/21  0003 12/27/21  2157 12/27/21  1611   POC GLUCOSE mg/dl 142* 163* 125 213* 140 188* 141* 204* 141* 138 181* 97               Lines/Drains:  Invasive Devices  Report    Peripheral Intravenous Line            Peripheral IV 12/27/21 Left Antecubital 3 days                Imaging:   CT chest wo contrast   Final Result by Sammy Nash MD (12/23 2103)      Nothing to suggest pneumonia  Nondisplaced fracture of the anterolateral left 8th rib  Correlate with tenderness to determine if acute or old  Mild diffuse reticulation throughout the lungs with mild bronchiectasis in the left upper lobe and lingula, most likely due due to pulmonary fibrosis  Consider referral to pulmonology for further evaluation and management of probable early interstitial    lung disease  Pulmonary artery enlargement which can be seen with pulmonary hypertension  Workstation performed: [id number]         CT head without contrast   Final Result by Mukund Rosales MD (12/22 1936)      No acute intracranial abnormality  Workstation performed: [id number]         CT facial bones without contrast   Final Result by Mukund Rosales MD (12/22 1938)      No acute fracture  Workstation performed: [id number]         XR chest 1 view portable   ED Interpretation by Emma Miramontes MD (12/22 1853)   Poor inspiratory effort, possible Left infiltrate? Final Result by Sal Arguello MD (12/23 4840)      No acute cardiopulmonary disease             Recent Cultures (last 7 days):         Last 24 Hours Medication List:   Current Facility-Administered Medications   Medication Dose Route Frequency Provider Last Rate    furosemide  20 mg Oral Daily PONCE Dooley      gabapentin  600 mg Oral TID PONCE Rand      heparin (porcine)  5,000 Units Subcutaneous [facility] Robyn Chilel PONCE      insulin lispro  1-6 Units Subcutaneous TID [facility] Jermaine Springer MD      levothyroxine  150 mcg Oral Early Morning [place], [place]      metoprolol succinate  25 mg Oral Daily [place]      nystatin   Topical BID [place], [place]      pravastatin  10 mg Oral Daily [place], [place]      sacubitril-valsartan  1 tablet Oral BID PONCE Arguello      venlafaxine  75 mg Oral TID PONCE Carmona          Today, Patient Was Seen By: Jessika Granado MD    **Please Note: This note may have been constructed using a voice recognition system  **

## 2021-12-30 NOTE — ASSESSMENT & PLAN NOTE
· Patient with known cardiac history, LBBB, and questionable ST changes on EMS 12 lead  · Echo 12/23/2021 30% LVEF grade 2 diastolic relaxation, with right ventricle dilation, left atrium dilation, moderate mitral regurgitation  · In ED, patient dizzy with movement and elevated trop, denied any CP  · CT face & CT head - no acute abnormalities   · CXR - fracture of the L 8th rib   · Cardiac catheterization 12/27: no obstruction, no intervention  · Etiology: Cardiogenic vs orthostatic vs hypoglycemia  · Orthostatics persistently positive, probably secondary to diabetic neuropathy    Plan  - Cardiology following: Started on Entresto and metoprolol XL   - Fitted for Life vest, currently in patient room   - Per geriatrics consult, discontinue amaryl on discharge  - Otherwise medically stable for discharge to rehab facility

## 2021-12-30 NOTE — QUICK NOTE
Kidney function stable, no evidence of contrast induced nephropathy  Nephrology will sign off at this time  Thank you for involving us in this case  Please call us if any question      Patient will need outpatient follow-up with Nephrology with BMP within 2-3 weeks of discharge      Jameel Grimaldo MD  Nephrology Attending

## 2021-12-30 NOTE — ASSESSMENT & PLAN NOTE
· Patient was sleeping in recliner and woke up on floor on left side +LOC and +head trauma  Arrived to ED via EMS  · Patient with known cardiac history, LBBB, and questionable ST changes on EMS 12 lead  · Echo 12/23/2021 30% LVEF grade 2 diastolic relaxation, with right ventricle dilation, left atrium dilation, moderate mitral regurgitation  · In ED, patient dizzy with movement and elevated trop, denies any CP  · CT face & CT head - no acute abnormalities   · CXR - fracture of the L 8th rib   · Cardiac catheterization 12/27: no obstruction, no intervention  · Etiology: Cardiogenic vs orthostatic vs hypoglycemia  · Orthostatics persistently positive, probably secondary to diabetic neuropathy    Plan  - Cardiology following: Started on Entresto and metoprolol XL   - Currently waiting for placement to post acute rehab     - Fitted for Life vest, currently in patient room   - Per geriatrics consult, discontinue amaryl on discharge, start on Januvia 25mg daily   - Otherwise medically stable

## 2021-12-30 NOTE — CASE MANAGEMENT
Case Management Discharge Planning Note    Patient name Jeffry Jade  [facility] S /S -01 MRN [id number]  : 1945 Date 2021       Current Admission Date: 2021  Current Admission Diagnosis:Syncope   Patient Active Problem List    Diagnosis Date Noted    Depression 2021    Heart failure ([facility] ) secondary to cardiomyopathy 2021    Ambulatory dysfunction 2021    Syncope 2021    Chronic renal disease, stage IV ([facility] ) 2021    Type 2 diabetes mellitus with stage 3 chronic kidney disease and hypertension ([facility] ) 2019    Diabetic peripheral neuropathy ([facility] ) 2018    Morbid obesity with body mass index (BMI) of 50 0 to 59 9 in [facility]) 2018    Primary osteoarthritis of both knees 2018    Aortic stenosis 2018    Hypercholesterolemia 10/04/2017    Edema 10/08/2014    Arteriosclerosis of coronary artery 2013    Hypertension 2012      LOS (days): 7  Geometric Mean LOS (GMLOS) (days): 2 20  Days to GMLOS:-5     OBJECTIVE:  Risk of Unplanned Readmission Score: 11         Current admission status: Inpatient   Preferred Pharmacy:   [address] [place] [address] [address] [place] [address]  Phone: [phone] Fax: [phone]    Primary Care Provider: Evelia Huang DO    Primary Insurance: MEDICARE  Secondary Insurance: BLUE CROSS    DISCHARGE DETAILS:    Discharge planning discussed with[de-identified] patient at bedside  [place] of Choice: Yes (re: rehab)  Comments - [place] of Choice: [facility]  CM contacted family/caregiver?: No- see comments (patient declined; states she's already spoken with her granddaughters)  Were Treatment Team discharge recommendations reviewed with patient/caregiver?: Yes  Did patient/caregiver verbalize understanding of patient care needs?: Yes  Were patient/caregiver advised of the risks associated with not following Treatment Team discharge recommendations?: Yes    Contacts  Patient Contacts: granddaughters  Relationship to Patient[de-identified] Family  Reason/Outcome: Emergency Contact              Other Referral/Resources/Interventions Provided:  Interventions: SNF,Short Term Rehab  Referral Comments: Transport confirmed with [facility] for tomorrow morning; [facility] aware of same  Spoke with Eric Paul who reports that patient does not need to be re-fitted prior to discharge to rehab; states that if patient would like re-education prior to discharge home from rehab, Alexis Allred can be contacted to coordinate same  Patient aware of transport scheduled for tomorrow to [facility] and agreeable to same  Offer made to contact family, but patient declines; states she's already called her granddaughters to make aware of plan  IMM reviewed with patient for anticipated d/c tomorrow  No other d/c needs identified at this time      Would you like to participate in our [address] service program?  : No - Declined    Treatment Team Recommendation: Short Term Rehab,SNF  Discharge Destination Plan[de-identified] SNF,Short Term Rehab ([facility])  Transport at Discharge : Wheelchair van  Dispatcher Contacted: Yes  Number/Name of Dispatcher: [facility] via [employer] and Unit #): [place]  ETA of Transport (Date): 12/31/21                 IMM Given (Date):: 12/30/21  IMM Given to[de-identified] Patient (patient signed; copy provided)          [address] Name, [address] : [facility]  Receiving Facility/Agency Phone Number: [phone]  Facility/Agency Fax Number: [phone]

## 2021-12-30 NOTE — ASSESSMENT & PLAN NOTE
Lab Results   Component Value Date    HGBA1C 6 5 08/04/2021       Recent Labs     12/29/21  1056 12/29/21  1542 12/29/21  2212 12/30/21  0727   POCGLU 213* 125 163* 142*       Blood Sugar Average: Last 72 hrs:  (P) 154 [id number]    Monitor accu-checks AC and HS      Per geriatrics consult, discontinue amaryl on discharge, start on Januvia 25mg daily    Price check was sent for empagliflozin and dapagliflozin, price is about 140 dollars for 1 month supply  o SGLT2 inhibitors will be beneficial to both heart failure and diabetes  o This will be a lot cheaper in January, because currently out of funds and her Medicare will renew after the new year

## 2021-12-30 NOTE — ASSESSMENT & PLAN NOTE
Lab Results   Component Value Date    EGFR 37 12/30/2021    EGFR 37 12/29/2021    EGFR 37 12/28/2021    CREATININE 1 34 (H) 12/30/2021    CREATININE 1 36 (H) 12/29/2021    CREATININE 1 34 (H) 12/28/2021     · Baseline Cr appears to be 1 7-1 9  · Avoid hypotension and nephrotoxic agents    Plan:  - per nephrology, patient will need outpatient follow-up with Nephrology with BMP within 2-3 weeks of discharge     - Continue home lasix dose  -Creatinine at baseline

## 2021-12-30 NOTE — ASSESSMENT & PLAN NOTE
· Patient on high dose Gabapentin for 6+ years  · Home regimen: 300 mg x3 in AM, 300 mg x2 in afternoon, 300 mg x3 in evening

## 2021-12-30 NOTE — ASSESSMENT & PLAN NOTE
Lab Results   Component Value Date    EGFR 37 12/30/2021    EGFR 37 12/29/2021    EGFR 37 12/28/2021    CREATININE 1 34 (H) 12/30/2021    CREATININE 1 36 (H) 12/29/2021    CREATININE 1 34 (H) 12/28/2021     · Baseline Cr appears to be 1 7-1 9  · Monitor Cr while admitted, renal adjust medications  · Avoid hypotension and nephrotoxic agents    Plan:  - per nephrology, patient will need outpatient follow-up with Nephrology with BMP within 2-3 weeks of discharge

## 2021-12-30 NOTE — ASSESSMENT & PLAN NOTE
BP in control    Plan:  - started on metoprolol succinate 25mg daily given new CHF (in place of home nebivolol)   - continue home lasix 20mg daily

## 2021-12-30 NOTE — ASSESSMENT & PLAN NOTE
· Patient reports falling "multiple times a day"  · Has area rugs, doesn't feel comfortable walking on hardwood floors despite wearing shoes  · Patient agreed to post acute rehab   [facility]

## 2021-12-30 NOTE — CASE MANAGEMENT
Case Management Discharge Planning Note    Patient name Aung MYERS /S -01 MRN [id number]  : 1945 Date 2021       Current Admission Date: 2021  Current Admission Diagnosis:Syncope   Patient Active Problem List    Diagnosis Date Noted    Depression 2021    Heart failure ([facility] ) secondary to cardiomyopathy 2021    Ambulatory dysfunction 2021    Syncope 2021    Chronic renal disease, stage IV ([facility] ) 2021    Type 2 diabetes mellitus with stage 3 chronic kidney disease and hypertension [facility] ) 2019    Diabetic peripheral neuropathy ([facility] ) 2018    Morbid obesity with body mass index (BMI) of 50 0 to 59 9 in [facility]) 2018    Primary osteoarthritis of both knees 2018    Aortic stenosis 2018    Hypercholesterolemia 10/04/2017    Edema 10/08/2014    Arteriosclerosis of coronary artery 2013    Hypertension 2012      LOS (days): 7  Geometric Mean LOS (GMLOS) (days): 2 20  Days to GMLOS:-4 8     OBJECTIVE:  Risk of Unplanned Readmission Score: 11         Current admission status: Inpatient   Preferred Pharmacy:   [address] [place] [address] [address] [place] [address]  Phone: [phone] Fax: [phone]    Primary Care Provider: Edi Calixto DO    Primary Insurance: MEDICARE  Secondary Insurance: BLUE CROSS    DISCHARGE DETAILS:    Discharge planning discussed with[de-identified] patient at bedside  [place] of Choice: Yes (re: rehab)  Comments - Freedom of Choice: Accepting bed at [facility] contacted family/caregiver?: No- see comments (patient declined; states she has been talking with family and they accept her decisions)  Were Treatment Team discharge recommendations reviewed with patient/caregiver?: Yes  Did patient/caregiver verbalize understanding of patient care needs?: Yes  Were patient/caregiver advised of the risks associated with not following Treatment Team discharge recommendations?: Yes    Contacts  Patient Contacts: granddaughters  Relationship to Patient[de-identified] Family  Reason/Outcome: Emergency Contact              Other Referral/Resources/Interventions Provided:  Interventions: SNF,Short Term Rehab  Referral Comments: Only bed offers received at this time are through [facility] and [facility]  Met with patient at bedside to review above  Patient continues to decline bed offer at [facility] and reports preference for [facility]  Per communication in [place] with [facility], they can accept patient tomorrow, 12/31, as long as patient has a repeat COVID test and is able to be transported so that she arrives prior to noon  COVID test ordered today and negative  Patient accepting of bed for tomorrow at [facility]  Discussed transportation and patient confirms that she will be comfortable going by wheelchair [employer], or can ask family if they are able to transport  Potential out-of-pocket costs reviewed with patient for w/c [employer] transport and patient reports that if it cannot be scheduled with [place], she will try to arrange family to bring her  Transport request entered for 10:30am for tomorrow; awaiting confirmation of same  Call made to [facility] and  left requesting return call, as patient's fitting is going to be outside of 48h discharge window; awaiting return call   Anticipate d/c for tomorrow, 12/31, to [facility]; transport pending for w/c [employer] at 10:30am     Would you like to participate in our [address] service program?  : No - Declined    Treatment Team Recommendation: Short Term Rehab,SNF  Discharge Destination Plan[de-identified] SNF,Short Term Rehab  Transport at Discharge : Wheelchair van  Dispatcher Contacted: Yes  Number/Name of Dispatcher: [facility] via [employer]     ETA of Transport (Date): 12/31/21  ETA of Transport (Time): 21  (pending confirmation)     Transfer Mode: Wheelchair

## 2021-12-30 NOTE — ASSESSMENT & PLAN NOTE
· Nuclear stress test: 12/7/2017 - small fixed inferoapical defect consistent with prior MI,  · Echo: 12/7/2017 - 60% EF, no wall motion abnormalities, G1DD, mild MR, mild AS   · Echo 12/23/2021 - 30% LVEF grade 2 diastolic relaxation, with right ventricle dilation, left atrium dilation, moderate mitral regurgitation  · Status post cardiac catheterization 12/27/2021, no obstruction noted    No intervention done  · Etiology: Nonischemic    Plan:  - Cardiology on board  - Continue on sacubitril- valsartan  - Continue on Metroprolol 25   - Continue home lasix 20mg daily   - Life vest fitted and in patient room

## 2021-12-30 NOTE — ASSESSMENT & PLAN NOTE
Gerontology on board   patient scored a 9 on her PHQ 9 depression scale  Gerontology recommend dose increase of venlafaxine on outpatient visit

## 2021-12-30 NOTE — ASSESSMENT & PLAN NOTE
· Nuclear stress test: 12/7/2017 - small fixed inferoapical defect consistent with prior MI,  · Echo: 12/7/2017 - 60% EF, no wall motion abnormalities, G1DD, mild MR, mild AS   · Echo 12/23/2021 - 30% LVEF grade 2 diastolic relaxation, with right ventricle dilation, left atrium dilation, moderate mitral regurgitation  · Status post cardiac catheterization 12/27/2021, no obstruction noted    No intervention done  · Etiology: Nonischemic    Plan:  - Cardiology on board  - Continue on sacubitril- valsartan OD  - Continue on Metroprolol 25 OD  - Continue home lasix 20mg daily   - Life vest fitted and in patient room

## 2021-12-31 VITALS
DIASTOLIC BLOOD PRESSURE: 50 MMHG | WEIGHT: 221.12 LBS | OXYGEN SATURATION: 91 % | RESPIRATION RATE: 18 BRPM | BODY MASS INDEX: 41.75 KG/M2 | HEART RATE: 60 BPM | HEIGHT: 61 IN | TEMPERATURE: 97.9 F | SYSTOLIC BLOOD PRESSURE: 101 MMHG

## 2021-12-31 LAB
ANION GAP SERPL CALCULATED.3IONS-SCNC: 6 MMOL/L (ref 4–13)
BUN SERPL-MCNC: 23 MG/DL (ref 5–25)
CALCIUM SERPL-MCNC: 8.7 MG/DL (ref 8.3–10.1)
CHLORIDE SERPL-SCNC: 105 MMOL/L (ref 100–108)
CO2 SERPL-SCNC: 31 MMOL/L (ref 21–32)
CREAT SERPL-MCNC: 1.45 MG/DL (ref 0.6–1.3)
GFR SERPL CREATININE-BSD FRML MDRD: 34 ML/MIN/1.73SQ M
GLUCOSE SERPL-MCNC: 146 MG/DL (ref 65–140)
GLUCOSE SERPL-MCNC: 185 MG/DL (ref 65–140)
POTASSIUM SERPL-SCNC: 4.2 MMOL/L (ref 3.5–5.3)
SODIUM SERPL-SCNC: 142 MMOL/L (ref 136–145)

## 2021-12-31 PROCEDURE — 82948 REAGENT STRIP/BLOOD GLUCOSE: CPT

## 2021-12-31 PROCEDURE — 99239 HOSP IP/OBS DSCHRG MGMT >30: CPT | Performed by: INTERNAL MEDICINE

## 2021-12-31 PROCEDURE — 80048 BASIC METABOLIC PNL TOTAL CA: CPT | Performed by: INTERNAL MEDICINE

## 2021-12-31 RX ORDER — DAPAGLIFLOZIN 5 MG/1
5 TABLET, FILM COATED ORAL DAILY
Qty: 30 TABLET | Refills: 0
Start: 2021-12-31 | End: 2022-01-01 | Stop reason: ALTCHOICE

## 2021-12-31 RX ORDER — METOPROLOL SUCCINATE 25 MG/1
25 TABLET, EXTENDED RELEASE ORAL DAILY
Refills: 0
Start: 2022-01-01 | End: 2022-03-07 | Stop reason: SDUPTHER

## 2021-12-31 RX ADMIN — HEPARIN SODIUM 5000 UNITS: 5000 INJECTION INTRAVENOUS; SUBCUTANEOUS at 05:37

## 2021-12-31 RX ADMIN — SACUBITRIL AND VALSARTAN 1 TABLET: 24; 26 TABLET, FILM COATED ORAL at 08:20

## 2021-12-31 RX ADMIN — NYSTATIN: 100000 POWDER TOPICAL at 08:21

## 2021-12-31 RX ADMIN — PRAVASTATIN SODIUM 10 MG: 10 TABLET ORAL at 08:20

## 2021-12-31 RX ADMIN — METOPROLOL SUCCINATE 25 MG: 25 TABLET, EXTENDED RELEASE ORAL at 08:20

## 2021-12-31 RX ADMIN — LEVOTHYROXINE SODIUM 150 MCG: 150 TABLET ORAL at 05:37

## 2021-12-31 RX ADMIN — GABAPENTIN 600 MG: 300 CAPSULE ORAL at 08:20

## 2021-12-31 RX ADMIN — FUROSEMIDE 20 MG: 20 TABLET ORAL at 08:20

## 2021-12-31 RX ADMIN — VENLAFAXINE 75 MG: 37.5 TABLET ORAL at 08:20

## 2021-12-31 NOTE — ASSESSMENT & PLAN NOTE
Lab Results   Component Value Date    HGBA1C 6 5 08/04/2021       Recent Labs     12/30/21  1124 12/30/21  1555 12/30/21  2111 12/31/21  0838   POCGLU 169* 149* 215* 185*       Blood Sugar Average: Last 72 hrs:  (P) 165 [id number]    Monitor accu-checks AC and HS      discontinue amaryl on discharge   Price check was sent for empagliflozin and dapagliflozin, price is about 140 dollars for 1 month supply  o SGLT2 inhibitors will be beneficial to both heart failure and diabetes  o This will be a lot cheaper in January, because currently out of funds and her Medicare will renew after the new year

## 2021-12-31 NOTE — ASSESSMENT & PLAN NOTE
· Patient reports falling "multiple times a day"  · Has area rugs, doesn't feel comfortable walking on hardwood floors despite wearing shoes  · Patient agreed to post acute rehab   [facility]

## 2022-01-01 ENCOUNTER — NURSING HOME VISIT (OUTPATIENT)
Dept: GERIATRICS | Facility: OTHER | Age: 77
End: 2022-01-01
Payer: MEDICARE

## 2022-01-01 DIAGNOSIS — Z66 DNR (DO NOT RESUSCITATE): ICD-10-CM

## 2022-01-01 DIAGNOSIS — I50.41 ACUTE COMBINED SYSTOLIC AND DIASTOLIC HEART FAILURE (HCC): Primary | ICD-10-CM

## 2022-01-01 DIAGNOSIS — S22.32XD CLOSED FRACTURE OF ONE RIB OF LEFT SIDE WITH ROUTINE HEALING, SUBSEQUENT ENCOUNTER: ICD-10-CM

## 2022-01-01 DIAGNOSIS — M17.0 PRIMARY OSTEOARTHRITIS OF BOTH KNEES: ICD-10-CM

## 2022-01-01 DIAGNOSIS — R26.2 AMBULATORY DYSFUNCTION: ICD-10-CM

## 2022-01-01 DIAGNOSIS — E11.42 DIABETIC PERIPHERAL NEUROPATHY (HCC): ICD-10-CM

## 2022-01-01 DIAGNOSIS — F32.A DEPRESSION, UNSPECIFIED DEPRESSION TYPE: ICD-10-CM

## 2022-01-01 DIAGNOSIS — I44.7 LEFT BUNDLE BRANCH BLOCK (LBBB): ICD-10-CM

## 2022-01-01 DIAGNOSIS — I10 HYPERTENSION, UNSPECIFIED TYPE: ICD-10-CM

## 2022-01-01 DIAGNOSIS — N17.9 ACUTE RENAL FAILURE SUPERIMPOSED ON STAGE 4 CHRONIC KIDNEY DISEASE, UNSPECIFIED ACUTE RENAL FAILURE TYPE (HCC): ICD-10-CM

## 2022-01-01 DIAGNOSIS — R55 SYNCOPE, UNSPECIFIED SYNCOPE TYPE: ICD-10-CM

## 2022-01-01 DIAGNOSIS — I25.10 CORONARY ARTERY DISEASE INVOLVING NATIVE CORONARY ARTERY OF NATIVE HEART WITHOUT ANGINA PECTORIS: ICD-10-CM

## 2022-01-01 DIAGNOSIS — E78.5 HYPERLIPIDEMIA, UNSPECIFIED HYPERLIPIDEMIA TYPE: ICD-10-CM

## 2022-01-01 DIAGNOSIS — E11.42 TYPE 2 DIABETES MELLITUS WITH DIABETIC POLYNEUROPATHY, WITHOUT LONG-TERM CURRENT USE OF INSULIN (HCC): ICD-10-CM

## 2022-01-01 DIAGNOSIS — I34.0 MODERATE MITRAL REGURGITATION: ICD-10-CM

## 2022-01-01 DIAGNOSIS — N18.4 ACUTE RENAL FAILURE SUPERIMPOSED ON STAGE 4 CHRONIC KIDNEY DISEASE, UNSPECIFIED ACUTE RENAL FAILURE TYPE (HCC): ICD-10-CM

## 2022-01-01 DIAGNOSIS — M75.102 TEAR OF LEFT ROTATOR CUFF, UNSPECIFIED TEAR EXTENT, UNSPECIFIED WHETHER TRAUMATIC: ICD-10-CM

## 2022-01-01 PROCEDURE — 99306 1ST NF CARE HIGH MDM 50: CPT | Performed by: INTERNAL MEDICINE

## 2022-01-01 NOTE — PROGRESS NOTES
[address]  [address] [place] [address]  Facility:  [place] [address]    HISTORY AND PHYSICAL    NAME: Keri Ocampo  AGE: 64 y o  SEX: female    DATE OF ENCOUNTER: 1/1/2022    Code status:  DNR    Assessment and Plan     1  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)  Assessment & Plan:  · Seen in consultation by the cardiology service during her hospitalization   · Has been started Entresto and metoprolol succinate ER  · Recommended to continue with furosemide and potassium therapy   · She will be admitted to the rehabilitation facility and seen in consultation by a multidisciplinary rehabilitation team for evaluation and treatment to assist her in returning to her prior level functioning   · She has a life vest in place  · Will continue her care in collaboration with the cardiology service   · She will follow-up with her PCP and cardiology services upon discharge            2  Acute renal failure superimposed on stage 4 chronic kidney disease, unspecified acute renal failure type [facility])  Assessment & Plan:  · Noted during hospitalization in December 2021: Baseline creatinine 1 8-1 9, admission creatinine 1 38, peak creatinine 1 69, discharge creatinine 1 46  · She will be admitted to the rehabilitation facility under the acute kidney injury pathway and seen in consultation by a multidisciplinary rehabilitation team for evaluation and treatment to assist her in returning to her prior level of functioning  · Will avoid nephrotoxic medications and supplements  · Will continue with clinical and periodic laboratory monitoring for change in her condition   · Nephrology service recommended that she follow-up with them after discharge   · She will follow-up with her PCP upon discharge      3   Closed fracture of one rib of left side with routine healing, subsequent encounter  Assessment & Plan:  · Demonstrated on imaging CT scan of chest on December 22, 2021  · She denies discomfort with the fracture  · Will continue with monitoring for change in her condition      4  Ambulatory dysfunction  Assessment & Plan:  · Secondary to her acute on chronic medical conditions   · She will be admitted to the rehabilitation facility and seen in consultation by a comprehensive rehabilitation team for evaluation and treatment to assist her in returning to her prior level of functioning  · Will continue to monitor for change in her condition  · She will follow-up with her PCP upon discharge      5  Left bundle branch block (LBBB)  Assessment & Plan:  · Demonstrated as a new finding on 12 lead ECG performed on December 22, 2021  · Seen in consultation by the cardiology service during her hospitalization   · Has LifeVest in place  · Will continue her care in collaboration with her cardiology service   · She will follow-up with cardiology and PCP services upon discharge      6  Coronary artery disease involving native coronary artery of native heart without angina pectoris  Assessment & Plan:  · Will continue her prior to admission pravastatin  · Cardiac coronary artery catheterization performed during her hospitalization revealed widely patent coronary arteries   · Will continue with monitoring for change in her condition   · She will follow-up with her PCP and cardiology services upon discharge      7  Type 2 diabetes mellitus with diabetic polyneuropathy, without long-term current use of insulin ([facility])  Assessment & Plan:  · Her glimepiride was discontinued during her hospitalization to avoid hypoglycemia  · I will discontinue her Tye Mealy to avoid hypoglycemia  · Will continue with monitoring of her fingerstick blood sugar 4 times daily and use lispro 5 units for a blood sugar greater than 300   · She will follow-up with her PCP upon discharge for long-term care      8   Moderate mitral regurgitation  Assessment & Plan:  · Demonstrated on echocardiogram performed on December 23,    · Seen by cardiology service during her hospitalization  · Will continue her care in collaboration with the cardiology service   · Will continue with monitoring for change in her condition  · She will follow-up with her PCP and cardiology services upon discharge      9  Diabetic peripheral neuropathy ([address] )  Assessment & Plan:  · Will continue her prior to admission gabapentin 900 mg in the morning, 600 mg in the evening, and 900 mg at bedtime  · Will continue to monitor for change in her condition  · She will follow-up with her PCP upon discharge      10  Syncope, unspecified syncope type  Assessment & Plan:  · Seen by the cardiology service during her hospitalization  · Will continue with monitoring for change in her condition  · Will continue her care in collaboration with Cardiology Service   · She will follow-up with her PCP and cardiology services upon discharge      11  Depression, unspecified depression type  Assessment & Plan:  · She notes that her   in 2021   · She reports doing well with increased dosage of venlafaxine at 75 mg 3 times daily  · Will continue her on this medication   · Will continue with monitoring for change in her condition  · She will follow-up with her PCP upon discharge      12  Hypertension, unspecified type  Assessment & Plan:  · Will continue with her prior to admission metoprolol succinate ER and Entresto  · Will continue with clinical and periodic laboratory monitoring for change in her condition   · She will follow-up with her PCP and cardiology services upon discharge      13  Tear of left rotator cuff, unspecified tear extent, unspecified whether traumatic  Assessment & Plan:  · As discussed with her, I will order a Salonpas patch daily and routine Tylenol   · Will continue with monitoring for change in her condition  · She will follow-up with her PCP upon discharge      14   Primary osteoarthritis of both knees  Assessment & Plan:  · As discussed with her, I will order Salonpas patch and routine Tylenol  · Will continue with monitoring for change in her condition      15  Hyperlipidemia, unspecified hyperlipidemia type  Assessment & Plan:  · Will continue her prior to admission pravastatin  · She will follow-up with her PCP upon discharge      16  DNR (do not resuscitate)  Assessment & Plan:  · As discussed with her during my visit, she wishes for her resuscitation status be do not resuscitate        All medications and routine orders were reviewed and updated as needed  Plan discussed with:  Patient and nursing staff  CC: PCP    Chief Complaint     She is seen for a visit to perform a history and physical exam to be admitted to the rehabilitation facility  History of Present Illness     She is a pleasant 53-year-old woman with the comorbidities of coronary artery disease with a history of MI, left bundle-branch block, type 2 diabetes mellitus, diabetic peripheral neuropathy, hypertension, osteoarthritis of her knees, hyperlipidemia, depression, stage 4 chronic kidney disease, and left shoulder rotator cuff tear who is seen for visit to perform a history and physical exam to be admitted to the rehabilitation facility  She presented to the emergency room on December 22, 2021 via EMS after falling at home  She reports going to sleep the night before and waking up on the floor  She does not recall falling onto the floor  EMS was called when her neighbor came to check in on her  She was admitted to the acute [facility] hospital from December 22, 2021 through December 31, 2021 for evaluation of her syncopal episode  She was seen in consultation by the Cardiology, Nephrology, and geriatric services  Elevated troponin levels felt to be nonischemic in nature  She was found to have new cardiomyopathy with acute combined systolic and diastolic congestive heart failure    The nephrology service was consulted to monitor her kidney function as she was having a cardiac coronary artery catheterization  She was found to have new onset left bundle-branch block  Her prior to admission glimepiride was discontinued to avoid hypoglycemic episodes which may have contributed to her syncopal episode  She was found to have a left 8th rib fracture  She denies discomfort from the rib fracture  Therapy recommended a post acute care rehabilitation stay prior to returning home  She was transferred to the rehabilitation facility on December 31, 2021  She notes discomfort in her left shoulder from a chronic rotator cuff tear and left knee from arthritis  She would like to use a Salonpas patch, as this is helpful at home  HISTORY:  Past Medical History:   Diagnosis Date    Aortic stenosis     Arthritis     Asthma     Coronary artery disease     Diabetes mellitus [facility] )     Diabetic peripheral neuropathy (HCC)     Last assessed - 1/27/16    Gallbladder disease     Heart attack ([facility] ) 07/1999    Hemorrhoids     Last assessed - 11/16/12    Myocardial infarction [facility])     Old myocardial infarction     Type 2 diabetes mellitus with autonomic neuropathy ([facility] )     Unspec long term insulin use status, Last assessed - 6/8/17     Past Surgical History:   Procedure Laterality Date    BUNIONECTOMY      CARDIAC CATHETERIZATION      Carotid Artery, Resolved - 7/1/13    CARDIAC CATHETERIZATION N/A 12/27/2021    Procedure: CARDIAC CATHETERIZATION ;  Surgeon: Seb Ventura MD;  [facility]; Service: Cardiology    CARDIAC CATHETERIZATION N/A 12/27/2021    Procedure: Cardiac Coronary Angiogram;  Surgeon: Seb Ventura MD;  [facility];   Service: Cardiology    CARDIOVASCULAR STRESS TEST  09/1999    CHOLECYSTECTOMY      COLONOSCOPY  2017    FOOT ARTHRODESIS, MODIFIED DONOHUE  2016    GALLBLADDER SURGERY  1970    HEMORROIDECTOMY      KNEE ARTHROSCOPY Left 2004    KS COLONOSCOPY FLX DX W/COLLJ SPEC WHEN PFRMD N/A 8/21/2017 Procedure: COLONOSCOPY;  Surgeon: Tete Robin MD;  Location: [facility]; Service: Colorectal    REPAIR KNEE LIGAMENT      REPAIR KNEE LIGAMENT Left 1986    REPAIR KNEE LIGAMENT Right 1988     Family History   Problem Relation Age of Onset    Hypertension Father     Diabetes Father     Cancer Father         Throat    Arthritis Father     Stroke Mother     Diabetes Maternal Grandmother     Heart disease Maternal Grandfather     Diabetes Son     Lymphoma Family         Head, Face and Neck      Social History     Socioeconomic History    Marital status: /Civil Union     Spouse name: Not on file    Number of children: Not on file    Years of education: Not on file    Highest education level: Not on file   Occupational History    Occupation: RETIRED   Tobacco Use    Smoking status: Never Smoker    Smokeless tobacco: Never Used   Vaping Use    Vaping Use: Never used   Substance and Sexual Activity    Alcohol use: Never     Comment: Social per [employer]     Drug use: Never    Sexual activity: Not Currently   Other Topics Concern    Not on file   Social History Narrative    Hx of daily cola consumption (unk cans/day)    Daily tea consumption (unk cups/day)    Multiple organ donor    Patient has living will    [place] [id number] in existence    Uses safety equipment - Seatbelts      Social Determinants of Health     Financial Resource Strain: Not on file   Food Insecurity: Not on file   Transportation Needs: No Transportation Needs    Lack of Transportation (Medical): No    Lack of Transportation (Non-Medical): No   Physical Activity: Not on file   Stress: Not on file   Social Connections: Not on file   Intimate Partner Violence: Not on file   Housing Stability: Low Risk     Unable to Pay for Housing in the Last Year: No    Number of Places Lived in the Last Year: 1    Unstable Housing in the Last Year: No       Allergies:   Allergies   Allergen Reactions    Lyrica [Pregabalin] Swelling    Sulfa Antibiotics Swelling       Review of Systems     Review of Systems   Constitutional: Negative for appetite change  HENT: Negative for dental problem and trouble swallowing  Eyes: Negative for visual disturbance  Respiratory: Negative for shortness of breath  Cardiovascular: Negative for chest pain  Genitourinary: Negative for difficulty urinating  Musculoskeletal: Positive for arthralgias  Discomfort in left shoulder from rotator cuff tear and left knee from arthritis   Neurological: Negative for headaches  Hematological: Bruises/bleeds easily  Psychiatric/Behavioral: Negative for dysphoric mood and sleep disturbance  Medications and orders     All medications reviewed and updated in Nursing Home EMR  Objective     Vitals:  Weight 120 lb, temperature 97 3° F, pulse 78, blood pressure 108/45, pulse oximetry 93% on room air, fasting fingerstick blood sugar 170  Physical Exam  Vitals reviewed  Exam conducted with a chaperone present  Constitutional:       General: She is awake  She is not in acute distress  Appearance: She is well-developed  She is not ill-appearing, toxic-appearing or diaphoretic  Comments:  Appears comfortable, stated age, and frail  HENT:      Head: Normocephalic  Nose: Nose normal       Mouth/Throat:      Mouth: Mucous membranes are moist    Eyes:      General: No scleral icterus  Conjunctiva/sclera: Conjunctivae normal    Cardiovascular:      Rate and Rhythm: Normal rate and regular rhythm  Heart sounds: Normal heart sounds  No murmur heard  No friction rub  No gallop  Comments: There is no pretibial edema, bilaterally  Pulmonary:      Effort: Pulmonary effort is normal  No respiratory distress  Breath sounds: Normal breath sounds  No stridor  No wheezing, rhonchi or rales  Abdominal:      General: Abdomen is flat  There is no distension  Palpations: Abdomen is soft  There is no mass  Tenderness: There is no abdominal tenderness  There is no guarding or rebound  Musculoskeletal:         General: No deformity or signs of injury  Cervical back: Neck supple  No rigidity or tenderness  Lymphadenopathy:      Cervical: No cervical adenopathy  Skin:     Findings: No rash  Neurological:      Mental Status: She is alert and oriented to person, place, and time  Psychiatric:         Mood and Affect: Mood normal          Behavior: Behavior normal  Behavior is cooperative  Pertinent Laboratory/Diagnostic Studies: The following labs/studies were reviewed please see chart or hospital paperwork for details  Lab Results   Component Value Date    WBC 6 40 12/30/2021    HGB 11 0 (L) 12/30/2021    HCT 34 7 (L) 12/30/2021     12/30/2021    CHOL 184 09/19/2015    TRIG 151 (H) 08/11/2021    HDL 45 08/11/2021    ALT 42 12/30/2021    AST 34 12/30/2021     09/19/2015    K 4 2 12/31/2021     12/31/2021    CREATININE 1 45 (H) 12/31/2021    BUN 23 12/31/2021    CO2 31 12/31/2021    GLUF 44 (L) 12/23/2021    HGBA1C 6 5 08/04/2021 December 22, 2021:     CT of head without contrast:       FINDINGS:     PARENCHYMA:  No intracranial mass, mass effect or midline shift  No CT signs of acute infarction  No acute parenchymal hemorrhage      VENTRICLES AND EXTRA-AXIAL SPACES:  Normal for the patient's age      VISUALIZED ORBITS AND PARANASAL SINUSES:  Unremarkable      CALVARIUM AND EXTRACRANIAL SOFT TISSUES:  Left temporal and periorbital scalp swelling  No scalp hematoma      IMPRESSION:     No acute intracranial abnormality  December 22, 2021:     CT of chest without contrast:     IMPRESSION:     Nothing to suggest pneumonia      Nondisplaced fracture of the anterolateral left 8th rib    Correlate with tenderness to determine if acute or old      Mild diffuse reticulation throughout the lungs with mild bronchiectasis in the left upper lobe and lingula, most likely due due to pulmonary fibrosis  Consider referral to pulmonology for further evaluation and management of probable early interstitial   lung disease      Pulmonary artery enlargement which can be seen with pulmonary hypertension  December 23, 2021:     2D transthoracic echocardiogram:     Interpretation Summary         Left Ventricle: Left ventricular cavity size is normal  Wall motion is normal  There is severe global hypokinesis with regional variation  Diastolic function is moderately abnormal, consistent with grade II (pseudonormal) relaxation  The left ventricular ejection fraction is 30%  Systolic function is severely reduced    Right Ventricle: Right ventricular cavity size is dilated  Systolic function is normal     Left Atrium: The atrium is dilated    Mitral Valve: There is mild to moderate regurgitation  December 27, 2021:     Cardiac coronary angiogram:     Impression    · Widely patent coronary arteries  - Her admission order summary was reviewed and signed      AUTUMN Abraham   1/2/2022 7:24 PM

## 2022-01-02 PROBLEM — N17.9 ACUTE RENAL FAILURE SUPERIMPOSED ON STAGE 4 CHRONIC KIDNEY DISEASE (HCC): Status: ACTIVE | Noted: 2021-05-18

## 2022-01-02 PROBLEM — I44.7 LEFT BUNDLE BRANCH BLOCK (LBBB): Status: ACTIVE | Noted: 2022-01-02

## 2022-01-02 PROBLEM — M75.100 TORN ROTATOR CUFF: Status: ACTIVE | Noted: 2022-01-02

## 2022-01-02 PROBLEM — Z66 DNR (DO NOT RESUSCITATE): Status: ACTIVE | Noted: 2022-01-01

## 2022-01-02 PROBLEM — E11.9 TYPE 2 DIABETES MELLITUS, WITHOUT LONG-TERM CURRENT USE OF INSULIN (HCC): Status: ACTIVE | Noted: 2019-09-06

## 2022-01-02 PROBLEM — I34.0 MODERATE MITRAL REGURGITATION: Status: ACTIVE | Noted: 2022-01-02

## 2022-01-02 PROBLEM — E78.5 HYPERLIPIDEMIA: Status: ACTIVE | Noted: 2017-10-04

## 2022-01-02 PROBLEM — S22.32XA CLOSED FRACTURE OF ONE RIB OF LEFT SIDE: Status: ACTIVE | Noted: 2022-01-02

## 2022-01-02 RX ORDER — ACETAMINOPHEN 325 MG/1
2 TABLET ORAL EVERY 6 HOURS PRN
COMMUNITY
Start: 2022-01-01

## 2022-01-02 NOTE — ASSESSMENT & PLAN NOTE
· As discussed with her during my visit, she wishes for her resuscitation status be do not resuscitate

## 2022-01-02 NOTE — ASSESSMENT & PLAN NOTE
· Demonstrated as a new finding on 12 lead ECG performed on December 22, 2021  · Seen in consultation by the cardiology service during her hospitalization   · Has LifeVest in place  · Will continue her care in collaboration with her cardiology service   · She will follow-up with cardiology and PCP services upon discharge

## 2022-01-02 NOTE — ASSESSMENT & PLAN NOTE
· Demonstrated on imaging CT scan of chest on December 22, 2021  · She denies discomfort with the fracture  · Will continue with monitoring for change in her condition

## 2022-01-02 NOTE — ASSESSMENT & PLAN NOTE
· Demonstrated on echocardiogram performed on December 23, 2021   · Seen by cardiology service during her hospitalization  · Will continue her care in collaboration with the cardiology service   · Will continue with monitoring for change in her condition  · She will follow-up with her PCP and cardiology services upon discharge

## 2022-01-03 NOTE — ASSESSMENT & PLAN NOTE
· Will continue her prior to admission gabapentin 900 mg in the morning, 600 mg in the evening, and 900 mg at bedtime  · Will continue to monitor for change in her condition  · She will follow-up with her PCP upon discharge

## 2022-01-03 NOTE — ASSESSMENT & PLAN NOTE
· Secondary to her acute on chronic medical conditions   · She will be admitted to the rehabilitation facility and seen in consultation by a comprehensive rehabilitation team for evaluation and treatment to assist her in returning to her prior level of functioning  · Will continue to monitor for change in her condition  · She will follow-up with her PCP upon discharge

## 2022-01-03 NOTE — ASSESSMENT & PLAN NOTE
· She notes that her   in 2021   · She reports doing well with increased dosage of venlafaxine at 75 mg 3 times daily  · Will continue her on this medication   · Will continue with monitoring for change in her condition  · She will follow-up with her PCP upon discharge

## 2022-01-03 NOTE — ASSESSMENT & PLAN NOTE
· Will continue her prior to admission pravastatin  · She will follow-up with her PCP upon discharge

## 2022-01-03 NOTE — ASSESSMENT & PLAN NOTE
· As discussed with her, I will order a Salonpas patch daily and routine Tylenol   · Will continue with monitoring for change in her condition  · She will follow-up with her PCP upon discharge

## 2022-01-03 NOTE — ASSESSMENT & PLAN NOTE
· Seen in consultation by the cardiology service during her hospitalization   · Has been started Entresto and metoprolol succinate ER  · Recommended to continue with furosemide and potassium therapy   · She will be admitted to the rehabilitation facility and seen in consultation by a multidisciplinary rehabilitation team for evaluation and treatment to assist her in returning to her prior level functioning   · She has a life vest in place  · Will continue her care in collaboration with the cardiology service   · She will follow-up with her PCP and cardiology services upon discharge

## 2022-01-03 NOTE — ASSESSMENT & PLAN NOTE
· Seen by the cardiology service during her hospitalization  · Will continue with monitoring for change in her condition  · Will continue her care in collaboration with Cardiology Service   · She will follow-up with her PCP and cardiology services upon discharge

## 2022-01-03 NOTE — ASSESSMENT & PLAN NOTE
· Will continue with her prior to admission metoprolol succinate ER and Entresto  · Will continue with clinical and periodic laboratory monitoring for change in her condition   · She will follow-up with her PCP and cardiology services upon discharge

## 2022-01-03 NOTE — ASSESSMENT & PLAN NOTE
· As discussed with her, I will order Salonpas patch and routine Tylenol  · Will continue with monitoring for change in her condition

## 2022-01-03 NOTE — ASSESSMENT & PLAN NOTE
· Will continue her prior to admission pravastatin  · Cardiac coronary artery catheterization performed during her hospitalization revealed widely patent coronary arteries   · Will continue with monitoring for change in her condition   · She will follow-up with her PCP and cardiology services upon discharge

## 2022-01-03 NOTE — ASSESSMENT & PLAN NOTE
· Her glimepiride was discontinued during her hospitalization to avoid hypoglycemia  · I will discontinue her Wilene Fraise to avoid hypoglycemia  · Will continue with monitoring of her fingerstick blood sugar 4 times daily and use lispro 5 units for a blood sugar greater than 300   · She will follow-up with her PCP upon discharge for long-term care

## 2022-01-05 ENCOUNTER — NURSING HOME VISIT (OUTPATIENT)
Dept: GERIATRICS | Facility: OTHER | Age: 77
End: 2022-01-05
Payer: MEDICARE

## 2022-01-05 VITALS
HEART RATE: 66 BPM | SYSTOLIC BLOOD PRESSURE: 106 MMHG | WEIGHT: 221 LBS | BODY MASS INDEX: 41.76 KG/M2 | TEMPERATURE: 98 F | RESPIRATION RATE: 18 BRPM | DIASTOLIC BLOOD PRESSURE: 41 MMHG

## 2022-01-05 DIAGNOSIS — S22.32XD CLOSED FRACTURE OF ONE RIB OF LEFT SIDE WITH ROUTINE HEALING, SUBSEQUENT ENCOUNTER: ICD-10-CM

## 2022-01-05 DIAGNOSIS — I50.41 ACUTE COMBINED SYSTOLIC AND DIASTOLIC HEART FAILURE (HCC): ICD-10-CM

## 2022-01-05 DIAGNOSIS — E11.42 DIABETIC PERIPHERAL NEUROPATHY (HCC): ICD-10-CM

## 2022-01-05 DIAGNOSIS — R26.2 AMBULATORY DYSFUNCTION: Primary | ICD-10-CM

## 2022-01-05 DIAGNOSIS — I10 HYPERTENSION, UNSPECIFIED TYPE: ICD-10-CM

## 2022-01-05 PROCEDURE — 99309 SBSQ NF CARE MODERATE MDM 30: CPT | Performed by: NURSE PRACTITIONER

## 2022-01-05 NOTE — ASSESSMENT & PLAN NOTE
· BP running low at times  · Compression stockings ordered  · Monitor BP especially during therapies  · Teach orthostatic hypotension precuations

## 2022-01-05 NOTE — PROGRESS NOTES
[address]   [phone]  [facility]  STR Progress Note        NAME: Gerard Reddy  AGE: 64 y o  SEX: female    DATE OF ENCOUNTER: 1/5/2022    Assessment and Plan     1  Ambulatory dysfunction  Assessment & Plan:  · Doing well at STR  · Continue with restorative care  · Assist with ADL and IADL  · Fall precautions  · PT/OT      2  Closed fracture of one rib of left side with routine healing, subsequent encounter  Assessment & Plan:  · Pain controlled  · Routine healing  · Monitor for changes  · monitor pain levels      3  Diabetic peripheral neuropathy (HCC)  Assessment & Plan:  · Pain controlled  · Continue with current gabapentin  · Monitor BS daily  · Monitor pain levels      4  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)  Assessment & Plan:  · Seen by cardiology during hospital stay  · Has life vest on  · Continue to monitor weights          5  Hypertension, unspecified type  Assessment & Plan:  · BP running low at times  · Compression stockings ordered  · Monitor BP especially during therapies  · Teach orthostatic hypotension precuations          No orders of the defined types were placed in this encounter  History of Present Illness     Gerard Reddy 64 y o  female seen for follow-up of care and treatment plan for ambulatory dysfunction doing well at STR, left rib fx routine healing, neuropathy pain controlled, CHF asymptomatic with a lifevest on, HTN controlled with BP meds     The following portions of the patient's history were reviewed and updated as appropriate: allergies, current medications, past family history, past medical history, past social history, past surgical history and problem list     Review of Systems     Review of Systems   Constitutional: Negative for chills and fever  HENT: Negative for ear pain and sore throat  Eyes: Negative for pain and visual disturbance     Respiratory: Negative for cough and shortness of breath  Cardiovascular: Negative for chest pain, palpitations and leg swelling  Gastrointestinal: Negative for abdominal pain and vomiting  Genitourinary: Negative for dysuria and hematuria  Musculoskeletal: Positive for gait problem  Negative for arthralgias and back pain  Skin: Negative for color change and rash  Neurological: Positive for weakness and light-headedness  Negative for seizures and syncope  All other systems reviewed and are negative  Objective     BP (!) 106/41   Pulse 66   Temp 98 °F (36 7 °C)   Resp 18   Wt 100 kg (221 lb)   BMI 41 76 kg/m²     Physical Exam  Vitals reviewed  Constitutional:       Appearance: She is well-developed  HENT:      Head: Normocephalic and atraumatic  Eyes:      Conjunctiva/sclera: Conjunctivae normal    Cardiovascular:      Rate and Rhythm: Normal rate and regular rhythm  Heart sounds: Normal heart sounds, S1 normal and S2 normal  No murmur heard  Pulmonary:      Effort: Pulmonary effort is normal  No respiratory distress  Breath sounds: Normal breath sounds  No wheezing  Chest:      Comments: Wearing lifevest at this time  Abdominal:      General: Bowel sounds are normal  There is no distension  Palpations: Abdomen is soft  Tenderness: There is no abdominal tenderness  Musculoskeletal:         General: Normal range of motion  Cervical back: Normal range of motion  Comments: Generalized weakness   Skin:     General: Skin is warm and dry  Neurological:      Mental Status: She is alert  Psychiatric:         Attention and Perception: Attention normal          Mood and Affect: Mood normal          Speech: Speech normal          Behavior: Behavior normal          Thought Content: Thought content normal          Cognition and Memory: Cognition normal          Pertinent Laboratory/Diagnostic Studies:  HFM Labs Reviewed    Current Medications   Medication list reviewed and updated today   Please see paper chart for updated medication list      Marcela Allen  :24 PM      Name: Pita Beckwith  : 1945  MRN: [id number]  DOS: 2022    Diagnoses:   Diagnosis ICD-10-CM Associated Orders   1  Ambulatory dysfunction  R26 2    2  Closed fracture of one rib of left side with routine healing, subsequent encounter  S22 32XD    3  Diabetic peripheral neuropathy (HCC)  E11 42    4  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)  I50 41    5   Hypertension, unspecified type  I10

## 2022-01-05 NOTE — ASSESSMENT & PLAN NOTE
· Doing well at [address]  · Continue with restorative care  · Assist with ADL and IADL  · Fall precautions  · PT/OT

## 2022-01-13 ENCOUNTER — NURSING HOME VISIT (OUTPATIENT)
Dept: GERIATRICS | Facility: OTHER | Age: 77
End: 2022-01-13
Payer: MEDICARE

## 2022-01-13 DIAGNOSIS — I50.41 ACUTE COMBINED SYSTOLIC AND DIASTOLIC HEART FAILURE (HCC): Primary | ICD-10-CM

## 2022-01-13 DIAGNOSIS — M25.562 CHRONIC PAIN OF LEFT KNEE: ICD-10-CM

## 2022-01-13 DIAGNOSIS — G89.29 CHRONIC PAIN OF LEFT KNEE: ICD-10-CM

## 2022-01-13 DIAGNOSIS — N18.4 ACUTE RENAL FAILURE SUPERIMPOSED ON STAGE 4 CHRONIC KIDNEY DISEASE, UNSPECIFIED ACUTE RENAL FAILURE TYPE (HCC): ICD-10-CM

## 2022-01-13 DIAGNOSIS — Z66 DNR (DO NOT RESUSCITATE): ICD-10-CM

## 2022-01-13 DIAGNOSIS — N17.9 ACUTE RENAL FAILURE SUPERIMPOSED ON STAGE 4 CHRONIC KIDNEY DISEASE, UNSPECIFIED ACUTE RENAL FAILURE TYPE (HCC): ICD-10-CM

## 2022-01-13 PROCEDURE — 99310 SBSQ NF CARE HIGH MDM 45: CPT | Performed by: INTERNAL MEDICINE

## 2022-01-13 RX ORDER — LOSARTAN POTASSIUM 25 MG/1
25 TABLET ORAL DAILY
COMMUNITY
Start: 2022-01-14 | End: 2022-01-20 | Stop reason: HOSPADM

## 2022-01-13 NOTE — PROGRESS NOTES
[address]  [address]  Facility:  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)  Assessment & Plan:  · Reviewed the finding of orthostatic hypotension with her and her cardiology service   · After discussion with the cardiologist, I will discontinue her Entresto and start losartan 25 mg daily  · Will continue with metoprolol succinate ER   · She will continue to be followed under the congestive heart failure pathway at the rehabilitation facility  · Will continue with clinical and periodic laboratory monitoring for change in her condition  · She has a follow-up visit with her cardiology service scheduled on January 17, 2022  · Cardiologist to message treating provider for that visit  · She will follow-up with her Cardiology service upon discharge            2  Chronic pain of left knee  Assessment & Plan:  · Will check x-ray of her left knee  · Will add Voltaren gel 1% starting at 2 g every 8 hours to left knee   · Physiatry has been consulted   · Will continue with monitoring for change in her condition      3  Acute renal failure superimposed on stage 4 chronic kidney disease, unspecified acute renal failure type [facility])  Assessment & Plan:  · Her laboratory test results look well and she continues to do well clinically  · She will continue under the acute kidney injury pathway at the rehabilitation facility  · Will continue with avoidance of nephrotoxic medications and supplements   · Will continue with clinical and periodic laboratory monitoring for change in her condition   · She will follow-up with her PCP upon discharge      4  DNR (do not resuscitate)    See my history and physical note of January 1, 2022 for further assessment and plan      All medications and routine orders were reviewed and updated as needed  Plan discussed with:  Patient, nursing staff, and cardiologist     I have spent 40 minutes with patient, nursing staff, and cardiologist today in which greater than 50% of this time was spent in counseling/coordination of care regarding Diagnostic results, Risks and benefits of tx options, Intructions for management, Patient and family education and Impressions  Chief Complaint     She is seen for a follow-up visit to update the care and treatment of her heart failure with cardiomyopathy  History of Present Illness     She is a pleasant 64-year-old woman with the comorbidities of acute combined systolic and diastolic congestive heart failure, acute renal failure superimposed on stage 4 chronic kidney disease, left bundle-branch block, type 2 diabetes mellitus, and moderate mitral regurgitation who is seen for visit to update the care and treatment of her heart failure  Therapy staff reports that she is experiencing asymptomatic orthostatic hypotension during therapy  She reports discomfort in her left knee  Therapy reports her left knee discomfort is her main limiting issue  Her knee discomfort has worsened since her admission to the rehabilitation facility  The topical lidocaine patches are no longer controlling her discomfort  She denies chest pain and shortness of breath    The following portions of the patient's history were reviewed and updated as appropriate: current medications, past family history, past medical history, past social history, past surgical history and problem list     Allergies: Allergies   Allergen Reactions    Lyrica [Pregabalin] Swelling    Sulfa Antibiotics Swelling       Review of Systems     Review of Systems   Respiratory: Negative for shortness of breath  Cardiovascular: Negative for chest pain          See HPI   Musculoskeletal:        See HPI       Medications and orders     All medications reviewed and updated in Nursing Home EMR  Objective     Vitals:  Weight 116 4 lb (decreased 4 lb since admission), temperature 97 6° F, pulse 74, blood pressure 121/64, fasting fingerstick blood sugar 189, pulse oximetry 99% on room air  Physical Exam  Vitals reviewed  Constitutional:       General: She is awake  Appearance: She is well-developed  She is not ill-appearing, toxic-appearing or diaphoretic  Comments: She appears comfortable, her stated age, and frail  Cardiovascular:      Rate and Rhythm: Normal rate and regular rhythm  Heart sounds: Normal heart sounds  No murmur heard  No friction rub  No gallop  Pulmonary:      Effort: Pulmonary effort is normal  No respiratory distress  Breath sounds: Normal breath sounds  No stridor  No wheezing, rhonchi or rales  Neurological:      Mental Status: She is alert  Psychiatric:         Mood and Affect: Mood normal          Behavior: Behavior normal  Behavior is cooperative  Pertinent Laboratory/Diagnostic Studies: The following labs were reviewed please see chart or hospital paperwork for details  January 10, 2022:      BMP:  Sodium 143, potassium 4 4, BUN 35, creatinine 1 3, fasting blood sugar 155, EGFR 39     CBC with diff:  WBC count 6, hemoglobin 11, hematocrit 32 6, platelet count [id number], MCV 94    - Therapy plan reviewed with patient, nursing staff, and cardiologist     AUTUMN Wong   1/16/2022 10:12 AM

## 2022-01-16 ENCOUNTER — APPOINTMENT (EMERGENCY)
Dept: RADIOLOGY | Facility: HOSPITAL | Age: 77
DRG: 226 | End: 2022-01-16
Payer: MEDICARE

## 2022-01-16 ENCOUNTER — HOSPITAL ENCOUNTER (INPATIENT)
Facility: HOSPITAL | Age: 77
LOS: 4 days | Discharge: NURSING, REHAB OR LONG-TERM CARE | DRG: 226 | End: 2022-01-20
Attending: EMERGENCY MEDICINE | Admitting: INTERNAL MEDICINE
Payer: MEDICARE

## 2022-01-16 ENCOUNTER — TELEPHONE (OUTPATIENT)
Dept: OTHER | Facility: OTHER | Age: 77
End: 2022-01-16

## 2022-01-16 DIAGNOSIS — I10 ESSENTIAL HYPERTENSION: ICD-10-CM

## 2022-01-16 DIAGNOSIS — E03.9 HYPOTHYROIDISM, UNSPECIFIED TYPE: ICD-10-CM

## 2022-01-16 DIAGNOSIS — Z45.02 DEFIBRILLATOR DISCHARGE: ICD-10-CM

## 2022-01-16 DIAGNOSIS — R53.1 GENERALIZED WEAKNESS: ICD-10-CM

## 2022-01-16 DIAGNOSIS — I95.9 HYPOTENSIVE EPISODE: ICD-10-CM

## 2022-01-16 DIAGNOSIS — J84.10 PULMONARY FIBROSIS (HCC): ICD-10-CM

## 2022-01-16 DIAGNOSIS — I47.2 WIDE-COMPLEX TACHYCARDIA (HCC): ICD-10-CM

## 2022-01-16 DIAGNOSIS — I48.91 ATRIAL FIBRILLATION WITH RVR (HCC): ICD-10-CM

## 2022-01-16 DIAGNOSIS — I50.41 ACUTE COMBINED SYSTOLIC AND DIASTOLIC HEART FAILURE (HCC): ICD-10-CM

## 2022-01-16 DIAGNOSIS — I48.91 ATRIAL FIBRILLATION WITH RAPID VENTRICULAR RESPONSE (HCC): Primary | ICD-10-CM

## 2022-01-16 PROBLEM — M25.562 LEFT KNEE PAIN: Status: ACTIVE | Noted: 2022-01-16

## 2022-01-16 PROBLEM — E83.42 HYPOMAGNESEMIA: Status: ACTIVE | Noted: 2022-01-16

## 2022-01-16 PROBLEM — N18.9 CKD (CHRONIC KIDNEY DISEASE): Status: ACTIVE | Noted: 2022-01-16

## 2022-01-16 PROBLEM — E66.813 CLASS 3 SEVERE OBESITY IN ADULT (HCC): Status: ACTIVE | Noted: 2018-11-29

## 2022-01-16 LAB
2HR DELTA HS TROPONIN: 41 NG/L
4HR DELTA HS TROPONIN: 85 NG/L
ANION GAP SERPL CALCULATED.3IONS-SCNC: 8 MMOL/L (ref 4–13)
APTT PPP: 187 SECONDS (ref 23–37)
ATRIAL RATE: 87 BPM
BASOPHILS # BLD AUTO: 0.04 THOUSANDS/ΜL (ref 0–0.1)
BASOPHILS NFR BLD AUTO: 1 % (ref 0–1)
BUN SERPL-MCNC: 29 MG/DL (ref 5–25)
CALCIUM SERPL-MCNC: 10.3 MG/DL (ref 8.3–10.1)
CARDIAC TROPONIN I PNL SERPL HS: 109 NG/L
CARDIAC TROPONIN I PNL SERPL HS: 24 NG/L
CARDIAC TROPONIN I PNL SERPL HS: 65 NG/L
CHLORIDE SERPL-SCNC: 107 MMOL/L (ref 100–108)
CO2 SERPL-SCNC: 26 MMOL/L (ref 21–32)
CREAT SERPL-MCNC: 1.55 MG/DL (ref 0.6–1.3)
EOSINOPHIL # BLD AUTO: 0.3 THOUSAND/ΜL (ref 0–0.61)
EOSINOPHIL NFR BLD AUTO: 4 % (ref 0–6)
ERYTHROCYTE [DISTWIDTH] IN BLOOD BY AUTOMATED COUNT: 15.1 % (ref 11.6–15.1)
GFR SERPL CREATININE-BSD FRML MDRD: 31 ML/MIN/1.73SQ M
GLUCOSE SERPL-MCNC: 118 MG/DL (ref 65–140)
GLUCOSE SERPL-MCNC: 124 MG/DL (ref 65–140)
GLUCOSE SERPL-MCNC: 130 MG/DL (ref 65–140)
GLUCOSE SERPL-MCNC: 148 MG/DL (ref 65–140)
GLUCOSE SERPL-MCNC: 153 MG/DL (ref 65–140)
GLUCOSE SERPL-MCNC: 167 MG/DL (ref 65–140)
HCT VFR BLD AUTO: 39 % (ref 34.8–46.1)
HGB BLD-MCNC: 12.4 G/DL (ref 11.5–15.4)
IMM GRANULOCYTES # BLD AUTO: 0.03 THOUSAND/UL (ref 0–0.2)
IMM GRANULOCYTES NFR BLD AUTO: 0 % (ref 0–2)
INR PPP: 1.25 (ref 0.84–1.19)
LYMPHOCYTES # BLD AUTO: 2.09 THOUSANDS/ΜL (ref 0.6–4.47)
LYMPHOCYTES NFR BLD AUTO: 26 % (ref 14–44)
MAGNESIUM SERPL-MCNC: 1.4 MG/DL (ref 1.6–2.6)
MCH RBC QN AUTO: 31.1 PG (ref 26.8–34.3)
MCHC RBC AUTO-ENTMCNC: 31.8 G/DL (ref 31.4–37.4)
MCV RBC AUTO: 98 FL (ref 82–98)
MONOCYTES # BLD AUTO: 0.81 THOUSAND/ΜL (ref 0.17–1.22)
MONOCYTES NFR BLD AUTO: 10 % (ref 4–12)
NEUTROPHILS # BLD AUTO: 4.89 THOUSANDS/ΜL (ref 1.85–7.62)
NEUTS SEG NFR BLD AUTO: 59 % (ref 43–75)
NRBC BLD AUTO-RTO: 0 /100 WBCS
P AXIS: 100 DEGREES
PHOSPHATE SERPL-MCNC: 3.4 MG/DL (ref 2.3–4.1)
PLATELET # BLD AUTO: 265 THOUSANDS/UL (ref 149–390)
PMV BLD AUTO: 12.3 FL (ref 8.9–12.7)
POTASSIUM SERPL-SCNC: 4.2 MMOL/L (ref 3.5–5.3)
PR INTERVAL: 166 MS
PROTHROMBIN TIME: 15.2 SECONDS (ref 11.6–14.5)
QRS AXIS: -70 DEGREES
QRSD INTERVAL: 148 MS
QT INTERVAL: 402 MS
QTC INTERVAL: 483 MS
RBC # BLD AUTO: 3.99 MILLION/UL (ref 3.81–5.12)
SODIUM SERPL-SCNC: 141 MMOL/L (ref 136–145)
T WAVE AXIS: 78 DEGREES
VENTRICULAR RATE: 87 BPM
WBC # BLD AUTO: 8.16 THOUSAND/UL (ref 4.31–10.16)

## 2022-01-16 PROCEDURE — 83735 ASSAY OF MAGNESIUM: CPT

## 2022-01-16 PROCEDURE — 84166 PROTEIN E-PHORESIS/URINE/CSF: CPT | Performed by: INTERNAL MEDICINE

## 2022-01-16 PROCEDURE — 71045 X-RAY EXAM CHEST 1 VIEW: CPT

## 2022-01-16 PROCEDURE — 82948 REAGENT STRIP/BLOOD GLUCOSE: CPT

## 2022-01-16 PROCEDURE — 80048 BASIC METABOLIC PNL TOTAL CA: CPT

## 2022-01-16 PROCEDURE — 99222 1ST HOSP IP/OBS MODERATE 55: CPT | Performed by: INTERNAL MEDICINE

## 2022-01-16 PROCEDURE — 93010 ELECTROCARDIOGRAM REPORT: CPT | Performed by: INTERNAL MEDICINE

## 2022-01-16 PROCEDURE — 99285 EMERGENCY DEPT VISIT HI MDM: CPT

## 2022-01-16 PROCEDURE — 85610 PROTHROMBIN TIME: CPT | Performed by: FAMILY MEDICINE

## 2022-01-16 PROCEDURE — 36415 COLL VENOUS BLD VENIPUNCTURE: CPT

## 2022-01-16 PROCEDURE — 85730 THROMBOPLASTIN TIME PARTIAL: CPT | Performed by: FAMILY MEDICINE

## 2022-01-16 PROCEDURE — 93005 ELECTROCARDIOGRAM TRACING: CPT

## 2022-01-16 PROCEDURE — 99223 1ST HOSP IP/OBS HIGH 75: CPT | Performed by: INTERNAL MEDICINE

## 2022-01-16 PROCEDURE — 96374 THER/PROPH/DIAG INJ IV PUSH: CPT

## 2022-01-16 PROCEDURE — 84484 ASSAY OF TROPONIN QUANT: CPT

## 2022-01-16 PROCEDURE — 99285 EMERGENCY DEPT VISIT HI MDM: CPT | Performed by: EMERGENCY MEDICINE

## 2022-01-16 PROCEDURE — 85025 COMPLETE CBC W/AUTO DIFF WBC: CPT

## 2022-01-16 PROCEDURE — RECHECK: Performed by: PHYSICIAN ASSISTANT

## 2022-01-16 PROCEDURE — 84100 ASSAY OF PHOSPHORUS: CPT

## 2022-01-16 PROCEDURE — 84484 ASSAY OF TROPONIN QUANT: CPT | Performed by: INTERNAL MEDICINE

## 2022-01-16 RX ORDER — SACUBITRIL AND VALSARTAN 24; 26 MG/1; MG/1
1 TABLET, FILM COATED ORAL 2 TIMES DAILY
Status: ON HOLD | COMMUNITY
End: 2022-01-17 | Stop reason: SDUPTHER

## 2022-01-16 RX ORDER — HEPARIN SODIUM 1000 [USP'U]/ML
2000 INJECTION, SOLUTION INTRAVENOUS; SUBCUTANEOUS
Status: DISCONTINUED | OUTPATIENT
Start: 2022-01-16 | End: 2022-01-20 | Stop reason: HOSPADM

## 2022-01-16 RX ORDER — ACETAMINOPHEN 325 MG/1
975 TABLET ORAL EVERY 8 HOURS SCHEDULED
Status: DISCONTINUED | OUTPATIENT
Start: 2022-01-16 | End: 2022-01-20 | Stop reason: HOSPADM

## 2022-01-16 RX ORDER — MAGNESIUM SULFATE HEPTAHYDRATE 40 MG/ML
2 INJECTION, SOLUTION INTRAVENOUS ONCE
Status: COMPLETED | OUTPATIENT
Start: 2022-01-16 | End: 2022-01-16

## 2022-01-16 RX ORDER — VENLAFAXINE 37.5 MG/1
75 TABLET ORAL 3 TIMES DAILY
Status: DISCONTINUED | OUTPATIENT
Start: 2022-01-16 | End: 2022-01-20 | Stop reason: HOSPADM

## 2022-01-16 RX ORDER — MIDAZOLAM HYDROCHLORIDE 1 MG/ML
1 INJECTION INTRAMUSCULAR; INTRAVENOUS ONCE
Status: COMPLETED | OUTPATIENT
Start: 2022-01-16 | End: 2022-01-16

## 2022-01-16 RX ORDER — ONDANSETRON 2 MG/ML
4 INJECTION INTRAMUSCULAR; INTRAVENOUS EVERY 6 HOURS PRN
Status: DISCONTINUED | OUTPATIENT
Start: 2022-01-16 | End: 2022-01-20 | Stop reason: HOSPADM

## 2022-01-16 RX ORDER — HEPARIN SODIUM 1000 [USP'U]/ML
4000 INJECTION, SOLUTION INTRAVENOUS; SUBCUTANEOUS
Status: DISCONTINUED | OUTPATIENT
Start: 2022-01-16 | End: 2022-01-20 | Stop reason: HOSPADM

## 2022-01-16 RX ORDER — GABAPENTIN 300 MG/1
600 CAPSULE ORAL DAILY
Status: DISCONTINUED | OUTPATIENT
Start: 2022-01-16 | End: 2022-01-20 | Stop reason: HOSPADM

## 2022-01-16 RX ORDER — METOPROLOL SUCCINATE 25 MG/1
25 TABLET, EXTENDED RELEASE ORAL EVERY 12 HOURS
Status: DISCONTINUED | OUTPATIENT
Start: 2022-01-16 | End: 2022-01-18

## 2022-01-16 RX ORDER — METOPROLOL SUCCINATE 25 MG/1
25 TABLET, EXTENDED RELEASE ORAL DAILY
Status: DISCONTINUED | OUTPATIENT
Start: 2022-01-16 | End: 2022-01-16

## 2022-01-16 RX ORDER — NYSTATIN 100000 [USP'U]/G
POWDER TOPICAL 2 TIMES DAILY
Status: DISCONTINUED | OUTPATIENT
Start: 2022-01-16 | End: 2022-01-20 | Stop reason: HOSPADM

## 2022-01-16 RX ORDER — FUROSEMIDE 20 MG/1
20 TABLET ORAL DAILY
Status: DISCONTINUED | OUTPATIENT
Start: 2022-01-16 | End: 2022-01-16

## 2022-01-16 RX ORDER — GABAPENTIN 300 MG/1
900 CAPSULE ORAL 2 TIMES DAILY
Status: DISCONTINUED | OUTPATIENT
Start: 2022-01-16 | End: 2022-01-20 | Stop reason: HOSPADM

## 2022-01-16 RX ORDER — PRAVASTATIN SODIUM 10 MG
10 TABLET ORAL DAILY
Status: DISCONTINUED | OUTPATIENT
Start: 2022-01-16 | End: 2022-01-20 | Stop reason: HOSPADM

## 2022-01-16 RX ORDER — DIPHENOXYLATE HYDROCHLORIDE AND ATROPINE SULFATE 2.5; .025 MG/1; MG/1
1 TABLET ORAL 4 TIMES DAILY PRN
Status: DISCONTINUED | OUTPATIENT
Start: 2022-01-16 | End: 2022-01-20 | Stop reason: HOSPADM

## 2022-01-16 RX ORDER — LEVOTHYROXINE SODIUM 0.07 MG/1
150 TABLET ORAL
Status: DISCONTINUED | OUTPATIENT
Start: 2022-01-16 | End: 2022-01-17

## 2022-01-16 RX ORDER — GABAPENTIN 300 MG/1
900 CAPSULE ORAL DAILY
Status: DISCONTINUED | OUTPATIENT
Start: 2022-01-16 | End: 2022-01-16

## 2022-01-16 RX ORDER — HEPARIN SODIUM 1000 [USP'U]/ML
4000 INJECTION, SOLUTION INTRAVENOUS; SUBCUTANEOUS ONCE
Status: COMPLETED | OUTPATIENT
Start: 2022-01-16 | End: 2022-01-16

## 2022-01-16 RX ADMIN — ACETAMINOPHEN 975 MG: 325 TABLET, FILM COATED ORAL at 13:42

## 2022-01-16 RX ADMIN — ACETAMINOPHEN 975 MG: 325 TABLET, FILM COATED ORAL at 21:12

## 2022-01-16 RX ADMIN — METOPROLOL SUCCINATE 25 MG: 25 TABLET, EXTENDED RELEASE ORAL at 08:45

## 2022-01-16 RX ADMIN — INSULIN LISPRO 1 UNITS: 100 INJECTION, SOLUTION INTRAVENOUS; SUBCUTANEOUS at 11:39

## 2022-01-16 RX ADMIN — LEVOTHYROXINE SODIUM 150 MCG: 75 TABLET ORAL at 06:59

## 2022-01-16 RX ADMIN — PRAVASTATIN SODIUM 10 MG: 10 TABLET ORAL at 08:45

## 2022-01-16 RX ADMIN — VENLAFAXINE 75 MG: 37.5 TABLET ORAL at 17:31

## 2022-01-16 RX ADMIN — GABAPENTIN 600 MG: 300 CAPSULE ORAL at 13:41

## 2022-01-16 RX ADMIN — ACETAMINOPHEN 975 MG: 325 TABLET, FILM COATED ORAL at 06:59

## 2022-01-16 RX ADMIN — GABAPENTIN 900 MG: 300 CAPSULE ORAL at 08:44

## 2022-01-16 RX ADMIN — VENLAFAXINE 75 MG: 37.5 TABLET ORAL at 21:12

## 2022-01-16 RX ADMIN — MAGNESIUM SULFATE HEPTAHYDRATE 2 G: 40 INJECTION, SOLUTION INTRAVENOUS at 05:26

## 2022-01-16 RX ADMIN — HEPARIN SODIUM 11.1 UNITS/KG/HR: 10000 INJECTION, SOLUTION INTRAVENOUS; SUBCUTANEOUS at 07:11

## 2022-01-16 RX ADMIN — HEPARIN SODIUM 4000 UNITS: 1000 INJECTION INTRAVENOUS; SUBCUTANEOUS at 07:12

## 2022-01-16 RX ADMIN — GABAPENTIN 900 MG: 300 CAPSULE ORAL at 21:12

## 2022-01-16 RX ADMIN — METOPROLOL SUCCINATE 25 MG: 25 TABLET, EXTENDED RELEASE ORAL at 21:12

## 2022-01-16 NOTE — ASSESSMENT & PLAN NOTE
· Noted hypotensive pre-hospital and on ED arrival improved s/p 750 cc IVF  · Possibly in setting of rapid atrial fibrillation  · Continue to monitor blood pressure closely, hold antihypertensives until blood pressure improved

## 2022-01-16 NOTE — CASE MANAGEMENT
Case Management Discharge Planning Note    Patient name Kendell Blue  Location [facility]-31 MRN [id number]  : 1945 Date 2022       Current Admission Date: 2022  Current Admission Diagnosis:Atrial fibrillation with RVR [facility])   Patient Active Problem List    Diagnosis Date Noted    Hypotension 2022    Lifevest discharge 2022    Hypomagnesemia 2022    CKD (chronic kidney disease) 2022    Left knee pain 2022    Torn rotator cuff 2022    Left bundle branch block (LBBB) 2022    Closed fracture of one rib of left side 2022    Moderate mitral regurgitation 2022    DNR (do not resuscitate) 2022    Depression 2021    Heart failure ([facility] ) secondary to cardiomyopathy 2021    Ambulatory dysfunction 2021    Elevated troponin 2021    Syncope 2021    Acute renal failure superimposed on stage 4 chronic kidney disease ([facility] ) 2021    Type 2 diabetes mellitus, without long-term current use of insulin ([facility] ) 2019    Diabetic peripheral neuropathy ([facility] ) 2018    Class 3 severe obesity in adult [facility]) 2018    Primary osteoarthritis of both knees 2018    Aortic stenosis 2018    Hyperlipidemia 10/04/2017    Atrial fibrillation with RVR ([facility] ) 2015    Edema 10/08/2014    Coronary artery disease 2013    Hypertension 2012      LOS (days): 0  Geometric Mean LOS (GMLOS) (days):   Days to GMLOS:     OBJECTIVE:  Risk of Unplanned Readmission Score: 17         Current admission status: Inpatient   Preferred Pharmacy:   [address], [address]  [address] [facility] [address]  Phone: [phone] Fax: [phone]    Primary Care Provider: Chayo Liu MD    Primary Insurance: MEDICARE  Secondary Insurance: BLUE CROSS    DISCHARGE DETAILS:    Discharge planning discussed with[de-identified] patient  Freedom of Choice: Yes  Comments - Freedom of Choice: Discussed HHC and [facility] referral made to [address] as patient's FOC       Were Treatment Team discharge recommendations reviewed with patient/caregiver?: Yes  Did patient/caregiver verbalize understanding of patient care needs?: Yes       Contacts  Patient Contacts: Candelario nguyen  Relationship to Patient[de-identified] Family  Contact Method: Phone  Phone Number: [phone]  Reason/Outcome: Continuity of [address] [place] [address]         Is the patient interested in [address] at discharge?: Yes  [address] requested[de-identified] Wayne Williamson Name[de-identified] [facility] Provider[de-identified] PCP  Home Health Services Needed[de-identified] Evaluate Functional Status and Safety  Homebound Criteria Met[de-identified] Uses an Assist Device (i e  cane, walker, etc)  Supporting Clincal Findings[de-identified] Limited Endurance         Other Referral/Resources/Interventions Provided:  Interventions: HHC  Referral Comments: [facility] [address]         [facility]  Discharge Destination Plan[de-identified] Home with [place] at Discharge : Family

## 2022-01-16 NOTE — ASSESSMENT & PLAN NOTE
· Patient noted with atrial fibrillation with RVR and hypotension triggering lifevest shock x1 and requiring cardioversion x1 via EMS pre-hospital  ?newly discovered Afib  · Currently maintaining normal sinus rhythm with normotensive but soft blood pressure  · Unclear triggering event, known with EF 30%  · For now will continue with telemetry monitoring  · Cardiology consult  · Continue metoprolol succinate if blood pressure allows  · CHADS2 Vasc 7, start heparin GTT pending cardiology evaluation

## 2022-01-16 NOTE — ED PROVIDER NOTES
History  Chief Complaint   Patient presents with    Medical Problem     per Medic pt was at [address] and pt's life vest discharged  Pt was in a rapid A FIb in the 190's  Pt cardioverted in the field with 100J  Patient is a 57-year-old female presenting to the emergency department after being shocked by her life vest   Per EMS the patient was found to be in AFib RVR up to the 190s she was shocked by EMS and return to sinus rhythm  Upon arrival to the hospital the patient was hypotensive but not complaining of any pain  Patient denies fevers, chills, congestion, sore throat, ear pain, change in vision, headache, focal deficit, weakness, chest pain, shortness of breath, nausea, vomiting, diarrhea, constipation, numbness or tingling in any of her extremities  Patient states that at her facility she noted that she felt as though she was getting hot and then she was shocked by her life vest   Patient states this never happened before in the past   Upon chart review the patient was found to have a EF of 30% with grade 2 diastolic relaxation  Prior to Admission Medications   Prescriptions Last Dose Informant Patient Reported? Taking? Camphor-Menthol-Methyl Sal 3 1-6-10 % PTCH   Yes No   Sig: Apply 1 patch topically in the morning 1 patch to left shoulder and 1 patch to left knee  On at 9:00 a m  and off at 9:00 p m     MICROLET LANCETS MISC  Self Yes No   Sig: by Does not apply route   Wound Dressings (PruTect) EMUL  Self No No   Sig: Apply 1 Dose topically 2 (two) times a day   acetaminophen (TYLENOL) 325 mg tablet   Yes No   Sig: Take 2 tablets by mouth every 8 (eight) hours   albuterol (ProAir HFA) 90 mcg/act inhaler  Self No No   Sig: Inhale 2 puffs every 6 (six) hours as needed for wheezing   Patient not taking: Reported on 12/22/2021    diphenoxylate-atropine (LOMOTIL) 2 5-0 025 mg per tablet   No No   Sig: Take 1 tablet by mouth 4 (four) times a day as needed for diarrhea   furosemide (LASIX) 20 mg tablet   No No   Sig: TAKE 1 TABLET BY MOUTH EVERY DAY   gabapentin (NEURONTIN) 300 mg capsule   No No   Sig: Take 3 capsules by mouth every morning, then 2 capsules by mouth every day in the afternoon, then take 3 capsules at bedtime   glucose blood (TRUETEST TEST) test strip  Self Yes No   Sig: by In Vitro route daily   levothyroxine 150 mcg tablet  Self No No   Sig: Take 1 tablet (150 mcg total) by mouth daily in the early morning   losartan (COZAAR) 25 mg tablet   Yes No   Sig: Take 25 mg by mouth daily   metoprolol succinate (TOPROL-XL) 25 mg 24 hr tablet   No No   Sig: Take 1 tablet (25 mg total) by mouth daily   nystatin (MYCOSTATIN) powder  Self No No   Sig: Apply topically 2 (two) times a day   potassium chloride (K-DUR,KLOR-CON) 20 mEq tablet  Self No No   Sig: TAKE 1 TABLET BY MOUTH EVERY DAY   pravastatin (PRAVACHOL) 10 mg tablet   No No   Sig: Take 1 tablet (10 mg total) by mouth daily   sacubitril-valsartan (Entresto) 24-26 MG TABS   Yes Yes   Sig: Take 1 tablet by mouth 2 (two) times a day   venlafaxine (EFFEXOR) 75 mg tablet   No No   Sig: Take 1 tablet (75 mg total) by mouth 3 (three) times a day      Facility-Administered Medications: None       Past Medical History:   Diagnosis Date    Aortic stenosis     Arthritis     Asthma     Coronary artery disease     [facility] mellitus ([facility] )     Diabetic peripheral neuropathy (HCC)     Last assessed - 1/27/16    Gallbladder disease     Heart attack ([facility] ) 07/1999    Hemorrhoids     Last assessed - 11/16/12    Myocardial infarction ([facility] )     Old myocardial infarction     Type 2 diabetes mellitus with autonomic neuropathy (HCC)     Unspec long term insulin use status, Last assessed - 6/8/17       Past Surgical History:   Procedure Laterality Date    BUNIONECTOMY      CARDIAC CATHETERIZATION      Carotid Artery, Resolved - 7/1/13    CARDIAC CATHETERIZATION N/A 12/27/2021    Procedure: CARDIAC CATHETERIZATION ;  Surgeon: Mica Roberts MD; [facility]; Service: Cardiology    CARDIAC CATHETERIZATION N/A 12/27/2021    Procedure: Cardiac Coronary Angiogram;  Surgeon: Cristal Horn MD;  [facility]; Service: Cardiology    CARDIOVASCULAR STRESS TEST  09/1999    CHOLECYSTECTOMY      COLONOSCOPY  2017    FOOT ARTHRODESIS, MODIFIED DONOHUE  2016    GALLBLADDER SURGERY  1970    HEMORROIDECTOMY      KNEE ARTHROSCOPY Left 2004    [facility] COLONOSCOPY FLX DX W/COLLJ [facility]HABILITATION WHEN PFRMD N/A 8/21/2017    Procedure: COLONOSCOPY;  Surgeon: Pau Ibarra MD;  [facility]: [facility]; Service: Colorectal    REPAIR KNEE LIGAMENT      REPAIR KNEE LIGAMENT Left 1986    REPAIR KNEE LIGAMENT Right 1988       Family History   Problem Relation Age of Onset    Hypertension Father     Diabetes Father     Cancer Father         Throat    Arthritis Father     Stroke Mother     Diabetes Maternal Grandmother     Heart disease Maternal Grandfather     Diabetes Son     Lymphoma Family         Head, Face and Neck      I have reviewed and agree with the history as documented  E-Cigarette/Vaping    E-Cigarette Use Never User      E-Cigarette/Vaping Substances    Nicotine No     THC No     CBD No     Flavoring No     Other No     Unknown No      Social History     Tobacco Use    Smoking status: Never Smoker    Smokeless tobacco: Never Used   Vaping Use    Vaping Use: Never used   Substance Use Topics    Alcohol use: Never     Comment: Social per [employer]     Drug use: Never        Review of Systems   Constitutional: Positive for activity change  Negative for chills and fever  HENT: Negative for congestion, ear pain and sore throat  Eyes: Negative for pain and visual disturbance  Respiratory: Negative for cough, chest tightness and shortness of breath  Cardiovascular: Positive for leg swelling  Negative for chest pain and palpitations     Gastrointestinal: Negative for abdominal pain, constipation, diarrhea, nausea and vomiting  Genitourinary: Negative for dysuria and hematuria  Musculoskeletal: Negative for arthralgias and back pain  Skin: Negative for color change and rash  Neurological: Negative for dizziness, seizures, syncope, facial asymmetry, weakness, light-headedness, numbness and headaches  Psychiatric/Behavioral: Negative for agitation and behavioral problems  All other systems reviewed and are negative  Physical Exam  ED Triage Vitals [01/16/22 0358]   Temperature Pulse Respirations Blood Pressure SpO2   97 5 °F (36 4 °C) 84 18 (!) 78/43 93 %      Temp Source Heart Rate Source Patient Position - Orthostatic VS BP Location FiO2 (%)   Oral Monitor Lying Left arm --      Pain Score       --             Orthostatic Vital Signs  Vitals:    01/16/22 0358 01/16/22 0430 01/16/22 0500 01/16/22 0545   BP: (!) 78/43 (!) 82/48 98/50 97/55   Pulse: 84 75 76 72   Patient Position - Orthostatic VS: Lying Lying Lying        Physical Exam  Vitals and nursing note reviewed  Constitutional:       General: She is not in acute distress  Appearance: She is well-developed  She is obese  She is not diaphoretic  HENT:      Head: Normocephalic and atraumatic  Right Ear: External ear normal       Left Ear: External ear normal       Nose: Nose normal       Mouth/Throat:      Mouth: Mucous membranes are moist    Eyes:      Extraocular Movements: Extraocular movements intact  Conjunctiva/sclera: Conjunctivae normal    Cardiovascular:      Rate and Rhythm: Tachycardia present  Rhythm irregular  Pulses: Normal pulses  Heart sounds: Murmur heard  Pulmonary:      Effort: Pulmonary effort is normal  No respiratory distress  Breath sounds: Normal breath sounds  Abdominal:      General: Abdomen is flat  Palpations: Abdomen is soft  Tenderness: There is no abdominal tenderness  Musculoskeletal:         General: Normal range of motion        Cervical back: Normal range of motion and neck supple  Right lower leg: Edema present  Left lower leg: Edema present  Skin:     General: Skin is warm and dry  Capillary Refill: Capillary refill takes 2 to 3 seconds  Neurological:      General: No focal deficit present  Mental Status: She is alert and oriented to person, place, and time     Psychiatric:         Mood and Affect: Mood normal          Behavior: Behavior normal          ED Medications  Medications   magnesium sulfate 2 g/50 mL IVPB (premix) 2 g (2 g Intravenous New Bag 1/16/22 0526)   midazolam (FOR EMS ONLY) (VERSED) 2 mg/2 mL injection 2 mg (0 mg Does not apply Given to EMS 1/16/22 3425)       Diagnostic Studies  Results Reviewed     Procedure Component Value Units Date/Time    HS Troponin 0hr (reflex protocol) [[id number]]  (Normal) Collected: 01/16/22 0401    Lab Status: Final result Specimen: Blood from Arm, Right Updated: 01/16/22 0451     hs TnI 0hr 24 ng/L     HS Troponin I 2hr [[id number]]     Lab Status: No result Specimen: Blood     HS Troponin I 4hr [[id number]]     Lab Status: No result Specimen: Blood     Basic metabolic panel [[id number]]  (Abnormal) Collected: 01/16/22 0401    Lab Status: Final result Specimen: Blood from Arm, Right Updated: 01/16/22 0449     Sodium 141 mmol/L      Potassium 4 2 mmol/L      Chloride 107 mmol/L      CO2 26 mmol/L      ANION GAP 8 mmol/L      BUN 29 mg/dL      Creatinine 1 55 mg/dL      Glucose 130 mg/dL      Calcium 10 3 mg/dL      eGFR 31 ml/min/1 73sq m     Narrative:      [place] guidelines for Chronic Kidney Disease (CKD):     Stage 1 with normal or high GFR (GFR > 90 mL/min/1 73 square meters)    Stage 2 Mild CKD (GFR = 60-89 mL/min/1 73 square meters)    Stage 3A Moderate CKD (GFR = 45-59 mL/min/1 73 square meters)    Stage 3B Moderate CKD (GFR = 30-44 mL/min/1 73 square meters)    Stage 4 Severe CKD (GFR = 15-29 mL/min/1 73 square meters)    Stage 5 End Stage CKD (GFR <15 mL/min/1 73 square meters)  Note: GFR calculation is accurate only with a steady state creatinine    Magnesium [[id number]]  (Abnormal) Collected: 01/16/22 0401    Lab Status: Final result Specimen: Blood from Arm, Right Updated: 01/16/22 0449     Magnesium 1 4 mg/dL     Phosphorus [[id number]]  (Normal) Collected: 01/16/22 0401    Lab Status: Final result Specimen: Blood from Arm, Right Updated: 01/16/22 0449     Phosphorus 3 4 mg/dL     CBC and differential [[id number]] Collected: 01/16/22 0401    Lab Status: Final result Specimen: Blood from Arm, Right Updated: 01/16/22 0423     WBC 8 16 Thousand/uL      RBC 3 99 Million/uL      Hemoglobin 12 4 g/dL      Hematocrit 39 0 %      MCV 98 fL      MCH 31 1 pg      MCHC 31 8 g/dL      RDW 15 1 %      MPV 12 3 fL      Platelets 383 Thousands/uL      nRBC 0 /100 WBCs      Neutrophils Relative 59 %      Immat GRANS % 0 %      Lymphocytes Relative 26 %      Monocytes Relative 10 %      Eosinophils Relative 4 %      Basophils Relative 1 %      Neutrophils Absolute 4 89 Thousands/µL      Immature Grans Absolute 0 03 Thousand/uL      Lymphocytes Absolute 2 09 Thousands/µL      Monocytes Absolute 0 81 Thousand/µL      Eosinophils Absolute 0 30 Thousand/µL      Basophils Absolute 0 04 Thousands/µL     Fingerstick Glucose (POCT) [[id number]]  (Abnormal) Collected: 01/16/22 0351    Lab Status: Final result Updated: 01/16/22 0354     POC Glucose 148 mg/dl                  XR chest 1 view portable    (Results Pending)         Procedures  ECG 12 Lead Documentation Only    Date/Time: 1/16/2022 4:12 AM  Performed by: Andrea Clarke DO  Authorized by:  Andrea Clarke DO     Indications / Diagnosis:  Life vest shock  ECG reviewed by me, the ED Provider: yes    Patient location:  ED  Previous ECG:     Previous ECG:  Compared to current    Comparison ECG info:  Qrs duration increased  Interpretation:     Interpretation: abnormal    Rate:     ECG rate:  87    ECG rate assessment: normal QRS:     QRS axis:  Left    QRS intervals: Wide  Conduction:     Conduction: abnormal      Abnormal conduction: complete LBBB    ST segments:     ST segments:  Normal  T waves:     T waves: normal            ED Course  ED Course as of 01/16/22 0607   Sun Jan 16, 2022   0411 POC Glucose(!): 148  Will evaluate with cardiac workup as well as basic labs will check for electrolyte abnnormality  Patient understands has no questions or concerns at this time  Will frequently re-evaluate  0411 Blood Pressure(!): 78/43   0411 Temperature: 97 5 °F (36 4 °C)   0411 Temp Source: Oral   0411 Pulse: 84   0411 Respirations: 18   0411 SpO2: 93 %   0425 CBC and differential   0458 Magnesium(!): 1 4  Will treat  0458 Creatinine(!): 1 55   0459 Patient resting comfortably bp 87/54   0509 Blood Pressure: 98/50   0509 Pulse: 76   0509 Respirations(!): 23   0509 SpO2: 98 %   0513 Will reach out for admission  Identification of Seniors at Risk      Most Recent Value   (ISAR) Identification of Seniors at Risk    Before the illness or injury that brought you to the Emergency, did you need someone to help you on a regular basis? 0 Filed at: 01/16/2022 0400   In the last 24 hours, have you needed more help than usual? 0 Filed at: 01/16/2022 0400   Have you been hospitalized for one or more nights during the past 6 months? 1 Filed at: 01/16/2022 0400   In general, do you see well? 1 Filed at: 01/16/2022 0400   In general, do you have serious problems with your memory? 0 Filed at: 01/16/2022 0400   Do you take more than three different medications every day?  1 Filed at: 01/16/2022 0400   ISAR Score 3 Filed at: 01/16/2022 0400                              MDM    Disposition  Final diagnoses:   Atrial fibrillation with rapid ventricular response (HCC)   Hypotensive episode     Time reflects when diagnosis was documented in both MDM as applicable and the Disposition within this note     Time User Action Codes Description Comment    1/16/2022  5:30 AM Park Fritter Add [I47 1] AVNRT (AV madeleine re-entry tachycardia) ([facility] )     1/16/2022  5:30 AM Palladino, Sarahann Monday Remove [I47 1] AVNRT (AV madeleine re-entry tachycardia) ([facility] )     1/16/2022  5:31 AM Park Fritter Add [I48 91] Atrial fibrillation with rapid ventricular response ([facility] )     1/16/2022  5:31 AM Park Fritter Add [I95 9] Hypotensive episode     1/16/2022  5:54 AM Meg Never D Add [I50 41] Acute combined systolic and diastolic heart failure ([facility] )     1/16/2022  5:54 AM Meg Never D Remove [I50 41] Acute combined systolic and diastolic heart failure ([facility] )     1/16/2022  5:54 AM Meg Never D Add [I50 41] Acute combined systolic and diastolic heart failure ([facility] )     1/16/2022  5:54 AM Meg Never D Remove [I50 41] Acute combined systolic and diastolic heart failure [facility])       ED Disposition     ED Disposition Condition Date/Time Comment    Admit Stable Sun Jan 16, 2022  5:32 AM Case was discussed with Dr Percy Ba and the patient's admission status was agreed to be inpatient to the service of Dr Bre Jaimes    None         Patient's Medications   Discharge Prescriptions    No medications on file     No discharge procedures on file  PDMP Review       Value Time User    PDMP Reviewed  Yes 1/16/2022  5:51 AM Anupam Espinoza DO           ED Provider  Attending physically available and evaluated Leisa Land  I managed the patient along with the ED Attending      Electronically Signed by         Andrea Clarke DO  01/16/22 8345

## 2022-01-16 NOTE — ASSESSMENT & PLAN NOTE
· Patient noted with atrial fibrillation with RVR and hypotension triggering lifevest shock x1 and requiring cardioversion x1 via EMS pre-hospital  ?newly discovered Afib  · Currently maintaining normal sinus rhythm with normotensive but soft blood pressure  · Unclear triggering event, known with EF 30%  · For now will continue with telemetry monitoring  · Cardiology consult pending   · Continue metoprolol succinate if blood pressure allows  · CHADS2 Vasc 7, was stared on heparin GTT on admission pending cardiology evaluation

## 2022-01-16 NOTE — ASSESSMENT & PLAN NOTE
Lab Results   Component Value Date    HGBA1C 6 5 08/04/2021       Recent Labs     01/16/22  0351   POCGLU 148*       Blood Sugar Average: Last 72 hrs:  (P) 148   · SSI with Accu-Cheks while inpatient, carb controlled diet  · Initiate hypoglycemia protocol  · May benefit from SGLT 2 inhibitor at time of discharge  · Continue gabapentin for neuropathy

## 2022-01-16 NOTE — CONSULTS
Pulmonary Consultation   Juanito Meraz 64 y o  female MRN: [id number]  Unit/Bed#: [facility] 509-01 Encounter: [id number]    Reason for consultation: Pulmonary fibrosis  Requesting physician: Dr Daja Her  Impressions:  1  Pulmonary fibrosis, unclear subtype, query sarcoidosis or chronic HP  2  A-fib/RVR, leading to lifevest discharge  3  Acute hypoxic respiratory failure, resolved, likely due to acute pulmonary vascular congestion  Recommendations:  1  Recommend outpatient high resolution CT scan in 6-8 weeks with follow up with Dr Jaime Perkins preferably; will arrange  2  No acute need for treatment related to this finding  History of Present Illness   HPI:  Juanito Meraz is a 64 y o  female who was admitted for a-fib/RVR requiring cardioversion and LifeVest discharge  Imaging at presentation suggested pulmonary vascular congestion without focal infiltrate  On her prior hospitalization in December she was found to have 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  This appears to be in a non-UIP distribution  The patient denies any history of autoimmune disease, exposure to birds or asbestos, or significant family history  At baseline she is not limited from a pulmonary status and does not require oxygen  She does not have a chronic cough  CT imaging has also shown some calcified lymph nodes, as well as fairly large mediastinal lymphadenopathy, though this was done in the setting of acute congestive heart failure and that may account for the lymphadenopathy  She has worked as a  all her life  Review of systems:  Review of Systems   Respiratory: Negative for cough and shortness of breath  Cardiovascular: Negative for leg swelling  All other systems reviewed and are negative  All other 12-point review of systems are negative      Historical Information   Past Medical History:   Diagnosis Date    Aortic stenosis     Arthritis     Asthma     Coronary artery disease     Diabetes mellitus [facility] )     Diabetic peripheral neuropathy ([facility] )     Last assessed - 1/27/16    Gallbladder disease     Heart attack ([facility] ) 07/1999    Hemorrhoids     Last assessed - 11/16/12    Myocardial infarction [facility])     Old myocardial infarction     Type 2 diabetes mellitus with [facility] )     Unspec long term insulin use status, Last assessed - 6/8/17     Past Surgical History:   Procedure Laterality Date    BUNIONECTOMY      CARDIAC CATHETERIZATION      Carotid Artery, Resolved - 7/1/13    CARDIAC CATHETERIZATION N/A 12/27/2021    Procedure: CARDIAC CATHETERIZATION ;  Surgeon: Anaid Cabrera MD;  [facility]; Service: Cardiology    CARDIAC CATHETERIZATION N/A 12/27/2021    Procedure: Cardiac Coronary Angiogram;  Surgeon: Anaid Cabrera MD;  [facility]; Service: Cardiology    CARDIOVASCULAR STRESS TEST  09/1999    CHOLECYSTECTOMY      COLONOSCOPY  2017    FOOT ARTHRODESIS, MODIFIED DONOHUE  2016    GALLBLADDER SURGERY  1970    HEMORROIDECTOMY      KNEE ARTHROSCOPY Left 2004    [place] COLONOSCOPY FLX DX W/COLLJ [facility] REHABILITATION WHEN PFRMD N/A 8/21/2017    Procedure: COLONOSCOPY;  Surgeon: Dago Dewey MD;  Location: [facility]; Service: Colorectal    REPAIR KNEE LIGAMENT      REPAIR KNEE LIGAMENT Left 1986    REPAIR KNEE LIGAMENT Right 1988     Family History   Problem Relation Age of Onset    Hypertension Father     Diabetes Father     Cancer Father         Throat    Arthritis Father     Stroke Mother     Diabetes Maternal Grandmother     Heart disease Maternal Grandfather     Diabetes Son     Lymphoma Family         Head, Face and Neck      Tobacco history: Never smoked  Family history: Noncontributory      Meds/Allergies   Current Facility-Administered Medications   Medication Dose Route Frequency    acetaminophen (TYLENOL) tablet 975 mg  975 mg Oral Q8H [facility]    diphenoxylate-atropine (LOMOTIL) 2 5-0 025 mg per tablet 1 tablet  1 tablet Oral 4x Daily PRN    gabapentin (NEURONTIN) capsule 600 mg  600 mg Oral Daily    gabapentin (NEURONTIN) capsule 900 mg  900 mg Oral BID    heparin (porcine) 25,000 Units in sodium chloride 0 45 % 250 mL infusion  3-20 Units/kg/hr (Order-Specific) Intravenous Titrated    heparin (porcine) injection 2,000 Units  2,000 Units Intravenous Q1H PRN    heparin (porcine) injection 4,000 Units  4,000 Units Intravenous Q1H PRN    insulin lispro (HumaLOG) 100 units/mL subcutaneous injection 1-5 Units  1-5 Units Subcutaneous HS    insulin lispro (HumaLOG) 100 units/mL subcutaneous injection 1-6 Units  1-6 Units Subcutaneous TID AC    levothyroxine tablet 150 mcg  150 mcg Oral Early Morning    metoprolol succinate (TOPROL-XL) 24 hr tablet 25 mg  25 mg Oral Daily    nystatin (MYCOSTATIN) powder   Topical BID    ondansetron (ZOFRAN) injection 4 mg  4 mg Intravenous Q6H PRN    pravastatin (PRAVACHOL) tablet 10 mg  10 mg Oral Daily    venlafaxine (EFFEXOR) tablet 75 mg  75 mg Oral TID     Allergies   Allergen Reactions    Lyrica [Pregabalin] Swelling    Sulfa Antibiotics Swelling       Vitals: Blood pressure 118/57, pulse 59, temperature (!) 97 4 °F (36 3 °C), temperature source Oral, resp  rate 18, height 5' 2" (1 575 m), weight 102 kg (223 lb 15 8 oz), SpO2 99 % , on room air, Body mass index is 40 97 kg/m²  Intake/Output Summary (Last 24 hours) at 1/16/2022 1045  Last data filed at 1/16/2022 0801  Gross per 24 hour   Intake 0 ml   Output --   Net 0 ml     Physical exam:     Physical Exam  Vitals reviewed  Constitutional:       General: She is not in acute distress  Appearance: Normal appearance  She is well-developed  She is not ill-appearing  HENT:      Head: Normocephalic and atraumatic  Eyes:      General: No scleral icterus  Conjunctiva/sclera: Conjunctivae normal    Neck:      Vascular: No JVD     Cardiovascular:      Rate and Rhythm: Normal rate and regular rhythm  Heart sounds: Normal heart sounds  No murmur heard  No friction rub  No gallop  Pulmonary:      Effort: Pulmonary effort is normal  No respiratory distress  Breath sounds: Normal breath sounds  No wheezing or rales  Musculoskeletal:      Cervical back: Neck supple  Right lower leg: No edema  Left lower leg: No edema  Skin:     General: Skin is warm and dry  Findings: No rash  Neurological:      General: No focal deficit present  Mental Status: She is alert and oriented to person, place, and time  Mental status is at baseline  Psychiatric:         Mood and Affect: Mood normal          Behavior: Behavior normal          Labs: I have personally reviewed pertinent lab results  Results from last 7 days   Lab Units 01/16/22  0401   WBC Thousand/uL 8 16   HEMOGLOBIN g/dL 12 4   HEMATOCRIT % 39 0   PLATELETS Thousands/uL 265         Results from last 7 days   Lab Units 01/16/22  0401   POTASSIUM mmol/L 4 2   CHLORIDE mmol/L 107   CO2 mmol/L 26   BUN mg/dL 29*   CREATININE mg/dL 1 55*   CALCIUM mg/dL 10 3*         Results from last 7 days   Lab Units 01/16/22  0401   MAGNESIUM mg/dL 1 4*     Imaging and other studies: I have personally reviewed pertinent films in PACS    Pulmonary function testing: None available for review  Code Status: Level 3 - DNAR and DNI    Thank you for allowing us to participate in the care of your patient      AUTUMN Gomez

## 2022-01-16 NOTE — ASSESSMENT & PLAN NOTE
Lab Results   Component Value Date    EGFR 31 01/16/2022    EGFR 34 12/31/2021    EGFR 37 12/30/2021    CREATININE 1 55 (H) 01/16/2022    CREATININE 1 45 (H) 12/31/2021    CREATININE 1 34 (H) 12/30/2021     · Baseline creatinine per recent Nephrology record review appears to be 1 5-1 9  · Creatinine at baseline  · Monitor

## 2022-01-16 NOTE — ASSESSMENT & PLAN NOTE
· Noted hypotensive pre-hospital and on ED arrival improved s/p 750 cc IVF  · Possibly in setting of rapid atrial fibrillation  · Continue to monitor blood pressure closely, hold antihypertensives until blood pressure confirm improved

## 2022-01-16 NOTE — CONSULTS
Reason for Consult / Principal Problem:Life vest shock    Physician Requesting Consult:  Adrian Thakkar MD    Cardiologist: Dr Jett Coronel  Assessment and Plan      Current Problem List   Principal Problem:    Atrial fibrillation with RVR ([facility])  Active Problems:    Type 2 diabetes mellitus, without long-term current use of insulin ([facility])    Heart failure ([address] ) secondary to cardiomyopathy    Hypotension    Lifevest discharge    Hypomagnesemia    Assessment/Plan:    1  LifeVest shock - patient has a history of a nonischemic cardiomyopathy diagnosed December 2021, at this time etiology has not been ascertained  Patient currently appears euvolemic  She was woken up in the night with her life vest going off  No prodromal symptoms prior to his shock  On presentation she was found to be atrial fibrillation with rapid ventricular response by EMS and she was cardioverted  · Will have Life Vest interrogated today to determine underlying rhythm - concerning for possible ventricular arrhythmia - if VT is seen on her life vest she will need ICD placement  2   Atrial fibrillation - new onset [id number] is 6 - she has a history of hypertension, heart failure, female, age greater than 74 and history of diabetes  · Switch to apixaban once it is determined that patient does not need ICD  · Monitor on telemetry - may need   3  New onset nonischemic cardiomyopathy - diagnosis December 2021  · Cardiac catheterization was negative December 2021  · Patient appears euvolemic  · Continue metoprolol succinate 25 mg daily  · Obtain SPEP and UPEP  · Obtain iron panel  · JASMIN  · HIV  · Serum free light chains  · Borderline TIESHA - restart Entresto tomorrow  · Place patient back on home Lasix  · TSH/T4  · Change lasix to PRN  4  Pulmonary fibrosis  ·  Per primary,         Subjective     CC:  Life vest discharge    HPI: This is a 69-year-old female a past medical history significant for hypertension, hyperlipidemia, diabetes, chronic kidney disease with creatinine 1 34 baseline, new onset nonischemic cardiomyopathy with a EF of 30%, December 2021, with cardiac catheterization showing completely normal coronary arteries, who was discharged 12/31/2021 from [facility] at the time the patient was started on Entresto metoprolol XL was fitted for LifeVest, at that time she presented secondary to syncope  Yesterday the patient presented to [facility] after we shocked by her LifeVest   EMS was called on arrival patient was found to be in atrial fibrillation with rapid ventricular response was cardioverted in the field  Presentation emergency department yesterday, the patient was asymptomatic, the patient was originally hypotensive she get a 500 cc saline bolus, improvement of blood pressure  She was started on heparin drip admit to the floor  EKG was notable for sinus rhythm, left bundle branch block, left axis deviation similar to previous EKG December 2021  Previous echocardiogram notable for ejection fraction 19%, grade 2 diastolic dysfunction, mild-to-moderate mitral regurgitation, right ventricular dilation, and left atrial dilation  No previous documentation of atrial fibrillation or previous EKGs demonstrating this  previous workup for cardiomyopathy includes cardiac catheterization 2021 that showed angiographically normal vessels  Patient question today reports that she was completely asymptomatic history, no increasing shortness of breath, no chest pain, no dizziness, no palpitations, she reports no previous history of atrial fibrillation  She reports that she was woken up in the the night because her LifeVest was going off, she had no prodromal symptoms prior to her shock, on presentation of EMS she was noted to be in atrial fibrillation with ventricular response received cardioversion    As far as her previous cardiomyopathy and pulmonary fibrosis, she reports that she has no associated hemoptysis, no associated fevers, no joint swelling, no rashes that she can report, she denies any smoking history, no alcohol history, no palpitations, she has no history of amyloidosis  No history of cancer  There was a stress test performed in 2017 that showed a possible myocardial infarction, however her vessels are angiographically normal on most recent catheterization  Remainder of ROS done and negative    Telemetry: Normal sinus rhythm  Net fluid balance:    I/O       01/14 0701  01/15 0700 01/15 0701  01/16 0700 01/16 0701 01/17 0700    P  O    0    Total Intake(mL/kg)   0 (0)    Net   0                         Family History:   Family History   Problem Relation Age of Onset    Hypertension Father     Diabetes Father     Cancer Father         Throat    Arthritis Father     Stroke Mother     Diabetes Maternal Grandmother     Heart disease Maternal Grandfather     Diabetes Son     Lymphoma Family         Head, Face and Neck      Historical Information   Past Medical History:   Diagnosis Date    Aortic stenosis     Arthritis     Asthma     Coronary artery disease     Diabetes mellitus [facility] [address] [facility]     Diabetic peripheral neuropathy ([facility] [address] )     Last assessed - 1/27/16    Gallbladder disease     Heart attack ([facility] ) 07/1999    Hemorrhoids     Last assessed - 11/16/12    Myocardial infarction ([facility] )     Old myocardial infarction     Type 2 diabetes mellitus with autonomic neuropathy ([facility] [facility] )     Unspec long term insulin use status, Last assessed - 6/8/17     Past Surgical History:   Procedure Laterality Date    BUNIONECTOMY      CARDIAC CATHETERIZATION      Carotid Artery, Resolved - 7/1/13    CARDIAC CATHETERIZATION N/A 12/27/2021    Procedure: CARDIAC CATHETERIZATION ;  Surgeon: Glenn Rubio MD;  [facility];   Service: Cardiology    CARDIAC CATHETERIZATION N/A 12/27/2021    Procedure: Cardiac Coronary Angiogram;  Surgeon: Glenn Rubio MD;  [facility]; Service: Cardiology    CARDIOVASCULAR STRESS TEST  09/1999    CHOLECYSTECTOMY      COLONOSCOPY  2017    FOOT ARTHRODESIS, MODIFIED DONOHUE  2016    GALLBLADDER SURGERY  1970    HEMORROIDECTOMY      KNEE ARTHROSCOPY Left 2004    CA COLONOSCOPY FLX DX W/[facility]LL[facility] WHEN PFRMD N/A 8/21/2017    Procedure: COLONOSCOPY;  Surgeon: Gricelda Mckeon MD;  Location: [facility]; Service: Colorectal    REPAIR KNEE LIGAMENT      REPAIR KNEE LIGAMENT Left 1986    REPAIR KNEE LIGAMENT Right 1988     Social History   Social History     Substance and Sexual Activity   Alcohol Use Never    Comment: Social per [employer]      Social History     Substance and Sexual Activity   Drug Use Never     Social History     Tobacco Use   Smoking Status Never Smoker   Smokeless Tobacco Never Used     Family History:   Family History   Problem Relation Age of Onset    Hypertension Father     Diabetes Father     Cancer Father         Throat    Arthritis Father     Stroke Mother     Diabetes Maternal Grandmother     Heart disease Maternal Grandfather     Diabetes Son     Lymphoma Family         Head, Face and Neck        Review of Systems:  Review of Systems   Constitutional: Negative for activity change  HENT: Negative for congestion  Respiratory: Negative for apnea  Cardiovascular: Negative for chest pain  Gastrointestinal: Negative for abdominal distention  Genitourinary: Negative for difficulty urinating  Psychiatric/Behavioral: Negative for agitation             Scheduled Meds:  Current Facility-Administered Medications   Medication Dose Route Frequency Provider Last Rate    acetaminophen  975 mg Oral [facility] Anupam Espinoza,       diphenoxylate-atropine  1 tablet Oral 4x Daily PRN Anupam Espinoza, DO      gabapentin  600 mg Oral Daily Anupam Espinoza, DO      gabapentin  900 mg Oral BID Anupam Espinoza, DO      heparin (porcine)  3-20 Units/kg/hr (Order-Specific) Intravenous Titrated Janny Laguerre Kecia Ramos MD 11 1 Units/kg/hr (01/16/22 0711)    heparin (porcine)  2,000 Units Intravenous Q1H PRN Robinson Miguel MD      heparin (porcine)  4,000 Units Intravenous Q1H PRN Robinson Miguel MD      insulin lispro  1-5 Units Subcutaneous HS Love Spoon, DO      insulin lispro  1-6 Units Subcutaneous TID AC Love Spoon, DO      levothyroxine  150 mcg Oral Early Morning Love Spoon, DO      metoprolol succinate  25 mg Oral Daily Love Spoon, DO      nystatin   Topical BID Love Spoon, DO      ondansetron  4 mg Intravenous Q6H PRN Love Spoon, DO      pravastatin  10 mg Oral Daily Love Spoon, DO      venlafaxine  75 mg Oral TID Love Spoon, DO       Continuous Infusions:heparin (porcine), 3-20 Units/kg/hr (Order-Specific), Last Rate: 11 1 Units/kg/hr (01/16/22 0711)      PRN Meds: diphenoxylate-atropine    heparin (porcine)    heparin (porcine)    ondansetron  all current active meds have been reviewed, current meds:   Current Facility-Administered Medications   Medication Dose Route Frequency    acetaminophen (TYLENOL) tablet 975 mg  975 mg Oral Q8H [address]    diphenoxylate-atropine (LOMOTIL) 2 5-0 025 mg per tablet 1 tablet  1 tablet Oral 4x Daily PRN    gabapentin (NEURONTIN) capsule 600 mg  600 mg Oral Daily    gabapentin (NEURONTIN) capsule 900 mg  900 mg Oral BID    heparin (porcine) 25,000 Units in sodium chloride 0 45 % 250 mL infusion  3-20 Units/kg/hr (Order-Specific) Intravenous Titrated    heparin (porcine) injection 2,000 Units  2,000 Units Intravenous Q1H PRN    heparin (porcine) injection 4,000 Units  4,000 Units Intravenous Q1H PRN    insulin lispro (HumaLOG) 100 units/mL subcutaneous injection 1-5 Units  1-5 Units Subcutaneous HS    insulin lispro (HumaLOG) 100 units/mL subcutaneous injection 1-6 Units  1-6 Units Subcutaneous TID AC    levothyroxine tablet 150 mcg  150 mcg Oral Early Morning    metoprolol succinate (TOPROL-XL) 24 hr tablet 25 mg  25 mg Oral Daily  nystatin (MYCOSTATIN) powder   Topical BID    ondansetron (ZOFRAN) injection 4 mg  4 mg Intravenous Q6H PRN    pravastatin (PRAVACHOL) tablet 10 mg  10 mg Oral Daily    venlafaxine (EFFEXOR) tablet 75 mg  75 mg Oral TID    and PTA meds:   Prior to Admission Medications   Prescriptions Last Dose Informant Patient Reported? Taking? Camphor-Menthol-Methyl Sal 3 1-6-10 % PTCH   Yes No   Sig: Apply 1 patch topically in the morning 1 patch to left shoulder and 1 patch to left knee  On at 9:00 a m  and off at 9:00 p m     MICROLET LANCETS MISC  Self Yes No   Sig: by Does not apply route   Wound Dressings (PruTect) EMUL  Self No No   Sig: Apply 1 Dose topically 2 (two) times a day   acetaminophen (TYLENOL) 325 mg tablet   Yes No   Sig: Take 2 tablets by mouth every 8 (eight) hours   albuterol (ProAir HFA) 90 mcg/act inhaler  Self No No   Sig: Inhale 2 puffs every 6 (six) hours as needed for wheezing   Patient not taking: Reported on 12/22/2021    diphenoxylate-atropine (LOMOTIL) 2 5-0 025 mg per tablet   No No   Sig: Take 1 tablet by mouth 4 (four) times a day as needed for diarrhea   furosemide (LASIX) 20 mg tablet   No No   Sig: TAKE 1 TABLET BY MOUTH EVERY DAY   gabapentin (NEURONTIN) 300 mg capsule   No No   Sig: Take 3 capsules by mouth every morning, then 2 capsules by mouth every day in the afternoon, then take 3 capsules at bedtime   glucose blood (TRUETEST TEST) test strip  Self Yes No   Sig: by In Vitro route daily   levothyroxine 150 mcg tablet  Self No No   Sig: Take 1 tablet (150 mcg total) by mouth daily in the early morning   losartan (COZAAR) 25 mg tablet   Yes No   Sig: Take 25 mg by mouth daily   metoprolol succinate (TOPROL-XL) 25 mg 24 hr tablet   No No   Sig: Take 1 tablet (25 mg total) by mouth daily   nystatin (MYCOSTATIN) powder  Self No No   Sig: Apply topically 2 (two) times a day   potassium chloride (K-DUR,KLOR-CON) 20 mEq tablet  Self No No   Sig: TAKE 1 TABLET BY MOUTH EVERY DAY pravastatin (PRAVACHOL) 10 mg tablet   No No   Sig: Take 1 tablet (10 mg total) by mouth daily   sacubitril-valsartan (Entresto) 24-26 MG TABS   Yes Yes   Sig: Take 1 tablet by mouth 2 (two) times a day   venlafaxine (EFFEXOR) 75 mg tablet   No No   Sig: Take 1 tablet (75 mg total) by mouth 3 (three) times a day      Facility-Administered Medications: None       Allergies   Allergen Reactions    Lyrica [Pregabalin] Swelling    Sulfa Antibiotics Swelling       Objective   Vitals: Temp (24hrs), Av 4 °F (36 3 °C), Min:97 4 °F (36 3 °C), Max:97 5 °F (36 4 °C)  Current: Temperature: (!) 97 4 °F (36 3 °C)  Patient Vitals for the past 24 hrs:   BP Temp Temp src Pulse Resp SpO2 Height Weight   22 0643 -- -- -- -- -- -- 5' 2" (1 575 m) 102 kg (223 lb 15 8 oz)   22 0635 (!) 124/48 (!) 97 4 °F (36 3 °C) Oral 75 18 96 % -- --   22 0634 (!) 124/48 (!) 97 4 °F (36 3 °C) -- 69 -- 95 % -- --   22 0545 97/55 -- -- 72 19 99 % -- --   22 0500 98/50 -- -- 76 (!) 23 98 % -- --   22 0430 (!) 82/48 -- -- 75 (!) 23 98 % -- --   22 0358 (!) 78/43 97 5 °F (36 4 °C) Oral 84 18 93 % -- --    Body mass index is 40 97 kg/m²  Orthostatic Blood Pressures      Most Recent Value   Blood Pressure 124/48 filed at 2022 [id number]   Patient Position - Orthostatic VS Lying filed at 2022              Invasive Devices  Report    Peripheral Intravenous Line            Peripheral IV 22 Left Antecubital <1 day    Peripheral IV 22 Right Antecubital <1 day                Physical Exam:    General:  AO x3, no acute distress  Cardiac:  S1-S2 normal  No murmurs, rubs or gallops, JVP:normal    Lungs:  Clear to auscultation bilaterally, no wheezing or crackles    Abdomen:  Soft nontender nondistended, positive bowel sounds  Extremities:  Warm, well perfused  Neuro: Grossly nonfocal  Psych:  Normal affect      Lab Results:   Results from last 7 days   Lab Units 22  0401   WBC Thousand/uL 8 16   HEMOGLOBIN g/dL 12 4   HEMATOCRIT % 39 0   PLATELETS Thousands/uL 265   NEUTROS PCT % 59   MONOS PCT % 10      Results from last 7 days   Lab Units 01/16/22  0401   SODIUM mmol/L 141   POTASSIUM mmol/L 4 2   CHLORIDE mmol/L 107   CO2 mmol/L 26   BUN mg/dL 29*   CREATININE mg/dL 1 55*   CALCIUM mg/dL 10 3*   MAGNESIUM mg/dL 1 4*   PHOSPHORUS mg/dL 3 4   EGFR ml/min/1 73sq m 31                 No results found for: PHART, IMN2KPU, PO2ART, FRD6GKG, A7MOSREA, BEART, SOURCE  No components found for: HIV1X2  No results found for: HAV, HEPAIGM, HEPBIGM, HEPBCAB, HBEAG, HEPCAB  No results found for: SPEP, UPEP   Lab Results   Component Value Date    HGBA1C 6 5 08/04/2021    HGBA1C 6 9 (A) 03/09/2021    HGBA1C 6 7 (H) 10/15/2020     Lab Results   Component Value Date    CHOL 184 09/19/2015      Lab Results   Component Value Date    HDL 45 08/11/2021    HDL 52 04/08/2021    HDL 60 10/15/2020      Lab Results   Component Value Date    LDLCALC 86 08/11/2021    LDLCALC 106 (H) 04/08/2021    LDLCALC 103 (H) 10/15/2020      Lab Results   Component Value Date    TRIG 151 (H) 08/11/2021    TRIG 143 04/08/2021    TRIG 116 10/15/2020     No components found for: PROCAL          Imaging: I have personally reviewed pertinent reports

## 2022-01-16 NOTE — ASSESSMENT & PLAN NOTE
· Her laboratory test results look well and she continues to do well clinically  · She will continue under the acute kidney injury pathway at the rehabilitation facility  · Will continue with avoidance of nephrotoxic medications and supplements   · Will continue with clinical and periodic laboratory monitoring for change in her condition   · She will follow-up with her PCP upon discharge

## 2022-01-16 NOTE — PROGRESS NOTES
[address] [facility]  Progress Note - Viktor Villatoro 1945, 64 y o  female MRN: [id number]  Unit/Bed#: [facility] 509-01 Encounter: [id number]  Primary Care Provider: Placido Dunbar MD   Date and time admitted to hospital: 1/16/2022  3:44 AM    POST-ADMIT CHECK    * Atrial fibrillation with RVR ([facility] [address] )  Assessment & Plan  · Patient noted with atrial fibrillation with RVR and hypotension triggering lifevest shock x1 and requiring cardioversion x1 via EMS pre-hospital  ?newly discovered Afib  · Currently maintaining normal sinus rhythm with normotensive but soft blood pressure  · Unclear triggering event, known with EF 30%  · For now will continue with telemetry monitoring  · Cardiology consult pending   · Continue metoprolol succinate if blood pressure allows  · CHADS2 Vasc 7, was stared on heparin GTT on admission pending cardiology evaluation    Hypotension  Assessment & Plan  · Noted hypotensive pre-hospital and on ED arrival improved s/p 750 cc IVF  · Possibly in setting of rapid atrial fibrillation  · Continue to monitor blood pressure closely, hold antihypertensives until blood pressure improved    Lifevest discharge  Assessment & Plan  · Patient with apparent shock delivered by LifeVest which prompted presentation  · Noted with apparent rapid atrial fibrillation via EMS, see associated plans  · Cardiology consult pending     Heart failure ([facility] [address] ) secondary to cardiomyopathy  Assessment & Plan  Wt Readings from Last 3 Encounters:   01/16/22 102 kg (223 lb 15 8 oz)   01/05/22 100 kg (221 lb)   12/31/21 100 kg (221 lb 1 9 oz)   · Echocardiogram 12/23/2021 revealing EF 30% and grade 2 diastolic dysfunction with right ventricular dilatation, left atrium dilatation, moderate mitral regurgitation    Cardiac catheterization that hospitalization without significant obstructing disease  · Holding Lasix given soft blood pressures  · Continue metoprolol if blood pressure allows  · Holding Entresto given soft blood pressure  · Monitor daily weights, I/O, volume status  · Cardiology consult pending           Class 3 severe obesity in adult [facility])  Assessment & Plan  · Body mass index is 40 97 kg/m²  · Encourage weight loss, lifestyle modification once acute issues improved    Hypertension  Assessment & Plan  · With hypotension pre-hospital and on arrival, now improved  · Entresto and Lasix on hold     Elevated troponin  Assessment & Plan  · Hs Tnl 0hr: 24  · Hs Tnl 2hr: 65  · Hs Tnl 4hr: 109  · Cardiology consulted    CKD (chronic kidney disease)  Assessment & Plan  Lab Results   Component Value Date    EGFR 31 01/16/2022    EGFR 34 12/31/2021    EGFR 37 12/30/2021    CREATININE 1 55 (H) 01/16/2022    CREATININE 1 45 (H) 12/31/2021    CREATININE 1 34 (H) 12/30/2021     · Baseline creatinine per recent Nephrology record review appears to be 1 5-1 9  · Creatinine at baseline  · Monitor     Type 2 diabetes mellitus, without long-term current use of insulin [facility])  Assessment & Plan  Lab Results   Component Value Date    HGBA1C 6 5 08/04/2021       Recent Labs     01/16/22  0351 01/16/22  0703   POCGLU 148* 153*       Blood Sugar Average: Last 72 hrs:  (P) 150 5   · SSI with Accu-Cheks while inpatient, carb controlled diet  · Initiate hypoglycemia protocol  · May benefit from SGLT 2 inhibitor at time of discharge  · Continue gabapentin for neuropathy    Hyperlipidemia  Assessment & Plan  · Continue statin    Hypomagnesemia  Assessment & Plan  · Magnesium level 1 4 on admission  S/p repletion   · Recheck magnesium level in a m  VTE Pharmacologic Prophylaxis: VTE Score: 5 High Risk (Score >/= 5) - Pharmacological DVT Prophylaxis Ordered: heparin drip  Sequential Compression Devices Ordered  Patient Centered Rounds: I performed bedside rounds with nursing staff today   d/w RN  Discussions with Specialists or Other Care Team Provider:     Education and Discussions with Family / Patient: Patient declined call to   Time Spent for Care: 30 minutes  More than 50% of total time spent on counseling and coordination of care as described above  Current Length of Stay: 0 day(s)  Current Patient Status: Inpatient   Certification Statement: The patient will continue to require additional inpatient hospital stay due to on heparin GTT, Cardiology consult   Discharge Plan: Anticipate discharge in 48 hrs to rehab facility  Code Status: Level 3 - DNAR and DNI    Subjective:   Ms Reema Guillen reports being tired today but otherwise denies complaint  She denies CP, SOB, abdominal pain, LE swelling  She denies numbness, tingling, weakness     Objective:     Vitals:   Temp (24hrs), Av 4 °F (36 3 °C), Min:97 4 °F (36 3 °C), Max:97 5 °F (36 4 °C)    Temp:  [97 4 °F (36 3 °C)-97 5 °F (36 4 °C)] 97 4 °F (36 3 °C)  HR:  [59-84] 59  Resp:  [18-23] 18  BP: ()/(43-57) 118/57  SpO2:  [93 %-99 %] 99 %  Body mass index is 40 97 kg/m²  Input and Output Summary (last 24 hours):   No intake or output data in the 24 hours ending 22 0849    Physical Exam:   Physical Exam  Vitals and nursing note reviewed  Constitutional:       Comments: Patient seen lying in bed comfortably resting, fatigued appearing, NAD   Cardiovascular:      Rate and Rhythm: Normal rate and regular rhythm  Pulmonary:      Effort: Pulmonary effort is normal  No respiratory distress  Breath sounds: Normal breath sounds  Abdominal:      General: Bowel sounds are normal       Palpations: Abdomen is soft  Tenderness: There is no abdominal tenderness  Musculoskeletal:      Right lower leg: No edema  Skin:     General: Skin is warm  Neurological:      Mental Status: She is oriented to person, place, and time     Psychiatric:         Mood and Affect: Mood normal          Behavior: Behavior normal           Additional Data:     Labs:  Results from last 7 days   Lab Units 22  0401   WBC Thousand/uL 8 16   HEMOGLOBIN g/dL 12 4 HEMATOCRIT % 39 0   PLATELETS Thousands/uL 265   NEUTROS PCT % 59   LYMPHS PCT % 26   MONOS PCT % 10   EOS PCT % 4     Results from last 7 days   Lab Units 22  0401   SODIUM mmol/L 141   POTASSIUM mmol/L 4 2   CHLORIDE mmol/L 107   CO2 mmol/L 26   BUN mg/dL 29*   CREATININE mg/dL 1 55*   ANION GAP mmol/L 8   CALCIUM mg/dL 10 3*   GLUCOSE RANDOM mg/dL 130         Results from last 7 days   Lab Units 22  0703 22  0351   POC GLUCOSE mg/dl 153* 148*               Lines/Drains:  Invasive Devices  Report    Peripheral Intravenous Line            Peripheral IV 22 Left Antecubital <1 day    Peripheral IV 22 Right Antecubital <1 day                  Telemetry:  Telemetry Orders (From admission, onward)             48 Hour Telemetry Monitoring  Continuous x 48 hours        References:    Telemetry Guidelines   Question:  Reason for 48 Hour Telemetry  Answer:  Arrhythmias Requiring Medical Therapy (eg  SVT, Vtach/fib, Bradycardia, Uncontrolled A-fib)                 Telemetry Reviewed: Normal Sinus Rhythm  Indication for Continued Telemetry Use: Arrthymias requiring medical therapy             Imagin21 Echo interpretation summary     Recent Cultures (last 7 days):         Last 24 Hours Medication List:   Current Facility-Administered Medications   Medication Dose Route Frequency Provider Last Rate    acetaminophen  975 mg Oral Q8H [facility] Elaina Jeff, DO      diphenoxylate-atropine  1 tablet Oral 4x Daily PRN Elaina Jeff, DO      gabapentin  600 mg Oral Daily Elaina Jeff, DO      gabapentin  900 mg Oral BID Elaina Jeff, DO      heparin (porcine)  3-20 Units/kg/hr (Order-Specific) Intravenous Titrated Yuridia Akhtar MD 11 1 Units/kg/hr (22 0711)    heparin (porcine)  2,000 Units Intravenous Q1H PRN Yuridia Akhtar MD      heparin (porcine)  4,000 Units Intravenous Q1H PRN Yuridia Akhtar MD      insulin lispro  1-5 Units Subcutaneous HS DO Ranjeet Camp insulin lispro  1-6 Units Subcutaneous TID AC Nini Shames, DO      levothyroxine  150 mcg Oral Early Morning Nini Shames, DO      metoprolol succinate  25 mg Oral Daily Nini Shames, DO      nystatin   Topical BID Nini Shames, DO      ondansetron  4 mg Intravenous Q6H PRN Nini Shames, DO      pravastatin  10 mg Oral Daily Nini Shames, DO      venlafaxine  75 mg Oral TID Nini Palacio, DO          Today, Patient Was Seen By: Louise Schwab PA-C    **Please Note: This note may have been constructed using a voice recognition system  **

## 2022-01-16 NOTE — H&P
[address] [facility]  H&P- [facility] Mariya 1945, 64 y o  female MRN: [id number]  Unit/Bed#: [facility] 509-01 Encounter: [id number]  Primary Care Provider: Tiesha Moseley MD   Date and time admitted to hospital: 1/16/2022  3:44 AM    * Atrial fibrillation with RVR ([address] )  Assessment & Plan  · Patient noted with atrial fibrillation with RVR and hypotension triggering lifevest shock x1 and requiring cardioversion x1 via EMS pre-hospital  ?newly discovered Afib  · Currently maintaining normal sinus rhythm with normotensive but soft blood pressure  · Unclear triggering event, known with EF 30%  · For now will continue with telemetry monitoring  · Cardiology consult  · Continue metoprolol succinate if blood pressure allows  · CHADS2 Vasc 7, start heparin GTT pending cardiology evaluation    Hypotension  Assessment & Plan  · Noted hypotensive pre-hospital and on ED arrival improved s/p 750 cc IVF  · Possibly in setting of rapid atrial fibrillation  · Continue to monitor blood pressure closely, hold antihypertensives until blood pressure confirm improved    Lifevest discharge  Assessment & Plan  · Patient with apparent shock delivered by LifeVest which prompted presentation  · Noted with apparent rapid atrial fibrillation via EMS, see associated plans  · Cardiology consult    Hypomagnesemia  Assessment & Plan  · Magnesium level 1 4, patient is receiving repletion  · Recheck magnesium level in a m  Heart failure (HCC) secondary to cardiomyopathy  Assessment & Plan  Wt Readings from Last 3 Encounters:   01/05/22 100 kg (221 lb)   12/31/21 100 kg (221 lb 1 9 oz)   08/16/21 123 kg (271 lb 9 6 oz)   · Echocardiogram 12/23/2021 revealing EF 30% and grade 2 diastolic dysfunction with right ventricular dilatation, left atrium dilatation, moderate mitral regurgitation    Cardiac catheterization that hospitalization without significant obstructing disease  · Holding Lasix given soft blood pressures  · Continue metoprolol if blood pressure allows  · Holding Entresto given soft blood pressure  · Monitor daily weights, I/O, volume status          Type 2 diabetes mellitus, without long-term current use of insulin [facility])  Assessment & Plan  Lab Results   Component Value Date    HGBA1C 6 5 08/04/2021       Recent Labs     01/16/22  0351   POCGLU 148*       Blood Sugar Average: Last 72 hrs:  (P) 148   · SSI with Accu-Cheks while inpatient, carb controlled diet  · Initiate hypoglycemia protocol  · May benefit from SGLT 2 inhibitor at time of discharge  · Continue gabapentin for neuropathy      VTE Prophylaxis: Heparin Drip  / sequential compression device   Code Status: Level 3 - DNAR and DNI   POLST: POLST form is not discussed and not completed at this time  Discussion with family:  Offered however patient declines at this time    Anticipated Length of Stay:  Patient will be admitted on an Inpatient basis with an anticipated length of stay of  greater than 2 midnights  Justification for Hospital Stay: Please see detailed plans noted above  Chief Complaint:     Life vest discharge  History of Present Illness:  Betty Grace is a 64 y o  female who presented from [facility], where patient is currently residing for short-term rehab after hospitalization at [facility] 12/22/2021-12/31/2021, after her life vest began to alarm and discharge  She was hospitalized at [facility] after a syncopal episode resulting in 8 rib fracture and echocardiogram revealing an EF 30% for which Cardiology was consulted with cardiac catheterization performed negative for significant CAD and patient started on metoprolol/Entresto and discharged short-term rehab with LifeVest with plan for further cardiology follow-up  This evening patient reports she was awakened when her life vest alarmed and delivered 1 shock, prompting call to EMS    Apparently shock was secondary to AFib with RVR and patient noted by EMS on scene hypotensive without complaints but in persistent atrial fibrillation with RVR -190s for which patient was cardioverted by EMS at [address]  [facility] [address] successfully to sinus rhythm but remained hypotensive, improved after 500 cc saline bolus in for which patient received 250 further ccs during ED evaluation  Her blood pressure remains normotensive but soft however patient maintained sinus rhythm and remainder of ED workup without other marked abnormality save for hypomagnesemia for which magnesium is being provided  She is admitted given the events of tonight and possible new onset AFib with RVR prompting symptoms  Currently, patient is lying in bed in no acute distress, comfortable appearing  She offers no acute complaints at this time, stating at the time of shock she was not experiencing any chest pain/pressure, shortness of breath, dizziness/lightheadedness, or palpitations/rapid heart rate  Additionally denies any weight gain, orthopnea/PND, or worsening edema      Review of Systems:    Constitutional:  Denies fever or chills   Eyes:  Denies change in visual acuity   HENT:  Denies nasal congestion or sore throat   Respiratory:  Denies cough or shortness of breath   Cardiovascular:  Denies chest pain or edema   GI:  Denies abdominal pain, nausea, vomiting, bloody stools or diarrhea   :  Denies dysuria   Musculoskeletal:  Denies back pain or joint pain   Integument:  Denies rash   Neurologic:  Denies headache, focal weakness or sensory changes   Endocrine:  Denies polyuria or polydipsia   Lymphatic:  Denies swollen glands   Psychiatric:  Denies depression or anxiety     Past Medical and Surgical History:   Past Medical History:   Diagnosis Date    Aortic stenosis     Arthritis     Asthma     Coronary artery disease     Diabetes mellitus ([facility] )     Diabetic peripheral neuropathy ([facility] )     Last assessed - 1/27/16    Gallbladder disease     Heart attack ([facility] ) 07/1999    Hemorrhoids     Last assessed - 11/16/12  Myocardial infarction [facility])     Old myocardial infarction     Type 2 diabetes mellitus with autonomic neuropathy ([facility] [address] )     Unspec long term insulin use status, Last assessed - 6/8/17     Past Surgical History:   Procedure Laterality Date    BUNIONECTOMY      CARDIAC CATHETERIZATION      Carotid Artery, Resolved - 7/1/13    CARDIAC CATHETERIZATION N/A 12/27/2021    Procedure: CARDIAC CATHETERIZATION ;  Surgeon: Jojo Nascimento MD;  [facility]; Service: Cardiology    CARDIAC CATHETERIZATION N/A 12/27/2021    Procedure: Cardiac Coronary Angiogram;  Surgeon: Jojo Nascimento MD;  [facility]; Service: Cardiology    CARDIOVASCULAR STRESS TEST  09/1999    CHOLECYSTECTOMY      COLONOSCOPY  2017    FOOT ARTHRODESIS, MODIFIED DONOHUE  2016    GALLBLADDER SURGERY  1970    HEMORROIDECTOMY      KNEE ARTHROSCOPY Left 2004    VA COLONOSCOPY FLX DX W/COLLJ [facility] REHABIL[facility]ATION WHEN PFRMD N/A 8/21/2017    Procedure: COLONOSCOPY;  Surgeon: Laurie Kennedy MD;  Location: [facility];   Service: Colorectal    REPAIR KNEE LIGAMENT      REPAIR KNEE LIGAMENT Left 1986    REPAIR KNEE LIGAMENT Right 1988       Meds/Allergies:  Medications Prior to Admission   Medication    sacubitril-valsartan (Entresto) 24-26 MG TABS    acetaminophen (TYLENOL) 325 mg tablet    albuterol (ProAir HFA) 90 mcg/act inhaler    Camphor-Menthol-Methyl Sal 3 1-6-10 % PTCH    diphenoxylate-atropine (LOMOTIL) 2 5-0 025 mg per tablet    furosemide (LASIX) 20 mg tablet    gabapentin (NEURONTIN) 300 mg capsule    glucose blood (TRUETEST TEST) test strip    levothyroxine 150 mcg tablet    losartan (COZAAR) 25 mg tablet    metoprolol succinate (TOPROL-XL) 25 mg 24 hr tablet    MICROLET LANCETS MISC    nystatin (MYCOSTATIN) powder    potassium chloride (K-DUR,KLOR-CON) 20 mEq tablet    pravastatin (PRAVACHOL) 10 mg tablet    venlafaxine (EFFEXOR) 75 mg tablet    Wound Dressings (PruTect) EMUL       Allergies: Allergies   Allergen Reactions    Lyrica [Pregabalin] Swelling    Sulfa Antibiotics Swelling     History:  Marital Status: /Civil Union     Substance Use History:   Social History     Substance and Sexual Activity   Alcohol Use Never    Comment: Social per [employer]      Social History     Tobacco Use   Smoking Status Never Smoker   Smokeless Tobacco Never Used     Social History     Substance and Sexual Activity   Drug Use Never       Family History:  Family History   Problem Relation Age of Onset    Hypertension Father     Diabetes Father     Cancer Father         Throat    Arthritis Father     Stroke Mother     Diabetes Maternal Grandmother     Heart disease Maternal Grandfather     Diabetes Son     Lymphoma Family         Head, Face and Neck        Physical Exam:     Vitals:   Blood Pressure: 97/55 (01/16/22 0545)  Pulse: 72 (01/16/22 0545)  Temperature: 97 5 °F (36 4 °C) (01/16/22 0358)  Temp Source: Oral (01/16/22 0358)  Respirations: 19 (01/16/22 0545)  SpO2: 99 % (01/16/22 0545)    Constitutional:  Well developed, obese, no acute distress, non-toxic appearance   Eyes:  PERRL, conjunctiva normal   HENT:  Atraumatic, external ears normal, nose normal, oropharynx moist, no pharyngeal exudates  Neck- normal range of motion, no tenderness, supple   Respiratory:  No respiratory distress, normal breath sounds, no rales, no wheezing   Cardiovascular:  Normal rate, normal rhythm, systolic murmur, no gallops, no rubs   GI:  Soft, nondistended, normal bowel sounds, nontender, no organomegaly, no mass, no rebound, no guarding   :  No costovertebral angle tenderness   Musculoskeletal:  Trace bilateral ankle edema, no tenderness, no deformities   Back- no tenderness  Integument:  Well hydrated, no rash   Lymphatic:  No lymphadenopathy noted   Neurologic:  Alert &awake, communicative, CN 2-12 normal, normal motor function, normal sensory function, no focal deficits noted   Psychiatric:  Speech and behavior appropriate       Lab Results: I have personally reviewed pertinent reports  Results from last 7 days   Lab Units 01/16/22  0401   WBC Thousand/uL 8 16   HEMOGLOBIN g/dL 12 4   HEMATOCRIT % 39 0   PLATELETS Thousands/uL 265   NEUTROS PCT % 59   LYMPHS PCT % 26   MONOS PCT % 10   EOS PCT % 4     Results from last 7 days   Lab Units 01/16/22  0401   POTASSIUM mmol/L 4 2   CHLORIDE mmol/L 107   CO2 mmol/L 26   BUN mg/dL 29*   CREATININE mg/dL 1 55*   CALCIUM mg/dL 10 3*           EKG:  EMS EKGs atrial fibrillation with RVR  with LAD and LBBB, EKG here normal sinus rhythm HR 87 with LAD and LBBB    Imaging: I have personally reviewed pertinent reports  XR chest 1 view portable    Result Date: 12/23/2021  Narrative: CHEST INDICATION:   falls versus syncope  COMPARISON:  5/20/2013 EXAM PERFORMED/VIEWS:  XR CHEST PORTABLE FINDINGS: Mild cardiomegaly is present  The lungs are clear  No pneumothorax or pleural effusion  Bilateral glenohumeral joint osteoarthritis is present  Impression: No acute cardiopulmonary disease  Workstation performed: [id number]     CT head without contrast    Result Date: 12/22/2021  Narrative: CT BRAIN - WITHOUT CONTRAST INDICATION:   Head trauma, moderate-severe Facial trauma, blunt trauma  COMPARISON:  None  TECHNIQUE:  CT examination of the brain was performed  In addition to axial images, sagittal and coronal 2D reformatted images were created and submitted for interpretation  Radiation dose length product (DLP) for this visit:  918 mGy-cm   This examination, like all CT scans performed in the [facility], was performed utilizing techniques to minimize radiation dose exposure, including the use of iterative reconstruction and automated exposure control  IMAGE QUALITY:  Diagnostic  FINDINGS: PARENCHYMA:  No intracranial mass, mass effect or midline shift  No CT signs of acute infarction  No acute parenchymal hemorrhage   VENTRICLES AND EXTRA-AXIAL SPACES:  Normal for the patient's age  VISUALIZED ORBITS AND PARANASAL SINUSES:  Unremarkable  CALVARIUM AND EXTRACRANIAL SOFT TISSUES:  Left temporal and periorbital scalp swelling  No scalp hematoma  Impression: No acute intracranial abnormality  Workstation performed: [id number]     CT facial bones without contrast    Result Date: 12/22/2021  Narrative: CT FACIAL BONES WITHOUT INTRAVENOUS CONTRAST INDICATION:   Facial trauma, penetrating trauma  COMPARISON: None  TECHNIQUE:  Axial CT images were obtained through the facial bones with additional sagittal and coronal reconstructions  Radiation dose length product (DLP) for this visit:  501 mGy-cm   This examination, like all CT scans performed in the [facility], was performed utilizing techniques to minimize radiation dose exposure, including the use of iterative reconstruction and automated exposure control  IMAGE QUALITY:  Diagnostic  FINDINGS: FACIAL BONES:   No facial bone fracture identified  Normal alignment of the temporomandibular joints  No lytic or blastic lesion  ORBITS:  Orbital globes, optic nerves, and extraocular muscles appear symmetric and normal  There is no evidence of retrobulbar mass, abscess, or hematoma  SINUSES:  Normal  SOFT TISSUES:  Left facial and periorbital soft tissue swelling  No soft tissue hematoma  Feel history     Impression: No acute fracture  Workstation performed: [id number]     CT chest wo contrast    Result Date: 12/23/2021  Narrative: CT CHEST WITHOUT IV CONTRAST INDICATION:   "Pneumonia, effusion or abscess suspected, xray done, abnormal finding on CXR, left lung field  Eval for pulm contusion vs asp pna   " Sleeping and fell out of chair, struck head and face with loss of consciousness  COMPARISON:  Chest radiograph from 12/22/2021  TECHNIQUE: Chest CT without intravenous contrast   Axial, sagittal, coronal 2D reformats and coronal MIPS from source data   Radiation dose length product (DLP):  466 mGy-cm   Radiation dose exposure minimized using iterative reconstruction and automated exposure control  FINDINGS: LUNGS:  No focal airspace consolidation  Mild diffuse reticulation throughout both lungs with mild bronchiectasis in the left upper lobe and lingula  AIRWAYS: No significant filling defects  PLEURA:  Unremarkable  HEART/GREAT VESSELS:  Heart at upper limit of normal in size  Mild coronary artery calcification indicating atherosclerotic heart disease  Moderate lipomatous hypertrophy of the interatrial septum  Pulmonary artery enlargement  MEDIASTINUM AND JOHNNY:  Calcified mediastinal and left hilar nodes due to benign granulomatous disease  Small hiatal hernia  CHEST WALL AND LOWER NECK: Unremarkable  UPPER ABDOMEN:  Cholecystectomy  Benign calcification in the spleen  OSSEOUS STRUCTURES: Nondisplaced fracture of the anterolateral left 8th rib (3/89)  Mild degenerative disease in the spine  Benign bone island in T5  Impression: Nothing to suggest pneumonia  Nondisplaced fracture of the anterolateral left 8th rib  Correlate with tenderness to determine if acute or old  Mild diffuse reticulation throughout the lungs with mild bronchiectasis in the left upper lobe and lingula, most likely due due to pulmonary fibrosis  Consider referral to pulmonology for further evaluation and management of probable early interstitial lung disease  Pulmonary artery enlargement which can be seen with pulmonary hypertension  Workstation performed: [id number]     Cardiac catheterization    Result Date: 12/27/2021  Narrative: · Widely patent coronary arteries  US bedside procedure    Result Date: 1/16/2022  Narrative: 1 2 840 [id number]  2 446 246 [id number] 332 1    Echo complete w/ contrast if indicated    Result Date: 12/23/2021  Narrative: [facility]  Left Ventricle: Left ventricular cavity size is normal  Wall motion is normal  There is severe global hypokinesis with regional variation   Diastolic function is moderately abnormal, consistent with grade II (pseudonormal) relaxation  The left ventricular ejection fraction is 30%  Systolic function is severely reduced    Right Ventricle: Right ventricular cavity size is dilated  Systolic function is normal    Left Atrium: The atrium is dilated    Mitral Valve: There is mild to moderate regurgitation  ** Please Note: Dragon 360 Dictation voice to text software was used in the creation of this document   **

## 2022-01-16 NOTE — ASSESSMENT & PLAN NOTE
· Reviewed the finding of orthostatic hypotension with her and her cardiology service   · After discussion with the cardiologist, I will discontinue her Entresto and start losartan 25 mg daily  · Will continue with metoprolol succinate ER   · She will continue to be followed under the congestive heart failure pathway at the rehabilitation facility  · Will continue with clinical and periodic laboratory monitoring for change in her condition  · She has a follow-up visit with her cardiology service scheduled on January 17, 2022  · Cardiologist to message treating provider for that visit  · She will follow-up with her Cardiology service upon discharge

## 2022-01-16 NOTE — PLAN OF CARE
Problem: CARDIOVASCULAR - ADULT  Goal: Maintains optimal cardiac output and hemodynamic stability  Description: INTERVENTIONS:  - Monitor I/O, vital signs and rhythm  - Monitor for S/S and trends of decreased cardiac output  - Administer and titrate ordered vasoactive medications to optimize hemodynamic stability  - Assess quality of pulses, skin color and temperature  - Assess for signs of decreased coronary artery perfusion  - Instruct patient to report change in severity of symptoms  Outcome: Progressing

## 2022-01-16 NOTE — ASSESSMENT & PLAN NOTE
· Body mass index is 40 97 kg/m²    · Encourage weight loss, lifestyle modification once acute issues improved

## 2022-01-16 NOTE — Clinical Note
The Icd Cobalt Xt Hf Quad Crt-d Mri Df4 device was inserted  The leads were placed into the connector and visually verified to be in correct position

## 2022-01-16 NOTE — ASSESSMENT & PLAN NOTE
· Patient with apparent shock delivered by LifeVest which prompted presentation  · Noted with apparent rapid atrial fibrillation via EMS, see associated plans  · Cardiology consult

## 2022-01-16 NOTE — PLAN OF CARE
Problem: PAIN - ADULT  Goal: Verbalizes/displays adequate comfort level or baseline comfort level  Description: Interventions:  - Encourage patient to monitor pain and request assistance  - Assess pain using appropriate pain scale  - Administer analgesics based on type and severity of pain and evaluate response  - Implement non-pharmacological measures as appropriate and evaluate response  - Consider cultural and social influences on pain and pain management  - Notify physician/advanced practitioner if interventions unsuccessful or patient reports new pain  Outcome: Progressing     Problem: INFECTION - ADULT  Goal: Absence or prevention of progression during hospitalization  Description: INTERVENTIONS:  - Assess and monitor for signs and symptoms of infection  - Monitor lab/diagnostic results  - Monitor all insertion sites, i e  indwelling lines, tubes, and drains  - Monitor endotracheal if appropriate and nasal secretions for changes in amount and color  - Gore appropriate cooling/warming therapies per order  - Administer medications as ordered  - Instruct and encourage patient and family to use good hand hygiene technique  - Identify and instruct in appropriate isolation precautions for identified infection/condition  Outcome: Progressing  Goal: Absence of fever/infection during neutropenic period  Description: INTERVENTIONS:  - Monitor WBC    Outcome: Progressing     Problem: SAFETY ADULT  Goal: Patient will remain free of falls  Description: INTERVENTIONS:  - Educate patient/family on patient safety including physical limitations  - Instruct patient to call for assistance with activity   - Consult OT/PT to assist with strengthening/mobility   - Keep Call bell within reach  - Keep bed low and locked with side rails adjusted as appropriate  - Keep care items and personal belongings within reach  - Initiate and maintain comfort rounds  - Make Fall Risk Sign visible to staff  - Offer Toileting every  Hours, in advance of need  - Initiate/Maintain alarm  - Obtain necessary fall risk management equipment:   - Apply yellow socks and bracelet for high fall risk patients  - Consider moving patient to room near nurses station  Outcome: Progressing  Goal: Maintain or return to baseline ADL function  Description: INTERVENTIONS:  -  Assess patient's ability to carry out ADLs; assess patient's baseline for ADL function and identify physical deficits which impact ability to perform ADLs (bathing, care of mouth/teeth, toileting, grooming, dressing, etc )  - Assess/evaluate cause of self-care deficits   - Assess range of motion  - Assess patient's mobility; develop plan if impaired  - Assess patient's need for assistive devices and provide as appropriate  - Encourage maximum independence but intervene and supervise when necessary  - Involve family in performance of ADLs  - Assess for home care needs following discharge   - Consider OT consult to assist with ADL evaluation and planning for discharge  - Provide patient education as appropriate  Outcome: Progressing  Goal: Maintains/Returns to pre admission functional level  Description: INTERVENTIONS:  - Perform BMAT or MOVE assessment daily    - Set and communicate daily mobility goal to care team and patient/family/caregiver  - Collaborate with rehabilitation services on mobility goals if consulted  - Perform Range of Motion  times a day  - Reposition patient every  hours    - Dangle patient  times a day  - Stand patient  times a day  - Ambulate patient  times a day  - Out of bed to chair  times a day   - Out of bed for meals times a day  - Out of bed for toileting  - Record patient progress and toleration of activity level   Outcome: Progressing     Problem: DISCHARGE PLANNING  Goal: Discharge to home or other facility with appropriate resources  Description: INTERVENTIONS:  - Identify barriers to discharge w/patient and caregiver  - Arrange for needed discharge resources and transportation as appropriate  - Identify discharge learning needs (meds, wound care, etc )  - Arrange for interpretive services to assist at discharge as needed  - Refer to Case Management Department for coordinating discharge planning if the patient needs post-hospital services based on physician/advanced practitioner order or complex needs related to functional status, cognitive ability, or social support system  Outcome: Progressing     Problem: Knowledge Deficit  Goal: Patient/family/caregiver demonstrates understanding of disease process, treatment plan, medications, and discharge instructions  Description: Complete learning assessment and assess knowledge base    Interventions:  - Provide teaching at level of understanding  - Provide teaching via preferred learning methods  Outcome: Progressing     Problem: CARDIOVASCULAR - ADULT  Goal: Maintains optimal cardiac output and hemodynamic stability  Description: INTERVENTIONS:  - Monitor I/O, vital signs and rhythm  - Monitor for S/S and trends of decreased cardiac output  - Administer and titrate ordered vasoactive medications to optimize hemodynamic stability  - Assess quality of pulses, skin color and temperature  - Assess for signs of decreased coronary artery perfusion  - Instruct patient to report change in severity of symptoms  Outcome: Progressing  Goal: Absence of cardiac dysrhythmias or at baseline rhythm  Description: INTERVENTIONS:  - Continuous cardiac monitoring, vital signs, obtain 12 lead EKG if ordered  - Administer antiarrhythmic and heart rate control medications as ordered  - Monitor electrolytes and administer replacement therapy as ordered  Outcome: Progressing

## 2022-01-16 NOTE — ASSESSMENT & PLAN NOTE
Wt Readings from Last 3 Encounters:   01/05/22 100 kg (221 lb)   12/31/21 100 kg (221 lb 1 9 oz)   08/16/21 123 kg (271 lb 9 6 oz)   · Echocardiogram 12/23/2021 revealing EF 30% and grade 2 diastolic dysfunction with right ventricular dilatation, left atrium dilatation, moderate mitral regurgitation    Cardiac catheterization that hospitalization without significant obstructing disease  · Holding Lasix given soft blood pressures  · Continue metoprolol if blood pressure allows  · Holding Entresto given soft blood pressure  · Monitor daily weights, I/O, volume status

## 2022-01-16 NOTE — ASSESSMENT & PLAN NOTE
Wt Readings from Last 3 Encounters:   01/16/22 102 kg (223 lb 15 8 oz)   01/05/22 100 kg (221 lb)   12/31/21 100 kg (221 lb 1 9 oz)   · Echocardiogram 12/23/2021 revealing EF 30% and grade 2 diastolic dysfunction with right ventricular dilatation, left atrium dilatation, moderate mitral regurgitation    Cardiac catheterization that hospitalization without significant obstructing disease  · Holding Lasix given soft blood pressures  · Continue metoprolol if blood pressure allows  · Holding Entresto given soft blood pressure  · Monitor daily weights, I/O, volume status  · Cardiology consult pending

## 2022-01-16 NOTE — ED ATTENDING ATTESTATION
1/16/2022  IZee MD, saw and evaluated the patient  I have discussed the patient with the resident/non-physician practitioner and agree with the resident's/non-physician practitioner's findings, Plan of Care, and MDM as documented in the resident's/non-physician practitioner's note, except where noted  All available labs and Radiology studies were reviewed  I was present for key portions of any procedure(s) performed by the resident/non-physician practitioner and I was immediately available to provide assistance  At this point I agree with the current assessment done in the Emergency Department  I have conducted an independent evaluation of this patient a history and physical is as follows:    ED Course     Patient is a 67-year-old female with history of proximal AFib, congestive heart failure EF 30% recent hospitalization for same and syncopal event  Presents with rapid AFib with RVR  While at nursing home facility, patient's life vest fired once due to AFib with RVR  Cardioversion unsuccessful  EMS arrived on scene found patient to be hypotensive without complaints  AFib rate 160s to 190s  Due to unstable AFib with hypotension, decision was made to proceed with cardioversion using 2 mg of Versed  Patient received 100 joules scars cardioversion by EMS with successful cardioversion to sinus rhythm patient remained hypotensive was given a 500 cc saline bolus in field  On arrival patient remained hypotensive received additional IV fluids improvement of blood pressure  Patient is well-appearing nontoxic no acute distress at this time  Heart regular rate rhythm without murmurs  Lungs clear abdomen soft nontender nondistended normal bowel sounds extremities no edema      Impression:  AFib with RVR status post cardioversion patient noted to have social hypertension continue to monitor blood pressure this time check cardiac evaluation chest x-ray anticipate admission      Critical Care Time  Procedures

## 2022-01-16 NOTE — ASSESSMENT & PLAN NOTE
· Will check x-ray of her left knee  · Will add Voltaren gel 1% starting at 2 g every 8 hours to left knee   · Physiatry has been consulted   · Will continue with monitoring for change in her condition

## 2022-01-16 NOTE — ASSESSMENT & PLAN NOTE
Lab Results   Component Value Date    HGBA1C 6 5 08/04/2021       Recent Labs     01/16/22  0351 01/16/22  0703   POCGLU 148* 153*       Blood Sugar Average: Last 72 hrs:  (P) 150 5   · SSI with Accu-Cheks while inpatient, carb controlled diet  · Initiate hypoglycemia protocol  · May benefit from SGLT 2 inhibitor at time of discharge  · Continue gabapentin for neuropathy

## 2022-01-16 NOTE — ASSESSMENT & PLAN NOTE
· Patient with apparent shock delivered by LifeVest which prompted presentation  · Noted with apparent rapid atrial fibrillation via EMS, see associated plans  · Cardiology consult pending

## 2022-01-17 ENCOUNTER — ANESTHESIA EVENT (INPATIENT)
Dept: NON INVASIVE DIAGNOSTICS | Facility: HOSPITAL | Age: 77
DRG: 226 | End: 2022-01-17
Payer: MEDICARE

## 2022-01-17 ENCOUNTER — EPISODE CHANGES (OUTPATIENT)
Dept: CASE MANAGEMENT | Facility: OTHER | Age: 77
End: 2022-01-17

## 2022-01-17 PROBLEM — J84.10 PULMONARY FIBROSIS (HCC): Status: ACTIVE | Noted: 2022-01-17

## 2022-01-17 PROBLEM — E03.9 HYPOTHYROIDISM: Status: ACTIVE | Noted: 2022-01-17

## 2022-01-17 PROBLEM — E83.42 HYPOMAGNESEMIA: Status: RESOLVED | Noted: 2022-01-16 | Resolved: 2022-01-17

## 2022-01-17 LAB
ANION GAP SERPL CALCULATED.3IONS-SCNC: 3 MMOL/L (ref 4–13)
APTT PPP: 47 SECONDS (ref 23–37)
APTT PPP: 50 SECONDS (ref 23–37)
APTT PPP: 94 SECONDS (ref 23–37)
BUN SERPL-MCNC: 27 MG/DL (ref 5–25)
CALCIUM SERPL-MCNC: 9.8 MG/DL (ref 8.3–10.1)
CHLORIDE SERPL-SCNC: 111 MMOL/L (ref 100–108)
CO2 SERPL-SCNC: 28 MMOL/L (ref 21–32)
CREAT SERPL-MCNC: 1.35 MG/DL (ref 0.6–1.3)
ERYTHROCYTE [DISTWIDTH] IN BLOOD BY AUTOMATED COUNT: 15.5 % (ref 11.6–15.1)
FERRITIN SERPL-MCNC: 247 NG/ML (ref 8–388)
GFR SERPL CREATININE-BSD FRML MDRD: 37 ML/MIN/1.73SQ M
GLUCOSE SERPL-MCNC: 114 MG/DL (ref 65–140)
GLUCOSE SERPL-MCNC: 115 MG/DL (ref 65–140)
GLUCOSE SERPL-MCNC: 123 MG/DL (ref 65–140)
GLUCOSE SERPL-MCNC: 151 MG/DL (ref 65–140)
GLUCOSE SERPL-MCNC: 155 MG/DL (ref 65–140)
HCT VFR BLD AUTO: 33.5 % (ref 34.8–46.1)
HGB BLD-MCNC: 10.6 G/DL (ref 11.5–15.4)
HIV 1+2 AB+HIV1 P24 AG SERPL QL IA: NORMAL
IRON SATN MFR SERPL: 60 % (ref 15–50)
IRON SERPL-MCNC: 107 UG/DL (ref 50–170)
MAGNESIUM SERPL-MCNC: 2 MG/DL (ref 1.6–2.6)
MCH RBC QN AUTO: 31.3 PG (ref 26.8–34.3)
MCHC RBC AUTO-ENTMCNC: 31.6 G/DL (ref 31.4–37.4)
MCV RBC AUTO: 99 FL (ref 82–98)
PLATELET # BLD AUTO: 195 THOUSANDS/UL (ref 149–390)
PMV BLD AUTO: 11.8 FL (ref 8.9–12.7)
POTASSIUM SERPL-SCNC: 4.4 MMOL/L (ref 3.5–5.3)
RBC # BLD AUTO: 3.39 MILLION/UL (ref 3.81–5.12)
SODIUM SERPL-SCNC: 142 MMOL/L (ref 136–145)
T4 FREE SERPL-MCNC: 1.49 NG/DL (ref 0.76–1.46)
TIBC SERPL-MCNC: 179 UG/DL (ref 250–450)
TSH SERPL DL<=0.05 MIU/L-ACNC: 0.17 UIU/ML (ref 0.36–3.74)
WBC # BLD AUTO: 6.31 THOUSAND/UL (ref 4.31–10.16)

## 2022-01-17 PROCEDURE — 99233 SBSQ HOSP IP/OBS HIGH 50: CPT | Performed by: INTERNAL MEDICINE

## 2022-01-17 PROCEDURE — 86334 IMMUNOFIX E-PHORESIS SERUM: CPT | Performed by: INTERNAL MEDICINE

## 2022-01-17 PROCEDURE — 84439 ASSAY OF FREE THYROXINE: CPT | Performed by: INTERNAL MEDICINE

## 2022-01-17 PROCEDURE — 85027 COMPLETE CBC AUTOMATED: CPT | Performed by: PHYSICIAN ASSISTANT

## 2022-01-17 PROCEDURE — 82948 REAGENT STRIP/BLOOD GLUCOSE: CPT

## 2022-01-17 PROCEDURE — 99232 SBSQ HOSP IP/OBS MODERATE 35: CPT | Performed by: PHYSICIAN ASSISTANT

## 2022-01-17 PROCEDURE — 99222 1ST HOSP IP/OBS MODERATE 55: CPT | Performed by: INTERNAL MEDICINE

## 2022-01-17 PROCEDURE — 83735 ASSAY OF MAGNESIUM: CPT | Performed by: PHYSICIAN ASSISTANT

## 2022-01-17 PROCEDURE — 82728 ASSAY OF FERRITIN: CPT | Performed by: INTERNAL MEDICINE

## 2022-01-17 PROCEDURE — 02HK3KZ INSERTION OF DEFIBRILLATOR LEAD INTO RIGHT VENTRICLE, PERCUTANEOUS APPROACH: ICD-10-PCS | Performed by: INTERNAL MEDICINE

## 2022-01-17 PROCEDURE — 83550 IRON BINDING TEST: CPT | Performed by: INTERNAL MEDICINE

## 2022-01-17 PROCEDURE — 85730 THROMBOPLASTIN TIME PARTIAL: CPT | Performed by: FAMILY MEDICINE

## 2022-01-17 PROCEDURE — 84165 PROTEIN E-PHORESIS SERUM: CPT | Performed by: PATHOLOGY

## 2022-01-17 PROCEDURE — 87389 HIV-1 AG W/HIV-1&-2 AB AG IA: CPT | Performed by: INTERNAL MEDICINE

## 2022-01-17 PROCEDURE — 0JH609Z INSERTION OF CARDIAC RESYNCHRONIZATION DEFIBRILLATOR PULSE GENERATOR INTO CHEST SUBCUTANEOUS TISSUE AND FASCIA, OPEN APPROACH: ICD-10-PCS | Performed by: INTERNAL MEDICINE

## 2022-01-17 PROCEDURE — 83540 ASSAY OF IRON: CPT | Performed by: INTERNAL MEDICINE

## 2022-01-17 PROCEDURE — 99232 SBSQ HOSP IP/OBS MODERATE 35: CPT | Performed by: INTERNAL MEDICINE

## 2022-01-17 PROCEDURE — 86038 ANTINUCLEAR ANTIBODIES: CPT | Performed by: INTERNAL MEDICINE

## 2022-01-17 PROCEDURE — 83521 IG LIGHT CHAINS FREE EACH: CPT | Performed by: INTERNAL MEDICINE

## 2022-01-17 PROCEDURE — 84165 PROTEIN E-PHORESIS SERUM: CPT | Performed by: INTERNAL MEDICINE

## 2022-01-17 PROCEDURE — 80048 BASIC METABOLIC PNL TOTAL CA: CPT | Performed by: PHYSICIAN ASSISTANT

## 2022-01-17 PROCEDURE — 86334 IMMUNOFIX E-PHORESIS SERUM: CPT | Performed by: PATHOLOGY

## 2022-01-17 PROCEDURE — 84443 ASSAY THYROID STIM HORMONE: CPT | Performed by: INTERNAL MEDICINE

## 2022-01-17 PROCEDURE — 93005 ELECTROCARDIOGRAM TRACING: CPT

## 2022-01-17 PROCEDURE — 84166 PROTEIN E-PHORESIS/URINE/CSF: CPT | Performed by: PATHOLOGY

## 2022-01-17 RX ORDER — CEFAZOLIN SODIUM 2 G/50ML
2000 SOLUTION INTRAVENOUS ONCE
Status: COMPLETED | OUTPATIENT
Start: 2022-01-18 | End: 2022-01-18

## 2022-01-17 RX ORDER — DOFETILIDE 0.12 MG/1
125 CAPSULE ORAL 2 TIMES DAILY
Qty: 60 CAPSULE | Refills: 0 | Status: SHIPPED | OUTPATIENT
Start: 2022-01-17 | End: 2022-02-28

## 2022-01-17 RX ORDER — DOFETILIDE 0.12 MG/1
125 CAPSULE ORAL EVERY 12 HOURS SCHEDULED
Status: DISCONTINUED | OUTPATIENT
Start: 2022-01-17 | End: 2022-01-20 | Stop reason: HOSPADM

## 2022-01-17 RX ORDER — SACUBITRIL AND VALSARTAN 24; 26 MG/1; MG/1
1 TABLET, FILM COATED ORAL 2 TIMES DAILY
Qty: 60 TABLET | Refills: 0 | Status: SHIPPED | OUTPATIENT
Start: 2022-01-17 | End: 2022-01-20 | Stop reason: ALTCHOICE

## 2022-01-17 RX ORDER — LEVOTHYROXINE SODIUM 0.12 MG/1
125 TABLET ORAL
Status: DISCONTINUED | OUTPATIENT
Start: 2022-01-18 | End: 2022-01-20 | Stop reason: HOSPADM

## 2022-01-17 RX ADMIN — VENLAFAXINE 75 MG: 37.5 TABLET ORAL at 20:59

## 2022-01-17 RX ADMIN — INSULIN LISPRO 1 UNITS: 100 INJECTION, SOLUTION INTRAVENOUS; SUBCUTANEOUS at 11:33

## 2022-01-17 RX ADMIN — GABAPENTIN 600 MG: 300 CAPSULE ORAL at 13:38

## 2022-01-17 RX ADMIN — INSULIN LISPRO 1 UNITS: 100 INJECTION, SOLUTION INTRAVENOUS; SUBCUTANEOUS at 21:08

## 2022-01-17 RX ADMIN — LEVOTHYROXINE SODIUM 150 MCG: 75 TABLET ORAL at 05:28

## 2022-01-17 RX ADMIN — GABAPENTIN 900 MG: 300 CAPSULE ORAL at 20:59

## 2022-01-17 RX ADMIN — ACETAMINOPHEN 975 MG: 325 TABLET, FILM COATED ORAL at 21:00

## 2022-01-17 RX ADMIN — HEPARIN SODIUM 2000 UNITS: 1000 INJECTION INTRAVENOUS; SUBCUTANEOUS at 18:31

## 2022-01-17 RX ADMIN — GABAPENTIN 900 MG: 300 CAPSULE ORAL at 08:04

## 2022-01-17 RX ADMIN — METOPROLOL SUCCINATE 25 MG: 25 TABLET, EXTENDED RELEASE ORAL at 20:59

## 2022-01-17 RX ADMIN — METOPROLOL SUCCINATE 25 MG: 25 TABLET, EXTENDED RELEASE ORAL at 08:06

## 2022-01-17 RX ADMIN — VENLAFAXINE 75 MG: 37.5 TABLET ORAL at 08:06

## 2022-01-17 RX ADMIN — NYSTATIN: 100000 POWDER TOPICAL at 08:05

## 2022-01-17 RX ADMIN — ACETAMINOPHEN 975 MG: 325 TABLET, FILM COATED ORAL at 05:28

## 2022-01-17 RX ADMIN — HEPARIN SODIUM 2000 UNITS: 1000 INJECTION INTRAVENOUS; SUBCUTANEOUS at 00:16

## 2022-01-17 RX ADMIN — VENLAFAXINE 75 MG: 37.5 TABLET ORAL at 15:17

## 2022-01-17 RX ADMIN — NYSTATIN: 100000 POWDER TOPICAL at 17:02

## 2022-01-17 RX ADMIN — ACETAMINOPHEN 975 MG: 325 TABLET, FILM COATED ORAL at 13:38

## 2022-01-17 RX ADMIN — HEPARIN SODIUM 8.1 UNITS/KG/HR: 10000 INJECTION, SOLUTION INTRAVENOUS; SUBCUTANEOUS at 11:34

## 2022-01-17 RX ADMIN — DOFETILIDE 125 MCG: 0.12 CAPSULE ORAL at 20:59

## 2022-01-17 RX ADMIN — PRAVASTATIN SODIUM 10 MG: 10 TABLET ORAL at 08:05

## 2022-01-17 NOTE — ASSESSMENT & PLAN NOTE
Body mass index is 40 04 kg/m²    · Encourage weight loss, lifestyle modification once acute issues improved

## 2022-01-17 NOTE — ASSESSMENT & PLAN NOTE
Lab Results   Component Value Date    EGFR 37 01/17/2022    EGFR 31 01/16/2022    EGFR 34 12/31/2021    CREATININE 1 35 (H) 01/17/2022    CREATININE 1 55 (H) 01/16/2022    CREATININE 1 45 (H) 12/31/2021     · Baseline creatinine per recent Nephrology record review appears to be 1 5-1 9  · Creatinine at baseline  · Monitor

## 2022-01-17 NOTE — ASSESSMENT & PLAN NOTE
· Patient with apparent shock delivered by LifeVest which prompted presentation  · Cardiology following  · Plan as per above

## 2022-01-17 NOTE — ASSESSMENT & PLAN NOTE
· Pulmonology consult appreciated - they recommend outpatient high-resolution CT scan in 6-8 weeks and follow-up with Dr Nickie Mcmahon

## 2022-01-17 NOTE — CONSULTS
Consultation - Electrophysiology  Sukhdev Goyal 64 y o  female MRN: [id number]  Unit/Bed#: [facility] 509-01 Encounter: [id number]      Inpatient consult to Electrophysiology  Consult performed by: Michelle Whaley PA-C  Consult ordered by: Gagandeep Schreiber MD          History of Present Illness   Physician Requesting Consult: Adrian Thakkar MD  Reason for Consult / Principal Problem: Shock    Assessment/Plan   Assessment:  1  Paroxysmal atrial fibrillation with episodes of RVR  - newly discovered with #2 / patient also required CV by EMS en route to hospital  - currently on heparin drip  - was on rate control of metoprolol succinate 25mg daily for cardiomyopathy prior to admission  2  Inappropriate LifeVest discharge for afib  - shocked patient at time of sleep for RVR  - NSVT also noted  3  Sick sinus syndrome   4  Tachy-cori syndrome  5  Left bundle branch block at baseline  - appears to have developed in 2020 looking at prior EKGs  6  Nonischemic cardiomyopathy  - cardiac cath 12/2021 showing no evidence of obstructive disease  - on OMT of metoprolol and Entresto as an outpateint  - last echo 12/2021 showing drop in EF to 30% / last prior 2017 was preserved   7  Chronic systolic congestive heart failure  - diuretic regimen of furosemide 20mg daily as outpatient   8  Non-insulin dependent diabetes mellitus  9  Essential hypertension  10  Hyperlipidemia   - maintained on pravastatin 10mg daily as an outpatient   11  Hypothyroidism   12  Obesity with BMI of 40  13  Pulmonary fibrosis   14  Depression  - maintained on venlafaxine as an outpatient    Plan:  Patient was unfortunately shocked by her life vest for atrial fibrillation with RVR which is a new diagnosis for her  However, in review of her prior EKGs, she does have baseline bradycardia and she is bradycardic in the 50s here    As she does require increase just doses of beta-blocker therapy given her new diagnosis of cardiomyopathy, she may become more bradycardic in the future  As she has tachy-cori syndrome, she does qualify for pacemaker  Also, as patient has a pacer indication at current time with an EF of 30%, does not need to have the full 3 months of guideline directed medical therapy prior to ICD implantation as well  As she has a wide left bundle which could be contributing to her cardiomyopathy as well, Bi V ICD was recommended to her  She was agreeable and we proceed with ICD implantation tomorrow  Patient be made NPO after midnight with heparin held early in the morning (will discuss with  at what time to discontinue) with antibiotics on-call to the EP lab  Will also price check dofetilide as patient will likely need antiarrhythmic therapy moving forward  HPI: Chase Truong is a 64y o  year old female with new onset atrial fibrillation with RVR and shocks by Life Vest on admission, left bundle-branch block baseline, nonischemic cardiomyopathy, chronic systolic congestive heart failure, non insulin-dependent diabetes mellitus, essential hypertension, hyperlipidemia a hypothyroidism, obesity with BMI of he and pulmonary fibrosis  Patient has followed with Dr Carol Dsouza of General Cardiology since 2018  Since that time, patient has have a preserved EF but she also had presumed CAD that was noted on nuclear stress testing in 2017  However, December of 2021 she presented with syncope  She had been sleeping in her recliner and she is unsure of what really happened next but she does know that she attempted to get up and next thing she knew was found down on the ground  It is in workup during that hospitalization, she was found to have a new drop in ejection fraction  As this was a new finding, patient did have more definitive ischemic evaluation with cardiac catheterization that revealed no obstructive CA D  She was placed on guideline directed medical therapy of metoprolol succinate and Entresto    She was then sent home with a life vest as well  She was then discharged to a nursing home facility  On 01/16, patient awoke after having received shock from her LifeVest   Although patient reports that this was during her sleep, EMS reports show that patient did have a prodrome of feeling hot and the shock was witnessed  In transit with EMS, she also was found to be in atrial fibrillation with rates in the 160s to 190s and as she was hypotensive with this, he was given Versed and cardioverted in route as well for which 100 joule shock was successful  On presentation, magnesium was 1 4 and potassium was 4 2  Creatinine was 1 55 on admission which appears slightly higher than baseline around 1 3  Pulmonary did also see the patient as she was noted to have pulmonary fibrosis with traction bronchiectasis  No acute treatment was warranted and recommendation for outpatient CT scan in 6 to 8 weeks was made  She was also placed on heparin drip for her atrial fibrillation  In review of her telemetry today, she has been maintaining sinus but is bradycardic in the 50s  Patient reports that she has felt fine overall  Even with her new drop in ejection fraction, she did not feel any shortness of breath or other symptoms prior to this  She has had lower extremity edema for while and has been well maintained on diuretics as an outpatient  Patient has been a  and still practices, has been for at least 40 years  She does not have any family history of significant cardiac issues      ZOLL -   Atrial fibrillation with abbherancy -     Shock received -       EMS report -         TELE: sinus bradycardia in the 50's    EKG:   Prior showing baseline bradycardia --      Widening of QRS complex seen in 2021 -       This admission -       Historical Information   Past Medical History:   Diagnosis Date    Aortic stenosis     Arthritis     Asthma     Coronary artery disease     Diabetes mellitus [facility]     Diabetic peripheral neuropathy ([facility] )     Last assessed - 1/27/16    Gallbladder disease     Heart attack ([facility] ) 07/1999    Hemorrhoids     Last assessed - 11/16/12    Myocardial infarction [facility])     Old myocardial infarction     Type 2 diabetes mellitus with autonomic neuropathy ([facility] )     Unspec long term insulin use status, Last assessed - 6/8/17     Past Surgical History:   Procedure Laterality Date    BUNIONECTOMY      CARDIAC CATHETERIZATION      Carotid Artery, Resolved - 7/1/13    CARDIAC CATHETERIZATION N/A 12/27/2021    Procedure: CARDIAC CATHETERIZATION ;  Surgeon: Christopher Jones MD;  [facility]; Service: Cardiology    CARDIAC CATHETERIZATION N/A 12/27/2021    Procedure: Cardiac Coronary Angiogram;  Surgeon: Christopher Jones MD;  [facility]; Service: Cardiology    CARDIOVASCULAR STRESS TEST  09/1999    CHOLECYSTECTOMY      COLONOSCOPY  2017    FOOT ARTHRODESIS, MODIFIED [facility]  2016    GALLBLADDER SURGERY  1970    HEMORROIDECTOMY      KNEE ARTHROSCOPY Left 2004    VT COLONOSCOPY FLX DX W/COLLJ [facility] INPAT[facility] WHEN PFRMD N/A 8/21/2017    Procedure: COLONOSCOPY;  Surgeon: Ki Waite MD;  Location: [facility];   Service: Colorectal    REPAIR KNEE LIGAMENT      REPAIR KNEE LIGAMENT Left 1986    REPAIR KNEE LIGAMENT Right 1988     Social History     Substance and Sexual Activity   Alcohol Use Never    Comment: Social per [employer]      Social History     Substance and Sexual Activity   Drug Use Never     Social History     Tobacco Use   Smoking Status Never Smoker   Smokeless Tobacco Never Used     Family History:   Family History   Problem Relation Age of Onset    Hypertension Father     Diabetes Father     Cancer Father         Throat    Arthritis Father     Stroke Mother     Diabetes Maternal Grandmother     Heart disease Maternal Grandfather     Diabetes Son     Lymphoma Family         Head, Face and Neck        Meds/Allergies   Hospital Medications:   Current Facility-Administered Medications   Medication Dose Route Frequency    acetaminophen (TYLENOL) tablet 975 mg  975 mg Oral Q8H [address]    diphenoxylate-atropine (LOMOTIL) 2 5-0 025 mg per tablet 1 tablet  1 tablet Oral 4x Daily PRN    gabapentin (NEURONTIN) capsule 600 mg  600 mg Oral Daily    gabapentin (NEURONTIN) capsule 900 mg  900 mg Oral BID    heparin (porcine) 25,000 Units in sodium chloride 0 45 % 250 mL infusion  3-20 Units/kg/hr (Order-Specific) Intravenous Titrated    heparin (porcine) injection 2,000 Units  2,000 Units Intravenous Q1H PRN    heparin (porcine) injection 4,000 Units  4,000 Units Intravenous Q1H PRN    insulin lispro (HumaLOG) 100 units/mL subcutaneous injection 1-5 Units  1-5 Units Subcutaneous HS    insulin lispro (HumaLOG) 100 units/mL subcutaneous injection 1-6 Units  1-6 Units Subcutaneous TID AC    [START ON 1/18/2022] levothyroxine tablet 125 mcg  125 mcg Oral Early Morning    metoprolol succinate (TOPROL-XL) 24 hr tablet 25 mg  25 mg Oral Q12H    nystatin (MYCOSTATIN) powder   Topical BID    ondansetron (ZOFRAN) injection 4 mg  4 mg Intravenous Q6H PRN    pravastatin (PRAVACHOL) tablet 10 mg  10 mg Oral Daily    venlafaxine (EFFEXOR) tablet 75 mg  75 mg Oral TID     Home Medications:   Medications Prior to Admission   Medication    sacubitril-valsartan (Entresto) 24-26 MG TABS    acetaminophen (TYLENOL) 325 mg tablet    albuterol (ProAir HFA) 90 mcg/act inhaler    Camphor-Menthol-Methyl Sal 3 1-6-10 % PTCH    diphenoxylate-atropine (LOMOTIL) 2 5-0 025 mg per tablet    furosemide (LASIX) 20 mg tablet    gabapentin (NEURONTIN) 300 mg capsule    glucose blood (TRUETEST TEST) test strip    levothyroxine 150 mcg tablet    losartan (COZAAR) 25 mg tablet    metoprolol succinate (TOPROL-XL) 25 mg 24 hr tablet    MICROLET LANCETS MISC    nystatin (MYCOSTATIN) powder    potassium chloride (K-DUR,KLOR-CON) 20 mEq tablet    pravastatin (PRAVACHOL) 10 mg tablet    venlafaxine (EFFEXOR) 75 mg tablet    Wound Dressings (PruTect) EMUL       Allergies   Allergen Reactions    Lyrica [Pregabalin] Swelling    Sulfa Antibiotics Swelling       Objective   Vitals: Blood pressure 138/63, pulse 67, temperature (!) 97 4 °F (36 3 °C), resp  rate 18, height 5' 2" (1 575 m), weight 99 3 kg (218 lb 14 7 oz), SpO2 95 %  Orthostatic Blood Pressures      Most Recent Value   Blood Pressure 138/63 filed at 01/17/2022 0801   Patient Position - Orthostatic VS Lying filed at 01/16/2022 1557            Intake/Output Summary (Last 24 hours) at 1/17/2022 1008  Last data filed at 1/17/2022 0950  Gross per 24 hour   Intake 137 46 ml   Output 493 ml   Net -355 54 ml       Invasive Devices  Report    Peripheral Intravenous Line            Peripheral IV 01/16/22 Left Antecubital 1 day          Drain            External Urinary Catheter <1 day                Review of Systems:  ROS  ROS as noted above, otherwise 12 point review of systems was performed and is negative  Physical Exam:   Physical Exam    Lab Results: I have personally reviewed pertinent lab results  Results from last 7 days   Lab Units 01/17/22  0608 01/16/22  0401   WBC Thousand/uL 6 31 8 16   HEMOGLOBIN g/dL 10 6* 12 4   HEMATOCRIT % 33 5* 39 0   PLATELETS Thousands/uL 195 265     Results from last 7 days   Lab Units 01/17/22  0608 01/16/22  0401   POTASSIUM mmol/L 4 4 4 2   CHLORIDE mmol/L 111* 107   CO2 mmol/L 28 26   BUN mg/dL 27* 29*   CREATININE mg/dL 1 35* 1 55*   CALCIUM mg/dL 9 8 10 3*     Results from last 7 days   Lab Units 01/17/22  0608 01/16/22  2229 01/16/22  1321   INR   --   --  1 25*   PTT seconds 94* 47* 187*     Results from last 7 days   Lab Units 01/17/22  0608 01/16/22  0401   MAGNESIUM mg/dL 2 0 1 4*       Imaging: I have personally reviewed pertinent reports      ECHO: Results for orders placed in visit on 12/07/17    Echo complete with contrast if indicated    Narrative  [address]  [phone]    Transthoracic Echocardiogram  2D, M-mode, Doppler, and Color Doppler    Study date:  07-Dec-2017    Patient: Jared Herrera  MR number: [id number]  Account number: [de-identified]  : 1945  Age: 74 years  Gender: Female  Status: Outpatient  Location: [address] [place] [address] [facility]  Height: 62 in  Weight: 288 lb  BP: 98/ 64 mmHg    Indications: Dyspnea, edema    Diagnoses: R06 09 - Other forms of dyspnea, R60 9 - Edema, unspecified    Sonographer:  VERONICA Morales  Referring Physician:  Olu West DO  Group:  [facility]  Interpreting Physician:  Eduardo Macias DO    SUMMARY    LEFT VENTRICLE:  Systolic function was normal  Ejection fraction was estimated to be 60 %  There were no regional wall motion abnormalities  Wall thickness was mildly increased  Doppler parameters were consistent with abnormal left ventricular relaxation (grade 1 diastolic dysfunction)  LEFT ATRIUM:  The atrium was mildly dilated  MITRAL VALVE:  There was mild regurgitation  AORTIC VALVE:  There was very mild stenosis  HISTORY: PRIOR HISTORY: Hypertension, high cholesterol, CAD, atrial fibrillation, asthma, COPD, DM, edema, hypothyroidism    PROCEDURE: The study was performed in the [address] [facility]  This was a routine study  The transthoracic approach was used  The study included complete 2D imaging, M-mode, complete spectral Doppler, and color Doppler  This  was a technically difficult study  LEFT VENTRICLE: Size was normal  Systolic function was normal  Ejection fraction was estimated to be 60 %  There were no regional wall motion abnormalities  Wall thickness was mildly increased  DOPPLER: Doppler parameters were consistent  with abnormal left ventricular relaxation (grade 1 diastolic dysfunction)      RIGHT VENTRICLE: The size was normal  Systolic function was normal  Wall thickness was normal     LEFT ATRIUM: The atrium was mildly dilated  RIGHT ATRIUM: Size was normal     MITRAL VALVE: Valve structure was normal  There was normal leaflet separation  DOPPLER: The transmitral velocity was within the normal range  There was no evidence for stenosis  There was mild regurgitation  AORTIC VALVE: The valve was trileaflet  Leaflets exhibited mildly increased thickness, mild calcification, and lower normal cuspal separation  DOPPLER: Transaortic velocity was minimally increased  There was very mild stenosis  There was  no significant regurgitation  TRICUSPID VALVE: The valve structure was normal  There was normal leaflet separation  DOPPLER: The transtricuspid velocity was within the normal range  There was no evidence for stenosis  There was no significant regurgitation  PULMONIC VALVE: Leaflets exhibited normal thickness, no calcification, and normal cuspal separation  DOPPLER: The transpulmonic velocity was within the normal range  There was no significant regurgitation  PERICARDIUM: There was no pericardial effusion  The pericardium was normal in appearance  AORTA: The root exhibited normal size  SYSTEMIC VEINS: IVC: The inferior vena cava was normal in size  SYSTEM MEASUREMENT TABLES    2D  %FS: 29 48 %  AV Diam: 3 07 cm  EDV(Teich): 123 01 ml  EF(Cube): 64 93 %  EF(Teich): 56 15 %  ESV(Cube): 46 13 ml  ESV(Teich): 53 94 ml  IVSd: 1 13 cm  LA Area: 22 33 cm2  LA Diam: 3 71 cm  LVEDV MOD A4C: 143 23 ml  LVEF MOD A4C: 59 57 %  LVESV MOD A4C: 57 91 ml  LVIDd: 5 09 cm  LVIDs: 3 59 cm  LVLd A4C: 7 57 cm  LVLs A4C: 6 34 cm  LVOT Diam: 2 09 cm  LVPWd: 1 24 cm  RA Area: 15 34 cm2  RV Diam : 3 79 cm  SV MOD A4C: 85 32 ml  SV(Cube): 85 41 ml  SV(Teich): 69 06 ml    CW  AV Env  Ti: 379 54 ms  AV SV: 364 79 ml  AV VTI: 49 28 cm  AV Vmax: 2 01 m/s  AV Vmean: 1 3 m/s  AV maxP 16 mmHg  AV meanP 79 mmHg    MM  TAPSE: 2 25 cm    PW  ANTONIO (VTI): 1 9 cm2  ANTONIO Vmax: 1 8 cm2  E': 0 05 m/s  E/E': 18 98  LVOT Env  Ti: 423 91 ms  LVOT VTI: 27 28 cm  LVOT Vmax: 1 05 m/s  LVOT Vmean: 0 65 m/s  LVOT maxP 44 mmHg  LVOT meanP 03 mmHg  LVSV Dopp: 93 59 ml  MV A Nadeem: 0 92 m/s  MV Dec [place]: 5 65 m/s2  MV DecT: 163 82 ms  MV E Nadeem: 0 93 m/s  MV E/A Ratio: 1 01    [facility]    Prepared and electronically signed by    Anni Stokes DO  Signed 07-Dec-2017 13:53:25       Cardiac testing:   ECHO:   Results for orders placed in visit on 17    Echo complete with contrast if indicated    Narrative  [facility]  [facility]  [phone]    Transthoracic Echocardiogram  2D, M-mode, Doppler, and Color Doppler    Study date:  07-Dec-2017    Patient: Catherine Quijano  MR number: [id number]  Account number: [de-identified]  : 1945  Age: 74 years  Gender: Female  Status: Outpatient  Location: [address] [place] [address] [facility] [place]  Height: 62 in  Weight: 288 lb  BP: 98/ 64 mmHg    Indications: Dyspnea, edema    Diagnoses: R06 09 - Other forms of dyspnea, R60 9 - Edema, unspecified    Sonographer:  VERONICA Mccoy  Referring Physician:  Kylah Cam DO  Group:  [facility]  Interpreting Physician:  Anni Stokes DO    SUMMARY    LEFT VENTRICLE:  Systolic function was normal  Ejection fraction was estimated to be 60 %  There were no regional wall motion abnormalities  Wall thickness was mildly increased  Doppler parameters were consistent with abnormal left ventricular relaxation (grade 1 diastolic dysfunction)  LEFT ATRIUM:  The atrium was mildly dilated  MITRAL VALVE:  There was mild regurgitation  AORTIC VALVE:  There was very mild stenosis      HISTORY: PRIOR HISTORY: Hypertension, high cholesterol, CAD, atrial fibrillation, asthma, COPD, DM, edema, hypothyroidism    PROCEDURE: The study was performed in the [facility]  This was a routine study  The transthoracic approach was used  The study included complete 2D imaging, M-mode, complete spectral Doppler, and color Doppler  This  was a technically difficult study  LEFT VENTRICLE: Size was normal  Systolic function was normal  Ejection fraction was estimated to be 60 %  There were no regional wall motion abnormalities  Wall thickness was mildly increased  DOPPLER: Doppler parameters were consistent  with abnormal left ventricular relaxation (grade 1 diastolic dysfunction)  RIGHT VENTRICLE: The size was normal  Systolic function was normal  Wall thickness was normal     LEFT ATRIUM: The atrium was mildly dilated  RIGHT ATRIUM: Size was normal     MITRAL VALVE: Valve structure was normal  There was normal leaflet separation  DOPPLER: The transmitral velocity was within the normal range  There was no evidence for stenosis  There was mild regurgitation  AORTIC VALVE: The valve was trileaflet  Leaflets exhibited mildly increased thickness, mild calcification, and lower normal cuspal separation  DOPPLER: Transaortic velocity was minimally increased  There was very mild stenosis  There was  no significant regurgitation  TRICUSPID VALVE: The valve structure was normal  There was normal leaflet separation  DOPPLER: The transtricuspid velocity was within the normal range  There was no evidence for stenosis  There was no significant regurgitation  PULMONIC VALVE: Leaflets exhibited normal thickness, no calcification, and normal cuspal separation  DOPPLER: The transpulmonic velocity was within the normal range  There was no significant regurgitation  PERICARDIUM: There was no pericardial effusion  The pericardium was normal in appearance  AORTA: The root exhibited normal size  SYSTEMIC VEINS: IVC: The inferior vena cava was normal in size      SYSTEM MEASUREMENT TABLES    2D  %FS: 29 48 %  AV Diam: 3 07 cm  EDV(Teich): 123 01 ml  EF(Cube): 64 93 %  EF(Teich): 56 15 %  ESV(Cube): 46 13 ml  ESV(Teich): 53 94 ml  IVSd: 1 13 cm  LA Area: 22 33 cm2  LA Diam: 3 71 cm  LVEDV MOD A4C: 143 23 ml  LVEF MOD A4C: 59 57 %  LVESV MOD A4C: 57 91 ml  LVIDd: 5 09 cm  LVIDs: 3 59 cm  LVLd A4C: 7 57 cm  LVLs A4C: 6 34 cm  LVOT Diam: 2 09 cm  LVPWd: 1 24 cm  RA Area: 15 34 cm2  RV Diam : 3 79 cm  SV MOD A4C: 85 32 ml  SV(Cube): 85 41 ml  SV(Teich): 69 06 ml    CW  AV Env  Ti: 379 54 ms  AV SV: 364 79 ml  AV VTI: 49 28 cm  AV Vmax: 2 01 m/s  AV Vmean: 1 3 m/s  AV maxP 16 mmHg  AV meanP 79 mmHg    MM  TAPSE: 2 25 cm    PW  ANTONIO (VTI): 1 9 cm2  ANTONIO Vmax: 1 8 cm2  E': 0 05 m/s  E/E': 18 98  LVOT Env  Ti: 423 91 ms  LVOT VTI: 27 28 cm  LVOT Vmax: 1 05 m/s  LVOT Vmean: 0 65 m/s  LVOT maxP 44 mmHg  LVOT meanP 03 mmHg  LVSV Dopp: 93 59 ml  MV A Nadeem: 0 92 m/s  MV Dec [place]: 5 65 m/s2  MV DecT: 163 82 ms  MV E Nadeem: 0 93 m/s  MV E/A Ratio: 1 01    [facility]    Prepared and electronically signed by    Luma Rock DO  Signed 07-Dec-2017 13:53:25    No results found for this or any previous visit  CATH:  No results found for this or any previous visit  STRESS TEST:  No results found for this or any previous visit        VTE Prophylaxis: Sequential compression device (Venodyne)

## 2022-01-17 NOTE — PROGRESS NOTES
[address] [facility]  Progress Note - Brigido Almanza 1945, 64 y o  female MRN: [id number]  Unit/Bed#: [facility] 509-01 Encounter: [id number]  Primary Care Provider: Jerzy Ruvalcaba MD   Date and time admitted to hospital: 1/16/2022  3:44 AM    * Atrial fibrillation with RVR ([facility] [address] )  Assessment & Plan  · Patient noted with atrial fibrillation with RVR and hypotension triggering lifevest shock x1 and requiring cardioversion x1 via EMS pre-hospital  ?newly discovered Afib  · Per Cardiology, [address] interrogations with Celine Ear and bundle branch block and intermittent NSVT  · Currently maintaining normal sinus rhythm and BP has improved  · Unclear triggering event, known with EF 30%  · Continue with telemetry monitoring  · Cardiology consult appreciated  · EP consult pending   · On metoprolol succinate 25mg BID  · CHADS2 Vasc 7, was stared on heparin GTT on admission     Hypotension  Assessment & Plan  · Noted hypotensive pre-hospital and on ED arrival improved s/p 750 cc IVF  · Possibly in setting of rapid atrial fibrillation  · Continue to monitor blood pressure closely, hold antihypertensives until blood pressure improved    Lifevest discharge  Assessment & Plan  · Patient with apparent shock delivered by LifeVest which prompted presentation  · Cardiology following  · Plan as per above    [facility] secondary to cardiomyopathy  Assessment & Plan  Wt Readings from Last 3 Encounters:   01/17/22 99 3 kg (218 lb 14 7 oz)   01/05/22 100 kg (221 lb)   12/31/21 100 kg (221 lb 1 9 oz)   · Echocardiogram 12/23/2021 revealing EF 30% and grade 2 diastolic dysfunction with right ventricular dilatation, left atrium dilatation, moderate mitral regurgitation    Cardiac catheterization that hospitalization without significant obstructing disease  · Holding Lasix given soft blood pressures  · Continue metoprolol as above  · Holding Entresto given soft blood pressure  · Monitor daily weights, I/O, volume status  · Cardiology following           Class 3 severe obesity in adult [facility])  Assessment & Plan  Body mass index is 40 04 kg/m²  · Encourage weight loss, lifestyle modification once acute issues improved    Hypertension  Assessment & Plan  · With hypotension pre-hospital and on arrival, now improved  · Entresto and Lasix on hold     Elevated troponin  Assessment & Plan  · Hs Tnl 0hr: 24  · Hs Tnl 2hr: 65  · Hs Tnl 4hr: 109  · Cardiology following     CKD (chronic kidney disease)  Assessment & Plan  Lab Results   Component Value Date    EGFR 37 01/17/2022    EGFR 31 01/16/2022    EGFR 34 12/31/2021    CREATININE 1 35 (H) 01/17/2022    CREATININE 1 55 (H) 01/16/2022    CREATININE 1 45 (H) 12/31/2021     · Baseline creatinine per recent Nephrology record review appears to be 1 5-1 9  · Creatinine at baseline  · Monitor     Type 2 diabetes mellitus, without long-term current use of insulin [facility])  Assessment & Plan  Lab Results   Component Value Date    HGBA1C 6 5 08/04/2021       Recent Labs     01/16/22  1112 01/16/22  1605 01/16/22  2106 01/17/22  0638   POCGLU 167* 118 124 123       Blood Sugar Average: Last 72 hrs:  (P) 092 [id number]   · SSI with Accu-Cheks while inpatient, carb controlled diet  · Initiate hypoglycemia protocol  · May benefit from SGLT 2 inhibitor at time of discharge  · Continue gabapentin for neuropathy    Hyperlipidemia  Assessment & Plan  · Continue statin    Hypomagnesemia-resolved as of 1/17/2022  Assessment & Plan  · Magnesium level 1 4 on admission   Resolved with repletion     Pulmonary fibrosis ([address] )  Assessment & Plan  · Pulmonology consult appreciated - they recommend outpatient high-resolution CT scan in 6-8 weeks and follow-up with Dr Madhu Saul    Hypothyroidism  Eder Parkinson  · With elevated fT4 and low TSH, decrease levothyroxine dose  · Repeat TFTs in 4-6 weeks        VTE Pharmacologic Prophylaxis: VTE Score: 5 High Risk (Score >/= 5) - Pharmacological DVT Prophylaxis Ordered: heparin drip  Sequential Compression Devices Ordered  Patient Centered Rounds: I performed bedside rounds with nursing staff today  d/w RN Florin Meter   Discussions with Specialists or Other Care Team Provider:     Education and Discussions with Family / Patient: Patient declined call to   Time Spent for Care: 30 minutes  More than 50% of total time spent on counseling and coordination of care as described above  Current Length of Stay: 1 day(s)  Current Patient Status: Inpatient   Certification Statement: The patient will continue to require additional inpatient hospital stay due to on heparin GTT, pending EP eval  Discharge Plan: Anticipate discharge in 48 hrs to rehab facility  Code Status: Level 3 - DNAR and DNI    Subjective:   Ms Yasmin Kim denies dizziness, lightheadedness, CP, SOB, abdominal pain, numbness, tingling, weakness    Objective:     Vitals:   Temp (24hrs), Av 3 °F (36 8 °C), Min:97 4 °F (36 3 °C), Max:98 7 °F (37 1 °C)    Temp:  [97 4 °F (36 3 °C)-98 7 °F (37 1 °C)] 97 4 °F (36 3 °C)  HR:  [51-70] 67  Resp:  [15-18] 18  BP: (114-138)/(46-63) 138/63  SpO2:  [90 %-99 %] 95 %  Body mass index is 40 04 kg/m²  Input and Output Summary (last 24 hours): Intake/Output Summary (Last 24 hours) at 2022 0809  Last data filed at 2022 0252  Gross per 24 hour   Intake 137 46 ml   Output 212 ml   Net -74 54 ml       Physical Exam:   Physical Exam  Vitals and nursing note reviewed  Constitutional:       Comments: Patient seen lying in bed comfortably resting, NAD   Cardiovascular:      Rate and Rhythm: Normal rate and regular rhythm  Pulmonary:      Effort: Pulmonary effort is normal       Breath sounds: Normal breath sounds  Abdominal:      General: Bowel sounds are normal       Tenderness: There is no abdominal tenderness  Musculoskeletal:      Right lower leg: No edema  Left lower leg: No edema  Skin:     General: Skin is warm  Neurological:      Mental Status: She is alert and oriented to person, place, and time  Psychiatric:         Mood and Affect: Mood normal          Behavior: Behavior normal           Additional Data:     Labs:  Results from last 7 days   Lab Units 01/17/22  0608 01/16/22  0401 01/16/22  0401   WBC Thousand/uL 6 31   < > 8 16   HEMOGLOBIN g/dL 10 6*   < > 12 4   HEMATOCRIT % 33 5*   < > 39 0   PLATELETS Thousands/uL 195   < > 265   NEUTROS PCT %  --   --  59   LYMPHS PCT %  --   --  26   MONOS PCT %  --   --  10   EOS PCT %  --   --  4    < > = values in this interval not displayed  Results from last 7 days   Lab Units 01/17/22  0608   SODIUM mmol/L 142   POTASSIUM mmol/L 4 4   CHLORIDE mmol/L 111*   CO2 mmol/L 28   BUN mg/dL 27*   CREATININE mg/dL 1 35*   ANION GAP mmol/L 3*   CALCIUM mg/dL 9 8   GLUCOSE RANDOM mg/dL 114     Results from last 7 days   Lab Units 01/16/22  1321   INR  1 25*     Results from last 7 days   Lab Units 01/17/22  0638 01/16/22  2106 01/16/22  1605 01/16/22  1112 01/16/22  0703 01/16/22  0351   POC GLUCOSE mg/dl 123 124 118 167* 153* 148*               Lines/Drains:  Invasive Devices  Report    Peripheral Intravenous Line            Peripheral IV 01/16/22 Left Antecubital 1 day          Drain            External Urinary Catheter <1 day                  Telemetry:  Telemetry Orders (From admission, onward)             48 Hour Telemetry Monitoring  Continuous x 48 hours        References:    Telemetry Guidelines   Question:  Reason for 48 Hour Telemetry  Answer:  Arrhythmias Requiring Medical Therapy (eg  SVT, Vtach/fib, Bradycardia, Uncontrolled A-fib)                 Telemetry Reviewed: Normal Sinus Rhythm  Indication for Continued Telemetry Use: Arrthymias requiring medical therapy             Imaging: No pertinent imaging reviewed      Recent Cultures (last 7 days):         Last 24 Hours Medication List:   Current Facility-Administered Medications   Medication Dose Route Frequency Provider Last Rate    acetaminophen  975 mg Oral [facility] Trinity Liming, DO      diphenoxylate-atropine  1 tablet Oral 4x Daily PRN Trinity Liming, DO      gabapentin  600 mg Oral Daily Trinity Liming, DO      gabapentin  900 mg Oral BID Trinity Liming, DO      heparin (porcine)  3-20 Units/kg/hr (Order-Specific) Intravenous Titrated Katerin Morse MD 8 1 Units/kg/hr (01/17/22 0809)    heparin (porcine)  2,000 Units Intravenous Q1H PRN Katerin Morse MD      heparin (porcine)  4,000 Units Intravenous Q1H PRN Katerin Morse MD      insulin lispro  1-5 Units Subcutaneous HS Trinity Liming, DO      insulin lispro  1-6 Units Subcutaneous TID [facility] Trinity Liming, DO      [START ON 1/18/2022] levothyroxine  125 mcg Oral Early Morning eSlina Bro PA-C      metoprolol succinate  25 mg Oral Q12H [facility] Alia Morley MD      nystatin   Topical BID Trinity Liming, DO      ondansetron  4 mg Intravenous Q6H PRN Trinity Liming, DO      pravastatin  10 mg Oral Daily Trinity Liming, DO      venlafaxine  75 mg Oral TID Trinity Liming, DO          Today, Patient Was Seen By: Selina Bro PA-C    **Please Note: This note may have been constructed using a voice recognition system  **

## 2022-01-17 NOTE — ASSESSMENT & PLAN NOTE
Wt Readings from Last 3 Encounters:   01/17/22 99 3 kg (218 lb 14 7 oz)   01/05/22 100 kg (221 lb)   12/31/21 100 kg (221 lb 1 9 oz)   · Echocardiogram 12/23/2021 revealing EF 30% and grade 2 diastolic dysfunction with right ventricular dilatation, left atrium dilatation, moderate mitral regurgitation    Cardiac catheterization that hospitalization without significant obstructing disease  · Holding Lasix given soft blood pressures  · Continue metoprolol as above  · Holding Entresto given soft blood pressure  · Monitor daily weights, I/O, volume status  · Cardiology following

## 2022-01-17 NOTE — CASE MANAGEMENT
Case Management Discharge Planning Note    Patient name  [place]  [facility] MRN [id number]  : 1945 Date 2022       Current Admission Date: 2022  Current Admission Diagnosis:Atrial fibrillation with RVR [facility])   Patient Active Problem List    Diagnosis Date Noted    [facility] 2022    Hypothyroidism 2022    Hypotension 2022    Lifevest discharge 2022    CKD (chronic kidney disease) 2022    Left knee pain 2022    Torn rotator cuff 2022    Left bundle branch block (LBBB) 2022    Closed fracture of one rib of left side 2022    Moderate mitral regurgitation 2022    DNR (do not resuscitate) 2022    Depression 2021    Heart failure ([facility] ) secondary to cardiomyopathy 2021    Ambulatory dysfunction 2021    Elevated troponin 2021    Syncope 2021    Acute renal failure superimposed on stage 4 chronic kidney disease ([facility] ) 2021    Type 2 diabetes mellitus, without long-term current use of insulin ([facility] ) 2019    Diabetic peripheral neuropathy ([facility] ) 2018    Class 3 severe obesity in adult [facility]) 2018    Primary osteoarthritis of both knees 2018    Aortic stenosis 2018    Hyperlipidemia 10/04/2017    Atrial fibrillation with RVR ([facility] ) 2015    Edema 10/08/2014    Coronary artery disease 2013    Hypertension 2012      LOS (days): 1  Geometric Mean LOS (GMLOS) (days): 3 50  Days to GMLOS:2 1     OBJECTIVE:  Risk of Unplanned Readmission Score: 21         Current admission status: Inpatient   Preferred Pharmacy:   [address], [address]  [address]  [address] [place] [address]  Phone: [phone] Fax: [phone] [place], [place] [address] [place] [facility]  Phone: [phone] Fax: [phone]    Primary Care Provider: Mine Figueroa MD    Primary Insurance: MEDICARE  Secondary Insurance: BLUE CROSS    DISCHARGE DETAILS:    Ciro Cabrera requested[de-identified] Wayne Williamson Name[de-identified] [address] Provider[de-identified] PCP  Home Health Services Needed[de-identified] Evaluate Functional Status and Safety,Post-Op Care and Assessment  Homebound Criteria Met[de-identified] Uses an Assist Device (i e  cane, walker, etc)  Supporting Clincal Findings[de-identified] Limited Endurance      Other Referral/Resources/Interventions Provided:  Interventions: HHC  Referral Comments: [facility] cannot accept due to census  Spoke to patient   [facility]IN referral was made to [facility] who has accepted the referral       Treatment Team Recommendation: Home with [address]  Discharge Destination Plan[de-identified] Home with [address] [facility]

## 2022-01-17 NOTE — PROGRESS NOTES
Cardiology Progress note  Unit/Bed#: [facility] 509-01 Encounter: [id number]        Jourdan La 64 y o  female [id number]  Hospital Stay Days: 1    Assessment and Plan      Current Problem List   Principal Problem:    Atrial fibrillation with RVR ([address] )  Active Problems:    Hyperlipidemia    Hypertension    Class 3 severe obesity in adult [facility])    Type 2 diabetes mellitus, without long-term current use of insulin (HCC)    Elevated troponin    Heart failure (HCC) secondary to cardiomyopathy    Hypotension    Lifevest discharge    CKD (chronic kidney disease)    Pulmonary fibrosis (HCC)    Hypothyroidism    Assessment/Plan:  1  LifeVest shock - patient has a history of a nonischemic cardiomyopathy diagnosed December 2021, at this time etiology has not been ascertained  Patient currently appears euvolemic  She was woken up in the night with her life vest going off  No prodromal symptoms prior to his shock  On presentation she was found to be atrial fibrillation with rapid ventricular response by EMS and she was cardioverted  ? Life vest interrogated A fib with RVR and NSVT - will need formal EP consultation  2   Atrial fibrillation - new onset [id number] is 6 - she has a history of hypertension, heart failure, female, age greater than 76 and history of diabetes  ? Switch to apixaban once it is determined that patient does not need ICD  ? Monitor on telemetry - may need   3  New onset nonischemic cardiomyopathy - diagnosis December 2021  ? Cardiac catheterization was negative December 2021  ? Patient appears euvolemic  ? Continue metoprolol succinate 25 mg daily  ? Obtain SPEP and UPEP  ? Obtain iron panel  ? JASMIN  ? HIV  ? Serum free light chains  ? Borderline TIESHA - restart Entresto tomorrow  ? Place patient back on home Lasix  ? TSH/T4  ? Change lasix to PRN  4  Pulmonary fibrosis  ? Per primary,       ·     Subjective     Patient seen and examined  Overnight no events on tele   Patient denies any complaints  Objective     Vitals: Temp (24hrs), Av 3 °F (36 8 °C), Min:97 4 °F (36 3 °C), Max:98 7 °F (37 1 °C)  Current: Temperature: (!) 97 4 °F (36 3 °C)  Patient Vitals for the past 24 hrs:   BP Temp Temp src Pulse Resp SpO2 Weight   22 0801 138/63 -- -- 67 -- 95 % --   22 0634 137/56 (!) 97 4 °F (36 3 °C) -- (!) 51 18 96 % --   22 0252 -- -- -- -- -- -- 99 3 kg (218 lb 14 7 oz)   22 0244 (!) 114/46 -- -- 60 -- 90 % --   22 2220 124/55 98 7 °F (37 1 °C) -- 62 -- 92 % --   22 2105 127/54 -- -- 64 -- 92 % --   22 1557 (!) 114/46 98 7 °F (37 1 °C) Oral 70 15 94 % --    Body mass index is 40 04 kg/m²  Physical Exam:  GENERAL: NAD  HEENT:  NC/AT, PERRL,   CARDIAC:  RRR, +S1/S2, no S3/S4 heard, no m/g/r  PULMONARY:  CTA B/L, no wheezing/rales/rhonci, non-labored breathing  ABDOMEN:  Soft,   Extremities:  No edema, cyanosis, or clubbing  NEUROLOGIC: Grossly intact  SKIN:  No rashes or erythema noted on exposed skin     Psych: Normal affect    Invasive Devices  Report    Peripheral Intravenous Line            Peripheral IV 22 Left Antecubital 1 day          Drain            External Urinary Catheter <1 day                    Labs:   Results from last 7 days   Lab Units 22  0608 22  0401   WBC Thousand/uL 6 31 8 16   HEMOGLOBIN g/dL 10 6* 12 4   HEMATOCRIT % 33 5* 39 0   PLATELETS Thousands/uL 195 265   NEUTROS PCT %  --  59   MONOS PCT %  --  10      Results from last 7 days   Lab Units 22  0608 22  2229 22  1321 22  0401   SODIUM mmol/L 142  --   --  141   POTASSIUM mmol/L 4 4  --   --  4 2   CHLORIDE mmol/L 111*  --   --  107   CO2 mmol/L 28  --   --  26   BUN mg/dL 27*  --   --  29*   CREATININE mg/dL 1 35*  --   --  1 55*   CALCIUM mg/dL 9 8  --   --  10 3*   MAGNESIUM mg/dL 2 0  --   --  1 4*   PHOSPHORUS mg/dL  --   --   --  3 4   INR   --   --  1 25*  --    PTT seconds 94* 47* 187*  --    EGFR ml/min/1 73sq m 37  --   -- 31     Results from last 7 days   Lab Units 01/17/22  0608 01/16/22  2229 01/16/22  1321   INR   --   --  1 25*   PTT seconds 94* 47* 187*             No results found for: PHART, XTU2CUJ, PO2ART, KON6KFZ, N7IDMQGG, BEART, SOURCE  No components found for: HIV1X2  No results found for: HAV, HEPAIGM, HEPBIGM, HEPBCAB, HBEAG, HEPCAB  No results found for: SPEP, UPEP   Lab Results   Component Value Date    HGBA1C 6 5 08/04/2021    HGBA1C 6 9 (A) 03/09/2021    HGBA1C 6 7 (H) 10/15/2020     Lab Results   Component Value Date    CHOL 184 09/19/2015      Lab Results   Component Value Date    HDL 45 08/11/2021    HDL 52 04/08/2021    HDL 60 10/15/2020      Lab Results   Component Value Date    LDLCALC 86 08/11/2021    LDLCALC 106 (H) 04/08/2021    LDLCALC 103 (H) 10/15/2020      Lab Results   Component Value Date    TRIG 151 (H) 08/11/2021    TRIG 143 04/08/2021    TRIG 116 10/15/2020     No components found for: PROCAL      Micro:      Urinalysis:  No results found for: AMPHETUR, BDZUR, COCAINEUR, OPIATEUR, PCPUR, THCUR, ETOH, ACTMNPHEN, SALICYLATE       Invalid input(s): URIBILINOGEN        Intake and Outputs:  I/O       01/15 0701  01/16 0700 01/16 0701  01/17 0700 01/17 0701 01/18 0700    P  O   0     I V  (mL/kg)  137 5 (1 4)     Total Intake(mL/kg)  137 5 (1 4)     Urine (mL/kg/hr)  212 (0 1) 281 (0 8)    Total Output  212 281    Net  -74 5 -281               Nutrition:  Diet Cardiovascular; Cardiac; Fluid Restriction 1500 ML, Consistent Carbohydrate Diet Level 2 (5 carb servings/75 grams CHO/meal)  Radiology Results:   XR chest 1 view portable   Final Result by Adelaida Hardy MD (01/16 1322)      No acute cardiopulmonary disease                    Workstation performed: [id number]           Scheduled Medications:  acetaminophen, 975 mg, Q8H [address]  gabapentin, 600 mg, Daily  gabapentin, 900 mg, BID  insulin lispro, 1-5 Units, HS  insulin lispro, 1-6 Units, TID AC  [START ON 1/18/2022] levothyroxine, 125 mcg, Early Morning  metoprolol succinate, 25 mg, Q12H  nystatin, , BID  pravastatin, 10 mg, Daily  venlafaxine, 75 mg, TID      PRN MEDS:  diphenoxylate-atropine, 1 tablet, 4x Daily PRN  heparin (porcine), 2,000 Units, Q1H PRN  heparin (porcine), 4,000 Units, Q1H PRN  ondansetron, 4 mg, Q6H PRN      Last 24 Hour Meds: :   Medication Administration - last 24 hours from 01/16/2022 1023 to 01/17/2022 1023       Date/Time Order Dose Route Action Action by     01/17/2022 0528 acetaminophen (TYLENOL) tablet 975 mg 975 mg Oral Given Malka Coleman RN     01/16/2022 2112 acetaminophen (TYLENOL) tablet 975 mg 975 mg Oral Given Malka Coleman RN     01/16/2022 1342 acetaminophen (TYLENOL) tablet 975 mg 975 mg Oral Given Leeroy Nicolas RN     01/17/2022 0528 levothyroxine tablet 150 mcg 150 mcg Oral Given Malka Coleman RN     01/17/2022 0805 nystatin (MYCOSTATIN) powder   Topical Given Elo Andino, ALE     01/16/2022 1730 nystatin (MYCOSTATIN) powder 0 application Topical Hold Leeroy Nicolas RN     01/17/2022 0805 pravastatin (PRAVACHOL) tablet 10 mg 10 mg Oral Given Elo Andino, ALE     01/17/2022 0806 venlafaxine (EFFEXOR) tablet 75 mg 75 mg Oral Given Elo Andino RN     01/16/2022 2112 venlafaxine (EFFEXOR) tablet 75 mg 75 mg Oral Given Malka Coleman RN     01/16/2022 1731 venlafaxine (EFFEXOR) tablet 75 mg 75 mg Oral Given Leeroy Nicolas RN     01/17/2022 0735 insulin lispro (HumaLOG) 100 units/mL subcutaneous injection 1-6 Units 1 Units Subcutaneous Not Given Elo Andino RN     01/16/2022 1730 insulin lispro (HumaLOG) 100 units/mL subcutaneous injection 1-6 Units 0 Units Subcutaneous Hold Leeroy Nicolas RN     01/16/2022 1139 insulin lispro (HumaLOG) 100 units/mL subcutaneous injection 1-6 Units 1 Units Subcutaneous Given Leeroy Nicolas RN     01/16/2022 4073 insulin lispro (HumaLOG) 100 units/mL subcutaneous injection 1-5 Units 1 Units Subcutaneous Not Given Julia Jamil Banks, ALE     01/16/2022 1341 gabapentin (NEURONTIN) capsule 600 mg 600 mg Oral Given Jordan Ruby RN     01/17/2022 0804 gabapentin (NEURONTIN) capsule 900 mg 900 mg Oral Given [facility], RN     01/16/2022 2112 gabapentin (NEURONTIN) capsule 900 mg 900 mg Oral Given Thelma Boyer RN     01/17/2022 0809 heparin (porcine) 25,000 Units in sodium chloride 0 45 % 250 mL infusion 8 1 Units/kg/hr Intravenous Rate/Dose Change Dina Taveras, ALE     01/17/2022 0014 heparin (porcine) 25,000 Units in sodium chloride 0 45 % 250 mL infusion 10 1 Units/kg/hr Intravenous Rate/Dose Change Julia Padilla, ALE     01/16/2022 1553 heparin (porcine) 25,000 Units in sodium chloride 0 45 % 250 mL infusion 8 1 Units/kg/hr Intravenous Restarted Jordan Ruby RN     01/16/2022 1450 heparin (porcine) 25,000 Units in sodium chloride 0 45 % 250 mL infusion 0 Units/kg/hr Intravenous Hold Jordan Ruby RN     01/17/2022 0016 heparin (porcine) injection 2,000 Units 2,000 Units Intravenous Given Thelma Boyer RN     01/16/2022 1127 furosemide (LASIX) tablet 20 mg 20 mg Oral Not Given Jordan Ruby RN     01/17/2022 9813 metoprolol succinate (TOPROL-XL) 24 hr tablet 25 mg 25 mg Oral Given [facility], RN     01/16/2022 2112 metoprolol succinate (TOPROL-XL) 24 hr tablet 25 mg 25 mg Oral Given Thelma Boyer RN          PLEASE NOTE:  This encounter was completed utilizing the [employer] Direct Speech Voice Recognition Software  Grammatical errors, random word insertions, pronoun errors and incomplete sentences are occasional consequences of the system due to software limitations, ambient noise and hardware issues  These may be missed by proof reading prior to affixing electronic signature  Any questions or concerns about the content, text or information contained within the body of this dictation should be directly addressed to the physician for clarification   Please do not hesitate to call me directly if you have any any questions or concerns

## 2022-01-17 NOTE — ASSESSMENT & PLAN NOTE
Lab Results   Component Value Date    HGBA1C 6 5 08/04/2021       Recent Labs     01/16/22  1112 01/16/22  1605 01/16/22  2106 01/17/22  0638   POCGLU 167* 118 124 123       Blood Sugar Average: Last 72 hrs:  (P) 247 [id number]   · SSI with Accu-Cheks while inpatient, carb controlled diet  · Initiate hypoglycemia protocol  · May benefit from SGLT 2 inhibitor at time of discharge  · Continue gabapentin for neuropathy

## 2022-01-17 NOTE — PROGRESS NOTES
PULMONOLOGY PROGRESS NOTE     Name: Jeffry Jade   Age & Sex: 64 y o  female   MRN: [id number]  Unit/Bed#: [facility] 509-01   Encounter: [id number]    PATIENT INFORMATION     Name: Jeffry Jade   Age & Sex: 64 y o  female   MRN: [id number]  Hospital Stay Days: 1    ASSESSMENT/PLAN     Assessment:  Pulmonary fibrosis  Afib w/ RVR  Acute hypoxic resp failure - resolved    Plan:  Outpatient follow up  Repeat HRCT in 6-8 weeks  To follow with Dr Maryellen Clark  No further inpatient work up/management  Pulmonary will sign off  SUBJECTIVE     Patient seen and examined  No acute events overnight  No acute complaints  ROS otherwise unremarkable  OBJECTIVE     Vitals:    22 0244 22 0252 22 0634 22 0801   BP: (!) 114/46  137/56 138/63   BP Location:       Pulse: 60  (!) 51 67   Resp:   18    Temp:   (!) 97 4 °F (36 3 °C)    TempSrc:       SpO2: 90%  96% 95%   Weight:  99 3 kg (218 lb 14 7 oz)     Height:          Temperature:   Temp (24hrs), Av 3 °F (36 8 °C), Min:97 4 °F (36 3 °C), Max:98 7 °F (37 1 °C)    Temperature: (!) 97 4 °F (36 3 °C)  Intake & Output:  I/O       01/15 0701   0700  0701   0700  0701   0700    P  O   0 120    I V  (mL/kg)  137 5 (1 4)     Total Intake(mL/kg)  137 5 (1 4) 120 (1 2)    Urine (mL/kg/hr)  212 (0 1) 431 (0 7)    Total Output  212 431    Net  -74 5 -311               Weights:        Body mass index is 40 04 kg/m²  Weight (last 2 days)     Date/Time Weight    22 0252 99 3 (218 92)    22 0643 102 (223 99)        Physical Exam  Vitals and nursing note reviewed  Constitutional:       General: She is not in acute distress  Appearance: Normal appearance  She is well-developed and normal weight  She is not ill-appearing or toxic-appearing  Interventions: She is not intubated  HENT:      Head: Normocephalic and atraumatic        Right Ear: External ear normal       Left Ear: External ear normal       Nose: Nose normal  Mouth/Throat:      Mouth: Mucous membranes are moist       Pharynx: Oropharynx is clear  Eyes:      General: No scleral icterus  Conjunctiva/sclera: Conjunctivae normal    Cardiovascular:      Rate and Rhythm: Normal rate  Rhythm irregularly irregular  Pulses: Normal pulses  Heart sounds: Normal heart sounds  No murmur heard  No friction rub  No gallop  Pulmonary:      Effort: Pulmonary effort is normal  No tachypnea, bradypnea, accessory muscle usage or respiratory distress  She is not intubated  Breath sounds: Normal breath sounds and air entry  No decreased air movement  No decreased breath sounds, wheezing, rhonchi or rales  Abdominal:      General: Abdomen is flat  Bowel sounds are normal       Palpations: Abdomen is soft  Musculoskeletal:         General: No swelling or tenderness  Cervical back: Neck supple  No tenderness  Skin:     General: Skin is warm and dry  Neurological:      General: No focal deficit present  Mental Status: She is alert and oriented to person, place, and time  Mental status is at baseline  LABORATORY DATA     Labs: I have personally reviewed pertinent reports    Results from last 7 days   Lab Units 01/17/22  0608 01/16/22  0401   WBC Thousand/uL 6 31 8 16   HEMOGLOBIN g/dL 10 6* 12 4   HEMATOCRIT % 33 5* 39 0   PLATELETS Thousands/uL 195 265   NEUTROS PCT %  --  59   MONOS PCT %  --  10      Results from last 7 days   Lab Units 01/17/22  0608 01/16/22  0401   POTASSIUM mmol/L 4 4 4 2   CHLORIDE mmol/L 111* 107   CO2 mmol/L 28 26   BUN mg/dL 27* 29*   CREATININE mg/dL 1 35* 1 55*   CALCIUM mg/dL 9 8 10 3*     Results from last 7 days   Lab Units 01/17/22  0608 01/16/22  0401   MAGNESIUM mg/dL 2 0 1 4*     Results from last 7 days   Lab Units 01/16/22  0401   PHOSPHORUS mg/dL 3 4      Results from last 7 days   Lab Units 01/17/22  0608 01/16/22  2229 01/16/22  1321   INR   --   --  1 25*   PTT seconds 94* 47* 187*       IMAGING & DIAGNOSTIC TESTING     Radiology Results: I have personally reviewed pertinent reports  XR chest 1 view portable    Result Date: 1/16/2022  Impression: No acute cardiopulmonary disease  Workstation performed: [id number]     Other Diagnostic Testing: I have personally reviewed pertinent reports  ACTIVE MEDICATIONS     Current Facility-Administered Medications   Medication Dose Route Frequency    acetaminophen (TYLENOL) tablet 975 mg  975 mg Oral Q8H [address]    [START ON 1/18/2022] ceFAZolin (ANCEF) IVPB (premix in dextrose) 2,000 mg 50 mL  2,000 mg Intravenous Once    diphenoxylate-atropine (LOMOTIL) 2 5-0 025 mg per tablet 1 tablet  1 tablet Oral 4x Daily PRN    gabapentin (NEURONTIN) capsule 600 mg  600 mg Oral Daily    gabapentin (NEURONTIN) capsule 900 mg  900 mg Oral BID    heparin (porcine) 25,000 Units in sodium chloride 0 45 % 250 mL infusion  3-20 Units/kg/hr (Order-Specific) Intravenous Titrated    heparin (porcine) injection 2,000 Units  2,000 Units Intravenous Q1H PRN    heparin (porcine) injection 4,000 Units  4,000 Units Intravenous Q1H PRN    insulin lispro (HumaLOG) 100 units/mL subcutaneous injection 1-5 Units  1-5 Units Subcutaneous HS    insulin lispro (HumaLOG) 100 units/mL subcutaneous injection 1-6 Units  1-6 Units Subcutaneous TID AC    [START ON 1/18/2022] levothyroxine tablet 125 mcg  125 mcg Oral Early Morning    metoprolol succinate (TOPROL-XL) 24 hr tablet 25 mg  25 mg Oral Q12H    nystatin (MYCOSTATIN) powder   Topical BID    ondansetron (ZOFRAN) injection 4 mg  4 mg Intravenous Q6H PRN    pravastatin (PRAVACHOL) tablet 10 mg  10 mg Oral Daily    venlafaxine (EFFEXOR) tablet 75 mg  75 mg Oral TID       VTE Pharmacologic Prophylaxis: Heparin  VTE Mechanical Prophylaxis: sequential compression device      Disclaimer: Portions of the record may have been created with voice recognition software   Occasional wrong word or "sound a like" substitutions may have occurred due to the inherent limitations of voice recognition software  Careful consideration should be taken to recognize, using context, where substitutions have occurred      Kurtis Wallace DO   Pulmonary and Critical Care Fellow, PGY-IV  [facility]

## 2022-01-17 NOTE — APP STUDENT NOTE
S: Pt is a 64year old female who denies any complaints today  She reports no issues with appetite or bowel movements  She has not been ambulating, but denies chest pain, palpitations, SOB, and lightheadedness  O:  Gen: obese elderly female lying in bed comfortably in no acute distress, A&Ox3  CV: bradycardic, regular rhythm, no m/r/g  Pulm: CTA b/l no w/r/r  Extremity: No lower extremity edema or erythema    A/P:    1  Atrial fibrillation with RVR  - Patient was found to have A fib with RVR and hypotension and was subsequently shocked by her lifevest once  She received cardioversion x 1 from EMS pre-hospital  - Has known EF of 30%  - Currently in normal sinus rhythm  - BP has improved with a latest of 138/63  - Currently on metoprolol succinate 25 mg BID  - Currently on heparin gtt  - EP and cardiology consult appreciated  - Continue telemetry monitoring    2  Hypotension  - Noted pre-hospital, improved after IVF bolus in ED  - Blood pressure improved as noted above    3  Heart failure secondary to cardiomyopathy  - Echo from 12/23/21 revealed an EF of 30% and grade 2 diastolic dysfunction with right ventricular dilatation, left atrium dilatation, and moderate mitral regurgitation  - Lasix and Entresto held due to reduced BP  - Metoprolol as stated above  - I/O, daily weight monitoring  - Cardiology following    4  Elevated troponin  - HS troponin I     - 0 hr: 24    - 2 hr: 65    - 4 hr: 109  - Cardiology following    4  Chronic kidney disease  - Creatinine at baseline of 1 3-1 5, appears stable    5  Type 2 diabetes mellitus, without long-term use of insulin   - Most recent Hb A1c 6 5   - Sliding scale insulin while admitted and Accu-Checks q 6 hrs  - Diabetic diet  - Continue gabapentin     6  Hyperlipidemia  - Continue statin    7  Pulmonary fibrosis  - Pulmonology consult appreciated  - Per Pulm, recommend outpatient high-resolution CT scan in 6-8 weeks and follow-up    8   Hypothyroidism  - Free T4 elevated and TSH low   - Decrease levothyroxine dose  - Repeat free T4 and TSH in 4-6 weeks

## 2022-01-18 ENCOUNTER — APPOINTMENT (INPATIENT)
Dept: RADIOLOGY | Facility: HOSPITAL | Age: 77
DRG: 226 | End: 2022-01-18
Payer: MEDICARE

## 2022-01-18 ENCOUNTER — ANESTHESIA (INPATIENT)
Dept: NON INVASIVE DIAGNOSTICS | Facility: HOSPITAL | Age: 77
DRG: 226 | End: 2022-01-18
Payer: MEDICARE

## 2022-01-18 PROBLEM — J96.01 ACUTE RESPIRATORY FAILURE WITH HYPOXIA (HCC): Status: ACTIVE | Noted: 2022-01-18

## 2022-01-18 PROBLEM — J96.01 ACUTE RESPIRATORY FAILURE WITH HYPOXIA (HCC): Status: RESOLVED | Noted: 2022-01-18 | Resolved: 2022-01-18

## 2022-01-18 LAB
ALBUMIN SERPL ELPH-MCNC: 2.75 G/DL (ref 3.5–5)
ALBUMIN SERPL ELPH-MCNC: 56.1 % (ref 52–65)
ALBUMIN UR ELPH-MCNC: 100 %
ALPHA1 GLOB MFR UR ELPH: 0 %
ALPHA1 GLOB SERPL ELPH-MCNC: 0.29 G/DL (ref 0.1–0.4)
ALPHA1 GLOB SERPL ELPH-MCNC: 6 % (ref 2.5–5)
ALPHA2 GLOB MFR UR ELPH: 0 %
ALPHA2 GLOB SERPL ELPH-MCNC: 0.83 G/DL (ref 0.4–1.2)
ALPHA2 GLOB SERPL ELPH-MCNC: 17 % (ref 7–13)
ANION GAP SERPL CALCULATED.3IONS-SCNC: 6 MMOL/L (ref 4–13)
ATRIAL RATE: 55 BPM
B-GLOBULIN MFR UR ELPH: 0 %
BASOPHILS # BLD AUTO: 0.03 THOUSANDS/ΜL (ref 0–0.1)
BASOPHILS NFR BLD AUTO: 1 % (ref 0–1)
BETA GLOB ABNORMAL SERPL ELPH-MCNC: 0.3 G/DL (ref 0.4–0.8)
BETA1 GLOB SERPL ELPH-MCNC: 6.1 % (ref 5–13)
BETA2 GLOB SERPL ELPH-MCNC: 5.3 % (ref 2–8)
BETA2+GAMMA GLOB SERPL ELPH-MCNC: 0.26 G/DL (ref 0.2–0.5)
BUN SERPL-MCNC: 32 MG/DL (ref 5–25)
CALCIUM SERPL-MCNC: 9.8 MG/DL (ref 8.3–10.1)
CHLORIDE SERPL-SCNC: 109 MMOL/L (ref 100–108)
CO2 SERPL-SCNC: 27 MMOL/L (ref 21–32)
CREAT SERPL-MCNC: 1.31 MG/DL (ref 0.6–1.3)
EOSINOPHIL # BLD AUTO: 0.34 THOUSAND/ΜL (ref 0–0.61)
EOSINOPHIL NFR BLD AUTO: 6 % (ref 0–6)
ERYTHROCYTE [DISTWIDTH] IN BLOOD BY AUTOMATED COUNT: 14.9 % (ref 11.6–15.1)
GAMMA GLOB ABNORMAL SERPL ELPH-MCNC: 0.47 G/DL (ref 0.5–1.6)
GAMMA GLOB MFR UR ELPH: 0 %
GAMMA GLOB SERPL ELPH-MCNC: 9.5 % (ref 12–22)
GFR SERPL CREATININE-BSD FRML MDRD: 39 ML/MIN/1.73SQ M
GLUCOSE SERPL-MCNC: 118 MG/DL (ref 65–140)
GLUCOSE SERPL-MCNC: 121 MG/DL (ref 65–140)
GLUCOSE SERPL-MCNC: 128 MG/DL (ref 65–140)
GLUCOSE SERPL-MCNC: 138 MG/DL (ref 65–140)
GLUCOSE SERPL-MCNC: 158 MG/DL (ref 65–140)
HCT VFR BLD AUTO: 35 % (ref 34.8–46.1)
HGB BLD-MCNC: 11.2 G/DL (ref 11.5–15.4)
IGG/ALB SER: 1.28 {RATIO} (ref 1.1–1.8)
IMM GRANULOCYTES # BLD AUTO: 0.02 THOUSAND/UL (ref 0–0.2)
IMM GRANULOCYTES NFR BLD AUTO: 0 % (ref 0–2)
INTERPRETATION UR IFE-IMP: NORMAL
KAPPA LC FREE SER-MCNC: 26.1 MG/L (ref 3.3–19.4)
KAPPA LC FREE/LAMBDA FREE SER: 1.4 {RATIO} (ref 0.26–1.65)
LAMBDA LC FREE SERPL-MCNC: 18.7 MG/L (ref 5.7–26.3)
LYMPHOCYTES # BLD AUTO: 1.8 THOUSANDS/ΜL (ref 0.6–4.47)
LYMPHOCYTES NFR BLD AUTO: 29 % (ref 14–44)
M PROTEIN 1 MFR SERPL ELPH: 2.6 %
M PROTEIN 1 SERPL ELPH-MCNC: 0.13 G/DL
MCH RBC QN AUTO: 31.3 PG (ref 26.8–34.3)
MCHC RBC AUTO-ENTMCNC: 32 G/DL (ref 31.4–37.4)
MCV RBC AUTO: 98 FL (ref 82–98)
MONOCYTES # BLD AUTO: 0.69 THOUSAND/ΜL (ref 0.17–1.22)
MONOCYTES NFR BLD AUTO: 11 % (ref 4–12)
NEUTROPHILS # BLD AUTO: 3.35 THOUSANDS/ΜL (ref 1.85–7.62)
NEUTS SEG NFR BLD AUTO: 53 % (ref 43–75)
NRBC BLD AUTO-RTO: 0 /100 WBCS
PLATELET # BLD AUTO: 213 THOUSANDS/UL (ref 149–390)
PMV BLD AUTO: 11.8 FL (ref 8.9–12.7)
POTASSIUM SERPL-SCNC: 4.2 MMOL/L (ref 3.5–5.3)
PR INTERVAL: 134 MS
PROT PATTERN SERPL ELPH-IMP: ABNORMAL
PROT PATTERN UR ELPH-IMP: ABNORMAL
PROT SERPL-MCNC: 4.9 G/DL (ref 6.4–8.2)
PROT UR-MCNC: 44 MG/DL
QRS AXIS: -51 DEGREES
QRSD INTERVAL: 152 MS
QT INTERVAL: 446 MS
QTC INTERVAL: 426 MS
RBC # BLD AUTO: 3.58 MILLION/UL (ref 3.81–5.12)
SODIUM SERPL-SCNC: 142 MMOL/L (ref 136–145)
T WAVE AXIS: 82 DEGREES
VENTRICULAR RATE: 55 BPM
WBC # BLD AUTO: 6.23 THOUSAND/UL (ref 4.31–10.16)

## 2022-01-18 PROCEDURE — 97163 PT EVAL HIGH COMPLEX 45 MIN: CPT

## 2022-01-18 PROCEDURE — C1887 CATHETER, GUIDING: HCPCS | Performed by: INTERNAL MEDICINE

## 2022-01-18 PROCEDURE — 93005 ELECTROCARDIOGRAM TRACING: CPT

## 2022-01-18 PROCEDURE — 85025 COMPLETE CBC W/AUTO DIFF WBC: CPT | Performed by: PHYSICIAN ASSISTANT

## 2022-01-18 PROCEDURE — C1898 LEAD, PMKR, OTHER THAN TRANS: HCPCS | Performed by: INTERNAL MEDICINE

## 2022-01-18 PROCEDURE — 82948 REAGENT STRIP/BLOOD GLUCOSE: CPT

## 2022-01-18 PROCEDURE — 33225 L VENTRIC PACING LEAD ADD-ON: CPT | Performed by: INTERNAL MEDICINE

## 2022-01-18 PROCEDURE — 33249 INSJ/RPLCMT DEFIB W/LEAD(S): CPT | Performed by: INTERNAL MEDICINE

## 2022-01-18 PROCEDURE — 99232 SBSQ HOSP IP/OBS MODERATE 35: CPT | Performed by: INTERNAL MEDICINE

## 2022-01-18 PROCEDURE — 80048 BASIC METABOLIC PNL TOTAL CA: CPT | Performed by: PHYSICIAN ASSISTANT

## 2022-01-18 PROCEDURE — 99233 SBSQ HOSP IP/OBS HIGH 50: CPT | Performed by: INTERNAL MEDICINE

## 2022-01-18 PROCEDURE — C1900 LEAD, CORONARY VENOUS: HCPCS | Performed by: INTERNAL MEDICINE

## 2022-01-18 PROCEDURE — C1777 LEAD, AICD, ENDO SINGLE COIL: HCPCS | Performed by: INTERNAL MEDICINE

## 2022-01-18 PROCEDURE — 97167 OT EVAL HIGH COMPLEX 60 MIN: CPT

## 2022-01-18 PROCEDURE — C1892 INTRO/SHEATH,FIXED,PEEL-AWAY: HCPCS | Performed by: INTERNAL MEDICINE

## 2022-01-18 PROCEDURE — 71045 X-RAY EXAM CHEST 1 VIEW: CPT

## 2022-01-18 PROCEDURE — 93010 ELECTROCARDIOGRAM REPORT: CPT | Performed by: INTERNAL MEDICINE

## 2022-01-18 PROCEDURE — C1882 AICD, OTHER THAN SING/DUAL: HCPCS | Performed by: INTERNAL MEDICINE

## 2022-01-18 PROCEDURE — C1730 CATH, EP, 19 OR FEW ELECT: HCPCS | Performed by: INTERNAL MEDICINE

## 2022-01-18 PROCEDURE — C1769 GUIDE WIRE: HCPCS | Performed by: INTERNAL MEDICINE

## 2022-01-18 DEVICE — LEAD 459888 MRI S-TIP US
Type: IMPLANTABLE DEVICE | Site: HEART | Status: FUNCTIONAL
Brand: ATTAIN PERFORMA™ S MRI SURESCAN™

## 2022-01-18 DEVICE — CRTD DTPA2QQ COBALT XT HF QUAD MRI DF4
Type: IMPLANTABLE DEVICE | Site: CHEST | Status: FUNCTIONAL
Brand: COBALT™ XT HF QUAD CRT-D MRI SURESCAN™

## 2022-01-18 DEVICE — LEAD 6935M62 QUATTRO SECURE S MRI US
Type: IMPLANTABLE DEVICE | Site: HEART | Status: FUNCTIONAL
Brand: SPRINT QUATTRO SECURE S MRI™ SURESCAN™

## 2022-01-18 DEVICE — ENVELOPE CMRM6133 ABSORB LRG MR
Type: IMPLANTABLE DEVICE | Site: CHEST | Status: FUNCTIONAL
Brand: TYRX™

## 2022-01-18 DEVICE — LEAD 457453 MRI US BI RCMCRD MVC
Type: IMPLANTABLE DEVICE | Site: HEART | Status: FUNCTIONAL
Brand: CAPSURE SENSE MRI™ SURESCAN™

## 2022-01-18 RX ORDER — GENTAMICIN SULFATE 40 MG/ML
INJECTION, SOLUTION INTRAMUSCULAR; INTRAVENOUS AS NEEDED
Status: DISCONTINUED | OUTPATIENT
Start: 2022-01-18 | End: 2022-01-18 | Stop reason: HOSPADM

## 2022-01-18 RX ORDER — GLYCOPYRROLATE 0.2 MG/ML
INJECTION INTRAMUSCULAR; INTRAVENOUS AS NEEDED
Status: DISCONTINUED | OUTPATIENT
Start: 2022-01-18 | End: 2022-01-18

## 2022-01-18 RX ORDER — MIDAZOLAM HYDROCHLORIDE 2 MG/2ML
INJECTION, SOLUTION INTRAMUSCULAR; INTRAVENOUS AS NEEDED
Status: DISCONTINUED | OUTPATIENT
Start: 2022-01-18 | End: 2022-01-18

## 2022-01-18 RX ORDER — SODIUM CHLORIDE 9 MG/ML
INJECTION, SOLUTION INTRAVENOUS CONTINUOUS PRN
Status: DISCONTINUED | OUTPATIENT
Start: 2022-01-18 | End: 2022-01-18

## 2022-01-18 RX ORDER — OXYCODONE HYDROCHLORIDE 5 MG/1
2.5 TABLET ORAL EVERY 6 HOURS PRN
Status: DISCONTINUED | OUTPATIENT
Start: 2022-01-18 | End: 2022-01-20 | Stop reason: HOSPADM

## 2022-01-18 RX ORDER — LIDOCAINE HYDROCHLORIDE 10 MG/ML
INJECTION, SOLUTION EPIDURAL; INFILTRATION; INTRACAUDAL; PERINEURAL AS NEEDED
Status: DISCONTINUED | OUTPATIENT
Start: 2022-01-18 | End: 2022-01-18 | Stop reason: HOSPADM

## 2022-01-18 RX ORDER — KETAMINE HCL IN NACL, ISO-OSM 100MG/10ML
SYRINGE (ML) INJECTION AS NEEDED
Status: DISCONTINUED | OUTPATIENT
Start: 2022-01-18 | End: 2022-01-18

## 2022-01-18 RX ORDER — FENTANYL CITRATE 50 UG/ML
INJECTION, SOLUTION INTRAMUSCULAR; INTRAVENOUS AS NEEDED
Status: DISCONTINUED | OUTPATIENT
Start: 2022-01-18 | End: 2022-01-18

## 2022-01-18 RX ORDER — METOPROLOL SUCCINATE 50 MG/1
50 TABLET, EXTENDED RELEASE ORAL EVERY 12 HOURS
Status: DISCONTINUED | OUTPATIENT
Start: 2022-01-18 | End: 2022-01-20 | Stop reason: HOSPADM

## 2022-01-18 RX ORDER — PROPOFOL 10 MG/ML
INJECTION, EMULSION INTRAVENOUS CONTINUOUS PRN
Status: DISCONTINUED | OUTPATIENT
Start: 2022-01-18 | End: 2022-01-18

## 2022-01-18 RX ADMIN — LEVOTHYROXINE SODIUM 125 MCG: 125 TABLET ORAL at 05:05

## 2022-01-18 RX ADMIN — PROPOFOL 70 MCG/KG/MIN: 10 INJECTION, EMULSION INTRAVENOUS at 11:26

## 2022-01-18 RX ADMIN — DOFETILIDE 125 MCG: 0.12 CAPSULE ORAL at 21:53

## 2022-01-18 RX ADMIN — VENLAFAXINE 75 MG: 37.5 TABLET ORAL at 17:18

## 2022-01-18 RX ADMIN — VENLAFAXINE 75 MG: 37.5 TABLET ORAL at 08:52

## 2022-01-18 RX ADMIN — SODIUM CHLORIDE: 0.9 INJECTION, SOLUTION INTRAVENOUS at 11:14

## 2022-01-18 RX ADMIN — ACETAMINOPHEN 975 MG: 325 TABLET, FILM COATED ORAL at 21:53

## 2022-01-18 RX ADMIN — PRAVASTATIN SODIUM 10 MG: 10 TABLET ORAL at 08:53

## 2022-01-18 RX ADMIN — ACETAMINOPHEN 975 MG: 325 TABLET, FILM COATED ORAL at 14:18

## 2022-01-18 RX ADMIN — CEFAZOLIN SODIUM 2000 MG: 2 SOLUTION INTRAVENOUS at 11:15

## 2022-01-18 RX ADMIN — FENTANYL CITRATE 25 MCG: 50 INJECTION INTRAMUSCULAR; INTRAVENOUS at 11:18

## 2022-01-18 RX ADMIN — DOFETILIDE 125 MCG: 0.12 CAPSULE ORAL at 08:52

## 2022-01-18 RX ADMIN — Medication 20 MG: at 11:27

## 2022-01-18 RX ADMIN — VENLAFAXINE 75 MG: 37.5 TABLET ORAL at 21:53

## 2022-01-18 RX ADMIN — PHENYLEPHRINE HYDROCHLORIDE 40 MCG/MIN: 10 INJECTION INTRAVENOUS at 11:26

## 2022-01-18 RX ADMIN — NYSTATIN: 100000 POWDER TOPICAL at 08:55

## 2022-01-18 RX ADMIN — NYSTATIN: 100000 POWDER TOPICAL at 17:26

## 2022-01-18 RX ADMIN — GABAPENTIN 900 MG: 300 CAPSULE ORAL at 08:52

## 2022-01-18 RX ADMIN — GABAPENTIN 900 MG: 300 CAPSULE ORAL at 21:54

## 2022-01-18 RX ADMIN — GABAPENTIN 600 MG: 300 CAPSULE ORAL at 14:18

## 2022-01-18 RX ADMIN — OXYCODONE HYDROCHLORIDE 2.5 MG: 5 TABLET ORAL at 17:23

## 2022-01-18 RX ADMIN — METOPROLOL SUCCINATE 50 MG: 50 TABLET, EXTENDED RELEASE ORAL at 21:53

## 2022-01-18 RX ADMIN — ACETAMINOPHEN 975 MG: 325 TABLET, FILM COATED ORAL at 05:04

## 2022-01-18 RX ADMIN — MIDAZOLAM 1 MG: 1 INJECTION INTRAMUSCULAR; INTRAVENOUS at 11:18

## 2022-01-18 RX ADMIN — NOREPINEPHRINE BITARTRATE 4 MCG/MIN: 1 INJECTION, SOLUTION, CONCENTRATE INTRAVENOUS at 11:54

## 2022-01-18 RX ADMIN — APIXABAN 5 MG: 5 TABLET, FILM COATED ORAL at 17:18

## 2022-01-18 RX ADMIN — GLYCOPYRROLATE 0.2 MG: 0.2 INJECTION, SOLUTION INTRAMUSCULAR; INTRAVENOUS at 11:18

## 2022-01-18 NOTE — ASSESSMENT & PLAN NOTE
Lab Results   Component Value Date    HGBA1C 6 5 08/04/2021       Recent Labs     01/17/22  1101 01/17/22  1552 01/17/22 2044 01/18/22  0613   POCGLU 151* 115 155* 128       Blood Sugar Average: Last 72 hrs:  (P) 138 2   · A1C well controlled, typically diet controlled at home  · SSI with Accu-Cheks while inpatient, carb controlled diet  · Hypoglycemia protocol  · May benefit from SGLT 2 inhibitor at time of discharge  · Continue gabapentin for neuropathy SUBJECTIVE:   CC: Elliot Norris is an 59 year old male who presents for preventative health visit.       Patient has been advised of split billing requirements and indicates understanding:     Healthy Habits:     Getting at least 3 servings of Calcium per day:  Yes    Bi-annual eye exam:  Yes    Dental care twice a year:  Yes    Sleep apnea or symptoms of sleep apnea:  Daytime drowsiness    Diet:  Regular (no restrictions)    Frequency of exercise:  2-3 days/week    Duration of exercise:  30-45 minutes    Taking medications regularly:  Yes    Medication side effects:  None, No muscle aches and No significant flushing    PHQ-2 Total Score: 2    Additional concerns today:  No    - Wants T-dap vaccine    Uncontrolled HTN   We discussed ways of treatment inc tobacco, exercise, diet and medications  BP Readings from Last 6 Encounters:   12/13/21 (!) 155/92   09/21/21 134/80   09/29/20 (!) 149/90   09/21/20 138/68   01/30/20 (!) 155/91   01/10/20 (!) 175/100       prob per exam: no signs of cardiovascular/renal causes of HTN such as Aortic Stenosis, abd bruit    Adding amlodipine      The patient also wishes to address some reduction in erectile function, and  Viagra type medications.   ROS: He is not having any loss in body hair, muscle mass or decrease in libido  Genital Exam deferred   Side effects are discussed. He does not have heart disease, does not seem at risk for CAD, and is not using nitrates. Brief sexual counseling is provided. The proper use is discussed.      Also frequent bronchitis desires albuterol for this        Today's PHQ-2 Score:   PHQ-2 ( 1999 Pf[employer]) 12/13/2021   Q1: Little interest or pleasure in doing things 1   Q2: Feeling down, depressed or hopeless 1   PHQ-2 Score 2   PHQ-2 Total Score (12-17 Years)- Positive if 3 or more points; Administer PHQ-A if positive -   Q1: Little interest or pleasure in doing things Several days   Q2: Feeling down, depressed or hopeless Several days   PHQ-2  Score 2       Abuse: Current or Past(Physical, Sexual or Emotional)- No  Do you feel safe in your environment? No        Social History     Tobacco Use     Smoking status: Never Smoker     Smokeless tobacco: Never Used   Substance Use Topics     Alcohol use: Yes     Comment: 3 drinks per week     If you drink alcohol do you typically have >3 drinks per day or >7 drinks per week? No    Alcohol Use 12/13/2021   Prescreen: >3 drinks/day or >7 drinks/week? No   Prescreen: >3 drinks/day or >7 drinks/week? -   No flowsheet data found.    Last PSA:   PSA   Date Value Ref Range Status   02/04/2010 1.39 ng/mL        Reviewed orders with patient. Reviewed health maintenance and updated orders accordingly - Yes  Lab work is in process  Labs reviewed in EPIC  BP Readings from Last 3 Encounters:   12/13/21 (!) 155/92   09/21/21 134/80   09/29/20 (!) 149/90    Wt Readings from Last 3 Encounters:   12/13/21 112.8 kg (248 lb 11.2 oz)   09/29/20 110.7 kg (244 lb)   01/10/20 117.9 kg (260 lb)                  Patient Active Problem List   Diagnosis     ED (erectile dysfunction)     Hyperlipidemia LDL goal <160     Fatty liver     Insomnia     Obesity     Hypertension goal BP (blood pressure) < 140/90     Prediabetes     History of basal cell carcinoma     Scar     Past Surgical History:   Procedure Laterality Date     APPENDECTOMY  1973     BIOPSY      Nose, skin.     COLONOSCOPY  2011     MOHS MICROGRAPHIC PROCEDURE  2009       Social History     Tobacco Use     Smoking status: Never Smoker     Smokeless tobacco: Never Used   Substance Use Topics     Alcohol use: Yes     Comment: 3 drinks per week     Family History   Problem Relation Age of Onset     Diabetes Mother      Arthritis Mother      Gynecology Mother      Pancreatic Cancer Mother      Obesity Mother      Diabetes Father      Hypertension Father      Alcohol/Drug Father      Circulatory Father      Depression Father      Heart Disease Father      Lipids Father       Cerebrovascular Disease Father          age 78     Anxiety Disorder Father      Substance Abuse Father      Obesity Father      Diabetes Maternal Grandmother      Cerebrovascular Disease Maternal Grandfather      Prostate Cancer Maternal Grandfather      Prostate Cancer Paternal Grandfather      Depression Paternal Grandmother      Depression Sister      Depression Sister          Current Outpatient Medications   Medication Sig Dispense Refill     diphenhydrAMINE (BENADRYL) 25 MG tablet Take 50 mg by mouth At Bedtime        simvastatin (ZOCOR) 40 MG tablet Take 1 tablet (40 mg) by mouth At Bedtime 90 tablet 3       Reviewed and updated as needed this visit by clinical staff                Reviewed and updated as needed this visit by Provider                   Review of Systems   Constitutional: Negative for chills and fever.   HENT: Negative for congestion, ear pain, hearing loss and sore throat.    Eyes: Negative for pain and visual disturbance.   Respiratory: Negative for cough and shortness of breath.    Cardiovascular: Negative for chest pain, palpitations and peripheral edema.   Gastrointestinal: Negative for abdominal pain, constipation, diarrhea, heartburn, hematochezia and nausea.   Genitourinary: Positive for impotence and urgency. Negative for dysuria, frequency, genital sores, hematuria and penile discharge.   Musculoskeletal: Negative for arthralgias, joint swelling and myalgias.   Skin: Negative for rash.   Neurological: Negative for dizziness, weakness, headaches and paresthesias.   Psychiatric/Behavioral: Negative for mood changes. The patient is not nervous/anxious.        OBJECTIVE:   BP (!) 155/92   Pulse 80   Temp 98.5  F (36.9  C) (Tympanic)   Ht 1.829 m (6')   Wt 112.8 kg (248 lb 11.2 oz)   SpO2 99%   BMI 33.73 kg/m      Physical Exam    General Appearance: Pleasant, alert, in no acute respiratory distress.  Head Exam: Normal. Normocephalic, atraumatic. No sinus tenderness.  Eye Exam:  Normal external eye, conjunctiva, lids. HARSHAD.  Ear Exam: Normal auditory canals and external ears. Non-tender.  Left TM-normal. Right TM-normal.  Neck Exam: Supple, no obvious thyroid enlargement  Chest/Respiratory Exam: Normal, comfortable, easy respirations. Chest wall normal. Lungs are clear to auscultation. No wheezing, crackles, or rhonchi.  Cardiovascular Exam: Regular rate and rhythm. No murmur, gallop, or rubs.  Musculoskeletal Exam: Back is non-tender, full ROM of upper and lower extremities.  Skin: no rash, warm and dry.    Neurologic Exam: Nonfocal, no tremor. Normal gait.  Psychiatric Exam: Alert - appropriate, normal affect      ASSESSMENT/PLAN:       ICD-10-CM    1. Routine general medical examination at a health care facility  Z00.00    2. Encounter for screening for other viral diseases  Z11.59 Asymptomatic COVID-19 Virus (Coronavirus) by PCR     Asymptomatic COVID-19 Virus (Coronavirus) by PCR     Asymptomatic COVID-19 Virus (Coronavirus) by PCR   3. Need for vaccination  Z23 TDAP VACCINE (Adacel, Boostrix)  [[id number]]       Patient has been advised of split billing requirements and indicates understanding: Yes  COUNSELING:   Reviewed preventive health counseling, as reflected in patient instructions       Regular exercise       Healthy diet/nutrition    Estimated body mass index is 33.09 kg/m  as calculated from the following:    Height as of 9/29/20: 1.829 m (6').    Weight as of 9/29/20: 110.7 kg (244 lb).       He reports that he has never smoked. He has never used smokeless tobacco.      Counseling Resources:  ATP IV Guidelines  Pooled Cohorts Equation Calculator  FRAX Risk Assessment  ICSI Preventive Guidelines  Dietary Guidelines for Americans, 2010  USDA's MyPlate  ASA Prophylaxis  Lung CA Screening    Paulino Roberto MD  [facility]

## 2022-01-18 NOTE — ANESTHESIA POSTPROCEDURE EVALUATION
Post-Op Assessment Note    CV Status:  Stable  Pain Score: 0    Pain management: adequate     Mental Status:  Alert and awake   Hydration Status:  Euvolemic   PONV Controlled:  Controlled   Airway Patency:  Patent      Post Op Vitals Reviewed: Yes      Staff: CRNA         No complications documented      BP   117/82   Temp      Pulse 59   Resp   20   SpO2 99 ra

## 2022-01-18 NOTE — ASSESSMENT & PLAN NOTE
Lab Results   Component Value Date    EGFR 39 01/18/2022    EGFR 37 01/17/2022    EGFR 31 01/16/2022    CREATININE 1 31 (H) 01/18/2022    CREATININE 1 35 (H) 01/17/2022    CREATININE 1 55 (H) 01/16/2022     Baseline creatinine per recent Nephrology record review appears to be 1 5-1 9  · Creatinine below baseline  · Monitor

## 2022-01-18 NOTE — ASSESSMENT & PLAN NOTE
Wt Readings from Last 3 Encounters:   01/18/22 99 9 kg (220 lb 3 8 oz)   01/05/22 100 kg (221 lb)   12/31/21 100 kg (221 lb 1 9 oz)   ·   · Echocardiogram 12/23/2021 revealing EF 30% and grade 2 diastolic dysfunction with right ventricular dilatation, left atrium dilatation, moderate mitral regurgitation    Cardiac catheterization that hospitalization without significant obstructing disease  · Holding Lasix given soft blood pressures--per cards likely go to PRN 1/19  · Continue metoprolol as above  · Holding Entresto given soft blood pressure--likely resume 1/19  · Monitor daily weights, I/O, volume status  · Cardiology following

## 2022-01-18 NOTE — OCCUPATIONAL THERAPY NOTE
Occupational Therapy Evaluation     Patient Name: Royal Granado  NXBSG'V Date: 1/18/2022  Problem List  Principal Problem:    Atrial fibrillation with RVR ([facility] )  Active Problems:    Hyperlipidemia    Class 3 severe obesity in adult [facility])    Type 2 diabetes mellitus, without long-term current use of insulin (HCC)    Elevated troponin    Heart failure ([facility] ) secondary to cardiomyopathy    Hypotension    Lifevest discharge    CKD (chronic kidney disease)    Pulmonary fibrosis ([facility] )    Hypothyroidism    Past Medical History  Past Medical History:   Diagnosis Date    Aortic stenosis     Arthritis     Asthma     Coronary artery disease     [facility]     Diabetic peripheral neuropathy ([facility] )     Last assessed - 1/27/16    Gallbladder disease     Heart attack ([facility] ) 07/1999    Hemorrhoids     Last assessed - 11/16/12    Hypoglycemia 12/23/2021    Myocardial infarction ([facility] )     Old myocardial infarction     Type 2 diabetes mellitus with autonomic neuropathy (HCC)     Unspec long term insulin use status, Last assessed - 6/8/17     Past Surgical History  Past Surgical History:   Procedure Laterality Date    BUNIONECTOMY      CARDIAC CATHETERIZATION      Carotid Artery, Resolved - 7/1/13    CARDIAC CATHETERIZATION N/A 12/27/2021    Procedure: CARDIAC CATHETERIZATION ;  Surgeon: Zan Swenson MD;  Location: [facility] CARD[facility]ATH [facility]; Service: Cardiology    CARDIAC CATHETERIZATION N/A 12/27/2021    Procedure: Cardiac Coronary Angiogram;  Surgeon: Zan Swenson MD;  [facility]: [facility] CARD[facility]; Service: Cardiology    CARDIOVASCULAR STRESS TEST  09/1999    CHOLECYSTECTOMY      COLONOSCOPY  2017    FOOT ARTHRODESIS, MODIFIED DONOHUE  2016    GALLBLADDER SURGERY  1970    HEMORROIDECTOMY      KNEE ARTHROSCOPY Left 2004    [place] COLONOSCOPY FLX DX W/[facility] WHEN PFRMD N/A 8/21/2017    Procedure: COLONOSCOPY;  Surgeon: Partha Martínez MD;  Location: [facility];   Service: Colorectal    REPAIR KNEE LIGAMENT      REPAIR KNEE LIGAMENT Left 1986    REPAIR KNEE LIGAMENT Right 1988 01/18/22 1020   OT Last Visit   OT Visit Date 01/18/22   Note Type   Note type Evaluation   Restrictions/Precautions   Weight Bearing Precautions Per Order No   Other Precautions Chair Alarm; Bed Alarm;Multiple lines; Fall Risk   Pain Assessment   Pain Assessment Tool 0-10   Pain Score No Pain   Home Living   Type of Home House   Home Layout Two level;Ramped entrance;Elevator;Performs ADLs on one level; Able to live on main level with bedroom/bathroom   Bathroom Shower/Tub Walk-in shower   Bathroom Toilet Standard   Bathroom Equipment Grab bars in shower;Grab bars around toilet; Shower chair   [address] Walker  (using pta )   Prior Function   Level of [place] Needs assistance with ADLs and functional mobility; Needs assistance with IADLs   Lives With Alone; Facility staff   Receives Help From Family   ADL Assistance Needs assistance   IADLs Needs assistance   Falls in the last 6 months 1 to 4  (1 per pt report, not reason for admission)   Vocational Retired   Comments pt reports that since recent fall she has required A for ADL/IADL tasks and was unable to perform functional mobility (WC bound)  However, prior to fall, she was I w/ ADL/IADL and used RW for functional mobility  Lifestyle   Autonomy pta, pt rq A for ADL/IADL and used WC for functional mobility, however prior to recent fall, she was I w/ ADL/IADL and used RW for functional mobility, -    Reciprocal Relationships supportive sister who can A pt, facility staff at [facility]    Service to Others retired   [address] unable to report at this time, states things have been hard for her since her  recently passed     Psychosocial   Psychosocial (WDL) WDL   Subjective   Subjective "I used to be able to walk "   ADL   Where Assessed Edge of bed   Eating Assistance 5  Supervision/Setup Grooming Assistance 5  Supervision/Setup   UB Bathing Assistance 4  Minimal Assistance   LB Bathing Assistance 2  Maximal Assistance   UB Dressing Assistance 4  Minimal Assistance   LB Dressing Assistance 2  Maximal [address]  2  Maximal Assistance   Functional Assistance 2  Maximal Assistance   Bed Mobility   Supine to Sit 3  Moderate assistance   Additional items Assist x 2; Increased time required;Verbal cues   Sit to Supine 3  Moderate assistance   Additional items Assist x 2; Increased time required;Verbal cues   Additional Comments sits EOB with supervision, does rq mod Ax2 for bed mobility  Transfers   Sit to Stand 3  Moderate assistance   Additional items Assist x 2; Increased time required;Verbal cues   Stand to Sit 3  Moderate assistance   Additional items Assist x 2; Increased time required;Verbal cues   Additional Comments c RW for support, VC for hand and foot placement  Pt tolerates stance for about 20 seconds before requiring seated rest break  appears fearful of falling/mildly self-limiting at this time  Functional Mobility   Additional Comments unable to assess, pt unable to tolerate stance for more than 20 seconds   Balance   Static Sitting Fair   Dynamic Sitting Fair -   Static Standing Poor   Dynamic Standing Poor -   Activity Tolerance   Activity Tolerance Patient limited by fatigue   Medical Staff Made Aware DPT [facility] 2* pts medical complexity, comorbidities and regression from baseline function  Nurse Made Aware ok to see per RN   RUE Assessment   RUE Assessment WNL   LUE Assessment   LUE Assessment WNL   Hand Function   Gross Motor Coordination Functional   Fine Motor Coordination Functional   Cognition   Overall Cognitive Status WFL   Arousal/Participation Alert; Responsive; Cooperative   Attention Within functional limits   Orientation Level Oriented X4   Memory Decreased recall of precautions   Following Commands Follows one step commands without difficulty Comments pt pleasant and cooperative, does require VC to inc overall safety awareness during session  Assessment   Limitation Decreased ADL status; Decreased UE ROM; Decreased UE strength;Decreased endurance;Decreased self-care trans;Decreased high-level ADLs   Prognosis Fair   Assessment Pt is a 64 y o  female admitted 1/16/22 from [facility] s/p life vest administered a shock  Pt w plans for pacemaker implantation to address Afib w RVR  Pt also recently admitted to [facility] 12/22/21-12/31/21 s/p fall where she fractured 8 ribs  At that time, she d/c to [facility], and reports that she has not been walking since then  Pt w active OT eval and treat orders  PMH includes  has a past medical history of Aortic stenosis, Arthritis, Asthma, Coronary artery disease, Diabetes mellitus ([facility] ), Diabetic peripheral neuropathy ([facility] ), Gallbladder disease, Heart attack ([facility] [facility] ), Hemorrhoids, Hypoglycemia, Myocardial infarction [facility]), Old myocardial infarction, and Type 2 diabetes mellitus with autonomic neuropathy ([facility] )  Pt admitted from [facility], however pt typically lives in a [facility] with ramp to enter, reports elevator in home but stays on the first floor with bathroom set-up, including walk-in shower with grab bars and shower chair and standard toilet with grab bars  Pta, pt rq A for ADL/IADL and used a WC for functional mobility (before recent fall, pt reports she was I w/ ADL/IADL, performed functional mobility with RW)  Currently, pt is Min A for UB ADLs, Max A for LB ADLs and toileting  Pt completed STS transfers with Mod Ax2 this date with RW, unable to tolerate stance for more than 20 seconds before requiring seated rest break  Pt is limited at this time 2* decreased endurance/activtiy tolerance, decreased cognition, decreased ADL/High-level ADL status, decreased self-care trans, decreased safety awareness, limited home support and is a fall risk   This impacts pt's ability to complete UB and LB dressing and bathing, toileting, transfers, functional mobility, community mobility, home and health maintenance, and safe engagement in typical daily routine  The patient's raw score on the AM-PAC Daily Activity inpatient short form is 16, standardized score is 35 96, less than 39 4  Patients at this level are likely to benefit from discharge to post-acute rehabilitation services  Please refer to the recommendation of the Occupational Therapist for safe discharge planning  From OT standpoint, pt should D/C to STR when medically stable  Pt will benefit from continued acute OT services 3-5x/wk for 10-14 days to meet goals  Goals   Patient Goals To get stronger  LTG Time Frame 10-14   Long Term Goal #1 see below  Plan   Treatment Interventions ADL retraining;Functional transfer training; Endurance training; Activityengagement; Energy conservation; Compensatory technique education;Equipment evaluation/education;Patient/family training   Goal Expiration Date 02/01/22   OT Frequency 3-5x/wk   Recommendation   OT Discharge Recommendation Post acute rehabilitation services   OT - OK to Discharge Yes   AM-PAC Daily Activity Inpatient   Lower Body Dressing 2   Bathing 2   Toileting 2   Upper Body Dressing 3   Grooming 3   Eating 4   Daily Activity Raw Score 16   Daily Activity Standardized Score (Calc for Raw Score >=11) 35 96   AM-PAC Applied Cognition Inpatient   Following a Speech/Presentation 4   Understanding Ordinary Conversation 4   Taking Medications 4   Remembering Where Things Are Placed or Put Away 4   Remembering List of 4-5 Errands 4   Taking Care of Complicated Tasks 4   Applied Cognition Raw Score 24   Applied Cognition Standardized Score 62 21   Modified Taney Scale   Modified Mat Scale 4     GOALS     Pt will complete functional mobility with Mod I using appropriate DME as needed  Pt will complete UB dressing and bathing with Min A using appropriate DME as needed       Pt will complete LB dressing and bathing with Min A using appropriate DME as needed  Pt will complete transfers with Min A using appropriate DME as needed  Pt will complete toileting with Min A using appropriate DME as needed  Pt will complete home maintenance task with Mod I using appropriate DME as needed  Pt will utilize energy conservation techniques throughout functional activity/ADL s/p skilled education  Pt will demonstrate increased safety awareness during functional tasks/ADL's s/p skilled education  Pt will increase activity tolerance to 30 minutes in order to complete ADL's/ functional tasks, using appropriate DME as needed  Pt will inc UE ROM +10 degrees b/l in order to increase overall ability to engage in typical daily routine  Pt will inc UE strength +1 MMT in order to increase overall ability to engage in typical daily routine         Zenia Kong, MOT, OTR/L

## 2022-01-18 NOTE — PROGRESS NOTES
[address] [facility]  Progress Note - Brigido Almanza 1945, 64 y o  female MRN: [id number]  Unit/Bed#: [facility] 509-01 Encounter: [id number]  Primary Care Provider: Jerzy Ruvalcaba MD   Date and time admitted to hospital: 1/16/2022  3:44 AM    * Atrial fibrillation with RVR [facility])  Assessment & Plan  Patient noted with atrial fibrillation with RVR and hypotension triggering lifevest shock x1 and requiring cardioversion x1 via EMS pre-hospital  ?newly discovered Afib  Per Cardiology, Lifevest interrogations with Celine Ear and bundle branch block and intermittent NSVT  · Unclear triggering event, known with EF 30%  · Continue with telemetry monitoring  · Cardiology/EP following, for ICD on 1/18  · On metoprolol succinate 25mg BID  Already started on Tikosyn   · CHADS2 Vasc 7, was stared on heparin GTT on admission  · To switch to Eliquis post procedure     Lifevest discharge  Assessment & Plan  Patient with apparent shock delivered by LifeVest which prompted presentation  · Cardiology following  · Plan as per above    Heart failure ([address] ) secondary to cardiomyopathy  Assessment & Plan  Wt Readings from Last 3 Encounters:   01/18/22 99 9 kg (220 lb 3 8 oz)   01/05/22 100 kg (221 lb)   12/31/21 100 kg (221 lb 1 9 oz)   ·   · Echocardiogram 12/23/2021 revealing EF 30% and grade 2 diastolic dysfunction with right ventricular dilatation, left atrium dilatation, moderate mitral regurgitation    Cardiac catheterization that hospitalization without significant obstructing disease  · Holding Lasix given soft blood pressures--per cards likely go to PRN 1/19  · Continue metoprolol as above  · Holding Entresto given soft blood pressure--likely resume 1/19  · Monitor daily weights, I/O, volume status  · Cardiology following           Elevated troponin  Assessment & Plan  · Hs Tnl 0hr: 24  · Hs Tnl 2hr: 65  · Hs Tnl 4hr: 109  · Cardiology following     Acute respiratory failure with hypoxia (HCC)-resolved as of 1/18/2022  Assessment & Plan  Reported earlier in admission  Not clearly documented in prior vitals that I am able to see  Had inc RR  · Now resolved      Hypothyroidism  Assessment & Plan  With elevated fT4 and low TSH,   · Decreased levothyroxine dose from 150 mcg to 125 mcg on admission  · Repeat TFTs in 4-6 weeks    Pulmonary fibrosis ([address] )  Assessment & Plan  · Pulmonology consult appreciated - they recommend outpatient high-resolution CT scan in 6-8 weeks and follow-up with Dr Maryellen Clark    CKD (chronic kidney disease)  Assessment & Plan  Lab Results   Component Value Date    EGFR 39 01/18/2022    EGFR 37 01/17/2022    EGFR 31 01/16/2022    CREATININE 1 31 (H) 01/18/2022    CREATININE 1 35 (H) 01/17/2022    CREATININE 1 55 (H) 01/16/2022     Baseline creatinine per recent Nephrology record review appears to be 1 5-1 9  · Creatinine below baseline  · Monitor     Hypotension  Assessment & Plan  Noted hypotensive pre-hospital and on ED arrival improved s/p 750 cc IVF  · Possibly in setting of rapid atrial fibrillation  · Continue to monitor blood pressure closely, holding lasix/entresto for now per cards    Type 2 diabetes mellitus, without long-term current use of insulin [facility])  Assessment & Plan  Lab Results   Component Value Date    HGBA1C 6 5 08/04/2021       Recent Labs     01/17/22  1101 01/17/22  1552 01/17/22  2044 01/18/22  0613   POCGLU 151* 115 155* 128       Blood Sugar Average: Last 72 hrs:  (P) 138 2   · A1C well controlled, typically diet controlled at home  · SSI with Accu-Cheks while inpatient, carb controlled diet  · Hypoglycemia protocol  · May benefit from SGLT 2 inhibitor at time of discharge  · Continue gabapentin for neuropathy    Class 3 severe obesity in adult [facility])  Assessment & Plan  Body mass index is 40 28 kg/m²    · Encourage weight loss, lifestyle modification once acute issues improved    Hyperlipidemia  Assessment & Plan  · Continue statin      VTE Pharmacologic Prophylaxis: VTE Score: 5 Moderate Risk (Score 3-4) - Pharmacological DVT Prophylaxis Ordered: heparin drip  Patient Centered Rounds: I performed bedside rounds with nursing staff today  Discussions with Specialists or Other Care Team Provider: appreciate cards, EP    Education and Discussions with Family / Patient: Patient declined call to   Time Spent for Care: 30 minutes  More than 50% of total time spent on counseling and coordination of care as described above  Current Length of Stay: 2 day(s)  Current Patient Status: Inpatient   Certification Statement: The patient will continue to require additional inpatient hospital stay due to s/p ICD placement, f/u care  Discharge Plan: Anticipate discharge in 24-48 hrs to home with home services  Code Status: Level 3 - DNAR and DNI    Subjective:   Doing okay post ICD  No other complaints  Hopeful for dc home tomorrow  Objective:     Vitals:   Temp (24hrs), Av 3 °F (36 8 °C), Min:97 2 °F (36 2 °C), Max:98 8 °F (37 1 °C)    Temp:  [97 2 °F (36 2 °C)-98 8 °F (37 1 °C)] 97 2 °F (36 2 °C)  HR:  [50-62] 60  Resp:  [15-18] 18  BP: (114-144)/(53-64) 132/64  SpO2:  [92 %-98 %] 97 %  Body mass index is 40 28 kg/m²  Input and Output Summary (last 24 hours): Intake/Output Summary (Last 24 hours) at 2022 1418  Last data filed at 2022 1244  Gross per 24 hour   Intake 903 26 ml   Output 600 ml   Net 303 26 ml       Physical Exam:   Physical Exam  Vitals and nursing note reviewed  Constitutional:       General: She is not in acute distress  Comments: On RA, L chest incision noted    Cardiovascular:      Rate and Rhythm: Normal rate  Pulmonary:      Effort: No respiratory distress  Breath sounds: No wheezing  Abdominal:      General: There is no distension  Tenderness: There is no abdominal tenderness  Musculoskeletal:      Right lower leg: No edema  Left lower leg: No edema     Neurological:      Mental Status: She is oriented to person, place, and time  Psychiatric:         Mood and Affect: Mood normal           Additional Data:     Labs:  Results from last 7 days   Lab Units 01/18/22  0505   WBC Thousand/uL 6 23   HEMOGLOBIN g/dL 11 2*   HEMATOCRIT % 35 0   PLATELETS Thousands/uL 213   NEUTROS PCT % 53   LYMPHS PCT % 29   MONOS PCT % 11   EOS PCT % 6     Results from last 7 days   Lab Units 01/18/22  0505   SODIUM mmol/L 142   POTASSIUM mmol/L 4 2   CHLORIDE mmol/L 109*   CO2 mmol/L 27   BUN mg/dL 32*   CREATININE mg/dL 1 31*   ANION GAP mmol/L 6   CALCIUM mg/dL 9 8   GLUCOSE RANDOM mg/dL 118     Results from last 7 days   Lab Units 01/16/22  1321   INR  1 25*     Results from last 7 days   Lab Units 01/18/22  1329 01/18/22  0613 01/17/22  2044 01/17/22  1552 01/17/22  1101 01/17/22  0638 01/16/22  2106 01/16/22  1605 01/16/22  1112 01/16/22  0703 01/16/22  0351   POC GLUCOSE mg/dl 158* 128 155* 115 151* 123 124 118 167* 153* 148*               Lines/Drains:  Invasive Devices  Report    Peripheral Intravenous Line            Peripheral IV 01/18/22 Left Antecubital <1 day    Peripheral IV 01/18/22 Right Antecubital <1 day          Drain            External Urinary Catheter 1 day                  Telemetry:  Telemetry Orders (From admission, onward)             48 Hour Telemetry Monitoring  Continuous x 48 hours        References:    Telemetry Guidelines   Question:  Reason for 48 Hour Telemetry  Answer:  Arrhythmias Requiring Medical Therapy (eg  SVT, Vtach/fib, Bradycardia, Uncontrolled A-fib)                 Telemetry Reviewed: Normal Sinus Rhythm  Indication for Continued Telemetry Use: Arrthymias requiring medical therapy             Imaging: No pertinent imaging reviewed      Recent Cultures (last 7 days):         Last 24 Hours Medication List:   Current Facility-Administered Medications   Medication Dose Route Frequency Provider Last Rate    acetaminophen  975 mg Oral [facility] DO Yessica Funez apixaban  5 mg Oral BID Lakesha Yeager PA-C      diphenoxylate-atropine  1 tablet Oral 4x Daily PRN Nuckolls Keiser, DO      dofetilide  125 mcg Oral Q12H [address] [place], [place]      gabapentin  600 mg Oral Daily Nuckolls Claudia, DO      gabapentin  900 mg Oral BID Nuckolls Claudia, DO      heparin (porcine)  2,000 Units Intravenous Q1H PRN Anish Torres MD      heparin (porcine)  4,000 Units Intravenous Q1H PRN Anish Torres MD      insulin lispro  1-5 Units Subcutaneous HS Nuckolls Keiser, DO      insulin lispro  1-6 Units Subcutaneous TID AC Nuckolls Claudia, DO      levothyroxine  125 mcg Oral Early Morning Ernestina Torres PA-C      metoprolol succinate  50 mg Oral Q12H Lakesha Yeager PA-C      nystatin   Topical BID Nuckolls Claudia, DO      ondansetron  4 mg Intravenous Q6H PRN Nuckolls Claudia, DO      oxyCODONE  2 5 mg Oral Q6H PRN Lakesha Yeager PA-C      pravastatin  10 mg Oral Daily Nuckolls Keiser, DO      venlafaxine  75 mg Oral TID Nuckolls Claudia, DO          Today, Patient Was Seen By: Lupe Owen PA-C    **Please Note: This note may have been constructed using a voice recognition system  **

## 2022-01-18 NOTE — ASSESSMENT & PLAN NOTE
Patient with apparent shock delivered by LifeVest which prompted presentation  · Cardiology following  · Plan as per above

## 2022-01-18 NOTE — ASSESSMENT & PLAN NOTE
Noted hypotensive pre-hospital and on ED arrival improved s/p 750 cc IVF  · Possibly in setting of rapid atrial fibrillation  · Continue to monitor blood pressure closely, holding lasix/entresto for now per cards

## 2022-01-18 NOTE — ASSESSMENT & PLAN NOTE
· Pulmonology consult appreciated - they recommend outpatient high-resolution CT scan in 6-8 weeks and follow-up with Dr Dawn Nielsen

## 2022-01-18 NOTE — PLAN OF CARE
Problem: PHYSICAL THERAPY ADULT  Goal: Performs mobility at highest level of function for planned discharge setting  See evaluation for individualized goals  Description: Treatment/Interventions: Functional transfer training,LE strengthening/ROM,Therapeutic exercise,Endurance training,Patient/family training,Equipment eval/education,Bed mobility,Spoke to nursing          See flowsheet documentation for full assessment, interventions and recommendations  Note: Prognosis: Good  Problem List: Decreased strength,Decreased endurance,Impaired balance,Decreased mobility,Decreased safety awareness  Assessment: Pt seen for high complexity PT evaluation due to decrease in functional mobility status compared to baseline  Pt with active PT eval/treat orders at this time  Pt is a 64 y o  F who presented to [facility] with a fib with RVR on 1/16/21  Pt  has a past medical history of Aortic stenosis, Arthritis, Asthma, Coronary artery disease, Diabetes mellitus ([facility] ), Diabetic peripheral neuropathy ([facility] [facility] ), Gallbladder disease, Heart attack ([facility] [facility] ) (07/1999), Hemorrhoids, Hypoglycemia (12/23/2021), Myocardial infarction [facility]), Old myocardial infarction, and Type 2 diabetes mellitus with autonomic neuropathy ([facility] [facility] )  Pt typically resides alone, however, admitted from [employer]  Pt presents with decreased strength, balance, endurance that contribute to limitations in bed mobility, functional transfers, functional mobility  Pt requires Mod x 2 for bed mobility and transfers at this time  Pt left supine on stretcher with transport present with all needs in reach  Pt will benefit from skilled therapy in order to address current impairments and functional limitations  PT to follow pt and recommending rehab once medically cleared  The patient's AM-PAC Basic Mobility Inpatient Short Form Raw Score is 6   A Raw score of less than or equal to 16 suggests the patient may benefit from discharge to post-acute rehabilitation services  Please also refer to the recommendation of the Physical Therapist for safe discharge planning  Barriers to Discharge: Inaccessible home environment,Decreased caregiver support        PT Discharge Recommendation: Post acute rehabilitation services          See flowsheet documentation for full assessment

## 2022-01-18 NOTE — PHYSICAL THERAPY NOTE
Physical Therapy Evaluation     Patient's Name: Kt Sinha    Admitting Diagnosis  Atrial fibrillation ([facility] ) [I48 91]  Atrial fibrillation with rapid ventricular response (HCC) [I48 91]  Hypotensive episode [I95 9]    Problem List  Patient Active Problem List   Diagnosis    Atrial fibrillation with RVR ([facility] )    Coronary artery disease    Hyperlipidemia    Aortic stenosis    Primary osteoarthritis of both knees    Class 3 severe obesity in adult [facility])    Diabetic peripheral neuropathy ([facility] )    Type 2 diabetes mellitus, without long-term current use of insulin (HCC)    Acute renal failure superimposed on stage 4 chronic kidney disease (HCC)    Elevated troponin    Heart failure (HCC) secondary to cardiomyopathy    Depression    DNR (do not resuscitate)    Torn rotator cuff    Left bundle branch block (LBBB)    Closed fracture of one rib of left side    Moderate mitral regurgitation    Hypotension    Lifevest discharge    CKD (chronic kidney disease)    Left knee pain    Pulmonary fibrosis ([facility] )    Hypothyroidism       Past Medical History  Past Medical History:   Diagnosis Date    Aortic stenosis     Arthritis     Asthma     Coronary artery disease     Diabetes mellitus ([facility] )     Diabetic peripheral neuropathy ([facility] )     Last assessed - 1/27/16    Gallbladder disease     Heart attack ([facility] ) 07/1999    Hemorrhoids     Last assessed - 11/16/12    Hypoglycemia 12/23/2021    Myocardial infarction ([facility] )     Old myocardial infarction     Type 2 diabetes mellitus with autonomic neuropathy (HCC)     Unspec long term insulin use status, Last assessed - 6/8/17       Past Surgical History  Past Surgical History:   Procedure Laterality Date    BUNIONECTOMY      CARDIAC CATHETERIZATION      Carotid Artery, Resolved - 7/1/13    CARDIAC CATHETERIZATION N/A 12/27/2021    Procedure: CARDIAC CATHETERIZATION ;  Surgeon: Fazal Tolliver MD;  Location: [facility];   Service: Cardiology  CARDIAC CATHETERIZATION N/A 12/27/2021    Procedure: Cardiac Coronary Angiogram;  Surgeon: Declan Amaral MD;  [facility]: [facility]; Service: Cardiology    CARDIOVASCULAR STRESS TEST  09/1999    CHOLECYSTECTOMY      COLONOSCOPY  2017    FOOT ARTHRODESIS, MODIFIED DONOHUE  2016    GALLBLADDER SURGERY  1970    HEMORROIDECTOMY      KNEE ARTHROSCOPY Left 2004    [place] COLONOSCOPY FLX DX W/COLLJ [address] WHEN PFRMD N/A 8/21/2017    Procedure: COLONOSCOPY;  Surgeon: Jac Bailey MD;  Location: [facility]; Service: Colorectal    REPAIR KNEE LIGAMENT      REPAIR KNEE LIGAMENT Left 1986    REPAIR KNEE LIGAMENT Right 1988 01/18/22 1021   PT Last Visit   PT Visit Date 01/18/22   Note Type   Note type Evaluation   Pain Assessment   Pain Assessment Tool 0-10   Pain Score No Pain   Restrictions/Precautions   Other Precautions Chair Alarm; Bed Alarm; Fall Risk;Multiple lines   Home Living   Type of [address] [place] [address] Two level;Ramped entrance; Able to live on main level with bedroom/bathroom  (elevator to second floor)   Bathroom Shower/Tub Walk-in shower   Bathroom Toilet Standard   Bathroom Equipment Grab bars in shower; Shower chair;Grab bars around toilet   [address] [facility]  (reports use PTA)   Additional Comments Pt admitted from [facility], but typically lives alone at above home set up  Prior Function   Falls in the last 6 months 1 to 4  (1 per pt report)   Vocational Retired   Comments Pt reports since recent fall she has required assist   Prior to fall she was independent  Pt reports at [facility] she was mostly in [facility]     General   Family/Caregiver Present No   Cognition   Overall Cognitive Status WFL   Arousal/Participation Alert   Attention Within functional limits   Orientation Level Oriented X4   Memory Decreased recall of precautions   Following Commands Follows one step commands with increased time or repetition   Comments Pt emotional throughout session about current functional limitations, tearful  Subjective   Subjective Pt pleasant and agreeable to participate in therapy session  RLE Assessment   RLE Assessment   (functionally 2+/5)   LLE Assessment   LLE Assessment   (functionally 2+/5)   Bed Mobility   Supine to Sit 3  Moderate assistance   Additional items Assist x 2;HOB elevated; Increased time required;Verbal cues;LE management   Sit to Supine 3  Moderate assistance   Additional items Assist x 2; Increased time required;Verbal cues;LE management   Additional Comments Supine in bed upon PT arrival   Pt left on stretcher with transport present  Transfers   Sit to Stand 3  Moderate assistance   Additional items Assist x 2; Increased time required;Verbal cues   Stand to Sit 3  Moderate assistance   Additional items Assist x 2; Increased time required;Verbal cues   Additional Comments Transfers with b/l HHA and knee block  VC for hand placement and safety  Unable to shift weight to perform marching in place  Balance   Static Sitting Fair   Dynamic Sitting Fair -   Static Standing Poor   Dynamic Standing Poor -   Endurance Deficit   Endurance Deficit Yes   Endurance Deficit Description fatigue, weakness   Activity Tolerance   Activity Tolerance Patient limited by fatigue   Medical Staff Made Aware OT Yissel Shirley   Nurse Made Aware RN cleared pt to be seen by PT   Assessment   Prognosis Good   Problem List Decreased strength;Decreased endurance; Impaired balance;Decreased mobility; Decreased safety awareness   Assessment Pt seen for high complexity PT evaluation due to decrease in functional mobility status compared to baseline  Pt with active PT eval/treat orders at this time  Pt is a 64 y o  F who presented to [facility] with a fib with RVR on 1/16/21    Pt  has a past medical history of Aortic stenosis, Arthritis, Asthma, Coronary artery disease, Diabetes mellitus ([facility] ), Diabetic peripheral neuropathy ([facility] ), Gallbladder disease, Heart attack ([facility] ) (07/1999), Hemorrhoids, Hypoglycemia (12/23/2021), Myocardial infarction [facility]), Old myocardial infarction, and Type 2 diabetes mellitus with autonomic neuropathy ([facility] )  Pt typically resides alone, however, admitted from [address] [place] [address]  Pt presents with decreased strength, balance, endurance that contribute to limitations in bed mobility, functional transfers, functional mobility  Pt requires Mod x 2 for bed mobility and transfers at this time  Pt left supine on stretcher with transport present with all needs in reach  Pt will benefit from skilled therapy in order to address current impairments and functional limitations  PT to follow pt and recommending rehab once medically cleared  The patient's AM-PAC Basic Mobility Inpatient Short Form Raw Score is 6  A Raw score of less than or equal to 16 suggests the patient may benefit from discharge to post-acute rehabilitation services  Please also refer to the recommendation of the Physical Therapist for safe discharge planning  Barriers to Discharge Inaccessible home environment;Decreased caregiver support   Goals   Patient Goals  to be able to walk again   STG Expiration Date 02/01/22   Short Term Goal #1 PT to see for further assessment of mobility and updated goals  1  Pt will demonstrate ability to perform all aspects of bed mobility with Min A in order to increase independence and decrease burden on caregivers  2  Pt will demonstrate ability to perform functional transfers with Min A in order to increase independence and decrease burden on caregivers  3  Pt will demonstrate improved balance by one grade order to decrease risk of falls  4  Pt will increase b/l LE strength by 1 grade in order to increase ease of functional mobility and transfers  Plan   Treatment/Interventions Functional transfer training;LE strengthening/ROM; Therapeutic exercise; Endurance training;Patient/family training;Equipment eval/education; Bed mobility;Spoke to nursing   PT Frequency 3-5x/wk   Recommendation   PT Discharge Recommendation Post acute rehabilitation services   Additional Comments PT cleared pt to DC once medically cleared   AM-PAC Basic Mobility Inpatient   Turning in Bed Without Bedrails 1   Lying on Back to Sitting on Edge of Flat Bed 1   Moving Bed to Chair 1   Standing Up From Chair 1   Walk in Room 1   Climb 3-5 Stairs 1   Basic Mobility Inpatient Raw Score 6   Turning Head Towards Sound 4   Follow Simple Instructions 4   Low Function Basic Mobility Raw Score 14   Low Function Basic Mobility Standardized Score 22 01   Highest Level Of Mobility   -[facility] Goal 2: Bed activities/Dependent transfer   -[facility] Highest Level of Mobility 3: Sit at edge of bed   -HL Goal Achieved Yes   Modified [place] Scale   Modified Mat Scale 4   End of Consult   Patient Position at End of Consult Supine  (on stretcher with transport present)         Chrystal Box, PT, DPT

## 2022-01-18 NOTE — ASSESSMENT & PLAN NOTE
Reported earlier in admission  Not clearly documented in prior vitals that I am able to see   Had inc RR  · Now resolved

## 2022-01-18 NOTE — PROGRESS NOTES
Cardiology Progress note  Unit/Bed#: [facility] CATH LAB ROOM Encounter: [id number]        Leandra Anderson 64 y o  female [phone]  Hospital Stay Days: 2    Assessment and Plan      Current Problem List   Principal Problem:    Atrial fibrillation with RVR ([address] )  Active Problems:    Hyperlipidemia    Hypertension    Class 3 severe obesity in adult [facility])    Type 2 diabetes mellitus, without long-term current use of insulin (HCC)    Elevated troponin    Heart failure (HCC) secondary to cardiomyopathy    Hypotension    Lifevest discharge    CKD (chronic kidney disease)    Pulmonary fibrosis (HCC)    Hypothyroidism    Assessment/Plan:  1   LifeVest shock - Patient has RVR and NSVT   ? To go for BiV placement today  2   Atrial fibrillation - new onset [id number] is 6 - she has a history of hypertension, heart failure, female, age greater than 76 and history of diabetes  ? Switch to apixaban post procedure   ? 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 Tikosyn per EP  3  New onset nonischemic cardiomyopathy - diagnosis 2021  ? Cardiac catheterization was negative 2021  ? Patient appears euvolemic  ? Continue metoprolol succinate 25 mg daily  ? Follow up SPEP/UPEP  ? Serum free light chains  ? restart Rufus Pavel tomorrow  ? Place patient back on home Lasix prn tomorrow  4  Pulmonary fibrosis  ?  Per primary,    ·   ·     Subjective     Patient seen and examined - no acute events overnight  Patient denies any pain  Episode of bradycardic - sinus cori      Objective     Vitals: Temp (24hrs), Av 4 °F (36 9 °C), Min:97 6 °F (36 4 °C), Max:98 8 °F (37 1 °C)  Current: Temperature: 97 9 °F (36 6 °C)  Patient Vitals for the past 24 hrs:   BP Temp Temp src Pulse Resp SpO2 Weight   22 0728 131/54 97 9 °F (36 6 °C) Oral (!) 50 18 94 % --   22 0542 -- -- -- -- -- -- 99 9 kg (220 lb 3 8 oz)   22 0234 137/61 97 6 °F (36 4 °C) Oral 55 15 93 % --   22 0000 -- -- -- -- -- 95 % --   01/17/22 2258 138/63 98 5 °F (36 9 °C) Oral 59 -- 95 % --   01/17/22 2257 138/63 98 5 °F (36 9 °C) -- 56 -- 96 % --   01/17/22 1906 140/53 98 8 °F (37 1 °C) Oral 61 18 94 % --   01/17/22 1906 140/53 98 8 °F (37 1 °C) -- 61 -- 92 % --   01/17/22 1502 144/64 98 7 °F (37 1 °C) Oral 58 17 94 % --    Body mass index is 40 28 kg/m²  Physical Exam:  GENERAL: NAD  HEENT:  NC/AT,   CARDIAC:  RRR, +S1/S2, no S3/S4 heard, no m/g/r  PULMONARY:  CTA B/L, no wheezing/rales/rhonci, non-labored breathing  ABDOMEN:  Soft, NT/ND,no rebound/guarding/rigidity  Extremities:  No edema, cyanosis, or clubbing  NEUROLOGIC: Grossly intact  SKIN:  No rashes or erythema noted on exposed skin  Psych: Normal affect    Invasive Devices  Report    Peripheral Intravenous Line            Peripheral IV 01/18/22 Right Antecubital <1 day          Drain            External Urinary Catheter 1 day                    Labs:   Results from last 7 days   Lab Units 01/18/22  0505 01/17/22  0608 01/16/22  0401 01/16/22  0401   WBC Thousand/uL 6 23 6 31  --  8 16   HEMOGLOBIN g/dL 11 2* 10 6*  --  12 4   HEMATOCRIT % 35 0 33 5*  --  39 0   PLATELETS Thousands/uL 213 195  --  265   NEUTROS PCT % 53  --    < > 59   MONOS PCT % 11  --   --  10    < > = values in this interval not displayed        Results from last 7 days   Lab Units 01/18/22  0505 01/17/22  1526 01/17/22  0608 01/16/22  2229 01/16/22  1321 01/16/22  0401   SODIUM mmol/L 142  --  142  --   --  141   POTASSIUM mmol/L 4 2  --  4 4  --   --  4 2   CHLORIDE mmol/L 109*  --  111*  --   --  107   CO2 mmol/L 27  --  28  --   --  26   BUN mg/dL 32*  --  27*  --   --  29*   CREATININE mg/dL 1 31*  --  1 35*  --   --  1 55*   CALCIUM mg/dL 9 8  --  9 8  --   --  10 3*   MAGNESIUM mg/dL  --   --  2 0  --   --  1 4*   PHOSPHORUS mg/dL  --   --   --   --   --  3 4   INR   --   --   --   --  1 25*  --    PTT seconds  --  50* 94* 47* 187*  --    EGFR ml/min/1 73sq m 39  --  37  --   --  31 Results from last 7 days   Lab Units 01/17/22  1526 01/17/22  0608 01/16/22  2229 01/16/22  1321   INR   --   --   --  1 25*   PTT seconds 50* 94* 47* 187*             No results found for: PHART, BKZ9LSG, PO2ART, VNU7KOR, X3LOWXCE, BEART, SOURCE  No components found for: HIV1X2  No results found for: HAV, HEPAIGM, HEPBIGM, HEPBCAB, HBEAG, HEPCAB  No results found for: SPEP, UPEP   Lab Results   Component Value Date    HGBA1C 6 5 08/04/2021    HGBA1C 6 9 (A) 03/09/2021    HGBA1C 6 7 (H) 10/15/2020     Lab Results   Component Value Date    CHOL 184 09/19/2015      Lab Results   Component Value Date    HDL 45 08/11/2021    HDL 52 04/08/2021    HDL 60 10/15/2020      Lab Results   Component Value Date    LDLCALC 86 08/11/2021    LDLCALC 106 (H) 04/08/2021    LDLCALC 103 (H) 10/15/2020      Lab Results   Component Value Date    TRIG 151 (H) 08/11/2021    TRIG 143 04/08/2021    TRIG 116 10/15/2020     No components found for: PROCAL      Micro:      Urinalysis:  No results found for: AMPHETUR, BDZUR, COCAINEUR, OPIATEUR, PCPUR, THCUR, ETOH, ACTMNPHEN, SALICYLATE       Invalid input(s): URIBILINOGEN        Intake and Outputs:  I/O       01/16 0701 01/17 0700 01/17 0701  01/18 0700 01/18 0701 01/19 0700    P  O  0 520     I V  (mL/kg) 137 5 (1 4) 203 3 (2)     Total Intake(mL/kg) 137 5 (1 4) 723 3 (7 2)     Urine (mL/kg/hr) 212 (0 1) 831 (0 3) 200 (0 6)    Total Output 212 831 200    Net -74 5 -107 7 -200           Unmeasured Urine Occurrence  1 x         Nutrition:  Diet NPO; Sips with meds  Radiology Results:   XR chest 1 view portable   Final Result by Jacques Gonzalez MD (01/16 1322)      No acute cardiopulmonary disease                    Workstation performed: [id number]           Scheduled Medications:  acetaminophen, 975 mg, Q8H [facility]  cefazolin, 2,000 mg, Once  dofetilide, 125 mcg, Q12H GEE  gabapentin, 600 mg, Daily  gabapentin, 900 mg, BID  insulin lispro, 1-5 Units, HS  insulin lispro, 1-6 Units, TID AC  levothyroxine, 125 mcg, Early Morning  metoprolol succinate, 25 mg, Q12H  nystatin, , BID  pravastatin, 10 mg, Daily  venlafaxine, 75 mg, TID      PRN MEDS:  diphenoxylate-atropine, 1 tablet, 4x Daily PRN  heparin (porcine), 2,000 Units, Q1H PRN  heparin (porcine), 4,000 Units, Q1H PRN  ondansetron, 4 mg, Q6H PRN      Last 24 Hour Meds: :   Medication Administration - last 24 hours from 01/17/2022 1035 to 01/18/2022 1035       Date/Time Order Dose Route Action Action by     01/18/2022 0504 acetaminophen (TYLENOL) tablet 975 mg 975 mg Oral Given Bettie Portillo RN     01/17/2022 2100 acetaminophen (TYLENOL) tablet 975 mg 975 mg Oral Given ALE Hernandez     01/17/2022 1338 acetaminophen (TYLENOL) tablet 975 mg 975 mg Oral [facility]     01/18/2022 0855 nystatin (MYCOSTATIN) powder   Topical Given [facility] Marcello     01/17/2022 1702 nystatin (MYCOSTATIN) powder   [facility]ical [facility]     01/18/2022 0853 pravastatin (PRAVACHOL) tablet 10 mg 10 mg [facility]     01/18/2022 0852 venlafaxine (EFFEXOR) tablet 75 mg 75 mg Oral Given Doreen Armendariz     01/17/2022 2059 venlafaxine (EFFEXOR) tablet 75 mg 75 mg Oral Given ALE Hernandez     01/17/2022 1517 venlafaxine (EFFEXOR) tablet 75 mg 75 mg Oral [facility]     01/18/2022 0719 insulin lispro (HumaLOG) 100 units/mL subcutaneous injection 1-6 Units 1 Units Subcutaneous Not Given Doreen Armendariz     01/17/2022 1559 insulin lispro (HumaLOG) 100 units/mL subcutaneous injection 1-6 Units 1 Units Subcutaneous Not [facility]     01/17/2022 1133 insulin lispro (HumaLOG) 100 units/mL subcutaneous injection 1-6 Units 1 Units Subcutaneous [facility]     01/17/2022 2108 insulin lispro (HumaLOG) 100 units/mL subcutaneous injection 1-5 Units 1 Units Subcutaneous Given ALE Hernandez     01/17/2022 8516 gabapentin (NEURONTIN) capsule 600 mg 600 mg Oral Given [employer], RN     01/18/2022 8841 gabapentin (NEURONTIN) capsule 900 mg 900 mg Oral Given Nini Oliva     01/17/2022 2059 gabapentin (NEURONTIN) capsule 900 mg 900 mg Oral Given ALE Hernandez     01/18/2022 0037 heparin (porcine) 25,000 Units in sodium chloride 0 45 % 250 mL infusion 0 Units/kg/hr Intravenous Stopped Alisson Millard, RN     01/17/2022 1830 heparin (porcine) 25,000 Units in sodium chloride 0 45 % 250 mL infusion 10 1 Units/kg/hr Intravenous Rate/Dose Change Dina Taveras, RN     01/17/2022 1134 heparin (porcine) 25,000 Units in sodium chloride 0 45 % 250 mL infusion 8 1 Units/kg/hr Intravenous [facility], RN     01/17/2022 1831 heparin (porcine) injection 2,000 Units 2,000 Units Intravenous Given [employer], RN     01/18/2022 0852 metoprolol succinate (TOPROL-XL) 24 hr tablet 25 mg 25 mg Oral Not Given Nini Oliva     01/17/2022 2059 metoprolol succinate (TOPROL-XL) 24 hr tablet 25 mg 25 mg Oral Given ALE Hernandez     01/18/2022 0505 levothyroxine tablet 125 mcg 125 mcg Oral Given Luisa Stearns RN     01/18/2022 5923 dofetilide (TIKOSYN) capsule 125 mcg 125 mcg Oral Given Nini Oliva     01/17/2022 2059 dofetilide (TIKOSYN) capsule 125 mcg 125 mcg Oral Given ALE Hernandez          PLEASE NOTE:  This encounter was completed utilizing the [employer] Direct Speech Voice Recognition Software  Grammatical errors, random word insertions, pronoun errors and incomplete sentences are occasional consequences of the system due to software limitations, ambient noise and hardware issues  These may be missed by proof reading prior to affixing electronic signature  Any questions or concerns about the content, text or information contained within the body of this dictation should be directly addressed to the physician for clarification   Please do not hesitate to call me directly if you have any any questions or concerns

## 2022-01-18 NOTE — ASSESSMENT & PLAN NOTE
With elevated fT4 and low TSH,   · Decreased levothyroxine dose from 150 mcg to 125 mcg on admission  · Repeat TFTs in 4-6 weeks

## 2022-01-18 NOTE — PLAN OF CARE
Problem: PAIN - ADULT  Goal: Verbalizes/displays adequate comfort level or baseline comfort level  Description: Interventions:  - Encourage patient to monitor pain and request assistance  - Assess pain using appropriate pain scale  - Administer analgesics based on type and severity of pain and evaluate response  - Implement non-pharmacological measures as appropriate and evaluate response  - Consider cultural and social influences on pain and pain management  - Notify physician/advanced practitioner if interventions unsuccessful or patient reports new pain  Outcome: Progressing     Problem: INFECTION - ADULT  Goal: Absence or prevention of progression during hospitalization  Description: INTERVENTIONS:  - Assess and monitor for signs and symptoms of infection  - Monitor lab/diagnostic results  - Monitor all insertion sites, i e  indwelling lines, tubes, and drains  - Monitor endotracheal if appropriate and nasal secretions for changes in amount and color  - [employer] appropriate cooling/warming therapies per order  - Administer medications as ordered  - Instruct and encourage patient and family to use good hand hygiene technique  - Identify and instruct in appropriate isolation precautions for identified infection/condition  Outcome: Progressing  Goal: Absence of fever/infection during neutropenic period  Description: INTERVENTIONS:  - Monitor WBC    Outcome: Progressing     Problem: SAFETY ADULT  Goal: Patient will remain free of falls  Description: INTERVENTIONS:  - Educate patient/family on patient safety including physical limitations  - Instruct patient to call for assistance with activity   - Consult OT/PT to assist with strengthening/mobility   - Keep Call bell within reach  - Keep bed low and locked with side rails adjusted as appropriate  - Keep care items and personal belongings within reach  - Initiate and maintain comfort rounds  - Make Fall Risk Sign visible to staff  - Apply yellow socks and bracelet for high fall risk patients  - Consider moving patient to room near nurses station  Outcome: Progressing  Goal: Maintain or return to baseline ADL function  Description: INTERVENTIONS:  -  Assess patient's ability to carry out ADLs; assess patient's baseline for ADL function and identify physical deficits which impact ability to perform ADLs (bathing, care of mouth/teeth, toileting, grooming, dressing, etc )  - Assess/evaluate cause of self-care deficits   - Assess range of motion  - Assess patient's mobility; develop plan if impaired  - Assess patient's need for assistive devices and provide as appropriate  - Encourage maximum independence but intervene and supervise when necessary  - Involve family in performance of ADLs  - Assess for home care needs following discharge   - Consider OT consult to assist with ADL evaluation and planning for discharge  - Provide patient education as appropriate  Outcome: Progressing  Goal: Maintains/Returns to pre admission functional level  Description: INTERVENTIONS:  - Perform BMAT or MOVE assessment daily    - Set and communicate daily mobility goal to care team and patient/family/caregiver  - Collaborate with rehabilitation services on mobility goals if consulted  - Perform Range of Motion 0 times a day  - Reposition patient every 2 hours    - Dangle patient 0 times a day  - Stand patient 0 times a day  - Ambulate patient 3 times a day  - Out of bed to chair 1 times a day   - Out of bed for meals 1 times a day  - Out of bed for toileting  - Record patient progress and toleration of activity level   Outcome: Progressing     Problem: DISCHARGE PLANNING  Goal: Discharge to home or other facility with appropriate resources  Description: INTERVENTIONS:  - Identify barriers to discharge w/patient and caregiver  - Arrange for needed discharge resources and transportation as appropriate  - Identify discharge learning needs (meds, wound care, etc )  - Arrange for interpretive services to assist at discharge as needed  - Refer to Case Management Department for coordinating discharge planning if the patient needs post-hospital services based on physician/advanced practitioner order or complex needs related to functional status, cognitive ability, or social support system  Outcome: Progressing     Problem: Knowledge Deficit  Goal: Patient/family/caregiver demonstrates understanding of disease process, treatment plan, medications, and discharge instructions  Description: Complete learning assessment and assess knowledge base  Interventions:  - Provide teaching at level of understanding  - Provide teaching via preferred learning methods  Outcome: Progressing     Problem: CARDIOVASCULAR - ADULT  Goal: Maintains optimal cardiac output and hemodynamic stability  Description: INTERVENTIONS:  - Monitor I/O, vital signs and rhythm  - Monitor for S/S and trends of decreased cardiac output  - Administer and titrate ordered vasoactive medications to optimize hemodynamic stability  - Assess quality of pulses, skin color and temperature  - Assess for signs of decreased coronary artery perfusion  - Instruct patient to report change in severity of symptoms  Outcome: Progressing  Goal: Absence of cardiac dysrhythmias or at baseline rhythm  Description: INTERVENTIONS:  - Continuous cardiac monitoring, vital signs, obtain 12 lead EKG if ordered  - Administer antiarrhythmic and heart rate control medications as ordered  - Monitor electrolytes and administer replacement therapy as ordered  Outcome: Progressing     Problem: Nutrition/Hydration-ADULT  Goal: Nutrient/Hydration intake appropriate for improving, restoring or maintaining nutritional needs  Description: Monitor and assess patient's nutrition/hydration status for malnutrition  Collaborate with interdisciplinary team and initiate plan and interventions as ordered  Monitor patient's weight and dietary intake as ordered or per policy   Utilize nutrition screening tool and intervene as necessary  Determine patient's food preferences and provide high-protein, high-caloric foods as appropriate  INTERVENTIONS:  - Monitor oral intake, urinary output, labs, and treatment plans  - Assess nutrition and hydration status and recommend course of action  - Evaluate amount of meals eaten  - Assist patient with eating if necessary   - Allow adequate time for meals  - Recommend/ encourage appropriate diets, oral nutritional supplements, and vitamin/mineral supplements  - Order, calculate, and assess calorie counts as needed  - Recommend, monitor, and adjust tube feedings and TPN/PPN based on assessed needs  - Assess need for intravenous fluids  - Provide specific nutrition/hydration education as appropriate  - Include patient/family/caregiver in decisions related to nutrition  Outcome: Progressing     Problem: MOBILITY - ADULT  Goal: Maintain or return to baseline ADL function  Description: INTERVENTIONS:  -  Assess patient's ability to carry out ADLs; assess patient's baseline for ADL function and identify physical deficits which impact ability to perform ADLs (bathing, care of mouth/teeth, toileting, grooming, dressing, etc )  - Assess/evaluate cause of self-care deficits   - Assess range of motion  - Assess patient's mobility; develop plan if impaired  - Assess patient's need for assistive devices and provide as appropriate  - Encourage maximum independence but intervene and supervise when necessary  - Involve family in performance of ADLs  - Assess for home care needs following discharge   - Consider OT consult to assist with ADL evaluation and planning for discharge  - Provide patient education as appropriate  Outcome: Progressing  Goal: Maintains/Returns to pre admission functional level  Description: INTERVENTIONS:  - Perform BMAT or MOVE assessment daily    - Set and communicate daily mobility goal to care team and patient/family/caregiver     - Collaborate with rehabilitation services on mobility goals if consulted  - Out of bed for toileting  - Record patient progress and toleration of activity level   Outcome: Progressing     Problem: Potential for Falls  Goal: Patient will remain free of falls  Description: INTERVENTIONS:  - Educate patient/family on patient safety including physical limitations  - Instruct patient to call for assistance with activity   - Consult OT/PT to assist with strengthening/mobility   - Keep Call bell within reach  - Keep bed low and locked with side rails adjusted as appropriate  - Keep care items and personal belongings within reach  - Initiate and maintain comfort rounds  - Make Fall Risk Sign visible to staff  - Obtain necessary fall risk management equipment:   - Apply yellow socks and bracelet for high fall risk patients  - Consider moving patient to room near nurses station  Outcome: Progressing     Problem: Prexisting or High Potential for Compromised Skin Integrity  Goal: Skin integrity is maintained or improved  Description: INTERVENTIONS:  - Identify patients at risk for skin breakdown  - Assess and monitor skin integrity  - Assess and monitor nutrition and hydration status  - Monitor labs   - Assess for incontinence   - Turn and reposition patient  - Assist with mobility/ambulation  - Relieve pressure over bony prominences  - Avoid friction and shearing  - Provide appropriate hygiene as needed including keeping skin clean and dry  - Evaluate need for skin moisturizer/barrier cream  - Collaborate with interdisciplinary team   - Patient/family teaching  - Consider wound care consult   Outcome: Progressing

## 2022-01-18 NOTE — ANESTHESIA PREPROCEDURE EVALUATION
Procedure:  Cardiac biv icd implant (N/A Chest)    Relevant Problems   CARDIO   (+) Aortic stenosis   (+) Atrial fibrillation with RVR (HCC)   (+) Coronary artery disease   (+) Hyperlipidemia   (+) Hypertension   (+) Left bundle branch block (LBBB)   (+) Moderate mitral regurgitation      ENDO   (+) Hypothyroidism   (+) Type 2 diabetes mellitus, without long-term current use of insulin (HCC)      /RENAL   (+) Acute renal failure superimposed on stage 4 chronic kidney disease (HCC)   (+) CKD (chronic kidney disease)      MUSCULOSKELETAL   (+) Primary osteoarthritis of both knees      NEURO/PSYCH   (+) Depression   (+) Diabetic peripheral neuropathy (HCC)    lvef 30  Pulm fibrosis   Physical Exam    Airway    Mallampati score: II  TM Distance: >3 FB  Neck ROM: full     Dental   No notable dental hx     Cardiovascular  Cardiovascular exam normal    Pulmonary  Decreased breath sounds,     Other Findings        Anesthesia Plan  ASA Score- 3     Anesthesia Type- IV sedation with anesthesia with ASA Monitors  Additional Monitors:   Airway Plan:           Plan Factors-Exercise tolerance (METS): >4 METS  Chart reviewed  EKG reviewed  Imaging results reviewed  Existing labs reviewed  Patient summary reviewed  Patient is not a current smoker  Induction- intravenous  Postoperative Plan-     Informed Consent- Anesthetic plan and risks discussed with patient  I personally reviewed this patient with the CRNA  Discussed and agreed on the Anesthesia Plan with the CRNA  López Morrison

## 2022-01-18 NOTE — PLAN OF CARE
Problem: OCCUPATIONAL THERAPY ADULT  Goal: Performs self-care activities at highest level of function for planned discharge setting  See evaluation for individualized goals  Description: Treatment Interventions: ADL retraining,Functional transfer training,Endurance training,Activityengagement,Energy conservation,Compensatory technique education,Equipment evaluation/education,Patient/family training          See flowsheet documentation for full assessment, interventions and recommendations  Note: Limitation: Decreased ADL status,Decreased UE ROM,Decreased UE strength,Decreased endurance,Decreased self-care trans,Decreased high-level ADLs  Prognosis: Fair  Assessment: Pt is a 64 y o  female admitted 1/16/22 from [facility] s/p life vest administered a shock  Pt w plans for pacemaker implantation to address Afib w RVR  Pt also recently admitted to [facility] 12/22/21-12/31/21 s/p fall where she fractured 8 ribs  At that time, she d/c to [facility], and reports that she has not been walking since then  Pt w active OT eval and treat orders  PMH includes  has a past medical history of Aortic stenosis, Arthritis, Asthma, Coronary artery disease, Diabetes mellitus ([facility] [address] ), Diabetic peripheral neuropathy ([facility] [address] ), Gallbladder disease, Heart attack ([facility] [address] ), Hemorrhoids, Hypoglycemia, Myocardial infarction [facility]), Old myocardial infarction, and Type 2 diabetes mellitus with autonomic neuropathy ([facility] [address] )  Pt admitted from [facility], however pt typically lives in a [facility] with ramp to enter, reports elevator in home but stays on the first floor with bathroom set-up, including walk-in shower with grab bars and shower chair and standard toilet with grab bars  Pta, pt rq A for ADL/IADL and used a WC for functional mobility (before recent fall, pt reports she was I w/ ADL/IADL, performed functional mobility with RW)  Currently, pt is Min A for UB ADLs, Max A for LB ADLs and toileting   Pt completed STS transfers with Mod Ax2 this date with RW, unable to tolerate stance for more than 20 seconds before requiring seated rest break  Pt is limited at this time 2* decreased endurance/activtiy tolerance, decreased cognition, decreased ADL/High-level ADL status, decreased self-care trans, decreased safety awareness, limited home support and is a fall risk  This impacts pt's ability to complete UB and LB dressing and bathing, toileting, transfers, functional mobility, community mobility, home and health maintenance, and safe engagement in typical daily routine  The patient's raw score on the AM-PAC Daily Activity inpatient short form is 16, standardized score is 35 96, less than 39 4  Patients at this level are likely to benefit from discharge to post-acute rehabilitation services  Please refer to the recommendation of the Occupational Therapist for safe discharge planning  From OT standpoint, pt should D/C to STR when medically stable  Pt will benefit from continued acute OT services 3-5x/wk for 10-14 days to meet goals  OT Discharge Recommendation: Post acute rehabilitation services  OT - OK to Discharge:  Yes

## 2022-01-18 NOTE — ASSESSMENT & PLAN NOTE
Patient noted with atrial fibrillation with RVR and hypotension triggering lifevest shock x1 and requiring cardioversion x1 via EMS pre-hospital  ?newly discovered Afib  Per Cardiology, Lifevest interrogations with Tameka Shines and bundle branch block and intermittent NSVT  · Unclear triggering event, known with EF 30%  · Continue with telemetry monitoring  · Cardiology/EP following, for ICD on 1/18  · On metoprolol succinate 25mg BID   Already started on Tikosyn   · CHADS2 Vasc 7, was stared on heparin GTT on admission  · To switch to Eliquis post procedure

## 2022-01-18 NOTE — ASSESSMENT & PLAN NOTE
Body mass index is 40 28 kg/m²    · Encourage weight loss, lifestyle modification once acute issues improved

## 2022-01-19 ENCOUNTER — APPOINTMENT (INPATIENT)
Dept: RADIOLOGY | Facility: HOSPITAL | Age: 77
DRG: 226 | End: 2022-01-19
Payer: MEDICARE

## 2022-01-19 LAB
ANION GAP SERPL CALCULATED.3IONS-SCNC: 6 MMOL/L (ref 4–13)
ATRIAL RATE: 147 BPM
ATRIAL RATE: 60 BPM
BASOPHILS # BLD AUTO: 0.03 THOUSANDS/ΜL (ref 0–0.1)
BASOPHILS NFR BLD AUTO: 0 % (ref 0–1)
BUN SERPL-MCNC: 27 MG/DL (ref 5–25)
CALCIUM SERPL-MCNC: 10 MG/DL (ref 8.3–10.1)
CHLORIDE SERPL-SCNC: 109 MMOL/L (ref 100–108)
CO2 SERPL-SCNC: 26 MMOL/L (ref 21–32)
CREAT SERPL-MCNC: 1.22 MG/DL (ref 0.6–1.3)
EOSINOPHIL # BLD AUTO: 0.33 THOUSAND/ΜL (ref 0–0.61)
EOSINOPHIL NFR BLD AUTO: 4 % (ref 0–6)
ERYTHROCYTE [DISTWIDTH] IN BLOOD BY AUTOMATED COUNT: 15.2 % (ref 11.6–15.1)
GFR SERPL CREATININE-BSD FRML MDRD: 42 ML/MIN/1.73SQ M
GLUCOSE SERPL-MCNC: 104 MG/DL (ref 65–140)
GLUCOSE SERPL-MCNC: 112 MG/DL (ref 65–140)
GLUCOSE SERPL-MCNC: 142 MG/DL (ref 65–140)
GLUCOSE SERPL-MCNC: 157 MG/DL (ref 65–140)
GLUCOSE SERPL-MCNC: 163 MG/DL (ref 65–140)
HCT VFR BLD AUTO: 33.3 % (ref 34.8–46.1)
HGB BLD-MCNC: 10.8 G/DL (ref 11.5–15.4)
IMM GRANULOCYTES # BLD AUTO: 0.07 THOUSAND/UL (ref 0–0.2)
IMM GRANULOCYTES NFR BLD AUTO: 1 % (ref 0–2)
LYMPHOCYTES # BLD AUTO: 1.17 THOUSANDS/ΜL (ref 0.6–4.47)
LYMPHOCYTES NFR BLD AUTO: 13 % (ref 14–44)
MCH RBC QN AUTO: 31.8 PG (ref 26.8–34.3)
MCHC RBC AUTO-ENTMCNC: 32.4 G/DL (ref 31.4–37.4)
MCV RBC AUTO: 98 FL (ref 82–98)
MONOCYTES # BLD AUTO: 0.95 THOUSAND/ΜL (ref 0.17–1.22)
MONOCYTES NFR BLD AUTO: 11 % (ref 4–12)
NEUTROPHILS # BLD AUTO: 6.43 THOUSANDS/ΜL (ref 1.85–7.62)
NEUTS SEG NFR BLD AUTO: 71 % (ref 43–75)
NRBC BLD AUTO-RTO: 0 /100 WBCS
P AXIS: 92 DEGREES
PLATELET # BLD AUTO: 184 THOUSANDS/UL (ref 149–390)
PMV BLD AUTO: 12 FL (ref 8.9–12.7)
POTASSIUM SERPL-SCNC: 4.3 MMOL/L (ref 3.5–5.3)
PR INTERVAL: 128 MS
PR INTERVAL: 164 MS
QRS AXIS: -19 DEGREES
QRS AXIS: 106 DEGREES
QRSD INTERVAL: 110 MS
QRSD INTERVAL: 130 MS
QT INTERVAL: 318 MS
QT INTERVAL: 452 MS
QTC INTERVAL: 452 MS
QTC INTERVAL: 497 MS
RBC # BLD AUTO: 3.4 MILLION/UL (ref 3.81–5.12)
RYE IGE QN: NEGATIVE
SODIUM SERPL-SCNC: 141 MMOL/L (ref 136–145)
T WAVE AXIS: -45 DEGREES
T WAVE AXIS: 184 DEGREES
VENTRICULAR RATE: 147 BPM
VENTRICULAR RATE: 60 BPM
WBC # BLD AUTO: 8.98 THOUSAND/UL (ref 4.31–10.16)

## 2022-01-19 PROCEDURE — 82948 REAGENT STRIP/BLOOD GLUCOSE: CPT

## 2022-01-19 PROCEDURE — 93005 ELECTROCARDIOGRAM TRACING: CPT

## 2022-01-19 PROCEDURE — 71046 X-RAY EXAM CHEST 2 VIEWS: CPT

## 2022-01-19 PROCEDURE — 93010 ELECTROCARDIOGRAM REPORT: CPT | Performed by: INTERNAL MEDICINE

## 2022-01-19 PROCEDURE — 80048 BASIC METABOLIC PNL TOTAL CA: CPT | Performed by: INTERNAL MEDICINE

## 2022-01-19 PROCEDURE — 99024 POSTOP FOLLOW-UP VISIT: CPT | Performed by: INTERNAL MEDICINE

## 2022-01-19 PROCEDURE — 99232 SBSQ HOSP IP/OBS MODERATE 35: CPT | Performed by: INTERNAL MEDICINE

## 2022-01-19 PROCEDURE — 85025 COMPLETE CBC W/AUTO DIFF WBC: CPT | Performed by: INTERNAL MEDICINE

## 2022-01-19 RX ADMIN — VENLAFAXINE 75 MG: 37.5 TABLET ORAL at 20:08

## 2022-01-19 RX ADMIN — PRAVASTATIN SODIUM 10 MG: 10 TABLET ORAL at 09:43

## 2022-01-19 RX ADMIN — ACETAMINOPHEN 975 MG: 325 TABLET, FILM COATED ORAL at 05:48

## 2022-01-19 RX ADMIN — METOPROLOL SUCCINATE 50 MG: 50 TABLET, EXTENDED RELEASE ORAL at 09:43

## 2022-01-19 RX ADMIN — INSULIN LISPRO 1 UNITS: 100 INJECTION, SOLUTION INTRAVENOUS; SUBCUTANEOUS at 11:54

## 2022-01-19 RX ADMIN — APIXABAN 5 MG: 5 TABLET, FILM COATED ORAL at 17:15

## 2022-01-19 RX ADMIN — VENLAFAXINE 75 MG: 37.5 TABLET ORAL at 17:15

## 2022-01-19 RX ADMIN — DOFETILIDE 125 MCG: 0.12 CAPSULE ORAL at 09:43

## 2022-01-19 RX ADMIN — APIXABAN 5 MG: 5 TABLET, FILM COATED ORAL at 09:43

## 2022-01-19 RX ADMIN — LEVOTHYROXINE SODIUM 125 MCG: 125 TABLET ORAL at 05:00

## 2022-01-19 RX ADMIN — GABAPENTIN 900 MG: 300 CAPSULE ORAL at 20:07

## 2022-01-19 RX ADMIN — VENLAFAXINE 75 MG: 37.5 TABLET ORAL at 09:44

## 2022-01-19 RX ADMIN — ACETAMINOPHEN 975 MG: 325 TABLET, FILM COATED ORAL at 22:34

## 2022-01-19 RX ADMIN — NYSTATIN 1 APPLICATION: 100000 POWDER TOPICAL at 17:15

## 2022-01-19 RX ADMIN — ACETAMINOPHEN 975 MG: 325 TABLET, FILM COATED ORAL at 14:23

## 2022-01-19 RX ADMIN — DOFETILIDE 125 MCG: 0.12 CAPSULE ORAL at 20:07

## 2022-01-19 RX ADMIN — SACUBITRIL AND VALSARTAN 1 TABLET: 24; 26 TABLET, FILM COATED ORAL at 20:08

## 2022-01-19 RX ADMIN — GABAPENTIN 600 MG: 300 CAPSULE ORAL at 14:23

## 2022-01-19 RX ADMIN — METOPROLOL SUCCINATE 50 MG: 50 TABLET, EXTENDED RELEASE ORAL at 20:07

## 2022-01-19 RX ADMIN — INSULIN LISPRO 1 UNITS: 100 INJECTION, SOLUTION INTRAVENOUS; SUBCUTANEOUS at 22:34

## 2022-01-19 RX ADMIN — GABAPENTIN 900 MG: 300 CAPSULE ORAL at 09:43

## 2022-01-19 NOTE — ASSESSMENT & PLAN NOTE
Lab Results   Component Value Date    HGBA1C 6 5 08/04/2021       Recent Labs     01/18/22  2046 01/19/22  0603 01/19/22  1045 01/19/22  1609   POCGLU 121 112 157* 142*       Blood Sugar Average: Last 72 hrs:  (P) 138 125   · A1C well controlled, typically diet controlled at home  · SSI with Accu-Cheks while inpatient, carb controlled diet  · Hypoglycemia protocol  · Continue gabapentin for neuropathy

## 2022-01-19 NOTE — ASSESSMENT & PLAN NOTE
Lab Results   Component Value Date    EGFR 42 01/19/2022    EGFR 39 01/18/2022    EGFR 37 01/17/2022    CREATININE 1 22 01/19/2022    CREATININE 1 31 (H) 01/18/2022    CREATININE 1 35 (H) 01/17/2022     Baseline creatinine per recent Nephrology record review appears to be 1 5-1 9  · Creatinine below baseline  · Monitor

## 2022-01-19 NOTE — PROGRESS NOTES
Cardiology Progress note  Unit/Bed#: [facility] 509-01 Encounter: [id number]        Pedro Kohler 64 y o  female [id number]  Hospital Stay Days: 3    Assessment and Plan      Current Problem List   Principal Problem:    Atrial fibrillation with RVR ([address] )  Active Problems:    Hyperlipidemia    Class 3 severe obesity in adult [facility])    Type 2 diabetes mellitus, without long-term current use of insulin (HCC)    Elevated troponin    Heart failure (HCC) secondary to cardiomyopathy    Hypotension    Lifevest discharge    CKD (chronic kidney disease)    Pulmonary fibrosis (HCC)    Hypothyroidism    Assessment/Plan:  1   LifeVest shock - Patient has RVR and NSVT   ? S/P BiV ICD placement  One episode of NSVT overnight asymptomatic  2   Atrial fibrillation - new onset [id number] is 6 - she has a history of hypertension, heart failure, female, age greater than 76 and history of diabetes  ? On apixaban  ? 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 Tikosyn per EP  3  New onset nonischemic cardiomyopathy - diagnosis 2021  ? Cardiac catheterization was negative 2021  ? Patient appears euvolemic  ? Continue metoprolol succinate 25 mg daily  ? Follow up SPEP/UPEP  ? Serum free light chains  ? restart Entresto today creatine improving  ? Place patient back on home Lasix prn     4  Pulmonary fibrosis  ?  Per primary,    ·     Subjective     Short run of NSVT this morning  Otherwise no complaints  No chest pain  Asymptomatic      Objective     Vitals: Temp (24hrs), Av 8 °F (36 6 °C), Min:97 2 °F (36 2 °C), Max:98 3 °F (36 8 °C)  Current: Temperature: 98 3 °F (36 8 °C)  Patient Vitals for the past 24 hrs:   BP Temp Temp src Pulse Resp SpO2 Weight   22 0558 (!) 113/43 98 3 °F (36 8 °C) Oral 61 18 95 % --   22 0445 -- -- -- -- -- -- 99 7 kg (219 lb 12 8 oz)   22 0300 99/62 98 °F (36 7 °C) Oral 60 18 95 % --   22 2249 123/59 98 3 °F (36 8 °C) Oral 60 18 96 % --   01/18/22 2141 122/61 -- -- 62 -- 96 % --   01/18/22 1847 123/62 98 °F (36 7 °C) Oral 60 20 96 % --   01/18/22 1440 133/66 -- -- 60 -- 96 % --   01/18/22 1433 132/65 (!) 97 2 °F (36 2 °C) Oral 60 18 94 % --   01/18/22 1430 132/65 -- -- 59 -- 95 % --   01/18/22 1420 -- -- -- 60 -- 95 % --   01/18/22 1410 130/63 -- -- 60 -- 96 % --   01/18/22 1400 132/64 -- -- 60 -- 97 % --   01/18/22 1309 114/58 (!) 97 2 °F (36 2 °C) -- 62 18 98 % --    Body mass index is 40 2 kg/m²  Physical Exam:  GENERAL: NAD  HEENT:  NC/AT,   CARDIAC:  RRR, +S1/S2, no S3/S4 heard, no m/g/r  PULMONARY:  CTA B/L, no wheezing/rales/rhonci, non-labored breathing  ABDOMEN:  Soft, NT/ND,no rebound/guarding/rigidity  Extremities:  No edema, cyanosis, or clubbing  NEUROLOGIC: Grossly intact  SKIN:  No rashes or erythema noted on exposed skin  Psych: Normal affect    Invasive Devices  Report    Peripheral Intravenous Line            Peripheral IV 01/18/22 Right Antecubital 1 day    Peripheral IV 01/18/22 Left Antecubital <1 day                    Labs:   Results from last 7 days   Lab Units 01/19/22  0453 01/18/22  0505 01/18/22  0505 01/17/22  0608 01/16/22  0401 01/16/22  0401   WBC Thousand/uL 8 98  --  6 23 6 31  --  8 16   HEMOGLOBIN g/dL 10 8*  --  11 2* 10 6*  --  12 4   HEMATOCRIT % 33 3*  --  35 0 33 5*  --  39 0   PLATELETS Thousands/uL 184  --  213 195  --  265   NEUTROS PCT % 71   < > 53  --    < > 59   MONOS PCT % 11  --  11  --   --  10    < > = values in this interval not displayed        Results from last 7 days   Lab Units 01/19/22  0453 01/18/22  0505 01/17/22  1526 01/17/22  0608 01/16/22  2229 01/16/22  1321 01/16/22  0401   SODIUM mmol/L 141 142  --  142  --   --  141   POTASSIUM mmol/L 4 3 4 2  --  4 4  --   --  4 2   CHLORIDE mmol/L 109* 109*  --  111*  --   --  107   CO2 mmol/L 26 27  --  28  --   --  26   BUN mg/dL 27* 32*  --  27*  --   --  29*   CREATININE mg/dL 1 22 1 31*  --  1 35*  --   --  1 55*   CALCIUM mg/dL 10 0 9 8  --  9 8  --   --  10 3*   MAGNESIUM mg/dL  --   --   --  2 0  --   --  1 4*   PHOSPHORUS mg/dL  --   --   --   --   --   --  3 4   INR   --   --   --   --   --  1 25*  --    PTT seconds  --   --  50* 94* 47* 187*  --    EGFR ml/min/1 73sq m 42 39  --  37  --   --  31     Results from last 7 days   Lab Units 01/17/22  1526 01/17/22  0608 01/16/22  2229 01/16/22  1321   INR   --   --   --  1 25*   PTT seconds 50* 94* 47* 187*             No results found for: PHART, AQI1AZZ, PO2ART, RTQ7RMZ, I5RRVOEU, BEART, SOURCE  No components found for: HIV1X2  No results found for: HAV, HEPAIGM, HEPBIGM, HEPBCAB, HBEAG, HEPCAB  Lab Results   Component Value Date    SPEP See Comment 01/17/2022    UPEP See Comment 01/16/2022      Lab Results   Component Value Date    HGBA1C 6 5 08/04/2021    HGBA1C 6 9 (A) 03/09/2021    HGBA1C 6 7 (H) 10/15/2020     Lab Results   Component Value Date    CHOL 184 09/19/2015      Lab Results   Component Value Date    HDL 45 08/11/2021    HDL 52 04/08/2021    HDL 60 10/15/2020      Lab Results   Component Value Date    LDLCALC 86 08/11/2021    LDLCALC 106 (H) 04/08/2021    LDLCALC 103 (H) 10/15/2020      Lab Results   Component Value Date    TRIG 151 (H) 08/11/2021    TRIG 143 04/08/2021    TRIG 116 10/15/2020     No components found for: PROCAL      Micro:      Urinalysis:  No results found for: AMPHETUR, BDZUR, COCAINEUR, OPIATEUR, PCPUR, THCUR, ETOH, ACTMNPHEN, SALICYLATE       Invalid input(s): URIBILINOGEN        Intake and Outputs:  I/O       01/17 0701 01/18 0700 01/18 0701 01/19 0700 01/19 0701 01/20 0700    P  O  520 0 0    I V  (mL/kg) 203 3 (2) 300 (3)     Total Intake(mL/kg) 723 3 (7 2) 300 (3) 0 (0)    Urine (mL/kg/hr) 831 (0 3) 1125 (0 5)     Stool  0     Total Output 831 1125     Net -107 7 -825 0           Unmeasured Urine Occurrence 1 x      Unmeasured Stool Occurrence  0 x         Nutrition:  Diet Cardiac  Radiology Results:   XR chest portable   Final Result by [facility] Hatchet, MD (01/18 1547)   Left chest wall ICD with intact leads  No pneumothorax  No acute cardiopulmonary disease  Workstation performed: [id number]         XR chest 1 view portable   Final Result by Iván Mcfadden MD (01/16 1322)      No acute cardiopulmonary disease                    Workstation performed: [id number]         XR chest 2 views    (Results Pending)     Scheduled Medications:  acetaminophen, 975 mg, Q8H [facility]  apixaban, 5 mg, BID  dofetilide, 125 mcg, Q12H [facility]  gabapentin, 600 mg, Daily  gabapentin, 900 mg, BID  insulin lispro, 1-5 Units, HS  insulin lispro, 1-6 Units, TID AC  levothyroxine, 125 mcg, Early Morning  metoprolol succinate, 50 mg, Q12H  nystatin, , BID  pravastatin, 10 mg, Daily  venlafaxine, 75 mg, TID      PRN MEDS:  diphenoxylate-atropine, 1 tablet, 4x Daily PRN  heparin (porcine), 2,000 Units, Q1H PRN  heparin (porcine), 4,000 Units, Q1H PRN  ondansetron, 4 mg, Q6H PRN  oxyCODONE, 2 5 mg, Q6H PRN      Last 24 Hour Meds: :   Medication Administration - last 24 hours from 01/18/2022 1055 to 01/19/2022 1055       Date/Time Order Dose Route Action Action by     01/19/2022 0548 acetaminophen (TYLENOL) tablet 975 mg 975 mg Oral Given Toi Major RN     01/18/2022 2153 acetaminophen (TYLENOL) tablet 975 mg 975 mg Oral Given Obey Boswell RN     01/18/2022 1418 acetaminophen (TYLENOL) tablet 975 mg 975 mg Oral Given Martinez Arn     01/19/2022 0950 nystatin (MYCOSTATIN) powder 0 application Topical Hold Sabrina Meigs, RN     01/18/2022 1726 nystatin (MYCOSTATIN) powder   Topical Given Juan Malagon     01/19/2022 0943 pravastatin (PRAVACHOL) tablet 10 mg 10 mg Oral Given Sabrina Meigs, RN     01/19/2022 0944 venlafaxine (EFFEXOR) tablet 75 mg 75 mg Oral Given Sabrina Meigs, RN     01/18/2022 2156 venlafaxine (EFFEXOR) tablet 75 mg 75 mg Oral Given Obey Boswell RN     01/18/2022 1718 venlafaxine (EFFEXOR) tablet 75 mg 75 mg Oral Given Tamara Brown Donnell     01/19/2022 0607 insulin lispro (HumaLOG) 100 units/mL subcutaneous injection 1-6 Units 0 Units Subcutaneous Not Given Jody Ziyad, RN     01/18/2022 1716 insulin lispro (HumaLOG) 100 units/mL subcutaneous injection 1-6 Units 1 Units Subcutaneous Not Given Roselind Goodpasture     01/18/2022 1126 insulin lispro (HumaLOG) 100 units/mL subcutaneous injection 1-6 Units 1 Units Subcutaneous Not Given Roselind Goodpasture     01/18/2022 2154 insulin lispro (HumaLOG) 100 units/mL subcutaneous injection 1-5 Units 1 Units Subcutaneous Not Given Quiana Leaks, RN     01/18/2022 1418 gabapentin (NEURONTIN) capsule 600 mg 600 mg Oral Given Roselind Goodpasture     01/19/2022 0943 gabapentin (NEURONTIN) capsule 900 mg 900 mg Oral Given Jose D Sours, RN     01/18/2022 2154 gabapentin (NEURONTIN) capsule 900 mg 900 mg Oral Given Quiana Leaks, RN     01/19/2022 0500 levothyroxine tablet 125 mcg 125 mcg Oral Given Jody Ziyad, RN     01/19/2022 4107 dofetilide (TIKOSYN) capsule 125 mcg 125 mcg Oral Given Jose D Sours, RN     01/18/2022 2153 dofetilide (TIKOSYN) capsule 125 mcg 125 mcg Oral Given Quiana Leaks, RN     01/18/2022 1723 oxyCODONE (ROXICODONE) IR tablet 2 5 mg 2 5 mg Oral Given Roselind Goodpasture     01/19/2022 0943 metoprolol succinate (TOPROL-XL) 24 hr tablet 50 mg 50 mg Oral Given Jose D Sours, RN     01/18/2022 2153 metoprolol succinate (TOPROL-XL) 24 hr tablet 50 mg 50 mg Oral Given Quiana Leaks, RN     01/19/2022 4125 apixaban (ELIQUIS) tablet 5 mg 5 mg Oral Given Jose D Sours, RN     01/18/2022 1718 apixaban (ELIQUIS) tablet 5 mg 5 mg Oral Given Roselind Goodpasture          PLEASE NOTE:  This encounter was completed utilizing the [employer] Direct Speech Voice Recognition Software  Grammatical errors, random word insertions, pronoun errors and incomplete sentences are occasional consequences of the system due to software limitations, ambient noise and hardware issues  These may be missed by proof reading prior to affixing electronic signature  Any questions or concerns about the content, text or information contained within the body of this dictation should be directly addressed to the physician for clarification  Please do not hesitate to call me directly if you have any any questions or concerns

## 2022-01-19 NOTE — DISCHARGE INSTRUCTIONS
Continue taking Tikosyn every 12 hours  Please check with our office or your pharmacist before beginning new medications in order to avoid drug interactions with tikosyn which might prolong your QT interval (ex  Antibiotics, antifungals)  Please refer to post device implantation discharge instructions and restrictions below and your device booklet/temporary card  Keep incision dry for one week  Do not use lotions, powders, ointments, or creams on the incision  Leave outer bandage in place for one week  The bandage is water proof  You may shower with it as long as it is fully adhered to your skin  If the bandage appears to be coming off or if there is an open gap in the bandage to the area of your incision, then please keep the whole area dry for the remaining week  After one week, please remove the bandage by gently pulling all edges away from the center and slowly remove it from your skin  Do not quickly rip off the bandage  No overhead reaching, pushing, or pulling with your left arm for 6 weeks  No lifting greater than 10 lbs with your left arm for 6 weeks  No driving for 48 hours  Please call the office if you notice redness, swelling, bleeding, or drainage from incision or if you develop fevers  Cardiac Resynchronization Therapy (CRT)    If you have any questions, please call [phone] to speak with a nurse (8:30am-4pm, or [phone] after hours)  For appointments, please call [phone]  WHAT YOU SHOULD KNOW:    Your device is a biventricular ICD, which delivers resynchronization therapy and is also a defibrillator (see below)  Cardiac resynchronization therapy (CRT) is a procedure used to treat problems with how your heart contracts  CRT is also called biventricular pacing  Your heart has 2 upper chambers, called atria, and 2 lower chambers, called ventricles  Your heartbeat is synchronized when all areas of your heart contract together properly   When the areas of your heart do not contract as they should, your heart cannot pump enough blood and oxygen to your body  You may have trouble breathing, tire easily, and have swelling in your legs and feet  With a biventricular device, there is one wire in the right atria (in most cases), one wire in the right ventricle, and a third wire that goes through a vein and wraps around to your left ventricle  The two ventricular wires work together to help your heart contract more synchronously and efficiently  AFTER YOU LEAVE:      Medicines:   · Heart medicine may be given to help your heart beat strongly and regularly  Ask your healthcare provider for more information about heart medicines  · Take your medicine as directed  Contact your healthcare provider if you think your medicine is not helping or if you have side effects  Tell him if you are allergic to any medicine  Keep a list of the medicines, vitamins, and herbs you take  Include the amounts, and when and why you take them  Bring the list or the pill bottles to follow-up visits  Carry your medicine list with you in case of an emergency  Driving: you are ok to drive 48 hours after device is implanted     Follow up with your healthcare provider as directed: You will need to return to have your pacemaker checked  You may also need an EKG, echocardiogram, or chest x-ray to check the leads in your heart, and your doctor will order these tests if necessary  Write down your questions so you remember to ask them during your visits  Device safety: Some electrical devices may interfere with how your pacemaker works  Some examples are cell phones, security systems, power cables, and electric monitors  Ask your healthcare provider how long you can be near these devices, and which devices to avoid  Tell caregivers you have a pacemaker before you have a procedure or surgery  Wound care: Care for your wound as directed  Keep incision dry for one week   Do not use lotions/powders/creams on incision  Leave outer bandage in place for 1 week - it is water proof, and as long as it is fully adhered to your skin you may shower with it  If it appears as though the bandage is coming off and/or there is any communication to the area of device incision, please then keep the whole area dry for the remaining week  After 1 week, please remove by pulling all edges away from the center of the bandage  No overhead reaching/pushing/pulling/lifting greater than 5-10lbs with left arm for one month  Please call the office if you notice redness, swelling, bleeding, or drainage from incision or if you develop fevers      Contact your healthcare provider if:   · You have new or increased swelling in your legs or feet  · You feel more tired than usual    · You have trouble breathing during activity  · Your wound is red, warm, swollen, or draining pus  · You have a fever  · You have questions or concerns about your condition or care  Seek care immediately or call 911 if:   · You feel like your heart is fluttering or jumping  · You have any of the following signs of a heart attack:    ¨ Squeezing, pressure, fullness, or pain in your chest that lasts longer than a few minutes or returns   ¨ Discomfort or pain in your back, neck, jaw, stomach, or arm   ¨ Shortness of breath or breathing problems   ¨ A sudden cold sweat, lightheadedness, dizziness, or nausea, especially with chest pain or trouble breathing      Implantable Cardioverter Defibrillator (ICD)    If you have any questions, please call [phone] to speak with a nurse (8:30am-4pm, or [phone] after hours)  For appointments, please call [phone]  WHAT YOU SHOULD KNOW:    Your device is a biventricular ICD, which delivers resynchronization therapy (see above) and is also a defibrillator  An implantable cardioverter defibrillator (ICD) is a small device that monitors your heart rate and rhythm   It is commonly placed inside your upper chest region  It may be used if you have a ventricular arrhythmia, which is an irregular, dangerous rhythm from the bottom chamber of your heart  Some arrhythmias may cause your heart to suddenly stop beating  An ICD can give a shock to your heart to make it start beating again, or it can give pacing therapy (also known as pain-free therapy) to return your heart to normal rhythm  It is also a pacemaker, so it will pace your heart if needed to prevent it from beating too slowly  AFTER YOU LEAVE:      Follow up with your primary healthcare provider or cardiologist as directed: You will need to follow-up to have your ICD checked and make sure you are not having problems  Write down your questions so you remember to ask them during your visits  Self-care:   · Ask about activity: Ask if you need to avoid moving your shoulder or arm, and for how long  Ask if you should avoid lifting heavy objects  Do not play any contact sports, such as football or wrestling, until your primary healthcare provider [facility] or cardiologist tells you it is okay  You may only be able to drive for a certain amount of time per day, or during certain hours  Ask when you can return to work  · Care for your skin over the ICD: see answer in instructions above     When you get a shock from your ICD: A shock may feel like someone has hit you, or you may feel a thump in the chest  If someone is touching you when you get a shock, they may feel a tingling feeling  The first time you feel a shock, it may scare you  Sit or lie down and stay calm  Ask someone to stay with you if possible  Please either call your cardiologist or report to an emergency room  Safety instructions when you have an ICD:   · Carry an ID card for the ICD: This card has important information about your ICD  · Stay away from magnets or machines with electric fields: This includes MRI machines  Avoid leaning into a car engine or doing welding  These things can interfere with how your ICD works  · Tell airport security you have an ICD: You may need to be searched by hand when you go through a security gate  The security gate or handheld wand could harm your ICD  · Keep an ICD diary: Record when you get a shock and what you were doing before you got the shock  Keep track of how you felt before and after the shock, as well as how many shocks you received  Write down the day and time of each shock  Bring the diary with you when you see your PHP or cardiologist    · Carry medical alert identification: Wear medical alert jewelry or carry a card that says you have an ICD  Ask your PHP or cardiologist where to get these items  Contact your primary healthcare provider or cardiologist if:   · You have a fever  · You feel 1 or more shocks from your ICD and feel fine afterwards  · Your feet or ankles swell  · The area around your ICD is painful or tender after surgery  · The skin around your stitches or staples is red, swollen, or draining pus or fluid  · You have chills, a cough, and feel weak or achy  · You are sad or anxious and find it hard to do your usual activities  · You have questions or concerns about your condition or care  Seek care immediately or call 911 if:   · Your stitches or staples come apart  · Blood soaks through your bandage  · You feel more than 3 shocks in a row from your ICD  · You become weak, dizzy, or faint  · You feel your heart skip beats or beat very fast or slow, but you do not feel a shock from your ICD  · You have chest pain that does not go away with rest or medicine  © 2014 [employer] is for End User's use only and may not be sold, redistributed or otherwise used for commercial purposes  All illustrations and images included in CareNotes® are the copyrighted property of [employer]  or Rashaad David    The above information is an  only  It is not intended as medical advice for individual conditions or treatments  Talk to your doctor, nurse or pharmacist before following any medical regimen to see if it is safe and effective for you

## 2022-01-19 NOTE — ASSESSMENT & PLAN NOTE
Body mass index is 40 2 kg/m²    · Encourage weight loss, lifestyle modification once acute issues improved

## 2022-01-19 NOTE — PROGRESS NOTES
Progress Note - Electrophysiology  Kenton Owen 64 y o  female MRN: [id number]  Unit/Bed#: [facility] 509-01 Encounter: [id number]      Assessment/Plan:    1  Paroxysmal atrial fibrillation with episodes of RVR  - newly discovered with #2 / patient also required CV by EMS en route to hospital  - was on rate control of metoprolol succinate 25mg daily for cardiomyopathy prior to admission  - anticoagulated with Eliquis 5 mg twice daily  -rhythm control with Tikosyn 125 mcg q12 hrs - new start this admission  2  Inappropriate LifeVest discharge for afib  - shocked patient at time of sleep for RVR  - NSVT also noted  3  Sick sinus syndrome   - s/p [employer] BiV ICD  4  Tachy-cori syndrome  5  Left bundle branch block at baseline  - appears to have developed in 2020 looking at prior EKGs  6  Nonischemic cardiomyopathy  - cardiac cath 12/2021 showing no evidence of obstructive disease  - on OMT of metoprolol and Entresto as an outpateint  - last echo 12/2021 showing drop in EF to 30% / last prior 2017 was preserved   - Now s/p BiV ICD  7  Chronic systolic congestive heart failure  8  Non-insulin dependent diabetes mellitus  9  Essential hypertension  10  Hyperlipidemia   - maintained on pravastatin 10mg daily as an outpatient   11  Hypothyroidism   12  Obesity with BMI of 40  13  Pulmonary fibrosis   14  Depression  - maintained on venlafaxine as an outpatient      Device instructions and restrictions were discussed with the patient in detail  She will also be provided with written instructions detailing what was discussed  Patient also requires a 2 week device and site check which has already been arranged and is in Epic  Patient is currently paced at 60 bpm  She has already received 4 doses of Tikosyn at the current dose of 125 mcg  Upon review of the last EKG, Qtc interval is stable  She will have an EKG completed in 1 week  Patient is on goal directed medical therapy of beta blocker and Entresto      Patient is stable from an EP standpoint  Subjective/Objective   Subjective: Kendell Blue is POD # 1  Her incision is clean, dry, and intact without evidence of hematoma or ecchymosis or signs of infection  Vital signs and labs were reviewed  Assessment of chest x-rays show proper lead placement without evidence of pneumothorax  Device interrogation showed appropriate device function with lead parameters such as sensing, threshold, and impedance being tested  Patient denies chest pain/heaviness/tightness/pressure, palpitations, shortness of breath, orthopnea, lightheadedness, presyncope, syncope or N/V      Telemetry shows paced rhythm at 60 bpm         Objective:  Vitals: /62   Pulse 61   Temp 98 3 °F (36 8 °C) (Oral)   Resp 18   Ht 5' 2" (1 575 m)   Wt 99 7 kg (219 lb 12 8 oz)   SpO2 95%   BMI 40 20 kg/m²     Vitals:    01/18/22 0542 01/19/22 0445   Weight: 99 9 kg (220 lb 3 8 oz) 99 7 kg (219 lb 12 8 oz)     Orthostatic Blood Pressures      Most Recent Value   Blood Pressure 127/62 filed at 01/19/2022 1113   Patient Position - Orthostatic VS Lying filed at 01/19/2022 0558            Intake/Output Summary (Last 24 hours) at 1/19/2022 1157  Last data filed at 1/19/2022 0900  Gross per 24 hour   Intake 300 ml   Output 925 ml   Net -625 ml       Invasive Devices  Report    Peripheral Intravenous Line            Peripheral IV 01/18/22 Left Antecubital 1 day    Peripheral IV 01/18/22 Right Antecubital 1 day                          Scheduled Meds:  Current Facility-Administered Medications   Medication Dose Route Frequency Provider Last Rate    acetaminophen  975 mg Oral [facility] Anson Dupont DO      apixaban  5 mg Oral BID Anurag Adams PA-C      diphenoxylate-atropine  1 tablet Oral 4x Daily PRN Anson Dupont DO      dofetilide  125 mcg Oral Q12H [address] Ruby Arredondo PA-C      gabapentin  600 mg Oral Daily Anson Dupont DO      gabapentin  900 mg Oral BID Anson Dupont DO      heparin (porcine)  2,000 Units Intravenous Q1H PRN Kassie Parks MD      heparin (porcine)  4,000 Units Intravenous Q1H PRN Kassie Parks MD      insulin lispro  1-5 Units Subcutaneous HS Ashlyn Clifton, DO      insulin lispro  1-6 Units Subcutaneous TID [facility] Ashlyn Clifton, DO      levothyroxine  125 mcg Oral Early Morning Donnell Escobedo, PA-C      metoprolol succinate  50 mg Oral Q12H Sundra SEBASTIAN Deluna-C      nystatin   Topical BID Ashlyn Clifton, DO      ondansetron  4 mg Intravenous Q6H PRN Ashlyn Clifton, DO      oxyCODONE  2 5 mg Oral Q6H PRN Sundjacek Deluna PA-C      pravastatin  10 mg Oral Daily Ashlyn Clifton, DO      venlafaxine  75 mg Oral TID Ashlyn Clifton, DO       Continuous Infusions:   PRN Meds: diphenoxylate-atropine    heparin (porcine)    heparin (porcine)    ondansetron    oxyCODONE    Review of Systems:  ROS as noted above, otherwise 12 point review of systems was performed and is negative  Physical Exam:   Physical Exam  Vitals reviewed  Constitutional:       General: She is not in acute distress  Appearance: She is not ill-appearing or diaphoretic  HENT:      Head: Normocephalic and atraumatic  Right Ear: External ear normal       Left Ear: External ear normal       Nose: Nose normal    Eyes:      General:         Right eye: No discharge  Left eye: No discharge  Cardiovascular:      Rate and Rhythm: Normal rate and regular rhythm  Heart sounds: No murmur heard  No friction rub  Pulmonary:      Effort: Pulmonary effort is normal       Breath sounds: Normal breath sounds  No wheezing, rhonchi or rales  Chest:       Abdominal:      General: There is no distension  Palpations: Abdomen is soft  Tenderness: There is no abdominal tenderness  Musculoskeletal:         General: No deformity or signs of injury  Cervical back: No rigidity  No muscular tenderness  Right lower leg: No edema  Left lower leg: No edema     Skin: General: Skin is warm and dry  Capillary Refill: Capillary refill takes less than 2 seconds  Coloration: Skin is not jaundiced or pale  Neurological:      General: No focal deficit present  Mental Status: She is alert and oriented to person, place, and time  Mental status is at baseline  Psychiatric:         Mood and Affect: Mood normal          Behavior: Behavior normal          Thought Content: Thought content normal                    Lab Results: I have personally reviewed pertinent lab results  Results from last 7 days   Lab Units 01/19/22  0453 01/18/22  0505 01/17/22  0608   WBC Thousand/uL 8 98 6 23 6 31   HEMOGLOBIN g/dL 10 8* 11 2* 10 6*   HEMATOCRIT % 33 3* 35 0 33 5*   PLATELETS Thousands/uL 184 213 195     Results from last 7 days   Lab Units 01/19/22  0453 01/18/22  0505 01/17/22  0608   POTASSIUM mmol/L 4 3 4 2 4 4   CHLORIDE mmol/L 109* 109* 111*   CO2 mmol/L 26 27 28   BUN mg/dL 27* 32* 27*   CREATININE mg/dL 1 22 1 31* 1 35*   CALCIUM mg/dL 10 0 9 8 9 8     Results from last 7 days   Lab Units 01/17/22  1526 01/17/22  0608 01/16/22  2229 01/16/22  1321 01/16/22  1321   INR   --   --   --   --  1 25*   PTT seconds 50* 94* 47*   < > 187*    < > = values in this interval not displayed  Results from last 7 days   Lab Units 01/17/22  0608 01/16/22  0401   MAGNESIUM mg/dL 2 0 1 4*       Imaging: I have personally reviewed pertinent films in PACS  ECHO: 12/23/21    Left Ventricle: Left ventricular cavity size is normal  Wall motion is normal  There is severe global hypokinesis with regional variation  Diastolic function is moderately abnormal, consistent with grade II (pseudonormal) relaxation  The left ventricular ejection fraction is 30%  Systolic function is severely reduced    Right Ventricle: Right ventricular cavity size is dilated  Systolic function is normal     Left Atrium: The atrium is dilated    Mitral Valve: There is mild to moderate regurgitation        CATH: 12/27/21  Main   The vessel was visualized by angiography and is angiographically normal    Left Anterior Descending   The vessel was visualized by angiography, is large and is angiographically normal    Left Circumflex   The vessel was visualized by angiography, is large, is angiographically normal and dominant  Right Coronary Artery   The vessel was visualized by angiography, is moderate in size, is angiographically normal and non-dominant           VTE Pharmacologic Prophylaxis: Eliquis  VTE Mechanical Prophylaxis: sequential compression device

## 2022-01-19 NOTE — ASSESSMENT & PLAN NOTE
Patient noted with atrial fibrillation with RVR and hypotension triggering lifevest shock x1 and requiring cardioversion x1 via EMS pre-hospital  ?newly discovered Afib  Per Cardiology, Ara Mohan 125 interrogations with Thompson Oliveros and bundle branch block and intermittent NSVT  · Unclear triggering event, known with EF 30%  · Continue with telemetry monitoring  · Cardiology/EP following status post ICD on 1/18  · On metoprolol succinate 25mg BID  Already started on Tikosyn   · CHADS2 Vasc 7, was stared on heparin GTT on admission  Now on Eliquis  · PT and OT is recommending rehab which patient is refusing adamantly despite multiple conversations with her by multiple team members  She is also refusing to call any family members  She reports that she has an aide at home from 8-4, willing to accept home VNA and her sister will stay with her

## 2022-01-19 NOTE — ASSESSMENT & PLAN NOTE
· Pulmonology consult appreciated - they recommend outpatient high-resolution CT scan in 6-8 weeks and follow-up with Dr Emily Connolly

## 2022-01-19 NOTE — ASSESSMENT & PLAN NOTE
Wt Readings from Last 3 Encounters:   01/19/22 99 7 kg (219 lb 12 8 oz)   01/05/22 100 kg (221 lb)   12/31/21 100 kg (221 lb 1 9 oz)     · Echocardiogram 12/23/2021 revealing EF 30% and grade 2 diastolic dysfunction with right ventricular dilatation, left atrium dilatation, moderate mitral regurgitation    Cardiac catheterization that hospitalization without significant obstructing disease  · Holding Lasix given soft blood pressures--per cards likely go to PRN 1/19  · Continue metoprolol as above  · Holding Entresto given soft blood pressure  · Monitor daily weights, I/O, volume status  · Cardiology following

## 2022-01-19 NOTE — RESTORATIVE TECHNICIAN NOTE
Restorative Technician Note      Patient Name: Jv Catalan     Note Type: Mobility  Patient Position Upon Consult: Bedside chair  Activity Performed: Transferred; Stood  Assistive Device: Other (Comment) (x2)  Patient Position at End of Consult: Supine;  All needs within reach

## 2022-01-19 NOTE — CASE MANAGEMENT
Case Management Discharge Planning Note    Patient name Kenton Owen  Location [facility] 509/[facility] 981-51 MRN [id number]  : 1945 Date 2022       Current Admission Date: 2022  Current Admission Diagnosis:Atrial fibrillation with RVR [facility])   Patient Active Problem List    Diagnosis Date Noted    Pulmonary fibrosis ([facility] ) 2022    Hypothyroidism 2022    Hypotension 2022    Lifevest discharge 2022    CKD (chronic kidney disease) 2022    Left knee pain 2022    Torn rotator cuff 2022    Left bundle branch block (LBBB) 2022    Closed fracture of one rib of left side 2022    Moderate mitral regurgitation 2022    DNR (do not resuscitate) 2022    Depression 2021    Heart failure ([place] [address] ) secondary to cardiomyopathy 2021    Elevated troponin 2021    Acute renal failure superimposed on stage 4 chronic kidney disease ([facility] ) 2021    Type 2 diabetes mellitus, without long-term current use of insulin ([place] [address] ) 2019    Diabetic peripheral neuropathy ([place] [address] ) 2018    Class 3 severe obesity in adult ([facility] ) 2018    Primary osteoarthritis of both knees 2018    Aortic stenosis 2018    Hyperlipidemia 10/04/2017    Atrial fibrillation with RVR ([place] [address] ) 2015    Coronary artery disease 2013      LOS (days): 3  Geometric Mean LOS (GMLOS) (days): 6 10  Days to GMLOS:2 6     OBJECTIVE:  Risk of Unplanned Readmission Score: 17         Current admission status: Inpatient   Preferred Pharmacy:   [address], [place] [address]  [address] [place] [address]  Phone: [phone] Fax: [address], [address] [place] [address] [address] [facility] [address] [facility]  Phone: [phone] Fax: [phone]    Primary Care Provider: Jay Edwards MD    Primary Insurance: MEDICARE  Secondary Insurance: BLUE CROSS    DISCHARGE DETAILS:      IMM Given (Date):: 01/19/22  IMM Given to[de-identified] Patient     Additional Comments: Met with patient to discuss her level of functioning with therapy  Patient refuses to discuss snf and plans to dc home  She will ask her neighbor to transport  Patient reports her sister and caregiver will provide assitsance to her at home  ECIN message was sent to [facility] about anticipated dc 1/20/22  Patient refuses for CM and MD to call her family

## 2022-01-19 NOTE — PLAN OF CARE
Problem: PAIN - ADULT  Goal: Verbalizes/displays adequate comfort level or baseline comfort level  Description: Interventions:  - Encourage patient to monitor pain and request assistance  - Assess pain using appropriate pain scale  - Administer analgesics based on type and severity of pain and evaluate response  - Implement non-pharmacological measures as appropriate and evaluate response  - Consider cultural and social influences on pain and pain management  - Notify physician/advanced practitioner if interventions unsuccessful or patient reports new pain  Outcome: Progressing     Problem: INFECTION - ADULT  Goal: Absence or prevention of progression during hospitalization  Description: INTERVENTIONS:  - Assess and monitor for signs and symptoms of infection  - Monitor lab/diagnostic results  - Monitor all insertion sites, i e  indwelling lines, tubes, and drains  - Monitor endotracheal if appropriate and nasal secretions for changes in amount and color  - [place] appropriate cooling/warming therapies per order  - Administer medications as ordered  - Instruct and encourage patient and family to use good hand hygiene technique  - Identify and instruct in appropriate isolation precautions for identified infection/condition  Outcome: Progressing  Goal: Absence of fever/infection during neutropenic period  Description: INTERVENTIONS:  - Monitor WBC    Outcome: Progressing     Problem: SAFETY ADULT  Goal: Patient will remain free of falls  Description: INTERVENTIONS:  - Educate patient/family on patient safety including physical limitations  - Instruct patient to call for assistance with activity   - Consult OT/PT to assist with strengthening/mobility   - Keep Call bell within reach  - Keep bed low and locked with side rails adjusted as appropriate  - Keep care items and personal belongings within reach  - Initiate and maintain comfort rounds  - Make Fall Risk Sign visible to staff  - Offer Toileting every 2 Hours, in advance of need  - Initiate/Maintain bed alarm  - Obtain necessary fall risk management equipment:   - Apply yellow socks and bracelet for high fall risk patients  - Consider moving patient to room near nurses station  Outcome: Progressing  Goal: Maintain or return to baseline ADL function  Description: INTERVENTIONS:  -  Assess patient's ability to carry out ADLs; assess patient's baseline for ADL function and identify physical deficits which impact ability to perform ADLs (bathing, care of mouth/teeth, toileting, grooming, dressing, etc )  - Assess/evaluate cause of self-care deficits   - Assess range of motion  - Assess patient's mobility; develop plan if impaired  - Assess patient's need for assistive devices and provide as appropriate  - Encourage maximum independence but intervene and supervise when necessary  - Involve family in performance of ADLs  - Assess for home care needs following discharge   - Consider OT consult to assist with ADL evaluation and planning for discharge  - Provide patient education as appropriate  Outcome: Progressing  Goal: Maintains/Returns to pre admission functional level  Description: INTERVENTIONS:  - Perform BMAT or MOVE assessment daily    - Set and communicate daily mobility goal to care team and patient/family/caregiver  - Collaborate with rehabilitation services on mobility goals if consulted  - Perform Range of Motion 3 times a day  - Reposition patient every 2 hours    - Dangle patient 3 times a day  - Stand patient 3 times a day  - Ambulate patient 3 times a day  - Out of bed to chair 3 times a day   - Out of bed for meals 3 times a day  - Out of bed for toileting  - Record patient progress and toleration of activity level   Outcome: Progressing     Problem: DISCHARGE PLANNING  Goal: Discharge to home or other facility with appropriate resources  Description: INTERVENTIONS:  - Identify barriers to discharge w/patient and caregiver  - Arrange for needed discharge resources and transportation as appropriate  - Identify discharge learning needs (meds, wound care, etc )  - Arrange for interpretive services to assist at discharge as needed  - Refer to Case Management Department for coordinating discharge planning if the patient needs post-hospital services based on physician/advanced practitioner order or complex needs related to functional status, cognitive ability, or social support system  Outcome: Progressing     Problem: Knowledge Deficit  Goal: Patient/family/caregiver demonstrates understanding of disease process, treatment plan, medications, and discharge instructions  Description: Complete learning assessment and assess knowledge base  Interventions:  - Provide teaching at level of understanding  - Provide teaching via preferred learning methods  Outcome: Progressing     Problem: CARDIOVASCULAR - ADULT  Goal: Maintains optimal cardiac output and hemodynamic stability  Description: INTERVENTIONS:  - Monitor I/O, vital signs and rhythm  - Monitor for S/S and trends of decreased cardiac output  - Administer and titrate ordered vasoactive medications to optimize hemodynamic stability  - Assess quality of pulses, skin color and temperature  - Assess for signs of decreased coronary artery perfusion  - Instruct patient to report change in severity of symptoms  Outcome: Progressing  Goal: Absence of cardiac dysrhythmias or at baseline rhythm  Description: INTERVENTIONS:  - Continuous cardiac monitoring, vital signs, obtain 12 lead EKG if ordered  - Administer antiarrhythmic and heart rate control medications as ordered  - Monitor electrolytes and administer replacement therapy as ordered  Outcome: Progressing     Problem: Nutrition/Hydration-ADULT  Goal: Nutrient/Hydration intake appropriate for improving, restoring or maintaining nutritional needs  Description: Monitor and assess patient's nutrition/hydration status for malnutrition   Collaborate with interdisciplinary team and initiate plan and interventions as ordered  Monitor patient's weight and dietary intake as ordered or per policy  Utilize nutrition screening tool and intervene as necessary  Determine patient's food preferences and provide high-protein, high-caloric foods as appropriate       INTERVENTIONS:  - Monitor oral intake, urinary output, labs, and treatment plans  - Assess nutrition and hydration status and recommend course of action  - Evaluate amount of meals eaten  - Assist patient with eating if necessary   - Allow adequate time for meals  - Recommend/ encourage appropriate diets, oral nutritional supplements, and vitamin/mineral supplements  - Order, calculate, and assess calorie counts as needed  - Recommend, monitor, and adjust tube feedings and TPN/PPN based on assessed needs  - Assess need for intravenous fluids  - Provide specific nutrition/hydration education as appropriate  - Include patient/family/caregiver in decisions related to nutrition  Outcome: Progressing     Problem: MOBILITY - ADULT  Goal: Maintain or return to baseline ADL function  Description: INTERVENTIONS:  -  Assess patient's ability to carry out ADLs; assess patient's baseline for ADL function and identify physical deficits which impact ability to perform ADLs (bathing, care of mouth/teeth, toileting, grooming, dressing, etc )  - Assess/evaluate cause of self-care deficits   - Assess range of motion  - Assess patient's mobility; develop plan if impaired  - Assess patient's need for assistive devices and provide as appropriate  - Encourage maximum independence but intervene and supervise when necessary  - Involve family in performance of ADLs  - Assess for home care needs following discharge   - Consider OT consult to assist with ADL evaluation and planning for discharge  - Provide patient education as appropriate  Outcome: Progressing  Goal: Maintains/Returns to pre admission functional level  Description: INTERVENTIONS:  - Perform BMAT or MOVE assessment daily    - Set and communicate daily mobility goal to care team and patient/family/caregiver  - Collaborate with rehabilitation services on mobility goals if consulted  - Perform Range of Motion 3 times a day  - Reposition patient every 2 hours    - Dangle patient 3 times a day  - Stand patient 3 times a day  - Ambulate patient 3 times a day  - Out of bed to chair 3 times a day   - Out of bed for meals 3 times a day  - Out of bed for toileting  - Record patient progress and toleration of activity level   Outcome: Progressing     Problem: Potential for Falls  Goal: Patient will remain free of falls  Description: INTERVENTIONS:  - Educate patient/family on patient safety including physical limitations  - Instruct patient to call for assistance with activity   - Consult OT/PT to assist with strengthening/mobility   - Keep Call bell within reach  - Keep bed low and locked with side rails adjusted as appropriate  - Keep care items and personal belongings within reach  - Initiate and maintain comfort rounds  - Make Fall Risk Sign visible to staff  - Offer Toileting every 2 Hours, in advance of need  - Initiate/Maintain bed alarm  - Obtain necessary fall risk management equipment:   - Apply yellow socks and bracelet for high fall risk patients  - Consider moving patient to room near nurses station  Outcome: Progressing     Problem: Prexisting or High Potential for Compromised Skin Integrity  Goal: Skin integrity is maintained or improved  Description: INTERVENTIONS:  - Identify patients at risk for skin breakdown  - Assess and monitor skin integrity  - Assess and monitor nutrition and hydration status  - Monitor labs   - Assess for incontinence   - Turn and reposition patient  - Assist with mobility/ambulation  - Relieve pressure over bony prominences  - Avoid friction and shearing  - Provide appropriate hygiene as needed including keeping skin clean and dry  - Evaluate need for skin moisturizer/barrier cream  - Collaborate with interdisciplinary team   - Patient/family teaching  - Consider wound care consult   Outcome: Progressing

## 2022-01-19 NOTE — RESTORATIVE TECHNICIAN NOTE
Restorative Technician Note      Patient Name: Ivan Alert     Note Type: Mobility  Patient Position Upon Consult: Bedside chair  Activity Performed: Repositioned  Patient Position at End of Consult: Bedside chair;  All needs within reach

## 2022-01-19 NOTE — PROGRESS NOTES
[address] [facility]  Progress Note - Kt Sinha 1945, 64 y o  female MRN: [id number]  Unit/Bed#: [facility] 509-01 Encounter: [id number]  Primary Care Provider: Susan Barnett MD   Date and time admitted to hospital: 1/16/2022  3:44 AM    * Atrial fibrillation with RVR [facility])  Assessment & Plan  Patient noted with atrial fibrillation with RVR and hypotension triggering lifevest shock x1 and requiring cardioversion x1 via EMS pre-hospital  ?newly discovered Afib  Per Cardiology, [address] interrogations with Latrelle Life and bundle branch block and intermittent NSVT  · Unclear triggering event, known with EF 30%  · Continue with telemetry monitoring  · Cardiology/EP following status post ICD on 1/18  · On metoprolol succinate 25mg BID  Already started on Tikosyn   · CHADS2 Vasc 7, was stared on heparin GTT on admission  Now on Eliquis  · PT and OT is recommending rehab which patient is refusing adamantly despite multiple conversations with her by multiple team members  She is also refusing to call any family members  She reports that she has an aide at home from 8-4, willing to accept home VNA and her sister will stay with her      Hypothyroidism  Assessment & Plan  With elevated fT4 and low TSH,   · Decreased levothyroxine dose from 150 mcg to 125 mcg on admission  · Repeat TFTs in 4-6 weeks    Pulmonary fibrosis ([facility] [address] )  Assessment & Plan  · Pulmonology consult appreciated - they recommend outpatient high-resolution CT scan in 6-8 weeks and follow-up with Dr Samantha Anguiano    CKD (chronic kidney disease)  Assessment & Plan  Lab Results   Component Value Date    EGFR 42 01/19/2022    EGFR 39 01/18/2022    EGFR 37 01/17/2022    CREATININE 1 22 01/19/2022    CREATININE 1 31 (H) 01/18/2022    CREATININE 1 35 (H) 01/17/2022     Baseline creatinine per recent Nephrology record review appears to be 1 5-1 9  · Creatinine below baseline  · Monitor     Lifevest discharge  Assessment & Plan  Patient with apparent shock delivered by LifeVest which prompted presentation  · Cardiology following  · Plan as per above    Hypotension  Assessment & Plan  Noted hypotensive pre-hospital and on ED arrival improved s/p 750 cc IVF  · Possibly in setting of rapid atrial fibrillation  · Continue to monitor blood pressure closely, holding lasix/entresto for now per cards    Heart failure ([address] ) secondary to cardiomyopathy  Assessment & Plan  Wt Readings from Last 3 Encounters:   01/19/22 99 7 kg (219 lb 12 8 oz)   01/05/22 100 kg (221 lb)   12/31/21 100 kg (221 lb 1 9 oz)     · Echocardiogram 12/23/2021 revealing EF 30% and grade 2 diastolic dysfunction with right ventricular dilatation, left atrium dilatation, moderate mitral regurgitation  Cardiac catheterization that hospitalization without significant obstructing disease  · Holding Lasix given soft blood pressures--per cards likely go to PRN 1/19  · Continue metoprolol as above  · Holding Entresto given soft blood pressure  · Monitor daily weights, I/O, volume status  · Cardiology following           Elevated troponin  Assessment & Plan  · Likely non MI in setting of above  · Cardiology following     Type 2 diabetes mellitus, without long-term current use of insulin [facility])  Assessment & Plan  Lab Results   Component Value Date    HGBA1C 6 5 08/04/2021       Recent Labs     01/18/22  2046 01/19/22  0603 01/19/22  1045 01/19/22  1609   POCGLU 121 112 157* 142*       Blood Sugar Average: Last 72 hrs:  (P) 138 125   · A1C well controlled, typically diet controlled at home  · SSI with Accu-Cheks while inpatient, carb controlled diet  · Hypoglycemia protocol  · Continue gabapentin for neuropathy    Class 3 severe obesity in adult [facility])  Assessment & Plan  Body mass index is 40 2 kg/m²    · Encourage weight loss, lifestyle modification once acute issues improved    Hyperlipidemia  Assessment & Plan  · Continue statin      VTE Pharmacologic Prophylaxis:   Pharmacologic: Apixaban (Eliquis)  Mechanical VTE Prophylaxis in Place: Yes    Patient Centered Rounds: I have performed bedside rounds with nursing staff today  Discussions with Specialists or Other Care Team Provider:  Case management, EP    Education and Discussions with Family / Patient:  Patient is adamantly refusing to call any family members  Discussed plan with the patient  Time Spent for Care: 30 minutes  More than 50% of total time spent on counseling and coordination of care as described above  Current Length of Stay: 3 day(s)    Current Patient Status: Inpatient   Certification Statement: The patient will continue to require additional inpatient hospital stay due to Likely discharge tomorrow    Discharge Plan:  Medically stable for discharge and rehab is recommended on the patient is adamantly refusing rehab  Code Status: Level 3 - DNAR and DNI      Subjective: We had a detailed discussion with patient regarding need for rehab but she continues to refuse adamantly  I also requested home do let me call her family members but she refused that as well  She states that she has aided home from , her sister will stay with her and she is willing to except VNA  Denies any chest pain or shortness of breath  Tearful during our cinversation as she dos not want rehab  Objective:     Vitals:   Temp (24hrs), Av 2 °F (36 8 °C), Min:98 °F (36 7 °C), Max:98 3 °F (36 8 °C)    Temp:  [98 °F (36 7 °C)-98 3 °F (36 8 °C)] 98 1 °F (36 7 °C)  HR:  [60-62] 61  Resp:  [16-20] 16  BP: ()/(43-62) 123/61  SpO2:  [95 %-96 %] 95 %  Body mass index is 40 2 kg/m²  Input and Output Summary (last 24 hours): Intake/Output Summary (Last 24 hours) at 2022 1755  Last data filed at 2022 1501  Gross per 24 hour   Intake 240 ml   Output 1127 ml   Net -887 ml       Physical Exam:     Physical Exam  Constitutional:       General: She is not in acute distress    Cardiovascular:      Rate and Rhythm: Normal rate and regular rhythm  Heart sounds: Normal heart sounds  No murmur heard  Pulmonary:      Effort: No respiratory distress  Breath sounds: Normal breath sounds  No wheezing or rales  Abdominal:      General: Bowel sounds are normal  There is no distension  Palpations: Abdomen is soft  Tenderness: There is no abdominal tenderness  Skin:     General: Skin is warm  Neurological:      Mental Status: She is alert  Comments: Awake alert and communicative           Additional Data:     Labs:    Results from last 7 days   Lab Units 01/19/22  0453   WBC Thousand/uL 8 98   HEMOGLOBIN g/dL 10 8*   HEMATOCRIT % 33 3*   PLATELETS Thousands/uL 184   NEUTROS PCT % 71   LYMPHS PCT % 13*   MONOS PCT % 11   EOS PCT % 4     Results from last 7 days   Lab Units 01/19/22  0453   SODIUM mmol/L 141   POTASSIUM mmol/L 4 3   CHLORIDE mmol/L 109*   CO2 mmol/L 26   BUN mg/dL 27*   CREATININE mg/dL 1 22   ANION GAP mmol/L 6   CALCIUM mg/dL 10 0   GLUCOSE RANDOM mg/dL 104     Results from last 7 days   Lab Units 01/16/22  1321   INR  1 25*     Results from last 7 days   Lab Units 01/19/22  1609 01/19/22  1045 01/19/22  0603 01/18/22  2046 01/18/22  1639 01/18/22  1329 01/18/22  0613 01/17/22  2044 01/17/22  1552 01/17/22  1101 01/17/22  0638 01/16/22  2106   POC GLUCOSE mg/dl 142* 157* 112 121 138 158* 128 155* 115 151* 123 124                   * I Have Reviewed All Lab Data Listed Above  * Additional Pertinent Lab Tests Reviewed: All Labs Within Last 24 Hours Reviewed    Imaging:    XR chest 2 views   Final Result by Elisabet Miranda MD (01/19 1125)      No acute cardiopulmonary disease  Workstation performed: [id number]         XR chest portable   Final Result by Marilou Newman MD (01/18 4177)   Left chest wall ICD with intact leads  No pneumothorax  No acute cardiopulmonary disease                    Workstation performed: [id number]         XR chest 1 view portable   Final Result by Iván Mcfadden MD (01/16 1322)      No acute cardiopulmonary disease  Workstation performed: [id number]           Recent Cultures (last 7 days):           Last 24 Hours Medication List:   Current Facility-Administered Medications   Medication Dose Route Frequency Provider Last Rate    acetaminophen  975 mg Oral [facility] Love Spoon, DO      apixaban  5 mg Oral BID Anthony Berry PA-C      diphenoxylate-atropine  1 tablet Oral 4x Daily PRN Love Spoon, DO      dofetilide  125 mcg Oral Q12H [address] [place]      gabapentin  600 mg Oral Daily Love Spoon, DO      gabapentin  900 mg Oral BID Love Spoon, DO      heparin (porcine)  2,000 Units Intravenous Q1H PRN Robinson Miguel MD      heparin (porcine)  4,000 Units Intravenous Q1H PRN Robinson Miguel MD      insulin lispro  1-5 Units Subcutaneous HS Love Spoon, DO      insulin lispro  1-6 Units Subcutaneous TID AC Love Spoon, DO      levothyroxine  125 mcg Oral Early Morning Everardo Her PA-C      metoprolol succinate  50 mg Oral Q12H Anthony Berry PA-C      nystatin   Topical BID Love Spoon, DO      ondansetron  4 mg Intravenous Q6H PRN Love Spoon, DO      oxyCODONE  2 5 mg Oral Q6H PRN Anthony Berry PA-C      pravastatin  10 mg Oral Daily Love Spoon, DO      venlafaxine  75 mg Oral TID Love Spoon, DO          Today, Patient Was Seen By: Elizabeth Cortes MD    ** Please Note: Dictation voice to text software may have been used in the creation of this document   **

## 2022-01-19 NOTE — CASE MANAGEMENT
Case Management Discharge Planning Note    Patient name Betty Grace  Location PPHP 509/PPHP 335-51 MRN [id number]  : 1945 Date 2022       Current Admission Date: 2022  Current Admission Diagnosis:Atrial fibrillation with RVR [facility])   Patient Active Problem List    Diagnosis Date Noted    Pulmonary fibrosis ([facility] ) 2022    Hypothyroidism 2022    Hypotension 2022    Lifevest discharge 2022    CKD (chronic kidney disease) 2022    Left knee pain 2022    Torn rotator cuff 2022    Left bundle branch block (LBBB) 2022    Closed fracture of one rib of left side 2022    Moderate mitral regurgitation 2022    DNR (do not resuscitate) 2022    Depression 2021    Heart failure ([place] [address] ) secondary to cardiomyopathy 2021    Elevated troponin 2021    Acute renal failure superimposed on stage 4 chronic kidney disease ([facility] ) 2021    Type 2 diabetes mellitus, without long-term current use of insulin ([place] [address] ) 2019    Diabetic peripheral neuropathy ([place] [address] ) 2018    Class 3 severe obesity in adult ([facility] ) 2018    Primary osteoarthritis of both knees 2018    Aortic stenosis 2018    Hyperlipidemia 10/04/2017    Atrial fibrillation with RVR ([place] [address] ) 2015    Coronary artery disease 2013      LOS (days): 3  Geometric Mean LOS (GMLOS) (days): 6 10  Days to GMLOS:2 9     OBJECTIVE:  Risk of Unplanned Readmission Score: 19         Current admission status: Inpatient   Preferred Pharmacy:   [address], [address]  [address] [address]  Phone: [phone] Fax: [phone], [address] [facility] [address] [facility] 94 [facility] 38 210 [facility]  Phone: [phone] Fax: [phone]    Primary Care Provider: Boston Melgar MD    Primary Insurance: MEDICARE  Secondary Insurance: BLUE CROSS    DISCHARGE DETAILS:      Additional Comments: Discussed patient with PT who reports she is A of 2 and rehab is recommended  Met with patient to discuss discharge needs  Patient refuses rehab either return to [facility] or at a different facility  She reports her sister will stay with her for 3 weeks after dc and another woman helps her at home daily  Request made for therapy to see her again   Informed MD

## 2022-01-20 VITALS
TEMPERATURE: 97.8 F | DIASTOLIC BLOOD PRESSURE: 53 MMHG | OXYGEN SATURATION: 95 % | HEART RATE: 59 BPM | HEIGHT: 62 IN | WEIGHT: 214.29 LBS | BODY MASS INDEX: 39.43 KG/M2 | RESPIRATION RATE: 16 BRPM | SYSTOLIC BLOOD PRESSURE: 102 MMHG

## 2022-01-20 PROBLEM — R77.8 ABNORMAL SPEP: Status: ACTIVE | Noted: 2022-01-20

## 2022-01-20 LAB
ATRIAL RATE: 60 BPM
FLUAV RNA RESP QL NAA+PROBE: NEGATIVE
FLUBV RNA RESP QL NAA+PROBE: NEGATIVE
GLUCOSE SERPL-MCNC: 131 MG/DL (ref 65–140)
P AXIS: 87 DEGREES
PR INTERVAL: 172 MS
QRS AXIS: -50 DEGREES
QRSD INTERVAL: 130 MS
QT INTERVAL: 464 MS
QTC INTERVAL: 464 MS
RSV RNA RESP QL NAA+PROBE: NEGATIVE
SARS-COV-2 RNA RESP QL NAA+PROBE: NEGATIVE
T WAVE AXIS: 2 DEGREES
VENTRICULAR RATE: 60 BPM

## 2022-01-20 PROCEDURE — 0241U HB NFCT DS VIR RESP RNA 4 TRGT: CPT | Performed by: INTERNAL MEDICINE

## 2022-01-20 PROCEDURE — NC001 PR NO CHARGE: Performed by: INTERNAL MEDICINE

## 2022-01-20 PROCEDURE — 99239 HOSP IP/OBS DSCHRG MGMT >30: CPT | Performed by: INTERNAL MEDICINE

## 2022-01-20 PROCEDURE — 82948 REAGENT STRIP/BLOOD GLUCOSE: CPT

## 2022-01-20 PROCEDURE — 97530 THERAPEUTIC ACTIVITIES: CPT

## 2022-01-20 PROCEDURE — 93010 ELECTROCARDIOGRAM REPORT: CPT | Performed by: INTERNAL MEDICINE

## 2022-01-20 RX ORDER — LEVOTHYROXINE SODIUM 0.12 MG/1
125 TABLET ORAL
Qty: 30 TABLET | Refills: 0 | Status: SHIPPED | OUTPATIENT
Start: 2022-01-20 | End: 2022-02-28

## 2022-01-20 RX ORDER — FUROSEMIDE 20 MG/1
20 TABLET ORAL DAILY PRN
Status: DISCONTINUED | OUTPATIENT
Start: 2022-01-20 | End: 2022-01-20 | Stop reason: HOSPADM

## 2022-01-20 RX ORDER — FUROSEMIDE 20 MG/1
20 TABLET ORAL DAILY PRN
Qty: 30 TABLET | Refills: 0 | Status: SHIPPED | OUTPATIENT
Start: 2022-01-20 | End: 2022-03-08

## 2022-01-20 RX ADMIN — NYSTATIN: 100000 POWDER TOPICAL at 08:56

## 2022-01-20 RX ADMIN — APIXABAN 5 MG: 5 TABLET, FILM COATED ORAL at 08:56

## 2022-01-20 RX ADMIN — METOPROLOL SUCCINATE 50 MG: 50 TABLET, EXTENDED RELEASE ORAL at 08:56

## 2022-01-20 RX ADMIN — ACETAMINOPHEN 975 MG: 325 TABLET, FILM COATED ORAL at 05:28

## 2022-01-20 RX ADMIN — SACUBITRIL AND VALSARTAN 1 TABLET: 24; 26 TABLET, FILM COATED ORAL at 08:56

## 2022-01-20 RX ADMIN — VENLAFAXINE 75 MG: 37.5 TABLET ORAL at 08:56

## 2022-01-20 RX ADMIN — GABAPENTIN 600 MG: 300 CAPSULE ORAL at 14:15

## 2022-01-20 RX ADMIN — ACETAMINOPHEN 975 MG: 325 TABLET, FILM COATED ORAL at 14:16

## 2022-01-20 RX ADMIN — PRAVASTATIN SODIUM 10 MG: 10 TABLET ORAL at 08:56

## 2022-01-20 RX ADMIN — GABAPENTIN 900 MG: 300 CAPSULE ORAL at 08:55

## 2022-01-20 RX ADMIN — LEVOTHYROXINE SODIUM 125 MCG: 125 TABLET ORAL at 05:28

## 2022-01-20 RX ADMIN — DOFETILIDE 125 MCG: 0.12 CAPSULE ORAL at 08:56

## 2022-01-20 NOTE — CASE MANAGEMENT
Case Management Discharge Planning Note    Patient name Kenton Owen  Location PPHP 509/PPHP 526-18 MRN [id number]  : 1945 Date 2022       Current Admission Date: 2022  Current Admission Diagnosis:Atrial fibrillation with RVR [facility])   Patient Active Problem List    Diagnosis Date Noted    Abnormal SPEP 2022    Pulmonary fibrosis ([facility] ) 2022    Hypothyroidism 2022    Hypotension 2022    Lifevest discharge 2022    CKD (chronic kidney disease) 2022    Left knee pain 2022    Torn rotator cuff 2022    Left bundle branch block (LBBB) 2022    Closed fracture of one rib of left side 2022    Moderate mitral regurgitation 2022    DNR (do not resuscitate) 2022    Depression 2021    Heart failure ([facility] ) secondary to cardiomyopathy 2021    Elevated troponin 2021    Acute renal failure superimposed on stage 4 chronic kidney disease ([facility] [facility] ) 2021    Type 2 diabetes mellitus, without long-term current use of insulin ([facility] ) 2019    Diabetic peripheral neuropathy ([facility] ) 2018    Class 3 severe obesity in adult [facility] ) 2018    Primary osteoarthritis of both knees 2018    Aortic stenosis 2018    Hyperlipidemia 10/04/2017    Atrial fibrillation with RVR ([facility] ) 2015    Coronary artery disease 2013      LOS (days): 4  Geometric Mean LOS (GMLOS) (days): 6 10  Days to GMLOS:1 8     OBJECTIVE:  Risk of Unplanned Readmission Score: 18         Current admission status: Inpatient   Preferred Pharmacy:   [address], [address]  [address] [place] [address]  Phone: [phone] Fax: [phone] [facility] [address] 210 [facility]  Phone: [phone] Fax: [phone]    Primary Care Provider: Jay Edwards MD    Primary Insurance: MEDICARE  Secondary Insurance: BLUE CROSS    DISCHARGE DETAILS:    Discharge planning discussed with[de-identified] patient  Freedom of Choice: Yes  Comments - Freedom of Choice: Patient had failed attempt to dc home today- she was unable to get into neighbor's car even with assist of 2 staff    Patient tearful upon return to room  MD aware and cancelled dc home today  Patient refuses for CM to call her sister or her granddaughters  Her sister called while CM in room-neighbor had called her  Patient now agrees to referral to return to [facility] and [place] referral was made  CM contacted family/caregiver?: No- see comments (patient refuses for CM to call)  Were Treatment Team discharge recommendations reviewed with patient/caregiver?: Yes  Did patient/caregiver verbalize understanding of patient care needs?: Yes       Other Referral/Resources/Interventions Provided:  Interventions: Short Term Rehab  Referral Comments: Patient now agrees to snf referral after failed dc to home  ECIN referral made to [facility]          Treatment Team Recommendation: Short Term Rehab  Discharge Destination Plan[de-identified] Short Term Rehab  Transport at Discharge : Wheelchair Pee Phlegm

## 2022-01-20 NOTE — ASSESSMENT & PLAN NOTE
· Pulmonology consult appreciated - they recommend outpatient high-resolution CT scan in 6-8 weeks and follow-up with Dr Tucker Soulier

## 2022-01-20 NOTE — ASSESSMENT & PLAN NOTE
Pt has SPEP s/o MGUS but has normal K/L ratio  Suspect due to underlying CKD  OP FU with PCP/Cardiology for now   Can see hem Onc if recommended by outpatient team

## 2022-01-20 NOTE — ASSESSMENT & PLAN NOTE
Lab Results   Component Value Date    HGBA1C 6 5 08/04/2021       Recent Labs     01/19/22  1045 01/19/22  1609 01/19/22 2055 01/20/22  0620   POCGLU 157* 142* 163* 131       Blood Sugar Average: Last 72 hrs:  (P) 138   · A1C well controlled, typically diet controlled at home  · carb controlled diet  · Continue gabapentin for neuropathy

## 2022-01-20 NOTE — PLAN OF CARE
Problem: CARDIOVASCULAR - ADULT  Goal: Maintains optimal cardiac output and hemodynamic stability  Description: INTERVENTIONS:  - Monitor I/O, vital signs and rhythm  - Monitor for S/S and trends of decreased cardiac output  - Administer and titrate ordered vasoactive medications to optimize hemodynamic stability  - Assess quality of pulses, skin color and temperature  - Assess for signs of decreased coronary artery perfusion  - Instruct patient to report change in severity of symptoms  1/20/2022 1333 by Gaurang Winchester RN  Outcome: Adequate for Discharge  1/20/2022 1333 by Gaurang Winchester RN  Outcome: Adequate for Discharge  Goal: Absence of cardiac dysrhythmias or at baseline rhythm  Description: INTERVENTIONS:  - Continuous cardiac monitoring, vital signs, obtain 12 lead EKG if ordered  - Administer antiarrhythmic and heart rate control medications as ordered  - Monitor electrolytes and administer replacement therapy as ordered  1/20/2022 1333 by Gaurang Winchester RN  Outcome: Adequate for Discharge  1/20/2022 1333 by Gaurang Winchester RN  Outcome: Adequate for Discharge     Problem: CARDIOVASCULAR - ADULT  Goal: Absence of cardiac dysrhythmias or at baseline rhythm  Description: INTERVENTIONS:  - Continuous cardiac monitoring, vital signs, obtain 12 lead EKG if ordered  - Administer antiarrhythmic and heart rate control medications as ordered  - Monitor electrolytes and administer replacement therapy as ordered  1/20/2022 1333 by Gaurang Winchester RN  Outcome: Adequate for Discharge  1/20/2022 1333 by Gaurang Winchester RN  Outcome: Adequate for Discharge

## 2022-01-20 NOTE — PLAN OF CARE
Problem: PHYSICAL THERAPY ADULT  Goal: Performs mobility at highest level of function for planned discharge setting  See evaluation for individualized goals  Description: Treatment/Interventions: Functional transfer training,LE strengthening/ROM,Therapeutic exercise,Endurance training,Patient/family training,Equipment eval/education,Bed mobility,Spoke to nursing          See flowsheet documentation for full assessment, interventions and recommendations  Outcome: Progressing  Note: Prognosis: Good  Problem List: Decreased strength,Decreased range of motion,Decreased endurance,Impaired balance,Decreased mobility,Decreased safety awareness,Impaired judgement  Assessment: Pt seen for PT treatment session with focus on functional transfers and pt education  Pt making slow progress toward goals this session pt continues to require significant assist of two for all transfers  Pt with such strength deficits as well as ROM limitations in b/l knees that she is unable to come to full upright stand during STS transfers  Additionally, pt with remaining strength deficits that limit ability to clear feet off of floor for attempted steps to WC  Pt provided significant education regarding safety concerns to DC home, pt respectful and listens to therapists, however, insists she is going home and asks for RN to bring her downstairs now  Pt left upright in transport chair with all needs in reach  Pt will benefit from skilled therapy in order to address current impairments and functional limitations  PT to follow pt and recommending rehab once medically cleared  The patient's AM-PAC Basic Mobility Inpatient Short Form Raw Score is 6  A Raw score of less than or equal to 16 suggests the patient may benefit from discharge to post-acute rehabilitation services  Please also refer to the recommendation of the Physical Therapist for safe discharge planning    Barriers to Discharge: Inaccessible home environment,Decreased caregiver support        PT Discharge Recommendation: Post acute rehabilitation services          See flowsheet documentation for full assessment

## 2022-01-20 NOTE — ASSESSMENT & PLAN NOTE
Wt Readings from Last 3 Encounters:   01/20/22 97 2 kg (214 lb 4 6 oz)   01/05/22 100 kg (221 lb)   12/31/21 100 kg (221 lb 1 9 oz)     · Echocardiogram 12/23/2021 revealing EF 30% and grade 2 diastolic dysfunction with right ventricular dilatation, left atrium dilatation, moderate mitral regurgitation  Cardiac catheterization that hospitalization without significant obstructing disease  · Holding Lasix given soft blood pressures--per cards - to be discharged on p r n  Lasix for weight gain    · Continue metoprolol, Agustin Champion

## 2022-01-20 NOTE — PROGRESS NOTES
Pt attempted to be discharged  Pt unable to stand and pivot into the car  After repeated attempts, pt and her friends agreed to return to her room  Pt friends also expressed concerns of getting her out of the car and her living alone  Dr Yady Toussaint notified via tiger text

## 2022-01-20 NOTE — CASE MANAGEMENT
Case Management Discharge Planning Note    Patient name Suzen Phalen  Location PPHP 509/PPHP 072-02 MRN [id number]  : 1945 Date 2022       Current Admission Date: 2022  Current Admission Diagnosis:Atrial fibrillation with RVR [facility])   Patient Active Problem List    Diagnosis Date Noted    Abnormal SPEP 2022    Pulmonary fibrosis ([facility] ) 2022    Hypothyroidism 2022    Hypotension 2022    Lifevest discharge 2022    CKD (chronic kidney disease) 2022    Left knee pain 2022    Torn rotator cuff 2022    Left bundle branch block (LBBB) 2022    Closed fracture of one rib of left side 2022    Moderate mitral regurgitation 2022    DNR (do not resuscitate) 2022    Depression 2021    Heart failure ([facility] ) secondary to cardiomyopathy 2021    Elevated troponin 2021    Acute renal failure superimposed on stage 4 chronic kidney disease ([facility] ) 2021    Type 2 diabetes mellitus, without long-term current use of insulin ([facility] ) 2019    [facility] ) 2018    Class 3 severe obesity in adult ([facility] ) 2018    Primary osteoarthritis of both knees 2018    Aortic stenosis 2018    Hyperlipidemia 10/04/2017    Atrial fibrillation with RVR ([facility] ) 2015    Coronary artery disease 2013      LOS (days): 4  Geometric Mean LOS (GMLOS) (days): 6 10  Days to GMLOS:1 7     OBJECTIVE:  Risk of Unplanned Readmission Score: 18         Current admission status: Inpatient   Preferred Pharmacy:   [address], [address]  [address] [place] [address]  Phone: [phone] Fax: [phone] [facility] [address] 210 [facility]  Phone: [phone] Fax: [phone]    Primary Care Provider: Jerry Campos MD    Primary Insurance: MEDICARE  Secondary Insurance: BLUE CROSS    DISCHARGE DETAILS:      Dispatcher Contacted: Yes  Number/Name of Dispatcher: [facility] [phone] nade request for wc Ann Phenes transport     Additional Comments: Patient is approved at [facility]  They can admit today pending negative covid test  Informed MD who ordered covid  ECIN referral made to [facility] to request wc Ann Phenes transport for today   Patient must be in building by [address] [place] [address] Name, [address] : [facility]  Receiving Facility/Agency Phone Number: [phone]  Facility/Agency Fax Number: [phone]

## 2022-01-20 NOTE — PLAN OF CARE
Problem: OCCUPATIONAL THERAPY ADULT  Goal: Performs self-care activities at highest level of function for planned discharge setting  See evaluation for individualized goals  Description: Treatment Interventions: ADL retraining,Functional transfer training,Endurance training,Activityengagement,Energy conservation,Compensatory technique education,Equipment evaluation/education,Patient/family training          See flowsheet documentation for full assessment, interventions and recommendations  Outcome: Progressing  Note: Limitation: Decreased ADL status,Decreased UE ROM,Decreased UE strength,Decreased endurance,Decreased self-care trans,Decreased high-level ADLs  Prognosis: Fair  Assessment: Pt seen on this date for OT session focusing on body mechanics, transfer retraining, increasing activity tolerance/endurance, extensive edu regarding pts current level of function and therapy concern w d/c to home  Pt completed transfer from [facility] chair with max Ax2, stand pivot completed as pt is unable to achieve full stance this date and unable to efficently move legs to complete functional mobility tasks  Pt is refusing rehab during this therapy session, despite therapists extensive edu  Pt is aware that therapy does not agree with home d/c and states that she just wants to be home and her sister will be able to help her as needed  Made pt aware that her sister is not likely going to be able to help her at home and that there are several safety concerns with d/c home  Pt continues to state that she wants to leave  Pt reamins limited 2* decreased ADL/High-level ADL status, decreased activity tolerance/endurance, decreased cognition, decreased self-care trans, decreased safety awareness and insight to condition  The patient's raw score on the AM-PAC Daily Activity inpatient short form is 16, standardized score is 35 96, less than 39 4   Patients at this level are likely to benefit from discharge to post-acute rehabilitation services  Please refer to the recommendation of the Occupational Therapist for safe discharge planning  Recommending pt D/C to STR when medically stable  Pt will continue to benefit from acute OT services to meet goals  OT Discharge Recommendation: Post acute rehabilitation services  OT - OK to Discharge:  Yes

## 2022-01-20 NOTE — ASSESSMENT & PLAN NOTE
Noted hypotensive pre-hospital and on ED arrival improved s/p 750 cc IVF  · Possibly in setting of rapid atrial fibrillation  · Now improved

## 2022-01-20 NOTE — PHYSICAL THERAPY NOTE
PHYSICAL THERAPY NOTE          Patient Name: Smitha Narayan  NFVOJ'M Date: 1/20/2022 01/20/22 1120   PT Last Visit   PT Visit Date 01/20/22   Note Type   Note Type Treatment   Pain Assessment   Pain Assessment Tool 0-10   Pain Score No Pain   Restrictions/Precautions   Weight Bearing Precautions Per Order No   Other Precautions Bed Alarm; Chair Alarm; Fall Risk   General   Chart Reviewed Yes   Response to Previous Treatment Patient with no complaints from previous session  Family/Caregiver Present No   Cognition   Arousal/Participation Alert   Attention Within functional limits   Memory Decreased recall of precautions   Following Commands Follows one step commands with increased time or repetition   Comments Pt hears education from therapists regarding concerns for DC home however, continues to insist she is going home  Pt is tearful, emotional about wanting to be home  Subjective   Subjective Pt is pleasant and respects therapists, however, absolutely wanting to DC home immediately  Bed Mobility   Additional Comments Sitting EOB upon PT arrival   Pt left sitting in transport chair with all needs in reach  Transfers   Sit to Stand 2  Maximal assistance   Additional items Assist x 2; Increased time required;Verbal cues   Stand to Sit 2  Maximal assistance   Additional items Assist x 2; Increased time required;Verbal cues   Stand pivot 2  Maximal assistance   Additional items Assist x 2; Increased time required;Verbal cues   Additional Comments Attempted STS with RW, however, pt only able to clear bottom 10-20% off of bed  Second STS attempted with b/l HHA and knee block  Pt able to clear ~80% off of bed and perform SPT to transport chair  When pt attempts to take steps, pt with posterior LOB and unable to clear feet from floor     Balance   Static Sitting Fair   Dynamic Sitting Fair -   Static Standing Poor   Dynamic Standing Poor -   Endurance Deficit   Endurance Deficit Yes   Endurance Deficit Description fatigue, weakness   Activity Tolerance   Activity Tolerance Patient limited by fatigue   Medical Staff Made Aware OT Shamika Black   Nurse Made Aware RN cleared pt to be seen by PT   Exercises   Neuro re-ed Pt provided with significant education regarding serious concerns for DC home including inability to stand/walk, inadequate support at home, risks to pt and caregiver, HIGH risk for falls/re-admission, inability to get into/out of car, inability to get into/out of home  Assessment   Prognosis Good   Problem List Decreased strength;Decreased range of motion;Decreased endurance; Impaired balance;Decreased mobility; Decreased safety awareness; Impaired judgement   Assessment Pt seen for PT treatment session with focus on functional transfers and pt education  Pt making slow progress toward goals this session pt continues to require significant assist of two for all transfers  Pt with such strength deficits as well as ROM limitations in b/l knees that she is unable to come to full upright stand during STS transfers  Additionally, pt with remaining strength deficits that limit ability to clear feet off of floor for attempted steps to WC  Pt provided significant education regarding safety concerns to DC home, pt respectful and listens to therapists, however, insists she is going home and asks for RN to bring her downstairs now  Pt left upright in transport chair with all needs in reach  Pt will benefit from skilled therapy in order to address current impairments and functional limitations  PT to follow pt and recommending rehab once medically cleared  The patient's AM-PAC Basic Mobility Inpatient Short Form Raw Score is 6  A Raw score of less than or equal to 16 suggests the patient may benefit from discharge to post-acute rehabilitation services  Please also refer to the recommendation of the Physical Therapist for safe discharge planning     Barriers to Discharge Inaccessible home environment;Decreased caregiver support   Goals   Patient Goals to go home right now   STG Expiration Date 02/01/22   Plan   Treatment/Interventions Functional transfer training;LE strengthening/ROM; Therapeutic exercise; Endurance training;Patient/family training;Equipment eval/education; Bed mobility;Spoke to nursing   Progress Slow progress, decreased activity tolerance   PT Frequency 3-5x/wk   Recommendation   PT Discharge Recommendation Post acute rehabilitation services   Additional Comments PT cleared pt to DC to rehab, NOT [address] Mobility Inpatient   Turning in Bed Without Bedrails 1   Lying on Back to Sitting on Edge of Flat Bed 1   Moving Bed to Chair 1   Standing Up From Chair 1   Walk in Room 1   Climb 3-5 Stairs 1   Basic Mobility Inpatient Raw Score 6   Turning Head Towards Sound 4   Follow Simple Instructions 4   Low Function Basic Mobility Raw Score 14   Low Function Basic Mobility Standardized Score 22 01   Highest Level Of Mobility   JH-HLM Goal 2: Bed activities/Dependent transfer   JH-HLM Highest Level of Mobility 4: Move to chair/commode   JH-HLM Goal Achieved Yes   End of Consult   Patient Position at End of Consult Other (comment)  (in transport chair)     Bentley Box, PT, DPT

## 2022-01-20 NOTE — PROGRESS NOTES
Cardiology Progress note  Unit/Bed#: [facility] 509-01 Encounter: [id number]        Leandra Anderson 64 y o  female [id number]  Hospital Stay Days: 4    Assessment and Plan      Current Problem List   Principal Problem:    Atrial fibrillation with RVR ([address] )  Active Problems:    Hyperlipidemia    Class 3 severe obesity in adult [facility])    Type 2 diabetes mellitus, without long-term current use of insulin (HCC)    Elevated troponin    Heart failure (HCC) secondary to cardiomyopathy    Hypotension    Lifevest discharge    CKD (chronic kidney disease)    Pulmonary fibrosis (HCC)    Hypothyroidism    Abnormal SPEP    Assessment/Plan:  1   LifeVest shock - Patient has RVR and NSVT   ? S/P BiV ICD placement  One episode of NSVT overnight asymptomatic  2   Atrial fibrillation - new onset Kwowa1vgyg is 6 - she has a history of hypertension, heart failure, female, age greater than 76 and history of diabetes  ? On apixaban  ? 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 Tikosyn per EP    3  New onset nonischemic cardiomyopathy - diagnosis 2021  ? Cardiac catheterization was negative 2021  ? Patient appears euvolemic  ? Continue metoprolol succinate 25 mg daily  ? SPEP showed monoclonal gammopathy - will follow up with oncology outpatient explained to patient and verbalized understanding - unlikely to be cause of CM  ? Entresto   ? Lasix prn     4  Pulmonary fibrosis  ?  Per primary,    ?  *    Subjective     Patient seen and examined  No acute events overnight  Patient with monoclonal gammopathy on labs  Denies any chest pain      Objective     Vitals: Temp (24hrs), Av 1 °F (36 7 °C), Min:97 9 °F (36 6 °C), Max:98 3 °F (36 8 °C)  Current: Temperature: 97 9 °F (36 6 °C)  Patient Vitals for the past 24 hrs:   BP Temp Temp src Pulse Resp SpO2 Weight   22 0727 127/54 97 9 °F (36 6 °C) -- 59 16 95 % --   22 0539 -- -- -- -- -- -- 97 2 kg (214 lb 4 6 oz)   222 130/53 98 1 °F (36 7 °C) -- 61 18 94 % --   01/19/22 1959 (!) 104/48 -- -- 60 -- 98 % --   01/19/22 1838 (!) 102/48 98 2 °F (36 8 °C) -- 61 -- 99 % --   01/19/22 1837 (!) 97/47 98 2 °F (36 8 °C) -- 62 18 97 % --   01/19/22 1429 123/61 98 1 °F (36 7 °C) -- -- 16 -- --   01/19/22 1113 127/62 98 3 °F (36 8 °C) Oral -- 18 -- --    Body mass index is 39 19 kg/m²  Physical Exam:  GENERAL: NAD  HEENT:  NC/AT  CARDIAC:  RRR, +S1/S2, no S3/S4 heard, no m/g/r  PULMONARY:  CTA B/L, no wheezing/rales/rhonci, non-labored breathing  ABDOMEN:  Soft, NT/ND,no rebound/guarding/rigidity  Extremities:  No edema, cyanosis, or clubbing  NEUROLOGIC: Grossly intact  SKIN:  No rashes or erythema noted on exposed skin  Psych: Normal affect    Invasive Devices  Report    Peripheral Intravenous Line            Peripheral IV 01/18/22 Right Antecubital 2 days    Peripheral IV 01/18/22 Left Antecubital 1 day                    Labs:   Results from last 7 days   Lab Units 01/19/22  0453 01/18/22  0505 01/18/22  0505 01/17/22  0608 01/16/22  0401 01/16/22  0401   WBC Thousand/uL 8 98  --  6 23 6 31  --  8 16   HEMOGLOBIN g/dL 10 8*  --  11 2* 10 6*  --  12 4   HEMATOCRIT % 33 3*  --  35 0 33 5*  --  39 0   PLATELETS Thousands/uL 184  --  213 195  --  265   NEUTROS PCT % 71   < > 53  --    < > 59   MONOS PCT % 11  --  11  --   --  10    < > = values in this interval not displayed        Results from last 7 days   Lab Units 01/19/22  0453 01/18/22  0505 01/17/22  1526 01/17/22  0608 01/16/22  2229 01/16/22  1321 01/16/22  0401   SODIUM mmol/L 141 142  --  142  --   --  141   POTASSIUM mmol/L 4 3 4 2  --  4 4  --   --  4 2   CHLORIDE mmol/L 109* 109*  --  111*  --   --  107   CO2 mmol/L 26 27  --  28  --   --  26   BUN mg/dL 27* 32*  --  27*  --   --  29*   CREATININE mg/dL 1 22 1 31*  --  1 35*  --   --  1 55*   CALCIUM mg/dL 10 0 9 8  --  9 8  --   --  10 3*   MAGNESIUM mg/dL  --   --   --  2 0  --   --  1 4*   PHOSPHORUS mg/dL  --   --   -- --   --   --  3 4   INR   --   --   --   --   --  1 25*  --    PTT seconds  --   --  50* 94* 47* 187*  --    EGFR ml/min/1 73sq m 42 39  --  37  --   --  31     Results from last 7 days   Lab Units 01/17/22  1526 01/17/22  0608 01/16/22  2229 01/16/22  1321   INR   --   --   --  1 25*   PTT seconds 50* 94* 47* 187*             No results found for: PHART, TBM6GVB, PO2ART, BII8KHF, S4APFIPC, BEART, SOURCE  No components found for: HIV1X2  No results found for: HAV, HEPAIGM, HEPBIGM, HEPBCAB, HBEAG, HEPCAB  Lab Results   Component Value Date    SPEP See Comment 01/17/2022    UPEP See Comment 01/16/2022      Lab Results   Component Value Date    HGBA1C 6 5 08/04/2021    HGBA1C 6 9 (A) 03/09/2021    HGBA1C 6 7 (H) 10/15/2020     Lab Results   Component Value Date    CHOL 184 09/19/2015      Lab Results   Component Value Date    HDL 45 08/11/2021    HDL 52 04/08/2021    HDL 60 10/15/2020      Lab Results   Component Value Date    LDLCALC 86 08/11/2021    LDLCALC 106 (H) 04/08/2021    LDLCALC 103 (H) 10/15/2020      Lab Results   Component Value Date    TRIG 151 (H) 08/11/2021    TRIG 143 04/08/2021    TRIG 116 10/15/2020     No components found for: PROCAL      Micro:      Urinalysis:  No results found for: AMPHETUR, BDZUR, COCAINEUR, OPIATEUR, PCPUR, THCUR, ETOH, ACTMNPHEN, SALICYLATE       Invalid input(s): URIBILINOGEN        Intake and Outputs:  I/O       01/18 0701 01/19 0700 01/19 0701 01/20 0700 01/20 0701 01/21 0700    P  O  0 240 0    I V  (mL/kg) 300 (3)      Total Intake(mL/kg) 300 (3) 240 (2 5) 0 (0)    Urine (mL/kg/hr) 1125 (0 5) 637 (0 3)     Stool 0      Total Output 1125 637     Net -825 -397 0           Unmeasured Stool Occurrence 0 x          Nutrition:  Diet Cardiac  Discharge Diet  Radiology Results:   XR chest 2 views   Final Result by Georgia Garcia MD (01/19 1125)      No acute cardiopulmonary disease                    Workstation performed: [id number]         XR chest portable Final Result by Jovanni Heart MD (01/18 1547)   Left chest wall ICD with intact leads  No pneumothorax  No acute cardiopulmonary disease  Workstation performed: [id number]         XR chest 1 view portable   Final Result by Nadeem Landeros MD (01/16 1322)      No acute cardiopulmonary disease                    Workstation performed: [id number]           Scheduled Medications:  acetaminophen, 975 mg, Q8H [address]  apixaban, 5 mg, BID  dofetilide, 125 mcg, Q12H [address]  gabapentin, 600 mg, Daily  gabapentin, 900 mg, BID  insulin lispro, 1-5 Units, HS  insulin lispro, 1-6 Units, TID AC  levothyroxine, 125 mcg, Early Morning  metoprolol succinate, 50 mg, Q12H  nystatin, , BID  pravastatin, 10 mg, Daily  sacubitril-valsartan, 1 tablet, BID  venlafaxine, 75 mg, TID      PRN MEDS:  diphenoxylate-atropine, 1 tablet, 4x Daily PRN  furosemide, 20 mg, Daily PRN  heparin (porcine), 2,000 Units, Q1H PRN  heparin (porcine), 4,000 Units, Q1H PRN  ondansetron, 4 mg, Q6H PRN  oxyCODONE, 2 5 mg, Q6H PRN      Last 24 Hour Meds: :   Medication Administration - last 24 hours from 01/19/2022 1040 to 01/20/2022 1040       Date/Time Order Dose Route Action Action by     01/20/2022 0528 acetaminophen (TYLENOL) tablet 975 mg 975 mg Oral Given Valentinaen Estimable, RN     01/19/2022 2234 acetaminophen (TYLENOL) tablet 975 mg 975 mg Oral Given Gayyanelyen Estimable, RN     01/19/2022 1423 acetaminophen (TYLENOL) tablet 975 mg 975 mg Oral Given Radha Hernadez, RN     01/20/2022 0856 nystatin (MYCOSTATIN) powder   Topical Given Loura Floor, RN     01/19/2022 1715 nystatin (MYCOSTATIN) powder 1 application Topical Given Karen Jones     01/20/2022 0856 pravastatin (PRAVACHOL) tablet 10 mg 10 mg Oral Given Loura Floor, RN     01/20/2022 0856 venlafaxine (EFFEXOR) tablet 75 mg 75 mg Oral Given Davonte Sanchez RN     01/19/2022 2008 venlafaxine (EFFEXOR) tablet 75 mg 75 mg Oral Given Darion Gates, ALE     01/19/2022 0484 venlafaxine (EFFEXOR) tablet 75 mg 75 mg Oral Given Karen Jones     01/20/2022 0720 insulin lispro (HumaLOG) 100 units/mL subcutaneous injection 1-6 Units 0 Units Subcutaneous Hold Bronwyn Gonzalez RN     01/19/2022 1649 insulin lispro (HumaLOG) 100 units/mL subcutaneous injection 1-6 Units 1 Units Subcutaneous Not Given Estefania Light     01/19/2022 1154 insulin lispro (HumaLOG) 100 units/mL subcutaneous injection 1-6 Units 1 Units Subcutaneous Given Shalonda Cantor RN     01/19/2022 2234 insulin lispro (HumaLOG) 100 units/mL subcutaneous injection 1-5 Units 1 Units Subcutaneous Given Jaquan Acosta RN     01/19/2022 1423 gabapentin (NEURONTIN) capsule 600 mg 600 mg Oral Given Shalonda Cantor RN     01/20/2022 0855 gabapentin (NEURONTIN) capsule 900 mg 900 mg Oral Given Bronwyn Gonzalez RN     01/19/2022 2007 gabapentin (NEURONTIN) capsule 900 mg 900 mg Oral Given Jaquan Acosta RN     01/20/2022 3313 levothyroxine tablet 125 mcg 125 mcg Oral Given Jaquan Acosta RN     01/20/2022 0856 dofetilide (TIKOSYN) capsule 125 mcg 125 mcg Oral Given Bronwyn Gonzalez RN     01/19/2022 2007 dofetilide (TIKOSYN) capsule 125 mcg 125 mcg Oral Given Jaquan Acosta RN     01/20/2022 0856 metoprolol succinate (TOPROL-XL) 24 hr tablet 50 mg 50 mg Oral Given Bronwyn Gonzalez RN     01/19/2022 2007 metoprolol succinate (TOPROL-XL) 24 hr tablet 50 mg 50 mg Oral Given Jaquan Acosta RN     01/20/2022 0856 apixaban (ELIQUIS) tablet 5 mg 5 mg Oral Given Bronwyn Gonzalez RN     01/19/2022 1715 apixaban (ELIQUIS) tablet 5 mg 5 mg Oral Given Karen Jones     01/20/2022 0856 sacubitril-valsartan (ENTRESTO) 24-26 MG per tablet 1 tablet 1 tablet Oral Given Bronwyn Gonzalez RN     01/19/2022 2008 sacubitril-valsartan (ENTRESTO) 24-26 MG per tablet 1 tablet 1 tablet Oral Given Jaquan Acosta RN          PLEASE NOTE:  This encounter was completed utilizing the M- Modal/Fluency Direct Speech Voice Recognition Software   Grammatical errors, random word insertions, pronoun errors and incomplete sentences are occasional consequences of the system due to software limitations, ambient noise and hardware issues  These may be missed by proof reading prior to affixing electronic signature  Any questions or concerns about the content, text or information contained within the body of this dictation should be directly addressed to the physician for clarification  Please do not hesitate to call me directly if you have any any questions or concerns

## 2022-01-20 NOTE — CASE MANAGEMENT
Case Management Discharge Planning Note    Patient name Elaine Going  [facility] [id number]  : 1945 Date 2022       Current Admission Date: 2022  Current Admission Diagnosis:Atrial fibrillation with RVR [facility])   Patient Active Problem List    Diagnosis Date Noted    Abnormal SPEP 2022    Pulmonary fibrosis ([facility] ) 2022    Hypothyroidism 2022    Hypotension 2022    Lifevest discharge 2022    CKD (chronic kidney disease) 2022    Left knee pain 2022    Torn rotator cuff 2022    Left bundle branch block (LBBB) 2022    Closed fracture of one rib of left side 2022    Moderate mitral regurgitation 2022    DNR (do not resuscitate) 2022    Depression 2021    Heart failure ([facility] ) secondary to cardiomyopathy 2021    Elevated troponin 2021    Acute renal failure superimposed on stage 4 chronic kidney disease [facility] ) 2021    Type 2 diabetes mellitus, without long-term current use of insulin ([facility] ) 2019    Diabetic peripheral neuropathy ([facility] ) 2018    Class 3 severe obesity in adult ([facility] ) 2018    Primary osteoarthritis of both knees 2018    Aortic stenosis 2018    Hyperlipidemia 10/04/2017    Atrial fibrillation with RVR ([facility] ) 2015    Coronary artery disease 2013      LOS (days): 4  Geometric Mean LOS (GMLOS) (days): 6 10  Days to GMLOS:1 7     OBJECTIVE:  Risk of Unplanned Readmission Score: 18         Current admission status: Inpatient   Preferred Pharmacy:   [address]  [address] [address]  Phone: [phone] Fax: [phone] [facility] [address] [place] [address] [facility]  Phone: [phone] Fax: [phone]    Primary Care Provider: Melvin Figueroa MD    Primary Insurance: MEDICARE  Secondary Insurance: BLUE CROSS    DISCHARGE DETAILS:    Dispatcher Contacted: No  Number/Name of Dispatcher: Cancelled [facility] [employer] request for  Yuri Daigle transport since [facility] will  pt with their wc van  They will arrive [phone] to [facility] and will call phone of charge nurse for pt to be brought down to [facility]  Informed RN, Charge nurse and MD  Met with patient who is pleased with plan for  dc to snf  Her granddaughter was called by [facility]  Transported by [facility] and Unit #):  [facility] Yuri Oxana  ETA of Transport (Date): 01/20/22  ETA of Transport (Time): 6844     Additional Comments: COVID test is negative and H[facility] was informed    Accepting Facility Name, [address] : [facility]  Receiving Facility/Agency Phone Number: [phone]  Facility/Agency Fax Number: [phone]

## 2022-01-20 NOTE — ASSESSMENT & PLAN NOTE
Patient noted with atrial fibrillation with RVR and hypotension triggering lifevest shock x1 and requiring cardioversion x1 via EMS pre-hospital  ?newly discovered Afib  Per Cardiology, [address] interrogations with Margarete Saint and bundle branch block and intermittent NSVT  · Unclear triggering event, known with EF 30%  · Cardiology/EP following status post ICD on 1/18  · On metoprolol succinate 25mg BID  Already started on Tikosyn   · CHADS2 Vasc 7, was stared on heparin GTT on admission  Now on Eliquis  · PT and OT is recommending rehab which patient is refusing adamantly despite multiple conversations with her by multiple team members  She is also refusing to call any family members   VNA to be set up by BRYAN

## 2022-01-20 NOTE — DISCHARGE SUMMARY
[address] [facility]  Discharge- Carlos Alberto Pardo 1945, 64 y o  female MRN: [id number]  Unit/Bed#: [facility] 509-01 Encounter: [id number]  Primary Care Provider: Lory Calderon MD   Date and time admitted to hospital: 1/16/2022  3:44 AM    * Atrial fibrillation with RVR [facility])  Assessment & Plan  Patient noted with atrial fibrillation with RVR and hypotension triggering lifevest shock x1 and requiring cardioversion x1 via EMS pre-hospital  ?newly discovered Afib  Per Cardiology, [address] interrogations with Garcia Lagos and bundle branch block and intermittent NSVT  · Unclear triggering event, known with EF 30%  · Cardiology/EP following status post ICD on 1/18  · On metoprolol succinate 25mg BID  Already started on Tikosyn   · CHADS2 Vasc 7, was stared on heparin GTT on admission  Now on Eliquis  · PT and OT is recommending rehab which patient is refusing adamantly despite multiple conversations with her by multiple team members  She is also refusing to call any family members  VNA to be set up by CM    Abnormal SPEP  Assessment & Plan  Pt has SPEP s/o MGUS but has normal K/L ratio  Suspect due to underlying CKD  OP FU with PCP/Cardiology for now   Can see hem Onc if recommended by outpatient team    Hypothyroidism  Assessment & Plan  With elevated fT4 and low TSH,   · Decreased levothyroxine dose from 150 mcg to 125 mcg on admission  · Repeat TFTs in 4-6 weeks    Pulmonary fibrosis ([address] )  Assessment & Plan  · Pulmonology consult appreciated - they recommend outpatient high-resolution CT scan in 6-8 weeks and follow-up with Dr Dawn Nielsen    CKD (chronic kidney disease)  Assessment & Plan  Lab Results   Component Value Date    EGFR 42 01/19/2022    EGFR 39 01/18/2022    EGFR 37 01/17/2022    CREATININE 1 22 01/19/2022    CREATININE 1 31 (H) 01/18/2022    CREATININE 1 35 (H) 01/17/2022     Baseline creatinine per recent Nephrology record review appears to be 1 5-1 9  · Creatinine below baseline  · Monitor     Lifevest discharge  Assessment & Plan  Patient with apparent shock delivered by LifeVest which prompted presentation  · Now s/p ICD    Hypotension  Assessment & Plan  Noted hypotensive pre-hospital and on ED arrival improved s/p 750 cc IVF  · Possibly in setting of rapid atrial fibrillation  · Now improved    Heart failure ([address] ) secondary to cardiomyopathy  Assessment & Plan  Wt Readings from Last 3 Encounters:   01/20/22 97 2 kg (214 lb 4 6 oz)   01/05/22 100 kg (221 lb)   12/31/21 100 kg (221 lb 1 9 oz)     · Echocardiogram 12/23/2021 revealing EF 30% and grade 2 diastolic dysfunction with right ventricular dilatation, left atrium dilatation, moderate mitral regurgitation  Cardiac catheterization that hospitalization without significant obstructing disease  · Holding Lasix given soft blood pressures--per cards - to be discharged on p r n  Lasix for weight gain  · Continue metoprolol, Entresto    Elevated troponin  Assessment & Plan  · Likely non MI in setting of above  · Cardiology following     Type 2 diabetes mellitus, without long-term current use of insulin [facility])  Assessment & Plan  Lab Results   Component Value Date    HGBA1C 6 5 08/04/2021       Recent Labs     01/19/22  1045 01/19/22  1609 01/19/22  2055 01/20/22  0620   POCGLU 157* 142* 163* 131       Blood Sugar Average: Last 72 hrs:  (P) 138   · A1C well controlled, typically diet controlled at home  · carb controlled diet  · Continue gabapentin for neuropathy    Class 3 severe obesity in adult [facility])  Assessment & Plan  Body mass index is 39 19 kg/m²    · Encourage weight loss, lifestyle modification once acute issues improved        Discharging Physician / Practitioner: Colin Catalan MD  PCP: Lindsay Bui MD  Admission Date:   Admission Orders (From admission, onward)     Ordered        01/16/22 0532  Inpatient Admission  Once                      Discharge Date: 01/20/22    Medical Problems             Resolved Problems  Date Reviewed: 1/18/2022          Resolved    Acute respiratory failure with hypoxia ([address] ) 1/18/2022     Resolved by  Tierra Crane PA-C                Consultations During Hospital Stay:  · cardiology    Significant Findings / Test Results:   · ICD placement     Outpatient Tests Requested:  · BMP in 1 week    Complications:  none    Reason for Admission: life vest discharge    Hospital Course:     Brigido Almanza is a 64 y o  female patient who originally presented to the hospital on 1/16/2022 due to [address] discharge  Has known history of nonischemic cardiomyopathy with low EF  Underwent ICD placement  Cleared for discharge from Cardiology standpoint  PT OT recommending rehab but patient adamantly refusing  Will be discharged home with VNA  Outpatient follow-up with PCP, Cardiology, pulmonology    Please see above list of diagnoses and related plan for additional information  Condition at Discharge: good     Discharge Day Visit / Exam:     Subjective:  Pt seen and examined by me this morning  Pt denied any complaints  Wants to go home  Vitals: Blood Pressure: 127/54 (01/20/22 0727)  Pulse: 59 (01/20/22 0727)  Temperature: 97 9 °F (36 6 °C) (01/20/22 0727)  Temp Source: Oral (01/19/22 1113)  Respirations: 16 (01/20/22 0727)  Height: 5' 2" (157 5 cm) (01/16/22 7142)  Weight - Scale: 97 2 kg (214 lb 4 6 oz) (01/20/22 0539)  SpO2: 95 % (01/20/22 0727)     Exam:   Physical Exam    Constitutional: Pt appears comfortable  Not in any acute distress  Cardiovascular: Normal rate, regular rhythm, normal heart sounds  No murmur heard  Pulmonary/Chest: Effort normal, air entry b/l equal  No respiratory distress  Pt has no wheezes or crackles  Abdominal: Soft  Non-distended, Non-tender  Bowel sounds are normal    Neurological: awake, alert  Psychiatric: normal mood and affect  Discussion with Family: pt   Refused to call family    Discharge instructions/Information to patient and family: See after visit summary for information provided to patient and family  Provisions for Follow-Up Care:  See after visit summary for information related to follow-up care and any pertinent home health orders  Disposition:     Home with VNA Services (Reminder: Complete face to face encounter)    For Discharges to [facility] SNF:   · Not Applicable to this Patient - Not Applicable to this Patient    Planned Readmission: no     Discharge Statement:  I spent 35 minutes discharging the patient  This time was spent on the day of discharge  I had direct contact with the patient on the day of discharge  Greater than 50% of the total time was spent examining patient, answering all patient questions, arranging and discussing plan of care with patient as well as directly providing post-discharge instructions  Additional time then spent on discharge activities  Discharge Medications:  See after visit summary for reconciled discharge medications provided to patient and family        ** Please Note: This note has been constructed using a voice recognition system **

## 2022-01-20 NOTE — ASSESSMENT & PLAN NOTE
Body mass index is 39 19 kg/m²    · Encourage weight loss, lifestyle modification once acute issues improved

## 2022-01-20 NOTE — OCCUPATIONAL THERAPY NOTE
Occupational Therapy Progress Note     Patient Name: Leandra Anderson  HMCBA'Q Date: 1/20/2022  Problem List  Principal Problem:    Atrial fibrillation with RVR ([facility] )  Active Problems:    Hyperlipidemia    Class 3 severe obesity in adult [facility])    Type 2 diabetes mellitus, without long-term current use of insulin (HCC)    Elevated troponin    Heart failure (HCC) secondary to cardiomyopathy    Hypotension    Lifevest discharge    CKD (chronic kidney disease)    Pulmonary fibrosis ([facility] )    Hypothyroidism    Abnormal SPEP            01/20/22 1122   OT Last Visit   OT Visit Date 01/20/22   Note Type   Note Type Treatment for insurance authorization   Restrictions/Precautions   Weight Bearing Precautions Per Order No   Other Precautions Chair Alarm; Bed Alarm; Fall Risk   General   Response to Previous Treatment Patient with no complaints from previous session   Lifestyle   Autonomy pta, pt rq A for ADL/IADL and used WC for functional mobility, however prior to recent fall, she was I w/ ADL/IADL and used RW for functional mobility, -    Reciprocal Relationships supportive sister who can A pt, 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   [address] unable to report at this time, states things have been hard for her since her  recently passed  Pain Assessment   Pain Assessment Tool 0-10   Pain Score No Pain   Bed Mobility   Additional Comments pt found sitting EOB, left OOB in WC for transport  Transfers   Sit to Stand 2  Maximal assistance   Additional items Assist x 2; Increased time required;Verbal cues   Stand to Sit 2  Maximal assistance   Additional items Assist x 2; Increased time required;Verbal cues   Stand pivot 2  Maximal assistance   Additional items Assist x 2; Increased time required;Verbal cues   Additional Comments c b/l HHA and knee blocking x2  Atempted STS transfer, pt unable to clear more than 20% and was unable to tolerate stance   Pt returned to seated position, trialed stance again, again pt unable to achieve  Sit pivot completed from EOB>transport chair  Edu pt for extensive time regarding therapists disagreeance w d/c plan  Therapy rec d/c to STR, pt refusing and insiting on going home  Discussed potential fall risks/safety concerns in the home, pt continued to state that she wishes to go home  Pt states that she is concerned about her work, as she is an   Therapy suggested someone bring laptop to pt so she can work, however pt states none are avail  If pt d/c to home, it will be against rehab recommendation at this time  Cognition   Overall Cognitive Status Impaired   Arousal/Participation Alert; Responsive   Attention Within functional limits   Memory Decreased recall of precautions   Following Commands Follows one step commands with increased time or repetition   Comments pt appears to be aware that there are safety concerns with d/c home and understands therapists viewpoint and reasons for recommending rehab, however she continues to state she wishes to go home  Discussed concerns w home d/c extensively, questioned patient regarding how she plans to complete all functional tasks at home  Pt states her sister will be home to A- therapy edu pt that she cannot transfer at this time without assistance of two people, pt states she is aware and just wishes to be home  Very tearful at this time  Activity Tolerance   Activity Tolerance Patient limited by fatigue   Medical Staff Made Aware DPT Ry Price 2* pts medical complexity, comorbidities and level of A required to safely acheive transfers  Assessment   Assessment Pt seen on this date for OT session focusing on body mechanics, transfer retraining, increasing activity tolerance/endurance, extensive edu regarding pts current level of function and therapy concern w d/c to home   Pt completed transfer from [facility] chair with max Ax2, stand pivot completed as pt is unable to achieve full stance this date and unable to efficently move legs to complete functional mobility tasks  Pt is refusing rehab during this therapy session, despite therapists extensive edu  Pt is aware that therapy does not agree with home d/c and states that she just wants to be home and her sister will be able to help her as needed  Made pt aware that her sister is not likely going to be able to help her at home and that there are several safety concerns with d/c home  Pt continues to state that she wants to leave  Pt reamins limited 2* decreased ADL/High-level ADL status, decreased activity tolerance/endurance, decreased cognition, decreased self-care trans, decreased safety awareness and insight to condition  The patient's raw score on the AM-PAC Daily Activity inpatient short form is 16, standardized score is 35 96, less than 39 4  Patients at this level are likely to benefit from discharge to post-acute rehabilitation services  Please refer to the recommendation of the Occupational Therapist for safe discharge planning  Recommending pt D/C to STR when medically stable  Pt will continue to benefit from acute OT services to meet goals  Plan   Treatment Interventions ADL retraining;Functional transfer training;Patient/family training; Activityengagement   Goal Expiration Date 02/01/22   OT Treatment Day 1   OT Frequency 3-5x/wk   Recommendation   OT Discharge Recommendation Post acute rehabilitation services   OT - OK to Discharge Yes   Additional Comments  yes to rehab, no to home     [facility]   Lower Body Dressing 2   Bathing 2   Toileting 2   Upper Body Dressing 3   Grooming 3   Eating 4   Daily Activity Raw Score 16   Daily Activity Standardized Score (Calc for Raw Score >=11) 35 96   AM-PAC Applied Cognition Inpatient   Following a Speech/Presentation 4   Understanding Ordinary Conversation 4   Taking Medications 4   Remembering Where Things Are Placed or Put Away 4   Remembering List of 4-5 Errands 4 Taking Care of Complicated Tasks 4   Applied Cognition Raw Score 24   Applied Cognition Standardized Score 62 21   Modified Mellette Scale   Modified Mat Scale 4              Rose Mary [place], [place], OTR/L

## 2022-01-21 ENCOUNTER — NURSING HOME VISIT (OUTPATIENT)
Dept: GERIATRICS | Facility: OTHER | Age: 77
End: 2022-01-21
Payer: MEDICARE

## 2022-01-21 DIAGNOSIS — I42.8 NONISCHEMIC CARDIOMYOPATHY (HCC): ICD-10-CM

## 2022-01-21 DIAGNOSIS — E11.42 TYPE 2 DIABETES MELLITUS WITH DIABETIC POLYNEUROPATHY, WITHOUT LONG-TERM CURRENT USE OF INSULIN (HCC): ICD-10-CM

## 2022-01-21 DIAGNOSIS — I48.0 PAROXYSMAL ATRIAL FIBRILLATION (HCC): Primary | ICD-10-CM

## 2022-01-21 DIAGNOSIS — N18.4 STAGE 4 CHRONIC KIDNEY DISEASE (HCC): ICD-10-CM

## 2022-01-21 DIAGNOSIS — Z66 DNR (DO NOT RESUSCITATE): ICD-10-CM

## 2022-01-21 DIAGNOSIS — I50.42 CHRONIC COMBINED SYSTOLIC AND DIASTOLIC HEART FAILURE (HCC): ICD-10-CM

## 2022-01-21 DIAGNOSIS — F32.A DEPRESSION, UNSPECIFIED DEPRESSION TYPE: ICD-10-CM

## 2022-01-21 DIAGNOSIS — M17.0 PRIMARY OSTEOARTHRITIS OF BOTH KNEES: ICD-10-CM

## 2022-01-21 DIAGNOSIS — E03.9 HYPOTHYROIDISM, UNSPECIFIED TYPE: ICD-10-CM

## 2022-01-21 PROCEDURE — 99306 1ST NF CARE HIGH MDM 50: CPT | Performed by: INTERNAL MEDICINE

## 2022-01-21 NOTE — PROGRESS NOTES
[address]  [address] [place] [address]  Facility:  [place] [address]    HISTORY AND PHYSICAL    NAME: Wesly Mendoza  AGE: 64 y o  SEX: female    DATE OF ENCOUNTER: 1/21/2022    Code status:  DNR    Assessment and Plan     1  Paroxysmal atrial fibrillation (HCC)  Assessment & Plan:  · She required cardioversion by EMS secondary to hypotension   · She is doing well with metoprolol succinate ER for rate control   · She is recently started on Tikosyn for rhythm control  · She is doing well with apixaban for anticoagulation   · Will continue her on this medication regimen   · Will continue her care in collaboration with the cardiology and EP services   · She will be admitted to the rehabilitation facility and seen in consultation by a comprehensive rehabilitation team to assist her in returning to her prior level functioning  · Will continue with monitoring for change in her condition  · She will follow-up with her PCP, Cardiology, and EP services upon discharge      2  Chronic combined systolic and diastolic heart failure (HCC)  Assessment & Plan:  · She is doing well without diuretic therapy   · She was advised to notify nursing if she begins to notice swelling in her legs  · She will be admitted to the rehabilitation facility under the congestive heart failure pathway  · Will continue with monitoring for change in her condition  · She will follow-up with her Cardiology and PCP services upon discharge            3   Nonischemic cardiomyopathy [facility])  Assessment & Plan:  · Status post biventricular ICD placement on January 18, 2022   · Will continue her care in collaboration with the cardiology and EP services   · Nursing staff endorses continued orthostatic hypotension   · As discussed with her cardiology service prior to her recent hospitalization, I will discontinue her Entresto and start losartan 25 milligrams daily  · Will continue with clinical and periodic laboratory monitoring for change in her condition  · She will follow-up with her PCP, Cardiology, and EP services upon discharge      4  Stage 4 chronic kidney disease ([facility] )  Assessment & Plan:  · Prior baseline creatinine 1 8 - 1 9  · January 2022 hospitalization:  Admission creatinine 1 55, discharge creatinine 1 22   · She will be admitted to the rehabilitation facility under the acute kidney injury pathway   · Will continue with avoidance of nephrotoxic medications and supplements   · Will continue with clinical and periodic laboratory monitoring for change in her condition  · She will follow-up with her PCP upon discharge      5  Type 2 diabetes mellitus with diabetic polyneuropathy, without long-term current use of insulin ([facility] )  Assessment & Plan:  · Will continue with monitoring without medication during her stay at the rehabilitation facility   · She will follow-up with her PCP upon this      6  Primary osteoarthritis of both knees  Assessment & Plan:  · Will trial Voltaren 1 percent gel 2 grams to her left knee 4 times daily  · Will consult physiatry for further evaluation and management   · Will consult a multidisciplinary rehabilitation team for evaluation and treatment to assist her in returning to her prior level of functioning  · Will continue with monitoring for change in her condition  · She will follow-up with her PCP upon discharge      7  Hypothyroidism, unspecified type  Assessment & Plan:  · Will continue her prior to admission levothyroxine  · Will continue with monitoring for change in her condition  · She will follow-up with her PCP upon discharge      8  Depression, unspecified depression type  Assessment & Plan:  · She endorses doing well with her current dosage of venlafaxine  · Will continue her with this medication   · She will follow-up with her PCP upon discharge      9   DNR (do not resuscitate)  Assessment & Plan:  · As confirmed with her during my visit, she wishes for her resuscitation status to be do not resuscitate      See my history and physical note of January 1, 2022 for further assessment and plan  All medications and routine orders were reviewed and updated as needed  Plan discussed with:  Patient and nursing staff  CC: PCP    Chief Complaint     She is seen for visit to perform a history and physical exam to be admitted to the rehabilitation facility  History of Present Illness     She is a pleasant 66-year-old woman with the comorbidities of atrial fibrillation with history of rapid ventricular rate, chronic combined systolic and diastolic congestive heart failure, type 2 diabetes mellitus, primary osteoarthritis of her knees, nonischemic cardiomyopathy, depression, hypothyroidism, and stage 4 chronic kidney disease who is seen for a visit to perform a history and physical exam to be admitted to the rehabilitation facility  She was transferred from the rehabilitation facility to the acute [facility] hospital on January 16, 2022 secondary to her life vest discharging multiple times  She required defibrillation by EMS secondary to her atrial fibrillation with rapid ventricular rate and hypotension  She was admitted to the [facility] hospital from January 16, 2022 through January 20, 2022  She was seen in consultation by the cardiology and EP services  She had a biventricular ICD placed on January 18, 2022  She was started on Tikosyn and apixaban  A follow-up post acute care rehabilitation stay was recommended after she was unable to get into her car to go home  She was transferred to the rehabilitation facility on January 20, 2022  She endorses that the lidocaine patch does not help her left knee pain  She would like to trial diclofenac gel  Nursing staff endorsed the presence of orthostatic hypotension during her admission evaluation    Therefore, I discontinued her Entresto and started losartan 25 milligrams daily as I had discussed with her cardiology service for the similar issue prior to her recent hospitalization  HISTORY:  Past Medical History:   Diagnosis Date    Aortic stenosis     Arthritis     Asthma     Coronary artery disease     Diabetes mellitus ([facility] )     Diabetic peripheral neuropathy (HCC)     Last assessed - 1/27/16    Gallbladder disease     Heart attack ([facility] ) 07/1999    Hemorrhoids     Last assessed - 11/16/12    Hypoglycemia 12/23/2021    Myocardial infarction ([facility] )     Old myocardial infarction     Type 2 diabetes mellitus with autonomic neuropathy ([place] [address] )     Unspec long term insulin use status, Last assessed - 6/8/17     Past Surgical History:   Procedure Laterality Date    BUNIONECTOMY      CARDIAC CATHETERIZATION      Carotid Artery, Resolved - 7/1/13    CARDIAC CATHETERIZATION N/A 12/27/2021    Procedure: CARDIAC CATHETERIZATION ;  Surgeon: Nan Lowery MD;  [facility] CARD[facility]; Service: Cardiology    CARDIAC CATHETERIZATION N/A 12/27/2021    Procedure: Cardiac Coronary Angiogram;  Surgeon: Nan Lowery MD;  [facility]: [facility] CARDIAC CATH LAB; Service: Cardiology    CARDIAC ELECTROPHYSIOLOGY PROCEDURE N/A 1/18/2022    Procedure: Cardiac biv icd implant;  Surgeon: Adeline Astudillo DO;  Location: [facility] CARDIAC CATH LAB; Service: Cardiology    CARDIOVASCULAR STRESS TEST  09/1999    CHOLECYSTECTOMY      COLONOSCOPY  2017    FOOT ARTHRODESIS, MODIFIED DONOHUE  2016    GALLBLADDER SURGERY  1970    HEMORROIDECTOMY      KNEE ARTHROSCOPY Left 2004    SD COLONOSCOPY FLX DX W/COLLJ [facility] WHEN PFRMD N/A 8/21/2017    Procedure: COLONOSCOPY;  Surgeon: Darwin Brito MD;  Location: [facility] GI LAB;   Service: Colorectal    REPAIR KNEE LIGAMENT      REPAIR KNEE LIGAMENT Left 1986    REPAIR KNEE LIGAMENT Right 1988     Family History   Problem Relation Age of Onset    Hypertension Father     Diabetes Father     Cancer Father         Throat    Arthritis Father     Stroke Mother     Diabetes Maternal Grandmother     Heart disease Maternal Grandfather     Diabetes Son     Lymphoma Family         Head, Face and Neck      Social History     Socioeconomic History    Marital status: /Civil Union     Spouse name: Not on file    Number of children: Not on file    Years of education: Not on file    Highest education level: Not on file   Occupational History    Occupation: RETIRED   Tobacco Use    Smoking status: Never Smoker    Smokeless tobacco: Never Used   Vaping Use    Vaping Use: Never used   Substance and Sexual Activity    Alcohol use: Never     Comment: Social per [employer]     Drug use: Never    Sexual activity: Not Currently   Other Topics Concern    Not on file   Social History Narrative    Hx of daily cola consumption (unk cans/day)    Daily tea consumption (unk cups/day)    Multiple organ donor    Patient has living will    Traci Marc in existence    Uses safety equipment - Seatbelts      Social Determinants of Health     Financial Resource Strain: Not on file   Food Insecurity: No Food Insecurity    Worried About [facility] in the Last Year: Never true    [address] [facility] [address] in the Last Year: Never true   Transportation Needs: No Transportation Needs    Lack of Transportation (Medical): No    Lack of Transportation (Non-Medical): No   Physical Activity: Not on file   Stress: Not on file   Social Connections: Not on file   Intimate Partner Violence: Not on file   Housing Stability: Low Risk     Unable to Pay for Housing in the Last Year: No    Number of Places Lived in the Last Year: 1    Unstable Housing in the Last Year: No       Allergies: Allergies   Allergen Reactions    Lyrica [Pregabalin] Swelling    Sulfa Antibiotics Swelling       Review of Systems     Review of Systems   Constitutional: Negative for fever  HENT: Negative for trouble swallowing  Eyes: Negative for visual disturbance  Respiratory: Negative for shortness of breath      Cardiovascular: Negative for chest pain  Gastrointestinal: Negative for abdominal pain and constipation  Genitourinary: Negative for difficulty urinating  Musculoskeletal: Positive for arthralgias  Left shoulder and left knee   Neurological: Negative for headaches  Psychiatric/Behavioral: Negative for dysphoric mood and sleep disturbance  The patient is not nervous/anxious  Medications and orders     All medications reviewed and updated in Nursing Home EMR  Objective     Vitals:  Weight 116 4 pounds, pulse 60, blood pressure 170/50, fasting fingerstick blood sugar 152, pulse oximetry 94 percent on room air  Physical Exam  Vitals reviewed  Constitutional:       General: She is awake  She is not in acute distress  Appearance: She is well-developed  She is morbidly obese  She is not ill-appearing, toxic-appearing or diaphoretic  Comments: She appears comfortable sitting by her bedside, stated age, and frail  HENT:      Head: Normocephalic  Nose: Nose normal    Eyes:      General: No scleral icterus  Conjunctiva/sclera: Conjunctivae normal    Cardiovascular:      Heart sounds: Normal heart sounds  No murmur heard  No friction rub  No gallop  Pulmonary:      Effort: Pulmonary effort is normal  No respiratory distress  Breath sounds: Normal breath sounds  No stridor  No wheezing, rhonchi or rales  Musculoskeletal:         General: No deformity  Cervical back: Neck supple  Skin:     Findings: Bruising present  Comments: Resolving bruises on left-sided face and upper arms  Neurological:      General: No focal deficit present  Mental Status: She is alert  Psychiatric:         Mood and Affect: Mood normal          Behavior: Behavior normal  Behavior is cooperative  Pertinent Laboratory/Diagnostic Studies: The following labs/studies were reviewed please see chart or hospital paperwork for details      Lab Results   Component Value Date    WBC 8 98 01/19/2022    HGB 10 8 (L) 01/19/2022    HCT 33 3 (L) 01/19/2022     01/19/2022    CHOL 184 09/19/2015    TRIG 151 (H) 08/11/2021    HDL 45 08/11/2021    ALT 42 12/30/2021    AST 34 12/30/2021     09/19/2015    K 4 3 01/19/2022     (H) 01/19/2022    CREATININE 1 22 01/19/2022    BUN 27 (H) 01/19/2022    CO2 26 01/19/2022    INR 1 25 (H) 01/16/2022    GLUF 44 (L) 12/23/2021    HGBA1C 6 5 08/04/2021 January 19, 2022:     Two view chest x-ray:     FINDINGS:  Dual-lead cardiac device      Unchanged cardiac and mediastinal contours      The lungs are clear  No pneumothorax or pleural effusion      Osseous structures appear within normal limits for patient age      IMPRESSION:     No acute cardiopulmonary disease  January 19, 2022:     ECG 12 lead    Status: Final result       Study Result    Narrative & Impression   AV dual-paced rhythm  Abnormal ECG  When compared with ECG of 1/19/22  AV sequential or dual chamber electronic pacemaker has replaced Atrial flutter  Confirmed by Virgil Draper ([id number]) on 1/20/2022 9:03:56 PM       - Her admission order summary was reviewed and signed      AUTUMN Flores   1/23/2022 11:38 PM

## 2022-01-23 PROBLEM — I50.42 CHRONIC COMBINED SYSTOLIC AND DIASTOLIC HEART FAILURE (HCC): Status: ACTIVE | Noted: 2021-12-24

## 2022-01-23 PROBLEM — I42.8 NONISCHEMIC CARDIOMYOPATHY (HCC): Status: ACTIVE | Noted: 2022-01-23

## 2022-01-23 RX ORDER — LOSARTAN POTASSIUM 25 MG/1
25 TABLET ORAL DAILY
COMMUNITY
Start: 2022-01-21 | End: 2022-02-28

## 2022-01-24 NOTE — ASSESSMENT & PLAN NOTE
· She required cardioversion by EMS secondary to hypotension   · She is doing well with metoprolol succinate ER for rate control   · She is recently started on Tikosyn for rhythm control  · She is doing well with apixaban for anticoagulation   · Will continue her on this medication regimen   · Will continue her care in collaboration with the cardiology and EP services   · She will be admitted to the rehabilitation facility and seen in consultation by a comprehensive rehabilitation team to assist her in returning to her prior level functioning  · Will continue with monitoring for change in her condition  · She will follow-up with her PCP, Cardiology, and EP services upon discharge

## 2022-01-24 NOTE — ASSESSMENT & PLAN NOTE
· Prior baseline creatinine 1 8 - 1 9  · January 2022 hospitalization:  Admission creatinine 1 55, discharge creatinine 1 22   · She will be admitted to the rehabilitation facility under the acute kidney injury pathway   · Will continue with avoidance of nephrotoxic medications and supplements   · Will continue with clinical and periodic laboratory monitoring for change in her condition  · She will follow-up with her PCP upon discharge

## 2022-01-24 NOTE — ASSESSMENT & PLAN NOTE
· As confirmed with her during my visit, she wishes for her resuscitation status to be do not resuscitate

## 2022-01-24 NOTE — ASSESSMENT & PLAN NOTE
· Status post biventricular ICD placement on January 18, 2022   · Will continue her care in collaboration with the cardiology and EP services   · Nursing staff endorses continued orthostatic hypotension   · As discussed with her cardiology service prior to her recent hospitalization, I will discontinue her Entresto and start losartan 25 milligrams daily  · Will continue with clinical and periodic laboratory monitoring for change in her condition  · She will follow-up with her PCP, Cardiology, and EP services upon discharge

## 2022-01-24 NOTE — ASSESSMENT & PLAN NOTE
· Will trial Voltaren 1 percent gel 2 grams to her left knee 4 times daily  · Will consult physiatry for further evaluation and management   · Will consult a multidisciplinary rehabilitation team for evaluation and treatment to assist her in returning to her prior level of functioning  · Will continue with monitoring for change in her condition  · She will follow-up with her PCP upon discharge

## 2022-01-24 NOTE — ASSESSMENT & PLAN NOTE
· Will continue her prior to admission levothyroxine  · Will continue with monitoring for change in her condition  · She will follow-up with her PCP upon discharge

## 2022-01-24 NOTE — ASSESSMENT & PLAN NOTE
· She is doing well without diuretic therapy   · She was advised to notify nursing if she begins to notice swelling in her legs  · She will be admitted to the rehabilitation facility under the congestive heart failure pathway  · Will continue with monitoring for change in her condition  · She will follow-up with her Cardiology and PCP services upon discharge

## 2022-01-24 NOTE — ASSESSMENT & PLAN NOTE
· Will continue with monitoring without medication during her stay at the rehabilitation facility   · She will follow-up with her PCP upon this

## 2022-01-24 NOTE — ASSESSMENT & PLAN NOTE
· She endorses doing well with her current dosage of venlafaxine  · Will continue her with this medication   · She will follow-up with her PCP upon discharge

## 2022-01-25 ENCOUNTER — NURSING HOME VISIT (OUTPATIENT)
Dept: GERIATRICS | Facility: OTHER | Age: 77
End: 2022-01-25
Payer: MEDICARE

## 2022-01-25 VITALS
TEMPERATURE: 97.1 F | SYSTOLIC BLOOD PRESSURE: 158 MMHG | HEART RATE: 63 BPM | BODY MASS INDEX: 40.42 KG/M2 | DIASTOLIC BLOOD PRESSURE: 78 MMHG | RESPIRATION RATE: 18 BRPM | WEIGHT: 221 LBS

## 2022-01-25 DIAGNOSIS — I48.0 PAROXYSMAL ATRIAL FIBRILLATION (HCC): ICD-10-CM

## 2022-01-25 DIAGNOSIS — I50.42 CHRONIC COMBINED SYSTOLIC AND DIASTOLIC HEART FAILURE (HCC): Primary | ICD-10-CM

## 2022-01-25 DIAGNOSIS — E11.42 TYPE 2 DIABETES MELLITUS WITH DIABETIC POLYNEUROPATHY, WITHOUT LONG-TERM CURRENT USE OF INSULIN (HCC): ICD-10-CM

## 2022-01-25 DIAGNOSIS — M17.0 PRIMARY OSTEOARTHRITIS OF BOTH KNEES: ICD-10-CM

## 2022-01-25 PROCEDURE — 99309 SBSQ NF CARE MODERATE MDM 30: CPT | Performed by: NURSE PRACTITIONER

## 2022-01-25 NOTE — ASSESSMENT & PLAN NOTE
· Asymptomatic  · Continue with daily weights  · Monitor for any increased edema  · Not currently on any diuretics

## 2022-01-25 NOTE — ASSESSMENT & PLAN NOTE
· Rate controlled  · Continue with metoprolol   · eliquis for anticoagulation  · Monitor for changes

## 2022-01-25 NOTE — ASSESSMENT & PLAN NOTE
· Chronic  · C/o pain BL knees with some buckling when walking  · Physiatry in to see pt today with new recommendations  · Monitor for effectiveness of new recommendations

## 2022-01-25 NOTE — PROGRESS NOTES
[address]   [phone]  [facility]  STR Progress Note        NAME: Pita Beckwith  AGE: 64 y o  SEX: female    DATE OF ENCOUNTER: 1/25/2022    Assessment and Plan     1  Chronic combined systolic and diastolic heart failure (HCC)  Assessment & Plan:  · Asymptomatic  · Continue with daily weights  · Monitor for any increased edema  · Not currently on any diuretics          2  Paroxysmal atrial fibrillation (HCC)  Assessment & Plan:  · Rate controlled  · Continue with metoprolol   · eliquis for anticoagulation  · Monitor for changes      3  Type 2 diabetes mellitus with diabetic polyneuropathy, without long-term current use of insulin (HCC)  Assessment & Plan:  · Controlled  · Not currently on any medications  · Continue with lifestyle changes  · Monitor BS      4  Primary osteoarthritis of both knees  Assessment & Plan:  · Chronic  · C/o pain BL knees with some buckling when walking  · Physiatry in to see pt today with new recommendations  · Monitor for effectiveness of new recommendations          No orders of the defined types were placed in this encounter  History of Present Illness     Pita Beckwith 64 y o  female seen for follow-up of care and treatment plan for ambulatory dysfunction doing well at STR, CHF asymptomatic, Afib rate controlled, DM2 controlled without medications, osteoarthritis both knees pain management    The following portions of the patient's history were reviewed and updated as appropriate: allergies, current medications, past family history, past medical history, past social history, past surgical history and problem list     Review of Systems     Review of Systems   Constitutional: Positive for activity change  Negative for chills and fever  HENT: Negative for ear pain and sore throat  Eyes: Negative for pain and visual disturbance  Respiratory: Negative for cough, shortness of breath and wheezing      Cardiovascular: Negative for chest pain, palpitations and leg swelling  Gastrointestinal: Negative for abdominal pain and vomiting  Genitourinary: Negative for dysuria and hematuria  Musculoskeletal: Positive for gait problem and joint swelling  Negative for arthralgias and back pain  Skin: Negative for color change and rash  Neurological: Positive for weakness  Negative for seizures and syncope  All other systems reviewed and are negative  Objective     /78   Pulse 63   Temp (!) 97 1 °F (36 2 °C)   Resp 18   Wt 100 kg (221 lb)   BMI 40 42 kg/m²     Physical Exam  Vitals reviewed  Constitutional:       Appearance: She is well-developed  HENT:      Head: Normocephalic and atraumatic  Eyes:      Conjunctiva/sclera: Conjunctivae normal    Cardiovascular:      Rate and Rhythm: Normal rate and regular rhythm  Heart sounds: Normal heart sounds, S1 normal and S2 normal  No murmur heard  Pulmonary:      Effort: Pulmonary effort is normal  No respiratory distress  Breath sounds: Normal breath sounds  No wheezing  Abdominal:      General: Bowel sounds are normal  There is no distension  Palpations: Abdomen is soft  Tenderness: There is no abdominal tenderness  Musculoskeletal:      Cervical back: Normal range of motion  Right knee: Decreased range of motion  Tenderness present  Left knee: Decreased range of motion  Tenderness present  Skin:     General: Skin is warm and dry  Neurological:      Mental Status: She is alert  Psychiatric:         Attention and Perception: Attention normal          Mood and Affect: Mood normal          Speech: Speech normal          Behavior: Behavior normal          Thought Content: Thought content normal          Cognition and Memory: Cognition normal          Pertinent Laboratory/Diagnostic Studies:  [facility] Labs Reviewed    Current Medications   Medication list reviewed and updated today   Please see paper chart for updated medication list      Margo Ervin  :21 PM      Name: Cristina Conrad  : 1945  MRN: [id number]  DOS: 2022    Diagnoses:   Diagnosis ICD-10-CM Associated Orders   1  Chronic combined systolic and diastolic heart failure (HCC)  I50 42    2  Paroxysmal atrial fibrillation (HCC)  I48 0    3  Type 2 diabetes mellitus with diabetic polyneuropathy, without long-term current use of insulin (HCC)  E11 42    4   Primary osteoarthritis of both knees  M17 0

## 2022-01-26 ENCOUNTER — EPISODE CHANGES (OUTPATIENT)
Dept: CASE MANAGEMENT | Facility: OTHER | Age: 77
End: 2022-01-26

## 2022-01-31 ENCOUNTER — TELEPHONE (OUTPATIENT)
Dept: NEPHROLOGY | Facility: CLINIC | Age: 77
End: 2022-01-31

## 2022-01-31 ENCOUNTER — NURSING HOME VISIT (OUTPATIENT)
Dept: GERIATRICS | Facility: OTHER | Age: 77
End: 2022-01-31
Payer: MEDICARE

## 2022-01-31 VITALS
RESPIRATION RATE: 18 BRPM | SYSTOLIC BLOOD PRESSURE: 144 MMHG | DIASTOLIC BLOOD PRESSURE: 68 MMHG | WEIGHT: 220 LBS | HEART RATE: 59 BPM | TEMPERATURE: 97.5 F | BODY MASS INDEX: 40.24 KG/M2

## 2022-01-31 DIAGNOSIS — N18.4 STAGE 4 CHRONIC KIDNEY DISEASE (HCC): ICD-10-CM

## 2022-01-31 DIAGNOSIS — I50.42 CHRONIC COMBINED SYSTOLIC AND DIASTOLIC HEART FAILURE (HCC): Primary | ICD-10-CM

## 2022-01-31 PROCEDURE — 99309 SBSQ NF CARE MODERATE MDM 30: CPT | Performed by: NURSE PRACTITIONER

## 2022-01-31 NOTE — TELEPHONE ENCOUNTER
waiting for Joellen to call back and see if patient can do a virtual  She is in a care center and they were unaware of the appointment

## 2022-02-01 NOTE — ASSESSMENT & PLAN NOTE
· Asymptomatic at this time  · Continue to monitor weights  · Monitor for increased edema  · Cardiac diet  · Monitor for changes

## 2022-02-01 NOTE — PROGRESS NOTES
[address]   [phone]  [facility]  STR progress Note        NAME: Inge Kee  AGE: 64 y o  SEX: female    DATE OF ENCOUNTER: 1/31/2022    Assessment and Plan     1  Chronic combined systolic and diastolic heart failure (HCC)  Assessment & Plan:  · Asymptomatic at this time  · Continue to monitor weights  · Monitor for increased edema  · Cardiac diet  · Monitor for changes          2  Stage 4 chronic kidney disease ([address] )  Assessment & Plan:  · Labs at baseline  · Continue to avoid nephrotoxins  · Monitor BMP for renal function          No orders of the defined types were placed in this encounter  History of Present Illness     Inge Kee 64 y o  female seen for follow-up of care and treatment plan for ambulatory dysfunction doing well at STR, CHF asymptomatic, CDK4 labs at baseline     The following portions of the patient's history were reviewed and updated as appropriate: allergies, current medications, past family history, past medical history, past social history, past surgical history and problem list     Review of Systems     Review of Systems   Constitutional: Negative for chills and fever  HENT: Negative for ear pain and sore throat  Eyes: Negative for pain and visual disturbance  Respiratory: Negative for cough, shortness of breath and wheezing  Cardiovascular: Negative for chest pain and palpitations  Gastrointestinal: Negative for abdominal pain and vomiting  Genitourinary: Negative for dysuria and hematuria  Musculoskeletal: Positive for gait problem  Negative for arthralgias and back pain  Skin: Negative for color change and rash  Neurological: Positive for weakness  Negative for seizures and syncope  All other systems reviewed and are negative  Objective     /68   Pulse 59   Temp 97 5 °F (36 4 °C)   Resp 18   Wt 99 8 kg (220 lb)   BMI 40 24 kg/m²     Physical Exam  Vitals reviewed  Constitutional:       Appearance: She is well-developed  HENT:      Head: Normocephalic and atraumatic  Eyes:      Conjunctiva/sclera: Conjunctivae normal    Cardiovascular:      Rate and Rhythm: Normal rate and regular rhythm  Heart sounds: Normal heart sounds, S1 normal and S2 normal  No murmur heard  Pulmonary:      Effort: Pulmonary effort is normal  No respiratory distress  Breath sounds: Normal breath sounds  No wheezing  Abdominal:      General: Bowel sounds are normal  There is no distension  Palpations: Abdomen is soft  Tenderness: There is no abdominal tenderness  Musculoskeletal:         General: Normal range of motion  Cervical back: Normal range of motion  Comments: Generalized weakness   Skin:     General: Skin is warm and dry  Neurological:      Mental Status: She is alert  Psychiatric:         Attention and Perception: Attention normal          Mood and Affect: Mood normal          Speech: Speech normal          Behavior: Behavior normal          Thought Content: Thought content normal          Pertinent Laboratory/Diagnostic Studies:  HFM Labs Reviewed    Current Medications   Medication list reviewed and updated today  Please see paper chart for updated medication list      Josefina Rosaline  :59 PM      Name: Jeffry Jade  : 1945  MRN: [id number]  DOS: 2022    Diagnoses:   Diagnosis ICD-10-CM Associated Orders   1  Chronic combined systolic and diastolic heart failure (HCC)  I50 42    2   Stage 4 chronic kidney disease ([facility] [address] )  N18 4

## 2022-02-01 NOTE — ASSESSMENT & PLAN NOTE
· Chronic   · Controlled with gabapentin and salon pas  · Followed by physiatry   · Continue to monitor pain

## 2022-02-02 ENCOUNTER — IN-CLINIC DEVICE VISIT (OUTPATIENT)
Dept: CARDIOLOGY CLINIC | Facility: CLINIC | Age: 77
End: 2022-02-02

## 2022-02-02 DIAGNOSIS — Z95.810 PRESENCE OF AUTOMATIC CARDIOVERTER/DEFIBRILLATOR (AICD): Primary | ICD-10-CM

## 2022-02-02 PROCEDURE — 99024 POSTOP FOLLOW-UP VISIT: CPT | Performed by: INTERNAL MEDICINE

## 2022-02-02 NOTE — PROGRESS NOTES
Results for orders placed or performed in visit on 02/02/22   Cardiac EP device report    Narrative    MDT/BI-V ICD (DDDR MODE)/ ACTIVE SYSTEM IS MRI CONDITIONAL  WOUND CHECK: INCISION CLEAN AND DRY WITH EDGES APPROXIMATED; STAPLES REMOVED; WOUND CARE AND RESTRICTIONS REVIEWED WITH PATIENT  BATTERY ADEQUATE (5-10 3 YRS)  AP 88%; BVP 98%  ALL LEAD PARAMETERS WITHIN NORMAL LIMITS  NO EPISODES  PT  TAKES ELIQUIS & METOPROLOL SUCC  NO PROGRAMMING CHANGES MADE TO DEVICE PARAMETERS  OPTIVOL INITIALIZING  NORMAL DEVICE FUNCTION   PL

## 2022-02-10 ENCOUNTER — NURSING HOME VISIT (OUTPATIENT)
Dept: GERIATRICS | Facility: OTHER | Age: 77
End: 2022-02-10
Payer: MEDICARE

## 2022-02-10 VITALS
TEMPERATURE: 97.8 F | DIASTOLIC BLOOD PRESSURE: 52 MMHG | SYSTOLIC BLOOD PRESSURE: 113 MMHG | RESPIRATION RATE: 18 BRPM | BODY MASS INDEX: 40.24 KG/M2 | WEIGHT: 220 LBS | HEART RATE: 68 BPM

## 2022-02-10 DIAGNOSIS — E11.42 TYPE 2 DIABETES MELLITUS WITH DIABETIC POLYNEUROPATHY, WITHOUT LONG-TERM CURRENT USE OF INSULIN (HCC): ICD-10-CM

## 2022-02-10 DIAGNOSIS — E11.42 DIABETIC PERIPHERAL NEUROPATHY (HCC): ICD-10-CM

## 2022-02-10 DIAGNOSIS — N18.4 STAGE 4 CHRONIC KIDNEY DISEASE (HCC): ICD-10-CM

## 2022-02-10 DIAGNOSIS — I48.0 PAROXYSMAL ATRIAL FIBRILLATION (HCC): ICD-10-CM

## 2022-02-10 DIAGNOSIS — M17.0 PRIMARY OSTEOARTHRITIS OF BOTH KNEES: Primary | ICD-10-CM

## 2022-02-10 DIAGNOSIS — F32.A DEPRESSION, UNSPECIFIED DEPRESSION TYPE: ICD-10-CM

## 2022-02-10 DIAGNOSIS — I50.42 CHRONIC COMBINED SYSTOLIC AND DIASTOLIC HEART FAILURE (HCC): ICD-10-CM

## 2022-02-10 PROCEDURE — 99316 NF DSCHRG MGMT 30 MIN+: CPT | Performed by: NURSE PRACTITIONER

## 2022-02-10 NOTE — ASSESSMENT & PLAN NOTE
· Labs at baseline  · Continue to avoid nephrotoxins as much as possible  · Monitor BMP for renal function  · F/u with PCP as needed

## 2022-02-10 NOTE — PROGRESS NOTES
[address]   [phone]  [address] [place] [address] St: [facility] List: [facility]  POS: 31: SNF/Short Term Rehab    NAME: Marilee Ballard  AGE: 64 y o  :1945 SEX: female SXO:[id number]  DATE OF ADMISSION:  22 DATE OF DISCHARGE: 22 DISCHARGE DISPOSITION:  Stable    Reason for admission: Patient was admitted for rehabilitation after hospitalization for afib new ICD placement  Course of stay: Patient received skilled nursing care, PT/OT/ST, dietary and  during their stay at [facility] with an overall improvement in functional status  PT/OT Report:   Bed mobility min assist, sit to transfer contact guard assist, sit to stand max assist, ambulates 15-30' with rolling walker with contact guard assist, has ramp, upper body dressing contact guard to min assist, upper body bathing supervision, lower body dressing/bathing min assist, toileting max x 2 assist, hygiene mod assist, clothing management contact guard assist     Discharge Medications: See discharge medication list which was reviewed and signed  Status at time of discharge: Stable    Today's Visit: 2/10/04302:22 PM    Subjective: 64y o  year old female seen and examined today for discharge planning  Patient is scheduled to be discharged on 22 to home with the following services: VNA, PT and OT  Patient states that they are feeling well and appear comfortable in the room  They deny headache, dizziness, blurred vision, dyspnea, abdominal pain, nausea, vomiting and diarrhea  Vitals:   Vitals:    02/10/22 1621   BP: 113/52   Pulse: 68   Resp: 18   Temp: 97 8 °F (36 6 °C)       Exam: Physical Exam  Vitals reviewed  Constitutional:       Appearance: Normal appearance  HENT:      Head: Normocephalic and atraumatic  Eyes:      Conjunctiva/sclera: Conjunctivae normal    Cardiovascular:      Rate and Rhythm: Normal rate and regular rhythm        Heart sounds: Normal heart sounds, S1 normal and S2 normal  No murmur heard  Pulmonary:      Effort: Pulmonary effort is normal  No respiratory distress  Breath sounds: Normal breath sounds  No wheezing  Abdominal:      General: Bowel sounds are normal  There is no distension  Palpations: Abdomen is soft  Tenderness: There is no abdominal tenderness  Musculoskeletal:         General: Normal range of motion  Cervical back: Normal range of motion  Comments: NWB on left arm, may wear sling for comfort   Skin:     General: Skin is warm and dry  Neurological:      Mental Status: She is alert  Psychiatric:         Attention and Perception: Attention normal          Mood and Affect: Mood normal          Speech: Speech normal          Behavior: Behavior normal          Thought Content: Thought content normal          Cognition and Memory: Cognition normal          Discussion with patient/family and further instructions:  -Fall precautions  -Aspiration precautions  -Bleeding precautions  -Monitor for signs/symptoms of infection  -Medication list was reviewed and signed  -DME form was completed    Follow-up Recommendations: Please follow-up with your primary care physician within 7-10 days of discharge to review medication changes and current status       Problem List Follow-up Recommendations:  Problem List Items Addressed This Visit        Endocrine    Diabetic peripheral neuropathy (HCC)     · Pain controlled  · Continue with gabapentin  · Monitor BS daily  · F/u with PCP with any changes         Type 2 diabetes mellitus, without long-term current use of insulin ([facility])     · Daily control  · Not currently on any PO medications  · Continue with lifestyle changes  · Monitor BS periodically   · F/u with PCP            Cardiovascular and Mediastinum    Atrial fibrillation ([address] )     · Rate controlled  · Continue with metoprolol  · eliquis for anticoagulation  · F/u with PCP and cardiology Chronic combined systolic and diastolic heart failure (HCC)     · Asymptomatic at this time  · Continue to monitor weights  · Not currently on any diuretics  · Monitor for any increased edema  · F/u with PCP as needed              Musculoskeletal and Integument    Primary osteoarthritis of both knees - Primary     · Continues with chronic pain  · Pt states her legs "buckle" at times  · Seen by physiatry  · Continue with pain management with salonpas, gabapentin and tylenol  · F/u with PCP for any changes            Genitourinary    Stage 4 chronic kidney disease ([facility] [address] )     · Labs at baseline  · Continue to avoid nephrotoxins as much as possible  · Monitor BMP for renal function  · F/u with PCP as needed            Other    Depression     · At baseline  · Continue with venlafaxine  · Monitor mood and affect  · F/u with PCP as needed               PONCE Jacob  2/10/90803:22 PM    Discharge  Statement   I spent 45 minutes minutes discharging the patient  This time was spent on the day of discharge  I had direct contact with the patient on the day of discharge

## 2022-02-10 NOTE — ASSESSMENT & PLAN NOTE
· Daily control  · Not currently on any PO medications  · Continue with lifestyle changes  · Monitor BS periodically   · F/u with PCP

## 2022-02-10 NOTE — ASSESSMENT & PLAN NOTE
· Asymptomatic at this time  · Continue to monitor weights  · Not currently on any diuretics  · Monitor for any increased edema  · F/u with PCP as needed

## 2022-02-10 NOTE — ASSESSMENT & PLAN NOTE
· Rate controlled  · Continue with metoprolol  · eliquis for anticoagulation  · F/u with PCP and cardiology

## 2022-02-14 ENCOUNTER — TRANSITIONAL CARE MANAGEMENT (OUTPATIENT)
Dept: FAMILY MEDICINE CLINIC | Facility: CLINIC | Age: 77
End: 2022-02-14

## 2022-02-14 ENCOUNTER — TELEPHONE (OUTPATIENT)
Dept: FAMILY MEDICINE CLINIC | Facility: CLINIC | Age: 77
End: 2022-02-14

## 2022-02-14 NOTE — TELEPHONE ENCOUNTER
Patient recently discharged from [facility]  Cassidy Edwardmerlyn just wanted you to be aware that she is currently in a wheelchair, has had a pacemaker put in, her arm is in a sling, and she is currently incontinent  She has a new diagnosis of CKD 4  Did you want to order any labs on the patient? I am trying to reach her to schedule a virtual TCM   If you want labs please order and I will fax to Estrella at [phone]     Today's vitals   Temp 98 1  HR 68   /82  Resp 16

## 2022-02-21 ENCOUNTER — TELEMEDICINE (OUTPATIENT)
Dept: FAMILY MEDICINE CLINIC | Facility: CLINIC | Age: 77
End: 2022-02-21
Payer: MEDICARE

## 2022-02-21 DIAGNOSIS — N18.31 STAGE 3A CHRONIC KIDNEY DISEASE (HCC): ICD-10-CM

## 2022-02-21 DIAGNOSIS — Z45.02 DEFIBRILLATOR DISCHARGE: ICD-10-CM

## 2022-02-21 DIAGNOSIS — I42.8 NONISCHEMIC CARDIOMYOPATHY (HCC): Primary | ICD-10-CM

## 2022-02-21 DIAGNOSIS — R77.8 ABNORMAL SPEP: ICD-10-CM

## 2022-02-21 PROCEDURE — 99495 TRANSJ CARE MGMT MOD F2F 14D: CPT | Performed by: FAMILY MEDICINE

## 2022-02-28 DIAGNOSIS — E03.9 HYPOTHYROIDISM, UNSPECIFIED TYPE: ICD-10-CM

## 2022-02-28 DIAGNOSIS — E11.42 DIABETIC PERIPHERAL NEUROPATHY (HCC): Primary | ICD-10-CM

## 2022-02-28 DIAGNOSIS — E78.00 HYPERCHOLESTEROLEMIA: ICD-10-CM

## 2022-02-28 DIAGNOSIS — I48.91 ATRIAL FIBRILLATION WITH RAPID VENTRICULAR RESPONSE (HCC): ICD-10-CM

## 2022-02-28 DIAGNOSIS — I95.9 HYPOTENSION, UNSPECIFIED HYPOTENSION TYPE: Primary | ICD-10-CM

## 2022-02-28 DIAGNOSIS — I48.91 ATRIAL FIBRILLATION WITH RVR (HCC): ICD-10-CM

## 2022-02-28 RX ORDER — LOSARTAN POTASSIUM 25 MG/1
TABLET ORAL
Qty: 14 TABLET | Refills: 0 | Status: SHIPPED | OUTPATIENT
Start: 2022-02-28 | End: 2022-03-14 | Stop reason: SDUPTHER

## 2022-02-28 RX ORDER — LEVOTHYROXINE SODIUM 0.12 MG/1
TABLET ORAL
Qty: 14 TABLET | Refills: 0 | Status: SHIPPED | OUTPATIENT
Start: 2022-02-28 | End: 2022-03-14 | Stop reason: SDUPTHER

## 2022-02-28 RX ORDER — DICLOFENAC SODIUM AND MISOPROSTOL 50; 200 MG/1; UG/1
TABLET, DELAYED RELEASE ORAL
Qty: 180 TABLET | Refills: 3 | Status: SHIPPED | OUTPATIENT
Start: 2022-02-28 | End: 2022-04-13 | Stop reason: SDUPTHER

## 2022-02-28 RX ORDER — PRAVASTATIN SODIUM 10 MG
TABLET ORAL
Qty: 14 TABLET | Refills: 0 | Status: SHIPPED | OUTPATIENT
Start: 2022-02-28 | End: 2022-03-14 | Stop reason: SDUPTHER

## 2022-02-28 RX ORDER — DOFETILIDE 0.12 MG/1
CAPSULE ORAL
Qty: 28 CAPSULE | Refills: 0 | Status: SHIPPED | OUTPATIENT
Start: 2022-02-28 | End: 2022-03-17 | Stop reason: SDUPTHER

## 2022-02-28 RX ORDER — APIXABAN 5 MG/1
TABLET, FILM COATED ORAL
Qty: 28 TABLET | Refills: 0 | Status: SHIPPED | OUTPATIENT
Start: 2022-02-28 | End: 2022-03-14 | Stop reason: SDUPTHER

## 2022-03-03 NOTE — PROGRESS NOTES
Virtual TCM Visit:  TCM Call (since 1/31/2022)     Date and time call was made  2/16/2022  2:45 PM    Hospital care reviewed  Discussed with Inpatient Physician        Patient was hospitialized at  --  [facility]         Date of Admission  01/20/22    Date of discharge  02/11/22    Disposition  Home    Were the patients medications reviewed and updated  Yes    Current Symptoms  --  knee pain      TCM Call (since 1/31/2022)     Post hospital issues  Reduced activity    Should patient be enrolled in anticoag monitoring? No    Scheduled for follow up? Yes    Did you obtain your prescribed medications  Yes    Do you need help managing your prescriptions or medications  No    Is transportation to your appointment needed  Yes    Specify why  Patient unable to drive     I have advised the patient to call PCP with any new or worsening symptoms  [facility]    The type of support provided  Physical    Are you recieving any outpatient services  Yes    Are you recieving home care services  Yes    Types of home care services  Home PT; Home health aid; Nurse visit    Are you using any community resources  No    Current waiver services  No    Have you fallen in the last 12 months  Yes    How many times  4     Interperter language line needed  No    Counseling  Patient    Counseling topics  Activities of daily living        Verification of patient location:    Patient is located in the following state in which I hold an active license [place]        Assessment/Plan:        Problem List Items Addressed This Visit     None             Reason for visit is discharge from rehab where she went for ambulation help and conditioning  She was in hospital for Afib with RVR  She has a nonischemic cardiomyopathy of 30% admm and was found to have an abnormal spep    She is receiving PT, OT and visiting nurses in the home and denies any chest pain, edema, dyspnea or palpitations  Encounter provider Donell Herr DO       Provider located at [address] [place] [phone]      Recent Visits  No visits were found meeting these conditions  Showing recent visits within past 7 days and meeting all other requirements  Future Appointments  No visits were found meeting these conditions  Showing future appointments within next 150 days and meeting all other requirements       After connecting through [employer], the patient was identified by name and date of birth  Betty Grace was informed that this is a telemedicine visit and that the visit is being conducted through [facility] and patient was informed this is a secure, HIPAA-complaint platform  She agrees to proceed     My office door was closed  No one else was in the room  She acknowledged consent and understanding of privacy and security of the video platform  The patient has agreed to participate and understands they can discontinue the visit at any time  Patient is aware this is a billable service  Subjective:     Patient ID: Betty Grace is a 64 y o  female  HPI    Review of Systems   Respiratory: Negative for cough, chest tightness and shortness of breath  Cardiovascular: Negative for chest pain and leg swelling  Gastrointestinal: Negative for constipation  Neurological: Weakness: generalized  All other systems reviewed and are negative  Objective: There were no vitals filed for this visit  Physical Exam  Vitals reviewed  Constitutional:       Appearance: Normal appearance  She is not ill-appearing  Eyes:      General:         Right eye: No discharge  Left eye: No discharge  Pulmonary:      Effort: No respiratory distress  Skin:     Findings: No rash  Neurological:      Mental Status: She is alert and oriented to person, place, and time  Mental status is at baseline     Psychiatric:         Mood and Affect: Mood normal          Behavior: Behavior normal          Thought Content: Thought content normal          Judgment: Judgment normal              Transitional Care Management Review:  Kt Sinha is a 64 y o  female here for TCM follow up  During the TCM phone call patient stated:    TCM Call (since 1/31/2022)     Date and time call was made  2/16/2022  2:45 PM    Hospital care reviewed  Discussed with Inpatient Physician        Patient was hospitialized at  --  [facility]         Date of Admission  01/20/22    Date of discharge  02/11/22    Disposition  Home    Were the patients medications reviewed and updated  Yes    Current Symptoms  --  knee pain      TCM Call (since 1/31/2022)     Post hospital issues  Reduced activity    Should patient be enrolled in anticoag monitoring? No    Scheduled for follow up? Yes    Did you obtain your prescribed medications  Yes    Do you need help managing your prescriptions or medications  No    Is transportation to your appointment needed  Yes    Specify why  Patient unable to drive     I have advised the patient to call PCP with any new or worsening symptoms  [address] [place] [address]    The type of support provided  Physical    Are you recieving any outpatient services  Yes    Are you recieving home care services  Yes    Types of home care services  Home PT; Home health aid; Nurse visit    Are you using any community resources  No    Current waiver services  No    Have you fallen in the last 12 months  Yes    How many times  4     Interperter language line needed  No    Counseling  Patient    Counseling topics  Activities of daily living          I spent 15 minutes with the patient during this visit  Janell Pacheco, DO      VIRTUAL VISIT DISCLAIMER    [facility]   Pt is aware that Virtual Care Services could be limited without vital signs or the ability to perform a full hands-on physical exam  Laura Ferrer understands she or the provider may request at any time to terminate the video visit and request the patient to seek care or treatment in person

## 2022-03-07 DIAGNOSIS — G62.9 NEUROPATHY: ICD-10-CM

## 2022-03-07 DIAGNOSIS — I50.41 ACUTE COMBINED SYSTOLIC AND DIASTOLIC HEART FAILURE (HCC): ICD-10-CM

## 2022-03-07 RX ORDER — GABAPENTIN 300 MG/1
CAPSULE ORAL
Qty: 240 CAPSULE | Refills: 0 | Status: SHIPPED | OUTPATIENT
Start: 2022-03-07 | End: 2022-04-13 | Stop reason: SDUPTHER

## 2022-03-07 RX ORDER — METOPROLOL SUCCINATE 25 MG/1
25 TABLET, EXTENDED RELEASE ORAL DAILY
Refills: 0
Start: 2022-03-07 | End: 2022-03-08 | Stop reason: SDUPTHER

## 2022-03-08 ENCOUNTER — OFFICE VISIT (OUTPATIENT)
Dept: CARDIOLOGY CLINIC | Facility: CLINIC | Age: 77
End: 2022-03-08
Payer: MEDICARE

## 2022-03-08 VITALS
HEIGHT: 62 IN | HEART RATE: 65 BPM | SYSTOLIC BLOOD PRESSURE: 116 MMHG | DIASTOLIC BLOOD PRESSURE: 80 MMHG | BODY MASS INDEX: 40.24 KG/M2

## 2022-03-08 DIAGNOSIS — I48.91 ATRIAL FIBRILLATION WITH RAPID VENTRICULAR RESPONSE (HCC): ICD-10-CM

## 2022-03-08 DIAGNOSIS — I42.8 NICM (NONISCHEMIC CARDIOMYOPATHY) (HCC): Primary | ICD-10-CM

## 2022-03-08 DIAGNOSIS — I50.41 ACUTE COMBINED SYSTOLIC AND DIASTOLIC HEART FAILURE (HCC): ICD-10-CM

## 2022-03-08 PROCEDURE — 99214 OFFICE O/P EST MOD 30 MIN: CPT | Performed by: PHYSICIAN ASSISTANT

## 2022-03-08 RX ORDER — METOPROLOL SUCCINATE 25 MG/1
25 TABLET, EXTENDED RELEASE ORAL DAILY
Qty: 30 TABLET | Refills: 3 | Status: SHIPPED | OUTPATIENT
Start: 2022-03-08 | End: 2022-04-13 | Stop reason: SDUPTHER

## 2022-03-08 NOTE — PROGRESS NOTES
Electrophysiology Office Note    [facility]  1945  [id number]  HEART & VASCULAR [facility] [address] [address]        Assessment/Plan     Primary diagnosis:   1   paroxysmal atrial fibrillation, asymptomatic    * patient presents to B EP office today under direction of Dr Jorge Barlow for post hospital follow up  * recently hospitalized with inappropriate shock from lifevest due to AF w/ RVR + TBS/SSS  Now s/p tikosyn 125mcg initiation with QTc of 434ms today ; she is not sure if she is still taking tikosyn as it does not sound familiar to her, I printed her a medlist today, she will call me if she is not taking the medication    * newly on eliquis for [facility] w/o any cost or bleeding issues    * EF on echo from  was 30% with mild to mod MR and dilated LA    * Today we discussed non cardiac modifiable AF risk factors to prevent further substrate formation    1  HTN - controlled      2  T2DM - does not have      3  HARVINDER - no formal diagnosis of sleep apnea, I did not discuss testing with her today can address at next visit     4  Alcohol intake- does not intake regularly     5  Obesity - BMI is 40 24 with weight being 220lbs  I examined her in a wheelchair today, she lives a more sedentary lifestyle     6  Tobacco use - does not use    * with her dilated LA and mild to mod MR and the above risk factors she likely will return to AF, at that point can discuss either AVJ or ablation, she is asymptomatic in AF so AVJ might be best option    * she would like to follow with an EP at the [place] office as this is [facility] closer for her, I will have her f/u in 6 months with Dr Carolyn Babinski for continued tikosyn and [employer]       2  NICMP w/ LBBB    * diagnosed with new cardiomyopathy in     * [facility] showed no CAD   * on GDMT with toprol XL and losartan    * due to her TBS/SSS she is now s/p BIV ICD, interrogation today showing 98% effective BIV pacing with QRS now <120ms  * NICMP like from LBBB and unknown Afib w/ RVR ; explained pathophysiology of her LBBB to her today using a heart model    * will have her f/u with limited echo in 6 weeks for EF evaluation    * she did follow with Dr Jett Coronel, I would like her to re-establish care with Dr Jett Coronel due to her NICMP  Patient agreeable       Secondary diagnosis:   1  Morbid obesity   2  Hypothyroidism   3  HLD               Rhythm History:   Atrial fibrillation:   1  Inappropriate shock from lifevest due to new onset PAF w/ RVR - 2022   2  S/p tikosyn initiation - 2022 - remains on 125mcg BID     Atrial flutter:     SVT:     VT/VF:     Device history:   Pacemaker:    Defibrillator:      BIV PPM:    BIV ICD:  1  TBS s/p MDT BIV ICD - 2022     ILR:      Cardiac Testing:     ECHO: Results for orders placed in visit on 17    Echo complete with contrast if indicated    [facility] [address]  [phone]    Transthoracic Echocardiogram  2D, M-mode, Doppler, and Color Doppler    Study date:  07-Dec-2017    Patient: Shalini Javier  MR number: [id number]  Account number: [de-identified]  : 1945  Age: 74 years  Gender: Female  Status: Outpatient  Location: [address] [place] [address] [facility] [place]  Height: 62 in  Weight: 288 lb  BP: 98/ 64 mmHg    Indications: Dyspnea, edema    Diagnoses: R06 09 - Other forms of dyspnea, R60 9 - Edema, unspecified    Sonographer:  Claude Ray, RCS  Referring Physician:  Ronen Orantes DO  Group:  [facility]  Interpreting Physician:  Torsten Miller DO    SUMMARY    LEFT VENTRICLE:  Systolic function was normal  Ejection fraction was estimated to be 60 %  There were no regional wall motion abnormalities  Wall thickness was mildly increased  Doppler parameters were consistent with abnormal left ventricular relaxation (grade 1 diastolic dysfunction)      LEFT ATRIUM:  The atrium was mildly dilated  MITRAL VALVE:  There was mild regurgitation  AORTIC VALVE:  There was very mild stenosis  HISTORY: PRIOR HISTORY: Hypertension, high cholesterol, CAD, atrial fibrillation, asthma, COPD, DM, edema, hypothyroidism    PROCEDURE: The study was performed in the [address] [facility] [place]  This was a routine study  The transthoracic approach was used  The study included complete 2D imaging, M-mode, complete spectral Doppler, and color Doppler  This  was a technically difficult study  LEFT VENTRICLE: Size was normal  Systolic function was normal  Ejection fraction was estimated to be 60 %  There were no regional wall motion abnormalities  Wall thickness was mildly increased  DOPPLER: Doppler parameters were consistent  with abnormal left ventricular relaxation (grade 1 diastolic dysfunction)  RIGHT VENTRICLE: The size was normal  Systolic function was normal  Wall thickness was normal     LEFT ATRIUM: The atrium was mildly dilated  RIGHT ATRIUM: Size was normal     MITRAL VALVE: Valve structure was normal  There was normal leaflet separation  DOPPLER: The transmitral velocity was within the normal range  There was no evidence for stenosis  There was mild regurgitation  AORTIC VALVE: The valve was trileaflet  Leaflets exhibited mildly increased thickness, mild calcification, and lower normal cuspal separation  DOPPLER: Transaortic velocity was minimally increased  There was very mild stenosis  There was  no significant regurgitation  TRICUSPID VALVE: The valve structure was normal  There was normal leaflet separation  DOPPLER: The transtricuspid velocity was within the normal range  There was no evidence for stenosis  There was no significant regurgitation  PULMONIC VALVE: Leaflets exhibited normal thickness, no calcification, and normal cuspal separation  DOPPLER: The transpulmonic velocity was within the normal range   There was no significant regurgitation  PERICARDIUM: There was no pericardial effusion  The pericardium was normal in appearance  AORTA: The root exhibited normal size  SYSTEMIC VEINS: IVC: The inferior vena cava was normal in size  SYSTEM MEASUREMENT TABLES    2D  %FS: 29 48 %  AV Diam: 3 07 cm  EDV(Teich): 123 01 ml  EF(Cube): 64 93 %  EF(Teich): 56 15 %  ESV(Cube): 46 13 ml  ESV(Teich): 53 94 ml  IVSd: 1 13 cm  LA Area: 22 33 cm2  LA Diam: 3 71 cm  LVEDV MOD A4C: 143 23 ml  LVEF MOD A4C: 59 57 %  LVESV MOD A4C: 57 91 ml  LVIDd: 5 09 cm  LVIDs: 3 59 cm  LVLd A4C: 7 57 cm  LVLs A4C: 6 34 cm  LVOT Diam: 2 09 cm  LVPWd: 1 24 cm  RA Area: 15 34 cm2  RV Diam : 3 79 cm  SV MOD A4C: 85 32 ml  SV(Cube): 85 41 ml  SV(Teich): 69 06 ml    CW  AV Env  Ti: 379 54 ms  AV SV: 364 79 ml  AV VTI: 49 28 cm  AV Vmax: 2 01 m/s  AV Vmean: 1 3 m/s  AV maxP 16 mmHg  AV meanP 79 mmHg    MM  TAPSE: 2 25 cm    PW  ANTONIO (VTI): 1 9 cm2  ANTONIO Vmax: 1 8 cm2  E': 0 05 m/s  E/E': 18 98  LVOT Env  Ti: 423 91 ms  LVOT VTI: 27 28 cm  LVOT Vmax: 1 05 m/s  LVOT Vmean: 0 65 m/s  LVOT maxP 44 mmHg  LVOT meanP 03 mmHg  LVSV Dopp: 93 59 ml  MV A Nadeem: 0 92 m/s  MV Dec [place]: 5 65 m/s2  MV DecT: 163 82 ms  MV E Nadeem: 0 93 m/s  MV E/A Ratio: 1 01    [facility] Accredited Echocardiography Laboratory    Prepared and electronically signed by    Sejal Cazares DO  Signed 07-Dec-2017 13:53:25      CATH/STRESS TEST ():   Left Main   The vessel was visualized by angiography and is angiographically normal    Left Anterior Descending   The vessel was visualized by angiography, is large and is angiographically normal    Left Circumflex   The vessel was visualized by angiography, is large, is angiographically normal and dominant  Right Coronary Artery   The vessel was visualized by angiography, is moderate in size, is angiographically normal and non-dominant           EKG:   NSR w/ BIV pacing         History of Present Illness HPI/INTERVAL HISTORY: Rachel Pappas is a 64 y o  female with history as above who presents to B EP office today under direction of Dr Therese Newman for post hospital follow up  Patient recently hospitalized for inappropriate shock from her lifevest due to atrial fibrillation w/ RVR  During stay diagnosed with TBS/SSS and evaluated by EP  During stay she had BIV ICD placed due to her baseline LBBB and EF of 30% + initiation of tikosyn  Since returning home from ablation she has no new concerns or complaints and is tolerating all of her medications without issues  Review of Systems  ROS as noted above, otherwise 12 point review of systems was performed and is negative  Historical Information   Social History     Socioeconomic History    Marital status: /Civil Union     Spouse name: Not on file    Number of children: Not on file    Years of education: Not on file    Highest education level: Not on file   Occupational History    Occupation: RETIRED   Tobacco Use    Smoking status: Never Smoker    Smokeless tobacco: Never Used   Vaping Use    Vaping Use: Never used   Substance and Sexual Activity    Alcohol use: Never     Comment: Social per Allscripts     Drug use: Never    Sexual activity: Not Currently   Other Topics Concern    Not on file   Social History Narrative    Hx of daily cola consumption (unk cans/day)    Daily tea consumption (unk cups/day)    Multiple organ donor    Patient has living will    [facility]    Uses safety equipment - Seatbelts      Social Determinants of Health     Financial Resource Strain: Not on file   Food Insecurity: No Food Insecurity    Worried About [address] in the Last Year: Never true    [address] in the Last Year: Never true   Transportation Needs: No Transportation Needs    Lack of Transportation (Medical): No    Lack of Transportation (Non-Medical):  No   Physical Activity: Not on file   Stress: Not on file   Social Connections: Not on file   Intimate Partner Violence: Not on file   Housing Stability: [address] Unable to Pay for Housing in the Last Year: No    Number of Places Lived in the Last Year: 1    Unstable Housing in the Last Year: No     Past Medical History:   Diagnosis Date    Aortic stenosis     Arthritis     Asthma     Coronary artery disease     Diabetes mellitus ([place] [address] )     Diabetic peripheral neuropathy ([place] [address] )     Last assessed - 1/27/16    Gallbladder disease     Heart attack ([place] [address] ) 07/1999    Hemorrhoids     Last assessed - 11/16/12    Hypoglycemia 12/23/2021    Myocardial infarction ([place] [address] )     Old myocardial infarction     Type 2 diabetes mellitus with autonomic neuropathy ([place] [address] )     Unspec long term insulin use status, Last assessed - 6/8/17     Past Surgical History:   Procedure Laterality Date    BUNIONECTOMY      CARDIAC CATHETERIZATION      Carotid Artery, Resolved - 7/1/13    CARDIAC CATHETERIZATION N/A 12/27/2021    Procedure: CARDIAC CATHETERIZATION ;  Surgeon: Martínez Costa MD;  [facility] CARD[facility]; Service: Cardiology    CARDIAC CATHETERIZATION N/A 12/27/2021    Procedure: Cardiac Coronary Angiogram;  Surgeon: Martínez Costa MD;  [facility]; Service: Cardiology    CARDIAC ELECTROPHYSIOLOGY PROCEDURE N/A 1/18/2022    Procedure: Cardiac biv icd implant;  Surgeon: Renita Dhaliwal DO;  Location: [facility]; Service: Cardiology    CARDIOVASCULAR STRESS TEST  09/1999    CHOLECYSTECTOMY      COLONOSCOPY  2017    FOOT ARTHRODESIS, MODIFIED [facility]  2016    GALLBLADDER SURGERY  1970    HEMORROIDECTOMY      KNEE ARTHROSCOPY Left 2004    RI COLONOSCOPY FLX DX W/COLLJ [facility] INPATIENT REH[facility] WHEN PFRMD N/A 8/21/2017    Procedure: COLONOSCOPY;  Surgeon: Kira Jeter MD;  Location: [facility] GI LAB;   Service: Colorectal    REPAIR KNEE LIGAMENT      REPAIR KNEE LIGAMENT Left 1986    REPAIR KNEE LIGAMENT Right 1988     Social History     Substance and Sexual Activity   Alcohol Use Never    Comment: Social per Allscripts      Social History     Substance and Sexual Activity   Drug Use Never     Social History     Tobacco Use   Smoking Status Never Smoker   Smokeless Tobacco Never Used     Family History   Problem Relation Age of Onset    Hypertension Father     Diabetes Father     Cancer Father         Throat    Arthritis Father     Stroke Mother     Diabetes Maternal Grandmother     Heart disease Maternal Grandfather     Diabetes Son     Lymphoma Family         Head, Face and Neck        Meds/Allergies       Current Outpatient Medications:     acetaminophen (TYLENOL) 325 mg tablet, Take 2 tablets by mouth every 8 (eight) hours, Disp: , Rfl:     Camphor-Menthol-Methyl Sal 3 1-6-10 % PTCH, Apply 1 patch topically in the morning 1 patch to left shoulder and 1 patch to left knee     On at 9:00 a m  and off at 9:00 p m , Disp: , Rfl:     diclofenac-misoprostol (ARTHROTEC 50) 50-0 2 MG per tablet, TAKE 1 TABLET BY MOUTH TWO TIMES DAILY, Disp: 180 tablet, Rfl: 3    dofetilide (TIKOSYN) 125 mcg capsule, TAKE 1 CAPSULE BY MOUTH EVERY 12 HOURS, Disp: 28 capsule, Rfl: 0    Eliquis 5 MG, TAKE 1 TABLET BY MOUTH EVERY 12 HOURS, Disp: 28 tablet, Rfl: 0    gabapentin (NEURONTIN) 300 mg capsule, Take 3 capsules by mouth every morning, then 2 capsules by mouth every day in the afternoon, then take 3 capsules at bedtime, Disp: 240 capsule, Rfl: 0    levothyroxine 125 mcg tablet, TAKE 1 TABLET BY MOUTH EVERY MORNING, Disp: 14 tablet, Rfl: 0    losartan (COZAAR) 25 mg tablet, TAKE 1 TABLET BY MOUTH EVERY DAY, Disp: 14 tablet, Rfl: 0    metoprolol succinate (TOPROL-XL) 25 mg 24 hr tablet, Take 1 tablet (25 mg total) by mouth daily, Disp: , Rfl: 0    pravastatin (PRAVACHOL) 10 mg tablet, TAKE 1 TABLET BY MOUTH EVERY DAY, Disp: 14 tablet, Rfl: 0    venlafaxine (EFFEXOR) 75 mg tablet, Take 1 tablet (75 mg total) by mouth 3 (three) times a day, Disp: 90 tablet, Rfl: 3    Allergies   Allergen Reactions    Lyrica [Pregabalin] Swelling    Sulfa Antibiotics Swelling       Objective   Vitals: There were no vitals taken for this visit  Physical Exam  Constitutional:       Appearance: She is well-developed  HENT:      Head: Normocephalic and atraumatic  Eyes:      Pupils: Pupils are equal, round, and reactive to light  Cardiovascular:      Rate and Rhythm: Normal rate and regular rhythm  Pulmonary:      Effort: Pulmonary effort is normal       Breath sounds: Normal breath sounds  Abdominal:      General: Bowel sounds are normal       Palpations: Abdomen is soft  Musculoskeletal:         General: Normal range of motion  Cervical back: Normal range of motion and neck supple  Skin:     General: Skin is warm and dry  Neurological:      Mental Status: She is alert and oriented to person, place, and time  Labs:  No results displayed because visit has over 200 results  Imaging: I have personally reviewed pertinent reports

## 2022-03-14 DIAGNOSIS — I48.91 ATRIAL FIBRILLATION WITH RVR (HCC): ICD-10-CM

## 2022-03-14 DIAGNOSIS — E78.00 HYPERCHOLESTEROLEMIA: ICD-10-CM

## 2022-03-14 DIAGNOSIS — I48.91 ATRIAL FIBRILLATION WITH RAPID VENTRICULAR RESPONSE (HCC): ICD-10-CM

## 2022-03-14 DIAGNOSIS — E03.9 HYPOTHYROIDISM, UNSPECIFIED TYPE: ICD-10-CM

## 2022-03-14 RX ORDER — PRAVASTATIN SODIUM 10 MG
10 TABLET ORAL DAILY
Qty: 30 TABLET | Refills: 0 | Status: SHIPPED | OUTPATIENT
Start: 2022-03-14 | End: 2022-04-13 | Stop reason: SDUPTHER

## 2022-03-14 RX ORDER — LEVOTHYROXINE SODIUM 0.12 MG/1
125 TABLET ORAL EVERY MORNING
Qty: 30 TABLET | Refills: 0 | Status: SHIPPED | OUTPATIENT
Start: 2022-03-14 | End: 2022-03-30 | Stop reason: SDUPTHER

## 2022-03-14 RX ORDER — LOSARTAN POTASSIUM 25 MG/1
25 TABLET ORAL DAILY
Qty: 30 TABLET | Refills: 0 | Status: SHIPPED | OUTPATIENT
Start: 2022-03-14 | End: 2022-04-13 | Stop reason: SDUPTHER

## 2022-03-17 DIAGNOSIS — I48.91 ATRIAL FIBRILLATION WITH RVR (HCC): ICD-10-CM

## 2022-03-17 RX ORDER — DOFETILIDE 0.12 MG/1
125 CAPSULE ORAL EVERY 12 HOURS
Qty: 28 CAPSULE | Refills: 0 | Status: SHIPPED | OUTPATIENT
Start: 2022-03-17 | End: 2022-03-30 | Stop reason: SDUPTHER

## 2022-03-23 ENCOUNTER — TELEPHONE (OUTPATIENT)
Dept: FAMILY MEDICINE CLINIC | Facility: CLINIC | Age: 77
End: 2022-03-23

## 2022-03-23 NOTE — TELEPHONE ENCOUNTER
Physical Therapist called to let you know that Patient's BP was a little high today  It was 168/84 and then with activity it was 168/90    All other vitals were good

## 2022-03-29 DIAGNOSIS — F32.A DEPRESSION, UNSPECIFIED DEPRESSION TYPE: ICD-10-CM

## 2022-03-29 RX ORDER — VENLAFAXINE 75 MG/1
75 TABLET ORAL 3 TIMES DAILY
Qty: 90 TABLET | Refills: 3 | Status: SHIPPED | OUTPATIENT
Start: 2022-03-29 | End: 2022-04-13 | Stop reason: SDUPTHER

## 2022-03-29 NOTE — TELEPHONE ENCOUNTER
Patricia Reich the PT home care nurse called   Today patients BP was 144/82   And with activity it was 152/80

## 2022-03-30 DIAGNOSIS — I48.91 ATRIAL FIBRILLATION WITH RVR (HCC): ICD-10-CM

## 2022-03-30 DIAGNOSIS — E03.9 HYPOTHYROIDISM, UNSPECIFIED TYPE: ICD-10-CM

## 2022-03-30 RX ORDER — DOFETILIDE 0.12 MG/1
125 CAPSULE ORAL EVERY 12 HOURS
Qty: 28 CAPSULE | Refills: 0 | Status: SHIPPED | OUTPATIENT
Start: 2022-03-30 | End: 2022-04-13 | Stop reason: SDUPTHER

## 2022-03-30 RX ORDER — LEVOTHYROXINE SODIUM 0.12 MG/1
125 TABLET ORAL EVERY MORNING
Qty: 30 TABLET | Refills: 0 | Status: SHIPPED | OUTPATIENT
Start: 2022-03-30 | End: 2022-04-13 | Stop reason: SDUPTHER

## 2022-04-07 ENCOUNTER — TELEPHONE (OUTPATIENT)
Dept: FAMILY MEDICINE CLINIC | Facility: CLINIC | Age: 77
End: 2022-04-07

## 2022-04-07 NOTE — TELEPHONE ENCOUNTER
Called to let you know that patients BP was a little high today and she also said she wasn't feeling very well and she had some diarrhea   BP was 161/82 before starting therapy and   158/88 when she was done

## 2022-04-11 NOTE — TELEPHONE ENCOUNTER
Spoke to patient and gave her the info   She stated she will tell the visiting nurse to call tomorrow

## 2022-04-12 ENCOUNTER — TELEPHONE (OUTPATIENT)
Dept: FAMILY MEDICINE CLINIC | Facility: CLINIC | Age: 77
End: 2022-04-12

## 2022-04-12 NOTE — TELEPHONE ENCOUNTER
Physical Therapist from [facility] called to let you know that patient's BP was 168/80    She also wanted to let you know that she is going to be requesting approval for more PT depending on what insurance will allow

## 2022-04-13 ENCOUNTER — TELEPHONE (OUTPATIENT)
Dept: FAMILY MEDICINE CLINIC | Facility: CLINIC | Age: 77
End: 2022-04-13

## 2022-04-13 ENCOUNTER — CLINICAL SUPPORT (OUTPATIENT)
Dept: FAMILY MEDICINE CLINIC | Facility: CLINIC | Age: 77
End: 2022-04-13
Payer: MEDICARE

## 2022-04-13 VITALS
DIASTOLIC BLOOD PRESSURE: 72 MMHG | SYSTOLIC BLOOD PRESSURE: 148 MMHG | HEART RATE: 65 BPM | BODY MASS INDEX: 40.24 KG/M2 | OXYGEN SATURATION: 99 % | TEMPERATURE: 98.2 F | HEIGHT: 62 IN

## 2022-04-13 DIAGNOSIS — E03.9 HYPOTHYROIDISM, UNSPECIFIED TYPE: ICD-10-CM

## 2022-04-13 DIAGNOSIS — E78.00 HYPERCHOLESTEROLEMIA: ICD-10-CM

## 2022-04-13 DIAGNOSIS — F32.A DEPRESSION, UNSPECIFIED DEPRESSION TYPE: ICD-10-CM

## 2022-04-13 DIAGNOSIS — I10 ESSENTIAL HYPERTENSION: Primary | ICD-10-CM

## 2022-04-13 DIAGNOSIS — E11.42 DIABETIC PERIPHERAL NEUROPATHY (HCC): ICD-10-CM

## 2022-04-13 DIAGNOSIS — G62.9 NEUROPATHY: ICD-10-CM

## 2022-04-13 DIAGNOSIS — I50.41 ACUTE COMBINED SYSTOLIC AND DIASTOLIC HEART FAILURE (HCC): ICD-10-CM

## 2022-04-13 DIAGNOSIS — I48.91 ATRIAL FIBRILLATION WITH RVR (HCC): ICD-10-CM

## 2022-04-13 DIAGNOSIS — I48.91 ATRIAL FIBRILLATION WITH RAPID VENTRICULAR RESPONSE (HCC): ICD-10-CM

## 2022-04-13 PROCEDURE — 99211 OFF/OP EST MAY X REQ PHY/QHP: CPT | Performed by: FAMILY MEDICINE

## 2022-04-13 RX ORDER — LOSARTAN POTASSIUM 25 MG/1
25 TABLET ORAL DAILY
Qty: 30 TABLET | Refills: 0 | Status: SHIPPED | OUTPATIENT
Start: 2022-04-13 | End: 2022-05-11 | Stop reason: SDUPTHER

## 2022-04-13 RX ORDER — PRAVASTATIN SODIUM 10 MG
10 TABLET ORAL DAILY
Qty: 30 TABLET | Refills: 0 | Status: SHIPPED | OUTPATIENT
Start: 2022-04-13

## 2022-04-13 RX ORDER — LEVOTHYROXINE SODIUM 0.12 MG/1
125 TABLET ORAL EVERY MORNING
Qty: 30 TABLET | Refills: 0 | Status: SHIPPED | OUTPATIENT
Start: 2022-04-13 | End: 2022-06-20

## 2022-04-13 RX ORDER — DICLOFENAC SODIUM AND MISOPROSTOL 50; 200 MG/1; UG/1
1 TABLET, DELAYED RELEASE ORAL 2 TIMES DAILY
Qty: 180 TABLET | Refills: 3 | Status: SHIPPED | OUTPATIENT
Start: 2022-04-13

## 2022-04-13 RX ORDER — GABAPENTIN 300 MG/1
CAPSULE ORAL
Qty: 240 CAPSULE | Refills: 0 | Status: SHIPPED | OUTPATIENT
Start: 2022-04-13 | End: 2022-05-09 | Stop reason: SDUPTHER

## 2022-04-13 RX ORDER — GABAPENTIN 300 MG/1
CAPSULE ORAL
Qty: 240 CAPSULE | Refills: 0 | Status: CANCELLED | OUTPATIENT
Start: 2022-04-13

## 2022-04-13 RX ORDER — METOPROLOL SUCCINATE 25 MG/1
25 TABLET, EXTENDED RELEASE ORAL DAILY
Qty: 30 TABLET | Refills: 0 | Status: SHIPPED | OUTPATIENT
Start: 2022-04-13 | End: 2022-06-06

## 2022-04-13 RX ORDER — VENLAFAXINE 75 MG/1
75 TABLET ORAL 3 TIMES DAILY
Qty: 90 TABLET | Refills: 0 | Status: SHIPPED | OUTPATIENT
Start: 2022-04-13 | End: 2022-06-20

## 2022-04-13 RX ORDER — DOFETILIDE 0.12 MG/1
125 CAPSULE ORAL EVERY 12 HOURS
Qty: 60 CAPSULE | Refills: 0 | Status: SHIPPED | OUTPATIENT
Start: 2022-04-13 | End: 2022-05-11 | Stop reason: SDUPTHER

## 2022-04-13 NOTE — PROGRESS NOTES
Patient present to office today for blood pressure check  Patient blood pressure taking today in office sitting on left arm was 148/72  Patient's sister also mentioned patient needs refills on all medication and she has had dirrehea off and on for a couple of days    Blood pressure was readings reviewed by Dr Yolanda Guevara  As per Dr Yolanda Guevara patient should make an office visit  appointment for next week to see Dr Yolanda Guevara to address all other issues

## 2022-04-13 NOTE — TELEPHONE ENCOUNTER
[facility] wanted you to know that patient wasn't feeling good today so she canceled therapy for the rest of the week    She said she has been feeling sick to her stomach

## 2022-04-19 ENCOUNTER — TELEMEDICINE (OUTPATIENT)
Dept: FAMILY MEDICINE CLINIC | Facility: CLINIC | Age: 77
End: 2022-04-19
Payer: MEDICARE

## 2022-04-19 DIAGNOSIS — I50.42 CHRONIC COMBINED SYSTOLIC AND DIASTOLIC HEART FAILURE (HCC): Primary | ICD-10-CM

## 2022-04-19 PROCEDURE — 99213 OFFICE O/P EST LOW 20 MIN: CPT | Performed by: FAMILY MEDICINE

## 2022-04-21 ENCOUNTER — HOSPITAL ENCOUNTER (OUTPATIENT)
Dept: NON INVASIVE DIAGNOSTICS | Facility: CLINIC | Age: 77
Discharge: HOME | End: 2022-04-21
Payer: MEDICARE

## 2022-04-21 VITALS
HEIGHT: 62 IN | SYSTOLIC BLOOD PRESSURE: 148 MMHG | BODY MASS INDEX: 40.48 KG/M2 | DIASTOLIC BLOOD PRESSURE: 72 MMHG | HEART RATE: 74 BPM | WEIGHT: 220 LBS

## 2022-04-21 DIAGNOSIS — I42.8 NICM (NONISCHEMIC CARDIOMYOPATHY) (HCC): ICD-10-CM

## 2022-04-21 LAB
FRACTIONAL SHORTENING: 18 % (ref 28–44)
INTERVENTRICULAR SEPTUM IN DIASTOLE (PARASTERNAL SHORT AXIS VIEW): 0.9 CM
INTERVENTRICULAR SEPTUM: 0.9 CM (ref 0.55–1.03)
LEFT INTERNAL DIMENSION IN SYSTOLE: 4.9 CM (ref 3.41–5.16)
LEFT VENTRICULAR INTERNAL DIMENSION IN DIASTOLE: 6 CM (ref 5.64–8.4)
LEFT VENTRICULAR POSTERIOR WALL IN END DIASTOLE: 1 CM (ref 0.53–1.01)
LEFT VENTRICULAR STROKE VOLUME: 62 ML
LVSV (TEICH): 62 ML
SL CV LV EF: 50
SL CV PED ECHO LEFT VENTRICLE DIASTOLIC VOLUME (MOD BIPLANE) 2D: 177 ML
SL CV PED ECHO LEFT VENTRICLE SYSTOLIC VOLUME (MOD BIPLANE) 2D: 115 ML
Z-SCORE OF INTERVENTRICULAR SEPTUM IN END DIASTOLE: 0.93
Z-SCORE OF LEFT VENTRICULAR DIMENSION IN END DIASTOLE: -1.37
Z-SCORE OF LEFT VENTRICULAR DIMENSION IN END SYSTOLE: 1.23
Z-SCORE OF LEFT VENTRICULAR POSTERIOR WALL IN END DIASTOLE: 1.86

## 2022-04-21 PROCEDURE — 93325 DOPPLER ECHO COLOR FLOW MAPG: CPT | Performed by: INTERNAL MEDICINE

## 2022-04-21 PROCEDURE — 93321 DOPPLER ECHO F-UP/LMTD STD: CPT | Performed by: INTERNAL MEDICINE

## 2022-04-21 PROCEDURE — C8924 2D TTE W OR W/O FOL W/CON,FU: HCPCS

## 2022-04-21 PROCEDURE — 93308 TTE F-UP OR LMTD: CPT | Performed by: INTERNAL MEDICINE

## 2022-04-21 RX ADMIN — PERFLUTREN 1.5 ML/MIN: 6.52 INJECTION, SUSPENSION INTRAVENOUS at 13:45

## 2022-04-28 ENCOUNTER — OFFICE VISIT (OUTPATIENT)
Dept: CARDIOLOGY CLINIC | Facility: CLINIC | Age: 77
End: 2022-04-28
Payer: MEDICARE

## 2022-04-28 ENCOUNTER — IN-CLINIC DEVICE VISIT (OUTPATIENT)
Dept: CARDIOLOGY CLINIC | Facility: CLINIC | Age: 77
End: 2022-04-28
Payer: MEDICARE

## 2022-04-28 VITALS
BODY MASS INDEX: 39.93 KG/M2 | HEIGHT: 62 IN | SYSTOLIC BLOOD PRESSURE: 126 MMHG | WEIGHT: 217 LBS | HEART RATE: 62 BPM | DIASTOLIC BLOOD PRESSURE: 92 MMHG

## 2022-04-28 DIAGNOSIS — I42.8 NONISCHEMIC CARDIOMYOPATHY (HCC): ICD-10-CM

## 2022-04-28 DIAGNOSIS — I50.42 CHRONIC COMBINED SYSTOLIC AND DIASTOLIC HEART FAILURE (HCC): ICD-10-CM

## 2022-04-28 DIAGNOSIS — I34.0 MODERATE MITRAL REGURGITATION: ICD-10-CM

## 2022-04-28 DIAGNOSIS — I48.0 PAROXYSMAL ATRIAL FIBRILLATION (HCC): ICD-10-CM

## 2022-04-28 DIAGNOSIS — I35.0 NONRHEUMATIC AORTIC VALVE STENOSIS: ICD-10-CM

## 2022-04-28 DIAGNOSIS — Z95.810 AICD (AUTOMATIC CARDIOVERTER/DEFIBRILLATOR) PRESENT: Primary | ICD-10-CM

## 2022-04-28 DIAGNOSIS — I44.7 LEFT BUNDLE BRANCH BLOCK (LBBB): ICD-10-CM

## 2022-04-28 DIAGNOSIS — I48.91 ATRIAL FIBRILLATION WITH RAPID VENTRICULAR RESPONSE (HCC): Primary | ICD-10-CM

## 2022-04-28 DIAGNOSIS — E78.2 MIXED HYPERLIPIDEMIA: ICD-10-CM

## 2022-04-28 PROCEDURE — 93284 PRGRMG EVAL IMPLANTABLE DFB: CPT | Performed by: INTERNAL MEDICINE

## 2022-04-28 PROCEDURE — 93000 ELECTROCARDIOGRAM COMPLETE: CPT | Performed by: INTERNAL MEDICINE

## 2022-04-28 PROCEDURE — 99214 OFFICE O/P EST MOD 30 MIN: CPT | Performed by: INTERNAL MEDICINE

## 2022-04-28 NOTE — PATIENT INSTRUCTIONS
A-fib (Atrial Fibrillation)   AMBULATORY CARE:   Atrial fibrillation (A-fib)  is an irregular heartbeat  It reduces your heart's ability to pump blood through your body  A-fib may come and go, or it may be a long-term condition  A-fib can cause life-threatening blood clots, stroke, or heart failure  It is important to treat and manage A-fib to help prevent these problems  Common signs and symptoms include the following:   · A heartbeat that races, pounds, or flutters    · Weakness, severe tiredness, or confusion    · Feeling lightheaded, sweaty, dizzy, or faint    · Shortness of breath or anxiety    · Chest pain or pressure    Call your local emergency number (911 in the [address] [facility]) or have someone call if:   · You have any of the following signs of a heart attack:      ? Squeezing, pressure, or pain in your chest    ? You may  also have any of the following:     § Discomfort or pain in your back, neck, jaw, stomach, or arm    § Shortness of breath    § Nausea or vomiting    § Lightheadedness or a sudden cold sweat    · You have any of the following signs of a stroke:      ? Numbness or drooping on one side of your face     ? Weakness in an arm or leg    ? Confusion or difficulty speaking    ? Dizziness, a severe headache, or vision loss    Call your doctor or cardiologist if:   · Your arm or leg feels warm, tender, and painful  It may look swollen and red  · Your heart rate is more than 110 beats per minute  · You have new or worsening swelling in your legs, feet, ankles, or abdomen  · You are short of breath, even at rest     · You have questions or concerns about your condition or care  Treatment for A-fib:  Conditions that cause A-fib, such as thyroid disease, will be treated  You may also need any of the following:  · Heart medicines  help control your heart rate or rhythm  You may need more than one medicine to treat your symptoms      · Antiplatelet or blood thinner medicines  help prevent blood clots and stroke  · Cardioversion  is a procedure to return your heart rate and rhythm to normal  It can be done using medicines or electric shock  · A-fib ablation  is a procedure that uses energy to burn a small area of heart tissue  This creates scar tissue and prevents electrical signals that cause A-fib  You may need this procedure more than once  Ask for more information on A-fib ablation  · A pacemaker  may be inserted into your heart  A pacemaker is a device that controls your heartbeat  A pacemaker may be inserted during an ablation procedure or surgery  Ask your healthcare provider for more information on pacemakers  · Surgery  may be needed if other procedures do not work  During surgery your healthcare provider will make cuts in the upper part of your heart  The provider will stitch the cuts together to create scar tissue  The scar tissue will prevent electrical signals that cause A-fib  Manage A-fib:   · Know your target heart rate  Learn how to check your pulse and monitor your heart rate  · Know the risks if you choose to drink alcohol  Alcohol can increase your risk for A-fib or make A-fib harder to manage  Ask your healthcare provider if it is okay for you to drink any alcohol  He or she can help you set limits for the number of drinks you have in 24 hours and in a week  A drink of alcohol is 12 ounces of beer, 5 ounces of wine, or 1½ ounces of liquor  · Do not smoke  Nicotine can cause heart damage and make it more difficult to manage your A-fib  Do not use e-cigarettes or smokeless tobacco in place of cigarettes or to help you quit  They still contain nicotine  Ask your healthcare provider for information if you currently smoke and need help quitting  · Eat heart-healthy foods  Heart healthy foods will help keep your cholesterol low  These include fruits, vegetables, whole-grain breads, low-fat dairy products, beans, lean meats, and fish   Replace butter and margarine with heart-healthy oils such as olive oil and canola oil  · Maintain a healthy weight  Ask your healthcare provider what a healthy weight is for you  Ask him or her to help you create a safe weight loss plan if you are overweight  Even a small goal of a 10% weight loss can improve your heart health  · Get regular physical activity  Physical activity helps improve your heart health  Get at least 150 minutes of moderate aerobic physical activity each week  Your healthcare provider can help you create an activity plan  · Manage other health conditions  This includes high blood pressure or cholesterol, sleep apnea, diabetes, and other heart conditions  Take medicine as directed and follow your treatment plan  Your healthcare provider may need to change a medicine you are taking if it is causing your A-fib  Do not  stop taking any medicine unless directed by your provider  Follow up with your doctor or cardiologist as directed: You will need regular blood tests and monitoring  Write down your questions so you remember to ask them during your visits  © [employer] 2022 Information is for End User's use only and may not be sold, redistributed or otherwise used for commercial purposes  All illustrations and images included in CareNotes® are the copyrighted property of A D A M , Inc  or [address] [place], [place] [address]tar[place]   The above information is an  only  It is not intended as medical advice for individual conditions or treatments  Talk to your doctor, nurse or pharmacist before following any medical regimen to see if it is safe and effective for you

## 2022-04-28 NOTE — PROGRESS NOTES
Results for orders placed or performed in visit on 04/28/22   Cardiac EP device report    Narrative    MDT/BI-V ICD (DDDR MODE)/ ACTIVE SYSTEM IS MRI CONDITIONAL  DEVICE INTERROGATED IN THE [place] OFFICE  BATTERY VOLTAGE ADEQUATE (10 3 YRS)  AP-91%, BVP>99% (TOTAL -96 6%+VSR PACE-2 5%)  ALL LEAD PARAMETERS WITHIN NORMAL LIMITS  1 NEW AF EPISODE ON 4/14/22 LASTING 27 5 MINS  AF BURDEN-0 6%  HX: PAF & ON ELIQUIS, DOFETILIDE & METOPROLOL  Jennifer Olea 89  OPTI-VOL WITHIN NORMAL LIMITS  INCREASED ATRIAL SENSITIVITY TO 0 15MV FROM 0 3MV TO MAINTAIN 2:1 SAFETY MARGIN  PT TO SEE DR NOLEN TODAY  NORMAL DEVICE FUNCTION   GV

## 2022-04-28 NOTE — PROGRESS NOTES
Cardiology Follow Up    [facility]  1945  [id number]  [facility] PRIYA Cameron [id number] [facility]  [phone]    1  Atrial fibrillation with rapid ventricular response (HCC)  POCT ECG   2  Nonischemic cardiomyopathy ([address] )     3  Moderate mitral regurgitation     4  Left bundle branch block (LBBB)     5  Chronic combined systolic and diastolic heart failure (HCC)     6  Paroxysmal atrial fibrillation ([address] )     7  Nonrheumatic aortic valve stenosis     8  Mixed hyperlipidemia       Chief Complaint   Patient presents with    Follow-up     No cardiac complaints       Interval History: Patient feels well, without complaints  No reported chest pain, shortness of breath, palpitations, lightheadedness, syncope, LE edema, orthopnea, PND, or significant weight changes  Patient remains active without any increased fatigue out of the ordinary          Patient Active Problem List   Diagnosis    Atrial fibrillation ([address] )    Coronary artery disease    Hyperlipidemia    Aortic stenosis    Primary osteoarthritis of both knees    Class 3 severe obesity in adult [facility])    Diabetic peripheral neuropathy ([facility] )    Type 2 diabetes mellitus, without long-term current use of insulin ([facility])    Stage 4 chronic kidney disease ([address] )    Elevated troponin    Chronic combined systolic and diastolic heart failure ([address] )    Depression    DNR (do not resuscitate)    Torn rotator cuff    Left bundle branch block (LBBB)    Closed fracture of one rib of left side    Moderate mitral regurgitation    Hypotension    Lifevest discharge    CKD (chronic kidney disease)    Left knee pain    Pulmonary fibrosis ([address] )    Hypothyroidism    Abnormal SPEP    Nonischemic cardiomyopathy ([address] )     Past Medical History:   Diagnosis Date    Aortic stenosis     Arthritis     Asthma     Coronary artery disease     Diabetes mellitus ([place] [address] )     Diabetic peripheral neuropathy ([place] [address] )     Last assessed - 1/27/16    Gallbladder disease     Heart attack ([place] [address] ) 07/1999    Hemorrhoids     Last assessed - 11/16/12    Hypoglycemia 12/23/2021    Myocardial infarction ([place] [address] )     Old myocardial infarction     Type 2 diabetes mellitus with autonomic neuropathy (HCC)     Unspec long term insulin use status, Last assessed - 6/8/17     Social History     Socioeconomic History    Marital status: /Civil Union     Spouse name: Not on file    Number of children: Not on file    Years of education: Not on file    Highest education level: Not on file   Occupational History    Occupation: RETIRED   Tobacco Use    Smoking status: Never Smoker    Smokeless tobacco: Never Used   Vaping Use    Vaping Use: Never used   Substance and Sexual Activity    Alcohol use: Never     Comment: Social per [employer]     Drug use: Never    Sexual activity: Not Currently   Other Topics Concern    Not on file   Social History Narrative    Hx of daily cola consumption (unk cans/day)    Daily tea consumption (unk cups/day)    Multiple organ donor    Patient has living will    [place] [id number] in existence    Uses safety equipment - Seatbelts      Social Determinants of Health     Financial Resource Strain: Not on file   Food Insecurity: No Food Insecurity    Worried About Running Out of Food in the Last Year: Never true    Jono of Food in the Last Year: Never true   Transportation Needs: No Transportation Needs    Lack of Transportation (Medical): No    Lack of Transportation (Non-Medical):  No   Physical Activity: Not on file   Stress: Not on file   Social Connections: Not on file   Intimate Partner Violence: Not on file   Housing Stability: Low Risk     Unable to Pay for Housing in the Last Year: No    Number of Places Lived in the Last Year: 1    Unstable Housing in the Last Year: No      Family History   Problem Relation Age of Onset    Hypertension Father     Diabetes Father     Cancer Father         Throat    Arthritis Father     Stroke Mother     Diabetes Maternal Grandmother     Heart disease Maternal Grandfather     Diabetes Son     Lymphoma Family         Head, Face and Neck      Past Surgical History:   Procedure Laterality Date    BUNIONECTOMY      CARDIAC CATHETERIZATION      Carotid Artery, Resolved - 7/1/13    CARDIAC CATHETERIZATION N/A 12/27/2021    Procedure: CARDIAC CATHETERIZATION ;  Surgeon: Heike Mays MD;  [facility]; Service: Cardiology    CARDIAC CATHETERIZATION N/A 12/27/2021    Procedure: Cardiac Coronary Angiogram;  Surgeon: Heike Mays MD;  [facility] C[facility]; Service: Cardiology    CARDIAC ELECTROPHYSIOLOGY PROCEDURE N/A 1/18/2022    Procedure: Cardiac biv icd implant;  Surgeon: Zarina Russell DO;  [facility]: [facility] CARD[facility]; Service: Cardiology    CARDIOVASCULAR STRESS TEST  09/1999    CHOLECYSTECTOMY      COLONOSCOPY  2017    FOOT ARTHRODESIS, MODIFIED DONOHUE  2016    GALLBLADDER SURGERY  1970    HEMORROIDECTOMY      KNEE ARTHROSCOPY Left 2004    TX COLONOSCOPY FLX DX W/COLLJ [facility] INPATIENT REHABILITATION WHEN PFRMD N/A 8/21/2017    Procedure: COLONOSCOPY;  Surgeon: Gregory Jackson MD;  Location: [facility] GI [facility];   Service: Colorectal    REPAIR KNEE LIGAMENT      REPAIR KNEE LIGAMENT Left 1986    REPAIR KNEE LIGAMENT Right 1988       Current Outpatient Medications:     acetaminophen (TYLENOL) 325 mg tablet, Take 2 tablets by mouth every 6 (six) hours as needed  , Disp: , Rfl:     apixaban (Eliquis) 5 mg, Take 1 tablet (5 mg total) by mouth every 12 (twelve) hours, Disp: 60 tablet, Rfl: 0    diclofenac-misoprostol (ARTHROTEC 50) 50-0 2 MG per tablet, Take 1 tablet by mouth 2 (two) times a day, Disp: 180 tablet, Rfl: 3    dofetilide (TIKOSYN) 125 mcg capsule, Take 1 capsule (125 mcg total) by mouth every 12 (twelve) hours, Disp: 60 capsule, Rfl: 0    gabapentin (NEURONTIN) 300 mg capsule, Take 3 capsules by mouth every morning, then 2 capsules by mouth every day in the afternoon, then take 3 capsules at bedtime, Disp: 240 capsule, Rfl: 0    levothyroxine 125 mcg tablet, Take 1 tablet (125 mcg total) by mouth every morning, Disp: 30 tablet, Rfl: 0    losartan (COZAAR) 25 mg tablet, Take 1 tablet (25 mg total) by mouth daily, Disp: 30 tablet, Rfl: 0    metoprolol succinate (TOPROL-XL) 25 mg 24 hr tablet, Take 1 tablet (25 mg total) by mouth daily (Patient taking differently: Take 25 mg by mouth daily Based on BP reading ), Disp: 30 tablet, Rfl: 0    Multiple Vitamins-Minerals (ICAPS AREDS 2 PO), Take by mouth in the morning, Disp: , Rfl:     pravastatin (PRAVACHOL) 10 mg tablet, Take 1 tablet (10 mg total) by mouth daily, Disp: 30 tablet, Rfl: 0    Camphor-Menthol-Methyl Sal 3 1-6-10 % PTCH, Apply 1 patch topically in the morning 1 patch to left shoulder and 1 patch to left knee  On at 9:00 a m  and off at 9:00 p m  (Patient not taking: Reported on 4/28/2022 ), Disp: 60 patch, Rfl: 0    venlafaxine (EFFEXOR) 75 mg tablet, Take 1 tablet (75 mg total) by mouth 3 (three) times a day (Patient not taking: Reported on 4/28/2022 ), Disp: 90 tablet, Rfl: 0  Allergies   Allergen Reactions    Lyrica [Pregabalin] Swelling    Sulfa Antibiotics Swelling       Labs:  Hospital Outpatient Visit on 04/21/2022   Component Date Value    LVPWd 04/21/2022 1 00     IVSd 04/21/2022 2 45     LV DIASTOLIC VOLUME (MOD* 72/69/5936 177     LEFT VENTRICLE SYSTOLIC * 82/28/0781 726     Left ventricular stroke * 04/21/2022 62 00     LVIDd 04/21/2022 6 00     IVS 04/21/2022 0 9     LVIDS 04/21/2022 4 90     FS 04/21/2022 18     LVSV, 2D 04/21/2022 62     ZLVPWD 04/21/2022 1 86     ZLVIDS 04/21/2022 1 23     ZLVIDD 04/21/2022 -1 37     ZIVSD 04/21/2022 0 93     LV EF 04/21/2022 50    No results displayed because visit has over 200 results  No results displayed because visit has over 200 results  Lab Results   Component Value Date    CHOL 184 09/19/2015    TRIG 151 (H) 08/11/2021    TRIG 113 09/19/2015    HDL 45 08/11/2021    HDL 47 09/19/2015     Imaging: Cardiac EP device report    Result Date: 4/28/2022  Narrative: MDT/BI-V ICD (DDDR MODE)/ ACTIVE SYSTEM IS MRI CONDITIONAL DEVICE INTERROGATED IN THE [place] OFFICE  BATTERY VOLTAGE ADEQUATE (10 3 YRS)  AP-91%, BVP>99% (TOTAL -96 6%+VSR PACE-2 5%)  ALL LEAD PARAMETERS WITHIN NORMAL LIMITS  1 NEW AF EPISODE ON 4/14/22 LASTING 27 5 MINS  AF BURDEN-0 6%  HX: PAF & ON ELIQUIS, DOFETILIDE & METOPROLOL  Jennifer Ole[address]  OPTI-VOL WITHIN NORMAL LIMITS  INCREASED ATRIAL SENSITIVITY TO 0 15MV FROM 0 3MV TO MAINTAIN 2:1 SAFETY MARGIN  PT TO SEE DR NOLEN TODAY  NORMAL DEVICE FUNCTION  GV     Cardiac EP device report    Result Date: 4/15/2022  Narrative: MDT/BI-V ICD (DDDR MODE)/ ACTIVE SYSTEM IS MRI CONDITIONAL [address] (2 PDF'S)- NB: MULTIPLE RVR EPISODES ON 4/14/22; 1 RVR EPISODE IN VF ZONE & TREATED W/ ATP X1 WHICH DEECREASED RATE BELOW VF ZONE  PT ASYMPTOMATIC  PT ON ELIQUIS, DOFETILIDE & METOPROLOL  PT STATED SHE MISSED SOME MEDS BUT UNSURE WHICH ONES THE LAST FEW DAYS BECAUSE SHE IS WAITING FOR REFILLS TO BE DELIVERED (CALLED PHARMACY & THEY WILL BE DELIVERING THEM TO PT'S HOUSE TODAY)  AS/BVP @ 66 PPM ON CURRENT EGM  BATTERY VOLTAGE ADEQUATE (10 2 YRS)  AP-69%, BVP-96% (TOTAL -90 7%+VSR PACE-5 1%)  ALL AVAILABLE LEAD PARAMETERS WITHIN NORMAL LIMITS  OPTI-VOL WITHIN NORMAL LIMITS  TIGER TEXTED AARTI BENNETT & DR NOLEN  APPT W/ DEVICE CLINIC & DR Gail Khan ON 4/28/22  NORMAL DEVICE FUNCTION  GV     Echo follow up/limited w/ contrast if indicated    Result Date: 4/21/2022  Narrative: [facility]  Left Ventricle: Left ventricle is not well visualized  Left ventricular cavity size is mildly dilated  Wall thickness is normal  The left ventricular ejection fraction is 50%   Systolic function is low normal  Wall motion is normal   Left Atrium: The atrium is mildly dilated  Review of Systems:  Review of Systems   Constitutional: Negative for activity change, appetite change, chills, diaphoresis, fatigue and unexpected weight change  HENT: Negative for hearing loss, nosebleeds and sore throat  Eyes: Negative for photophobia and visual disturbance  Respiratory: Negative for cough, chest tightness, shortness of breath and wheezing  Cardiovascular: Negative for chest pain, palpitations and leg swelling  Gastrointestinal: Negative for abdominal pain, diarrhea, nausea and vomiting  Endocrine: Negative for polyuria  Genitourinary: Negative for dysuria, frequency and hematuria  Musculoskeletal: Negative for arthralgias, back pain, gait problem and neck pain  Skin: Negative for pallor and rash  Neurological: Negative for dizziness, syncope and headaches  Hematological: Does not bruise/bleed easily  Psychiatric/Behavioral: Negative for behavioral problems and confusion  Physical Exam:  Physical Exam  Vitals reviewed  Constitutional:       Appearance: She is well-developed  She is not diaphoretic  HENT:      Head: Normocephalic and atraumatic  Nose: Nose normal    Eyes:      General: No scleral icterus  Pupils: Pupils are equal, round, and reactive to light  Neck:      Vascular: No JVD  Cardiovascular:      Rate and Rhythm: Normal rate and regular rhythm  Heart sounds: Murmur heard  Systolic murmur is present with a grade of 2/6  No friction rub  No gallop  Pulmonary:      Effort: Pulmonary effort is normal  No respiratory distress  Breath sounds: Normal breath sounds  No wheezing or rales  Abdominal:      General: Bowel sounds are normal  There is no distension  Palpations: Abdomen is soft  Tenderness: There is no abdominal tenderness  Musculoskeletal:         General: No deformity  Normal range of motion  Cervical back: Normal range of motion and neck supple  Skin:     General: Skin is warm and dry  Findings: No rash  Neurological:      Mental Status: She is alert and oriented to person, place, and time  Cranial Nerves: No cranial nerve deficit  Psychiatric:         Behavior: Behavior normal        Blood pressure 126/92, pulse 62, height 5' 2" (1 575 m), weight 98 4 kg (217 lb)  EKG:  Normal sinus rhythm  Lateral infarct, age undetermined  Abnormal ECG  QTc 430 ms    Discussion/Summary:  NICM: cath in Dec 2021 on initial diagnosis was normal for CAD  Currently without symptoms or signs of volume overload  S/p Bi-V ICD - opti-vol WNL on most recent interrogation  EF noted to be 30% on diagnosis, now on repeat echo after medical therapy revealed low normal LVEF with only trace MR as opposed to mild to moderate on diagnosis  Afib: with inappropriate LifeVest discharge for afib with RVR - now s/p ICD  Rate control with B-blocker and rhythm control with Tikosyn  Continued on Eliquis for [facility]  Aortic stenosis: most recent echo in Dec 2021 did not reveal significant stenosis  LBBB: continued with Bi-V ICD  HLD: continued on statin  LDL 86 in Aug 2021 - repeat for this year

## 2022-05-09 DIAGNOSIS — G62.9 NEUROPATHY: ICD-10-CM

## 2022-05-09 RX ORDER — GABAPENTIN 300 MG/1
CAPSULE ORAL
Qty: 240 CAPSULE | Refills: 0 | Status: SHIPPED | OUTPATIENT
Start: 2022-05-09 | End: 2022-06-06

## 2022-05-09 NOTE — PROGRESS NOTES
Virtual Regular Visit    Verification of patient location:    Patient is located in the following state in which I hold an active license [place]      Assessment/Plan:    Problem List Items Addressed This Visit     None               Reason for visit is No chief complaint on file  Encounter provider Luigi Mendenhall DO    Provider located at [address] [place] [phone]      Recent Visits  No visits were found meeting these conditions  Showing recent visits within past 7 days and meeting all other requirements  Future Appointments  No visits were found meeting these conditions  Showing future appointments within next 150 days and meeting all other requirements       The patient was identified by name and date of birth  Jack Romo was informed that this is a telemedicine visit and that the visit is being conducted through Telephone  My office door was closed  No one else was in the room  She acknowledged consent and understanding of privacy and security of the video platform  The patient has agreed to participate and understands they can discontinue the visit at any time  Patient is aware this is a billable service  Subjective  Jack Romo is a 64 y o  female  Who needed follow up for chf  She denies any edema, chest pain or orthopnea  She is watching her sodium limit and watching her sodium intake  She has no cough, dyspnea or chest pressure  Grabill Arthur       HPI     Past Medical History:   Diagnosis Date    Aortic stenosis     Arthritis     Asthma     Coronary artery disease     Diabetes mellitus ([address] )     Diabetic peripheral neuropathy ([address] )     Last assessed - 1/27/16    Gallbladder disease     Heart attack ([address] ) 07/1999    Hemorrhoids     Last assessed - 11/16/12    Hypoglycemia 12/23/2021    Myocardial infarction ([address] )     Old myocardial infarction     Type 2 diabetes mellitus with autonomic neuropathy ([address] )     Unspec long term insulin use status, Last assessed - 6/8/17       Past Surgical History:   Procedure Laterality Date    BUNIONECTOMY      CARDIAC CATHETERIZATION      Carotid Artery, Resolved - 7/1/13    CARDIAC CATHETERIZATION N/A 12/27/2021    Procedure: CARDIAC CATHETERIZATION ;  Surgeon: Emery Jeffrey MD;  [facility] C[facility]; Service: Cardiology    CARDIAC CATHETERIZATION N/A 12/27/2021    Procedure: Cardiac Coronary Angiogram;  Surgeon: Emery Jeffrey MD;  [facility] C[facility]; Service: Cardiology    CARDIAC ELECTROPHYSIOLOGY PROCEDURE N/A 1/18/2022    Procedure: Cardiac biv icd implant;  Surgeon: Branden Clayton DO;  [facility]: [facility]; Service: Cardiology    CARDIOVASCULAR STRESS TEST  09/1999    CHOLECYSTECTOMY      COLONOSCOPY  2017    FOOT ARTHRODESIS, MODIFIED DONOHUE  2016    GALLBLADDER SURGERY  1970    HEMORROIDECTOMY      KNEE ARTHROSCOPY Left 2004    [place] COLONOSCOPY FLX DX W/COLLJ [facility] REHABILITATION WHEN PFRMD N/A 8/21/2017    Procedure: COLONOSCOPY;  Surgeon: Consuelo Almanzar MD;  Location: [facility] GI [facility]; Service: Colorectal    REPAIR KNEE LIGAMENT      REPAIR KNEE LIGAMENT Left 1986    REPAIR KNEE LIGAMENT Right 1988       Current Outpatient Medications   Medication Sig Dispense Refill    acetaminophen (TYLENOL) 325 mg tablet Take 2 tablets by mouth every 6 (six) hours as needed        apixaban (Eliquis) 5 mg Take 1 tablet (5 mg total) by mouth every 12 (twelve) hours 60 tablet 0    Camphor-Menthol-Methyl Sal 3 1-6-10 % PTCH Apply 1 patch topically in the morning 1 patch to left shoulder and 1 patch to left knee    On at 9:00 a m  and off at 9:00 p m  (Patient not taking: Reported on 4/28/2022 ) 60 patch 0    diclofenac-misoprostol (ARTHROTEC 50) 50-0 2 MG per tablet Take 1 tablet by mouth 2 (two) times a day 180 tablet 3    dofetilide (TIKOSYN) 125 mcg capsule Take 1 capsule (125 mcg total) by mouth every 12 (twelve) hours 60 capsule 0    gabapentin (NEURONTIN) 300 mg capsule Take 3 capsules by mouth every morning, then 2 capsules by mouth every day in the afternoon, then take 3 capsules at bedtime 240 capsule 0    levothyroxine 125 mcg tablet Take 1 tablet (125 mcg total) by mouth every morning 30 tablet 0    losartan (COZAAR) 25 mg tablet Take 1 tablet (25 mg total) by mouth daily 30 tablet 0    metoprolol succinate (TOPROL-XL) 25 mg 24 hr tablet Take 1 tablet (25 mg total) by mouth daily (Patient taking differently: Take 25 mg by mouth daily Based on BP reading ) 30 tablet 0    Multiple Vitamins-Minerals (ICAPS AREDS 2 PO) Take by mouth in the morning      pravastatin (PRAVACHOL) 10 mg tablet Take 1 tablet (10 mg total) by mouth daily 30 tablet 0    venlafaxine (EFFEXOR) 75 mg tablet Take 1 tablet (75 mg total) by mouth 3 (three) times a day (Patient not taking: Reported on 4/28/2022 ) 90 tablet 0     No current facility-administered medications for this visit  Allergies   Allergen Reactions    Lyrica [Pregabalin] Swelling    Sulfa Antibiotics Swelling       Review of Systems   Respiratory: Negative for cough, shortness of breath and wheezing  Cardiovascular: Negative for chest pain  Gastrointestinal: Negative for diarrhea, nausea and vomiting  Neurological: Negative for dizziness  All other systems reviewed and are negative  Video Exam    There were no vitals filed for this visit  Physical Exam  Vitals reviewed  Constitutional:       Appearance: Normal appearance  She is not diaphoretic  HENT:      Right Ear: There is no impacted cerumen  Left Ear: There is no impacted cerumen  Eyes:      General:         Right eye: No discharge  Left eye: No discharge  Neurological:      General: No focal deficit present  Mental Status: She is alert and oriented to person, place, and time  Psychiatric:         Mood and Affect: Mood normal          Behavior: Behavior normal          Thought Content:  Thought content normal  Judgment: Judgment normal           I spent 15 minutes directly with the patient during this visit    VIRTUAL VISIT DISCLAIMER      Paola Peterson verbally agrees to participate in [employer]  Pt is aware that [employer] could be limited without vital signs or the ability to perform a full hands-on physical exam  Laura Kulkarni understands she or the provider may request at any time to terminate the video visit and request the patient to seek care or treatment in person

## 2022-05-11 DIAGNOSIS — I48.91 ATRIAL FIBRILLATION WITH RVR (HCC): ICD-10-CM

## 2022-05-11 DIAGNOSIS — I48.91 ATRIAL FIBRILLATION WITH RAPID VENTRICULAR RESPONSE (HCC): ICD-10-CM

## 2022-05-11 RX ORDER — LOSARTAN POTASSIUM 25 MG/1
25 TABLET ORAL DAILY
Qty: 90 TABLET | Refills: 3 | Status: SHIPPED | OUTPATIENT
Start: 2022-05-11

## 2022-05-11 RX ORDER — DOFETILIDE 0.12 MG/1
125 CAPSULE ORAL EVERY 12 HOURS
Qty: 180 CAPSULE | Refills: 3 | Status: SHIPPED | OUTPATIENT
Start: 2022-05-11

## 2022-05-31 ENCOUNTER — REMOTE DEVICE CLINIC VISIT (OUTPATIENT)
Dept: CARDIOLOGY CLINIC | Facility: CLINIC | Age: 77
End: 2022-05-31
Payer: COMMERCIAL

## 2022-05-31 DIAGNOSIS — Z95.810 PRESENCE OF AUTOMATIC CARDIOVERTER/DEFIBRILLATOR (AICD): Primary | ICD-10-CM

## 2022-05-31 PROCEDURE — 93297 REM INTERROG DEV EVAL ICPMS: CPT | Performed by: INTERNAL MEDICINE

## 2022-05-31 PROCEDURE — G2066 INTER DEVC REMOTE 30D: HCPCS | Performed by: INTERNAL MEDICINE

## 2022-05-31 NOTE — PROGRESS NOTES
Results for orders placed or performed in visit on 05/31/22   Cardiac EP device report    Narrative    MDT/BI-V ICD (DDDR MODE)/ ACTIVE SYSTEM IS MRI CONDITIONAL  CARELINK TRANSMISSION - OPTI-VOL ONLY: BATTERY VOLTAGE ADEQUATE (11 YRS)  AP: 98 2%  : 94 1% + VSRP: 2%  ALL AVAILABLE LEAD PARAMETERS WITHIN NORMAL LIMITS  NO SIGNIFICANT HIGH RATE EPISODES  1,388 V-SENSE EPISODES (10 MINS/D) W/ AVAIL MARKERS SHOWING FUSION & AP VS W/ HRS 61-82 BPM, MAX DURATION 1 MIN 57 SECS  OPTI-VOL WITHIN NORMAL LIMITS  APPROPRIATELY FUNCTIONING BI-V ICD    [address]

## 2022-06-01 DIAGNOSIS — E11.42 DIABETIC PERIPHERAL NEUROPATHY (HCC): ICD-10-CM

## 2022-06-01 RX ORDER — DICLOFENAC SODIUM AND MISOPROSTOL 50; 200 MG/1; UG/1
1 TABLET, DELAYED RELEASE ORAL 2 TIMES DAILY
Qty: 180 TABLET | Refills: 3 | OUTPATIENT
Start: 2022-06-01

## 2022-06-06 DIAGNOSIS — G62.9 NEUROPATHY: ICD-10-CM

## 2022-06-06 DIAGNOSIS — I50.41 ACUTE COMBINED SYSTOLIC AND DIASTOLIC HEART FAILURE (HCC): ICD-10-CM

## 2022-06-06 RX ORDER — METOPROLOL SUCCINATE 25 MG/1
TABLET, EXTENDED RELEASE ORAL
Qty: 30 TABLET | Refills: 0 | Status: SHIPPED | OUTPATIENT
Start: 2022-06-06 | End: 2022-07-11

## 2022-06-06 RX ORDER — GABAPENTIN 300 MG/1
CAPSULE ORAL
Qty: 240 CAPSULE | Refills: 0 | Status: SHIPPED | OUTPATIENT
Start: 2022-06-06 | End: 2022-07-11

## 2022-06-20 DIAGNOSIS — E03.9 HYPOTHYROIDISM, UNSPECIFIED TYPE: ICD-10-CM

## 2022-06-20 DIAGNOSIS — F32.A DEPRESSION, UNSPECIFIED DEPRESSION TYPE: ICD-10-CM

## 2022-06-20 RX ORDER — VENLAFAXINE 75 MG/1
TABLET ORAL
Qty: 90 TABLET | Refills: 0 | Status: SHIPPED | OUTPATIENT
Start: 2022-06-20 | End: 2022-08-08

## 2022-06-20 RX ORDER — LEVOTHYROXINE SODIUM 0.12 MG/1
TABLET ORAL
Qty: 30 TABLET | Refills: 0 | Status: SHIPPED | OUTPATIENT
Start: 2022-06-20 | End: 2022-07-25

## 2022-06-30 ENCOUNTER — REMOTE DEVICE CLINIC VISIT (OUTPATIENT)
Dept: CARDIOLOGY CLINIC | Facility: CLINIC | Age: 77
End: 2022-06-30
Payer: COMMERCIAL

## 2022-06-30 DIAGNOSIS — Z95.810 PRESENCE OF AUTOMATIC CARDIOVERTER/DEFIBRILLATOR (AICD): Primary | ICD-10-CM

## 2022-06-30 PROCEDURE — 93297 REM INTERROG DEV EVAL ICPMS: CPT | Performed by: INTERNAL MEDICINE

## 2022-06-30 PROCEDURE — G2066 INTER DEVC REMOTE 30D: HCPCS | Performed by: INTERNAL MEDICINE

## 2022-06-30 NOTE — PROGRESS NOTES
MDT/BI-V ICD (DDDR MODE)/ ACTIVE SYSTEM IS MRI CONDITIONAL   CARELINK TRANSMISSION - OPTI-VOL ONLY: 2 PDF'S ATTACHED; OPTI-VOL WITHIN NORMAL LIMITS  BATTERY VOLTAGE ADEQUATE (10 8  YRS)  AP 96 8% BVP 87 6% ( 85 2% + VSRP 2 4%/<90% - DDDR 60)  INCREASE TO PVC COUNTERS OVER LAST 30 DAYS (565 5/HR); ALL AVAILABLE LEAD PARAMETERS WITHIN NORMAL LIMITS  NO SIGNIFICANT HIGH RATE EPISODES  492 V-SENSE EPISODES W/ AVAIL MARKERS SHOWING PVC'S, VSRP, FUSION BEATS  EF 50% (4/2022 ECHO)  PATIENT ON ELIQUIS, DOFETILIDE, METOPROLOL SUCC  TASK TO DR NOLEN   NORMAL DEVICE FUNCTION   ES

## 2022-07-09 DIAGNOSIS — G62.9 NEUROPATHY: ICD-10-CM

## 2022-07-10 DIAGNOSIS — I50.41 ACUTE COMBINED SYSTOLIC AND DIASTOLIC HEART FAILURE (HCC): ICD-10-CM

## 2022-07-11 RX ORDER — METOPROLOL SUCCINATE 25 MG/1
TABLET, EXTENDED RELEASE ORAL
Qty: 30 TABLET | Refills: 0 | Status: SHIPPED | OUTPATIENT
Start: 2022-07-11 | End: 2022-08-12

## 2022-07-11 RX ORDER — GABAPENTIN 300 MG/1
CAPSULE ORAL
Qty: 240 CAPSULE | Refills: 0 | Status: SHIPPED | OUTPATIENT
Start: 2022-07-11 | End: 2022-08-08

## 2022-07-24 DIAGNOSIS — E03.9 HYPOTHYROIDISM, UNSPECIFIED TYPE: ICD-10-CM

## 2022-07-25 RX ORDER — LEVOTHYROXINE SODIUM 0.12 MG/1
TABLET ORAL
Qty: 30 TABLET | Refills: 0 | Status: SHIPPED | OUTPATIENT
Start: 2022-07-25 | End: 2022-08-22

## 2022-07-27 ENCOUNTER — APPOINTMENT (OUTPATIENT)
Dept: RADIOLOGY | Facility: AMBULARY SURGERY CENTER | Age: 77
End: 2022-07-27
Attending: ORTHOPAEDIC SURGERY
Payer: COMMERCIAL

## 2022-07-27 ENCOUNTER — APPOINTMENT (OUTPATIENT)
Dept: LAB | Facility: AMBULARY SURGERY CENTER | Age: 77
End: 2022-07-27
Attending: ORTHOPAEDIC SURGERY
Payer: COMMERCIAL

## 2022-07-27 ENCOUNTER — OFFICE VISIT (OUTPATIENT)
Dept: OBGYN CLINIC | Facility: CLINIC | Age: 77
End: 2022-07-27
Payer: COMMERCIAL

## 2022-07-27 VITALS — BODY MASS INDEX: 39.93 KG/M2 | HEIGHT: 62 IN | WEIGHT: 217 LBS

## 2022-07-27 DIAGNOSIS — M25.561 CHRONIC PAIN OF BOTH KNEES: Primary | ICD-10-CM

## 2022-07-27 DIAGNOSIS — M25.562 LEFT KNEE PAIN, UNSPECIFIED CHRONICITY: ICD-10-CM

## 2022-07-27 DIAGNOSIS — E11.69 TYPE 2 DIABETES MELLITUS WITH OTHER SPECIFIED COMPLICATION, UNSPECIFIED WHETHER LONG TERM INSULIN USE (HCC): ICD-10-CM

## 2022-07-27 DIAGNOSIS — M17.0 PRIMARY OSTEOARTHRITIS OF BOTH KNEES: ICD-10-CM

## 2022-07-27 DIAGNOSIS — G89.29 CHRONIC PAIN OF BOTH KNEES: Primary | ICD-10-CM

## 2022-07-27 DIAGNOSIS — M25.562 CHRONIC PAIN OF BOTH KNEES: Primary | ICD-10-CM

## 2022-07-27 DIAGNOSIS — M25.561 RIGHT KNEE PAIN, UNSPECIFIED CHRONICITY: ICD-10-CM

## 2022-07-27 LAB
EST. AVERAGE GLUCOSE BLD GHB EST-MCNC: 131 MG/DL
HBA1C MFR BLD: 6.2 %

## 2022-07-27 PROCEDURE — 83036 HEMOGLOBIN GLYCOSYLATED A1C: CPT

## 2022-07-27 PROCEDURE — 73562 X-RAY EXAM OF KNEE 3: CPT

## 2022-07-27 PROCEDURE — 1160F RVW MEDS BY RX/DR IN RCRD: CPT | Performed by: ORTHOPAEDIC SURGERY

## 2022-07-27 PROCEDURE — 36415 COLL VENOUS BLD VENIPUNCTURE: CPT

## 2022-07-27 PROCEDURE — 99204 OFFICE O/P NEW MOD 45 MIN: CPT | Performed by: ORTHOPAEDIC SURGERY

## 2022-07-27 NOTE — PROGRESS NOTES
Assessment:  1  Left knee pain, unspecified chronicity     2  Right knee pain, unspecified chronicity       Patient Active Problem List   Diagnosis    Atrial fibrillation ([facility]    Coronary artery disease    Hyperlipidemia    Aortic stenosis    Primary osteoarthritis of both knees    Class 3 severe obesity in adult [facility])    Diabetic peripheral neuropathy ([facility]    Type 2 diabetes mellitus, without long-term current use of insulin (HCC)    Stage 4 chronic kidney disease ([facility]    Elevated troponin    Chronic combined systolic and diastolic heart failure ([facility]    Depression    DNR (do not resuscitate)    Torn rotator cuff    Left bundle branch block (LBBB)    Closed fracture of one rib of left side    Moderate mitral regurgitation    Hypotension    Lifevest discharge    CKD (chronic kidney disease)    Left knee pain    Pulmonary fibrosis ([facility]    Hypothyroidism    Abnormal SPEP    [facility]ische[facility] ([facility]   4/21        Plan    The patient has an examination and x-rays consistent with severe bilateral knee OA  I have discussed with the patient the pathophysiology of this diagnosis and reviewed how the examination correlates with this diagnosis  Surgical vs conservative treatment options were discussed at length  Explained that due to multiple comorbidities she is not a surgical candidate  · I cannot provided a CS injection today as patient's last hemoglobin A1C was in 8/4/21 and she has had multiple elevated  Fingerstick glucose results  · New order was placed pending results we will consider a CS injection at a later date  · I cannot provided her with a Toradol injection due to her Stage 4 kidney DS  · Euflexxa injections ordered today  · Referral to pain mgmt for long term pain control  · Also recommend patient follow up with her PCP as she has not seen them in the office in quit a long time       Follow up for [facility]             Subjective:     Patient ID:    Chief Complaint:Laura Sarkar 64 y o  female      HPI    Patient comes in today with regards to bilateral knee pain  The patient reports that the pain is in the anteromedial aspect of the knees and has been going on for several years  Patient was admitted to the hospital for AFIB 1/16/22  patient had a pacemaker and defibrillator placed  Patient presents in a wheel chair as it is too painful to walk  The pain is rated at0 at its best and10 at its worst   The pain is described as sharp  It is worsened with WB, and is made better with rest        The following portions of the patient's history were reviewed and updated as appropriate: allergies, current medications, past family history, past social history, past surgical history and problem list         Social History     Socioeconomic History    Marital status: /Civil Union     Spouse name: Not on file    Number of children: Not on file    Years of education: Not on file    Highest education level: Not on file   Occupational History    Occupation: RETIRED   Tobacco Use    Smoking status: Never Smoker    Smokeless tobacco: Never Used   Vaping Use    Vaping Use: Never used   Substance and Sexual Activity    Alcohol use: Never     Comment: Social per [employer]     Drug use: Never    Sexual activity: Not Currently   Other Topics Concern    Not on file   Social History Narrative    Hx of daily cola consumption (unk cans/day)    Daily tea consumption (unk cups/day)    Multiple organ donor    Patient [place] living will    [place] [id number] in existence    Uses safety equipment - Seatbelts      Social Determinants of Health     Financial Resource Strain: Not on file   Food Insecurity: No Food Insecurity    Worried About Running Out of Food in the Last Year: Never true    Jono of Food in the Last Year: Never true   Transportation Needs: No Transportation Needs    Lack of Transportation (Medical): No    Lack of Transportation (Non-Medical):  No Physical Activity: Not on file   Stress: Not on file   Social Connections: Not on file   Intimate Partner Violence: Not on file   Housing Stability: Low Risk     Unable to Pay for Housing in the Last Year: No    Number of Places Lived in the Last Year: 1    Unstable Housing in the Last Year: No     Past Medical History:   Diagnosis Date    Aortic stenosis     Arthritis     Asthma     Coronary artery disease     Diabetes mellitus ([place] [address] )     Diabetic peripheral neuropathy ([place] [address] )     Last assessed - 1/27/16    Gallbladder disease     Heart attack ([place] [address] ) 07/1999    Hemorrhoids     Last assessed - 11/16/12    Hypoglycemia 12/23/2021    Myocardial infarction ([place] [address] )     Old myocardial infarction     Type 2 diabetes mellitus with autonomic neuropathy ([place] [address] )     Unspec long term insulin use status, Last assessed - 6/8/17     Past Surgical History:   Procedure Laterality Date    BUNIONECTOMY      CARDIAC CATHETERIZATION      Carotid Artery, Resolved - 7/1/13    CARDIAC CATHETERIZATION N/A 12/27/2021    Procedure: CARDIAC CATHETERIZATION ;  Surgeon: Mica Roberts MD;  [facility]; Service: Cardiology    CARDIAC CATHETERIZATION N/A 12/27/2021    Procedure: Cardiac Coronary Angiogram;  Surgeon: Mica Roberts MD;  [facility]; Service: Cardiology    CARDIAC ELECTROPHYSIOLOGY PROCEDURE N/A 1/18/2022    Procedure: Cardiac biv icd implant;  Surgeon: Yessy Barnard DO;  [facility]; Service: Cardiology    CARDIOVASCULAR STRESS TEST  09/1999    CHOLECYSTECTOMY      COLONOSCOPY  2017    FOOT ARTHRODESIS, MODIFIED DONOHUE  2016    GALLBLADDER SURGERY  1970    HEMORROIDECTOMY      KNEE ARTHROSCOPY Left 2004    GA COLONOSCOPY FLX DX W/COLLJ [facility] WHEN PFRMD N/A 8/21/2017    Procedure: COLONOSCOPY;  Surgeon: Kamran Paz MD;  Location: [facility];   Service: Colorectal    REPAIR KNEE LIGAMENT      REPAIR KNEE LIGAMENT Left 1986    REPAIR KNEE LIGAMENT Right 1988     Allergies   Allergen Reactions    Lyrica [Pregabalin] Swelling    Sulfa Antibiotics Swelling     Current Outpatient Medications on File Prior to Visit   Medication Sig Dispense Refill    acetaminophen (TYLENOL) 325 mg tablet Take 2 tablets by mouth every 6 (six) hours as needed        apixaban (Eliquis) 5 mg Take 1 tablet (5 mg total) by mouth every 12 (twelve) hours 180 tablet 3    Camphor-Menthol-Methyl Sal 3 1-6-10 % PTCH Apply 1 patch topically in the morning 1 patch to left shoulder and 1 patch to left knee  On at 9:00 a m  and off at 9:00 p m  (Patient not taking: Reported on 4/28/2022 ) 60 patch 0    diclofenac-misoprostol (ARTHROTEC 50) 50-0 2 MG per tablet Take 1 tablet by mouth 2 (two) times a day 180 tablet 3    dofetilide (TIKOSYN) 125 mcg capsule Take 1 capsule (125 mcg total) by mouth every 12 (twelve) hours 180 capsule 3    gabapentin (NEURONTIN) 300 mg capsule TAKE 3 CAPSULES BY MOUTH EVERY MORNING, 2 CAPSULES IN THE AFTERNON AND 3 CAPSULES AT BEDTIME 240 capsule 0    levothyroxine 125 mcg tablet TAKE 1 TABLET BY MOUTH EVERY MORNING 30 tablet 0    losartan (COZAAR) 25 mg tablet Take 1 tablet (25 mg total) by mouth in the morning  90 tablet 3    metoprolol succinate (TOPROL-XL) 25 mg 24 hr tablet TAKE 1 TABLET BY MOUTH EVERY DAY 30 tablet 0    Multiple Vitamins-Minerals (ICAPS AREDS 2 PO) Take by mouth in the morning      pravastatin (PRAVACHOL) 10 mg tablet Take 1 tablet (10 mg total) by mouth daily 30 tablet 0    venlafaxine (EFFEXOR) 75 mg tablet TAKE 1 TABLET BY MOUTH THREE TIMES DAILY 90 tablet 0     No current facility-administered medications on file prior to visit  Objective:    Review of Systems    Right Knee Exam     Tenderness   The patient is experiencing tenderness in the medial joint line  Range of Motion   Extension: 5   Right knee flexion: 115       Comments:  Trophic skin changes lower leg without edema  Pre cellulitic condition  Valgus laxity  Without instability  No varus laxity       Left Knee Exam     Tenderness   The patient is experiencing tenderness in the medial joint line  Range of Motion   Left knee extension: 2  Left knee flexion: 115  Comments:  Trophic skin changes lower leg without edema  Pre cellulitic condition  Valgus laxity  Without instability   No varus laxity             Physical Exam  Vitals reviewed  Constitutional:       Appearance: She is well-developed  Eyes:      Pupils: Pupils are equal, round, and reactive to light  Pulmonary:      Effort: Pulmonary effort is normal       Breath sounds: Normal breath sounds  Skin:     General: Skin is warm and dry  Neurological:      Mental Status: She is alert and oriented to person, place, and time  Psychiatric:         Behavior: Behavior normal          Thought Content: Thought content normal          Judgment: Judgment normal          Procedures  No Procedures performed today    I have personally reviewed pertinent films in PACS and my interpretation is Bilateral knee x-rays demonstrates severe tricompartmental degenerative changes    no fracture or dislocation  Portions of the record may have been created with voice recognition software   Occasional wrong word or "sound a like" substitutions may have occurred due to the inherent limitations of voice recognition software   Read the chart carefully and recognize, using context, where substitutions have occurred

## 2022-07-29 ENCOUNTER — REMOTE DEVICE CLINIC VISIT (OUTPATIENT)
Dept: CARDIOLOGY CLINIC | Facility: CLINIC | Age: 77
End: 2022-07-29
Payer: COMMERCIAL

## 2022-07-29 DIAGNOSIS — Z95.810 PRESENCE OF AUTOMATIC CARDIOVERTER/DEFIBRILLATOR (AICD): Primary | ICD-10-CM

## 2022-07-29 PROCEDURE — 93296 REM INTERROG EVL PM/IDS: CPT | Performed by: INTERNAL MEDICINE

## 2022-07-29 PROCEDURE — 93295 DEV INTERROG REMOTE 1/2/MLT: CPT | Performed by: INTERNAL MEDICINE

## 2022-07-29 NOTE — PROGRESS NOTES
Results for orders placed or performed in visit on 07/29/22   Cardiac EP device report    Narrative    MDT/BI-V ICD (DDDR MODE)/ ACTIVE SYSTEM IS MRI CONDITIONAL  CARELINK TRANSMISSION: BATTERY VOLTAGE ADEQUATE (10 8 YRS)  AP: 98 9%  : 81 0% + VSRP: 2 2% (<90%~PVC COUNTERS: SINGLE: 490 8/H & 3 8/H RUNS FROM 6/29-7/28)  ALL AVAILABLE LEAD PARAMETERS WITHIN NORMAL LIMITS  NO SIGNIFICANT HIGH RATE EPISODES  2,454 V-SENSE EPISODES (14 MIN/D) W/ AVAIL MARKERS SHOWING FUSION  OPTI-VOL WITHIN NORMAL LIMITS  PT TAKES METOPROLOL SUCC, TIKOSYN, ELIQUIS  EF: 50% (ECHO 4/21/22)  APPROPRIATELY FUNCTIONING BI-V ICD    [address]

## 2022-08-04 ENCOUNTER — OFFICE VISIT (OUTPATIENT)
Dept: OBGYN CLINIC | Facility: CLINIC | Age: 77
End: 2022-08-04
Payer: COMMERCIAL

## 2022-08-04 VITALS — BODY MASS INDEX: 39.93 KG/M2 | WEIGHT: 217 LBS | HEIGHT: 62 IN

## 2022-08-04 DIAGNOSIS — M17.0 PRIMARY OSTEOARTHRITIS OF BOTH KNEES: Primary | ICD-10-CM

## 2022-08-04 PROCEDURE — 20610 DRAIN/INJ JOINT/BURSA W/O US: CPT | Performed by: ORTHOPAEDIC SURGERY

## 2022-08-04 RX ORDER — HYALURONATE SODIUM 10 MG/ML
20 SYRINGE (ML) INTRAARTICULAR
Status: DISCONTINUED | OUTPATIENT
Start: 2022-08-04 | End: 2022-08-05

## 2022-08-04 NOTE — PROGRESS NOTES
1  Primary osteoarthritis of both knees       Patient is here for her 1st injection of Orthovisc into the bilateral knee  Patient reports pain  All organ systems normal  Physical exam of the knee shows no effusion no ecchymosis  Large joint arthrocentesis: L knee  Universal Protocol:  Consent given by: patient  Time out: Immediately prior to procedure a "time out" was called to verify the correct patient, procedure, equipment, support staff and site/side marked as required    Supporting Documentation  Indications: pain   Procedure Details  Location: knee - L knee  Needle size: 22 G  Ultrasound guidance: no  Approach: anterolateral  Medications administered: 30 mg sodium hyaluronate 30 mg/2 mL    Patient tolerance: patient tolerated the procedure well with no immediate complications    Large joint arthrocentesis: R knee  Universal Protocol:  Consent given by: patient    Supporting Documentation  Indications: pain   Procedure Details  Location: knee - R knee  Needle size: 22 G  Ultrasound guidance: no  Approach: anterolateral  Medications administered: 30 mg sodium hyaluronate 30 mg/2 mL    Patient tolerance: patient tolerated the procedure well with no immediate complications          Patient tolerated procedure follow up 1 week

## 2022-08-08 DIAGNOSIS — F32.A DEPRESSION, UNSPECIFIED DEPRESSION TYPE: ICD-10-CM

## 2022-08-08 DIAGNOSIS — G62.9 NEUROPATHY: ICD-10-CM

## 2022-08-08 RX ORDER — GABAPENTIN 300 MG/1
CAPSULE ORAL
Qty: 240 CAPSULE | Refills: 0 | Status: SHIPPED | OUTPATIENT
Start: 2022-08-08 | End: 2022-08-31

## 2022-08-08 RX ORDER — VENLAFAXINE 75 MG/1
TABLET ORAL
Qty: 90 TABLET | Refills: 0 | Status: SHIPPED | OUTPATIENT
Start: 2022-08-08 | End: 2022-09-21

## 2022-08-12 ENCOUNTER — PROCEDURE VISIT (OUTPATIENT)
Dept: OBGYN CLINIC | Facility: CLINIC | Age: 77
End: 2022-08-12
Payer: COMMERCIAL

## 2022-08-12 DIAGNOSIS — I50.41 ACUTE COMBINED SYSTOLIC AND DIASTOLIC HEART FAILURE (HCC): ICD-10-CM

## 2022-08-12 DIAGNOSIS — M17.0 PRIMARY OSTEOARTHRITIS OF BOTH KNEES: Primary | ICD-10-CM

## 2022-08-12 PROCEDURE — 20610 DRAIN/INJ JOINT/BURSA W/O US: CPT | Performed by: ORTHOPAEDIC SURGERY

## 2022-08-12 RX ORDER — METOPROLOL SUCCINATE 25 MG/1
TABLET, EXTENDED RELEASE ORAL
Qty: 30 TABLET | Refills: 0 | Status: SHIPPED | OUTPATIENT
Start: 2022-08-12 | End: 2022-09-09

## 2022-08-12 NOTE — PROGRESS NOTES
1  Primary osteoarthritis of both knees       Patient is here for her 2nd injection of Orthovisc into the bilateral knee  Patient reports pain  All organ systems normal  Physical exam of the knee shows no effusion no ecchymosis  Large joint arthrocentesis: R knee  Universal Protocol:  Consent given by: patient    Supporting Documentation  Indications: pain   Procedure Details  Location: knee - R knee  Needle size: 22 G  Ultrasound guidance: no  Approach: anterolateral  Medications administered: 30 mg sodium hyaluronate 30 mg/2 mL    Patient tolerance: patient tolerated the procedure well with no immediate complications    Large joint arthrocentesis: L knee  Universal Protocol:  Consent given by: patient  Time out: Immediately prior to procedure a "time out" was called to verify the correct patient, procedure, equipment, support staff and site/side marked as required    Supporting Documentation  Indications: pain   Procedure Details  Location: knee - L knee  Needle size: 22 G  Ultrasound guidance: no  Approach: anterolateral  Medications administered: 30 mg sodium hyaluronate 30 mg/2 mL    Patient tolerance: patient tolerated the procedure well with no immediate complications          Patient tolerated procedure follow up 1 week

## 2022-08-16 DIAGNOSIS — R19.7 DIARRHEA, UNSPECIFIED TYPE: Primary | ICD-10-CM

## 2022-08-16 RX ORDER — LOPERAMIDE HYDROCHLORIDE 2 MG/1
CAPSULE ORAL
COMMUNITY
End: 2022-08-17 | Stop reason: SDUPTHER

## 2022-08-17 RX ORDER — LOPERAMIDE HYDROCHLORIDE 2 MG/1
2 CAPSULE ORAL 4 TIMES DAILY PRN
Qty: 56 CAPSULE | Refills: 1 | Status: SHIPPED | OUTPATIENT
Start: 2022-08-17

## 2022-08-22 DIAGNOSIS — E03.9 HYPOTHYROIDISM, UNSPECIFIED TYPE: ICD-10-CM

## 2022-08-22 RX ORDER — LEVOTHYROXINE SODIUM 0.12 MG/1
TABLET ORAL
Qty: 30 TABLET | Refills: 0 | Status: SHIPPED | OUTPATIENT
Start: 2022-08-22 | End: 2022-09-21

## 2022-08-25 ENCOUNTER — PROCEDURE VISIT (OUTPATIENT)
Dept: OBGYN CLINIC | Facility: CLINIC | Age: 77
End: 2022-08-25
Payer: COMMERCIAL

## 2022-08-25 VITALS — HEIGHT: 62 IN | BODY MASS INDEX: 39.93 KG/M2 | WEIGHT: 217 LBS

## 2022-08-25 DIAGNOSIS — M17.0 PRIMARY OSTEOARTHRITIS OF BOTH KNEES: Primary | ICD-10-CM

## 2022-08-25 PROCEDURE — 20610 DRAIN/INJ JOINT/BURSA W/O US: CPT | Performed by: PHYSICIAN ASSISTANT

## 2022-08-25 NOTE — PROGRESS NOTES
1  Primary osteoarthritis of both knees  Large joint arthrocentesis: bilateral knee     Patient is here for her 3rd injection of Orthovisc into the bilateral knee  Patient reports no improvement yet  Physical exam of the knee shows no effusion no ecchymosis  All other organ systems normal    Large joint arthrocentesis: bilateral knee  Universal Protocol:  Consent given by: patient  Time out: Immediately prior to procedure a "time out" was called to verify the correct patient, procedure, equipment, support staff and site/side marked as required  Site marked: the operative site was marked  Supporting Documentation  Indications: pain   Procedure Details  Location: knee - bilateral knee  Preparation: Patient was prepped and draped in the usual sterile fashion  Needle size: 22 G  Ultrasound guidance: no  Approach: anterolateral    Medications (Right): 30 mg sodium hyaluronate 30 mg/2 mLMedications (Left): 30 mg sodium hyaluronate 30 mg/2 mL   Patient tolerance: patient tolerated the procedure well with no immediate complications  Dressing:  Sterile dressing applied          Patient tolerated procedure follow up p r n

## 2022-08-29 ENCOUNTER — REMOTE DEVICE CLINIC VISIT (OUTPATIENT)
Dept: CARDIOLOGY CLINIC | Facility: CLINIC | Age: 77
End: 2022-08-29
Payer: COMMERCIAL

## 2022-08-29 DIAGNOSIS — E78.00 HYPERCHOLESTEROLEMIA: ICD-10-CM

## 2022-08-29 DIAGNOSIS — Z95.810 PRESENCE OF AUTOMATIC CARDIOVERTER/DEFIBRILLATOR (AICD): Primary | ICD-10-CM

## 2022-08-29 PROCEDURE — G2066 INTER DEVC REMOTE 30D: HCPCS | Performed by: INTERNAL MEDICINE

## 2022-08-29 PROCEDURE — 93297 REM INTERROG DEV EVAL ICPMS: CPT | Performed by: INTERNAL MEDICINE

## 2022-08-29 RX ORDER — PRAVASTATIN SODIUM 10 MG
TABLET ORAL
Qty: 90 TABLET | Refills: 1 | Status: SHIPPED | OUTPATIENT
Start: 2022-08-29

## 2022-08-29 NOTE — PROGRESS NOTES
Results for orders placed or performed in visit on 08/29/22   Cardiac EP device report    Narrative    MDT/BI-V ICD (DDDR MODE)/ ACTIVE SYSTEM IS MRI CONDITIONAL  CARELINK TRANSMISSION - OPTI-VOL ONLY: OPTI-VOL WITHIN NORMAL LIMITS  AP 98%  82% (<90%) VSR 2 5%  ALL AVAILABLE LEAD PARAMETERS WITHIN NORMAL LIMITS  NO SIGNIFICANT HIGH RATE EPISODES  VENTRICULAR SENSE EPISODES DETECTED  NORMAL DEVICE FUNCTION  ---WILBURN

## 2022-08-31 DIAGNOSIS — G62.9 NEUROPATHY: ICD-10-CM

## 2022-08-31 RX ORDER — GABAPENTIN 300 MG/1
CAPSULE ORAL
Qty: 240 CAPSULE | Refills: 0 | Status: SHIPPED | OUTPATIENT
Start: 2022-08-31 | End: 2022-10-05

## 2022-09-09 DIAGNOSIS — I50.41 ACUTE COMBINED SYSTOLIC AND DIASTOLIC HEART FAILURE (HCC): ICD-10-CM

## 2022-09-09 RX ORDER — METOPROLOL SUCCINATE 25 MG/1
TABLET, EXTENDED RELEASE ORAL
Qty: 30 TABLET | Refills: 0 | Status: SHIPPED | OUTPATIENT
Start: 2022-09-09 | End: 2022-10-14

## 2022-09-15 ENCOUNTER — APPOINTMENT (OUTPATIENT)
Dept: RADIOLOGY | Facility: AMBULARY SURGERY CENTER | Age: 77
End: 2022-09-15
Attending: ORTHOPAEDIC SURGERY
Payer: COMMERCIAL

## 2022-09-15 ENCOUNTER — OFFICE VISIT (OUTPATIENT)
Dept: OBGYN CLINIC | Facility: CLINIC | Age: 77
End: 2022-09-15
Payer: COMMERCIAL

## 2022-09-15 VITALS — HEIGHT: 62 IN | WEIGHT: 217 LBS | BODY MASS INDEX: 39.93 KG/M2

## 2022-09-15 DIAGNOSIS — M19.012 PRIMARY OSTEOARTHRITIS OF LEFT SHOULDER: Primary | ICD-10-CM

## 2022-09-15 DIAGNOSIS — M25.511 BILATERAL SHOULDER PAIN, UNSPECIFIED CHRONICITY: ICD-10-CM

## 2022-09-15 DIAGNOSIS — M25.512 BILATERAL SHOULDER PAIN, UNSPECIFIED CHRONICITY: ICD-10-CM

## 2022-09-15 DIAGNOSIS — M19.011 PRIMARY OSTEOARTHRITIS OF RIGHT SHOULDER: ICD-10-CM

## 2022-09-15 PROCEDURE — 73030 X-RAY EXAM OF SHOULDER: CPT

## 2022-09-15 PROCEDURE — 20610 DRAIN/INJ JOINT/BURSA W/O US: CPT | Performed by: ORTHOPAEDIC SURGERY

## 2022-09-15 PROCEDURE — 1160F RVW MEDS BY RX/DR IN RCRD: CPT | Performed by: ORTHOPAEDIC SURGERY

## 2022-09-15 PROCEDURE — 99213 OFFICE O/P EST LOW 20 MIN: CPT | Performed by: ORTHOPAEDIC SURGERY

## 2022-09-15 RX ORDER — BUPIVACAINE HYDROCHLORIDE 2.5 MG/ML
1 INJECTION, SOLUTION EPIDURAL; INFILTRATION; INTRACAUDAL
Status: COMPLETED | OUTPATIENT
Start: 2022-09-15 | End: 2022-09-15

## 2022-09-15 RX ORDER — BETAMETHASONE SODIUM PHOSPHATE AND BETAMETHASONE ACETATE 3; 3 MG/ML; MG/ML
12 INJECTION, SUSPENSION INTRA-ARTICULAR; INTRALESIONAL; INTRAMUSCULAR; SOFT TISSUE
Status: COMPLETED | OUTPATIENT
Start: 2022-09-15 | End: 2022-09-15

## 2022-09-15 RX ADMIN — BUPIVACAINE HYDROCHLORIDE 1 ML: 2.5 INJECTION, SOLUTION EPIDURAL; INFILTRATION; INTRACAUDAL at 12:46

## 2022-09-15 RX ADMIN — BETAMETHASONE SODIUM PHOSPHATE AND BETAMETHASONE ACETATE 12 MG: 3; 3 INJECTION, SUSPENSION INTRA-ARTICULAR; INTRALESIONAL; INTRAMUSCULAR; SOFT TISSUE at 12:46

## 2022-09-15 NOTE — PROGRESS NOTES
Assessment/Plan:  1  Primary osteoarthritis of left shoulder  Large joint arthrocentesis: bilateral glenohumeral   2  Primary osteoarthritis of right shoulder  Large joint arthrocentesis: bilateral glenohumeral   3  Bilateral shoulder pain, unspecified chronicity  XR shoulder 2+ vw left    XR shoulder 2+ vw right     Scribe Attestation    I,:  Christina Tian MA am acting as a scribe while in the presence of the attending physician :       I,:  Sara Orellana DO personally performed the services described in this documentation    as scribed in my presence :             I discussed with Gordoncurtis Mylene that her signs and symptoms are consistent with bilateral shoulder osteoarthritis  X-rays were reviewed which demonstrate severe bilateral shoulder osteoarthritis  The patient is aware surgical intervention would be in the form of a shoulder replacement  The patient is not interested in surgical intervention at this time  Treatment options were discussed in the form of steroid injections  The patient was agreeable to this  She consented and underwent bilateral glenohumeral joint injections in the office today without any complications  She is aware these can be repeated no more than 4 times a year  She may follow up with me as needed  Subjective:   Agustin Hill is a 76 y o  female who presents to the office today for evaluation of bilateral shoulder pain  She states her left shoulder is worse than her right  She notes pain to the anterior aspect of both of her shoulder that radiates to her elbows  She states the pain does not radiate past the elbow  She notes increased pain with range of motion and using her computer  She describes the pain as dull and achy  She denies prior surgery on her shoulders  She states she has a known rotator cuff tear on the left  She states she underwent injections to the posterior aspect of her back/shoulder in July with an outside physician that did not provide her with relief   She denies any numbness or tingling  The patient is a diabetic and her last A1 C was 6 2  Review of Systems   Constitutional: Negative for chills and fever  HENT: Negative for drooling and sneezing  Eyes: Negative for redness  Respiratory: Negative for cough and wheezing  Gastrointestinal: Negative for nausea and vomiting  Musculoskeletal: Positive for arthralgias and myalgias  Negative for joint swelling  Neurological: Negative for weakness and numbness  Psychiatric/Behavioral: Negative for behavioral problems  The patient is not nervous/anxious  Past Medical History:   Diagnosis Date    Aortic stenosis     Arthritis     Asthma     Coronary artery disease     [facility] mellitus [facility] )     Diabetic peripheral neuropathy ([facility] )     Last assessed - 1/27/16    Gallbladder disease     Heart attack ([facility] ) 07/1999    Hemorrhoids     Last assessed - 11/16/12    Hypoglycemia 12/23/2021    Myocardial infarction ([facility] )     Old myocardial infarction     Type 2 diabetes mellitus with autonomic neuropathy ([facility] )     Unspec long term insulin use status, Last assessed - 6/8/17       Past Surgical History:   Procedure Laterality Date    BUNIONECTOMY      CARDIAC CATHETERIZATION      Carotid Artery, Resolved - 7/1/13    CARDIAC CATHETERIZATION N/A 12/27/2021    Procedure: CARDIAC CATHETERIZATION ;  Surgeon: Nan Lowery MD;  [facility]; Service: Cardiology    CARDIAC CATHETERIZATION N/A 12/27/2021    Procedure: Cardiac Coronary Angiogram;  Surgeon: Nan Lowery MD;  [facility]; Service: Cardiology    CARDIAC ELECTROPHYSIOLOGY PROCEDURE N/A 1/18/2022    Procedure: Cardiac biv icd implant;  Surgeon: Adeline Astudillo DO;  [facility];   Service: Cardiology    CARDIOVASCULAR STRESS TEST  09/1999    CHOLECYSTECTOMY      COLONOSCOPY  2017    FOOT ARTHRODESIS, MODIFIED CHARANJIT  2016    GALLBLADDER SURGERY  1970    HEMORROIDECTOMY      KNEE ARTHROSCOPY Left 2004    [place] COLONOSCOPY FLX DX W/COLLJ SPEC WHEN PFRMD N/A 8/21/2017    Procedure: COLONOSCOPY;  Surgeon: Kimberly Conde MD;  Location: [facility] GI LAB; Service: Colorectal    REPAIR KNEE LIGAMENT      REPAIR KNEE LIGAMENT Left 1986    REPAIR KNEE LIGAMENT Right 1988       Family History   Problem Relation Age of Onset    Hypertension Father     Diabetes Father     Cancer Father         Throat    Arthritis Father     Stroke Mother     Diabetes Maternal Grandmother     Heart disease Maternal Grandfather     Diabetes Son     Lymphoma Family         Head, Face and Neck        Social History     Occupational History    Occupation: RETIRED   Tobacco Use    Smoking status: Never Smoker    Smokeless tobacco: Never Used   Vaping Use    Vaping Use: Never used   Substance and Sexual Activity    Alcohol use: Never     Comment: Social per [employer]     Drug use: Never    Sexual activity: Not Currently         Current Outpatient Medications:     acetaminophen (TYLENOL) 325 mg tablet, Take 2 tablets by mouth every 6 (six) hours as needed  , Disp: , Rfl:     apixaban (Eliquis) 5 mg, Take 1 tablet (5 mg total) by mouth every 12 (twelve) hours, Disp: 180 tablet, Rfl: 3    Camphor-Menthol-Methyl Sal 3 1-6-10 % PTCH, Apply 1 patch topically in the morning 1 patch to left shoulder and 1 patch to left knee    On at 9:00 a m  and off at 9:00 p m  (Patient not taking: Reported on 4/28/2022 ), Disp: 60 patch, Rfl: 0    diclofenac-misoprostol (ARTHROTEC 50) 50-0 2 MG per tablet, Take 1 tablet by mouth 2 (two) times a day, Disp: 180 tablet, Rfl: 3    dofetilide (TIKOSYN) 125 mcg capsule, Take 1 capsule (125 mcg total) by mouth every 12 (twelve) hours, Disp: 180 capsule, Rfl: 3    gabapentin (NEURONTIN) 300 mg capsule, TAKE 3 CAPSULES BY MOUTH EVERY MORNING, TAKE 2 CAPSULES BY MOUTH IN THE AFTERNOON, AND TAKE 3 CAPSULES BY MOUTH AT BEDTIME, Disp: 240 capsule, Rfl: 0    levothyroxine 125 mcg tablet, TAKE 1 TABLET BY MOUTH EVERY MORNING, Disp: 30 tablet, Rfl: 0    loperamide (IMODIUM) 2 mg capsule, Take 1 capsule (2 mg total) by mouth 4 (four) times a day as needed for diarrhea, Disp: 56 capsule, Rfl: 1    losartan (COZAAR) 25 mg tablet, Take 1 tablet (25 mg total) by mouth in the morning , Disp: 90 tablet, Rfl: 3    metoprolol succinate (TOPROL-XL) 25 mg 24 hr tablet, TAKE 1 TABLET BY MOUTH EVERY DAY, Disp: 30 tablet, Rfl: 0    Multiple Vitamins-Minerals (ICAPS AREDS 2 PO), Take by mouth in the morning, Disp: , Rfl:     pravastatin (PRAVACHOL) 10 mg tablet, TAKE 1 TABLET BY MOUTH EVERY DAY, Disp: 90 tablet, Rfl: 1    venlafaxine (EFFEXOR) 75 mg tablet, TAKE 1 TABLET BY MOUTH THREE TIMES DAILY, Disp: 90 tablet, Rfl: 0    Allergies   Allergen Reactions    Lyrica [Pregabalin] Swelling    Sulfa Antibiotics Swelling       Objective: There were no vitals filed for this visit  Right Shoulder Exam     Range of Motion   Active abduction: 50   Forward flexion: 80     Muscle Strength   Internal rotation: 4/5   External rotation: 1/5     Other   Erythema: absent  Sensation: normal  Pulse: present      Left Shoulder Exam     Range of Motion   Left shoulder active abduction: 45  Forward flexion: 80     Muscle Strength   Left shoulder normal muscle strength: 2+ external rotation  Internal rotation: 4/5     Other   Erythema: absent  Sensation: normal  Pulse: present             Physical Exam  Constitutional:       Appearance: She is well-developed  HENT:      Head: Normocephalic and atraumatic  Eyes:      General:         Right eye: No discharge  Left eye: No discharge  Conjunctiva/sclera: Conjunctivae normal    Cardiovascular:      Rate and Rhythm: Normal rate  Pulmonary:      Effort: Pulmonary effort is normal  No respiratory distress  Musculoskeletal:      Cervical back: Normal range of motion and neck supple  Comments: As noted in HPI   Skin:     General: Skin is warm and dry  Neurological:      Mental Status: She is alert and oriented to person, place, and time  Psychiatric:         Behavior: Behavior normal          Thought Content: Thought content normal          Judgment: Judgment normal      Large joint arthrocentesis: bilateral glenohumeral  Universal Protocol:  Consent: Verbal consent obtained  Consent given by: patient  Patient identity confirmed: verbally with patient    Supporting Documentation  Indications: pain   Procedure Details  Location: shoulder - bilateral glenohumeral  Preparation: Patient was prepped and draped in the usual sterile fashion  Needle size: 22 G  Ultrasound guidance: no    Medications (Right): 1 mL bupivacaine (PF) 0 25 %; 12 mg betamethasone acetate-betamethasone sodium phosphate 6 (3-3) mg/mLMedications (Left): 1 mL bupivacaine (PF) 0 25 %; 12 mg betamethasone acetate-betamethasone sodium phosphate 6 (3-3) mg/mL   Patient tolerance: patient tolerated the procedure well with no immediate complications  Dressing:  Sterile dressing applied            I have personally reviewed pertinent films in PACS and my interpretation is as follows:X-rays bilateral shoulder performed in the office today demonstrate severe bilateral shoulder osteoarthritis  This document was created using speech voice recognition software  Grammatical errors, random word insertions, pronoun errors, and incomplete sentences are an occasional consequence of this system due to software limitations, ambient noise, and hardware issues  Any formal questions or concerns about content, text, or information contained within the body of this dictation should be directly addressed to the provider for clarification

## 2022-09-21 DIAGNOSIS — E03.9 HYPOTHYROIDISM, UNSPECIFIED TYPE: ICD-10-CM

## 2022-09-21 DIAGNOSIS — F32.A DEPRESSION, UNSPECIFIED DEPRESSION TYPE: ICD-10-CM

## 2022-09-21 RX ORDER — VENLAFAXINE 75 MG/1
TABLET ORAL
Qty: 90 TABLET | Refills: 0 | Status: SHIPPED | OUTPATIENT
Start: 2022-09-21

## 2022-09-21 RX ORDER — LEVOTHYROXINE SODIUM 0.12 MG/1
TABLET ORAL
Qty: 30 TABLET | Refills: 0 | Status: SHIPPED | OUTPATIENT
Start: 2022-09-21 | End: 2022-10-18

## 2022-09-29 ENCOUNTER — REMOTE DEVICE CLINIC VISIT (OUTPATIENT)
Dept: CARDIOLOGY CLINIC | Facility: CLINIC | Age: 77
End: 2022-09-29
Payer: COMMERCIAL

## 2022-09-29 DIAGNOSIS — Z95.810 AICD (AUTOMATIC CARDIOVERTER/DEFIBRILLATOR) PRESENT: Primary | ICD-10-CM

## 2022-09-29 PROCEDURE — 93297 REM INTERROG DEV EVAL ICPMS: CPT | Performed by: INTERNAL MEDICINE

## 2022-09-29 PROCEDURE — G2066 INTER DEVC REMOTE 30D: HCPCS | Performed by: INTERNAL MEDICINE

## 2022-09-29 NOTE — PROGRESS NOTES
Results for orders placed or performed in visit on 09/29/22   Cardiac EP device report    Narrative    MDT/BI-V ICD (DDDR MODE)/ ACTIVE SYSTEM IS MRI CONDITIONAL  CARELINK TRANSMISSION - OPTI-VOL ONLY: OPTI-VOL WITHIN NORMAL LIMITS  BATTERY VOLTAGE ADEQUATE (10 7 YRS)  AP-97%, BVP-89% (<90% TOTAL -86 4%+VSR [email or web address])  ALL AVAILABLE LEAD PARAMETERS WITHIN NORMAL LIMITS  [address]  NORMAL DEVICE FUNCTION   GV

## 2022-10-05 DIAGNOSIS — G62.9 NEUROPATHY: ICD-10-CM

## 2022-10-05 RX ORDER — GABAPENTIN 300 MG/1
CAPSULE ORAL
Qty: 240 CAPSULE | Refills: 0 | Status: SHIPPED | OUTPATIENT
Start: 2022-10-05

## 2022-10-14 DIAGNOSIS — I50.41 ACUTE COMBINED SYSTOLIC AND DIASTOLIC HEART FAILURE (HCC): ICD-10-CM

## 2022-10-14 RX ORDER — METOPROLOL SUCCINATE 25 MG/1
TABLET, EXTENDED RELEASE ORAL
Qty: 30 TABLET | Refills: 0 | Status: SHIPPED | OUTPATIENT
Start: 2022-10-14

## 2022-10-18 DIAGNOSIS — E03.9 HYPOTHYROIDISM, UNSPECIFIED TYPE: ICD-10-CM

## 2022-10-18 RX ORDER — LEVOTHYROXINE SODIUM 0.12 MG/1
TABLET ORAL
Qty: 30 TABLET | Refills: 0 | Status: SHIPPED | OUTPATIENT
Start: 2022-10-18

## 2022-10-31 ENCOUNTER — REMOTE DEVICE CLINIC VISIT (OUTPATIENT)
Dept: CARDIOLOGY CLINIC | Facility: CLINIC | Age: 77
End: 2022-10-31

## 2022-10-31 DIAGNOSIS — Z95.810 PRESENCE OF AUTOMATIC CARDIOVERTER/DEFIBRILLATOR (AICD): Primary | ICD-10-CM

## 2022-10-31 NOTE — PROGRESS NOTES
Results for orders placed or performed in visit on 10/31/22   Cardiac EP device report    Narrative    MDT/BI-V ICD (DDDR MODE)/ ACTIVE SYSTEM IS MRI CONDITIONAL  CARELINK TRANSMISSION: BATTERY VOLTAGE ADEQUATE (10 6 YRS)  AP: 89 2%  : 88 1% + VSRP: 3 8%  ALL AVAILABLE LEAD PARAMETERS WITHIN NORMAL LIMITS  NO SIGNIFICANT HIGH RATE EPISODES  5,577 V-SENSE EPISODES (20 MINS/D) W/AVAIL MARKERS SHOWING FUSION  OPTI-VOL WITHIN NORMAL LIMITS  APPROPRIATELY FUNCTIONING BI-V ICD    [address]

## 2022-11-01 DIAGNOSIS — F32.A DEPRESSION, UNSPECIFIED DEPRESSION TYPE: ICD-10-CM

## 2022-11-01 DIAGNOSIS — G62.9 NEUROPATHY: ICD-10-CM

## 2022-11-02 RX ORDER — GABAPENTIN 300 MG/1
CAPSULE ORAL
Qty: 240 CAPSULE | Refills: 0 | Status: SHIPPED | OUTPATIENT
Start: 2022-11-02

## 2022-11-02 RX ORDER — VENLAFAXINE 75 MG/1
TABLET ORAL
Qty: 90 TABLET | Refills: 0 | Status: SHIPPED | OUTPATIENT
Start: 2022-11-02

## 2022-11-09 DIAGNOSIS — G62.9 NEUROPATHY: ICD-10-CM

## 2022-11-09 RX ORDER — GABAPENTIN 300 MG/1
CAPSULE ORAL
Qty: 240 CAPSULE | Refills: 0 | Status: CANCELLED | OUTPATIENT
Start: 2022-11-09

## 2022-11-16 DIAGNOSIS — I50.41 ACUTE COMBINED SYSTOLIC AND DIASTOLIC HEART FAILURE (HCC): ICD-10-CM

## 2022-11-16 DIAGNOSIS — E03.9 HYPOTHYROIDISM, UNSPECIFIED TYPE: ICD-10-CM

## 2022-11-16 RX ORDER — LEVOTHYROXINE SODIUM 0.12 MG/1
TABLET ORAL
Qty: 30 TABLET | Refills: 0 | Status: SHIPPED | OUTPATIENT
Start: 2022-11-16

## 2022-11-16 RX ORDER — METOPROLOL SUCCINATE 25 MG/1
TABLET, EXTENDED RELEASE ORAL
Qty: 30 TABLET | Refills: 0 | Status: SHIPPED | OUTPATIENT
Start: 2022-11-16

## 2022-12-01 ENCOUNTER — TELEPHONE (OUTPATIENT)
Dept: CARDIOLOGY CLINIC | Facility: CLINIC | Age: 77
End: 2022-12-01

## 2022-12-01 ENCOUNTER — REMOTE DEVICE CLINIC VISIT (OUTPATIENT)
Dept: CARDIOLOGY CLINIC | Facility: CLINIC | Age: 77
End: 2022-12-01

## 2022-12-01 DIAGNOSIS — Z95.810 PRESENCE OF AUTOMATIC CARDIOVERTER/DEFIBRILLATOR (AICD): Primary | ICD-10-CM

## 2022-12-01 NOTE — TELEPHONE ENCOUNTER
Spoke with pt  She feels fine  Denies edema, sob, or wt gain  She will call us if she has any CHF symptoms

## 2022-12-01 NOTE — PROGRESS NOTES
Results for orders placed or performed in visit on 12/01/22   Cardiac EP device report    Narrative    MDT/BI-V ICD (DDDR MODE)/ ACTIVE SYSTEM IS MRI CONDITIONAL  CARELINK TRANSMISSION - OPTI-VOL ONLY: BATTERY VOLTAGE ADEQUATE (10 5 YRS)  AP: 93 5%  : 90 3% + VSRP: 3%  ALL AVAILABLE LEAD PARAMETERS WITHIN NORMAL LIMITS  NO SIGNIFICANT HIGH RATE EPISODES  9,341 V-SENSE EPISODES (22 MINS/D) W/ AVAIL MARKERS SHOWING FUSION  OPTI-VOL FLUID THRESHOLD CROSSED SINCE 11/2 AND IS ONGOING  PT DOES NOT TAKE ANY DIURETICS  PT TAKES METOPROLOL SUCC, TIKOSYN, ELIQUIS  TASK TO HF TEAM  APPROPRIATELY FUNCTIONING BI-V ICD    [address]

## 2022-12-01 NOTE — TELEPHONE ENCOUNTER
Pts opti-vol crossed since 11/2 and is ongoing  Pt does not take any diuretics  Pt takes metoprolol succ, tikosyn, eliquis  EF" 50% (echo 04/21/22)  Thanks,   ~Leela PADILLA TRANSMISSION - OPTI-VOL ONLY: BATTERY VOLTAGE ADEQUATE (10 5 YRS)  AP: 93 5%  : 90 3% + VSRP: 3%  ALL AVAILABLE LEAD PARAMETERS WITHIN NORMAL LIMITS  NO SIGNIFICANT HIGH RATE EPISODES  7,592 V-SENSE EPISODES (22 MINS/D) W/ AVAIL MARKERS SHOWING FUSION  OPTI-VOL FLUID THRESHOLD CROSSED SINCE 11/2 AND IS ONGOING  PT DOES NOT TAKE ANY DIURETICS  PT TAKES METOPROLOL SUCC, TIKOSYN, ELIQUIS   TASK TO HF TEAM  APPROPRIATELY FUNCTIONING BI-V ICD  [facility]

## 2022-12-06 DIAGNOSIS — G62.9 NEUROPATHY: ICD-10-CM

## 2022-12-06 RX ORDER — GABAPENTIN 300 MG/1
CAPSULE ORAL
Qty: 240 CAPSULE | Refills: 0 | Status: SHIPPED | OUTPATIENT
Start: 2022-12-06

## 2022-12-20 DIAGNOSIS — F32.A DEPRESSION, UNSPECIFIED DEPRESSION TYPE: ICD-10-CM

## 2022-12-20 DIAGNOSIS — E03.9 HYPOTHYROIDISM, UNSPECIFIED TYPE: ICD-10-CM

## 2022-12-20 DIAGNOSIS — I50.41 ACUTE COMBINED SYSTOLIC AND DIASTOLIC HEART FAILURE (HCC): ICD-10-CM

## 2022-12-20 NOTE — TELEPHONE ENCOUNTER
Patient ended the phone call after I stated my name and where I was calling from  Attempted to call back, patient hung up again

## 2022-12-28 RX ORDER — LEVOTHYROXINE SODIUM 0.12 MG/1
TABLET ORAL
Qty: 30 TABLET | Refills: 0 | Status: SHIPPED | OUTPATIENT
Start: 2022-12-28

## 2022-12-28 RX ORDER — METOPROLOL SUCCINATE 25 MG/1
TABLET, EXTENDED RELEASE ORAL
Qty: 30 TABLET | Refills: 0 | Status: SHIPPED | OUTPATIENT
Start: 2022-12-28

## 2022-12-28 RX ORDER — VENLAFAXINE 75 MG/1
TABLET ORAL
Qty: 90 TABLET | Refills: 0 | Status: SHIPPED | OUTPATIENT
Start: 2022-12-28

## 2023-01-03 ENCOUNTER — REMOTE DEVICE CLINIC VISIT (OUTPATIENT)
Dept: CARDIOLOGY CLINIC | Facility: CLINIC | Age: 78
End: 2023-01-03

## 2023-01-03 DIAGNOSIS — Z95.810 PRESENCE OF AUTOMATIC CARDIOVERTER/DEFIBRILLATOR (AICD): Primary | ICD-10-CM

## 2023-01-03 NOTE — PROGRESS NOTES
MDT/BI-V ICD (DDDR MODE)/ ACTIVE SYSTEM IS MRI CONDITIONAL   CARELINK TRANSMISSION - OPTI-VOL ONLY: OPTI-VOL FLUID THRESHOLD WAS CROSSED BUT NOW WITHIN NORMAL RANGE  BATTERY VOLTAGE ADEQUATE (10 4 YRS)  AP 92 6% BVP 90 2% ( 84 4% + VSR PACE 2 8%/DDDR 60)  ALL AVAILABLE LEAD PARAMETERS WITHIN NORMAL LIMITS  NO SIGNIFICANT HIGH RATE EPISODES  1,152 V-SENSE EPISODES W/AVAIL MARKERS SHOWING FUSION, VSRP (AVG  - 28 MINS/DAY)   NORMAL DEVICE FUNCTION   ES

## 2023-01-04 DIAGNOSIS — G62.9 NEUROPATHY: ICD-10-CM

## 2023-01-04 RX ORDER — GABAPENTIN 300 MG/1
CAPSULE ORAL
Qty: 240 CAPSULE | Refills: 0 | Status: SHIPPED | OUTPATIENT
Start: 2023-01-04

## 2023-01-06 ENCOUNTER — OFFICE VISIT (OUTPATIENT)
Dept: OBGYN CLINIC | Facility: CLINIC | Age: 78
End: 2023-01-06

## 2023-01-06 VITALS
HEART RATE: 65 BPM | HEIGHT: 62 IN | WEIGHT: 217 LBS | BODY MASS INDEX: 39.93 KG/M2 | DIASTOLIC BLOOD PRESSURE: 79 MMHG | SYSTOLIC BLOOD PRESSURE: 120 MMHG

## 2023-01-06 DIAGNOSIS — M17.0 PRIMARY OSTEOARTHRITIS OF BOTH KNEES: ICD-10-CM

## 2023-01-06 DIAGNOSIS — M19.019 SHOULDER ARTHRITIS: Primary | ICD-10-CM

## 2023-01-06 RX ORDER — BUPIVACAINE HYDROCHLORIDE 2.5 MG/ML
2 INJECTION, SOLUTION INFILTRATION; PERINEURAL
Status: COMPLETED | OUTPATIENT
Start: 2023-01-06 | End: 2023-01-06

## 2023-01-06 RX ORDER — LIDOCAINE HYDROCHLORIDE 10 MG/ML
1 INJECTION, SOLUTION INFILTRATION; PERINEURAL
Status: COMPLETED | OUTPATIENT
Start: 2023-01-06 | End: 2023-01-06

## 2023-01-06 RX ORDER — BETAMETHASONE SODIUM PHOSPHATE AND BETAMETHASONE ACETATE 3; 3 MG/ML; MG/ML
6 INJECTION, SUSPENSION INTRA-ARTICULAR; INTRALESIONAL; INTRAMUSCULAR; SOFT TISSUE
Status: COMPLETED | OUTPATIENT
Start: 2023-01-06 | End: 2023-01-06

## 2023-01-06 RX ADMIN — BETAMETHASONE SODIUM PHOSPHATE AND BETAMETHASONE ACETATE 6 MG: 3; 3 INJECTION, SUSPENSION INTRA-ARTICULAR; INTRALESIONAL; INTRAMUSCULAR; SOFT TISSUE at 11:36

## 2023-01-06 RX ADMIN — LIDOCAINE HYDROCHLORIDE 1 ML: 10 INJECTION, SOLUTION INFILTRATION; PERINEURAL at 11:36

## 2023-01-06 RX ADMIN — BUPIVACAINE HYDROCHLORIDE 2 ML: 2.5 INJECTION, SOLUTION INFILTRATION; PERINEURAL at 11:36

## 2023-01-06 NOTE — PROGRESS NOTES
Assessment:  1  Shoulder arthritis  Large joint arthrocentesis: L glenohumeral    Large joint arthrocentesis: R glenohumeral      2  Primary osteoarthritis of both knees          Patient Active Problem List   Diagnosis   • Atrial fibrillation ([facility] )   • Coronary artery disease   • Hyperlipidemia   • Aortic stenosis   • Primary osteoarthritis of both knees   • Class 3 severe obesity in adult [facility])   • Diabetic peripheral neuropathy (HCC)   • Type 2 diabetes mellitus, without long-term current use of insulin ([facility])   • Stage 4 chronic kidney disease ([facility])   • Elevated troponin   • Chronic combined systolic and diastolic heart failure ([facility] )   • Depression   • DNR (do not resuscitate)   • Torn rotator cuff   • Left bundle branch block (LBBB)   • Closed fracture of one rib of left side   • Moderate mitral regurgitation   • Hypotension   • Lifevest discharge   • CKD (chronic kidney disease)   • Left knee pain   • Pulmonary fibrosis ([facility])   • Hypothyroidism   • Abnormal SPEP   • Nonischemic cardiomyopathy ([facility] )           Plan      Follow-up in 2 weeks for injections of cortisone into both knees and possible sample injections into both shoulders of lubricant injection            Subjective:     Patient ID:    Chief Complaint:Laura Sarkar 76 y o  female      HPI    Patient comes in today with regards to bilateral shoulder pain  She is arthritis she had a cortisone injection back in September she did very well till about a couple weeks ago  She also reports that her knees have been acting up and she would like to come back for cortisone injections for those in 2 weeks because of her diabetes we talked about holding off on doing them for a couple weeks just to let her sugars go down from the cortisone injections today        The following portions of the patient's history were reviewed and updated as appropriate: allergies, current medications, past family history, past social history, past surgical history and problem list     All organ systems normal    Social History     Socioeconomic History   • Marital status: /Civil Union     Spouse name: Not on file   • Number of children: Not on file   • Years of education: Not on file   • Highest education level: Not on file   Occupational History   • Occupation: RETIRED   Tobacco Use   • Smoking status: Never   • Smokeless tobacco: Never   Vaping Use   • Vaping Use: Never used   Substance and Sexual Activity   • Alcohol use: Never     Comment: Social per [employer]    • Drug use: Never   • Sexual activity: Not Currently   Other Topics Concern   • Not on file   Social History Narrative    Hx of daily cola consumption (unk cans/day)    Daily tea consumption (unk cups/day)    Multiple organ donor    Patient has living will    Power of  in existence    Uses safety equipment - Seatbelts      Social Determinants of Health     Financial Resource Strain: Not on file   Food Insecurity: No Food Insecurity   • Worried About Running Out of Food in the Last Year: Never true   • Ran Out of Food in the Last Year: Never true   Transportation Needs: No Transportation Needs   • Lack of Transportation (Medical): No   • Lack of Transportation (Non-Medical):  No   Physical Activity: Not on file   Stress: Not on file   Social Connections: Not on file   Intimate Partner Violence: Not on file   Housing Stability: Low Risk    • Unable to Pay for Housing in the Last Year: No   • Number of Places Lived in the Last Year: 1   • Unstable Housing in the Last Year: No     Past Medical History:   Diagnosis Date   • Aortic stenosis    • Arthritis    • Asthma    • Coronary artery disease    • Diabetes mellitus ([facility] )    • Diabetic peripheral neuropathy ([facility] )     Last assessed - 1/27/16   • Gallbladder disease    • Heart attack ([facility] ) 07/1999   • Hemorrhoids     Last assessed - 11/16/12   • Hypoglycemia 12/23/2021   • Myocardial infarction [facility])    • Old myocardial infarction    • Type 2 diabetes mellitus with autonomic neuropathy ([facility] )     Unspec long term insulin use status, Last assessed - 6/8/17     Past Surgical History:   Procedure Laterality Date   • BUNIONECTOMY     • CARDIAC CATHETERIZATION      Carotid Artery, Resolved - 7/1/13   • CARDIAC CATHETERIZATION N/A 12/27/2021    Procedure: CARDIAC CATHETERIZATION ;  Surgeon: Elana Hobson MD;  [facility]; Service: Cardiology   • CARDIAC CATHETERIZATION N/A 12/27/2021    Procedure: Cardiac Coronary Angiogram;  Surgeon: Elana Hobson MD;  [facility]; Service: Cardiology   • CARDIAC ELECTROPHYSIOLOGY PROCEDURE N/A 1/18/2022    Procedure: Cardiac biv icd implant;  Surgeon: Nathan Mcnair DO;  [facility]; Service: Cardiology   • CARDIOVASCULAR STRESS TEST  09/1999   • CHOLECYSTECTOMY     • COLONOSCOPY  2017   • FOOT ARTHRODESIS, MODIFIED DONOHUE  2016   • GALLBLADDER SURGERY  1970   • HEMORROIDECTOMY     • KNEE ARTHROSCOPY Left 2004   • [place] COLONOSCOPY FLX DX W/COLLJ SPEC WHEN PFRMD N/A 8/21/2017    Procedure: COLONOSCOPY;  Surgeon: Matias Jaed MD;  Location: [facility] GI [facility]; Service: Colorectal   • REPAIR KNEE LIGAMENT     • REPAIR KNEE LIGAMENT Left 1986   • REPAIR KNEE LIGAMENT Right 1988     Allergies   Allergen Reactions   • Lyrica [Pregabalin] Swelling   • Sulfa Antibiotics Swelling     Current Outpatient Medications on File Prior to Visit   Medication Sig Dispense Refill   • acetaminophen (TYLENOL) 325 mg tablet Take 2 tablets by mouth every 6 (six) hours as needed       • apixaban (Eliquis) 5 mg Take 1 tablet (5 mg total) by mouth every 12 (twelve) hours 180 tablet 3   • Camphor-Menthol-Methyl Sal 3 1-6-10 % PTCH Apply 1 patch topically in the morning 1 patch to left shoulder and 1 patch to left knee    On at 9:00 a m  and off at 9:00 p m  (Patient not taking: Reported on 4/28/2022 ) 60 patch 0   • diclofenac-misoprostol (ARTHROTEC 50) 50-0 2 MG per tablet Take 1 tablet by mouth 2 (two) times a day 180 tablet 3   • dofetilide (TIKOSYN) 125 mcg capsule Take 1 capsule (125 mcg total) by mouth every 12 (twelve) hours 180 capsule 3   • gabapentin (NEURONTIN) 300 mg capsule TAKE 3 CAPSULES BY MOUTH EVERY MORNING, TAKE 2 CAPSULES BY MOUTH IN THE AFTERNOON, THEN TAKE 3 CAPSULES BY MOUTH IN THE EVENING 240 capsule 0   • levothyroxine 125 mcg tablet TAKE 1 TABLET BY MOUTH EVERY MORNING 30 tablet 0   • loperamide (IMODIUM) 2 mg capsule Take 1 capsule (2 mg total) by mouth 4 (four) times a day as needed for diarrhea 56 capsule 1   • losartan (COZAAR) 25 mg tablet Take 1 tablet (25 mg total) by mouth in the morning  90 tablet 3   • metoprolol succinate (TOPROL-XL) 25 mg 24 hr tablet TAKE 1 TABLET BY MOUTH EVERY DAY 30 tablet 0   • Multiple Vitamins-Minerals (ICAPS AREDS 2 PO) Take by mouth in the morning     • pravastatin (PRAVACHOL) 10 mg tablet TAKE 1 TABLET BY MOUTH EVERY DAY 90 tablet 1   • venlafaxine (EFFEXOR) 75 mg tablet TAKE 1 TABLET BY MOUTH THREE TIMES DAILY 90 tablet 0     No current facility-administered medications on file prior to visit  Objective:    Large joint arthrocentesis: L glenohumeral  Universal Protocol:  Consent given by: patient    Supporting Documentation  Indications: pain   Procedure Details  Location: shoulder - L glenohumeral  Needle size: 22 G  Approach: posterior  Medications administered: 2 mL bupivacaine 0 25 %; 1 mL lidocaine 1 %; 6 mg betamethasone acetate-betamethasone sodium phosphate 6 (3-3) mg/mL    Patient tolerance: patient tolerated the procedure well with no immediate complications    Large joint arthrocentesis: R glenohumeral  Universal Protocol:  Consent given by: patient  Time out: Immediately prior to procedure a "time out" was called to verify the correct patient, procedure, equipment, support staff and site/side marked as required    Supporting Documentation  Indications: pain   Procedure Details  Location: shoulder - R glenohumeral  Needle size: 22 G  Approach: posterior  Medications administered: 2 mL bupivacaine 0 25 %; 1 mL lidocaine 1 %; 6 mg betamethasone acetate-betamethasone sodium phosphate 6 (3-3) mg/mL              Ortho Exam        Pain with range of motion crepitus with range of motion  Portions of the record may have been created with voice recognition software   Occasional wrong word or "sound a like" substitutions may have occurred due to the inherent limitations of voice recognition software   Read the chart carefully and recognize, using context, where substitutions have occurred

## 2023-01-20 ENCOUNTER — OFFICE VISIT (OUTPATIENT)
Dept: OBGYN CLINIC | Facility: CLINIC | Age: 78
End: 2023-01-20

## 2023-01-20 VITALS — WEIGHT: 217 LBS | BODY MASS INDEX: 39.93 KG/M2 | HEIGHT: 62 IN

## 2023-01-20 DIAGNOSIS — M19.011 PRIMARY OSTEOARTHRITIS OF RIGHT SHOULDER: ICD-10-CM

## 2023-01-20 DIAGNOSIS — M17.0 PRIMARY OSTEOARTHRITIS OF BOTH KNEES: Primary | ICD-10-CM

## 2023-01-20 RX ORDER — TRIAMCINOLONE ACETONIDE 40 MG/ML
80 INJECTION, SUSPENSION INTRA-ARTICULAR; INTRAMUSCULAR
Status: COMPLETED | OUTPATIENT
Start: 2023-01-20 | End: 2023-01-20

## 2023-01-20 RX ORDER — BUPIVACAINE HYDROCHLORIDE 2.5 MG/ML
2 INJECTION, SOLUTION INFILTRATION; PERINEURAL
Status: COMPLETED | OUTPATIENT
Start: 2023-01-20 | End: 2023-01-20

## 2023-01-20 RX ADMIN — TRIAMCINOLONE ACETONIDE 80 MG: 40 INJECTION, SUSPENSION INTRA-ARTICULAR; INTRAMUSCULAR at 11:20

## 2023-01-20 RX ADMIN — BUPIVACAINE HYDROCHLORIDE 2 ML: 2.5 INJECTION, SOLUTION INFILTRATION; PERINEURAL at 11:20

## 2023-01-20 NOTE — PROGRESS NOTES
Assessment:  1  Primary osteoarthritis of both knees        2  Primary osteoarthritis of right shoulder          Patient Active Problem List   Diagnosis   • Atrial fibrillation ([facility] )   • Coronary artery disease   • Hyperlipidemia   • Aortic stenosis   • Primary osteoarthritis of both knees   • Class 3 severe obesity in adult [facility])   • Diabetic peripheral neuropathy (HCC)   • Type 2 diabetes mellitus, without long-term current use of insulin ([facility])   • Stage 4 chronic kidney disease ([facility])   • Elevated troponin   • Chronic combined systolic and diastolic heart failure ([facility] )   • Depression   • DNR (do not resuscitate)   • Torn rotator cuff   • Left bundle branch block (LBBB)   • Closed fracture of one rib of left side   • Moderate mitral regurgitation   • Hypotension   • Lifevest discharge   • CKD (chronic kidney disease)   • Left knee pain   • Pulmonary fibrosis ([facility])   • Hypothyroidism   • Abnormal SPEP   • Nonischemic cardiomyopathy ([facility] )           Plan      Patient was offered, accepted, and received a cortisone injection of the bilateral knees  Risks and benefits of CSI were discussed with the patient  The corticosteroid injection was administered without any immediate complication and was well tolerated by the patient  This was done under sterile technique  Post injection instructions and expectations were discussed  It was explained to the patient that cortisone injections can be repeated as early as every 3 months  Patient was also administered sample Euflexxa for bilateral shoulders  Patient tolerated procedure well with no immediate complications  Follow up on an as-needed basis          Subjective:     Patient ID:    Chief Complaint:Laura Sarkar 76 y o  female      HPI    Patient presents with bilateral shoulder and knee osteoarthritis  She has found relief in the past from periodic injections of cortisone into both her shoulders and knees   She received cortisone injections at her last visit to bilateral shoulders with which she has found good relief       The following portions of the patient's history were reviewed and updated as appropriate: allergies, current medications, past family history, past social history, past surgical history and problem list         Social History     Socioeconomic History   • Marital status: /Civil Union     Spouse name: Not on file   • Number of children: Not on file   • Years of education: Not on file   • Highest education level: Not on file   Occupational History   • Occupation: RETIRED   Tobacco Use   • Smoking status: Never   • Smokeless tobacco: Never   Vaping Use   • Vaping Use: Never used   Substance and Sexual Activity   • Alcohol use: Never     Comment: Social per [employer]    • Drug use: Never   • Sexual activity: Not Currently   Other Topics Concern   • Not on file   Social History Narrative    Hx of daily cola consumption (unk cans/day)    Daily tea consumption (unk cups/day)    Multiple organ donor    Patient has living will    Power of  in existence    Uses safety equipment - Seatbelts      Social Determinants of Health     Financial Resource Strain: Not on file   Food Insecurity: Not on file   Transportation Needs: Not on file   Physical Activity: Not on file   Stress: Not on file   Social Connections: Not on file   Intimate Partner Violence: Not on file   Housing Stability: Not on file     Past Medical History:   Diagnosis Date   • Aortic stenosis    • Arthritis    • Asthma    • Coronary artery disease    • Diabetes mellitus ([facility] )    • Diabetic peripheral neuropathy ([facility] )     Last assessed - 1/27/16   • Gallbladder disease    • Heart attack ([facility] ) 07/1999   • Hemorrhoids     Last assessed - 11/16/12   • Hypoglycemia 12/23/2021   • Myocardial infarction [facility])    • Old myocardial infarction    • Type 2 diabetes mellitus with autonomic neuropathy ([facility] )     Unspec long term insulin use status, Last assessed - 6/8/17     Past Surgical History: Procedure Laterality Date   • BUNIONECTOMY     • CARDIAC CATHETERIZATION      Carotid Artery, Resolved - 7/1/13   • CARDIAC CATHETERIZATION N/A 12/27/2021    Procedure: CARDIAC CATHETERIZATION ;  Surgeon: Letty Parker MD;  [facility]; Service: Cardiology   • CARDIAC CATHETERIZATION N/A 12/27/2021    Procedure: Cardiac Coronary Angiogram;  Surgeon: Letty Parker MD;  [facility] CARD[facility]ATH [facility]; Service: Cardiology   • CARDIAC ELECTROPHYSIOLOGY PROCEDURE N/A 1/18/2022    Procedure: Cardiac biv icd implant;  Surgeon: Ramesh Barber DO;  Location: [facility] CARDIAC CATH LAB; Service: Cardiology   • CARDIOVASCULAR STRESS TEST  09/1999   • CHOLECYSTECTOMY     • COLONOSCOPY  2017   • FOOT ARTHRODESIS, MODIFIED DONOHUE  2016   • GALLBLADDER SURGERY  1970   • HEMORROIDECTOMY     • KNEE ARTHROSCOPY Left 2004   • LA COLONOSCOPY FLX DX W/COLLJ SPEC WHEN PFRMD N/A 8/21/2017    Procedure: COLONOSCOPY;  Surgeon: John Rowe MD;  Location: [facility] GI LAB; Service: Colorectal   • REPAIR KNEE LIGAMENT     • REPAIR KNEE LIGAMENT Left 1986   • REPAIR KNEE LIGAMENT Right 1988     Allergies   Allergen Reactions   • Lyrica [Pregabalin] Swelling   • Sulfa Antibiotics Swelling     Current Outpatient Medications on File Prior to Visit   Medication Sig Dispense Refill   • acetaminophen (TYLENOL) 325 mg tablet Take 2 tablets by mouth every 6 (six) hours as needed       • apixaban (Eliquis) 5 mg Take 1 tablet (5 mg total) by mouth every 12 (twelve) hours 180 tablet 3   • Camphor-Menthol-Methyl Sal 3 1-6-10 % PTCH Apply 1 patch topically in the morning 1 patch to left shoulder and 1 patch to left knee    On at 9:00 a m  and off at 9:00 p m  (Patient not taking: Reported on 4/28/2022 ) 60 patch 0   • diclofenac-misoprostol (ARTHROTEC 50) 50-0 2 MG per tablet Take 1 tablet by mouth 2 (two) times a day 180 tablet 3   • dofetilide (TIKOSYN) 125 mcg capsule Take 1 capsule (125 mcg total) by mouth every 12 (twelve) hours 180 capsule 3   • gabapentin (NEURONTIN) 300 mg capsule TAKE 3 CAPSULES BY MOUTH EVERY MORNING, TAKE 2 CAPSULES BY MOUTH IN THE AFTERNOON, THEN TAKE 3 CAPSULES BY MOUTH IN THE EVENING 240 capsule 0   • levothyroxine 125 mcg tablet TAKE 1 TABLET BY MOUTH EVERY MORNING 30 tablet 0   • loperamide (IMODIUM) 2 mg capsule Take 1 capsule (2 mg total) by mouth 4 (four) times a day as needed for diarrhea 56 capsule 1   • losartan (COZAAR) 25 mg tablet Take 1 tablet (25 mg total) by mouth in the morning  90 tablet 3   • metoprolol succinate (TOPROL-XL) 25 mg 24 hr tablet TAKE 1 TABLET BY MOUTH EVERY DAY 30 tablet 0   • Multiple Vitamins-Minerals (ICAPS AREDS 2 PO) Take by mouth in the morning     • pravastatin (PRAVACHOL) 10 mg tablet TAKE 1 TABLET BY MOUTH EVERY DAY 90 tablet 1   • venlafaxine (EFFEXOR) 75 mg tablet TAKE 1 TABLET BY MOUTH THREE TIMES DAILY 90 tablet 0     No current facility-administered medications on file prior to visit  Objective:    Review of Systems   Constitutional: Negative for chills and fever  HENT: Negative for ear pain and sore throat  Eyes: Negative for pain and visual disturbance  Respiratory: Negative for cough and shortness of breath  Cardiovascular: Negative for chest pain and palpitations  Gastrointestinal: Negative for abdominal pain and vomiting  Genitourinary: Negative for dysuria and hematuria  Musculoskeletal: Positive for arthralgias  Negative for back pain  Skin: Negative for color change and rash  Neurological: Negative for seizures and syncope  All other systems reviewed and are negative        Ortho Exam   Bilateral knees   Range of motion 5-110 with pain and crepitation  Tender with palpation medial and lateral joint lines  Brisk capillary refill  Sensation intact    Bilateral shoulders  Limited range of motion due to pain  Crepitation with shoulder range of motion  4/5 shoulder flexion, abduction    Physical Exam  Constitutional:       General: She is not in acute distress  Appearance: Normal appearance  HENT:      Head: Normocephalic and atraumatic  Eyes:      Extraocular Movements: Extraocular movements intact  Pupils: Pupils are equal, round, and reactive to light  Cardiovascular:      Pulses: Normal pulses  Pulmonary:      Effort: Pulmonary effort is normal  No respiratory distress  Breath sounds: Normal breath sounds  Musculoskeletal:      Cervical back: Normal range of motion and neck supple  Comments: As noted in HPI   Skin:     General: Skin is warm and dry  Neurological:      General: No focal deficit present  Mental Status: She is alert and oriented to person, place, and time  Large joint arthrocentesis: bilateral knee  Universal Protocol:  Consent: Verbal consent obtained  Risks and benefits: risks, benefits and alternatives were discussed  Consent given by: patient  Time out: Immediately prior to procedure a "time out" was called to verify the correct patient, procedure, equipment, support staff and site/side marked as required  Patient understanding: patient states understanding of the procedure being performed  Site marked: the operative site was marked  Patient identity confirmed: verbally with patient    Supporting Documentation  Indications: pain   Procedure Details  Location: knee - bilateral knee  Preparation: Patient was prepped and draped in the usual sterile fashion  Needle size: 22 G  Ultrasound guidance: no    Medications (Right): 2 mL bupivacaine 0 25 %; 80 mg triamcinolone acetonide 40 mg/mLMedications (Left): 2 mL bupivacaine 0 25 %; 80 mg triamcinolone acetonide 40 mg/mL   Patient tolerance: patient tolerated the procedure well with no immediate complications  Dressing:  Sterile dressing applied    Large joint arthrocentesis: bilateral glenohumeral  Universal Protocol:  Consent: Verbal consent obtained    Risks and benefits: risks, benefits and alternatives were discussed  Consent given by: patient  Time out: Immediately prior to procedure a "time out" was called to verify the correct patient, procedure, equipment, support staff and site/side marked as required  Patient understanding: patient states understanding of the procedure being performed  Site marked: the operative site was marked  Supporting Documentation  Indications: pain   Procedure Details  Location: shoulder - bilateral glenohumeral  Preparation: Patient was prepped and draped in the usual sterile fashion  Needle size: 25 G    Patient tolerance: patient tolerated the procedure well with no immediate complications  Dressing:  Sterile dressing applied    Sample Euflexxa was given into bilateral glenohumeral joints  Patient accepted all risks  Tolerated procedure well  No Procedures performed today    No new imaging to review      Portions of the record may have been created with voice recognition software   Occasional wrong word or "sound a like" substitutions may have occurred due to the inherent limitations of voice recognition software   Read the chart carefully and recognize, using context, where substitutions have occurred

## 2023-01-23 DIAGNOSIS — I50.41 ACUTE COMBINED SYSTOLIC AND DIASTOLIC HEART FAILURE (HCC): ICD-10-CM

## 2023-01-23 DIAGNOSIS — E03.9 HYPOTHYROIDISM, UNSPECIFIED TYPE: ICD-10-CM

## 2023-01-23 RX ORDER — METOPROLOL SUCCINATE 25 MG/1
TABLET, EXTENDED RELEASE ORAL
Qty: 30 TABLET | Refills: 0 | Status: SHIPPED | OUTPATIENT
Start: 2023-01-23

## 2023-01-23 RX ORDER — LEVOTHYROXINE SODIUM 0.12 MG/1
TABLET ORAL
Qty: 30 TABLET | Refills: 0 | Status: SHIPPED | OUTPATIENT
Start: 2023-01-23

## 2023-02-01 DIAGNOSIS — G62.9 NEUROPATHY: ICD-10-CM

## 2023-02-01 RX ORDER — GABAPENTIN 300 MG/1
CAPSULE ORAL
Qty: 240 CAPSULE | Refills: 0 | Status: SHIPPED | OUTPATIENT
Start: 2023-02-01

## 2023-02-02 ENCOUNTER — REMOTE DEVICE CLINIC VISIT (OUTPATIENT)
Dept: CARDIOLOGY CLINIC | Facility: CLINIC | Age: 78
End: 2023-02-02

## 2023-02-02 DIAGNOSIS — Z95.810 PRESENCE OF AUTOMATIC CARDIOVERTER/DEFIBRILLATOR (AICD): Primary | ICD-10-CM

## 2023-02-03 NOTE — PROGRESS NOTES
Results for orders placed or performed in visit on 02/02/23   Cardiac EP device report    Narrative    MDT/BI-V ICD (DDDR MODE)/ ACTIVE SYSTEM IS MRI CONDITIONAL  CARELINK TRANSMISSION: BATTERY VOLTAGE ADEQUATE (10 4 YRS)  AP 96 9% BP 89 8% VSRP 2 9%  ALL AVAILABLE LEAD PARAMETERS WITHIN NORMAL LIMITS  NO SIGNIFICANT HIGH RATE EPISODES  [address] SHOWING FUSION BEATS AND PVCS  OPTI-VOL WITHIN NORMAL LIMITS  NORMAL DEVICE FUNCTION   AM/RG

## 2023-02-10 ENCOUNTER — OFFICE VISIT (OUTPATIENT)
Dept: OBGYN CLINIC | Facility: CLINIC | Age: 78
End: 2023-02-10

## 2023-02-10 VITALS — BODY MASS INDEX: 39.93 KG/M2 | HEIGHT: 62 IN | WEIGHT: 217 LBS

## 2023-02-10 DIAGNOSIS — M19.012 PRIMARY OSTEOARTHRITIS OF BOTH SHOULDERS: Primary | ICD-10-CM

## 2023-02-10 DIAGNOSIS — F32.A DEPRESSION, UNSPECIFIED DEPRESSION TYPE: ICD-10-CM

## 2023-02-10 DIAGNOSIS — M19.011 PRIMARY OSTEOARTHRITIS OF BOTH SHOULDERS: Primary | ICD-10-CM

## 2023-02-10 RX ORDER — TRIAMCINOLONE ACETONIDE 40 MG/ML
80 INJECTION, SUSPENSION INTRA-ARTICULAR; INTRAMUSCULAR
Status: COMPLETED | OUTPATIENT
Start: 2023-02-10 | End: 2023-02-10

## 2023-02-10 RX ORDER — VENLAFAXINE 75 MG/1
TABLET ORAL
Qty: 90 TABLET | Refills: 0 | Status: SHIPPED | OUTPATIENT
Start: 2023-02-10

## 2023-02-10 RX ADMIN — TRIAMCINOLONE ACETONIDE 80 MG: 40 INJECTION, SUSPENSION INTRA-ARTICULAR; INTRAMUSCULAR at 11:36

## 2023-02-10 NOTE — PROGRESS NOTES
Assessment:  1  Primary osteoarthritis of both shoulders  Large joint arthrocentesis: bilateral glenohumeral        Patient Active Problem List   Diagnosis   • Atrial fibrillation ([address] )   • Coronary artery disease   • Hyperlipidemia   • Aortic stenosis   • Primary osteoarthritis of both knees   • Class 3 severe obesity in adult [facility])   • Diabetic peripheral neuropathy (HCC)   • Type 2 diabetes mellitus, without long-term current use of insulin (HCC)   • Stage 4 chronic kidney disease (HCC)   • Elevated troponin   • Chronic combined systolic and diastolic heart failure (HCC)   • Depression   • DNR (do not resuscitate)   • Torn rotator cuff   • Left bundle branch block (LBBB)   • Closed fracture of one rib of left side   • Moderate mitral regurgitation   • Hypotension   • Lifevest discharge   • CKD (chronic kidney disease)   • Left knee pain   • Pulmonary fibrosis (HCC)   • Hypothyroidism   • Abnormal SPEP   • Nonischemic cardiomyopathy ([facility])   • Primary osteoarthritis of both shoulders       Plan:    76 y o  female  with bilateral shoulder osteoarthritis    Bilateral intra-articular glenohumeral cortisone injections given today patient tolerated these well  Follow-up as needed       The assessment and plan were formulated by Dr Nancy Donaldson and I assisted in carrying it out  Subjective:   Patient ID: Anand Cheek is a 76 y o  female   HPI    Patient presents to the office for follow up of bilateral shoulder arthritis  Since the last visit, the patient reports improvements from sample Euflexxa injections given to the bilateral shoulders  Still having some pain left worse than right  Would like cortisone injections today into the shoulders         The following portions of the patient's history were reviewed and updated as appropriate: allergies, current medications, past family history, past social history, past surgical history and problem list     Social History     Socioeconomic History   • Marital status: /Civil [employer]     Spouse name: Not on file   • Number of children: Not on file   • Years of education: Not on file   • Highest education level: Not on file   Occupational History   • Occupation: RETIRED   Tobacco Use   • Smoking status: Never   • Smokeless tobacco: Never   Vaping Use   • Vaping Use: Never used   Substance and Sexual Activity   • Alcohol use: Never     Comment: Social per [employer]    • Drug use: Never   • Sexual activity: Not Currently   Other Topics Concern   • Not on file   Social History Narrative    Hx of daily cola consumption (unk cans/day)    Daily tea consumption (unk cups/day)    Multiple organ donor    Patient has living will    Power of  in existence    Uses safety equipment - Seatbelts      Social Determinants of Health     Financial Resource Strain: Not on file   Food Insecurity: Not on file   Transportation Needs: Not on file   Physical Activity: Not on file   Stress: Not on file   Social Connections: Not on file   Intimate Partner Violence: Not on file   Housing Stability: Not on file     Past Medical History:   Diagnosis Date   • Aortic stenosis    • Arthritis    • Asthma    • Coronary artery disease    • Diabetes mellitus ([facility] )    • Diabetic peripheral neuropathy ([facility] )     Last assessed - 1/27/16   • Gallbladder disease    • Heart attack ([facility] ) 07/1999   • Hemorrhoids     Last assessed - 11/16/12   • Hypoglycemia 12/23/2021   • Myocardial infarction [facility])    • Old myocardial infarction    • Type 2 diabetes mellitus with autonomic neuropathy ([facility] )     Unspec long term insulin use status, Last assessed - 6/8/17     Past Surgical History:   Procedure Laterality Date   • BUNIONECTOMY     • CARDIAC CATHETERIZATION      Carotid Artery, Resolved - 7/1/13   • CARDIAC CATHETERIZATION N/A 12/27/2021    Procedure: CARDIAC CATHETERIZATION ;  Surgeon: Inez Sorto MD;  [facility]: [facility]ARD[facility];   Service: Cardiology   • CARDIAC CATHETERIZATION N/A 12/27/2021    Procedure: Cardiac Coronary Angiogram;  Surgeon: Michael Man MD;  [facility]: [facility]; Service: Cardiology   • CARDIAC ELECTROPHYSIOLOGY PROCEDURE N/A 1/18/2022    Procedure: Cardiac biv icd implant;  Surgeon: Pam Cruz DO;  Location: [facility]; Service: Cardiology   • CARDIOVASCULAR STRESS TEST  09/1999   • CHOLECYSTECTOMY     • COLONOSCOPY  2017   • FOOT ARTHRODESIS, MODIFIED DONOHUE  2016   • GALLBLADDER SURGERY  1970   • HEMORROIDECTOMY     • KNEE ARTHROSCOPY Left 2004   • GA COLONOSCOPY FLX DX W/COLLJ SPEC WHEN PFRMD N/A 8/21/2017    Procedure: COLONOSCOPY;  Surgeon: John Mei MD;  Location: [facility] GI [facility]; Service: Colorectal   • REPAIR KNEE LIGAMENT     • REPAIR KNEE LIGAMENT Left 1986   • REPAIR KNEE LIGAMENT Right 1988     Allergies   Allergen Reactions   • Lyrica [Pregabalin] Swelling   • Sulfa Antibiotics Swelling     Current Outpatient Medications on File Prior to Visit   Medication Sig Dispense Refill   • acetaminophen (TYLENOL) 325 mg tablet Take 2 tablets by mouth every 6 (six) hours as needed       • apixaban (Eliquis) 5 mg Take 1 tablet (5 mg total) by mouth every 12 (twelve) hours 180 tablet 3   • Camphor-Menthol-Methyl Sal 3 1-6-10 % PTCH Apply 1 patch topically in the morning 1 patch to left shoulder and 1 patch to left knee  On at 9:00 a m  and off at 9:00 p m  (Patient not taking: Reported on 4/28/2022 ) 60 patch 0   • diclofenac-misoprostol (ARTHROTEC 50) 50-0 2 MG per tablet Take 1 tablet by mouth 2 (two) times a day 180 tablet 3   • dofetilide (TIKOSYN) 125 mcg capsule Take 1 capsule (125 mcg total) by mouth every 12 (twelve) hours 180 capsule 3   • gabapentin (NEURONTIN) 300 mg capsule TAKE 3 CAPSULES BY MOUTH EVERY MORNING, THEN TAKE 2 CAPSULES IN THE AFTERNOON, THEN TAKE 3 CAPSULES EVERY EVENING   240 capsule 0   • levothyroxine 125 mcg tablet TAKE 1 TABLET BY MOUTH EVERY MORNING 30 tablet 0   • loperamide (IMODIUM) 2 mg capsule Take 1 capsule (2 mg total) by mouth 4 (four) times a day as needed for diarrhea 56 capsule 1   • losartan (COZAAR) 25 mg tablet Take 1 tablet (25 mg total) by mouth in the morning  90 tablet 3   • metoprolol succinate (TOPROL-XL) 25 mg 24 hr tablet TAKE 1 TABLET BY MOUTH EVERY DAY 30 tablet 0   • Multiple Vitamins-Minerals (ICAPS AREDS 2 PO) Take by mouth in the morning     • pravastatin (PRAVACHOL) 10 mg tablet TAKE 1 TABLET BY MOUTH EVERY DAY 90 tablet 1   • venlafaxine (EFFEXOR) 75 mg tablet TAKE 1 TABLET BY MOUTH THREE TIMES DAILY 90 tablet 0     No current facility-administered medications on file prior to visit  Review of Systems  See HPi    Objective: There were no vitals filed for this visit  Physical Exam    Right Shoulder Exam     Other   Sensation: normal  Pulse: present    Comments:  Painful range of motion bilateral shoulders with crepitus      Left Shoulder Exam     Other   Sensation: normal  Pulse: present             I have personally reviewed pertinent films in PACS  Large joint arthrocentesis: bilateral glenohumeral  Universal Protocol:  Consent given by: patient  Time out: Immediately prior to procedure a "time out" was called to verify the correct patient, procedure, equipment, support staff and site/side marked as required  Site marked: the operative site was marked  Supporting Documentation  Indications: pain   Procedure Details  Location: shoulder - bilateral glenohumeral  Preparation: Patient was prepped and draped in the usual sterile fashion  Needle size: 25 G    Medications (Right): 80 mg triamcinolone acetonide 40 mg/mLMedications (Left): 80 mg triamcinolone acetonide 40 mg/mL   Patient tolerance: patient tolerated the procedure well with no immediate complications  Dressing:  Sterile dressing applied    2 mL of 0 5% ropivacaine were injected into each shoulder along with the cortisone                Portions of the record may have been created with voice recognition software    Occasional wrong word or "sound a like" substitutions may have occurred due to the inherent limitations of voice recognition software  Read the chart carefully and recognize, using context, where substitutions have occurred

## 2023-02-22 DIAGNOSIS — E78.00 HYPERCHOLESTEROLEMIA: ICD-10-CM

## 2023-02-22 DIAGNOSIS — I50.41 ACUTE COMBINED SYSTOLIC AND DIASTOLIC HEART FAILURE (HCC): ICD-10-CM

## 2023-02-22 DIAGNOSIS — E03.9 HYPOTHYROIDISM, UNSPECIFIED TYPE: ICD-10-CM

## 2023-02-22 RX ORDER — PRAVASTATIN SODIUM 10 MG
TABLET ORAL
Qty: 90 TABLET | Refills: 1 | Status: SHIPPED | OUTPATIENT
Start: 2023-02-22

## 2023-02-22 RX ORDER — LEVOTHYROXINE SODIUM 0.12 MG/1
TABLET ORAL
Qty: 30 TABLET | Refills: 0 | Status: SHIPPED | OUTPATIENT
Start: 2023-02-22

## 2023-02-22 RX ORDER — METOPROLOL SUCCINATE 25 MG/1
TABLET, EXTENDED RELEASE ORAL
Qty: 30 TABLET | Refills: 0 | Status: SHIPPED | OUTPATIENT
Start: 2023-02-22

## 2023-03-06 ENCOUNTER — REMOTE DEVICE CLINIC VISIT (OUTPATIENT)
Dept: CARDIOLOGY CLINIC | Facility: CLINIC | Age: 78
End: 2023-03-06

## 2023-03-06 DIAGNOSIS — Z95.810 PRESENCE OF IMPLANTABLE CARDIOVERTER-DEFIBRILLATOR (ICD): Primary | ICD-10-CM

## 2023-03-06 NOTE — PROGRESS NOTES
MDT/BI-V ICD (DDDR MODE)/ ACTIVE SYSTEM IS MRI CONDITIONAL   CARELINK TRANSMISSION - OPTI-VOL ONLY:  OPTI-VOL WITHIN NORMAL LIMITS   BATTERY VOLTAGE ADEQUATE (10 3 YR )   AP 90 3% BP 89 9% VSRP 4 9%    ALL LEAD PARAMETERS WITHIN NORMAL LIMITS   NO SIGNIFICANT HIGH RATE EPISODES   2,051 V SENSE EPISODES (81 MIN /D), WITH MARKERS FOR FUSION   NORMAL DEVICE FUNCTION   RG

## 2023-03-08 DIAGNOSIS — G62.9 NEUROPATHY: ICD-10-CM

## 2023-03-08 RX ORDER — GABAPENTIN 300 MG/1
CAPSULE ORAL
Qty: 240 CAPSULE | Refills: 0 | Status: SHIPPED | OUTPATIENT
Start: 2023-03-08

## 2023-03-28 ENCOUNTER — RA CDI HCC (OUTPATIENT)
Dept: OTHER | Facility: HOSPITAL | Age: 78
End: 2023-03-28

## 2023-03-28 DIAGNOSIS — F32.A DEPRESSION, UNSPECIFIED DEPRESSION TYPE: ICD-10-CM

## 2023-03-28 DIAGNOSIS — E03.9 HYPOTHYROIDISM, UNSPECIFIED TYPE: ICD-10-CM

## 2023-03-28 DIAGNOSIS — I50.41 ACUTE COMBINED SYSTOLIC AND DIASTOLIC HEART FAILURE (HCC): ICD-10-CM

## 2023-03-28 RX ORDER — METOPROLOL SUCCINATE 25 MG/1
TABLET, EXTENDED RELEASE ORAL
Qty: 30 TABLET | Refills: 0 | Status: SHIPPED | OUTPATIENT
Start: 2023-03-28

## 2023-03-28 RX ORDER — LEVOTHYROXINE SODIUM 0.12 MG/1
TABLET ORAL
Qty: 30 TABLET | Refills: 0 | Status: SHIPPED | OUTPATIENT
Start: 2023-03-28

## 2023-03-28 RX ORDER — VENLAFAXINE 75 MG/1
TABLET ORAL
Qty: 90 TABLET | Refills: 0 | Status: SHIPPED | OUTPATIENT
Start: 2023-03-28

## 2023-03-28 NOTE — PROGRESS NOTES
[facility]  coding opportunities          Chart Reviewed number of suggestions sent to Provider: 4  E11 [id number]  E11 42  I13 0  I42 8     Patients Insurance     Medicare Insurance: [facility] Advantage

## 2023-04-02 DIAGNOSIS — G62.9 NEUROPATHY: ICD-10-CM

## 2023-04-03 RX ORDER — GABAPENTIN 300 MG/1
CAPSULE ORAL
Qty: 240 CAPSULE | Refills: 0 | Status: SHIPPED | OUTPATIENT
Start: 2023-04-03

## 2023-04-04 ENCOUNTER — TELEPHONE (OUTPATIENT)
Dept: FAMILY MEDICINE CLINIC | Facility: CLINIC | Age: 78
End: 2023-04-04

## 2023-04-04 NOTE — TELEPHONE ENCOUNTER
Patient had to cancel her AWV appointment with you today because her aide is sick and unable to bring her     Is there a place we can reschedule her sooner than your next available AWV spot so she doesn't have to wait too long?      Please advise

## 2023-04-06 ENCOUNTER — REMOTE DEVICE CLINIC VISIT (OUTPATIENT)
Dept: CARDIOLOGY CLINIC | Facility: CLINIC | Age: 78
End: 2023-04-06

## 2023-04-06 DIAGNOSIS — Z95.810 PRESENCE OF AUTOMATIC CARDIOVERTER/DEFIBRILLATOR (AICD): Primary | ICD-10-CM

## 2023-04-06 NOTE — PROGRESS NOTES
Results for orders placed or performed in visit on 04/06/23   Cardiac EP device report    Narrative    MDT/BI-V ICD (DDDR MODE)/ ACTIVE SYSTEM IS MRI CONDITIONAL  CARELINK TRANSMISSION - OPTI-VOL ONLY: BATTERY VOLTAGE ADEQUATE (10 2 YRS)  AP: 94 8%  : 88 5% + VSRP: 4 5%  <90%~DDDR/60)  ALL AVAILABLE LEAD PARAMETERS WITHIN NORMAL LIMITS  NO SIGNIFICANT HIGH RATE EPISODES  12,715 V-SENSE EPISODES (32MINS/D) W/ AVAIL MARKERS SHOWING FUSION  PT TAKES METOPROLOL SUCC, TIKOSYN, ELIQUIS  EF: 50% (ECHO 4/21/22)  OPTI-VOL WITHIN NORMAL LIMITS  APPROPRIATELY FUNCTIONING BI-V ICD    [address]

## 2023-04-24 DIAGNOSIS — E03.9 HYPOTHYROIDISM, UNSPECIFIED TYPE: ICD-10-CM

## 2023-04-24 DIAGNOSIS — I50.41 ACUTE COMBINED SYSTOLIC AND DIASTOLIC HEART FAILURE (HCC): ICD-10-CM

## 2023-04-24 RX ORDER — METOPROLOL SUCCINATE 25 MG/1
TABLET, EXTENDED RELEASE ORAL
Qty: 30 TABLET | Refills: 0 | Status: SHIPPED | OUTPATIENT
Start: 2023-04-24

## 2023-04-24 RX ORDER — LEVOTHYROXINE SODIUM 0.12 MG/1
TABLET ORAL
Qty: 30 TABLET | Refills: 0 | Status: SHIPPED | OUTPATIENT
Start: 2023-04-24

## 2023-04-25 ENCOUNTER — OFFICE VISIT (OUTPATIENT)
Dept: FAMILY MEDICINE CLINIC | Facility: CLINIC | Age: 78
End: 2023-04-25

## 2023-04-25 VITALS
HEIGHT: 62 IN | BODY MASS INDEX: 35.33 KG/M2 | HEART RATE: 61 BPM | TEMPERATURE: 97.7 F | SYSTOLIC BLOOD PRESSURE: 104 MMHG | WEIGHT: 192 LBS | OXYGEN SATURATION: 96 % | DIASTOLIC BLOOD PRESSURE: 66 MMHG

## 2023-04-25 DIAGNOSIS — R26.81 GAIT INSTABILITY: ICD-10-CM

## 2023-04-25 DIAGNOSIS — E03.9 HYPOTHYROIDISM, UNSPECIFIED TYPE: ICD-10-CM

## 2023-04-25 DIAGNOSIS — Z00.00 MEDICARE ANNUAL WELLNESS VISIT, SUBSEQUENT: Primary | ICD-10-CM

## 2023-04-25 DIAGNOSIS — E11.42 TYPE 2 DIABETES MELLITUS WITH DIABETIC POLYNEUROPATHY, WITHOUT LONG-TERM CURRENT USE OF INSULIN (HCC): ICD-10-CM

## 2023-04-25 DIAGNOSIS — I48.0 PAROXYSMAL ATRIAL FIBRILLATION (HCC): ICD-10-CM

## 2023-04-25 DIAGNOSIS — I50.42 CHRONIC COMBINED SYSTOLIC AND DIASTOLIC HEART FAILURE (HCC): ICD-10-CM

## 2023-04-25 DIAGNOSIS — E78.00 HYPERCHOLESTEROLEMIA: ICD-10-CM

## 2023-04-25 LAB — SL AMB POCT HEMOGLOBIN AIC: 6.4 (ref ?–6.5)

## 2023-04-25 NOTE — PROGRESS NOTES
Assessment and Plan:     Problem List Items Addressed This Visit        Endocrine    Type 2 diabetes mellitus, without long-term current use of insulin ([facility] [address] ) - Primary    Relevant Orders    POCT hemoglobin A1c    Microalbumin / creatinine urine ratio        Preventive health issues were discussed with patient, and age appropriate screening tests were ordered as noted in patient's After Visit Summary  Personalized health advice and appropriate referrals for health education or preventive services given if needed, as noted in patient's After Visit Summary  History of Present Illness:     Patient presents for a Medicare Wellness Visit  Her diabetes is well controlled, hypercholesterolemia, hypothyroidism, afib and chf are all stable  She continues to monitor her sodium intake, weigh herself daily and has no edema  She is losing her aide mid-May and will need an aide to assist with ADLs  She also is experiencing gait dysfunction from muscle deconditioning and needs PT in the home  HPI   Patient Care Team:  Rashmi Mariscal DO as PCP - General (Family Medicine)  Zayda Holt MD as PCP - [address] [place] [address] (RTE)  Delora Flood, DO Laural Kussmaul, DO Scott A Huggler, Bradd Coombes, MD Glee Factor, PA-C Claudina Able, DO Scott A [place], OD (Optometry)  Debbi Cook MD as Endoscopist     Review of Systems:     Review of Systems   Constitutional: Negative for appetite change, chills and fever  HENT: Negative for ear pain, facial swelling, rhinorrhea, sinus pain, sore throat and trouble swallowing  Eyes: Negative for discharge and redness  Respiratory: Negative for chest tightness, shortness of breath and wheezing  Cardiovascular: Negative for chest pain and palpitations  Gastrointestinal: Negative for abdominal pain, diarrhea, nausea and vomiting  Endocrine: Negative for polyuria  Genitourinary: Negative for dysuria and urgency  Musculoskeletal: Negative for arthralgias and back pain  Skin: Negative for rash  Neurological: Positive for weakness  Negative for dizziness and headaches         + gait inistability  + muscle weakness of the legs   Hematological: Negative for adenopathy  Psychiatric/Behavioral: Negative for behavioral problems, confusion and sleep disturbance  All other systems reviewed and are negative         Problem List:     Patient Active Problem List   Diagnosis   • Atrial fibrillation ([place] [address] )   • Coronary artery disease   • Hyperlipidemia   • Aortic stenosis   • Primary osteoarthritis of both knees   • Class 3 severe obesity in adult [facility])   • Diabetic peripheral neuropathy (HCC)   • Type 2 diabetes mellitus, without long-term current use of insulin ([facility])   • Stage 4 chronic kidney disease ([place] [address] )   • Elevated troponin   • Chronic combined systolic and diastolic heart failure ([place] [address] )   • Depression   • DNR (do not resuscitate)   • Torn rotator cuff   • Left bundle branch block (LBBB)   • Closed fracture of one rib of left side   • Moderate mitral regurgitation   • Hypotension   • Lifevest discharge   • CKD (chronic kidney disease)   • Left knee pain   • Pulmonary fibrosis ([facility])   • Hypothyroidism   • Abnormal SPEP   • Nonischemic cardiomyopathy ([place] [address] )   • Primary osteoarthritis of both shoulders      Past Medical and Surgical History:     Past Medical History:   Diagnosis Date   • Aortic stenosis    • Arthritis    • Asthma    • Coronary artery disease    • Diabetes mellitus ([place] [address] )    • Diabetic peripheral neuropathy ([place] [address] )     Last assessed - 1/27/16   • Gallbladder disease    • Heart attack ([place] [address] ) 07/1999   • Hemorrhoids     Last assessed - 11/16/12   • Hypoglycemia 12/23/2021   • Myocardial infarction [facility])    • Old myocardial infarction    • Type 2 diabetes mellitus with autonomic neuropathy ([place] [address] )     Unspec long term insulin use status, Last assessed - 6/8/17     Past Surgical History:   Procedure Laterality Date   • BUNIONECTOMY     • CARDIAC CATHETERIZATION      Carotid Artery, Resolved - 7/1/13   • CARDIAC CATHETERIZATION N/A 12/27/2021    Procedure: CARDIAC CATHETERIZATION ;  Surgeon: Jf Resendiz MD;  [facility]; Service: Cardiology   • CARDIAC CATHETERIZATION N/A 12/27/2021    Procedure: Cardiac Coronary Angiogram;  Surgeon: Jf Resendiz MD;  [facility]; Service: Cardiology   • CARDIAC ELECTROPHYSIOLOGY PROCEDURE N/A 1/18/2022    Procedure: Cardiac biv icd implant;  Surgeon: Jesus Marquez DO;  [facility]; Service: Cardiology   • CARDIOVASCULAR STRESS TEST  09/1999   • CHOLECYSTECTOMY     • COLONOSCOPY  2017   • FOOT ARTHRODESIS, MODIFIED DONOHUE  2016   • GALLBLADDER SURGERY  1970   • HEMORROIDECTOMY     • KNEE ARTHROSCOPY Left 2004   • AK COLONOSCOPY FLX DX W/COLLJ SPEC WHEN PFRMD N/A 8/21/2017    Procedure: COLONOSCOPY;  Surgeon: Delaney Bejarano MD;  Location: [facility];   Service: Colorectal   • REPAIR KNEE LIGAMENT     • REPAIR KNEE LIGAMENT Left 1986   • REPAIR KNEE LIGAMENT Right 1988      Family History:     Family History   Problem Relation Age of Onset   • Hypertension Father    • Diabetes Father    • Cancer Father         Throat   • Arthritis Father    • Stroke Mother    • Diabetes Maternal Grandmother    • Heart disease Maternal Grandfather    • Diabetes Son    • Lymphoma Family         Head, Face and Neck       Social History:     Social History     Socioeconomic History   • Marital status: /Civil Union     Spouse name: None   • Number of children: None   • Years of education: None   • Highest education level: None   Occupational History   • Occupation: RETIRED   Tobacco Use   • Smoking status: Never     Passive exposure: Never   • Smokeless tobacco: Never   Vaping Use   • Vaping Use: Never used   Substance and Sexual Activity   • Alcohol use: Never     Comment: Social per [employer]    • Drug use: Never   • Sexual activity: Not Currently   Other Topics Concern   • None   Social History Narrative    Hx of daily cola consumption (unk cans/day)    Daily tea consumption (unk cups/day)    Multiple organ donor    Patient has living will    Power of  in existence    Uses safety equipment - Seatbelts      Social Determinants of Health     Financial Resource Strain: Not on file   Food Insecurity: Not on file   Transportation Needs: Not on file   Physical Activity: Not on file   Stress: Not on file   Social Connections: Not on file   Intimate Partner Violence: Not on file   Housing Stability: Not on file      Medications and Allergies:     Current Outpatient Medications   Medication Sig Dispense Refill   • acetaminophen (TYLENOL) 325 mg tablet Take 2 tablets by mouth every 6 (six) hours as needed       • apixaban (Eliquis) 5 mg Take 1 tablet (5 mg total) by mouth every 12 (twelve) hours 180 tablet 3   • diclofenac-misoprostol (ARTHROTEC 50) 50-0 2 MG per tablet Take 1 tablet by mouth 2 (two) times a day 180 tablet 3   • dofetilide (TIKOSYN) 125 mcg capsule Take 1 capsule (125 mcg total) by mouth every 12 (twelve) hours 180 capsule 3   • gabapentin (NEURONTIN) 300 mg capsule TAKE 3 CAPSULES BY MOUTH EVERY MORNING, TAKE 2 CAPSULES BY MOUTH IN THE AFTERNOON, TAKE 3 CAPSULES BY MOUTH EVERY EVENING 240 capsule 0   • levothyroxine 125 mcg tablet TAKE 1 TABLET BY MOUTH EVERY MORNING 30 tablet 0   • loperamide (IMODIUM) 2 mg capsule Take 1 capsule (2 mg total) by mouth 4 (four) times a day as needed for diarrhea 56 capsule 1   • losartan (COZAAR) 25 mg tablet Take 1 tablet (25 mg total) by mouth in the morning   90 tablet 3   • metoprolol succinate (TOPROL-XL) 25 mg 24 hr tablet TAKE 1 TABLET BY MOUTH EVERY DAY 30 tablet 0   • Multiple Vitamins-Minerals (ICAPS AREDS 2 PO) Take by mouth in the morning     • venlafaxine (EFFEXOR) 75 mg tablet TAKE 1 TABLET BY MOUTH THREE TIMES DAILY 90 tablet 0   • Camphor-Menthol-Methyl Sal 3 1-6-10 % PTCH Apply 1 patch topically in the morning 1 patch to left shoulder and 1 patch to left knee  On at 9:00 a m  and off at 9:00 p m  (Patient not taking: Reported on 4/28/2022) 60 patch 0   • pravastatin (PRAVACHOL) 10 mg tablet TAKE 1 TABLET BY MOUTH EVERY DAY (Patient not taking: Reported on 4/25/2023) 90 tablet 1     No current facility-administered medications for this visit  Allergies   Allergen Reactions   • Lyrica [Pregabalin] Swelling   • Sulfa Antibiotics Swelling      Immunizations:     Immunization History   Administered Date(s) Administered   • COVID-19 PFIZER VACCINE 0 3 ML IM 03/17/2021, 04/08/2021   • Influenza Split High Dose Preservative Free IM 11/19/2012, 01/09/2014, 09/16/2014, 09/23/2015, 10/04/2016, 10/09/2017   • Influenza, high dose seasonal 0 7 mL 09/20/2018, 09/12/2019, 12/23/2021   • Influenza, seasonal, injectable 10/06/2011, 10/06/2011   • Pneumococcal Conjugate 13-Valent 10/04/2016   • Pneumococcal Polysaccharide PPV23 09/23/2015   • TD (adult) Preservative Free 09/12/2019   • Tdap 02/04/2001   • Tuberculin Skin Test 01/01/2022, 01/10/2022   • influenza, trivalent, adjuvanted 09/02/2020      Health Maintenance:         Topic Date Due   • DXA SCAN  Never done   • Breast Cancer Screening: Mammogram  01/07/2020   • Colorectal Cancer Screening  08/21/2022   • Hepatitis C Screening  08/14/2023 (Originally 1945)         Topic Date Due   • COVID-19 Vaccine (3 - Booster for Pfizer series) 06/03/2021   • Influenza Vaccine (1) 09/01/2022      Medicare Screening Tests and Risk Assessments:     Lizzie Saucedo is here for her Subsequent Wellness visit  Last Medicare Wellness visit information reviewed, patient interviewed, no change since last AWV  Health Risk Assessment:   Patient rates overall health as good  Patient feels that their physical health rating is same  Patient is very satisfied with their life  Eyesight was rated as same  Hearing was rated as same   Patient feels that their emotional and mental health rating is much better  Patients states they are never, rarely angry  Patient states they are sometimes unusually tired/fatigued  Pain experienced in the last 7 days has been none  Patient states that she has experienced no weight loss or gain in last 6 months  Fall Risk Screening: In the past year, patient has experienced: no history of falling in past year      Urinary Incontinence Screening:   Patient has leaked urine accidently in the last six months  Home Safety:  Patient has trouble with stairs inside or outside of their home  Patient has working smoke alarms and has no working carbon monoxide detector  Home safety hazards include: none  Nutrition:   Current diet is Regular  Medications:   Patient is not currently taking any over-the-counter supplements  Patient is not able to manage medications  Activities of Daily Living (ADLs)/Instrumental Activities of Daily Living (IADLs):   Walk and transfer into and out of bed and chair?: Yes  Dress and groom yourself?: No    Bathe or shower yourself?: Yes    Feed yourself? Yes  Do your laundry/housekeeping?: No  Manage your money, pay your bills and track your expenses?: Yes  Make your own meals?: No    Do your own shopping?: No    Previous Hospitalizations:   Any hospitalizations or ED visits within the last 12 months?: No      Advance Care Planning:   Living will: Yes    Durable POA for healthcare:  Yes    Advanced directive: Yes    Advanced directive counseling given: Yes    Five wishes given: Yes    Patient declined ACP directive: No    End of Life Decisions reviewed with patient: Yes    Provider agrees with end of life decisions: Yes      Cognitive Screening:   Provider or family/friend/caregiver concerned regarding cognition?: No    PREVENTIVE SCREENINGS      Cardiovascular Screening:    General: Screening Not Indicated and History Lipid Disorder      Diabetes Screening:     General: Screening Not Indicated and History Diabetes "Colorectal Cancer Screening:     General: Screening Current      Cervical Cancer Screening:    General: Screening Not Indicated      Osteoporosis Screening:    General: Screening Current      Abdominal Aortic Aneurysm (AAA) Screening:        General: Screening Current and Screening Not Indicated      Lung Cancer Screening:     General: Screening Not Indicated      Hepatitis C Screening:    General: Screening Current    Screening, Brief Intervention, and Referral to Treatment (SBIRT)    Screening    Typical number of drinks in a week: 0    Single Item Drug Screening:  How often have you used an illegal drug (including marijuana) or a prescription medication for non-medical reasons in the past year? never    Single Item Drug Screen Score: 0  Interpretation: Negative screen for possible drug use disorder    No results found  Physical Exam:     /66   Pulse 61   Temp 97 7 °F (36 5 °C)   Ht 5' 2\" (1 575 m)   Wt 87 1 kg (192 lb)   SpO2 96%   BMI 35 12 kg/m²     Physical Exam  Vitals and nursing note reviewed  Constitutional:       General: She is not in acute distress  Appearance: Normal appearance  She is not ill-appearing or diaphoretic  HENT:      Head: Normocephalic and atraumatic  Right Ear: Tympanic membrane, ear canal and external ear normal       Left Ear: Tympanic membrane, ear canal and external ear normal       Nose: Nose normal  No congestion or rhinorrhea  Mouth/Throat:      Mouth: Mucous membranes are moist       Pharynx: Oropharynx is clear  No posterior oropharyngeal erythema  Eyes:      General:         Right eye: No discharge  Left eye: No discharge  Extraocular Movements: Extraocular movements intact  Conjunctiva/sclera: Conjunctivae normal       Pupils: Pupils are equal, round, and reactive to light  Neck:      Vascular: No carotid bruit  Cardiovascular:      Rate and Rhythm: Normal rate and regular rhythm  Pulses: Normal pulses        Heart " sounds: Normal heart sounds  No murmur heard  Pulmonary:      Effort: Pulmonary effort is normal  No respiratory distress  Breath sounds: Normal breath sounds  No wheezing or rhonchi  Abdominal:      General: Abdomen is flat  Bowel sounds are normal  There is no distension  Palpations: There is no mass  Tenderness: There is no abdominal tenderness  Musculoskeletal:         General: No swelling or deformity  Normal range of motion  Cervical back: Normal range of motion and neck supple  No rigidity  Right lower leg: No edema  Left lower leg: No edema  Lymphadenopathy:      Cervical: No cervical adenopathy  Skin:     General: Skin is warm and dry  Capillary Refill: Capillary refill takes less than 2 seconds  Coloration: Skin is not jaundiced  Findings: No bruising, erythema or rash  Neurological:      General: No focal deficit present  Mental Status: She is alert and oriented to person, place, and time  Cranial Nerves: No cranial nerve deficit  Sensory: No sensory deficit  Motor: Weakness present  Gait: Gait abnormal       Deep Tendon Reflexes: Reflexes normal       Comments: + motor of lower legs +2/4 bilaterally  + uneven gait abnormality  Psychiatric:         Mood and Affect: Mood normal          Behavior: Behavior normal          Thought Content:  Thought content normal          Judgment: Judgment normal           Willy Gray DO

## 2023-04-25 NOTE — PATIENT INSTRUCTIONS
Medicare Preventive Visit Patient Instructions  Thank you for completing your Welcome to Medicare Visit or Medicare Annual Wellness Visit today  Your next wellness visit will be due in one year (4/25/2024)  The screening/preventive services that you may require over the next 5-10 years are detailed below  Some tests may not apply to you based off risk factors and/or age  Screening tests ordered at today's visit but not completed yet may show as past due  Also, please note that scanned in results may not display below  Preventive Screenings:  Service Recommendations Previous Testing/Comments   Colorectal Cancer Screening  * Colonoscopy    * Fecal Occult Blood Test (FOBT)/Fecal Immunochemical Test (FIT)  * Fecal DNA/Cologuard Test  * Flexible Sigmoidoscopy Age: 39-70 years old   Colonoscopy: every 10 years (may be performed more frequently if at higher risk)  OR  FOBT/FIT: every 1 year  OR  Cologuard: every 3 years  OR  Sigmoidoscopy: every 5 years  Screening may be recommended earlier than age 39 if at higher risk for colorectal cancer  Also, an individualized decision between you and your healthcare provider will decide whether screening between the ages of 74-80 would be appropriate  Colonoscopy: 08/21/2017  FOBT/FIT: Not on file  Cologuard: Not on file  Sigmoidoscopy: Not on file          Breast Cancer Screening Age: 36 years old  Frequency: every 1-2 years  Not required if history of left and right mastectomy Mammogram: 01/07/2019        Cervical Cancer Screening Between the ages of 21-29, pap smear recommended once every 3 years  Between the ages of 33-67, can perform pap smear with HPV co-testing every 5 years     Recommendations may differ for women with a history of total hysterectomy, cervical cancer, or abnormal pap smears in past  Pap Smear: Not on file    Screening Not Indicated   Hepatitis C Screening Once for adults born between [facility]  More frequently in patients at high risk for Hepatitis C Hep C Antibody: Not on file    Screening Current   Diabetes Screening 1-2 times per year if you're at risk for diabetes or have pre-diabetes Fasting glucose: 44 mg/dL (12/23/2021)  A1C: 6 2 % (7/27/2022)  Screening Not Indicated  History Diabetes   Cholesterol Screening Once every 5 years if you don't have a lipid disorder  May order more often based on risk factors  Lipid panel: 08/11/2021    Screening Not Indicated  History Lipid Disorder     Other Preventive Screenings Covered by Medicare:  1  Abdominal Aortic Aneurysm (AAA) Screening: covered once if your at risk  You're considered to be at risk if you have a family history of AAA  2  Lung Cancer Screening: covers low dose CT scan once per year if you meet all of the following conditions: (1) Age 50-69; (2) No signs or symptoms of lung cancer; (3) Current smoker or have quit smoking within the last 15 years; (4) You have a tobacco smoking history of at least 20 pack years (packs per day multiplied by number of years you smoked); (5) You get a written order from a healthcare provider  3  Glaucoma Screening: covered annually if you're considered high risk: (1) You have diabetes OR (2) Family history of glaucoma OR (3)  aged 48 and older OR (3)  American aged 72 and older  3  Osteoporosis Screening: covered every 2 years if you meet one of the following conditions: (1) You're estrogen deficient and at risk for osteoporosis based off medical history and other findings; (2) Have a vertebral abnormality; (3) On glucocorticoid therapy for more than 3 months; (4) Have primary hyperparathyroidism; (5) On osteoporosis medications and need to assess response to drug therapy  · Last bone density test (DXA Scan): Not on file  5  HIV Screening: covered annually if you're between the age of 12-76  Also covered annually if you are younger than 13 and older than 72 with risk factors for HIV infection   For pregnant patients, it is covered up to 3 times per pregnancy  Immunizations:  Immunization Recommendations   Influenza Vaccine Annual influenza vaccination during flu season is recommended for all persons aged >= 6 months who do not have contraindications   Pneumococcal Vaccine   * Pneumococcal conjugate vaccine = PCV13 (Prevnar 13), PCV15 (Vaxneuvance), PCV20 (Prevnar 20)  * Pneumococcal polysaccharide vaccine = PPSV23 (Pneumovax) Adults 25-60 years old: 1-3 doses may be recommended based on certain risk factors  Adults 72 years old: 1-2 doses may be recommended based off what pneumonia vaccine you previously received   Hepatitis B Vaccine 3 dose series if at intermediate or high risk (ex: diabetes, end stage renal disease, liver disease)   Tetanus (Td) Vaccine - COST NOT COVERED BY MEDICARE PART B Following completion of primary series, a booster dose should be given every 10 years to maintain immunity against tetanus  Td may also be given as tetanus wound prophylaxis  Tdap Vaccine - COST NOT COVERED BY MEDICARE PART B Recommended at least once for all adults  For pregnant patients, recommended with each pregnancy  Shingles Vaccine (Shingrix) - COST NOT COVERED BY MEDICARE PART B  2 shot series recommended in those aged 48 and above     Health Maintenance Due:      Topic Date Due   • DXA SCAN  Never done   • Breast Cancer Screening: Mammogram  01/07/2020   • Colorectal Cancer Screening  08/21/2022   • Hepatitis C Screening  08/14/2023 (Originally 1945)     Immunizations Due:      Topic Date Due   • COVID-19 Vaccine (3 - Booster for Pfizer series) 06/03/2021   • Influenza Vaccine (1) 09/01/2022     Advance Directives   What are advance directives? Advance directives are legal documents that state your wishes and plans for medical care  These plans are made ahead of time in case you lose your ability to make decisions for yourself   Advance directives can apply to any medical decision, such as the treatments you want, and if you want to donate organs  What are the types of advance directives? There are many types of advance directives, and each state has rules about how to use them  You may choose a combination of any of the following:  · Living will: This is a written record of the treatment you want  You can also choose which treatments you do not want, which to limit, and which to stop at a certain time  This includes surgery, medicine, IV fluid, and tube feedings  · Durable power of  for healthcare [facility]): This is a written record that states who you want to make healthcare choices for you when you are unable to make them for yourself  This person, called a proxy, is usually a family member or a friend  You may choose more than 1 proxy  · Do not resuscitate (DNR) order:  A DNR order is used in case your heart stops beating or you stop breathing  It is a request not to have certain forms of treatment, such as CPR  A DNR order may be included in other types of advance directives  · Medical directive: This covers the care that you want if you are in a coma, near death, or unable to make decisions for yourself  You can list the treatments you want for each condition  Treatment may include pain medicine, surgery, blood transfusions, dialysis, IV or tube feedings, and a ventilator (breathing machine)  · Values history: This document has questions about your views, beliefs, and how you feel and think about life  This information can help others choose the care that you would choose  Why are advance directives important? An advance directive helps you control your care  Although spoken wishes may be used, it is better to have your wishes written down  Spoken wishes can be misunderstood, or not followed  Treatments may be given even if you do not want them  An advance directive may make it easier for your family to make difficult choices about your care     Urinary Incontinence   Urinary incontinence (UI)  is when you lose control of your bladder  UI develops because your bladder cannot store or empty urine properly  The 3 most common types of UI are stress incontinence, urge incontinence, or both  Medicines:   · May be given to help strengthen your bladder control  Report any side effects of medication to your healthcare provider  Do pelvic muscle exercises often:  Your pelvic muscles help you stop urinating  Squeeze these muscles tight for 5 seconds, then relax for 5 seconds  Gradually work up to squeezing for 10 seconds  Do 3 sets of 15 repetitions a day, or as directed  This will help strengthen your pelvic muscles and improve bladder control  Train your bladder:  Go to the bathroom at set times, such as every 2 hours, even if you do not feel the urge to go  You can also try to hold your urine when you feel the urge to go  For example, hold your urine for 5 minutes when you feel the urge to go  As that becomes easier, hold your urine for 10 minutes  Self-care:   · Keep a UI record  Write down how often you leak urine and how much you leak  Make a note of what you were doing when you leaked urine  · Drink liquids as directed  You may need to limit the amount of liquid you drink to help control your urine leakage  Do not drink any liquid right before you go to bed  Limit or do not have drinks that contain caffeine or alcohol  · Prevent constipation  Eat a variety of high-fiber foods  Good examples are high-fiber cereals, beans, vegetables, and whole-grain breads  Walking is the best way to trigger your intestines to have a bowel movement  · Exercise regularly and maintain a healthy weight  Weight loss and exercise will decrease pressure on your bladder and help you control your leakage  · Use a catheter as directed  to help empty your bladder  A catheter is a tiny, plastic tube that is put into your bladder to drain your urine  · Go to behavior therapy as directed    Behavior therapy may be used to help you learn to control your urge to urinate  Weight Management   Why it is important to manage your weight:  Being overweight increases your risk of health conditions such as heart disease, high blood pressure, type 2 diabetes, and certain types of cancer  It can also increase your risk for osteoarthritis, sleep apnea, and other respiratory problems  Aim for a slow, steady weight loss  Even a small amount of weight loss can lower your risk of health problems  How to lose weight safely:  A safe and healthy way to lose weight is to eat fewer calories and get regular exercise  You can lose up about 1 pound a week by decreasing the number of calories you eat by 500 calories each day  Healthy meal plan for weight management:  A healthy meal plan includes a variety of foods, contains fewer calories, and helps you stay healthy  A healthy meal plan includes the following:  · Eat whole-grain foods more often  A healthy meal plan should contain fiber  Fiber is the part of grains, fruits, and vegetables that is not broken down by your body  Whole-grain foods are healthy and provide extra fiber in your diet  Some examples of whole-grain foods are whole-wheat breads and pastas, oatmeal, brown rice, and bulgur  · Eat a variety of vegetables every day  Include dark, leafy greens such as spinach, kale, magdaleno greens, and mustard greens  Eat yellow and orange vegetables such as carrots, sweet potatoes, and winter squash  · Eat a variety of fruits every day  Choose fresh or canned fruit (canned in its own juice or light syrup) instead of juice  Fruit juice has very little or no fiber  · Eat low-fat dairy foods  Drink fat-free (skim) milk or 1% milk  Eat fat-free yogurt and low-fat cottage cheese  Try low-fat cheeses such as mozzarella and other reduced-fat cheeses  · Choose meat and other protein foods that are low in fat  Choose beans or other legumes such as split peas or lentils   Choose fish, skinless poultry (chicken or turkey), or lean cuts of red meat (beef or pork)  Before you cook meat or poultry, cut off any visible fat  · Use less fat and oil  Try baking foods instead of frying them  Add less fat, such as margarine, sour cream, regular salad dressing and mayonnaise to foods  Eat fewer high-fat foods  Some examples of high-fat foods include french fries, doughnuts, ice cream, and cakes  · Eat fewer sweets  Limit foods and drinks that are high in sugar  This includes candy, cookies, regular soda, and sweetened drinks  Exercise:  Exercise at least 30 minutes per day on most days of the week  Some examples of exercise include walking, biking, dancing, and swimming  You can also fit in more physical activity by taking the stairs instead of the elevator or parking farther away from stores  Ask your healthcare provider about the best exercise plan for you  © Copyright [employer] 2018 Information is for End User's use only and may not be sold, redistributed or otherwise used for commercial purposes   All illustrations and images included in CareNotes® are the copyrighted property of [employer]  or [address] [place] [address]

## 2023-04-27 ENCOUNTER — TELEPHONE (OUTPATIENT)
Dept: LAB | Facility: HOSPITAL | Age: 78
End: 2023-04-27

## 2023-04-27 ENCOUNTER — PATIENT OUTREACH (OUTPATIENT)
Dept: FAMILY MEDICINE CLINIC | Facility: CLINIC | Age: 78
End: 2023-04-27

## 2023-04-27 ENCOUNTER — TELEPHONE (OUTPATIENT)
Dept: FAMILY MEDICINE CLINIC | Facility: CLINIC | Age: 78
End: 2023-04-27

## 2023-04-27 DIAGNOSIS — Z71.89 COMPLEX CARE COORDINATION: Primary | ICD-10-CM

## 2023-04-27 NOTE — PROGRESS NOTES
Spoke with Stephanie Chacon informed her [facility] is being ordered to come to her home and work with her for PT  There will be a $20 copay with each visit which she is okay with  She also needs a new home health aide  Currently pays privately  I will email her list of agencies she can call   I will also check back with her to make sure [facility] has made appointment I will call [facility] lab now to set up appointment for home draw

## 2023-04-30 LAB
CREAT UR-MCNC: 79.6 MG/DL
MICROALBUMIN UR-MCNC: 5.6 MG/L (ref 0–20)
MICROALBUMIN/CREAT 24H UR: 7 MG/G CREATININE (ref 0–30)

## 2023-05-02 DIAGNOSIS — I48.91 ATRIAL FIBRILLATION WITH RVR (HCC): ICD-10-CM

## 2023-05-02 DIAGNOSIS — G62.9 NEUROPATHY: ICD-10-CM

## 2023-05-02 DIAGNOSIS — I48.91 ATRIAL FIBRILLATION WITH RAPID VENTRICULAR RESPONSE (HCC): ICD-10-CM

## 2023-05-02 RX ORDER — GABAPENTIN 300 MG/1
CAPSULE ORAL
Qty: 240 CAPSULE | Refills: 0 | Status: SHIPPED | OUTPATIENT
Start: 2023-05-02

## 2023-05-02 RX ORDER — LOSARTAN POTASSIUM 25 MG/1
TABLET ORAL
Qty: 90 TABLET | Refills: 3 | Status: SHIPPED | OUTPATIENT
Start: 2023-05-02

## 2023-05-02 RX ORDER — APIXABAN 5 MG/1
TABLET, FILM COATED ORAL
Qty: 180 TABLET | Refills: 3 | Status: SHIPPED | OUTPATIENT
Start: 2023-05-02

## 2023-05-06 DIAGNOSIS — F32.A DEPRESSION, UNSPECIFIED DEPRESSION TYPE: ICD-10-CM

## 2023-05-06 DIAGNOSIS — I48.91 ATRIAL FIBRILLATION WITH RVR (HCC): ICD-10-CM

## 2023-05-08 RX ORDER — VENLAFAXINE 75 MG/1
TABLET ORAL
Qty: 90 TABLET | Refills: 0 | Status: SHIPPED | OUTPATIENT
Start: 2023-05-08

## 2023-05-08 RX ORDER — DOFETILIDE 0.12 MG/1
CAPSULE ORAL
Qty: 180 CAPSULE | Refills: 3 | Status: SHIPPED | OUTPATIENT
Start: 2023-05-08

## 2023-05-09 ENCOUNTER — IN-CLINIC DEVICE VISIT (OUTPATIENT)
Dept: CARDIOLOGY CLINIC | Facility: CLINIC | Age: 78
End: 2023-05-09

## 2023-05-09 DIAGNOSIS — Z95.810 PRESENCE OF AUTOMATIC CARDIOVERTER/DEFIBRILLATOR (AICD): Primary | ICD-10-CM

## 2023-05-09 NOTE — PROGRESS NOTES
Results for orders placed or performed in visit on 05/09/23   Cardiac EP device report    Narrative    MDT/BI-V ICD (DDDR MODE)/ ACTIVE SYSTEM IS MRI CONDITIONAL  DEVICE INTERROGATED IN THE [place] OFFICE  BATTERY VOLTAGE ADEQUATE (10 2 YRS)  AP 94 7% BVP 90 6% ( 87 5% + VSRP 3 1%) ALL LEAD PARAMETERS WITHIN NORMAL LIMITS  ONE AT/AF EPISODE W/ RVR  BPM, LASTING FOR 2 HRS 53 MINS  AT/AF BURDEN <0 1% SINCE 4/8/22  TAKES ELIQUIS, DOFETILIDE, & METOPROLOL SUCC  VT RX5, VT RX6, VF RX5, VF RX6 PATHWAYS UPDATED TO B>AX* PER MDT RECOMMENDATIONS  OPTI-VOL WITHIN NORMAL LIMITS  NORMAL DEVICE FUNCTION   TASKED TO DR ERUM WILLETT/NC

## 2023-05-12 ENCOUNTER — APPOINTMENT (OUTPATIENT)
Dept: LAB | Facility: HOSPITAL | Age: 78
End: 2023-05-12
Attending: FAMILY MEDICINE

## 2023-05-12 DIAGNOSIS — E78.00 HYPERCHOLESTEROLEMIA: ICD-10-CM

## 2023-05-12 DIAGNOSIS — E03.9 HYPOTHYROIDISM, UNSPECIFIED TYPE: ICD-10-CM

## 2023-05-12 DIAGNOSIS — E11.42 TYPE 2 DIABETES MELLITUS WITH DIABETIC POLYNEUROPATHY, WITHOUT LONG-TERM CURRENT USE OF INSULIN (HCC): ICD-10-CM

## 2023-05-12 LAB
ALBUMIN SERPL BCP-MCNC: 3.7 G/DL (ref 3.5–5)
ALP SERPL-CCNC: 73 U/L (ref 34–104)
ALT SERPL W P-5'-P-CCNC: 13 U/L (ref 7–52)
ANION GAP SERPL CALCULATED.3IONS-SCNC: 7 MMOL/L (ref 4–13)
AST SERPL W P-5'-P-CCNC: 17 U/L (ref 13–39)
BILIRUB SERPL-MCNC: 0.38 MG/DL (ref 0.2–1)
BUN SERPL-MCNC: 26 MG/DL (ref 5–25)
CALCIUM SERPL-MCNC: 10 MG/DL (ref 8.4–10.2)
CHLORIDE SERPL-SCNC: 104 MMOL/L (ref 96–108)
CHOLEST SERPL-MCNC: 159 MG/DL
CO2 SERPL-SCNC: 30 MMOL/L (ref 21–32)
CREAT SERPL-MCNC: 1.15 MG/DL (ref 0.6–1.3)
GFR SERPL CREATININE-BSD FRML MDRD: 45 ML/MIN/1.73SQ M
GLUCOSE P FAST SERPL-MCNC: 129 MG/DL (ref 65–99)
HDLC SERPL-MCNC: 49 MG/DL
LDLC SERPL CALC-MCNC: 92 MG/DL (ref 0–100)
POTASSIUM SERPL-SCNC: 4.4 MMOL/L (ref 3.5–5.3)
PROT SERPL-MCNC: 6.1 G/DL (ref 6.4–8.4)
SODIUM SERPL-SCNC: 141 MMOL/L (ref 135–147)
T4 FREE SERPL-MCNC: 1.58 NG/DL (ref 0.76–1.46)
TRIGL SERPL-MCNC: 88 MG/DL
TSH SERPL DL<=0.05 MIU/L-ACNC: 0.08 UIU/ML (ref 0.45–4.5)

## 2023-05-15 DIAGNOSIS — E03.9 HYPOTHYROIDISM, UNSPECIFIED TYPE: Primary | ICD-10-CM

## 2023-05-26 DIAGNOSIS — I50.41 ACUTE COMBINED SYSTOLIC AND DIASTOLIC HEART FAILURE (HCC): ICD-10-CM

## 2023-05-26 DIAGNOSIS — E03.9 HYPOTHYROIDISM, UNSPECIFIED TYPE: ICD-10-CM

## 2023-05-26 RX ORDER — METOPROLOL SUCCINATE 25 MG/1
TABLET, EXTENDED RELEASE ORAL
Qty: 30 TABLET | Refills: 0 | Status: SHIPPED | OUTPATIENT
Start: 2023-05-26

## 2023-05-26 RX ORDER — LEVOTHYROXINE SODIUM 0.12 MG/1
TABLET ORAL
Qty: 30 TABLET | Refills: 0 | Status: SHIPPED | OUTPATIENT
Start: 2023-05-26

## 2023-05-30 DIAGNOSIS — G62.9 NEUROPATHY: ICD-10-CM

## 2023-05-30 RX ORDER — GABAPENTIN 300 MG/1
CAPSULE ORAL
Qty: 240 CAPSULE | Refills: 0 | Status: SHIPPED | OUTPATIENT
Start: 2023-05-30

## 2023-06-05 ENCOUNTER — OFFICE VISIT (OUTPATIENT)
Dept: OBGYN CLINIC | Facility: CLINIC | Age: 78
End: 2023-06-05
Payer: COMMERCIAL

## 2023-06-05 VITALS — BODY MASS INDEX: 35.33 KG/M2 | WEIGHT: 192 LBS | HEIGHT: 62 IN

## 2023-06-05 DIAGNOSIS — M25.561 CHRONIC PAIN OF BOTH KNEES: ICD-10-CM

## 2023-06-05 DIAGNOSIS — M17.0 PRIMARY OSTEOARTHRITIS OF BOTH KNEES: Primary | ICD-10-CM

## 2023-06-05 DIAGNOSIS — G89.29 CHRONIC PAIN OF BOTH KNEES: ICD-10-CM

## 2023-06-05 DIAGNOSIS — M25.562 CHRONIC PAIN OF BOTH KNEES: ICD-10-CM

## 2023-06-05 PROCEDURE — 20610 DRAIN/INJ JOINT/BURSA W/O US: CPT | Performed by: ORTHOPAEDIC SURGERY

## 2023-06-05 PROCEDURE — 99212 OFFICE O/P EST SF 10 MIN: CPT | Performed by: ORTHOPAEDIC SURGERY

## 2023-06-05 RX ORDER — TRIAMCINOLONE ACETONIDE 40 MG/ML
80 INJECTION, SUSPENSION INTRA-ARTICULAR; INTRAMUSCULAR
Status: COMPLETED | OUTPATIENT
Start: 2023-06-05 | End: 2023-06-05

## 2023-06-05 RX ORDER — BUPIVACAINE HYDROCHLORIDE 5 MG/ML
2 INJECTION, SOLUTION EPIDURAL; INTRACAUDAL
Status: COMPLETED | OUTPATIENT
Start: 2023-06-05 | End: 2023-06-05

## 2023-06-05 RX ADMIN — TRIAMCINOLONE ACETONIDE 80 MG: 40 INJECTION, SUSPENSION INTRA-ARTICULAR; INTRAMUSCULAR at 12:00

## 2023-06-05 RX ADMIN — BUPIVACAINE HYDROCHLORIDE 2 ML: 5 INJECTION, SOLUTION EPIDURAL; INTRACAUDAL at 12:00

## 2023-06-05 NOTE — PROGRESS NOTES
Assessment:  1  Primary osteoarthritis of both knees        2  Chronic pain of both knees          Patient Active Problem List   Diagnosis   • Atrial fibrillation ([facility] )   • Coronary artery disease   • Hyperlipidemia   • Aortic stenosis   • Primary osteoarthritis of both knees   • Class 3 severe obesity in adult [facility])   • Diabetic peripheral neuropathy (HCC)   • Type 2 diabetes mellitus, without long-term current use of insulin (HCC)   • Stage 4 chronic kidney disease (HCC)   • Elevated troponin   • Chronic combined systolic and diastolic heart failure ([facility] )   • Depression   • DNR (do not resuscitate)   • Torn rotator cuff   • Left bundle branch block (LBBB)   • Closed fracture of one rib of left side   • Moderate mitral regurgitation   • Hypotension   • Lifevest discharge   • CKD (chronic kidney disease)   • Left knee pain   • Pulmonary fibrosis (HCC)   • Hypothyroidism   • Abnormal SPEP   • Nonischemic cardiomyopathy (HCC)   • Primary osteoarthritis of both shoulders           Plan      77 y/o female with bilateral knee osteoarthritis     · Repeat bilateral knee corticosteroid injections administered in clinic today, procedure tolerated well  Post injection protocol advised  · Patient understands she can repeat these no sooner then 3 months            Subjective:     Patient ID:    Chief Complaint:Laura Sarkar 76 y o  female      HPI    Patient presents to the office today for follow up evaluation of chronic bilateral knee pain secondary to osteoarthritis  Patient has had relief in the past with periodic injections of cortisone  Last knee injection was 1/10/2023  She notes a return of her activity related knee pain  Pain is a constant ache impacting her ADL      The following portions of the patient's history were reviewed and updated as appropriate: allergies, current medications, past family history, past social history, past surgical history and problem list         Social History     Socioeconomic History • Marital status: /Civil Union     Spouse name: Not on file   • Number of children: Not on file   • Years of education: Not on file   • Highest education level: Not on file   Occupational History   • Occupation: RETIRED   Tobacco Use   • Smoking status: Never     Passive exposure: Never   • Smokeless tobacco: Never   Vaping Use   • Vaping Use: Never used   Substance and Sexual Activity   • Alcohol use: Never     Comment: Social per All[employer]    • Drug use: Never   • Sexual activity: Not Currently   Other Topics Concern   • Not on file   Social History Narrative    Hx of daily cola consumption (unk cans/day)    Daily tea consumption (unk cups/day)    Multiple organ donor    Patient has living will    Power of  in existence    Uses safety equipment - Seatbelts      Social Determinants of Health     Financial Resource Strain: [address]  (4/25/2023)    Overall Financial Resource Strain (CARDIA)    • Difficulty of Paying Living Expenses: Not hard at all   Food Insecurity: No Food Insecurity (1/16/2022)    Hunger Vital Sign    • Worried About Running Out of Food in the Last Year: Never true    • Ran Out of Food in the Last Year: Never true   Transportation Needs: No Transportation Needs (4/25/2023)    PRAPARE - Transportation    • Lack of Transportation (Medical): No    • Lack of Transportation (Non-Medical):  No   Physical Activity: Not on file   Stress: Not on file   Social Connections: Not on file   Intimate Partner Violence: Not on file   Housing Stability: Low Risk  (1/16/2022)    Housing Stability Vital Sign    • Unable to Pay for Housing in the Last Year: No    • Number of Places Lived in the Last Year: 1    • Unstable Housing in the Last Year: No     Past Medical History:   Diagnosis Date   • Aortic stenosis    • Arthritis    • Asthma    • Coronary artery disease    • Diabetes mellitus ([facility] )    • Diabetic peripheral neuropathy ([facility] )     Last assessed - 1/27/16   • Gallbladder disease    • Heart attack [facility]) 07/1999   • Hemorrhoids     Last assessed - 11/16/12   • Hypoglycemia 12/23/2021   • Myocardial infarction [facility])    • Old myocardial infarction    • Type 2 diabetes mellitus with autonomic neuropathy ([facility] )     Unspec long term insulin use status, Last assessed - 6/8/17     Past Surgical History:   Procedure Laterality Date   • BUNIONECTOMY     • CARDIAC CATHETERIZATION      Carotid Artery, Resolved - 7/1/13   • CARDIAC CATHETERIZATION N/A 12/27/2021    Procedure: CARDIAC CATHETERIZATION ;  Surgeon: Sage Chen MD;  Location: [facility] CARDIAC CATH LAB; Service: Cardiology   • CARDIAC CATHETERIZATION N/A 12/27/2021    Procedure: Cardiac Coronary Angiogram;  Surgeon: Sage Chen MD;  Location: [facility] CARDIAC CATH LAB; Service: Cardiology   • CARDIAC ELECTROPHYSIOLOGY PROCEDURE N/A 1/18/2022    Procedure: Cardiac biv icd implant;  Surgeon: Matthew Busby DO;  Location: [facility] CARDIAC CATH LAB; Service: Cardiology   • CARDIOVASCULAR STRESS TEST  09/1999   • CHOLECYSTECTOMY     • COLONOSCOPY  2017   • FOOT ARTHRODESIS, MODIFIED CHARANJIT  2016   • GALLBLADDER SURGERY  1970   • HEMORROIDECTOMY     • KNEE ARTHROSCOPY Left 2004   • RI COLONOSCOPY FLX DX W/COLLJ SPEC WHEN PFRMD N/A 8/21/2017    Procedure: COLONOSCOPY;  Surgeon: Luigi Duarte MD;  Location: [facility] GI LAB; Service: Colorectal   • REPAIR KNEE LIGAMENT     • REPAIR KNEE LIGAMENT Left 1986   • REPAIR KNEE LIGAMENT Right 1988     Allergies   Allergen Reactions   • Lyrica [Pregabalin] Swelling   • Sulfa Antibiotics Swelling     Current Outpatient Medications on File Prior to Visit   Medication Sig Dispense Refill   • acetaminophen (TYLENOL) 325 mg tablet Take 2 tablets by mouth every 6 (six) hours as needed       • Camphor-Menthol-Methyl Sal 3 1-6-10 % PTCH Apply 1 patch topically in the morning 1 patch to left shoulder and 1 patch to left knee    On at 9:00 a m  and off at 9:00 p m  (Patient not taking: Reported on 4/28/2022) 60 patch 0   • diclofenac-misoprostol (ARTHROTEC 50) 50-0 2 MG per tablet Take 1 tablet by mouth 2 (two) times a day 180 tablet 3   • dofetilide (TIKOSYN) 125 mcg capsule TAKE 1 CAPSULE BY MOUTH EVERY 12 HOURS 180 capsule 3   • Eliquis 5 MG TAKE 1 TABLET BY MOUTH EVERY 12 HOURS 180 tablet 3   • gabapentin (NEURONTIN) 300 mg capsule TAKE 3 CAPSULES BY MOUTH EVERY MORNING, TAKE 2 CAPSULES BY MOUTH IN THE AFTERNOON, TAKE 3 CAPSULES BY MOUTH EVERY EVENING 240 capsule 0   • levothyroxine 125 mcg tablet TAKE 1 TABLET BY MOUTH EVERY MORNING 30 tablet 0   • loperamide (IMODIUM) 2 mg capsule Take 1 capsule (2 mg total) by mouth 4 (four) times a day as needed for diarrhea 56 capsule 1   • losartan (COZAAR) 25 mg tablet TAKE 1 TABLET BY MOUTH EVERY MORNING 90 tablet 3   • metoprolol succinate (TOPROL-XL) 25 mg 24 hr tablet TAKE 1 TABLET BY MOUTH EVERY DAY 30 tablet 0   • Multiple Vitamins-Minerals (ICAPS AREDS 2 PO) Take by mouth in the morning     • pravastatin (PRAVACHOL) 10 mg tablet TAKE 1 TABLET BY MOUTH EVERY DAY (Patient not taking: Reported on 4/25/2023) 90 tablet 1   • venlafaxine (EFFEXOR) 75 mg tablet TAKE 1 TABLET BY MOUTH THREE TIMES DAILY 90 tablet 0     No current facility-administered medications on file prior to visit  Objective:    Review of Systems   Constitutional: Negative for chills and fever  HENT: Negative for ear pain and sore throat  Eyes: Negative for pain and visual disturbance  Respiratory: Negative for cough and shortness of breath  Cardiovascular: Negative for chest pain and palpitations  Gastrointestinal: Negative for abdominal pain and vomiting  Genitourinary: Negative for dysuria and hematuria  Musculoskeletal: Negative for arthralgias and back pain  Skin: Negative for color change and rash  Neurological: Negative for seizures and syncope  All other systems reviewed and are negative        Right Knee Exam     Muscle Strength   The patient has normal right knee strength  Other   Swelling: none  Effusion: no effusion present      Left Knee Exam     Muscle Strength   The patient has normal left knee strength  Other   Swelling: none  Effusion: no effusion present            Physical Exam  Vitals and nursing note reviewed  HENT:      Head: Normocephalic  Eyes:      Extraocular Movements: Extraocular movements intact  Cardiovascular:      Rate and Rhythm: Normal rate  Pulses: Normal pulses  Pulmonary:      Effort: Pulmonary effort is normal    Musculoskeletal:         General: Normal range of motion  Cervical back: Normal range of motion  Right knee: No effusion  Left knee: No effusion  Skin:     General: Skin is warm and dry  Neurological:      General: No focal deficit present  Mental Status: She is alert  Psychiatric:         Behavior: Behavior normal          Large joint arthrocentesis: bilateral knee  Universal Protocol:  Consent: Verbal consent obtained  Risks and benefits: risks, benefits and alternatives were discussed  Consent given by: patient  Timeout called at: 6/5/2023 12:33 PM   Patient understanding: patient states understanding of the procedure being performed  Patient identity confirmed: verbally with patient    Supporting Documentation  Indications: pain and diagnostic evaluation   Procedure Details  Location: knee - bilateral knee  Preparation: Patient was prepped and draped in the usual sterile fashion  Needle size: 22 G  Ultrasound guidance: no  Approach: anterolateral    Medications (Right): 2 mL bupivacaine (PF) 0 5 %; 80 mg triamcinolone acetonide 40 mg/mLMedications (Left): 2 mL bupivacaine (PF) 0 5 %; 80 mg triamcinolone acetonide 40 mg/mL   Patient tolerance: patient tolerated the procedure well with no immediate complications  Dressing:  Sterile dressing applied             I have personally reviewed pertinent films in PACS      Scribe Attestation    I,:  Layla Simon am acting as a scribe while in the "presence of the attending physician :       I,:  Jerry Kelley, DO personally performed the services described in this documentation    as scribed in my presence :           Portions of the record may have been created with voice recognition software   Occasional wrong word or \"sound a like\" substitutions may have occurred due to the inherent limitations of voice recognition software   Read the chart carefully and recognize, using context, where substitutions have occurred      "

## 2023-06-15 ENCOUNTER — OFFICE VISIT (OUTPATIENT)
Dept: CARDIOLOGY CLINIC | Facility: CLINIC | Age: 78
End: 2023-06-15
Payer: COMMERCIAL

## 2023-06-15 VITALS
HEIGHT: 62 IN | WEIGHT: 192 LBS | HEART RATE: 59 BPM | BODY MASS INDEX: 35.33 KG/M2 | SYSTOLIC BLOOD PRESSURE: 108 MMHG | OXYGEN SATURATION: 100 % | DIASTOLIC BLOOD PRESSURE: 62 MMHG

## 2023-06-15 DIAGNOSIS — I44.7 LEFT BUNDLE BRANCH BLOCK (LBBB): ICD-10-CM

## 2023-06-15 DIAGNOSIS — I42.8 NONISCHEMIC CARDIOMYOPATHY (HCC): ICD-10-CM

## 2023-06-15 DIAGNOSIS — I48.91 ATRIAL FIBRILLATION WITH RAPID VENTRICULAR RESPONSE (HCC): Primary | ICD-10-CM

## 2023-06-15 DIAGNOSIS — I48.0 PAROXYSMAL ATRIAL FIBRILLATION (HCC): ICD-10-CM

## 2023-06-15 DIAGNOSIS — I50.42 CHRONIC COMBINED SYSTOLIC AND DIASTOLIC HEART FAILURE (HCC): ICD-10-CM

## 2023-06-15 DIAGNOSIS — I35.0 NONRHEUMATIC AORTIC VALVE STENOSIS: ICD-10-CM

## 2023-06-15 DIAGNOSIS — E78.2 MIXED HYPERLIPIDEMIA: ICD-10-CM

## 2023-06-15 PROCEDURE — 99214 OFFICE O/P EST MOD 30 MIN: CPT | Performed by: INTERNAL MEDICINE

## 2023-06-15 PROCEDURE — 93000 ELECTROCARDIOGRAM COMPLETE: CPT | Performed by: INTERNAL MEDICINE

## 2023-06-15 NOTE — PROGRESS NOTES
Cardiology Follow Up    [facility]  1945  [id number]  [facility] PRIYA [place] [id number] [facility]  [phone]    1  Atrial fibrillation with rapid ventricular response (HCC)  POCT ECG      2  Nonischemic cardiomyopathy ([facility] )        3  Left bundle branch block (LBBB)        4  Chronic combined systolic and diastolic heart failure ([facility] )        5  Nonrheumatic aortic valve stenosis        6  Mixed hyperlipidemia        7  Paroxysmal atrial fibrillation [facility])          Chief Complaint   Patient presents with   • Follow-up     6 month f/u , pt states no cardiac complaints        Interval History: Patient feels well, without complaints  No reported chest pain, shortness of breath, palpitations, lightheadedness, syncope, LE edema, orthopnea, PND, or significant weight changes  Patient remains active without any increased fatigue out of the ordinary          Patient Active Problem List   Diagnosis   • Atrial fibrillation ([place] [address] )   • Coronary artery disease   • Hyperlipidemia   • Aortic stenosis   • Primary osteoarthritis of both knees   • Class 3 severe obesity in adult [facility])   • Diabetic peripheral neuropathy (HCC)   • Type 2 diabetes mellitus, without long-term current use of insulin ([facility])   • Stage 4 chronic kidney disease (HCC)   • Elevated troponin   • Chronic combined systolic and diastolic heart failure ([place] [address] )   • Depression   • DNR (do not resuscitate)   • Torn rotator cuff   • Left bundle branch block (LBBB)   • Closed fracture of one rib of left side   • Moderate mitral regurgitation   • Hypotension   • Lifevest discharge   • CKD (chronic kidney disease)   • Left knee pain   • Pulmonary fibrosis (HCC)   • Hypothyroidism   • Abnormal SPEP   • Nonischemic cardiomyopathy ([facility] )   • Primary osteoarthritis of both shoulders     Past Medical History:   Diagnosis Date   • Aortic stenosis    • Arthritis    • Asthma • Coronary artery disease    • Diabetes mellitus ([facility] )    • Diabetic peripheral neuropathy (HCC)     Last assessed - 1/27/16   • Gallbladder disease    • Heart attack ([facility] ) 07/1999   • Hemorrhoids     Last assessed - 11/16/12   • Hypoglycemia 12/23/2021   • Myocardial infarction [facility])    • Old myocardial infarction    • Type 2 diabetes mellitus with autonomic neuropathy (HCC)     Unspec long term insulin use status, Last assessed - 6/8/17     Social History     Socioeconomic History   • Marital status: /Civil Union     Spouse name: Not on file   • Number of children: Not on file   • Years of education: Not on file   • Highest education level: Not on file   Occupational History   • Occupation: RETIRED   Tobacco Use   • Smoking status: Never     Passive exposure: Never   • Smokeless tobacco: Never   Vaping Use   • Vaping Use: Never used   Substance and Sexual Activity   • Alcohol use: Never     Comment: Social per [employer]scripts    • Drug use: Never   • Sexual activity: Not Currently   Other Topics Concern   • Not on file   Social History Narrative    Hx of daily cola consumption (unk cans/day)    Daily tea consumption (unk cups/day)    Multiple organ donor    Patient has living will    Power of  in existence    Uses safety equipment - Seatbelts      Social Determinants of Health     Financial Resource Strain: Low Risk  (4/25/2023)    Overall Financial Resource Strain (CARDIA)    • Difficulty of Paying Living Expenses: Not hard at all   Food Insecurity: No Food Insecurity (1/16/2022)    Hunger Vital Sign    • Worried About Running Out of Food in the Last Year: Never true    • Ran Out of Food in the Last Year: Never true   Transportation Needs: No Transportation Needs (4/25/2023)    PRAPARE - Transportation    • Lack of Transportation (Medical): No    • Lack of Transportation (Non-Medical):  No   Physical Activity: Not on file   Stress: Not on file   Social Connections: Not on file   Intimate Partner Violence: Not on file   Housing Stability: Low Risk  (1/16/2022)    Housing Stability Vital Sign    • Unable to Pay for Housing in the Last Year: No    • Number of Places Lived in the Last Year: 1    • Unstable Housing in the Last Year: No      Family History   Problem Relation Age of Onset   • Hypertension Father    • Diabetes Father    • Cancer Father         Throat   • Arthritis Father    • Stroke Mother    • Diabetes Maternal Grandmother    • Heart disease Maternal Grandfather    • Diabetes Son    • Lymphoma Family         Head, Face and Neck      Past Surgical History:   Procedure Laterality Date   • BUNIONECTOMY     • CARDIAC CATHETERIZATION      Carotid Artery, Resolved - 7/1/13   • CARDIAC CATHETERIZATION N/A 12/27/2021    Procedure: CARDIAC CATHETERIZATION ;  Surgeon: Anjali Batres MD;  Location: [facility] CARDIAC CATH LAB; Service: Cardiology   • CARDIAC CATHETERIZATION N/A 12/27/2021    Procedure: Cardiac Coronary Angiogram;  Surgeon: Anjali Batres MD;  Location: [facility] CARDIAC CATH LAB; Service: Cardiology   • CARDIAC ELECTROPHYSIOLOGY PROCEDURE N/A 1/18/2022    Procedure: Cardiac biv icd implant;  Surgeon: Akilah David DO;  Location: [facility] CARDIAC CATH LAB; Service: Cardiology   • CARDIOVASCULAR STRESS TEST  09/1999   • CHOLECYSTECTOMY     • COLONOSCOPY  2017   • FOOT ARTHRODESIS, MODIFIED DONOHUE  2016   • GALLBLADDER SURGERY  1970   • HEMORROIDECTOMY     • KNEE ARTHROSCOPY Left 2004   • [place] COLONOSCOPY FLX DX W/COLLJ SPEC WHEN PFRMD N/A 8/21/2017    Procedure: COLONOSCOPY;  Surgeon: Sulema Alcaraz MD;  Location: [facility] GI LAB;   Service: Colorectal   • REPAIR KNEE LIGAMENT     • REPAIR KNEE LIGAMENT Left 1986   • REPAIR KNEE LIGAMENT Right 1988       Current Outpatient Medications:   •  acetaminophen (TYLENOL) 325 mg tablet, Take 2 tablets by mouth every 6 (six) hours as needed  , Disp: , Rfl:   •  diclofenac-misoprostol (ARTHROTEC 50) 50-0 2 MG per tablet, Take 1 tablet by mouth 2 (two) times a day, Disp: 180 tablet, Rfl: 3  •  dofetilide (TIKOSYN) 125 mcg capsule, TAKE 1 CAPSULE BY MOUTH EVERY 12 HOURS, Disp: 180 capsule, Rfl: 3  •  Eliquis 5 MG, TAKE 1 TABLET BY MOUTH EVERY 12 HOURS, Disp: 180 tablet, Rfl: 3  •  gabapentin (NEURONTIN) 300 mg capsule, TAKE 3 CAPSULES BY MOUTH EVERY MORNING, TAKE 2 CAPSULES BY MOUTH IN THE AFTERNOON, TAKE 3 CAPSULES BY MOUTH EVERY EVENING, Disp: 240 capsule, Rfl: 0  •  levothyroxine 125 mcg tablet, TAKE 1 TABLET BY MOUTH EVERY MORNING, Disp: 30 tablet, Rfl: 0  •  loperamide (IMODIUM) 2 mg capsule, Take 1 capsule (2 mg total) by mouth 4 (four) times a day as needed for diarrhea, Disp: 56 capsule, Rfl: 1  •  losartan (COZAAR) 25 mg tablet, TAKE 1 TABLET BY MOUTH EVERY MORNING, Disp: 90 tablet, Rfl: 3  •  metoprolol succinate (TOPROL-XL) 25 mg 24 hr tablet, TAKE 1 TABLET BY MOUTH EVERY DAY, Disp: 30 tablet, Rfl: 0  •  Multiple Vitamins-Minerals (ICAPS AREDS 2 PO), Take by mouth in the morning, Disp: , Rfl:   •  venlafaxine (EFFEXOR) 75 mg tablet, TAKE 1 TABLET BY MOUTH THREE TIMES DAILY, Disp: 90 tablet, Rfl: 0  •  Camphor-Menthol-Methyl Sal 3 1-6-10 % PTCH, Apply 1 patch topically in the morning 1 patch to left shoulder and 1 patch to left knee    On at 9:00 a m  and off at 9:00 p m  (Patient not taking: Reported on 4/28/2022), Disp: 60 patch, Rfl: 0  •  pravastatin (PRAVACHOL) 10 mg tablet, TAKE 1 TABLET BY MOUTH EVERY DAY (Patient not taking: Reported on 4/25/2023), Disp: 90 tablet, Rfl: 1  Allergies   Allergen Reactions   • Lyrica [Pregabalin] Swelling   • Sulfa Antibiotics Swelling       Labs:  Appointment on 05/12/2023   Component Date Value   • Sodium 05/12/2023 141    • Potassium 05/12/2023 4 4    • Chloride 05/12/2023 104    • CO2 05/12/2023 30    • ANION GAP 05/12/2023 7    • BUN 05/12/2023 26 (H)    • Creatinine 05/12/2023 1 15    • Glucose, Fasting 05/12/2023 129 (H)    • Calcium 05/12/2023 10 0    • AST 05/12/2023 17    • ALT 05/12/2023 13    • Alkaline Phosphatase 05/12/2023 73    • Total Protein 05/12/2023 6 1 (L)    • Albumin 05/12/2023 3 7    • Total Bilirubin 05/12/2023 0 38    • eGFR 05/12/2023 45    • Cholesterol 05/12/2023 159    • Triglycerides 05/12/2023 88    • HDL, Direct 05/12/2023 49 (L)    • LDL Calculated 05/12/2023 92    • TSH 3RD GENERATON 05/12/2023 0 081 (L)    • Free T4 05/12/2023 1 58 (H)    Office Visit on 04/25/2023   Component Date Value   • Hemoglobin A1C 04/25/2023 6 4    • Creatinine, Ur 04/28/2023 79 6    • Albumin,U,Random 04/28/2023 5 6    • Albumin Creat Ratio 04/28/2023 7      Lab Results   Component Value Date    CHOL 184 09/19/2015    HDL 49 (L) 05/12/2023    HDL 47 09/19/2015    TRIG 88 05/12/2023    TRIG 113 09/19/2015     Imaging: Cardiac EP device report    Result Date: 4/28/2022  Narrative: MDT/BI-V ICD (DDDR MODE)/ ACTIVE SYSTEM IS MRI CONDITIONAL DEVICE INTERROGATED IN THE [place] OFFICE  BATTERY VOLTAGE ADEQUATE (10 3 YRS)  AP-91%, BVP>99% (TOTAL -96 6%+VSR PACE-2 5%)  ALL LEAD PARAMETERS WITHIN NORMAL LIMITS  1 NEW AF EPISODE ON 4/14/22 LASTING 27 5 MINS  AF BURDEN-0 6%  HX: PAF & ON ELIQUIS, DOFETILIDE & METOPROLOL  Jennifer Olea 92  OPTI-VOL WITHIN NORMAL LIMITS  INCREASED ATRIAL SENSITIVITY TO 0 15MV FROM 0 3MV TO MAINTAIN 2:1 SAFETY MARGIN  PT TO SEE DR NOLEN TODAY  NORMAL DEVICE FUNCTION  GV     Cardiac EP device report    Result Date: 4/15/2022  Narrative: MDT/BI-V ICD (DDDR MODE)/ ACTIVE SYSTEM IS MRI CONDITIONAL [address] (2 PDF'S)- NB: MULTIPLE RVR EPISODES ON 4/14/22; 1 RVR EPISODE IN VF ZONE & TREATED W/ ATP X1 WHICH DEECREASED RATE BELOW VF ZONE  PT ASYMPTOMATIC  PT ON ELIQUIS, DOFETILIDE & METOPROLOL  PT STATED SHE MISSED SOME MEDS BUT UNSURE WHICH ONES THE LAST FEW DAYS BECAUSE SHE IS WAITING FOR REFILLS TO BE DELIVERED (CALLED PHARMACY & THEY WILL BE DELIVERING THEM TO PT'S HOUSE TODAY)  AS/BVP @ 66 PPM ON CURRENT EGM  BATTERY VOLTAGE ADEQUATE (10 2 YRS)   AP-69%, BVP-96% (TOTAL -90 7%+VSR PACE-5 1%)  ALL AVAILABLE LEAD PARAMETERS WITHIN NORMAL LIMITS  OPTI-VOL WITHIN NORMAL LIMITS  TIGER TEXTED AS, AARTI & DR NOLEN  APPT W/ DEVICE CLINIC & DR Corona Ordaz ON 4/28/22  NORMAL DEVICE FUNCTION  GV     Echo follow up/limited w/ contrast if indicated    Result Date: 4/21/2022  Narrative: •  Left Ventricle: Left ventricle is not well visualized  Left ventricular cavity size is mildly dilated  Wall thickness is normal  The left ventricular ejection fraction is 50%  Systolic function is low normal  Wall motion is normal  •  Left Atrium: The atrium is mildly dilated  Review of Systems:  Review of Systems   Constitutional: Negative for activity change, appetite change, chills, diaphoresis, fatigue and unexpected weight change  HENT: Negative for hearing loss, nosebleeds and sore throat  Eyes: Negative for photophobia and visual disturbance  Respiratory: Negative for cough, chest tightness, shortness of breath and wheezing  Cardiovascular: Negative for chest pain, palpitations and leg swelling  Gastrointestinal: Negative for abdominal pain, diarrhea, nausea and vomiting  Endocrine: Negative for polyuria  Genitourinary: Negative for dysuria, frequency and hematuria  Musculoskeletal: Positive for gait problem  Negative for arthralgias, back pain and neck pain  Skin: Negative for pallor and rash  Neurological: Negative for dizziness, syncope and headaches  Hematological: Does not bruise/bleed easily  Psychiatric/Behavioral: Negative for behavioral problems and confusion  Physical Exam:  Physical Exam  Vitals reviewed  Constitutional:       Appearance: She is well-developed  She is not diaphoretic  HENT:      Head: Normocephalic and atraumatic  Nose: Nose normal    Eyes:      General: No scleral icterus  Pupils: Pupils are equal, round, and reactive to light  Neck:      Vascular: No JVD     Cardiovascular:      Rate and Rhythm: Normal rate and regular "rhythm  Heart sounds: Murmur heard  Systolic murmur is present with a grade of 2/6  No friction rub  No gallop  Pulmonary:      Effort: Pulmonary effort is normal  No respiratory distress  Breath sounds: Normal breath sounds  No wheezing or rales  Abdominal:      General: Bowel sounds are normal  There is no distension  Palpations: Abdomen is soft  Tenderness: There is no abdominal tenderness  Musculoskeletal:         General: No deformity  Normal range of motion  Cervical back: Normal range of motion and neck supple  Skin:     General: Skin is warm and dry  Findings: No rash  Neurological:      Mental Status: She is alert and oriented to person, place, and time  Cranial Nerves: No cranial nerve deficit  Psychiatric:         Behavior: Behavior normal        Blood pressure 108/62, pulse 59, height 5' 2\" (1 575 m), weight 87 1 kg (192 lb), SpO2 100 %  EKG:  AV paced rhythm  Abnormal ECG    Discussion/Summary:  NICM: cath in Dec 2021 on initial diagnosis was normal for CAD  S/p Bi-V ICD - opti-vol WNL on most recent interrogation  EF noted to be 30% on diagnosis, now on repeat echo after medical therapy revealed low normal LVEF with only trace MR as opposed to mild to moderate on diagnosis  Continues to remain euvolemic on exam today  Afib: with inappropriate LifeVest discharge for afib with RVR - now s/p ICD  Rate control with B-blocker and rhythm control with Tikosyn  Continued on Eliquis for [facility]  Continues to have intermittent episodes of A-fib on device interrogation  Aortic stenosis: most recent echo in Dec 2021 did not reveal significant stenosis  LBBB: continued with Bi-V ICD  HLD: continued on statin  LDL 86 in Aug 2021  LDL 92 in May 2023      "

## 2023-06-25 DIAGNOSIS — I50.41 ACUTE COMBINED SYSTOLIC AND DIASTOLIC HEART FAILURE (HCC): ICD-10-CM

## 2023-06-25 DIAGNOSIS — F32.A DEPRESSION, UNSPECIFIED DEPRESSION TYPE: ICD-10-CM

## 2023-06-25 DIAGNOSIS — E03.9 HYPOTHYROIDISM, UNSPECIFIED TYPE: ICD-10-CM

## 2023-06-26 RX ORDER — METOPROLOL SUCCINATE 25 MG/1
TABLET, EXTENDED RELEASE ORAL
Qty: 30 TABLET | Refills: 0 | Status: SHIPPED | OUTPATIENT
Start: 2023-06-26

## 2023-06-26 RX ORDER — VENLAFAXINE 75 MG/1
TABLET ORAL
Qty: 90 TABLET | Refills: 0 | Status: SHIPPED | OUTPATIENT
Start: 2023-06-26

## 2023-06-26 RX ORDER — LEVOTHYROXINE SODIUM 0.12 MG/1
TABLET ORAL
Qty: 30 TABLET | Refills: 0 | Status: SHIPPED | OUTPATIENT
Start: 2023-06-26

## 2023-07-04 DIAGNOSIS — G62.9 NEUROPATHY: ICD-10-CM

## 2023-07-05 RX ORDER — GABAPENTIN 300 MG/1
CAPSULE ORAL
Qty: 240 CAPSULE | Refills: 0 | Status: SHIPPED | OUTPATIENT
Start: 2023-07-05

## 2023-07-24 DIAGNOSIS — I50.41 ACUTE COMBINED SYSTOLIC AND DIASTOLIC HEART FAILURE (HCC): ICD-10-CM

## 2023-07-24 RX ORDER — METOPROLOL SUCCINATE 25 MG/1
TABLET, EXTENDED RELEASE ORAL
Qty: 90 TABLET | Refills: 1 | Status: SHIPPED | OUTPATIENT
Start: 2023-07-24

## 2023-07-31 DIAGNOSIS — G62.9 NEUROPATHY: ICD-10-CM

## 2023-07-31 RX ORDER — GABAPENTIN 300 MG/1
CAPSULE ORAL
Qty: 240 CAPSULE | Refills: 0 | Status: SHIPPED | OUTPATIENT
Start: 2023-07-31

## 2023-08-08 ENCOUNTER — REMOTE DEVICE CLINIC VISIT (OUTPATIENT)
Dept: CARDIOLOGY CLINIC | Facility: CLINIC | Age: 78
End: 2023-08-08
Payer: COMMERCIAL

## 2023-08-08 DIAGNOSIS — Z95.810 PRESENCE OF IMPLANTABLE CARDIOVERTER-DEFIBRILLATOR (ICD): Primary | ICD-10-CM

## 2023-08-08 PROCEDURE — 93295 DEV INTERROG REMOTE 1/2/MLT: CPT | Performed by: INTERNAL MEDICINE

## 2023-08-08 PROCEDURE — 93296 REM INTERROG EVL PM/IDS: CPT | Performed by: INTERNAL MEDICINE

## 2023-08-08 NOTE — PROGRESS NOTES
MDT BI-V ICD (DDDR MODE)/ ACTIVE SYSTEM IS MRI CONDITIONAL   CARELINK TRANSMISSION:  BATTERY VOLTAGE ADEQUATE (9.5 YR.).  AP 91.5% BP 92.8% VSRP 3.9%.   ALL LEAD PARAMETERS WITHIN NORMAL LIMITS.  NO SIGNIFICANT HIGH RATE EPISODES.  6,266 V SENSE EPISODES (44 MIN/D) WITH MARKERS FOR FUSION.  OPTI-VOL WITHIN NORMAL LIMITS.  NORMAL DEVICE FUNCTION.  RG

## 2023-08-09 DIAGNOSIS — F32.A DEPRESSION, UNSPECIFIED DEPRESSION TYPE: ICD-10-CM

## 2023-08-09 DIAGNOSIS — E03.9 HYPOTHYROIDISM, UNSPECIFIED TYPE: ICD-10-CM

## 2023-08-09 RX ORDER — LEVOTHYROXINE SODIUM 0.12 MG/1
TABLET ORAL
Qty: 30 TABLET | Refills: 0 | Status: SHIPPED | OUTPATIENT
Start: 2023-08-09

## 2023-08-09 RX ORDER — VENLAFAXINE 75 MG/1
TABLET ORAL
Qty: 90 TABLET | Refills: 0 | Status: SHIPPED | OUTPATIENT
Start: 2023-08-09

## 2023-08-25 DIAGNOSIS — E78.00 HYPERCHOLESTEROLEMIA: ICD-10-CM

## 2023-08-25 RX ORDER — PRAVASTATIN SODIUM 10 MG
TABLET ORAL
Qty: 90 TABLET | Refills: 1 | Status: SHIPPED | OUTPATIENT
Start: 2023-08-25

## 2023-09-01 DIAGNOSIS — G62.9 NEUROPATHY: ICD-10-CM

## 2023-09-01 RX ORDER — GABAPENTIN 300 MG/1
CAPSULE ORAL
Qty: 240 CAPSULE | Refills: 0 | Status: SHIPPED | OUTPATIENT
Start: 2023-09-01

## 2023-09-05 ENCOUNTER — OFFICE VISIT (OUTPATIENT)
Dept: FAMILY MEDICINE CLINIC | Facility: CLINIC | Age: 78
End: 2023-09-05
Payer: COMMERCIAL

## 2023-09-05 ENCOUNTER — TELEPHONE (OUTPATIENT)
Dept: FAMILY MEDICINE CLINIC | Facility: CLINIC | Age: 78
End: 2023-09-05

## 2023-09-05 VITALS
OXYGEN SATURATION: 95 % | DIASTOLIC BLOOD PRESSURE: 74 MMHG | TEMPERATURE: 97.5 F | BODY MASS INDEX: 35.12 KG/M2 | HEART RATE: 63 BPM | HEIGHT: 62 IN | SYSTOLIC BLOOD PRESSURE: 120 MMHG

## 2023-09-05 DIAGNOSIS — E78.00 HYPERCHOLESTEROLEMIA: ICD-10-CM

## 2023-09-05 DIAGNOSIS — B37.2 CUTANEOUS CANDIDIASIS: ICD-10-CM

## 2023-09-05 DIAGNOSIS — E11.42 TYPE 2 DIABETES MELLITUS WITH DIABETIC POLYNEUROPATHY, WITHOUT LONG-TERM CURRENT USE OF INSULIN (HCC): ICD-10-CM

## 2023-09-05 DIAGNOSIS — E03.9 HYPOTHYROIDISM, UNSPECIFIED TYPE: ICD-10-CM

## 2023-09-05 DIAGNOSIS — I10 ESSENTIAL HYPERTENSION: ICD-10-CM

## 2023-09-05 DIAGNOSIS — D49.2 SKIN NEOPLASM: Primary | ICD-10-CM

## 2023-09-05 PROCEDURE — 99214 OFFICE O/P EST MOD 30 MIN: CPT | Performed by: FAMILY MEDICINE

## 2023-09-05 RX ORDER — NYSTATIN 100000 [USP'U]/G
POWDER TOPICAL
COMMUNITY
End: 2023-09-05 | Stop reason: SDUPTHER

## 2023-09-05 RX ORDER — NYSTATIN 100000 [USP'U]/G
POWDER TOPICAL 2 TIMES DAILY
Qty: 60 G | Refills: 3 | Status: SHIPPED | OUTPATIENT
Start: 2023-09-05

## 2023-09-06 ENCOUNTER — TELEPHONE (OUTPATIENT)
Dept: FAMILY MEDICINE CLINIC | Facility: CLINIC | Age: 78
End: 2023-09-06

## 2023-09-06 ENCOUNTER — PATIENT OUTREACH (OUTPATIENT)
Dept: FAMILY MEDICINE CLINIC | Facility: CLINIC | Age: 78
End: 2023-09-06

## 2023-09-06 NOTE — PROGRESS NOTES
Patient ID: Haidee Boxer is a 78 y.o. female. HPI: 78 y. o.female presents for evaluation of a skin lesion on her right temple which will not heal and keeps opening up and bleeding as well as for follow up of niddm, hypertension,hypothyroidism,  and hypercholesterolemia. She is due for labs. Patient deneis any dyspnea, chest pain or palpitations.       SUBJECTIVE    Family History   Problem Relation Age of Onset   • Hypertension Father    • Diabetes Father    • Cancer Father         Throat   • Arthritis Father    • Stroke Mother    • Diabetes Maternal Grandmother    • Heart disease Maternal Grandfather    • Diabetes Son    • Lymphoma Family         Head, Face and Neck      Social History     Socioeconomic History   • Marital status: /Civil Union     Spouse name: Not on file   • Number of children: Not on file   • Years of education: Not on file   • Highest education level: Not on file   Occupational History   • Occupation: RETIRED   Tobacco Use   • Smoking status: Never     Passive exposure: Never   • Smokeless tobacco: Never   Vaping Use   • Vaping Use: Never used   Substance and Sexual Activity   • Alcohol use: Never     Comment: Social per [employer]    • Drug use: Never   • Sexual activity: Not Currently   Other Topics Concern   • Not on file   Social History Narrative    Hx of daily cola consumption (unk cans/day)    Daily tea consumption (unk cups/day)    Multiple organ donor    Patient has living will    Power of  in existence    Uses safety equipment - Seatbelts      Social Determinants of Health     Financial Resource Strain: Low Risk  (4/25/2023)    Overall Financial Resource Strain (CARDIA)    • Difficulty of Paying Living Expenses: Not hard at all   Food Insecurity: No Food Insecurity (1/16/2022)    Hunger Vital Sign    • Worried About Running Out of Food in the Last Year: Never true    • Ran Out of Food in the Last Year: Never true   Transportation Needs: No Transportation Needs (4/25/2023)    PRAPARE - Transportation    • Lack of Transportation (Medical): No    • Lack of Transportation (Non-Medical): No   Physical Activity: Not on file   Stress: Not on file   Social Connections: Not on file   Intimate Partner Violence: Not on file   Housing Stability: Low Risk  (1/16/2022)    Housing Stability Vital Sign    • Unable to Pay for Housing in the Last Year: No    • Number of Places Lived in the Last Year: 1    • Unstable Housing in the Last Year: No     Past Medical History:   Diagnosis Date   • Aortic stenosis    • Arthritis    • Asthma    • Coronary artery disease    • Diabetes mellitus ([address])    • Diabetic peripheral neuropathy ([address])     Last assessed - 1/27/16   • Gallbladder disease    • Heart attack ([address]) 07/1999   • Hemorrhoids     Last assessed - 11/16/12   • Hypoglycemia 12/23/2021   • Myocardial infarction [facility])    • Old myocardial infarction    • Type 2 diabetes mellitus with autonomic neuropathy ([address])     Unspec long term insulin use status, Last assessed - 6/8/17     Past Surgical History:   Procedure Laterality Date   • BUNIONECTOMY     • CARDIAC CATHETERIZATION      Carotid Artery, Resolved - 7/1/13   • CARDIAC CATHETERIZATION N/A 12/27/2021    Procedure: CARDIAC CATHETERIZATION ;  Surgeon: Aissatou Brandt MD;  [facility]; Service: Cardiology   • CARDIAC CATHETERIZATION N/A 12/27/2021    Procedure: Cardiac Coronary Angiogram;  Surgeon: Aissatou Brandt MD;  [facility]; Service: Cardiology   • CARDIAC ELECTROPHYSIOLOGY PROCEDURE N/A 1/18/2022    Procedure: Cardiac biv icd implant;  Surgeon: Koko Ogden DO;  [facility];   Service: Cardiology   • CARDIOVASCULAR STRESS TEST  09/1999   • CHOLECYSTECTOMY     • COLONOSCOPY  2017   • FOOT ARTHRODESIS, MODIFIED CHARANJIT  2016   • GALLBLADDER SURGERY  1970   • HEMORROIDECTOMY     • KNEE ARTHROSCOPY Left 2004   • [place] COLONOSCOPY FLX DX W/COLLJ SPEC WHEN PFRMD N/A 8/21/2017 Procedure: COLONOSCOPY;  Surgeon: Rohini Martinez MD;  Location: [facility] GI LAB; Service: Colorectal   • REPAIR KNEE LIGAMENT     • REPAIR KNEE LIGAMENT Left 1986   • REPAIR KNEE LIGAMENT Right 1988     Allergies   Allergen Reactions   • Lyrica [Pregabalin] Swelling   • Sulfa Antibiotics Swelling       Current Outpatient Medications:   •  acetaminophen (TYLENOL) 325 mg tablet, Take 2 tablets by mouth every 6 (six) hours as needed  , Disp: , Rfl:   •  diclofenac-misoprostol (ARTHROTEC 50) 50-0.2 MG per tablet, Take 1 tablet by mouth 2 (two) times a day, Disp: 180 tablet, Rfl: 3  •  dofetilide (TIKOSYN) 125 mcg capsule, TAKE 1 CAPSULE BY MOUTH EVERY 12 HOURS, Disp: 180 capsule, Rfl: 3  •  Eliquis 5 MG, TAKE 1 TABLET BY MOUTH EVERY 12 HOURS, Disp: 180 tablet, Rfl: 3  •  gabapentin (NEURONTIN) 300 mg capsule, TAKE 3 CAPSULES BY MOUTH EVERY MORNING 2 IN THE AFTERNOON AND 3 IN THE EVENING, Disp: 240 capsule, Rfl: 0  •  levothyroxine 125 mcg tablet, TAKE 1 TABLET BY MOUTH EVERY MORNING, Disp: 30 tablet, Rfl: 0  •  losartan (COZAAR) 25 mg tablet, TAKE 1 TABLET BY MOUTH EVERY MORNING, Disp: 90 tablet, Rfl: 3  •  metoprolol succinate (TOPROL-XL) 25 mg 24 hr tablet, TAKE 1 TABLET BY MOUTH EVERY DAY, Disp: 90 tablet, Rfl: 1  •  Multiple Vitamins-Minerals (ICAPS AREDS 2 PO), Take by mouth in the morning, Disp: , Rfl:   •  nystatin (MYCOSTATIN) powder, Apply topically 2 (two) times a day, Disp: 60 g, Rfl: 3  •  pravastatin (PRAVACHOL) 10 mg tablet, TAKE 1 TABLET BY MOUTH EVERY DAY, Disp: 90 tablet, Rfl: 1  •  venlafaxine (EFFEXOR) 75 mg tablet, TAKE 1 TABLET BY MOUTH THREE TIMES DAILY, Disp: 90 tablet, Rfl: 0  •  Camphor-Menthol-Methyl Sal 3.1-6-10 % PTCH, Apply 1 patch topically in the morning 1 patch to left shoulder and 1 patch to left knee.   On at 9:00 a.m. and off at 9:00 p.m. (Patient not taking: Reported on 4/28/2022), Disp: 60 patch, Rfl: 0  •  loperamide (IMODIUM) 2 mg capsule, Take 1 capsule (2 mg total) by mouth 4 (four) times a day as needed for diarrhea (Patient not taking: Reported on 9/5/2023), Disp: 56 capsule, Rfl: 1    Review of Systems  Constitutional:     Denies fever, chills ,fatigue ,weakness ,weight loss, weight gain     ENT: Denies earache ,loss of hearing ,nosebleed, nasal discharge,nasal congestion ,sore throat ,hoarseness  Pulmonary: Denies shortness of breath ,cough  ,dyspnea on exertion, orthopnea  ,PND   Cardiovascular:  Denies bradycardia , tachycardia  ,palpations, lower extremity edema leg, claudication  Breast:  Denies new or changing breast lumps ,nipple discharge ,nipple changes  Abdomen:  Denies abdominal pain , anorexia , indigestion, nausea, vomiting, constipation, diarrhea  Musculoskeletal: Denies myalgias, arthralgias, joint swelling, joint stiffness , limb pain, limb swelling  Gu: denies dysuria, polyuria  Skin: + right temple skin lesion(nonhealing), skin lesion, skin wound, itching, dry skin  Neuro: Denies headache, numbness, tingling, confusion, loss of consciousness, dizziness, vertigo  Psychiatric: Denies feelings of depression, suicidal ideation, anxiety, sleep disturbances    OBJECTIVE  /74   Pulse 63   Temp 97.5 °F (36.4 °C)   Ht 5' 2" (1.575 m)   SpO2 95%   BMI 35.12 kg/m²   Constitutional:   NAD, well appearing and well nourished      ENT:   Conjunctiva and lids: no injection, edema, or discharge     Pupils and iris: ANGELINE bilaterally    External inspection of ears and nose: normal without deformities or discharge. Otoscopic exam: Canals patent without erythema. Nasal mucosa, septum and turbinates: Normal or edema or discharge         Oropharynx:  Moist mucosa, normal tongue and tonsils without lesions. No erythema        Pulmonary:Respiratory effort normal rate and rhythm, no increased work of breathing.  Auscultation of lungs:  Clear bilaterally with no adventitious breath sounds       Cardiovascular: regular rate and rhythm, S1 and S2, no murmur, no edema and/or varicosities of LE      Abdomen: Soft and non-distended     Positive bowel sounds      No heptomegaly or splenomegaly      Gu: no suprapubic tenderness or CVA tenderness, no urethral discharge  Lymphatic:  No anterior or posterior cervical lymphadenopathy         Musculoskeletal:  Gait and station: Normal gait      Digits and nails normal without clubbing or cyanosis       Inspection/palpation of joints, bones, and muscles:  No joint tenderness, swelling, full active and passive range of motion       Skin: + right temporal skin lesion approx 3cm in diameter with open area in center and scabbed edges   Neuro:    Normal reflexes     Psych:   alert and oriented to person, place and time     normal mood and affect       Assessment/Plan:Diagnoses and all orders for this visit:    Skin neoplasm  -     Ambulatory Referral to Plastic Surgery; Future    Type 2 diabetes mellitus with diabetic polyneuropathy, without long-term current use of insulin (HCC)  -     HEMOGLOBIN A1C W/ EAG ESTIMATION; Future    Essential hypertension  Comments:  Continue ARB therapy. Orders:  -     Comprehensive metabolic panel; Future    Hypercholesterolemia  Comments:  Stable on statin therapy. Orders:  -     Lipid panel; Future    Hypothyroidism, unspecified type  Comments:  Stable on levothyroxine therapy. Orders:  -     TSH, 3rd generation; Future    Cutaneous candidiasis  Comments:  Refill given for Mycostatin  Orders:  -     nystatin (MYCOSTATIN) powder; Apply topically 2 (two) times a day    Other orders  -     Discontinue: nystatin (MYCOSTATIN) powder; APPLY TOPICALLY TWO TIMES DAILY        Reviewed with patient plan to treat with above plan.     Patient instructed to call in 72 hours if not feeling better or if symptoms worsen

## 2023-09-07 ENCOUNTER — PATIENT OUTREACH (OUTPATIENT)
Dept: FAMILY MEDICINE CLINIC | Facility: CLINIC | Age: 78
End: 2023-09-07

## 2023-09-07 NOTE — PROGRESS NOTES
Spoke with Claudy Denney to set up [address] home lab Reminded her she will have to fast at least 8 hours or more.  Explained [facility] lab will reach out next to set up appointment

## 2023-09-15 ENCOUNTER — APPOINTMENT (OUTPATIENT)
Dept: LAB | Facility: HOSPITAL | Age: 78
End: 2023-09-15
Attending: FAMILY MEDICINE
Payer: COMMERCIAL

## 2023-09-15 DIAGNOSIS — E03.9 HYPOTHYROIDISM, UNSPECIFIED TYPE: ICD-10-CM

## 2023-09-15 DIAGNOSIS — I10 ESSENTIAL HYPERTENSION: ICD-10-CM

## 2023-09-15 DIAGNOSIS — E78.00 HYPERCHOLESTEROLEMIA: ICD-10-CM

## 2023-09-15 DIAGNOSIS — E11.42 TYPE 2 DIABETES MELLITUS WITH DIABETIC POLYNEUROPATHY, WITHOUT LONG-TERM CURRENT USE OF INSULIN (HCC): ICD-10-CM

## 2023-09-15 LAB
ALBUMIN SERPL BCP-MCNC: 3.5 G/DL (ref 3.5–5)
ALP SERPL-CCNC: 80 U/L (ref 34–104)
ALT SERPL W P-5'-P-CCNC: 11 U/L (ref 7–52)
ANION GAP SERPL CALCULATED.3IONS-SCNC: 10 MMOL/L
AST SERPL W P-5'-P-CCNC: 20 U/L (ref 13–39)
BILIRUB SERPL-MCNC: 0.55 MG/DL (ref 0.2–1)
BUN SERPL-MCNC: 27 MG/DL (ref 5–25)
CALCIUM SERPL-MCNC: 9.7 MG/DL (ref 8.4–10.2)
CHLORIDE SERPL-SCNC: 105 MMOL/L (ref 96–108)
CHOLEST SERPL-MCNC: 146 MG/DL
CO2 SERPL-SCNC: 29 MMOL/L (ref 21–32)
CREAT SERPL-MCNC: 1.19 MG/DL (ref 0.6–1.3)
EST. AVERAGE GLUCOSE BLD GHB EST-MCNC: 143 MG/DL
GFR SERPL CREATININE-BSD FRML MDRD: 43 ML/MIN/1.73SQ M
GLUCOSE P FAST SERPL-MCNC: 125 MG/DL (ref 65–99)
HBA1C MFR BLD: 6.6 %
HDLC SERPL-MCNC: 44 MG/DL
LDLC SERPL CALC-MCNC: 84 MG/DL (ref 0–100)
NONHDLC SERPL-MCNC: 102 MG/DL
POTASSIUM SERPL-SCNC: 4 MMOL/L (ref 3.5–5.3)
PROT SERPL-MCNC: 5.7 G/DL (ref 6.4–8.4)
SODIUM SERPL-SCNC: 144 MMOL/L (ref 135–147)
TRIGL SERPL-MCNC: 90 MG/DL
TSH SERPL DL<=0.05 MIU/L-ACNC: 0.76 UIU/ML (ref 0.45–4.5)

## 2023-09-15 PROCEDURE — 36415 COLL VENOUS BLD VENIPUNCTURE: CPT

## 2023-09-15 PROCEDURE — 80053 COMPREHEN METABOLIC PANEL: CPT

## 2023-09-15 PROCEDURE — 83036 HEMOGLOBIN GLYCOSYLATED A1C: CPT

## 2023-09-15 PROCEDURE — 80061 LIPID PANEL: CPT

## 2023-09-15 PROCEDURE — 84443 ASSAY THYROID STIM HORMONE: CPT

## 2023-10-03 DIAGNOSIS — G62.9 NEUROPATHY: ICD-10-CM

## 2023-10-03 RX ORDER — GABAPENTIN 300 MG/1
CAPSULE ORAL
Qty: 240 CAPSULE | Refills: 0 | Status: SHIPPED | OUTPATIENT
Start: 2023-10-03

## 2023-10-23 ENCOUNTER — CONSULT (OUTPATIENT)
Dept: PLASTIC SURGERY | Facility: CLINIC | Age: 78
End: 2023-10-23
Payer: COMMERCIAL

## 2023-10-23 VITALS
WEIGHT: 190 LBS | DIASTOLIC BLOOD PRESSURE: 79 MMHG | HEIGHT: 62 IN | HEART RATE: 60 BPM | TEMPERATURE: 97.3 F | BODY MASS INDEX: 34.96 KG/M2 | SYSTOLIC BLOOD PRESSURE: 115 MMHG

## 2023-10-23 DIAGNOSIS — D49.2 SKIN NEOPLASM: Primary | ICD-10-CM

## 2023-10-23 PROCEDURE — 99203 OFFICE O/P NEW LOW 30 MIN: CPT | Performed by: PHYSICIAN ASSISTANT

## 2023-10-23 PROCEDURE — 11102 TANGNTL BX SKIN SINGLE LES: CPT | Performed by: PHYSICIAN ASSISTANT

## 2023-10-23 NOTE — PROGRESS NOTES
Assessment/Plan:     Diagnoses and all orders for this visit:    Skin neoplasm  Pt was referred by her PCP for a skin lesion on her right forehead. It appears as 2 separate lesions. The more lateral lesion measures ~7mm. Shave biopsy was obtained. The more medial lesion measures 1cm & punch biopsy was performed. We will see her back in 2 weeks to discuss her pathology. -     Ambulatory Referral to Plastic Surgery  -     Biopsy      Subjective:      Patient ID: Geraldo Mckeon is a 78 y.o. female. HPI    Pt was referred by her PCP for a skin lesion on her right forehead. She states it has been there for several months. She believes she had a skin cancer on her nose in the past. She does not currently follow with dermatology. She does have a PMH of pacemaker, CAD, a fib, on Eliquis & diabetes.     Patient Active Problem List   Diagnosis    Atrial fibrillation ([address])    Coronary artery disease    Hyperlipidemia    Aortic stenosis    Primary osteoarthritis of both knees    Class 3 severe obesity in [facility])    Diabetic peripheral neuropathy (HCC)    Type 2 diabetes mellitus, without long-term current use of insulin (HCC)    Stage 4 chronic kidney disease (HCC)    Elevated troponin    Chronic combined systolic and diastolic heart failure ([address])    Depression    DNR (do not resuscitate)    Torn rotator cuff    Left bundle branch block (LBBB)    Closed fracture of one rib of left side    Moderate mitral regurgitation    Hypotension    [facility] discharge    CKD (chronic kidney disease)    Left knee pain    Pulmonary fibrosis (HCC)    Hypothyroidism    Abnormal SPEP    Nonischemic cardiomyopathy (HCC)    Primary osteoarthritis of both shoulders     Allergies   Allergen Reactions    Lyrica [Pregabalin] Swelling    Sulfa Antibiotics Swelling     Current Outpatient Medications on File Prior to Visit   Medication Sig    acetaminophen (TYLENOL) 325 mg tablet Take 2 tablets by mouth every 6 (six) hours as needed Camphor-Menthol-Methyl Sal 3.1-6-10 % PTCH Apply 1 patch topically in the morning 1 patch to left shoulder and 1 patch to left knee. On at 9:00 a.m. and off at 9:00 p.m. (Patient not taking: Reported on 4/28/2022)    diclofenac-misoprostol (ARTHROTEC 50) 50-0.2 MG per tablet Take 1 tablet by mouth 2 (two) times a day    dofetilide (TIKOSYN) 125 mcg capsule TAKE 1 CAPSULE BY MOUTH EVERY 12 HOURS    Eliquis 5 MG TAKE 1 TABLET BY MOUTH EVERY 12 HOURS    gabapentin (NEURONTIN) 300 mg capsule TAKE 3 CAPSULES BY MOUTH EVERY MORNING AND TAKE 2 CAPSULES IN THE AFTERNOON AND TAKE 3 CAPSULES EVERY EVENING    levothyroxine 125 mcg tablet TAKE 1 TABLET BY MOUTH EVERY MORNING    loperamide (IMODIUM) 2 mg capsule Take 1 capsule (2 mg total) by mouth 4 (four) times a day as needed for diarrhea (Patient not taking: Reported on 9/5/2023)    losartan (COZAAR) 25 mg tablet TAKE 1 TABLET BY MOUTH EVERY MORNING    metoprolol succinate (TOPROL-XL) 25 mg 24 hr tablet TAKE 1 TABLET BY MOUTH EVERY DAY    Multiple Vitamins-Minerals (ICAPS AREDS 2 PO) Take by mouth in the morning    nystatin (MYCOSTATIN) powder Apply topically 2 (two) times a day    pravastatin (PRAVACHOL) 10 mg tablet TAKE 1 TABLET BY MOUTH EVERY DAY    venlafaxine (EFFEXOR) 75 mg tablet TAKE 1 TABLET BY MOUTH THREE TIMES DAILY     No current facility-administered medications on file prior to visit.      Family History   Problem Relation Age of Onset    Hypertension Father     Diabetes Father     Cancer Father         Throat    Arthritis Father     Stroke Mother     Diabetes Maternal Grandmother     Heart disease Maternal Grandfather     Diabetes Son     Lymphoma Family         Head, Face and Neck      Past Medical History:   Diagnosis Date    Aortic stenosis     Arthritis     Asthma     Coronary artery disease     Diabetes mellitus ([address])     Diabetic peripheral neuropathy ([address])     Last assessed - 1/27/16    Gallbladder disease     Heart attack ([address]) 07/1999    Hemorrhoids Last assessed - 11/16/12    Hypoglycemia 12/23/2021    Myocardial infarction [facility])     Old myocardial infarction     Type 2 diabetes mellitus with autonomic neuropathy (HCC)     Unspec long term insulin use status, Last assessed - 6/8/17     Social History     Socioeconomic History    Marital status: /Civil Union     Spouse name: Not on file    Number of children: Not on file    Years of education: Not on file    Highest education level: Not on file   Occupational History    Occupation: RETIRED   Tobacco Use    Smoking status: Never     Passive exposure: Never    Smokeless tobacco: Never   Vaping Use    Vaping Use: Never used   Substance and Sexual Activity    Alcohol use: Never     Comment: Social per [employer]     Drug use: Never    Sexual activity: Not Currently   Other Topics Concern    Not on file   Social History Narrative    Hx of daily cola consumption (unk cans/day)    Daily tea consumption (unk cups/day)    Multiple organ donor    Patient has living will    [facility]    Uses safety equipment - Seatbelts      Social Determinants of Health     Financial Resource Strain: [address]  (4/25/2023)    Overall Financial Resource Strain (CARDIA)     Difficulty of Paying Living Expenses: Not hard at all   Food Insecurity: No Food Insecurity (1/16/2022)    Hunger Vital Sign     Worried About Running Out of Food in the Last Year: Never true     Ran Out of Food in the Last Year: Never true   Transportation Needs: No Transportation Needs (4/25/2023)    PRAPARE - Transportation     Lack of Transportation (Medical): No     Lack of Transportation (Non-Medical):  No   Physical Activity: Not on file   Stress: Not on file   Social Connections: Not on file   Intimate Partner Violence: Not on file   Housing Stability: Low Risk  (1/16/2022)    Housing Stability Vital Sign     Unable to Pay for Housing in the Last Year: No     Number of Places Lived in the Last Year: 1     Unstable Housing in the Last Year: No     Past Surgical History:   Procedure Laterality Date    BUNIONECTOMY      CARDIAC CATHETERIZATION      Carotid Artery, Resolved - 7/1/13    CARDIAC CATHETERIZATION N/A 12/27/2021    Procedure: CARDIAC CATHETERIZATION ;  Surgeon: Noni Rivera MD;  [facility]; Service: Cardiology    CARDIAC CATHETERIZATION N/A 12/27/2021    Procedure: Cardiac Coronary Angiogram;  Surgeon: Noni Rivera MD;  [facility]; Service: Cardiology    CARDIAC ELECTROPHYSIOLOGY PROCEDURE N/A 1/18/2022    Procedure: Cardiac biv icd implant;  Surgeon: Pastor Tyler DO;  [facility]; Service: Cardiology    CARDIOVASCULAR STRESS TEST  09/1999    CHOLECYSTECTOMY      COLONOSCOPY  2017    FOOT ARTHRODESIS, MODIFIED DONOHUE  2016    GALLBLADDER SURGERY  1970    HEMORROIDECTOMY      KNEE ARTHROSCOPY Left 2004    [place] COLONOSCOPY FLX DX W/COLLJ SPEC WHEN PFRMD N/A 8/21/2017    Procedure: COLONOSCOPY;  Surgeon: Sallye Angelucci, MD;  Location: [facility]; Service: Colorectal    REPAIR KNEE LIGAMENT      REPAIR KNEE LIGAMENT Left 1986    REPAIR KNEE LIGAMENT Right 1988         Review of Systems   All other systems reviewed and are negative. Objective: There were no vitals taken for this visit. Physical Exam  Constitutional:       Appearance: Normal appearance. She is well-developed. HENT:      Head: Normocephalic and atraumatic. Comments: 2 lesions right forehead/ temple. The 1 that is more lateral is irregular brown/black, measures ~7mm. The 2nd is flat/ crusted circular lesion measuring 1cm. Eyes:      Conjunctiva/sclera: Conjunctivae normal.   Pulmonary:      Effort: Pulmonary effort is normal.   Musculoskeletal:      Cervical back: Normal range of motion. Comments: In wheelchair   Skin:     General: Skin is warm and dry. Neurological:      Mental Status: She is alert and oriented to person, place, and time.    Psychiatric:         Mood and Affect: Mood normal.         Behavior: Behavior normal.         Biopsy    Date/Time: 10/23/2023 1:30 PM    Performed by: Hieu Garcia PA-C  Authorized by: Hieu Garcia PA-C  [place] Protocol:  Consent: Verbal consent obtained. Procedure Details - Lesion Biopsy: Body area:  Head/neck    Head/neck location:  Forehead    Biopsy method: shave biopsy      Biopsy tissue type: skin     Lateral lesion, measures 7mm     After informed written consent was obtained, using alcohol for cleansing and 1% Lidocaine with epinephrine for anesthetic, with sterile technique a 2 mm punch biopsy was used to obtain a biopsy specimen of the media lesion. Hemostasis was obtained by pressure and silver nitrate. The specimen is labeled and sent to pathology for evaluation. The procedure was well tolerated without complications.

## 2023-11-02 DIAGNOSIS — G62.9 NEUROPATHY: ICD-10-CM

## 2023-11-02 RX ORDER — GABAPENTIN 300 MG/1
CAPSULE ORAL
Qty: 240 CAPSULE | Refills: 0 | Status: SHIPPED | OUTPATIENT
Start: 2023-11-02

## 2023-11-06 ENCOUNTER — OFFICE VISIT (OUTPATIENT)
Dept: PLASTIC SURGERY | Facility: CLINIC | Age: 78
End: 2023-11-06
Payer: COMMERCIAL

## 2023-11-06 DIAGNOSIS — C44.319 BASAL CELL CARCINOMA (BCC) OF FOREHEAD: Primary | ICD-10-CM

## 2023-11-06 PROCEDURE — 99213 OFFICE O/P EST LOW 20 MIN: CPT | Performed by: PHYSICIAN ASSISTANT

## 2023-11-06 NOTE — PROGRESS NOTES
Assessment/Plan:     Diagnoses and all orders for this visit:    Basal cell carcinoma (BCC) of forehead  Pt was referred by her PCP for a skin lesion on her right forehead. There are 2 lesions side by side. Shave biopsy of the medial lesion showed BCC & lateral lesion non-specific carcinoma. We discussed referral to dermatology for Mohs of both lesions with reconstruction of the defect w/ local flap advancement vs skin graft. Risks of the procedure were discussed including bleeding, scarring & infection. Pt is agreeable to proceed & consent was obtained. Subjective:      Patient ID: Araceli Ramirez is a 78 y.o. female. HPI    Pt is here to discuss her pathology results. Pt was referred by her PCP for a skin lesion on her right forehead. Shave biopsy was performed at her last visit on each lesion. Right medial forehead was consistent with a basal cell carcinoma with nodular & morpheaform features. The right lateral forehead showed scant fragments of carcinoma, basaloid & purulent scale-crust.   Note: The differential diagnosis includes, but is not limited to, an unusual basal-cell carcinoma and an adnexal carcinoma, including endocrine mucin-producing sweat gland carcinoma. The focal presence of intracytoplasmic mucin argues against a poorly-differentiated sebaceous carcinoma. A more definitive diagnosis will be attempted in the context of the re-excised specimen. Step-sections have been examined.      Patient Active Problem List   Diagnosis   • Atrial fibrillation ([address])   • Coronary artery disease   • Hyperlipidemia   • Aortic stenosis   • Primary osteoarthritis of both knees   • Class 3 severe obesity in [facility])   • Diabetic peripheral neuropathy (HCC)   • Type 2 diabetes mellitus, without long-term current use of insulin (HCC)   • Stage 4 chronic kidney disease ([address])   • Elevated troponin   • Chronic combined systolic and diastolic heart failure ([address])   • Depression   • DNR (do not resuscitate) • Torn rotator cuff   • Left bundle branch block (LBBB)   • Closed fracture of one rib of left side   • Moderate mitral regurgitation   • Hypotension   • Lifevest discharge   • CKD (chronic kidney disease)   • Left knee pain   • Pulmonary fibrosis (HCC)   • Hypothyroidism   • Abnormal SPEP   • Nonischemic cardiomyopathy (HCC)   • Primary osteoarthritis of both shoulders   • Skin neoplasm     Allergies   Allergen Reactions   • Lyrica [Pregabalin] Swelling   • Sulfa Antibiotics Swelling     Current Outpatient Medications on File Prior to Visit   Medication Sig   • acetaminophen (TYLENOL) 325 mg tablet Take 2 tablets by mouth every 6 (six) hours as needed     • Camphor-Menthol-Methyl Sal 3.1-6-10 % PTCH Apply 1 patch topically in the morning 1 patch to left shoulder and 1 patch to left knee.   On at 9:00 a.m. and off at 9:00 p.m. (Patient not taking: Reported on 4/28/2022)   • diclofenac-misoprostol (ARTHROTEC 50) 50-0.2 MG per tablet Take 1 tablet by mouth 2 (two) times a day (Patient not taking: Reported on 10/23/2023)   • dofetilide (TIKOSYN) 125 mcg capsule TAKE 1 CAPSULE BY MOUTH EVERY 12 HOURS   • Eliquis 5 MG TAKE 1 TABLET BY MOUTH EVERY 12 HOURS   • gabapentin (NEURONTIN) 300 mg capsule TAKE 3 CAPSULES BY MOUTH EVERY MORNING AND 2 CAPSULES IN THE AFTERNOON, AND TAKE 3 CAPSULES BY MOUTH EVERY EVENING   • levothyroxine 125 mcg tablet TAKE 1 TABLET BY MOUTH EVERY MORNING   • loperamide (IMODIUM) 2 mg capsule Take 1 capsule (2 mg total) by mouth 4 (four) times a day as needed for diarrhea (Patient not taking: Reported on 9/5/2023)   • losartan (COZAAR) 25 mg tablet TAKE 1 TABLET BY MOUTH EVERY MORNING   • metoprolol succinate (TOPROL-XL) 25 mg 24 hr tablet TAKE 1 TABLET BY MOUTH EVERY DAY   • Multiple Vitamins-Minerals (ICAPS AREDS 2 PO) Take by mouth in the morning (Patient not taking: Reported on 10/23/2023)   • nystatin (MYCOSTATIN) powder Apply topically 2 (two) times a day   • pravastatin (PRAVACHOL) 10 mg tablet TAKE 1 TABLET BY MOUTH EVERY DAY   • venlafaxine (EFFEXOR) 75 mg tablet TAKE 1 TABLET BY MOUTH THREE TIMES DAILY     No current facility-administered medications on file prior to visit.      Family History   Problem Relation Age of Onset   • Hypertension Father    • Diabetes Father    • Cancer Father         Throat   • Arthritis Father    • Stroke Mother    • Diabetes Maternal Grandmother    • Heart disease Maternal Grandfather    • Diabetes Son    • Lymphoma Family         Head, Face and Neck      Past Medical History:   Diagnosis Date   • Aortic stenosis    • Arthritis    • Asthma    • Coronary artery disease    • Diabetes mellitus ([address])    • Diabetic peripheral neuropathy (HCC)     Last assessed - 1/27/16   • Gallbladder disease    • Heart attack ([address]) 07/1999   • Hemorrhoids     Last assessed - 11/16/12   • Hypoglycemia 12/23/2021   • Myocardial infarction [facility])    • Old myocardial infarction    • Type 2 diabetes mellitus with autonomic neuropathy (HCC)     Unspec long term insulin use status, Last assessed - 6/8/17     Social History     Socioeconomic History   • Marital status: /Civil Union     Spouse name: Not on file   • Number of children: Not on file   • Years of education: Not on file   • Highest education level: Not on file   Occupational History   • Occupation: RETIRED   Tobacco Use   • Smoking status: Never     Passive exposure: Never   • Smokeless tobacco: Never   Vaping Use   • Vaping Use: Never used   Substance and Sexual Activity   • Alcohol use: Never     Comment: Social per [employer]    • Drug use: Never   • Sexual activity: Not Currently   Other Topics Concern   • Not on file   Social History Narrative    Hx of daily cola consumption (unk cans/day)    Daily tea consumption (unk cups/day)    Multiple organ donor    Patient has living will    Power of  in existence    Uses safety equipment - Seatbelts      Social Determinants of Health     Financial Resource Strain: Low Risk (4/25/2023)    Overall Financial Resource Strain (CARDIA)    • Difficulty of Paying Living Expenses: Not hard at all   Food Insecurity: No Food Insecurity (1/16/2022)    Hunger Vital Sign    • Worried About Running Out of Food in the Last Year: Never true    • Ran Out of Food in the Last Year: Never true   Transportation Needs: No Transportation Needs (4/25/2023)    PRAPARE - Transportation    • Lack of Transportation (Medical): No    • Lack of Transportation (Non-Medical): No   Physical Activity: Not on file   Stress: Not on file   Social Connections: Not on file   Intimate Partner Violence: Not on file   Housing Stability: Low Risk  (1/16/2022)    Housing Stability Vital Sign    • Unable to Pay for Housing in the Last Year: No    • Number of Places Lived in the Last Year: 1    • Unstable Housing in the Last Year: No     Past Surgical History:   Procedure Laterality Date   • BUNIONECTOMY     • CARDIAC CATHETERIZATION      Carotid Artery, Resolved - 7/1/13   • CARDIAC CATHETERIZATION N/A 12/27/2021    Procedure: CARDIAC CATHETERIZATION ;  Surgeon: Alia Sweet MD;  [facility]; Service: Cardiology   • CARDIAC CATHETERIZATION N/A 12/27/2021    Procedure: Cardiac Coronary Angiogram;  Surgeon: Alia Sweet MD;  [facility]; Service: Cardiology   • CARDIAC ELECTROPHYSIOLOGY PROCEDURE N/A 1/18/2022    Procedure: Cardiac biv icd implant;  Surgeon: Bj Parish DO;  [facility]: [facility]; Service: Cardiology   • CARDIOVASCULAR STRESS TEST  09/1999   • CHOLECYSTECTOMY     • COLONOSCOPY  2017   • FOOT ARTHRODESIS, MODIFIED DONOHUE  2016   • GALLBLADDER SURGERY  1970   • HEMORROIDECTOMY     • KNEE ARTHROSCOPY Left 2004   • CA COLONOSCOPY FLX DX W/COLLJ SPEC WHEN PFRMD N/A 8/21/2017    Procedure: COLONOSCOPY;  Surgeon: Kisha Jefferson MD;  Location: [facility];   Service: Colorectal   • REPAIR KNEE LIGAMENT     • REPAIR KNEE LIGAMENT Left 1986   • REPAIR KNEE LIGAMENT Right 1988         Review of Systems   All other systems reviewed and are negative. Objective: There were no vitals taken for this visit. Physical Exam  Constitutional:       Appearance: Normal appearance. She is well-developed. HENT:      Head: Normocephalic and atraumatic. Comments: Eschar present over each biopsy site. Eyes:      Conjunctiva/sclera: Conjunctivae normal.   Pulmonary:      Effort: Pulmonary effort is normal.   Musculoskeletal:      Cervical back: Normal range of motion. Comments: In a wheelchair   Skin:     General: Skin is warm and dry. Neurological:      Mental Status: She is alert and oriented to person, place, and time.    Psychiatric:         Mood and Affect: Mood normal.         Behavior: Behavior normal.

## 2023-11-08 ENCOUNTER — REMOTE DEVICE CLINIC VISIT (OUTPATIENT)
Dept: CARDIOLOGY CLINIC | Facility: CLINIC | Age: 78
End: 2023-11-08
Payer: COMMERCIAL

## 2023-11-08 DIAGNOSIS — C44.319 BASAL CELL CARCINOMA (BCC) OF FOREHEAD: Primary | ICD-10-CM

## 2023-11-08 DIAGNOSIS — Z95.810 PRESENCE OF IMPLANTABLE CARDIOVERTER-DEFIBRILLATOR (ICD): Primary | ICD-10-CM

## 2023-11-08 PROCEDURE — 93296 REM INTERROG EVL PM/IDS: CPT | Performed by: INTERNAL MEDICINE

## 2023-11-08 PROCEDURE — 93295 DEV INTERROG REMOTE 1/2/MLT: CPT | Performed by: INTERNAL MEDICINE

## 2023-11-08 NOTE — PROGRESS NOTES
Results for orders placed or performed in visit on 11/08/23   Cardiac EP device report    Narrative    MDT BI-V ICD (DDDR MODE)/ ACTIVE SYSTEM IS MRI CONDITIONAL  CARELINK TRANSMISSION: BATTERY VOLTAGE ADEQUATE (9.6 YRS). AP 96.2%  92% + VSR PACE 3.1%; ALL AVAILABLE LEAD PARAMETERS WITHIN NORMAL LIMITS. NO SIGNIFICANT HIGH RATE EPISODES. 2,905 V-SENSE EPISODES (44 MIN/DAY) WITH MARKERS FOR FUSION, VSP; PATIENT TAKING ELIQUIS, DOFETILIDE, METOPROLOL SUCC; OPTI-VOL WITHIN NORMAL LIMITS. NORMAL DEVICE FUNCTION.   ES

## 2023-11-09 ENCOUNTER — TELEPHONE (OUTPATIENT)
Dept: DERMATOLOGY | Facility: CLINIC | Age: 78
End: 2023-11-09

## 2023-11-09 NOTE — TELEPHONE ENCOUNTER
Pre- operative Mohs Telephone Scheduling Note    Do you have a pacemaker, defibrillator, spinal or brain stimulator? yes: pacemaker    Do you take antibiotics before skin or dental procedures? no  If yes, will likely require pre-operative antibiotics. Ask  the patient why they take the antibiotics (usually because of joint replacement). Do you have a history of a joint replacements within the past 2 years? no   If yes, will likely require pre-operative antibiotics. Ask if orthopaedic surgeon has prescribed pre-operative antibiotics to take before procedures/dental work? Do you take any OTC medications that thin your blood (Aspirin, Aleve, Ibuprofen) or supplements that thin your blood (fish oil, garlic, vitamin E, Ginko Biloba)? no    Do you take any prescribed medications that thin your blood (Coumadin, Plavix, Xarelto, Eliquis or another prescribed blood thinner)? no    Do you have an allergy to lidocaine or epinephrine? no    Do you have allergies to Iodine? no    Do you wear a lidocaine patch? no    Have you ever been diagnosed with HIV, AIDS, Hep B and Hep C? no    Do you use a cane, walker or wheelchair? yes: wheelchair/can transport    Is the patient from a nursing home? no If yes, Is there any special accommodations that is needed for patient n/a    Do you smoke? no      If yes,  patient to try and stop 2 days before surgery and 7 days after the surgery. Minimizing smoking as much as possible during this time will improve healing and the cosmetic result after surgery. Do you use supplemental oxygen? If so, how many liters and can you be off it for a short period of time? N/a    Date scheduled: January 10, 2024 @ 1:00 pm with Juliana Kim. Cullen    Coordination of Care with other provider (Oculoplastics, Plastics, ENT) required? no   IF YES, PLEASE FORWARD TO APPROPRIATE PERSONNEL TO HELP COORDINATE. Are there remaining tumors to be scheduled? no    Was Prior Authorization obtained?  No (please use [facility] to document prior auth)

## 2023-11-09 NOTE — LETTER
[facility]     1945    [address] [place] [id number]    Dear Patrick Vasquez,    You are scheduled to have the MOHS procedure on January 10, 2024 at 1:00 pm for right lateral/medial forehead with Dr.Stephen Berman. Our office is located in The [address] [place] [address] at the [facility] our address is [address] [place] [address]), [address] [place] [address]. Once you arrive please check in with our front staff in [address] and they will escort you to the [address] [place] [address] waiting room. If you have someone bringing you to your appointment they may wait in the waiting room or accompany you in your visit. Below you will find some pre-op instructions along with some information regarding the MOHS procedure. If you have any questions please call our office at [phone]. Thank you,    [facility] MOHS Department         PRE-OPERATIVE INSTRUCTIONS - MOHS    Before your scheduled surgery, there are a number of important precautions and positive steps you should take to help prepare yourself for a successful treatment and speedy recovery. Some of the steps, which are listed below, may seem unnecessary and inconvenient, but they are important. For example, when you stop smoking, you increase your ability to heal. Occasionally, there may be valid reasons, personal or medical, why you can't comply. In such cases, please call the office so we can discuss possible ways to overcome any obstacles you may be encountering. If you have any questions about the surgery, or remember additional medical information that you forgot to mention to our staff, please contact the office prior to your surgery. GENERAL INFORMATION REGARDING MOHS MICROGRAPHIC SURGERY    Mohs surgery is a specialized technique for the removal of skin cancer developed by Dr. Marcey Han Mohs over 50 years ago to improve the cure rates of skin cancer.  Traditionally, skin cancers are treated by destructive methods (radiation, freezing, scraping, and burning) or excision (cutting out the tissue with standards margins and sending it to an outside laboratory for testing). These methods all yield cure rates between 65%-94%. However, for cancers located in cosmetically sensitive areas, large tumors, or tumors unsuccessfully treated by other means, Mohs surgery offers a higher cure rate. In most cases, Mohs surgery provides you with a 99% cure rate for primary (previously untreated) basal cell cancer and a 95% cure rate for primary squamous cell cancer. In Mohs surgery, tissue is removed and processed in a way that we are able to check 100% of the margins, giving the highest cure rate for any method of treating skin cancers while providing maximal preservation of normal skin. This allows the surgeon to produce an optimal cosmetic result for the patient by maximizing the amount of tissue removed yielding as small a scar as possible    On the day of surgery, you will be given local anesthesia only (similar to what was given to you during your initial biopsy). You will remain awake. You will verify the location of the skin cancer prior to the onset of the surgery. Once the area is numb, the tissue containing the skin cancer will be removed, taking a small safety margin. This margin is usually smaller than what would be taken with a standard excision. Once the tissue is removed, it is marked and oriented. The first layer (“Stage I”) will be processed in our laboratory. The wound will be treated for bleeding and a bandage will be placed to keep you comfortable while you wait an approximate 45 minutes-1 hour (for the processing of the tissue) in your room. Your Mohs surgeon will examine the pathology in the lab, checking all the margins. If any tumor remains, you will need to take a second layer of skin (“Stage 2”). The area will be re-anesthetized and your Mohs surgeon will remove more skin only in the area where the tumor exists.  This process will continue until all the skin cancer is removed. Unfortunately, there is no method to predict how many layers or stages will be taken. Once the tumor has been removed completely, we will discuss the best ways to close the defect. Most wounds may be closed with stitches. A larger wound may require a skin graft or a flap. In rare instances, especially for cancers around the eye or for larger cancers, we may work with another surgeon (oculoplastic, ENT, plastics) with special skills to assist with reconstruction. Medications: Please take all your normal medications the morning of your surgery. If you are a diabetic, please bring your insulin or medications with you, as well as a snack to avoid having low blood sugar during your day with us. Blood Thinners    VERY IMPORTANT: We do NOT stop or hold prescribed blood thinners (such as Coumadin/Warfarin, Plavix, Eliquis, Pradaxa, Brilinta, Apixaban, Xarelto, Lovonox, Rivaroxaban, or Aggrenox) before Mohs surgery. Additionally, If you take aspirin because you have had a stroke, heart attack, heart disease, other condition, or your physician has prescribed you to take it, please continue your aspirin. Although there is a risk of increased bruising and bleeding, we are still able to safely perform surgery while continuing these medications. Please NEVER stop your prescribed blood thinner without the managing doctor's (the doctor that prescribed the medication) permission or knowledge. If you have any concerns about not holding your blood thinner, please address these with your Mohs surgeon. Most people should stop all non-steroidal anti-inflammatory medications (Motrin, Naproxen, Advil, Midol, Aleve, etc.) for 7 days prior to your scheduled surgery and 2 days after (unless instructed otherwise after surgery). You may take Tylenol for pain.      Antibiotics  If you usually require antibiotics prior to dental work, please let the office know at least 24 hours prior to your surgery. Medical conditions that sometimes require preoperative antibiotics include artificial heart valves, heart murmurs, artificial joints, and related problems. We may give you a different medication than the dentist, so please contact us for the correct antibiotic. If you were prescribed pre-operative antibiotics by our office, please take the medication 2 hours prior to your procedure. Vitamins and Supplements  Avoid taking any supplements with Vitamin E, Fish Oil, Gingko, Ginseng, and Garlic for 2 weeks before and 2 days after your surgery. These thin your blood. Alcohol: Avoid drinking alcohol for 2 days prior to your surgery, and for 2 days afterwards (it thins the blood and causes more bruising and swelling). Smoking: Try to STOP or reduce smoking significantly the week before your surgery, and especially the week afterwards (it greatly improves how well you heal). Tobacco smoke deprives the blood of oxygen, which is urgently needed by the wound during the healing process. Contact Lenses: Do not wear them on the day of the surgery. Instead, wear glasses and bring your case, in case we need to remove them. Clothing: Do not wear your nicest clothing on your surgery day. We recommend wearing a button down shirt that will not disrupt your post-operative dressing when changing later that night. Bathing: On the morning of your surgery, you may bathe or shower normally. If you get your hair done on a weekly basis, remember to get your hair washed the day before surgery.   - You will need to keep your surgical site dry for a minimum of 48 hours after surgery. Makeup: If your surgery is on the face, please do not wear any makeup on the day of the surgery. Jewelry: Please try to avoid wearing jewelry on the day of surgery. Food: On the morning of surgery, have breakfast but limit your intake of caffeinated beverages. They are diuretic and may inconvenience you during surgery.  If you are following up with another surgeon the same day as your Mohs surgery, you must receive permission to eat breakfast from that surgeon. What to bring with you on the day of your surgery:  Bring snacks - Since you could be at the office long, you may bring snacks and/or lunch with you. Some snacks and drinks are available at the office as well. Bring a sweater - Bring a sweater or jacket that buttons or zips down the front and will not disturb your wound dressing during removal.  Bring something to do - You will be spending much of the day in our office. There will be 45-60 minute waiting periods  between layers/stages, and while there is a television with cable in every room, it is nice to have something to keep you occupied such as books, magazines, knitting, music, or work. Planning Ahead:  Other Appointments - It is important to realize that no matter how small the skin cancer appears to be, looks can be deceiving. Since your surgery may last the entire day, you should not schedule any other appointments that day. Special Occasions - Surgery often creates swelling and bruising. Also, the post-op dressing may be rather large and obvious. Keep this in mind as you arrange your social/work schedule. If an important event is already planned, please check with your referring physician or your Mohs surgeon to see if the surgery can be postponed. Activity Limits after Surgery - If surgery was performed on your face, we recommend that you keep your activity level to a minimum for 2-3 days (the blood pressure elevation related to exercise can lead to bleeding). If you have stitches in an area that will be under tension or significant movement (neck, back, arms, legs), you will need to avoid heavy lifting (anything over 5 lbs) or exercise for at least 2 weeks and possibly longer. We also advise that you limit out of town travel for the first 7 days after surgery.  You should also wait at least 7 days before going into a pool or the ocean. Housework - Since you will need to minimize activity after surgery, plan to do your groceries, laundry, gardening, and other heavy household chores prior to your surgery. Please make arrangements for assistance during the post-op period. If surgery is around your mouth area, you may need to eat soft foods, such as soup, milkshakes, or yogurt for 48 hours. Purchasing bandage supplies: Prior to surgery, please purchase the following items to care for your surgical wound properly. Cotton swabs (Q-tips)  Vaseline or Aquaphor  Telfa pads (or any non-stick dressing)  Paper tape or Hypafix tape  Gauze pads (3x3)      We will provide you with some bandage supplies after surgery to get you started. Transportation: It is often reassuring and comforting to have a  drive you to and from the surgery. He or she is welcome to wait in the office during the surgery. Please note that for safety reasons, only the patient is allowed in the procedure room during surgery. Thank you for your cooperation. Rescheduling: If you need to reschedule your surgery, please notify the office as soon as possible.

## 2024-01-02 DIAGNOSIS — G62.9 NEUROPATHY: ICD-10-CM

## 2024-01-04 ENCOUNTER — TELEPHONE (OUTPATIENT)
Age: 79
End: 2024-01-04

## 2024-01-04 RX ORDER — GABAPENTIN 300 MG/1
CAPSULE ORAL
Qty: 240 CAPSULE | Refills: 0 | Status: SHIPPED | OUTPATIENT
Start: 2024-01-04

## 2024-01-04 NOTE — TELEPHONE ENCOUNTER
Honorio with [employer] called in stating they need office visit notes containing diabetes/insulin treatment for the patient around 7/10/22. This is for a continuous glucose monitor.     Fax number is [phone]  Phone number is [phone]  Please fax attn: Honorio

## 2024-01-10 ENCOUNTER — PROCEDURE VISIT (OUTPATIENT)
Dept: DERMATOLOGY | Facility: CLINIC | Age: 79
End: 2024-01-10
Payer: COMMERCIAL

## 2024-01-10 VITALS
TEMPERATURE: 97.6 F | DIASTOLIC BLOOD PRESSURE: 70 MMHG | WEIGHT: 197.2 LBS | BODY MASS INDEX: 36.29 KG/M2 | SYSTOLIC BLOOD PRESSURE: 120 MMHG | HEIGHT: 62 IN

## 2024-01-10 DIAGNOSIS — C44.319 BASAL CELL CARCINOMA (BCC) OF FOREHEAD: Primary | ICD-10-CM

## 2024-01-10 PROCEDURE — 12052 INTMD RPR FACE/MM 2.6-5.0 CM: CPT | Performed by: DERMATOLOGY

## 2024-01-10 PROCEDURE — 17311 MOHS 1 STAGE H/N/HF/G: CPT | Performed by: DERMATOLOGY

## 2024-01-10 PROCEDURE — 17312 MOHS ADDL STAGE: CPT | Performed by: DERMATOLOGY

## 2024-01-10 RX ADMIN — Medication: at 16:00

## 2024-01-10 NOTE — PATIENT INSTRUCTIONS
"[facility]scopic Surgery After Care    WOUND CARE AFTER SURGERY:    Do NOT to remove the pressure bandage for 48 hours. Keep the area clean and dry while this bandage is on.    After removing the bandage for the first time, gently clean the area with soap and water. If the bandage is difficult to remove, getting the bandage wet in the shower will sometimes help soften the adhesive and allow it to be removed more easily.     You will now need to cleanse this area daily in the shower with gentle soap. There is no need to scrub the area. Apply plain Vaseline ointment (this is over the counter and not a prescription) to the site followed by a clean appropriately sized bandage to area.  Non stick dressing and paper tape (or Hypafix) are recommended for sensitive skin but a bandaid is fine if it covers the area well.    You will need to continue the above daily wound care until you return for suture removal in 7 days (generally 7 days for the face, 10-14 days off the face)      RESTRICTIONS:     For two DAYS:   - You will need to take it very easy as this time is highest risk for bleeding. Being a \"couch potato\" during these two days is generally recommended.   - For surgeries on the face/neck/scalp: Avoid leaning down to pick things up off the floor as this brings blood up to your head. Instead, squat down to pick things up.     For two WEEKS:   - No heavy lifting (anything greater than 10 pounds)   - You can start to do slow, gentle activities such as slow walking but nothing to increase your heart rate and blood pressure too much (such as cardiovascular exercise). It is important to take it easy as there is still a risk for bleeding and a high risk popping of stitches open during this time.     If we did surgery near the eyes (including the nose, forehead, front part of your scalp, cheeks): It is VERY common to get a large amount of swelling around your eyes (puffy eyes). Although less frequent, this can be enough to " swell your eyes shut and can also come along with bruising. This should not hurt and is very expected and normal. It is typically worst at ~ 3 days out from your surgery and dramatically better 1 week post-operatively.     MANAGING YOUR PAIN AFTER SURGERY     You can expect to have some pain after surgery. This is normal. The pain is typically worse the first two days after surgery, and quickly begins to get better.     The best strategy for controlling your pain after surgery is around the clock pain control. You can take over the counter Acetaminophen (Tylenol) for discomfort, if no contraindications.     If you are taking this at the maximum dose, you can alternate this with Motrin (ibuprofen or Advil) as well. Alternating these medications with each other allows you to maximize your pain control. In addition to Tylenol and Motrin, you can use heating pads or ice packs on your incisions to help reduce your pain.     How will I alternate your regular strength over-the-counter pain medication?  You will take a dose of pain medication every three hours.   Start by taking 650 mg of Tylenol (2 pills of 325 mg)   3 hours later take 600 mg of Motrin (3 pills of 200 mg)   3 hours after taking the Motrin take 650 mg of Tylenol   3 hours after that take 600 mg of Motrin.    See example - if your first dose of Tylenol is at 12:00 PM     12:00 PM  Tylenol 650 mg (2 pills of 325 mg)    3:00 PM  Motrin 600 mg (3 pills of 200 mg)    6:00 PM  Tylenol 650 mg (2 pills of 325 mg)    9:00 PM  Motrin 600 mg (3 pills of 200 mg)    Continue alternating every 3 hours      Important:   Do not take more than 4000mg of Tylenol or 3200mg of Motrin in a 24-hour period.     What if I still have pain?   If you have pain that is not controlled with the over-the-counter pain medications (Tylenol and Motrin or Advil), don't hesitate to call our staff using the number provided. We will help make sure you are managing your pain in the best way  possible, and if necessary, we can provide a prescription for additional pain medication.     CALL OUR OFFICE IMMEDIATELY FOR ANY SIGNS OF INFECTION:    This includes fever, chills, increased redness, warmth, tenderness, severe discomfort/pain, or pus or foul smell coming from the wound. If you are experiencing any of the above, please call [facility] directly at [phone].    IF BLEEDING IS NOTICED:    Place a clean cloth over the area and apply firm pressure for thirty minutes.  Check the wound ONLY after 30 minutes of direct pressure; do not cheat and sneak a peak, as that does not count.  If bleeding persists after 30 minutes of legitimate direct pressure, then try one more round of direct pressure to the area.  Should the bleeding become heavier or not stop after the second attempt, call [facility] directly at [phone]. Your call will get routed to the dermatology surgeon on call even after hours.

## 2024-01-10 NOTE — PROGRESS NOTES
[facility] Procedure Note    Patient: Laura Sarkar  : 1945  MRN: [id number]  Date: 1/10/2024    History of Present Illness: The patient is a 78 y.o. female who presents with complaints of Basal cell carcinoma nodular, morpheaform, basoloid type of the right lateral and medial forehead.     Past Medical History:   Diagnosis Date   • Aortic stenosis    • Arthritis    • Asthma    • Basal cell carcinoma 10/24/2023    right lateral forehead, mohs   • Basal cell carcinoma 10/24/2023    right medial forehead, mohs   • Coronary artery disease    • Diabetes mellitus (HCC)    • Diabetic peripheral neuropathy (HCC)     Last assessed - 16   • Gallbladder disease    • Heart attack (HCC) 1999   • Hemorrhoids     Last assessed - 12   • Hypoglycemia 2021   • Myocardial infarction (HCC)    • Old myocardial infarction    • Type 2 diabetes mellitus with autonomic neuropathy (HCC)     Unspec long term insulin use status, Last assessed - 17       Past Surgical History:   Procedure Laterality Date   • BUNIONECTOMY     • CARDIAC CATHETERIZATION      Carotid Artery, Resolved - 13   • CARDIAC CATHETERIZATION N/A 2021    Procedure: CARDIAC CATHETERIZATION ;  Surgeon: Chucky Tadeo MD;  [facility];  Service: Cardiology   • CARDIAC CATHETERIZATION N/A 2021    Procedure: Cardiac Coronary Angiogram;  Surgeon: Chucky Tadeo MD;  [facility];  Service: Cardiology   • CARDIAC ELECTROPHYSIOLOGY PROCEDURE N/A 2022    Procedure: Cardiac biv icd implant;  Surgeon: Harley Rolilns DO;  Location: [facility];  Service: Cardiology   • CARDIOVASCULAR STRESS TEST  1999   • CHOLECYSTECTOMY     • COLONOSCOPY     • FOOT ARTHRODESIS, MODIFIED CHARANJIT     • GALLBLADDER SURGERY  1970   • HEMORROIDECTOMY     • KNEE ARTHROSCOPY Left    • MOHS SURGERY Right 01/10/2024    right lateral and medial forehead (combined), Dr Berman   • CO COLONOSCOPY FLX DX  W/COLLJ SPEC WHEN PFRMD N/A 08/21/2017    Procedure: COLONOSCOPY;  Surgeon: Ady Wilkes MD;  Location: [facility];  Service: Colorectal   • REPAIR KNEE LIGAMENT     • REPAIR KNEE LIGAMENT Left 1986   • REPAIR KNEE LIGAMENT Right 1988         Current Outpatient Medications:   •  acetaminophen (TYLENOL) 325 mg tablet, Take 2 tablets by mouth every 6 (six) hours as needed  , Disp: , Rfl:   •  dofetilide (TIKOSYN) 125 mcg capsule, TAKE 1 CAPSULE BY MOUTH EVERY 12 HOURS, Disp: 180 capsule, Rfl: 3  •  Eliquis 5 MG, TAKE 1 TABLET BY MOUTH EVERY 12 HOURS, Disp: 180 tablet, Rfl: 3  •  gabapentin (NEURONTIN) 300 mg capsule, TAKE 3 CAPSULES BY MOUTH EVERY MORNING, TAKE 2 CAPSULES BY MOUTH EVERY AFTERNOON, AND TAKE 3 CAPSULES BY MOUTH EVERY EVENING, Disp: 240 capsule, Rfl: 0  •  levothyroxine 125 mcg tablet, TAKE 1 TABLET BY MOUTH EVERY MORNING, Disp: 30 tablet, Rfl: 2  •  losartan (COZAAR) 25 mg tablet, TAKE 1 TABLET BY MOUTH EVERY MORNING, Disp: 90 tablet, Rfl: 3  •  metoprolol succinate (TOPROL-XL) 25 mg 24 hr tablet, TAKE 1 TABLET BY MOUTH EVERY DAY, Disp: 90 tablet, Rfl: 1  •  pravastatin (PRAVACHOL) 10 mg tablet, TAKE 1 TABLET BY MOUTH EVERY DAY, Disp: 90 tablet, Rfl: 1  •  venlafaxine (EFFEXOR) 75 mg tablet, TAKE 1 TABLET BY MOUTH THREE TIMES DAILY, Disp: 90 tablet, Rfl: 2  •  Camphor-Menthol-Methyl Sal 3.1-6-10 % PTCH, Apply 1 patch topically in the morning 1 patch to left shoulder and 1 patch to left knee.  On at 9:00 a.m. and off at 9:00 p.m. (Patient not taking: Reported on 4/28/2022), Disp: 60 patch, Rfl: 0  •  diclofenac-misoprostol (ARTHROTEC 50) 50-0.2 MG per tablet, Take 1 tablet by mouth 2 (two) times a day (Patient not taking: Reported on 10/23/2023), Disp: 180 tablet, Rfl: 3  •  loperamide (IMODIUM) 2 mg capsule, Take 1 capsule (2 mg total) by mouth 4 (four) times a day as needed for diarrhea (Patient not taking: Reported on 9/5/2023), Disp: 56 capsule, Rfl: 1  •  Multiple Vitamins-Minerals (ICAPS AREDS  2 PO), Take by mouth in the morning (Patient not taking: Reported on 10/23/2023), Disp: , Rfl:   •  nystatin (MYCOSTATIN) powder, Apply topically 2 (two) times a day (Patient not taking: Reported on 1/10/2024), Disp: 60 g, Rfl: 3  No current facility-administered medications for this visit.    Allergies   Allergen Reactions   • Lyrica [Pregabalin] Swelling   • Sulfa Antibiotics Swelling       Physical Exam:   Vitals:    01/10/24 1241   BP: 120/70   Temp: 97.6 °F (36.4 °C)     General: Awake, Alert, Oriented x 3, Mood and affect appropriate  Respiratory: Respirations even and unlabored  Cardiovascular: Peripheral pulses intact; no edema  Musculoskeletal Exam: n/a    Skin: 2 cm x 1.5 cm eroded patch    Assessment: Biopsy confirmed basal cell carcinoma of the right lateral and medial forehead.     Plan: Mohs    Time of H&P Completion:1250    MOHS Procedure Timeout    Flowsheet Row Most Recent Value   Timeout: 1301   Patient Identity Verified: Yes   Correct Site Verified: Yes   Correct Procedure Verified: Yes          MOHS Diagnosis/Indication/Location/ID    Flowsheet Row Most Recent Value   Pathology Type Basal cell carcinoma   Anatomic Site --  [right lateral and medial forehead *combined*]   Indications for MOHS tumor location, large tumor size   MOHS ID [id number]          MOHS Site/Accession/Pre-Post    Flowsheet Row Most Recent Value   Original Site Identified (as submitted by referring clinician) Photo, Referral   Biopsy Accession/Specimen # (as submitted by referring clincian) [id number]   Pre-MOHS Size Length (cm) 2   Pre-MOHS Size Width (cm) 1.5   Post-MOHS Size-Length (cm) 4.5   Post MOHS Size-Width (cm) 2.5   Repair Type Intermediate layered closure   Suture Type Vicryl, Ethilon   Ethilon Suture Size 4   Vicryl Suture Size 4   Final repair length (cm): 5   Anesthetic Used 1% Lidocaine with epinephrine  [5]          MOHS Tumor Stage 1 Information    Flowsheet Row Most Recent Value   Tissue Sections (blocks) 2    Microscopic Exam Section 1: Irregularly shaped cords and strands of basaloid keratinocytes are present within the dermis. In areas the cells assume a single cell strand formation with a highly-infiltrative growth pattern.The islands are associated with a dense morpheic stroma.  [deeper section were done and demonstrated that all tumor had similar morphologuy of infiltrating BCC.  note is made that it penetrated deeply into fat.]   Microscopic Exam Section 2: Emanating from the epidermis and infiltrating the dermis are irregularly shaped islands of basaloid keratinocytes. The islands are associated with a fibromyxoid stroma and clefting. The tumor breaks up into a small, micronodular, infiltrative islands.  [deeper section were done and demonstrated that all tumor had similar morphologuy of infiltrating BCC. note is made that it penetrated deeply into fat.]   Tumor Clear After Stage I? No          MOHS Tumor Stage 2 Information    Flowsheet Row Most Recent Value   Tissue Sections (blocks) 1   Microscopic Exam Section 1: No tumor identified in section.  [excision extended deep muscularis.]   Tumor Clear After Stage II? Yes                      Patient identified, procedure verified, site identified and verified. Time out completed. Surgical removal of the lesion discussed with the patient (risks and benefits, including possibility of scarring, infection, recurrence or potential for further treatment)  I have specifically identified the site with the patient. I have discussed the fact that the patient will have a scar after the procedure regardless of granulation or repair with sutures. I have discussed that the repair options can range from granulation in some cases to linear or curvilinear closures to larger flaps or grafts.  There are sometimes flaps or grafts used that require multiples stages of surgery and will not be completed today, rather be completed over a series of appointments. I have discussed that  occasionally due to location, size or depth of the lesion I may recommend consultation with and transfer of care for further removal or the reconstruction to another provider such as ophthalmology surgery, plastic surgery, ENT surgery, or surgical oncology. There are cases in which other testing such as imaging with MRI or CT scan or testing of lymph nodes is recommended because of the nature/depth/location of tumor seen during the removal. There is a risk of injury to nerves causing temporary or permanent numbness or the inability to move muscles full such as the inability to lift eyebrows. Questions answered and verbal and written consent was obtained.    With the patient in the supine position and under adequate local anesthesia with 1% lidocaine with epinephrine 1:100,000, the defect was scrubbed with Chlorhexidine. Sterile drapes were placed from the sterile tray.  Because of the location of the surgical defect, an intermediate closure was judged to give the best possible cosmetic and functional result.  The edges of the defect were carefully debrided removing any dead or coagulated tissue.  Note that wound was closed under moderated tension without extensive underming due to anticoagulation. Total length of repair was 5 cm . Sutures will be left in place for 14 days.    Hemostasis was obtained by pinpoint electrocoagulation.  Careful planning of removal of redundant tissue at either end of the defect was drawn out so that the suture lines would fall in the optimal orientation with regard to the relaxed skin tension lines.  These were then removed with a #15 blade scalpel.  The wound was then approximated by a deep layer of buried vertical mattress sutures and the cutaneous margins were approximated and closed by superficial sutures as noted above.  Estimated blood loss was less than 5 mL.      The patient tolerated the procedure well.  The wound was dressed with petrolatum, a non-stick pad, and a compression  dressing.     Samir Berman MD served as the surgeon and pathologist during the procedure.    Postoperative care: Wound care discussed at length.  I urged the patient to call us if any problems or question should arise.     Complications: none  Post-op medications: none  Patient condition after procedure: stable  Discharge plans: Plan for return to Mohs for suture removal, as scheduled in 14 days.      The tumor qualifies for Mohs based on AUC criteria. Dr. Berman served as the surgeon and pathologist during the procedure.    Postoperative care: No would care should be necessary prior to the next visit but I urged the patient to call us if any problems or question should arise prior to that time. If circumstances should change, we will contact the patient to make other arrangements.     Scribe Attestation    I,:  Monalisa Ricardo am acting as a scribe while in the presence of the attending physician.:       I,:  Samir Berman MD personally performed the services described in this documentation    as scribed in my presence.:

## 2024-01-24 ENCOUNTER — OFFICE VISIT (OUTPATIENT)
Dept: FAMILY MEDICINE CLINIC | Facility: CLINIC | Age: 79
End: 2024-01-24
Payer: COMMERCIAL

## 2024-01-24 VITALS
TEMPERATURE: 97.5 F | HEART RATE: 51 BPM | BODY MASS INDEX: 36.07 KG/M2 | OXYGEN SATURATION: 100 % | SYSTOLIC BLOOD PRESSURE: 124 MMHG | HEIGHT: 62 IN | DIASTOLIC BLOOD PRESSURE: 66 MMHG

## 2024-01-24 DIAGNOSIS — E11.42 TYPE 2 DIABETES MELLITUS WITH DIABETIC POLYNEUROPATHY, WITHOUT LONG-TERM CURRENT USE OF INSULIN (HCC): Primary | ICD-10-CM

## 2024-01-24 DIAGNOSIS — I10 ESSENTIAL HYPERTENSION: ICD-10-CM

## 2024-01-24 DIAGNOSIS — E11.42 DIABETIC PERIPHERAL NEUROPATHY (HCC): ICD-10-CM

## 2024-01-24 DIAGNOSIS — E78.00 HYPERCHOLESTEROLEMIA: ICD-10-CM

## 2024-01-24 DIAGNOSIS — Z23 ENCOUNTER FOR IMMUNIZATION: ICD-10-CM

## 2024-01-24 DIAGNOSIS — E03.9 HYPOTHYROIDISM, UNSPECIFIED TYPE: ICD-10-CM

## 2024-01-24 LAB — SL AMB POCT HEMOGLOBIN AIC: 6.5 (ref ?–6.5)

## 2024-01-24 PROCEDURE — 83036 HEMOGLOBIN GLYCOSYLATED A1C: CPT | Performed by: FAMILY MEDICINE

## 2024-01-24 PROCEDURE — 1159F MED LIST DOCD IN RCRD: CPT | Performed by: FAMILY MEDICINE

## 2024-01-24 PROCEDURE — 1160F RVW MEDS BY RX/DR IN RCRD: CPT | Performed by: FAMILY MEDICINE

## 2024-01-24 PROCEDURE — G0009 ADMIN PNEUMOCOCCAL VACCINE: HCPCS | Performed by: FAMILY MEDICINE

## 2024-01-24 PROCEDURE — 90677 PCV20 VACCINE IM: CPT | Performed by: FAMILY MEDICINE

## 2024-01-24 PROCEDURE — 3078F DIAST BP <80 MM HG: CPT | Performed by: FAMILY MEDICINE

## 2024-01-24 PROCEDURE — 99214 OFFICE O/P EST MOD 30 MIN: CPT | Performed by: FAMILY MEDICINE

## 2024-01-24 PROCEDURE — 3074F SYST BP LT 130 MM HG: CPT | Performed by: FAMILY MEDICINE

## 2024-01-29 NOTE — PROGRESS NOTES
Patient ID: Laura Sarkar is a 78 y.o. female.    HPI: 78 y.o.female presents for follow up of niddm, hypertension and hypercholesterolemia.  Hgba1c is   6.5   and patient's issues are well controlled. Patient deneis any dyspnea, chest pain or palpitations.      SUBJECTIVE    Family History   Problem Relation Age of Onset    Hypertension Father     Diabetes Father     Cancer Father         Throat    Arthritis Father     Stroke Mother     Diabetes Maternal Grandmother     Heart disease Maternal Grandfather     Diabetes Son     Lymphoma Family         Head, Face and Neck      Social History     Socioeconomic History    Marital status: /Civil Union     Spouse name: Not on file    Number of children: Not on file    Years of education: Not on file    Highest education level: Not on file   Occupational History    Occupation: RETIRED   Tobacco Use    Smoking status: Never     Passive exposure: Never    Smokeless tobacco: Never   Vaping Use    Vaping status: Never Used   Substance and Sexual Activity    Alcohol use: Never     Comment: Social per [employer]     Drug use: Never    Sexual activity: Not Currently   Other Topics Concern    Not on file   Social History Narrative    Hx of daily cola consumption (unk cans/day)    Daily tea consumption (unk cups/day)    Multiple organ donor    Patient has living will    Power of  in existence    Uses safety equipment - Seatbelts      Social Determinants of Health     Financial Resource Strain: Low Risk  (4/25/2023)    Overall Financial Resource Strain (CARDIA)     Difficulty of Paying Living Expenses: Not hard at all   Food Insecurity: No Food Insecurity (1/16/2022)    Hunger Vital Sign     Worried About Running Out of Food in the Last Year: Never true     Ran Out of Food in the Last Year: Never true   Transportation Needs: No Transportation Needs (4/25/2023)    PRAPARE - Transportation     Lack of Transportation (Medical): No     Lack of Transportation  (Non-Medical): No   Physical Activity: Not on file   Stress: Not on file   Social Connections: Not on file   Intimate Partner Violence: Not on file   Housing Stability: Low Risk  (1/16/2022)    Housing Stability Vital Sign     Unable to Pay for Housing in the Last Year: No     Number of Places Lived in the Last Year: 1     Unstable Housing in the Last Year: No     Past Medical History:   Diagnosis Date    Aortic stenosis     Arthritis     Asthma     Basal cell carcinoma 10/24/2023    right lateral forehead, mohs    Basal cell carcinoma 10/24/2023    right medial forehead, mohs    Coronary artery disease     Diabetes mellitus (HCC)     Diabetic peripheral neuropathy (HCC)     Last assessed - 1/27/16    Gallbladder disease     Heart attack (HCC) 07/1999    Hemorrhoids     Last assessed - 11/16/12    Hypoglycemia 12/23/2021    Myocardial infarction (HCC)     Old myocardial infarction     Type 2 diabetes mellitus with autonomic neuropathy (HCC)     Unspec long term insulin use status, Last assessed - 6/8/17     Past Surgical History:   Procedure Laterality Date    BUNIONECTOMY      CARDIAC CATHETERIZATION      Carotid Artery, Resolved - 7/1/13    CARDIAC CATHETERIZATION N/A 12/27/2021    Procedure: CARDIAC CATHETERIZATION ;  Surgeon: Chucky Tadeo MD;  [facility];  Service: Cardiology    CARDIAC CATHETERIZATION N/A 12/27/2021    Procedure: Cardiac Coronary Angiogram;  Surgeon: Chucky Tadeo MD;  [facility];  Service: Cardiology    CARDIAC ELECTROPHYSIOLOGY PROCEDURE N/A 01/18/2022    Procedure: Cardiac biv icd implant;  Surgeon: Harley Rollins DO;  Location: [facility];  Service: Cardiology    CARDIOVASCULAR STRESS TEST  09/1999    CHOLECYSTECTOMY      COLONOSCOPY  2017    FOOT ARTHRODESIS, MODIFIED DONOHUE  2016    GALLBLADDER SURGERY  1970    HEMORROIDECTOMY      KNEE ARTHROSCOPY Left 2004    MOHS SURGERY Right 01/10/2024    right lateral and medial forehead  (combined), Dr Berman    CO COLONOSCOPY FLX DX W/COLLJ SPEC WHEN PFRMD N/A 08/21/2017    Procedure: COLONOSCOPY;  Surgeon: Ady Wilkes MD;  Location: [facility];  Service: Colorectal    REPAIR KNEE LIGAMENT      REPAIR KNEE LIGAMENT Left 1986    REPAIR KNEE LIGAMENT Right 1988     Allergies   Allergen Reactions    Lyrica [Pregabalin] Swelling    Sulfa Antibiotics Swelling       Current Outpatient Medications:     acetaminophen (TYLENOL) 325 mg tablet, Take 2 tablets by mouth every 6 (six) hours as needed  , Disp: , Rfl:     dofetilide (TIKOSYN) 125 mcg capsule, TAKE 1 CAPSULE BY MOUTH EVERY 12 HOURS, Disp: 180 capsule, Rfl: 3    Eliquis 5 MG, TAKE 1 TABLET BY MOUTH EVERY 12 HOURS, Disp: 180 tablet, Rfl: 3    gabapentin (NEURONTIN) 300 mg capsule, TAKE 3 CAPSULES BY MOUTH EVERY MORNING, TAKE 2 CAPSULES BY MOUTH EVERY AFTERNOON, AND TAKE 3 CAPSULES BY MOUTH EVERY EVENING, Disp: 240 capsule, Rfl: 0    levothyroxine 125 mcg tablet, TAKE 1 TABLET BY MOUTH EVERY MORNING, Disp: 30 tablet, Rfl: 2    losartan (COZAAR) 25 mg tablet, TAKE 1 TABLET BY MOUTH EVERY MORNING, Disp: 90 tablet, Rfl: 3    metoprolol succinate (TOPROL-XL) 25 mg 24 hr tablet, TAKE 1 TABLET BY MOUTH EVERY DAY, Disp: 90 tablet, Rfl: 1    pravastatin (PRAVACHOL) 10 mg tablet, TAKE 1 TABLET BY MOUTH EVERY DAY, Disp: 90 tablet, Rfl: 1    venlafaxine (EFFEXOR) 75 mg tablet, TAKE 1 TABLET BY MOUTH THREE TIMES DAILY, Disp: 90 tablet, Rfl: 2    loperamide (IMODIUM) 2 mg capsule, Take 1 capsule (2 mg total) by mouth 4 (four) times a day as needed for diarrhea (Patient not taking: Reported on 9/5/2023), Disp: 56 capsule, Rfl: 1    Multiple Vitamins-Minerals (ICAPS AREDS 2 PO), Take by mouth in the morning (Patient not taking: Reported on 10/23/2023), Disp: , Rfl:     nystatin (MYCOSTATIN) powder, Apply topically 2 (two) times a day (Patient not taking: Reported on 1/10/2024), Disp: 60 g, Rfl: 3    Review of Systems  Constitutional:     Denies fever, chills  ",fatigue ,weakness ,weight loss, weight gain     ENT: Denies earache ,loss of hearing ,nosebleed, nasal discharge,nasal congestion ,sore throat ,hoarseness  Pulmonary: Denies shortness of breath ,cough  ,dyspnea on exertion, orthopnea  ,PND   Cardiovascular:  Denies bradycardia , tachycardia  ,palpations, lower extremity edema leg, claudication  Breast:  Denies new or changing breast lumps ,nipple discharge ,nipple changes  Abdomen:  Denies abdominal pain , anorexia , indigestion, nausea, vomiting, constipation, diarrhea  Musculoskeletal: Denies myalgias, arthralgias, joint swelling, joint stiffness , limb pain, limb swelling  Gu: denies dysuria, polyuria  Skin: Denies skin rash, skin lesion, skin wound, itching, dry skin  Neuro: Denies headache, numbness, tingling, confusion, loss of consciousness, dizziness, vertigo  Psychiatric: Denies feelings of depression, suicidal ideation, anxiety, sleep disturbances    OBJECTIVE  /66   Pulse (!) 51   Temp 97.5 °F (36.4 °C)   Ht 5' 2\" (1.575 m)   SpO2 100%   BMI 36.07 kg/m²   Constitutional:   NAD, well appearing and well nourished      ENT:   Conjunctiva and lids: no injection, edema, or discharge     Pupils and iris: ANGELINE bilaterally    External inspection of ears and nose: normal without deformities or discharge.      Otoscopic exam: Canals patent without erythema.       Nasal mucosa, septum and turbinates: Normal or edema or discharge         Oropharynx:  Moist mucosa, normal tongue and tonsils without lesions. No erythema        Pulmonary:Respiratory effort normal rate and rhythm, no increased work of breathing. Auscultation of lungs:  Clear bilaterally with no adventitious breath sounds       Cardiovascular: regular rate and rhythm, S1 and S2, no murmur, no edema and/or varicosities of LE      Abdomen: Soft and non-distended     Positive bowel sounds      No heptomegaly or splenomegaly      Gu: no suprapubic tenderness or CVA tenderness, no urethral " discharge  Lymphatic:  No anterior or posterior cervical lymphadenopathy         Musculoskeletal:  Gait and station: Normal gait      Digits and nails normal without clubbing or cyanosis       Inspection/palpation of joints, bones, and muscles:  No joint tenderness, swelling, full active and passive range of motion       Skin: Normal skin turgor and no rashes      Neuro:    Normal reflexes     Psych:   alert and oriented to person, place and time     normal mood and affect   Patient's shoes and socks removed.    Right Foot/Ankle   Right Foot Inspection  Skin Exam: skin normal and skin intact. No dry skin, no warmth, no callus, no erythema, no maceration, no abnormal color, no pre-ulcer, no ulcer and no callus.     Toe Exam: ROM and strength within normal limits. No swelling, no tenderness, erythema and  no right toe deformity    Sensory   Vibration: diminished  Proprioception: diminished  Monofilament testing: diminished    Vascular  Capillary refills: < 3 seconds  The right DP pulse is 2+. The right PT pulse is 2+.     Left Foot/Ankle  Left Foot Inspection  Skin Exam: skin normal and skin intact. No dry skin, no warmth, no erythema, no maceration, normal color, no pre-ulcer, no ulcer and no callus.     Toe Exam: ROM and strength within normal limits. No swelling, no tenderness, no erythema and no left toe deformity.     Sensory   Vibration: diminished  Proprioception: diminished  Monofilament testing: diminished    Vascular  Capillary refills: < 3 seconds  The left DP pulse is 2+. The left PT pulse is 2+.     Assign Risk Category  No deformity present  Loss of protective sensation  No weak pulses  Risk: 1       Assessment/Plan:Diagnoses and all orders for this visit:    Type 2 diabetes mellitus with diabetic polyneuropathy, without long-term current use of insulin (HCC)    Essential hypertension  Comments:  Continue ARB tx.  Orders:  -     Comprehensive metabolic panel;  Future    Hypercholesterolemia  Comments:  Stable on statin tx.  Orders:  -     Lipid Panel with Direct LDL reflex; Future    Hypothyroidism, unspecified type  Comments:  Continue levothyroxine tx.  Orders:  -     TSH, 3rd generation with Free T4 reflex; Future    Diabetic peripheral neuropathy (HCC)  Comments:  Stable on gabapentin tx.  Orders:  -     POCT hemoglobin A1c    Encounter for immunization  -     Pneumococcal Conjugate Vaccine 20-valent (Pcv20)        Reviewed with patient plan to treat with above plan.    Patient instructed to call in 72 hours if not feeling better or if symptoms worsen

## 2024-01-30 DIAGNOSIS — I50.41 ACUTE COMBINED SYSTOLIC AND DIASTOLIC HEART FAILURE (HCC): ICD-10-CM

## 2024-01-30 DIAGNOSIS — G62.9 NEUROPATHY: ICD-10-CM

## 2024-01-30 DIAGNOSIS — E03.9 HYPOTHYROIDISM, UNSPECIFIED TYPE: ICD-10-CM

## 2024-01-31 RX ORDER — METOPROLOL SUCCINATE 25 MG/1
TABLET, EXTENDED RELEASE ORAL
Qty: 90 TABLET | Refills: 1 | Status: SHIPPED | OUTPATIENT
Start: 2024-01-31

## 2024-01-31 RX ORDER — LEVOTHYROXINE SODIUM 0.12 MG/1
TABLET ORAL
Qty: 30 TABLET | Refills: 5 | Status: SHIPPED | OUTPATIENT
Start: 2024-01-31

## 2024-01-31 RX ORDER — GABAPENTIN 300 MG/1
CAPSULE ORAL
Qty: 240 CAPSULE | Refills: 5 | Status: SHIPPED | OUTPATIENT
Start: 2024-01-31

## 2024-02-06 DIAGNOSIS — F32.A DEPRESSION, UNSPECIFIED DEPRESSION TYPE: ICD-10-CM

## 2024-02-06 RX ORDER — VENLAFAXINE 75 MG/1
TABLET ORAL
Qty: 90 TABLET | Refills: 3 | Status: SHIPPED | OUTPATIENT
Start: 2024-02-06

## 2024-02-07 ENCOUNTER — REMOTE DEVICE CLINIC VISIT (OUTPATIENT)
Dept: CARDIOLOGY CLINIC | Facility: CLINIC | Age: 79
End: 2024-02-07
Payer: COMMERCIAL

## 2024-02-07 DIAGNOSIS — Z95.810 AICD (AUTOMATIC CARDIOVERTER/DEFIBRILLATOR) PRESENT: Primary | ICD-10-CM

## 2024-02-07 PROCEDURE — 93295 DEV INTERROG REMOTE 1/2/MLT: CPT | Performed by: INTERNAL MEDICINE

## 2024-02-07 PROCEDURE — 93296 REM INTERROG EVL PM/IDS: CPT | Performed by: INTERNAL MEDICINE

## 2024-02-07 NOTE — PROGRESS NOTES
"Results for orders placed or performed in visit on 02/07/24   Cardiac EP device report    Narrative    MDT BI-V ICD (DDDR MODE)/ ACTIVE SYSTEM IS MRI CONDITIONAL  CARELINK TRANSMISSION: BATTERY STATUS \"8 YRS.\" AP 96% CRT PACING (BIV) 35% (LV) 64%. ALL AVAILABLE LEAD PARAMETERS WITHIN NORMAL LIMITS. NO SIGNIFICANT HIGH RATE EPISODES. VENT SENSING MARKERS NOTED. OPTI-VOL WITHIN NORMAL LIMITS. NORMAL DEVICE FUNCTION. NC         "

## 2024-02-19 DIAGNOSIS — E78.00 HYPERCHOLESTEROLEMIA: ICD-10-CM

## 2024-02-19 RX ORDER — PRAVASTATIN SODIUM 10 MG
TABLET ORAL
Qty: 90 TABLET | Refills: 1 | Status: SHIPPED | OUTPATIENT
Start: 2024-02-19

## 2024-03-07 ENCOUNTER — TELEPHONE (OUTPATIENT)
Dept: CARDIOLOGY CLINIC | Facility: CLINIC | Age: 79
End: 2024-03-07

## 2024-03-07 NOTE — TELEPHONE ENCOUNTER
called patient and asked her to increase Toprol-XL to twice daily dosing in order to help with heart rate control in A-fib per Dr. Estes.

## 2024-03-07 NOTE — TELEPHONE ENCOUNTER
----- Message from Sarika Segura sent at 3/6/2024 11:45 AM EST -----  Regarding: FW: Rapid heart rates    ----- Message -----  From: Kaila Estes MD  Sent: 3/6/2024   9:26 AM EST  To: [facility]  Subject: Rapid heart rates                                Please call patient and ask her to increase Toprol-XL to twice daily dosing in order to help with heart rate control in A-fib.  ----- Message -----  From: Harley Rollins DO  Sent: 3/5/2024   4:58 PM EST  To: Kaila Estes MD    She goes very fast when in afib.  Fortunately rare.  Just FYI.  This episode was afib that likely caused transient VT.  ATP worked. Thanks. dt

## 2024-05-06 DIAGNOSIS — I48.91 ATRIAL FIBRILLATION WITH RVR (HCC): ICD-10-CM

## 2024-05-07 RX ORDER — LOSARTAN POTASSIUM 25 MG/1
TABLET ORAL
Qty: 90 TABLET | Refills: 1 | Status: SHIPPED | OUTPATIENT
Start: 2024-05-07

## 2024-05-07 RX ORDER — DOFETILIDE 0.12 MG/1
CAPSULE ORAL
Qty: 180 CAPSULE | Refills: 3 | Status: SHIPPED | OUTPATIENT
Start: 2024-05-07

## 2024-05-11 DIAGNOSIS — I48.91 ATRIAL FIBRILLATION WITH RAPID VENTRICULAR RESPONSE (HCC): ICD-10-CM

## 2024-05-13 RX ORDER — APIXABAN 5 MG/1
TABLET, FILM COATED ORAL
Qty: 60 TABLET | Refills: 0 | Status: SHIPPED | OUTPATIENT
Start: 2024-05-13 | End: 2024-05-14 | Stop reason: SDUPTHER

## 2024-05-13 NOTE — TELEPHONE ENCOUNTER
PT called to see about refill.  I told her it had been sent to [facility] today for 1 pill every 12 hrs

## 2024-05-14 ENCOUNTER — TELEPHONE (OUTPATIENT)
Dept: FAMILY MEDICINE CLINIC | Facility: CLINIC | Age: 79
End: 2024-05-14

## 2024-05-14 DIAGNOSIS — I48.91 ATRIAL FIBRILLATION WITH RAPID VENTRICULAR RESPONSE (HCC): ICD-10-CM

## 2024-05-17 NOTE — TELEPHONE ENCOUNTER
Patient's sister came in and asked about eloquis refil.  Patient is out of medication and needs this asap

## 2024-05-20 DIAGNOSIS — I50.41 ACUTE COMBINED SYSTOLIC AND DIASTOLIC HEART FAILURE (HCC): ICD-10-CM

## 2024-05-20 RX ORDER — METOPROLOL SUCCINATE 25 MG/1
25 TABLET, EXTENDED RELEASE ORAL DAILY
Qty: 90 TABLET | Refills: 0 | Status: SHIPPED | OUTPATIENT
Start: 2024-05-20

## 2024-05-20 NOTE — TELEPHONE ENCOUNTER
Patient called the RX Refill Line. Message is being forwarded to the office.     Patient is requesting the dose of her metoprolol succinate (TOPROL-XL) 25 mg 24 hr tablet  be changed to two 2 tablets a day.  She is out of the medication and needs the script sent to her pharmacy due to having to double up on what she has.    Please review and adjust of appropriate and contact patient directly      Please contact patient at [phone]

## 2024-06-27 DIAGNOSIS — F32.A DEPRESSION, UNSPECIFIED DEPRESSION TYPE: ICD-10-CM

## 2024-06-27 RX ORDER — VENLAFAXINE 75 MG/1
TABLET ORAL
Qty: 90 TABLET | Refills: 1 | Status: SHIPPED | OUTPATIENT
Start: 2024-06-27

## 2024-07-10 ENCOUNTER — TELEPHONE (OUTPATIENT)
Dept: CARDIOLOGY CLINIC | Facility: CLINIC | Age: 79
End: 2024-07-10

## 2024-07-11 ENCOUNTER — IN-CLINIC DEVICE VISIT (OUTPATIENT)
Dept: CARDIOLOGY CLINIC | Facility: CLINIC | Age: 79
End: 2024-07-11
Payer: COMMERCIAL

## 2024-07-11 DIAGNOSIS — Z95.810 AICD (AUTOMATIC CARDIOVERTER/DEFIBRILLATOR) PRESENT: Primary | ICD-10-CM

## 2024-07-11 PROCEDURE — 93284 PRGRMG EVAL IMPLANTABLE DFB: CPT | Performed by: INTERNAL MEDICINE

## 2024-07-11 NOTE — PROGRESS NOTES
Results for orders placed or performed in visit on 07/11/24   Cardiac EP device report    Narrative    MDT BI-V ICD (DDDR MODE)/ ACTIVE SYSTEM IS MRI CONDITIONAL  DEVICE INTERROGATED IN THE [place] OFFICE. BATTERY VOLTAGE ADEQUATE (8.2 YRS). AP-95%, BVP-97% (TOTAL -94.2%+VSR PACE-2.9%). ALL LEAD PARAMETERS WITHIN NORMAL LIMITS. 14 NEW AF EPISODES MAX DURATION 4.3 HRS. AF BURDEN-0.2%. HX: PAF & ON ELIQUIS, DOFETILIDE & METOPROLOL. >11K VENT SENSING EPISODES SINCE 5/9/23- FUSION BEATS. OPTI-VOL WITHIN NORMAL LIMITS. WAVELET TEMPLATE COLLECTED. NORMAL DEVICE FUNCTION. GV

## 2024-07-18 ENCOUNTER — RA CDI HCC (OUTPATIENT)
Dept: OTHER | Facility: HOSPITAL | Age: 79
End: 2024-07-18

## 2024-07-18 PROBLEM — S22.32XA CLOSED FRACTURE OF ONE RIB OF LEFT SIDE: Status: RESOLVED | Noted: 2022-01-02 | Resolved: 2024-07-18

## 2024-07-18 NOTE — PROGRESS NOTES
HCC coding opportunities          Chart Reviewed number of suggestions sent to Provider: 3  E11.22  I13.0  I42.8     Patients Insurance     Medicare Insurance: [facility] Medicare Advantage

## 2024-07-25 ENCOUNTER — HOSPITAL ENCOUNTER (EMERGENCY)
Facility: HOSPITAL | Age: 79
Discharge: HOME | End: 2024-07-25
Attending: EMERGENCY MEDICINE
Payer: COMMERCIAL

## 2024-07-25 ENCOUNTER — TELEPHONE (OUTPATIENT)
Dept: CARDIOLOGY CLINIC | Facility: CLINIC | Age: 79
End: 2024-07-25

## 2024-07-25 ENCOUNTER — OFFICE VISIT (OUTPATIENT)
Dept: FAMILY MEDICINE CLINIC | Facility: CLINIC | Age: 79
End: 2024-07-25
Payer: COMMERCIAL

## 2024-07-25 VITALS
DIASTOLIC BLOOD PRESSURE: 53 MMHG | RESPIRATION RATE: 16 BRPM | SYSTOLIC BLOOD PRESSURE: 113 MMHG | TEMPERATURE: 98.1 F | WEIGHT: 200.62 LBS | HEART RATE: 60 BPM | BODY MASS INDEX: 36.69 KG/M2 | OXYGEN SATURATION: 94 %

## 2024-07-25 VITALS
SYSTOLIC BLOOD PRESSURE: 92 MMHG | HEART RATE: 70 BPM | BODY MASS INDEX: 36.07 KG/M2 | DIASTOLIC BLOOD PRESSURE: 62 MMHG | TEMPERATURE: 97.1 F | HEIGHT: 62 IN | OXYGEN SATURATION: 97 %

## 2024-07-25 DIAGNOSIS — I47.29 VENTRICULAR TACHYCARDIA, NONSUSTAINED (HCC): Primary | ICD-10-CM

## 2024-07-25 DIAGNOSIS — Z95.811 PRESENCE OF HEART ASSIST DEVICE (HCC): ICD-10-CM

## 2024-07-25 DIAGNOSIS — Z45.02 AICD DISCHARGE: Primary | ICD-10-CM

## 2024-07-25 PROBLEM — L98.491 SKIN ULCER, LIMITED TO BREAKDOWN OF SKIN (HCC): Status: ACTIVE | Noted: 2024-07-25

## 2024-07-25 PROBLEM — N25.81 SECONDARY HYPERPARATHYROIDISM OF RENAL ORIGIN (HCC): Status: ACTIVE | Noted: 2024-07-25

## 2024-07-25 LAB
2HR DELTA HS TROPONIN: 0 NG/L
ALBUMIN SERPL BCG-MCNC: 3.4 G/DL (ref 3.5–5)
ALP SERPL-CCNC: 69 U/L (ref 34–104)
ALT SERPL W P-5'-P-CCNC: 6 U/L (ref 7–52)
ANION GAP SERPL CALCULATED.3IONS-SCNC: 4 MMOL/L (ref 4–13)
AST SERPL W P-5'-P-CCNC: 13 U/L (ref 13–39)
BASOPHILS # BLD AUTO: 0.05 THOUSANDS/ÂΜL (ref 0–0.1)
BASOPHILS NFR BLD AUTO: 1 % (ref 0–1)
BILIRUB SERPL-MCNC: 0.47 MG/DL (ref 0.2–1)
BUN SERPL-MCNC: 22 MG/DL (ref 5–25)
CALCIUM ALBUM COR SERPL-MCNC: 10.2 MG/DL (ref 8.3–10.1)
CALCIUM SERPL-MCNC: 9.7 MG/DL (ref 8.4–10.2)
CARDIAC TROPONIN I PNL SERPL HS: 19 NG/L
CARDIAC TROPONIN I PNL SERPL HS: 19 NG/L
CHLORIDE SERPL-SCNC: 107 MMOL/L (ref 96–108)
CO2 SERPL-SCNC: 30 MMOL/L (ref 21–32)
CREAT SERPL-MCNC: 1.29 MG/DL (ref 0.6–1.3)
EOSINOPHIL # BLD AUTO: 0.2 THOUSAND/ÂΜL (ref 0–0.61)
EOSINOPHIL NFR BLD AUTO: 2 % (ref 0–6)
ERYTHROCYTE [DISTWIDTH] IN BLOOD BY AUTOMATED COUNT: 13.3 % (ref 11.6–15.1)
GFR SERPL CREATININE-BSD FRML MDRD: 39 ML/MIN/1.73SQ M
GLUCOSE SERPL-MCNC: 157 MG/DL (ref 65–140)
HCT VFR BLD AUTO: 39.7 % (ref 34.8–46.1)
HGB BLD-MCNC: 12.4 G/DL (ref 11.5–15.4)
IMM GRANULOCYTES # BLD AUTO: 0.02 THOUSAND/UL (ref 0–0.2)
IMM GRANULOCYTES NFR BLD AUTO: 0 % (ref 0–2)
LYMPHOCYTES # BLD AUTO: 1.64 THOUSANDS/ÂΜL (ref 0.6–4.47)
LYMPHOCYTES NFR BLD AUTO: 17 % (ref 14–44)
MAGNESIUM SERPL-MCNC: 1.6 MG/DL (ref 1.9–2.7)
MCH RBC QN AUTO: 31.5 PG (ref 26.8–34.3)
MCHC RBC AUTO-ENTMCNC: 31.2 G/DL (ref 31.4–37.4)
MCV RBC AUTO: 101 FL (ref 82–98)
MONOCYTES # BLD AUTO: 0.87 THOUSAND/ÂΜL (ref 0.17–1.22)
MONOCYTES NFR BLD AUTO: 9 % (ref 4–12)
NEUTROPHILS # BLD AUTO: 7.12 THOUSANDS/ÂΜL (ref 1.85–7.62)
NEUTS SEG NFR BLD AUTO: 71 % (ref 43–75)
NRBC BLD AUTO-RTO: 0 /100 WBCS
PLATELET # BLD AUTO: 199 THOUSANDS/UL (ref 149–390)
PMV BLD AUTO: 11.4 FL (ref 8.9–12.7)
POTASSIUM SERPL-SCNC: 4.5 MMOL/L (ref 3.5–5.3)
PROT SERPL-MCNC: 5.8 G/DL (ref 6.4–8.4)
RBC # BLD AUTO: 3.94 MILLION/UL (ref 3.81–5.12)
SODIUM SERPL-SCNC: 141 MMOL/L (ref 135–147)
TSH SERPL DL<=0.05 MIU/L-ACNC: 1.75 UIU/ML (ref 0.45–4.5)
WBC # BLD AUTO: 9.9 THOUSAND/UL (ref 4.31–10.16)

## 2024-07-25 PROCEDURE — 80053 COMPREHEN METABOLIC PANEL: CPT | Performed by: EMERGENCY MEDICINE

## 2024-07-25 PROCEDURE — G2211 COMPLEX E/M VISIT ADD ON: HCPCS | Performed by: FAMILY MEDICINE

## 2024-07-25 PROCEDURE — 84484 ASSAY OF TROPONIN QUANT: CPT | Performed by: EMERGENCY MEDICINE

## 2024-07-25 PROCEDURE — 84443 ASSAY THYROID STIM HORMONE: CPT | Performed by: EMERGENCY MEDICINE

## 2024-07-25 PROCEDURE — 99285 EMERGENCY DEPT VISIT HI MDM: CPT

## 2024-07-25 PROCEDURE — 36415 COLL VENOUS BLD VENIPUNCTURE: CPT | Performed by: EMERGENCY MEDICINE

## 2024-07-25 PROCEDURE — 99213 OFFICE O/P EST LOW 20 MIN: CPT | Performed by: FAMILY MEDICINE

## 2024-07-25 PROCEDURE — 83735 ASSAY OF MAGNESIUM: CPT | Performed by: EMERGENCY MEDICINE

## 2024-07-25 PROCEDURE — 85025 COMPLETE CBC W/AUTO DIFF WBC: CPT | Performed by: EMERGENCY MEDICINE

## 2024-07-25 PROCEDURE — 93005 ELECTROCARDIOGRAM TRACING: CPT

## 2024-07-25 PROCEDURE — 99285 EMERGENCY DEPT VISIT HI MDM: CPT | Performed by: EMERGENCY MEDICINE

## 2024-07-25 NOTE — ED PROVIDER NOTES
History  Chief Complaint   Patient presents with    AICD Firing     Pt was in the shower this morning, icd fired x1 around 730 am. Pt went to PCP, and sent here. No complaints on arrival.      78-year-old female presents after she was in the bathroom today and her pacemaker went off, the patient reports that she has not had any other symptoms at all, including no chest pain, cough, shortness of breath, headache, dizziness, nausea, vomiting, abdominal pain, and no other complaints.  She reports the pacemaker going off was very uncomfortable to her but has not had any residual pain or issues.  The patient reports this has not happened before.        Prior to Admission Medications   Prescriptions Last Dose Informant Patient Reported? Taking?   Multiple Vitamins-Minerals (ICAPS AREDS 2 PO)  Self Yes No   Sig: Take by mouth in the morning   Patient not taking: Reported on 10/23/2023   acetaminophen (TYLENOL) 325 mg tablet  Self Yes No   Sig: Take 2 tablets by mouth every 6 (six) hours as needed     apixaban (Eliquis) 5 mg   No No   Sig: Take 1 tablet (5 mg total) by mouth every 12 (twelve) hours   dofetilide (TIKOSYN) 125 mcg capsule   No No   Sig: TAKE 1 CAPSULE BY MOUTH EVERY 12 HOURS   gabapentin (NEURONTIN) 300 mg capsule   No No   Sig: TAKE 3 CAPSULES BY MOUTH EVERY MORNING, TAKE 2 CAPSULES BY MOUTH EVERY AFTERNOON, TAKE 3 CAPSULES BY MOUTH EVERY EVENING   levothyroxine 125 mcg tablet   No No   Sig: TAKE 1 TABLET BY MOUTH EVERY MORNING   loperamide (IMODIUM) 2 mg capsule  Self No No   Sig: Take 1 capsule (2 mg total) by mouth 4 (four) times a day as needed for diarrhea   Patient not taking: Reported on 9/5/2023   losartan (COZAAR) 25 mg tablet   No No   Sig: TAKE 1 TABLET BY MOUTH EVERY MORNING   metoprolol succinate (TOPROL-XL) 25 mg 24 hr tablet   No No   Sig: Take 1 tablet (25 mg total) by mouth daily   Patient taking differently: Take 25 mg by mouth daily Pt takes 2 a day   nystatin (MYCOSTATIN) powder  Self No  No   Sig: Apply topically 2 (two) times a day   Patient not taking: Reported on 1/10/2024   pravastatin (PRAVACHOL) 10 mg tablet   No No   Sig: TAKE 1 TABLET BY MOUTH EVERY DAY   venlafaxine (EFFEXOR) 75 mg tablet   No No   Sig: TAKE 1 TABLET BY MOUTH THREE TIMES DAILY      Facility-Administered Medications: None       Past Medical History:   Diagnosis Date    Aortic stenosis     Arthritis     Asthma     Basal cell carcinoma 10/24/2023    right lateral forehead, mohs    Basal cell carcinoma 10/24/2023    right medial forehead, mohs    Coronary artery disease     Diabetes mellitus (HCC)     Diabetic peripheral neuropathy (HCC)     Last assessed - 1/27/16    Gallbladder disease     Heart attack (HCC) 07/1999    Hemorrhoids     Last assessed - 11/16/12    Hypoglycemia 12/23/2021    Myocardial infarction (HCC)     Old myocardial infarction     Type 2 diabetes mellitus with autonomic neuropathy (HCC)     Unspec long term insulin use status, Last assessed - 6/8/17       Past Surgical History:   Procedure Laterality Date    BUNIONECTOMY      CARDIAC CATHETERIZATION      Carotid Artery, Resolved - 7/1/13    CARDIAC CATHETERIZATION N/A 12/27/2021    Procedure: CARDIAC CATHETERIZATION ;  Surgeon: Chucky aTdeo MD;  [facility];  Service: Cardiology    CARDIAC CATHETERIZATION N/A 12/27/2021    Procedure: Cardiac Coronary Angiogram;  Surgeon: Chucky Tadeo MD;  [facility];  Service: Cardiology    CARDIAC ELECTROPHYSIOLOGY PROCEDURE N/A 01/18/2022    Procedure: Cardiac biv icd implant;  Surgeon: Harley Rollins DO;  Location: [facility];  Service: Cardiology    CARDIOVASCULAR STRESS TEST  09/1999    CHOLECYSTECTOMY      COLONOSCOPY  2017    FOOT ARTHRODESIS, MODIFIED DONOHUE  2016    GALLBLADDER SURGERY  1970    HEMORROIDECTOMY      KNEE ARTHROSCOPY Left 2004    MOHS SURGERY Right 01/10/2024    right lateral and medial forehead (combined), Dr Berman    NV COLONOSCOPY FLX DX  W/COLLJ SPEC WHEN PFRMD N/A 08/21/2017    Procedure: COLONOSCOPY;  Surgeon: Ady Wilkes MD;  Location: [facility];  Service: Colorectal    REPAIR KNEE LIGAMENT      REPAIR KNEE LIGAMENT Left 1986    REPAIR KNEE LIGAMENT Right 1988       Family History   Problem Relation Age of Onset    Hypertension Father     Diabetes Father     Cancer Father         Throat    Arthritis Father     Stroke Mother     Diabetes Maternal Grandmother     Heart disease Maternal Grandfather     Diabetes Son     Lymphoma Family         Head, Face and Neck      I have reviewed and agree with the history as documented.    E-Cigarette/Vaping    E-Cigarette Use Never User      E-Cigarette/Vaping Substances    Nicotine No     THC No     CBD No     Flavoring No     Other No     Unknown No      Social History     Tobacco Use    Smoking status: Never     Passive exposure: Never    Smokeless tobacco: Never   Vaping Use    Vaping status: Never Used   Substance Use Topics    Alcohol use: Never     Comment: Social per [employer]     Drug use: Never       Review of Systems   Constitutional:  Negative for fever.   HENT:  Negative for congestion.    Eyes:  Negative for visual disturbance.   Respiratory:  Negative for cough and shortness of breath.    Cardiovascular:  Positive for chest pain.   Gastrointestinal:  Negative for abdominal pain, constipation, diarrhea, nausea and vomiting.   Endocrine: Negative for polyuria.   Genitourinary:  Negative for dysuria and hematuria.   Musculoskeletal:  Negative for myalgias.   Neurological:  Negative for dizziness and headaches.       Physical Exam  Physical Exam  Vitals and nursing note reviewed.   Constitutional:       General: She is not in acute distress.     Appearance: Normal appearance. She is well-developed.   HENT:      Head: Normocephalic and atraumatic.   Eyes:      Extraocular Movements: Extraocular movements intact.      Conjunctiva/sclera: Conjunctivae normal.   Cardiovascular:      Rate and  Rhythm: Normal rate and regular rhythm.      Comments: Pacemaker in place in chest wall  Pulmonary:      Effort: Pulmonary effort is normal. No respiratory distress.      Breath sounds: Normal breath sounds.   Abdominal:      General: There is no distension.      Palpations: Abdomen is soft.      Tenderness: There is no abdominal tenderness.   Musculoskeletal:         General: No swelling.      Cervical back: Neck supple.   Skin:     General: Skin is warm and dry.      Capillary Refill: Capillary refill takes less than 2 seconds.   Neurological:      General: No focal deficit present.      Mental Status: She is alert and oriented to person, place, and time.   Psychiatric:         Mood and Affect: Mood normal.         Vital Signs  ED Triage Vitals [07/25/24 1159]   Temperature Pulse Respirations Blood Pressure SpO2   98.1 °F (36.7 °C) 64 16 113/53 94 %      Temp Source Heart Rate Source Patient Position - Orthostatic VS BP Location FiO2 (%)   Oral Monitor Sitting Right arm --      Pain Score       No Pain           Vitals:    07/25/24 1330 07/25/24 1400 07/25/24 1530 07/25/24 1600   BP: 102/50 101/51 109/55 113/53   Pulse: 60 60 60 60   Patient Position - Orthostatic VS:             Visual Acuity      ED Medications  Medications - No data to display    Diagnostic Studies  Results Reviewed       Procedure Component Value Units Date/Time    HS Troponin I 2hr [[id number]]  (Normal) Collected: 07/25/24 1430    Lab Status: Final result Specimen: Blood from Arm, Left Updated: 07/25/24 1514     hs TnI 2hr 19 ng/L      Delta 2hr hsTnI 0 ng/L     HS Troponin I 4hr [[id number]]     Lab Status: No result Specimen: Blood     TSH [[id number]]  (Normal) Collected: 07/25/24 1241    Lab Status: Final result Specimen: Blood from Arm, Left Updated: 07/25/24 1322     TSH 3RD GENERATON 1.748 uIU/mL     HS Troponin 0hr (reflex protocol) [[id number]]  (Normal) Collected: 07/25/24 1241    Lab Status: Final result Specimen: Blood from Arm, Left  Updated: 07/25/24 1313     hs TnI 0hr 19 ng/L     Comprehensive metabolic panel [[id number]]  (Abnormal) Collected: 07/25/24 1241    Lab Status: Final result Specimen: Blood from Arm, Left Updated: 07/25/24 1309     Sodium 141 mmol/L      Potassium 4.5 mmol/L      Chloride 107 mmol/L      CO2 30 mmol/L      ANION GAP 4 mmol/L      BUN 22 mg/dL      Creatinine 1.29 mg/dL      Glucose 157 mg/dL      Calcium 9.7 mg/dL      Corrected Calcium 10.2 mg/dL      AST 13 U/L      ALT 6 U/L      Alkaline Phosphatase 69 U/L      Total Protein 5.8 g/dL      Albumin 3.4 g/dL      Total Bilirubin 0.47 mg/dL      eGFR 39 ml/min/1.73sq m     Narrative:      National Kid[employer] guidelines for Chronic Kidney Disease (CKD):     Stage 1 with normal or high GFR (GFR > 90 mL/min/1.73 square meters)    Stage 2 Mild CKD (GFR = 60-89 mL/min/1.73 square meters)    Stage 3A Moderate CKD (GFR = 45-59 mL/min/1.73 square meters)    Stage 3B Moderate CKD (GFR = 30-44 mL/min/1.73 square meters)    Stage 4 Severe CKD (GFR = 15-29 mL/min/1.73 square meters)    Stage 5 End Stage CKD (GFR <15 mL/min/1.73 square meters)  Note: GFR calculation is accurate only with a steady state creatinine    Magnesium [[id number]]  (Abnormal) Collected: 07/25/24 1241    Lab Status: Final result Specimen: Blood from Arm, Left Updated: 07/25/24 1309     Magnesium 1.6 mg/dL     CBC and differential [[id number]]  (Abnormal) Collected: 07/25/24 1241    Lab Status: Final result Specimen: Blood from Arm, Left Updated: 07/25/24 1251     WBC 9.90 Thousand/uL      RBC 3.94 Million/uL      Hemoglobin 12.4 g/dL      Hematocrit 39.7 %       fL      MCH 31.5 pg      MCHC 31.2 g/dL      RDW 13.3 %      MPV 11.4 fL      Platelets 199 Thousands/uL      nRBC 0 /100 WBCs      Segmented % 71 %      Immature Grans % 0 %      Lymphocytes % 17 %      Monocytes % 9 %      Eosinophils Relative 2 %      Basophils Relative 1 %      Absolute Neutrophils 7.12 Thousands/µL       Absolute Immature Grans 0.02 Thousand/uL      Absolute Lymphocytes 1.64 Thousands/µL      Absolute Monocytes 0.87 Thousand/µL      Eosinophils Absolute 0.20 Thousand/µL      Basophils Absolute 0.05 Thousands/µL                    No orders to display              Procedures  ECG 12 Lead Documentation Only    Date/Time: 7/25/2024 5:53 PM    Performed by: Olu Valera MD  Authorized by: Olu Valera MD    ECG reviewed by me, the ED Provider: yes    Patient location:  ED  Previous ECG:     Previous ECG:  Compared to current    Similarity:  No change  Interpretation:     Interpretation: abnormal    Rate:     ECG rate:  60    ECG rate assessment: normal    Rhythm:     Rhythm: paced    Ectopy:     Ectopy: none    QRS:     QRS axis:  Normal    QRS intervals:  Normal  Conduction:     Conduction: normal    ST segments:     ST segments:  Normal  T waves:     T waves: non-specific             ED Course               HEART Risk Score      Flowsheet Row Most Recent Value   Heart Score Risk Calculator    History 0 Filed at: 07/25/2024 1752   ECG 1 Filed at: 07/25/2024 1752   Age 2 Filed at: 07/25/2024 1752   Risk Factors 2 Filed at: 07/25/2024 1752   Troponin 1 Filed at: 07/25/2024 1752   HEART Score 6 Filed at: 07/25/2024 1752                                        Medical Decision Making  Patient will be evaluated for possible electrolyte abnormalities, arrhythmia, ACS, or other causes of her pain in her chest, although if her workup is unremarkable and she does not show any signs of arrhythmia while being evaluated in the emergency department, she would likely be safe for discharge home to follow-up with cardiology.    The patient's workup is unremarkable, repeat troponins were stable, she is safe for discharge home with instructions to follow-up with cardiology as an outpatient.    Amount and/or Complexity of Data Reviewed  Labs: ordered.                 Disposition  Final diagnoses:   AICD discharge      Time reflects when diagnosis was documented in both MDM as applicable and the Disposition within this note       Time User Action Codes Description Comment    7/25/2024  3:53 PM Olu Valera Add [Z45.02] AICD discharge           ED Disposition       ED Disposition   Discharge    Condition   Stable    Date/Time   Thu Jul 25, 2024 1553    Comment   Laura ALINA Sarkar discharge to home/self care.                   Follow-up Information       Follow up With Specialties Details Why Contact Info Additional Information    Kalpana Siegel DO Family Medicine Schedule an appointment as soon as possible for a visit in 3 days For follow-up [address] [place] [address] [address]  [facility] [address]  [phone]       [facility] Emergency Medicine Go to  As needed [facility] [address]  [phone] [facility], [facility] [place], [place], [address]    Kaila Estes MD Cardiology, Multidisciplinary Schedule an appointment as soon as possible for a visit in 3 days For follow-up [address] [place] [address] [id number] [phone]               Patient's Medications   Discharge Prescriptions    No medications on file       No discharge procedures on file.    PDMP Review         Value Time User    PDMP Reviewed  Yes 1/16/2022  5:51 AM Biju Hurtado DO            ED Provider  Electronically Signed by             Olu Valera MD  07/25/24 5945

## 2024-07-25 NOTE — TELEPHONE ENCOUNTER
A message was left for pt to assess shock rec'd this morning.     S/w Ms. Galan (grandchild) who will reach out to pt and/or family member.

## 2024-07-25 NOTE — PATIENT INSTRUCTIONS
Medicare Preventive Visit Patient Instructions  Thank you for completing your Welcome to Medicare Visit or Medicare Annual Wellness Visit today. Your next wellness visit will be due in one year (7/26/2025).  The screening/preventive services that you may require over the next 5-10 years are detailed below. Some tests may not apply to you based off risk factors and/or age. Screening tests ordered at today's visit but not completed yet may show as past due. Also, please note that scanned in results may not display below.  Preventive Screenings:  Service Recommendations Previous Testing/Comments   Colorectal Cancer Screening  * Colonoscopy    * Fecal Occult Blood Test (FOBT)/Fecal Immunochemical Test (FIT)  * Fecal DNA/Cologuard Test  * Flexible Sigmoidoscopy Age: 45-75 years old   Colonoscopy: every 10 years (may be performed more frequently if at higher risk)  OR  FOBT/FIT: every 1 year  OR  Cologuard: every 3 years  OR  Sigmoidoscopy: every 5 years  Screening may be recommended earlier than age 45 if at higher risk for colorectal cancer. Also, an individualized decision between you and your healthcare provider will decide whether screening between the ages of 76-85 would be appropriate. Colonoscopy: 08/21/2017  FOBT/FIT: Not on file  Cologuard: Not on file  Sigmoidoscopy: Not on file          Breast Cancer Screening Age: 40+ years old  Frequency: every 1-2 years  Not required if history of left and right mastectomy Mammogram: 01/07/2019        Cervical Cancer Screening Between the ages of 21-29, pap smear recommended once every 3 years.   Between the ages of 30-65, can perform pap smear with HPV co-testing every 5 years.   Recommendations may differ for women with a history of total hysterectomy, cervical cancer, or abnormal pap smears in past. Pap Smear: Not on file        Hepatitis C Screening Once for adults born between 1945 and 1965  More frequently in patients at high risk for Hepatitis C Hep C Antibody: Not  on file        Diabetes Screening 1-2 times per year if you're at risk for diabetes or have pre-diabetes Fasting glucose: 125 mg/dL (9/15/2023)  A1C: 6.5 (1/24/2024)      Cholesterol Screening Once every 5 years if you don't have a lipid disorder. May order more often based on risk factors. Lipid panel: 09/15/2023          Other Preventive Screenings Covered by Medicare:  Abdominal Aortic Aneurysm (AAA) Screening: covered once if your at risk. You're considered to be at risk if you have a family history of AAA.  Lung Cancer Screening: covers low dose CT scan once per year if you meet all of the following conditions: (1) Age 55-77; (2) No signs or symptoms of lung cancer; (3) Current smoker or have quit smoking within the last 15 years; (4) You have a tobacco smoking history of at least 20 pack years (packs per day multiplied by number of years you smoked); (5) You get a written order from a healthcare provider.  Glaucoma Screening: covered annually if you're considered high risk: (1) You have diabetes OR (2) Family history of glaucoma OR (3)  aged 50 and older OR (4)  American aged 65 and older  Osteoporosis Screening: covered every 2 years if you meet one of the following conditions: (1) You're estrogen deficient and at risk for osteoporosis based off medical history and other findings; (2) Have a vertebral abnormality; (3) On glucocorticoid therapy for more than 3 months; (4) Have primary hyperparathyroidism; (5) On osteoporosis medications and need to assess response to drug therapy.   Last bone density test (DXA Scan): Not on file.  HIV Screening: covered annually if you're between the age of 15-65. Also covered annually if you are younger than 15 and older than 65 with risk factors for HIV infection. For pregnant patients, it is covered up to 3 times per pregnancy.    Immunizations:  Immunization Recommendations   Influenza Vaccine Annual influenza vaccination during flu season is  recommended for all persons aged >= 6 months who do not have contraindications   Pneumococcal Vaccine   * Pneumococcal conjugate vaccine = PCV13 (Prevnar 13), PCV15 (Vaxneuvance), PCV20 (Prevnar 20)  * Pneumococcal polysaccharide vaccine = PPSV23 (Pneumovax) Adults 19-63 yo with certain risk factors or if 65+ yo  If never received any pneumonia vaccine: recommend Prevnar 20 (PCV20)  Give PCV20 if previously received 1 dose of PCV13 or PPSV23   Hepatitis B Vaccine 3 dose series if at intermediate or high risk (ex: diabetes, end stage renal disease, liver disease)   Respiratory syncytial virus (RSV) Vaccine - COVERED BY MEDICARE PART D  * RSVPreF3 (Arexvy) CDC recommends that adults 60 years of age and older may receive a single dose of RSV vaccine using shared clinical decision-making (SCDM)   Tetanus (Td) Vaccine - COST NOT COVERED BY MEDICARE PART B Following completion of primary series, a booster dose should be given every 10 years to maintain immunity against tetanus. Td may also be given as tetanus wound prophylaxis.   Tdap Vaccine - COST NOT COVERED BY MEDICARE PART B Recommended at least once for all adults. For pregnant patients, recommended with each pregnancy.   Shingles Vaccine (Shingrix) - COST NOT COVERED BY MEDICARE PART B  2 shot series recommended in those 19 years and older who have or will have weakened immune systems or those 50 years and older     Health Maintenance Due:      Topic Date Due   • DXA SCAN  Never done   • Breast Cancer Screening: Mammogram  01/07/2020   • Colorectal Cancer Screening  Discontinued     Immunizations Due:      Topic Date Due   • COVID-19 Vaccine (3 - 2023-24 season) 09/01/2023   • Influenza Vaccine (1) 09/01/2024     Advance Directives   What are advance directives?  Advance directives are legal documents that state your wishes and plans for medical care. These plans are made ahead of time in case you lose your ability to make decisions for yourself. Advance directives  can apply to any medical decision, such as the treatments you want, and if you want to donate organs.   What are the types of advance directives?  There are many types of advance directives, and each state has rules about how to use them. You may choose a combination of any of the following:  Living will:  This is a written record of the treatment you want. You can also choose which treatments you do not want, which to limit, and which to stop at a certain time. This includes surgery, medicine, IV fluid, and tube feedings.   Durable power of  for healthcare (DPAHC):  This is a written record that states who you want to make healthcare choices for you when you are unable to make them for yourself. This person, called a proxy, is usually a family member or a friend. You may choose more than 1 proxy.  Do not resuscitate (DNR) order:  A DNR order is used in case your heart stops beating or you stop breathing. It is a request not to have certain forms of treatment, such as CPR. A DNR order may be included in other types of advance directives.  Medical directive:  This covers the care that you want if you are in a coma, near death, or unable to make decisions for yourself. You can list the treatments you want for each condition. Treatment may include pain medicine, surgery, blood transfusions, dialysis, IV or tube feedings, and a ventilator (breathing machine).  Values history:  This document has questions about your views, beliefs, and how you feel and think about life. This information can help others choose the care that you would choose.  Why are advance directives important?  An advance directive helps you control your care. Although spoken wishes may be used, it is better to have your wishes written down. Spoken wishes can be misunderstood, or not followed. Treatments may be given even if you do not want them. An advance directive may make it easier for your family to make difficult choices about your care.    Weight Management   Why it is important to manage your weight:  Being overweight increases your risk of health conditions such as heart disease, high blood pressure, type 2 diabetes, and certain types of cancer. It can also increase your risk for osteoarthritis, sleep apnea, and other respiratory problems. Aim for a slow, steady weight loss. Even a small amount of weight loss can lower your risk of health problems.  How to lose weight safely:  A safe and healthy way to lose weight is to eat fewer calories and get regular exercise. You can lose up about 1 pound a week by decreasing the number of calories you eat by 500 calories each day.   Healthy meal plan for weight management:  A healthy meal plan includes a variety of foods, contains fewer calories, and helps you stay healthy. A healthy meal plan includes the following:  Eat whole-grain foods more often.  A healthy meal plan should contain fiber. Fiber is the part of grains, fruits, and vegetables that is not broken down by your body. Whole-grain foods are healthy and provide extra fiber in your diet. Some examples of whole-grain foods are whole-wheat breads and pastas, oatmeal, brown rice, and bulgur.  Eat a variety of vegetables every day.  Include dark, leafy greens such as spinach, kale, magdaleno greens, and mustard greens. Eat yellow and orange vegetables such as carrots, sweet potatoes, and winter squash.   Eat a variety of fruits every day.  Choose fresh or canned fruit (canned in its own juice or light syrup) instead of juice. Fruit juice has very little or no fiber.  Eat low-fat dairy foods.  Drink fat-free (skim) milk or 1% milk. Eat fat-free yogurt and low-fat cottage cheese. Try low-fat cheeses such as mozzarella and other reduced-fat cheeses.  Choose meat and other protein foods that are low in fat.  Choose beans or other legumes such as split peas or lentils. Choose fish, skinless poultry (chicken or turkey), or lean cuts of red meat (beef or  pork). Before you cook meat or poultry, cut off any visible fat.   Use less fat and oil.  Try baking foods instead of frying them. Add less fat, such as margarine, sour cream, regular salad dressing and mayonnaise to foods. Eat fewer high-fat foods. Some examples of high-fat foods include french fries, doughnuts, ice cream, and cakes.  Eat fewer sweets.  Limit foods and drinks that are high in sugar. This includes candy, cookies, regular soda, and sweetened drinks.  Exercise:  Exercise at least 30 minutes per day on most days of the week. Some examples of exercise include walking, biking, dancing, and swimming. You can also fit in more physical activity by taking the stairs instead of the elevator or parking farther away from stores. Ask your healthcare provider about the best exercise plan for you.      © Copyright [employer] 2018 Information is for End User's use only and may not be sold, redistributed or otherwise used for commercial purposes. All illustrations and images included in CareNotes® are the copyrighted property of [employer].A[employer]M., Inc. or [employer]

## 2024-07-25 NOTE — TELEPHONE ENCOUNTER
Per PDMP last fill 06/02/21 06/02/2021 1 06/02/2021   HYDROCODONE-ACETAMIN 5-325 MG  40 0 10 [place]   [id number]  WEGMA (2508) 0 20 0 [place]    05/10/2021 1 05/10/2021   HYDROCODONE-ACETAMIN 5-325 MG  40 0 10 [place] BR[place]   [id number]  WEGMA (2508) 0 20 0 [place]    04/12/2021 1 04/12/2021   HYDROCODONE-ACETAMIN 5-325 MG  40 0 10 [place]   [id number]  WEGMA (2508) 0 20 0 [place] [FreeTextEntry1] : 81 year male with CAD. We reviewed his diet regarding Carbs and discussed portion control. He denies having any chest pain, SOB, BURGER, dizziness, palpitations, orthopnea, PND or syncope. He started Omega 3. He reports being active

## 2024-07-25 NOTE — PROGRESS NOTES
Ambulatory Visit  Name: Laura Sarkar      : 1945      MRN: [id number]  Encounter Provider: Kalpana Siegel DO  Encounter Date: 2024   Encounter department: [facility]    Assessment & Plan   1. Ventricular tachycardia, nonsustained (HCC)  -     Transfer to other facility  2. Presence of heart assist device (HCC)  3. Type 2 diabetes mellitus with diabetic polyneuropathy, without long-term current use of insulin (HCC)  4. Inability to acquire transportation       Preventive health issues were discussed with patient, and age appropriate screening tests were ordered as noted in patient's After Visit Summary. Personalized health advice and appropriate referrals for health education or preventive services given if needed, as noted in patient's After Visit Summary.    History of Present Illness   {Disappearing Hyperlinks I Encounters * My Last Note * Since Last Visit * History :00966}  [facility]   Patient Care Team:  Kalpana Siegel DO as PCP - General (Family Medicine)  Missael Patel MD as PCP - [facility]  DO Genaro Live DO Scott A Huggler, OD Farooq Qureshi, MD Corey Matthew Hicks, PA-C James Sacco, DO Scott A Huggler, OD (Optometry)  Ady Wilkes MD as Endoscopist    Review of Systems  Medical History Reviewed by provider this encounter:  Tobacco  Allergies  Meds  Problems  Med Hx  Surg Hx  Fam Hx       Annual Wellness Visit Questionnaire   Laura is here for her Subsequent Wellness visit.     Health Risk Assessment:   Patient rates overall health as very good. Patient feels that their physical health rating is same. Patient is very satisfied with their life. Eyesight was rated as same. Hearing was rated as same. Patient feels that their emotional and mental health rating is same. Patients states they are never, rarely angry. Patient states they are often unusually tired/fatigued. Pain experienced  in the last 7 days has been none. Patient states that she has experienced no weight loss or gain in last 6 months.     Depression Screening:   PHQ-9 Score: 0      Fall Risk Screening:   In the past year, patient has experienced: no history of falling in past year      Urinary Incontinence Screening:   Patient has leaked urine accidently in the last six months.     Home Safety:  Patient has trouble with stairs inside or outside of their home. Patient has working smoke alarms and has working carbon monoxide detector. Home safety hazards include: none.     Nutrition:   Current diet is Regular.     Medications:   Patient is not currently taking any over-the-counter supplements. Patient is not able to manage medications.     Activities of Daily Living (ADLs)/Instrumental Activities of Daily Living (IADLs):   Walk and transfer into and out of bed and chair?: No  Dress and groom yourself?: Yes    Bathe or shower yourself?: No    Feed yourself? Yes  Do your laundry/housekeeping?: No  Manage your money, pay your bills and track your expenses?: Yes  Make your own meals?: Yes    Do your own shopping?: No    Previous Hospitalizations:   Any hospitalizations or ED visits within the last 12 months?: Yes    How many hospitalizations have you had in the last year?: 1-2    Hospitalization Comments: Cancer removal    Advance Care Planning:   Living will: Yes    Durable POA for healthcare: Yes    Advanced directive: Yes      PREVENTIVE SCREENINGS      Cardiovascular Screening:    General: Screening Not Indicated and History Lipid Disorder      Diabetes Screening:     General: Screening Not Indicated and History Diabetes      Cervical Cancer Screening:    General: Screening Not Indicated      Lung Cancer Screening:     General: Screening Not Indicated    Screening, Brief Intervention, and Referral to Treatment (SBIRT)    Screening  Typical number of drinks in a day: 0  Typical number of drinks in a week: 0  Interpretation: Low risk  "drinking behavior.    Single Item Drug Screening:  How often have you used an illegal drug (including marijuana) or a prescription medication for non-medical reasons in the past year? never    Single Item Drug Screen Score: 0  Interpretation: Negative screen for possible drug use disorder    SDOH Risk Assessment  Social determinants of health (SDOH) risk assesment tool was completed. The tool at a minimum covered housing stability, food insecurity, transportation needs, and utility difficulty. Patient had at risk responses for the following SDOH domains: transportation needs.     Social Determinants of Health     Financial Resource Strain: Low Risk  (4/25/2023)    Overall Financial Resource Strain (CARDIA)     Difficulty of Paying Living Expenses: Not hard at all   Food Insecurity: No Food Insecurity (7/28/2024)    Hunger Vital Sign     Worried About Running Out of Food in the Last Year: Never true     Ran Out of Food in the Last Year: Never true   Transportation Needs: No Transportation Needs (7/28/2024)    PRAPARE - Transportation     Lack of Transportation (Medical): No     Lack of Transportation (Non-Medical): No   Recent Concern: Transportation Needs - Unmet Transportation Needs (7/25/2024)    PRAPARE - Transportation     Lack of Transportation (Medical): Yes     Lack of Transportation (Non-Medical): Yes   Housing Stability: Low Risk  (7/28/2024)    Housing Stability Vital Sign     Unable to Pay for Housing in the Last Year: No     Number of Times Moved in the Last Year: 0     Homeless in the Last Year: No   Utilities: Not At Risk (7/28/2024)    [facility] Utilities     Threatened with loss of utilities: No     No results found.    Objective   {Disappearing Hyperlinks   Review Vitals * Enter New Vitals * Results Review * Labs * Imaging * Cardiology * Procedures * Lung Cancer Screening :89873}  BP 92/62   Pulse 70   Temp (!) 97.1 °F (36.2 °C)   Ht 5' 2\" (1.575 m)   SpO2 97%   BMI 36.07 kg/m²     Physical " Exam  {Administrative / Billing Section (Optional):46875}

## 2024-07-26 ENCOUNTER — DOCUMENTATION (OUTPATIENT)
Dept: CARDIOLOGY CLINIC | Facility: CLINIC | Age: 79
End: 2024-07-26

## 2024-07-26 ENCOUNTER — VBI (OUTPATIENT)
Dept: FAMILY MEDICINE CLINIC | Facility: CLINIC | Age: 79
End: 2024-07-26

## 2024-07-26 ENCOUNTER — TELEPHONE (OUTPATIENT)
Dept: CARDIOLOGY CLINIC | Facility: CLINIC | Age: 79
End: 2024-07-26

## 2024-07-26 DIAGNOSIS — Z45.02 ICD (IMPLANTABLE CARDIOVERTER-DEFIBRILLATOR) DISCHARGE: Primary | ICD-10-CM

## 2024-07-26 LAB
ATRIAL RATE: 60 BPM
P AXIS: 71 DEGREES
PR INTERVAL: 168 MS
QRS AXIS: 161 DEGREES
QRSD INTERVAL: 114 MS
QT INTERVAL: 452 MS
QTC INTERVAL: 452 MS
T WAVE AXIS: -8 DEGREES
VENTRICULAR RATE: 60 BPM

## 2024-07-26 PROCEDURE — 93010 ELECTROCARDIOGRAM REPORT: CPT | Performed by: INTERNAL MEDICINE

## 2024-07-26 NOTE — LETTER
[facility]  [address]  [address]  [place] [address]    Date: 07/31/24    Laura Sarkar  [address]  [place] [address]    Dear Laura:                                                                                                                                Thank you for choosing [facility] emergency department for care.  Your primary care provider wants to make sure that your ongoing medical care is being addressed. If you require follow up care as a result of your emergency department visit, there are a few things the practice would like you to know.                As part of the network's continuing commitment to caring for our patients, we have added more same day appointments and have extended office hours to meet your medical needs. After hours, on-call physicians are available via your primary care provider's main office line.               We encourage you to contact our office prior to seeking treatment to discuss your symptoms with the medical staff.  Together, we can determine the correct course of action.  A majority of non-emergent conditions such as: common cold, flu-like symptoms, fevers, strains/sprains, dislocations, minor burns, cuts and animal bites can be treated at [facility] facilities. Diagnostic testing is available at some sites.               Of course, if you are experiencing a life threatening medical emergency call 911 or proceed directly to the nearest emergency room.    Your nearest [facility] facility is conveniently located at:    [address] [address]  [phone]  SKIP THE WAIT  Conveniently offered at most [facility] locations  [place] your spot online at [email or web address] or on the [facility]    Sincerely,    [facility]  Dept: [phone]

## 2024-07-26 NOTE — PROGRESS NOTES
Electrophysiology team has been notified yesterday that patient had ICD shock on device.  Patient was unable to be reached by phone but happened to be at her PCP office.  PCP was able to tell her to present to [facility] emergency department.  It appears she presented to [facility] emergency department later that day and was evaluated and discharged.     Reevaluated EGM's with Dr. Aldrich today and there is a concern this could be A-fib with RVR versus a flutter with inappropriate shocks and notes from PCP office that she has run out of her metoprolol.  Emergency room visit also noted low magnesium levels and is unclear if this was replaced.  Document there is high concern that she could have recurrent defibrillation with low electrolytes and not being on her beta-blockade.  Patient also has not had repeat echo since 2022.  Dr. Aldrich and myself attempted to contact the patient and left a voicemail on her home phone, her cell phone was disconnected.  Ultimately we will hold her healthcare agent her granddaughter Vibha.  IV was aware of the events of yesterday but thought she had been evaluated at our [facility].  I relayed our concerns to her granddaughter.  She is going to reach out to [facility] and take the patient to the hospital tomorrow morning.  We instructed that should her device shocked her again she should present to the emergency department via 911.  Juvencio is aware that our team is only available at the [facility] and that is where the patient should present.  This information was given to her care team including her PCP and cardiologist

## 2024-07-26 NOTE — TELEPHONE ENCOUNTER
7/26/24 Granddaughter Elo called needing transportation for the pt back to [facility] due to a series of shocks that the pt received.  Elo advised that the pt can not get into a vehicle.  Spoke with technician DENISE Fraser for advice.  She recommended that the pt be transported via ambulance due to the risk of pt possibly receiving additional shocks. BMc

## 2024-07-26 NOTE — TELEPHONE ENCOUNTER
07/26/24 1:32 PM    Patient contacted post ED visit, first outreach attempt made. Message was left for patient to return a call to the [facility] Department at [place]: Phone [phone].    Thank you.  Patricia Case MA  [facility]

## 2024-07-27 ENCOUNTER — APPOINTMENT (INPATIENT)
Dept: RADIOLOGY | Facility: HOSPITAL | Age: 79
DRG: 274 | End: 2024-07-27
Payer: COMMERCIAL

## 2024-07-27 ENCOUNTER — HOSPITAL ENCOUNTER (INPATIENT)
Facility: HOSPITAL | Age: 79
LOS: 4 days | Discharge: HOME HEALTH | DRG: 274 | End: 2024-07-31
Attending: EMERGENCY MEDICINE | Admitting: INTERNAL MEDICINE
Payer: COMMERCIAL

## 2024-07-27 DIAGNOSIS — I48.0 PAROXYSMAL ATRIAL FIBRILLATION (HCC): ICD-10-CM

## 2024-07-27 DIAGNOSIS — Z45.02 AICD DISCHARGE: Primary | ICD-10-CM

## 2024-07-27 DIAGNOSIS — Z45.02 ICD (IMPLANTABLE CARDIOVERTER-DEFIBRILLATOR) DISCHARGE: ICD-10-CM

## 2024-07-27 DIAGNOSIS — R26.2 AMBULATORY DYSFUNCTION: ICD-10-CM

## 2024-07-27 LAB
2HR DELTA HS TROPONIN: -1 NG/L
ANION GAP SERPL CALCULATED.3IONS-SCNC: 5 MMOL/L (ref 4–13)
BASOPHILS # BLD AUTO: 0.03 THOUSANDS/ÂΜL (ref 0–0.1)
BASOPHILS NFR BLD AUTO: 0 % (ref 0–1)
BUN SERPL-MCNC: 21 MG/DL (ref 5–25)
CALCIUM SERPL-MCNC: 9.5 MG/DL (ref 8.4–10.2)
CARDIAC TROPONIN I PNL SERPL HS: 11 NG/L
CARDIAC TROPONIN I PNL SERPL HS: 12 NG/L
CHLORIDE SERPL-SCNC: 107 MMOL/L (ref 96–108)
CO2 SERPL-SCNC: 29 MMOL/L (ref 21–32)
CREAT SERPL-MCNC: 1.19 MG/DL (ref 0.6–1.3)
EOSINOPHIL # BLD AUTO: 0.22 THOUSAND/ÂΜL (ref 0–0.61)
EOSINOPHIL NFR BLD AUTO: 3 % (ref 0–6)
ERYTHROCYTE [DISTWIDTH] IN BLOOD BY AUTOMATED COUNT: 13.3 % (ref 11.6–15.1)
GFR SERPL CREATININE-BSD FRML MDRD: 43 ML/MIN/1.73SQ M
GLUCOSE SERPL-MCNC: 135 MG/DL (ref 65–140)
HCT VFR BLD AUTO: 39 % (ref 34.8–46.1)
HGB BLD-MCNC: 12 G/DL (ref 11.5–15.4)
IMM GRANULOCYTES # BLD AUTO: 0.03 THOUSAND/UL (ref 0–0.2)
IMM GRANULOCYTES NFR BLD AUTO: 0 % (ref 0–2)
LYMPHOCYTES # BLD AUTO: 1.22 THOUSANDS/ÂΜL (ref 0.6–4.47)
LYMPHOCYTES NFR BLD AUTO: 17 % (ref 14–44)
MAGNESIUM SERPL-MCNC: 1.6 MG/DL (ref 1.9–2.7)
MCH RBC QN AUTO: 30.8 PG (ref 26.8–34.3)
MCHC RBC AUTO-ENTMCNC: 30.8 G/DL (ref 31.4–37.4)
MCV RBC AUTO: 100 FL (ref 82–98)
MONOCYTES # BLD AUTO: 0.61 THOUSAND/ÂΜL (ref 0.17–1.22)
MONOCYTES NFR BLD AUTO: 9 % (ref 4–12)
NEUTROPHILS # BLD AUTO: 5.1 THOUSANDS/ÂΜL (ref 1.85–7.62)
NEUTS SEG NFR BLD AUTO: 71 % (ref 43–75)
NRBC BLD AUTO-RTO: 0 /100 WBCS
PLATELET # BLD AUTO: 180 THOUSANDS/UL (ref 149–390)
PMV BLD AUTO: 11.3 FL (ref 8.9–12.7)
POTASSIUM SERPL-SCNC: 4.6 MMOL/L (ref 3.5–5.3)
RBC # BLD AUTO: 3.89 MILLION/UL (ref 3.81–5.12)
SODIUM SERPL-SCNC: 141 MMOL/L (ref 135–147)
WBC # BLD AUTO: 7.21 THOUSAND/UL (ref 4.31–10.16)

## 2024-07-27 PROCEDURE — 93005 ELECTROCARDIOGRAM TRACING: CPT

## 2024-07-27 PROCEDURE — 99285 EMERGENCY DEPT VISIT HI MDM: CPT

## 2024-07-27 PROCEDURE — 36415 COLL VENOUS BLD VENIPUNCTURE: CPT

## 2024-07-27 PROCEDURE — 85025 COMPLETE CBC W/AUTO DIFF WBC: CPT

## 2024-07-27 PROCEDURE — 80048 BASIC METABOLIC PNL TOTAL CA: CPT

## 2024-07-27 PROCEDURE — 71045 X-RAY EXAM CHEST 1 VIEW: CPT

## 2024-07-27 PROCEDURE — 84484 ASSAY OF TROPONIN QUANT: CPT

## 2024-07-27 PROCEDURE — 99223 1ST HOSP IP/OBS HIGH 75: CPT | Performed by: INTERNAL MEDICINE

## 2024-07-27 PROCEDURE — 99285 EMERGENCY DEPT VISIT HI MDM: CPT | Performed by: EMERGENCY MEDICINE

## 2024-07-27 PROCEDURE — 83735 ASSAY OF MAGNESIUM: CPT | Performed by: INTERNAL MEDICINE

## 2024-07-27 RX ORDER — GABAPENTIN 100 MG/1
200 CAPSULE ORAL 3 TIMES DAILY
Status: DISCONTINUED | OUTPATIENT
Start: 2024-07-27 | End: 2024-07-27

## 2024-07-27 RX ORDER — LEVOTHYROXINE SODIUM 0.12 MG/1
125 TABLET ORAL
Status: DISCONTINUED | OUTPATIENT
Start: 2024-07-28 | End: 2024-07-31 | Stop reason: HOSPADM

## 2024-07-27 RX ORDER — SODIUM CHLORIDE 9 MG/ML
3 INJECTION INTRAVENOUS
Status: DISCONTINUED | OUTPATIENT
Start: 2024-07-27 | End: 2024-07-31 | Stop reason: HOSPADM

## 2024-07-27 RX ORDER — METOPROLOL SUCCINATE 25 MG/1
25 TABLET, EXTENDED RELEASE ORAL EVERY 12 HOURS
Status: DISCONTINUED | OUTPATIENT
Start: 2024-07-27 | End: 2024-07-28

## 2024-07-27 RX ORDER — PRAVASTATIN SODIUM 10 MG
10 TABLET ORAL
Status: DISCONTINUED | OUTPATIENT
Start: 2024-07-27 | End: 2024-07-31 | Stop reason: HOSPADM

## 2024-07-27 RX ORDER — GABAPENTIN 300 MG/1
600 CAPSULE ORAL 3 TIMES DAILY
Status: DISCONTINUED | OUTPATIENT
Start: 2024-07-27 | End: 2024-07-31 | Stop reason: HOSPADM

## 2024-07-27 RX ORDER — LANOLIN ALCOHOL/MO/W.PET/CERES
400 CREAM (GRAM) TOPICAL 2 TIMES DAILY
Status: DISCONTINUED | OUTPATIENT
Start: 2024-07-28 | End: 2024-07-31 | Stop reason: HOSPADM

## 2024-07-27 RX ORDER — VENLAFAXINE 37.5 MG/1
75 TABLET ORAL 3 TIMES DAILY
Status: DISCONTINUED | OUTPATIENT
Start: 2024-07-27 | End: 2024-07-31 | Stop reason: HOSPADM

## 2024-07-27 RX ORDER — DOFETILIDE 0.12 MG/1
125 CAPSULE ORAL EVERY 12 HOURS
Status: DISCONTINUED | OUTPATIENT
Start: 2024-07-27 | End: 2024-07-29

## 2024-07-27 RX ORDER — LOSARTAN POTASSIUM 25 MG/1
25 TABLET ORAL EVERY MORNING
Status: DISCONTINUED | OUTPATIENT
Start: 2024-07-28 | End: 2024-07-30

## 2024-07-27 RX ORDER — MAGNESIUM SULFATE HEPTAHYDRATE 40 MG/ML
2 INJECTION, SOLUTION INTRAVENOUS ONCE
Status: COMPLETED | OUTPATIENT
Start: 2024-07-27 | End: 2024-07-27

## 2024-07-27 RX ORDER — ACETAMINOPHEN 325 MG/1
650 TABLET ORAL EVERY 6 HOURS PRN
Status: DISCONTINUED | OUTPATIENT
Start: 2024-07-27 | End: 2024-07-31 | Stop reason: HOSPADM

## 2024-07-27 RX ADMIN — APIXABAN 5 MG: 5 TABLET, FILM COATED ORAL at 12:00

## 2024-07-27 RX ADMIN — MAGNESIUM SULFATE HEPTAHYDRATE 2 G: 40 INJECTION, SOLUTION INTRAVENOUS at 12:00

## 2024-07-27 RX ADMIN — GABAPENTIN 600 MG: 300 CAPSULE ORAL at 21:21

## 2024-07-27 RX ADMIN — APIXABAN 5 MG: 5 TABLET, FILM COATED ORAL at 23:48

## 2024-07-27 RX ADMIN — VENLAFAXINE 75 MG: 37.5 TABLET ORAL at 17:00

## 2024-07-27 RX ADMIN — PRAVASTATIN SODIUM 10 MG: 10 TABLET ORAL at 16:59

## 2024-07-27 RX ADMIN — DOFETILIDE 125 MCG: 0.12 CAPSULE ORAL at 21:25

## 2024-07-27 RX ADMIN — GABAPENTIN 600 MG: 300 CAPSULE ORAL at 16:59

## 2024-07-27 RX ADMIN — VENLAFAXINE 75 MG: 37.5 TABLET ORAL at 21:24

## 2024-07-27 NOTE — ED PROVIDER NOTES
History  Chief Complaint   Patient presents with    AICD Problem     Pt defibrillator fired 7/25.  Pt was evaluated by [facility].  Pt cardiologist called and said to come back to ED for eval.  Pt has no complaInts      Patient is an 78-year-old female with a past medical history of aortic stenosis, asthma, diabetes, myocardial infarction, presents to the ED with AICD shock.  She was shocked once by her AICD on July 25.  She went to [facility] and was worked up and subsequently discharged.  She has never been symptomatic.  She presents to the ED today with no complaints but due to her cardiologist calling her and asking her to come back to the hospital she is here today.  Initially on July 25 when she was shocked she did not have any symptoms.  She has never passed out.  She has never been shocked by her AICD before.  She has no fevers or chills no chest pain or shortness of breath.         Prior to Admission Medications   Prescriptions Last Dose Informant Patient Reported? Taking?   Multiple Vitamins-Minerals (ICAPS AREDS 2 PO)  Self Yes No   Sig: Take by mouth in the morning   Patient not taking: Reported on 10/23/2023   acetaminophen (TYLENOL) 325 mg tablet  Self Yes No   Sig: Take 2 tablets by mouth every 6 (six) hours as needed     apixaban (Eliquis) 5 mg   No No   Sig: Take 1 tablet (5 mg total) by mouth every 12 (twelve) hours   dofetilide (TIKOSYN) 125 mcg capsule   No No   Sig: TAKE 1 CAPSULE BY MOUTH EVERY 12 HOURS   gabapentin (NEURONTIN) 300 mg capsule   No No   Sig: TAKE 3 CAPSULES BY MOUTH EVERY MORNING, TAKE 2 CAPSULES BY MOUTH EVERY AFTERNOON, TAKE 3 CAPSULES BY MOUTH EVERY EVENING   levothyroxine 125 mcg tablet   No No   Sig: TAKE 1 TABLET BY MOUTH EVERY MORNING   loperamide (IMODIUM) 2 mg capsule  Self No No   Sig: Take 1 capsule (2 mg total) by mouth 4 (four) times a day as needed for diarrhea   Patient not taking: Reported on 9/5/2023   losartan (COZAAR) 25 mg tablet   No No   Sig: TAKE  1 TABLET BY MOUTH EVERY MORNING   metoprolol succinate (TOPROL-XL) 25 mg 24 hr tablet   No No   Sig: Take 1 tablet (25 mg total) by mouth daily   Patient taking differently: Take 25 mg by mouth daily Pt takes 2 a day   nystatin (MYCOSTATIN) powder  Self No No   Sig: Apply topically 2 (two) times a day   Patient not taking: Reported on 1/10/2024   pravastatin (PRAVACHOL) 10 mg tablet   No No   Sig: TAKE 1 TABLET BY MOUTH EVERY DAY   venlafaxine (EFFEXOR) 75 mg tablet   No No   Sig: TAKE 1 TABLET BY MOUTH THREE TIMES DAILY      Facility-Administered Medications: None       Past Medical History:   Diagnosis Date    Aortic stenosis     Arthritis     Asthma     Basal cell carcinoma 10/24/2023    right lateral forehead, mohs    Basal cell carcinoma 10/24/2023    right medial forehead, mohs    Coronary artery disease     Diabetes mellitus (HCC)     Diabetic peripheral neuropathy (HCC)     Last assessed - 1/27/16    Gallbladder disease     Heart attack (HCC) 07/1999    Hemorrhoids     Last assessed - 11/16/12    Hypoglycemia 12/23/2021    Myocardial infarction (HCC)     Old myocardial infarction     Type 2 diabetes mellitus with autonomic neuropathy (HCC)     Unspec long term insulin use status, Last assessed - 6/8/17       Past Surgical History:   Procedure Laterality Date    BUNIONECTOMY      CARDIAC CATHETERIZATION      Carotid Artery, Resolved - 7/1/13    CARDIAC CATHETERIZATION N/A 12/27/2021    Procedure: CARDIAC CATHETERIZATION ;  Surgeon: Chucky Tadeo MD;  [facility];  Service: Cardiology    CARDIAC CATHETERIZATION N/A 12/27/2021    Procedure: Cardiac Coronary Angiogram;  Surgeon: Chucky Tadeo MD;  [facility];  Service: Cardiology    CARDIAC ELECTROPHYSIOLOGY PROCEDURE N/A 01/18/2022    Procedure: Cardiac biv icd implant;  Surgeon: Harley Rollins DO;  [facility];  Service: Cardiology    CARDIOVASCULAR STRESS TEST  09/1999    CHOLECYSTECTOMY       COLONOSCOPY  2017    FOOT ARTHRODESIS, MODIFIED CHARANJIT  2016    GALLBLADDER SURGERY  1970    HEMORROIDECTOMY      KNEE ARTHROSCOPY Left 2004    MOHS SURGERY Right 01/10/2024    right lateral and medial forehead (combined), Dr Berman    MD COLONOSCOPY FLX DX W/COLLJ SPEC WHEN PFRMD N/A 08/21/2017    Procedure: COLONOSCOPY;  Surgeon: Ady Wilkes MD;  Location: [facility];  Service: Colorectal    REPAIR KNEE LIGAMENT      REPAIR KNEE LIGAMENT Left 1986    REPAIR KNEE LIGAMENT Right 1988       Family History   Problem Relation Age of Onset    Hypertension Father     Diabetes Father     Cancer Father         Throat    Arthritis Father     Stroke Mother     Diabetes Maternal Grandmother     Heart disease Maternal Grandfather     Diabetes Son     Lymphoma Family         Head, Face and Neck      I have reviewed and agree with the history as documented.    E-Cigarette/Vaping    E-Cigarette Use Never User      E-Cigarette/Vaping Substances    Nicotine No     THC No     CBD No     Flavoring No     Other No     Unknown No      Social History     Tobacco Use    Smoking status: Never     Passive exposure: Never    Smokeless tobacco: Never   Vaping Use    Vaping status: Never Used   Substance Use Topics    Alcohol use: Never     Comment: Social per [employer]     Drug use: Never        Review of Systems   Constitutional:  Negative for chills and fever.   Respiratory:  Negative for cough and shortness of breath.    Cardiovascular:  Negative for chest pain and leg swelling.   Gastrointestinal:  Negative for abdominal pain.   Genitourinary:  Negative for dysuria.   Neurological:  Negative for seizures and syncope.   All other systems reviewed and are negative.      Physical Exam  ED Triage Vitals [07/27/24 0931]   Temperature Pulse Respirations Blood Pressure SpO2   97.8 °F (36.6 °C) 72 18 130/61 96 %      Temp Source Heart Rate Source Patient Position - Orthostatic VS BP Location FiO2 (%)   Oral Monitor Sitting Right arm --       Pain Score       No Pain             Orthostatic Vital Signs  Vitals:    07/27/24 0931   BP: 130/61   Pulse: 72   Patient Position - Orthostatic VS: Sitting       Physical Exam  Vitals reviewed.   Constitutional:       General: She is not in acute distress.     Appearance: Normal appearance. She is not ill-appearing, toxic-appearing or diaphoretic.       HENT:      Head: Normocephalic and atraumatic.      Nose: Nose normal.   Eyes:      General: No scleral icterus.  Cardiovascular:      Rate and Rhythm: Normal rate and regular rhythm.      Heart sounds: No murmur heard.     No friction rub. No gallop.   Pulmonary:      Effort: Pulmonary effort is normal. No tachypnea, bradypnea, accessory muscle usage or respiratory distress.      Breath sounds: No wheezing or rales.   Abdominal:      General: Abdomen is flat. There is no distension.      Palpations: Abdomen is soft.      Tenderness: There is no abdominal tenderness. There is no guarding.   Skin:     General: Skin is warm and dry.      Capillary Refill: Capillary refill takes less than 2 seconds.   Neurological:      Mental Status: She is alert and oriented to person, place, and time.      GCS: GCS eye subscore is 4. GCS verbal subscore is 5. GCS motor subscore is 6.      Cranial Nerves: No dysarthria or facial asymmetry.   Psychiatric:         Mood and Affect: Mood normal.         Behavior: Behavior normal. Behavior is cooperative.         Thought Content: Thought content normal.         Judgment: Judgment normal.         ED Medications  Medications   sodium chloride (PF) 0.9 % injection 3 mL (has no administration in time range)       Diagnostic Studies  Results Reviewed       Procedure Component Value Units Date/Time    Magnesium [[id number]]     Lab Status: No result Specimen: Blood     CBC and differential [[id number]] Collected: 07/27/24 0950    Lab Status: No result Specimen: Blood from Arm, Left     Basic metabolic panel [[id number]] Collected: 07/27/24 0950     Lab Status: No result Specimen: Blood from Arm, Left     HS Troponin 0hr (reflex protocol) [[id number]] Collected: 07/27/24 0950    Lab Status: No result Specimen: Blood from Arm, Left                    No orders to display         Procedures  Procedures      ED Course               Identification of Seniors at Risk      Flowsheet Row Most Recent Value   (ISAR) Identification of Seniors at Risk    Before the illness or injury that brought you to the Emergency, did you need someone to help you on a regular basis? 1 Filed at: 07/27/2024 0933   In the last 24 hours, have you needed more help than usual? 0 Filed at: 07/27/2024 0933   Have you been hospitalized for one or more nights during the past 6 months? 1 Filed at: 07/27/2024 0933   In general, do you see well? 0 Filed at: 07/27/2024 0933   In general, do you have serious problems with your memory? 0 Filed at: 07/27/2024 0933   Do you take more than three different medications every day? 1 Filed at: 07/27/2024 0933   ISAR Score 3 Filed at: 07/27/2024 0933                      SBIRT 22yo+      Flowsheet Row Most Recent Value   Initial Alcohol Screen: US AUDIT-C     1. How often do you have a drink containing alcohol? 0 Filed at: 07/27/2024 0933   2. How many drinks containing alcohol do you have on a typical day you are drinking?  0 Filed at: 07/27/2024 0933   3a. Male UNDER 65: How often do you have five or more drinks on one occasion? 0 Filed at: 07/27/2024 0933   3b. FEMALE Any Age, or MALE 65+: How often do you have 4 or more drinks on one occassion? 0 Filed at: 07/27/2024 0933   Audit-C Score 0 Filed at: 07/27/2024 0933   HERMINIA: How many times in the past year have you...    Used an illegal drug or used a prescription medication for non-medical reasons? Never Filed at: 07/27/2024 0933                  Medical Decision Making  Laura Sarkar is a 78 y.o. female who presents to the emergency department for AICD discharge    Differential diagnosis includes but  is not limited to: Arrhythmia, electrolyte disturbance, AICD malfunction.  I doubt this is due to MI, PE, or an occult cause of arrhythmia at this time even her history and clinical exam.  She notes that she has not been taking her Tikosyn due to it not being available at this time.  Suspect this may be the cause of her AICD firing given her being in a small arrhythmia.    Will do cardiac workup here as well as interrogate her pacemaker.  We have already spoke with the EP and they would like the patient admitted.  We will admit patient to the hospital.    Still with no complaints Emergency Department.  Spoke with her regarding admission.  She is agreeable to admission.      Amount and/or Complexity of Data Reviewed  Labs: ordered.    Risk  Prescription drug management.  Decision regarding hospitalization.          Disposition  Final diagnoses:   AICD discharge     Time reflects when diagnosis was documented in both MDM as applicable and the Disposition within this note       Time User Action Codes Description Comment    7/27/2024 10:07 AM Alberto Godinez Add [Z45.02] AICD discharge           ED Disposition       ED Disposition   Admit    Condition   Stable    Date/Time   Sat Jul 27, 2024 1007    Comment   Case was discussed with DR Padron and the patient's admission status was agreed to be Admission Status: inpatient status to the service of Dr. Padron .               Follow-up Information    None         Patient's Medications   Discharge Prescriptions    No medications on file     No discharge procedures on file.    PDMP Review         Value Time User    PDMP Reviewed  Yes 1/16/2022  5:51 AM Biju Hurtado DO             ED Provider  Attending physically available and evaluated Laura Sarkar. I managed the patient along with the ED Attending.    Electronically Signed by           Alberto Godinez MD  07/27/24 1013

## 2024-07-27 NOTE — ASSESSMENT & PLAN NOTE
Interrogation 7/25 : 2 VF CLASSIFIED NOTED & TREATED WITH 5 ATP & 1 SHOCK. AVAIL EGRAMS PRESENT AS RVR & PROBABLE VT. 3 AT/AF NOTED; LONGEST 5.31 HRS; 13% BURDEN   EP consult  Reports running of her metoporolol for 1 and half week and couldn't get refill  Continue metoprolol and tikosyn awaiting EP eval  Obtain TTE  Optimize K and Mg  Telemetry  Check CXR

## 2024-07-27 NOTE — PLAN OF CARE
Problem: SAFETY ADULT  Goal: Patient will remain free of falls  Description: INTERVENTIONS:  - Educate patient/family on patient safety including physical limitations  - Instruct patient to call for assistance with activity   - Consult OT/PT to assist with strengthening/mobility   - Keep Call bell within reach  - Keep bed low and locked with side rails adjusted as appropriate  - Keep care items and personal belongings within reach  - Initiate and maintain comfort rounds    - Apply yellow socks and bracelet for high fall risk patients  - Consider moving patient to room near nurses station  Outcome: Progressing     Problem: SAFETY ADULT  Goal: Maintain or return to baseline ADL function  Description: INTERVENTIONS:  -  Assess patient's ability to carry out ADLs; assess patient's baseline for ADL function and identify physical deficits which impact ability to perform ADLs (bathing, care of mouth/teeth, toileting, grooming, dressing, etc.)  - Assess/evaluate cause of self-care deficits   - Assess range of motion  - Assess patient's mobility; develop plan if impaired  - Assess patient's need for assistive devices and provide as appropriate  - Encourage maximum independence but intervene and supervise when necessary  - Involve family in performance of ADLs  - Assess for home care needs following discharge   - Consider OT consult to assist with ADL evaluation and planning for discharge  - Provide patient education as appropriate  Outcome: Progressing

## 2024-07-27 NOTE — H&P
"[facility]  H&P  Name: Laura Sarkar 78 y.o. female I MRN: [id number]  Unit/Bed#: CW2 214-01 I Date of Admission: 7/27/2024   Date of Service: 7/27/2024 I Hospital Day: 0      Assessment & Plan   * ICD (implantable cardioverter-defibrillator) discharge  Assessment & Plan  Interrogation 7/25 : 2 VF CLASSIFIED NOTED & TREATED WITH 5 ATP & 1 SHOCK. AVAIL EGRAMS PRESENT AS RVR & PROBABLE VT. 3 AT/AF NOTED; LONGEST 5.31 HRS; 13% BURDEN   EP consult  Reports running of her metoporolol for 1 and half week and couldn't get refill  Continue metoprolol and tikosyn awaiting EP eval  Obtain TTE  Optimize K and Mg  Telemetry  Check CXR    Nonischemic cardiomyopathy (HCC)  Assessment & Plan  Continue current GDMT    Type 2 diabetes mellitus, without long-term current use of insulin (HCC)  Assessment & Plan  Lab Results   Component Value Date    HGBA1C 6.5 01/24/2024       No results for input(s): \"POCGLU\" in the last 72 hours.    Blood Sugar Average: Last 72 hrs:  Diet controlled  Ok to observe off SSI.      Atrial fibrillation (HCC)  Assessment & Plan  Maintained on Toprol 25mg BID and Tikosyn   Eliquis for AC.   Rates currently controlled.           VTE Pharmacologic Prophylaxis: VTE Score: 3 Moderate Risk (Score 3-4) - Pharmacological DVT Prophylaxis Ordered: apixaban (Eliquis).  Code Status: Level 2 - DNAR: but accepts endotracheal intubation   Discussion with family: Updated  (sister) via phone.    Anticipated Length of Stay: Patient will be admitted on an inpatient basis with an anticipated length of stay of greater than 2 midnights secondary to above.    Total Time Spent on Date of Encounter in care of patient: 44 mins. This time was spent on one or more of the following: performing physical exam; counseling and coordination of care; obtaining or reviewing history; documenting in the medical record; reviewing/ordering tests, medications or procedures; communicating " with other healthcare professionals and discussing with patient's family/caregivers.    Chief Complaint: ICD shock    History of Present Illness:  Laura Sarkar is a 78 y.o. female with a PMH of Afib, NICM, LBBB s/p ICD who presents with ICD discharge. Event occurred on 7/25 where she was seen in ED and was eventually sent home with follow up with her cardiologist. She is back to hospitals today at request of her cardiologist. Interrogation showed 2 VF CLASSIFIED NOTED & TREATED WITH 5 ATP & 1 SHOCK. Currently asymptomatics and HD stable. EP notified regarding patient arrival.     Discussed with her sister who reported that her betablocker dose was increased from 25 mg daily to twice daily however they ran out of the medication about 1 week and half and couldn't get a refill.     Review of Systems:  Review of Systems   Constitutional:  Negative for chills and fever.   HENT:  Negative for ear pain and sore throat.    Eyes:  Negative for pain and visual disturbance.   Respiratory:  Negative for cough and shortness of breath.    Cardiovascular:  Negative for chest pain and palpitations.   Gastrointestinal:  Negative for abdominal pain and vomiting.   Genitourinary:  Negative for dysuria and hematuria.   Musculoskeletal:  Negative for arthralgias and back pain.   Skin:  Negative for color change and rash.   Neurological:  Negative for seizures and syncope.   All other systems reviewed and are negative.      Past Medical and Surgical History:   Past Medical History:   Diagnosis Date    Aortic stenosis     Arthritis     Asthma     Basal cell carcinoma 10/24/2023    right lateral forehead, mohs    Basal cell carcinoma 10/24/2023    right medial forehead, mohs    Coronary artery disease     Diabetes mellitus (HCC)     Diabetic peripheral neuropathy (HCC)     Last assessed - 1/27/16    Gallbladder disease     Heart attack (HCC) 07/1999    Hemorrhoids     Last assessed - 11/16/12    Hypoglycemia 12/23/2021    Myocardial  infarction (HCC)     Old myocardial infarction     Type 2 diabetes mellitus with autonomic neuropathy (HCC)     Unspec long term insulin use status, Last assessed - 6/8/17       Past Surgical History:   Procedure Laterality Date    BUNIONECTOMY      CARDIAC CATHETERIZATION      Carotid Artery, Resolved - 7/1/13    CARDIAC CATHETERIZATION N/A 12/27/2021    Procedure: CARDIAC CATHETERIZATION ;  Surgeon: Chucky Tadeo MD;  [facility];  Service: Cardiology    CARDIAC CATHETERIZATION N/A 12/27/2021    Procedure: Cardiac Coronary Angiogram;  Surgeon: Chucky Tadeo MD;  [facility];  Service: Cardiology    CARDIAC ELECTROPHYSIOLOGY PROCEDURE N/A 01/18/2022    Procedure: Cardiac biv icd implant;  Surgeon: Harley Rollins DO;  [facility];  Service: Cardiology    CARDIOVASCULAR STRESS TEST  09/1999    CHOLECYSTECTOMY      COLONOSCOPY  2017    FOOT ARTHRODESIS, MODIFIED DONOHUE  2016    GALLBLADDER SURGERY  1970    HEMORROIDECTOMY      KNEE ARTHROSCOPY Left 2004    MOHS SURGERY Right 01/10/2024    right lateral and medial forehead (combined), Dr Berman    WI COLONOSCOPY FLX DX W/COLLJ SPEC WHEN PFRMD N/A 08/21/2017    Procedure: COLONOSCOPY;  Surgeon: Ady Wilkes MD;  Location: [facility];  Service: Colorectal    REPAIR KNEE LIGAMENT      REPAIR KNEE LIGAMENT Left 1986    REPAIR KNEE LIGAMENT Right 1988       Meds/Allergies:  Prior to Admission medications    Medication Sig Start Date End Date Taking? Authorizing Provider   acetaminophen (TYLENOL) 325 mg tablet Take 2 tablets by mouth every 6 (six) hours as needed   1/1/22   Historical Provider, MD   apixaban (Eliquis) 5 mg Take 1 tablet (5 mg total) by mouth every 12 (twelve) hours 5/14/24   Kalpana Siegel,    dofetilide (TIKOSYN) 125 mcg capsule TAKE 1 CAPSULE BY MOUTH EVERY 12 HOURS 5/7/24   Kalpana Siegel DO   gabapentin (NEURONTIN) 300 mg capsule TAKE 3 CAPSULES BY MOUTH EVERY MORNING,  TAKE 2 CAPSULES BY MOUTH EVERY AFTERNOON, TAKE 3 CAPSULES BY MOUTH EVERY EVENING 1/31/24   Kalpana Siegel DO   levothyroxine 125 mcg tablet TAKE 1 TABLET BY MOUTH EVERY MORNING 1/31/24   Kalpana Siegel DO   loperamide (IMODIUM) 2 mg capsule Take 1 capsule (2 mg total) by mouth 4 (four) times a day as needed for diarrhea  Patient not taking: Reported on 9/5/2023 8/17/22   Kalpana Siegel DO   losartan (COZAAR) 25 mg tablet TAKE 1 TABLET BY MOUTH EVERY MORNING 5/7/24   Kalpana Siegel,    metoprolol succinate (TOPROL-XL) 25 mg 24 hr tablet Take 1 tablet (25 mg total) by mouth daily  Patient taking differently: Take 25 mg by mouth daily Pt takes 2 a day 5/20/24   Kalpana Siegel DO   Multiple Vitamins-Minerals (ICAPS AREDS 2 PO) Take by mouth in the morning  Patient not taking: Reported on 10/23/2023    Historical Provider, MD   nystatin (MYCOSTATIN) powder Apply topically 2 (two) times a day  Patient not taking: Reported on 1/10/2024 9/5/23   Kalpana Siegel DO   pravastatin (PRAVACHOL) 10 mg tablet TAKE 1 TABLET BY MOUTH EVERY DAY 2/19/24   Kalpana Siegel DO   venlafaxine (EFFEXOR) 75 mg tablet TAKE 1 TABLET BY MOUTH THREE TIMES DAILY 6/27/24   Kalpana Siegel DO     I have reviewed home medications with patient personally.    Allergies:   Allergies   Allergen Reactions    Lyrica [Pregabalin] Swelling    Sulfa Antibiotics Swelling       Social History:  Marital Status:      Substance Use History:   Social History     Substance and Sexual Activity   Alcohol Use Never    Comment: Social per [employer]      Social History     Tobacco Use   Smoking Status Never    Passive exposure: Never   Smokeless Tobacco Never     Social History     Substance and Sexual Activity   Drug Use Never       Family History:  Family History   Problem Relation Age of Onset    Hypertension Father     Diabetes Father     Cancer Father         Throat    Arthritis  "Father     Stroke Mother     Diabetes Maternal Grandmother     Heart disease Maternal Grandfather     Diabetes Son     Lymphoma Family         Head, Face and Neck        Physical Exam:     Vitals:   Blood Pressure: 126/62 (07/27/24 1114)  Pulse: 58 (07/27/24 1114)  Temperature: 98.1 °F (36.7 °C) (07/27/24 1114)  Temp Source: Oral (07/27/24 0931)  Respirations: 16 (07/27/24 1114)  Height: 5' 1\" (154.9 cm) (07/27/24 1114)  Weight - Scale: 86.2 kg (190 lb) (07/27/24 1114)  SpO2: 93 % (07/27/24 1114)    Physical Exam  Vitals and nursing note reviewed.   Constitutional:       General: She is not in acute distress.     Appearance: She is well-developed.   HENT:      Head: Normocephalic and atraumatic.   Eyes:      Conjunctiva/sclera: Conjunctivae normal.   Cardiovascular:      Rate and Rhythm: Normal rate and regular rhythm.      Heart sounds: No murmur heard.  Pulmonary:      Effort: Pulmonary effort is normal. No respiratory distress.      Breath sounds: Normal breath sounds.   Abdominal:      Palpations: Abdomen is soft.      Tenderness: There is no abdominal tenderness.   Musculoskeletal:         General: No swelling.      Cervical back: Neck supple.   Skin:     General: Skin is warm and dry.      Capillary Refill: Capillary refill takes less than 2 seconds.   Neurological:      Mental Status: She is alert.   Psychiatric:         Mood and Affect: Mood normal.          Additional Data:     Lab Results:  Results from last 7 days   Lab Units 07/27/24  0950   WBC Thousand/uL 7.21   HEMOGLOBIN g/dL 12.0   HEMATOCRIT % 39.0   PLATELETS Thousands/uL 180   SEGS PCT % 71   LYMPHO PCT % 17   MONO PCT % 9   EOS PCT % 3     Results from last 7 days   Lab Units 07/27/24  0950 07/25/24  1241   SODIUM mmol/L 141 141   POTASSIUM mmol/L 4.6 4.5   CHLORIDE mmol/L 107 107   CO2 mmol/L 29 30   BUN mg/dL 21 22   CREATININE mg/dL 1.19 1.29   ANION GAP mmol/L 5 4   CALCIUM mg/dL 9.5 9.7   ALBUMIN g/dL  --  3.4*   TOTAL BILIRUBIN mg/dL  --  " 0.47   ALK PHOS U/L  --  69   ALT U/L  --  6*   AST U/L  --  13   GLUCOSE RANDOM mg/dL 135 157*             Lab Results   Component Value Date    HGBA1C 6.5 01/24/2024    HGBA1C 6.6 (H) 09/15/2023    HGBA1C 6.4 04/25/2023           Lines/Drains:  Invasive Devices       Peripheral Intravenous Line  Duration             Peripheral IV 07/27/24 Distal;Dorsal (posterior);Right Forearm <1 day                        Imaging: Reviewed radiology reports from this admission including: xray(s)  XR chest portable    (Results Pending)       EKG and Other Studies Reviewed on Admission:   EKG: Personally Reviewed.    ** Please Note: This note has been constructed using a voice recognition system. **

## 2024-07-27 NOTE — ASSESSMENT & PLAN NOTE
"Lab Results   Component Value Date    HGBA1C 6.5 01/24/2024       No results for input(s): \"POCGLU\" in the last 72 hours.    Blood Sugar Average: Last 72 hrs:  Diet controlled  Ok to observe off SSI.    "

## 2024-07-28 ENCOUNTER — APPOINTMENT (INPATIENT)
Dept: NON INVASIVE DIAGNOSTICS | Facility: HOSPITAL | Age: 79
DRG: 274 | End: 2024-07-28
Payer: COMMERCIAL

## 2024-07-28 PROBLEM — R26.2 AMBULATORY DYSFUNCTION: Status: ACTIVE | Noted: 2024-07-28

## 2024-07-28 LAB
ANION GAP SERPL CALCULATED.3IONS-SCNC: 6 MMOL/L (ref 4–13)
AORTIC ROOT: 3 CM
APICAL FOUR CHAMBER EJECTION FRACTION: 53 %
ASCENDING AORTA: 3.7 CM
ATRIAL RATE: 147 BPM
ATRIAL RATE: 59 BPM
ATRIAL RATE: 60 BPM
ATRIAL RATE: 60 BPM
BSA FOR ECHO PROCEDURE: 1.85 M2
BUN SERPL-MCNC: 18 MG/DL (ref 5–25)
CALCIUM SERPL-MCNC: 9.1 MG/DL (ref 8.4–10.2)
CHLORIDE SERPL-SCNC: 106 MMOL/L (ref 96–108)
CO2 SERPL-SCNC: 28 MMOL/L (ref 21–32)
CREAT SERPL-MCNC: 1.13 MG/DL (ref 0.6–1.3)
FRACTIONAL SHORTENING: 27 (ref 28–44)
GFR SERPL CREATININE-BSD FRML MDRD: 46 ML/MIN/1.73SQ M
GLUCOSE SERPL-MCNC: 117 MG/DL (ref 65–140)
GLUCOSE SERPL-MCNC: 90 MG/DL (ref 65–140)
INTERVENTRICULAR SEPTUM IN DIASTOLE (PARASTERNAL SHORT AXIS VIEW): 1 CM
INTERVENTRICULAR SEPTUM: 1 CM (ref 0.6–1.1)
LEFT ATRIUM SIZE: 4.1 CM
LEFT INTERNAL DIMENSION IN SYSTOLE: 3.5 CM (ref 2.1–4)
LEFT VENTRICULAR INTERNAL DIMENSION IN DIASTOLE: 4.8 CM (ref 3.5–6)
LEFT VENTRICULAR POSTERIOR WALL IN END DIASTOLE: 1.2 CM
LEFT VENTRICULAR STROKE VOLUME: 57 ML
LVSV (TEICH): 57 ML
MAGNESIUM SERPL-MCNC: 1.5 MG/DL (ref 1.9–2.7)
MV E'TISSUE VEL-LAT: 9 CM/S
MV E'TISSUE VEL-SEP: 9 CM/S
P AXIS: 77 DEGREES
P AXIS: 82 DEGREES
PHOSPHATE SERPL-MCNC: 3.2 MG/DL (ref 2.3–4.1)
POTASSIUM SERPL-SCNC: 4.3 MMOL/L (ref 3.5–5.3)
PR INTERVAL: 180 MS
PR INTERVAL: 184 MS
PR INTERVAL: 220 MS
QRS AXIS: -36 DEGREES
QRS AXIS: -51 DEGREES
QRS AXIS: 179 DEGREES
QRS AXIS: 49 DEGREES
QRSD INTERVAL: 116 MS
QRSD INTERVAL: 120 MS
QRSD INTERVAL: 146 MS
QRSD INTERVAL: 86 MS
QT INTERVAL: 388 MS
QT INTERVAL: 420 MS
QT INTERVAL: 442 MS
QT INTERVAL: 474 MS
QTC INTERVAL: 420 MS
QTC INTERVAL: 437 MS
QTC INTERVAL: 474 MS
QTC INTERVAL: 552 MS
RA PRESSURE ESTIMATED: 5 MMHG
RV PSP: 27 MMHG
SL CV LV EF: 45
SL CV PED ECHO LEFT VENTRICLE DIASTOLIC VOLUME (MOD BIPLANE) 2D: 108 ML
SL CV PED ECHO LEFT VENTRICLE SYSTOLIC VOLUME (MOD BIPLANE) 2D: 51 ML
SODIUM SERPL-SCNC: 140 MMOL/L (ref 135–147)
T WAVE AXIS: -32 DEGREES
T WAVE AXIS: -4 DEGREES
T WAVE AXIS: 188 DEGREES
T WAVE AXIS: 40 DEGREES
TR MAX PG: 22 MMHG
TR PEAK VELOCITY: 2.4 M/S
TRICUSPID ANNULAR PLANE SYSTOLIC EXCURSION: 1.8 CM
TRICUSPID VALVE PEAK REGURGITATION VELOCITY: 2.36 M/S
VENTRICULAR RATE: 122 BPM
VENTRICULAR RATE: 59 BPM
VENTRICULAR RATE: 60 BPM
VENTRICULAR RATE: 60 BPM

## 2024-07-28 PROCEDURE — 93010 ELECTROCARDIOGRAM REPORT: CPT | Performed by: INTERNAL MEDICINE

## 2024-07-28 PROCEDURE — C8929 TTE W OR WO FOL WCON,DOPPLER: HCPCS

## 2024-07-28 PROCEDURE — 93005 ELECTROCARDIOGRAM TRACING: CPT

## 2024-07-28 PROCEDURE — 80048 BASIC METABOLIC PNL TOTAL CA: CPT | Performed by: INTERNAL MEDICINE

## 2024-07-28 PROCEDURE — 93306 TTE W/DOPPLER COMPLETE: CPT | Performed by: INTERNAL MEDICINE

## 2024-07-28 PROCEDURE — 99232 SBSQ HOSP IP/OBS MODERATE 35: CPT | Performed by: INTERNAL MEDICINE

## 2024-07-28 PROCEDURE — 83735 ASSAY OF MAGNESIUM: CPT | Performed by: INTERNAL MEDICINE

## 2024-07-28 PROCEDURE — 84100 ASSAY OF PHOSPHORUS: CPT | Performed by: INTERNAL MEDICINE

## 2024-07-28 PROCEDURE — 99223 1ST HOSP IP/OBS HIGH 75: CPT | Performed by: STUDENT IN AN ORGANIZED HEALTH CARE EDUCATION/TRAINING PROGRAM

## 2024-07-28 PROCEDURE — 82948 REAGENT STRIP/BLOOD GLUCOSE: CPT

## 2024-07-28 RX ORDER — MAGNESIUM SULFATE HEPTAHYDRATE 40 MG/ML
4 INJECTION, SOLUTION INTRAVENOUS ONCE
Status: COMPLETED | OUTPATIENT
Start: 2024-07-28 | End: 2024-07-28

## 2024-07-28 RX ORDER — METOPROLOL SUCCINATE 50 MG/1
50 TABLET, EXTENDED RELEASE ORAL EVERY 12 HOURS
Status: DISCONTINUED | OUTPATIENT
Start: 2024-07-28 | End: 2024-07-29

## 2024-07-28 RX ADMIN — PRAVASTATIN SODIUM 10 MG: 10 TABLET ORAL at 16:00

## 2024-07-28 RX ADMIN — GABAPENTIN 600 MG: 300 CAPSULE ORAL at 20:36

## 2024-07-28 RX ADMIN — VENLAFAXINE 75 MG: 37.5 TABLET ORAL at 10:34

## 2024-07-28 RX ADMIN — DOFETILIDE 125 MCG: 0.12 CAPSULE ORAL at 20:36

## 2024-07-28 RX ADMIN — GABAPENTIN 600 MG: 300 CAPSULE ORAL at 16:00

## 2024-07-28 RX ADMIN — MAGNESIUM SULFATE HEPTAHYDRATE 4 G: 40 INJECTION, SOLUTION INTRAVENOUS at 09:00

## 2024-07-28 RX ADMIN — MAGNESIUM OXIDE TAB 400 MG (241.3 MG ELEMENTAL MG) 400 MG: 400 (241.3 MG) TAB at 18:19

## 2024-07-28 RX ADMIN — LEVOTHYROXINE SODIUM 125 MCG: 125 TABLET ORAL at 06:17

## 2024-07-28 RX ADMIN — VENLAFAXINE 75 MG: 37.5 TABLET ORAL at 16:00

## 2024-07-28 RX ADMIN — MAGNESIUM OXIDE TAB 400 MG (241.3 MG ELEMENTAL MG) 400 MG: 400 (241.3 MG) TAB at 08:00

## 2024-07-28 RX ADMIN — METOPROLOL SUCCINATE 25 MG: 25 TABLET, EXTENDED RELEASE ORAL at 08:00

## 2024-07-28 RX ADMIN — METOPROLOL SUCCINATE 50 MG: 50 TABLET, EXTENDED RELEASE ORAL at 18:53

## 2024-07-28 RX ADMIN — LOSARTAN POTASSIUM 25 MG: 25 TABLET, FILM COATED ORAL at 08:00

## 2024-07-28 RX ADMIN — PERFLUTREN 0.6 ML/MIN: 6.52 INJECTION, SUSPENSION INTRAVENOUS at 14:04

## 2024-07-28 RX ADMIN — DOFETILIDE 125 MCG: 0.12 CAPSULE ORAL at 08:00

## 2024-07-28 RX ADMIN — APIXABAN 5 MG: 5 TABLET, FILM COATED ORAL at 08:00

## 2024-07-28 RX ADMIN — APIXABAN 5 MG: 5 TABLET, FILM COATED ORAL at 20:36

## 2024-07-28 RX ADMIN — GABAPENTIN 600 MG: 300 CAPSULE ORAL at 08:00

## 2024-07-28 NOTE — PROGRESS NOTES
"[facility]  Progress Note  Name: Laura Sarkar I  MRN: [id number]  Unit/Bed#: CW2 214-01 I Date of Admission: 7/27/2024   Date of Service: 7/28/2024 I Hospital Day: 1    Assessment & Plan   * ICD (implantable cardioverter-defibrillator) discharge  Assessment & Plan  Interrogation 7/25 : 2 VF CLASSIFIED NOTED & TREATED WITH 5 ATP & 1 SHOCK. AVAIL EGRAMS PRESENT AS RVR & PROBABLE VT. 3 AT/AF NOTED; LONGEST 5.31 HRS; 13% BURDEN   EP consult  Reports running of her metoporolol for 1 and half week and couldn't get refill  Continue metoprolol and tikosyn   Obtain TTE  Optimize K and Mg  Telemetry  EP following    Ambulatory dysfunction  Assessment & Plan  PT/OT    Nonischemic cardiomyopathy (HCC)  Assessment & Plan  Continue current GDMT    Type 2 diabetes mellitus, without long-term current use of insulin (HCC)  Assessment & Plan  Lab Results   Component Value Date    HGBA1C 6.5 01/24/2024       No results for input(s): \"POCGLU\" in the last 72 hours.    Blood Sugar Average: Last 72 hrs:  Diet controlled  Ok to observe off SSI.      Atrial fibrillation (HCC)  Assessment & Plan  Maintained on Toprol 25mg BID and Tikosyn   Eliquis for AC.   Rates currently controlled.  Possible ablation pending TTE and ICD interrogation              VTE Pharmacologic Prophylaxis: VTE Score: 3 Moderate Risk (Score 3-4) - Pharmacological DVT Prophylaxis Ordered: apixaban (Eliquis).    Mobility:   Basic Mobility Inpatient Raw Score: 24  JH-HLM Goal: 8: Walk 250 feet or more  JH-HLM Achieved: 7: Walk 25 feet or more  JH-HLM Goal NOT achieved. Continue with multidisciplinary rounding and encourage appropriate mobility to improve upon JH-HLM goals.    Patient Centered Rounds: I performed bedside rounds with nursing staff today.   Discussions with Specialists or Other Care Team Provider: EP    Education and Discussions with Family / Patient: Updated  (granddaughter ) at bedside.    Total Time " Spent on Date of Encounter in care of patient: 35 mins. This time was spent on one or more of the following: performing physical exam; counseling and coordination of care; obtaining or reviewing history; documenting in the medical record; reviewing/ordering tests, medications or procedures; communicating with other healthcare professionals and discussing with patient's family/caregivers.    Current Length of Stay: 1 day(s)  Current Patient Status: Inpatient   Certification Statement: The patient will continue to require additional inpatient hospital stay due to Above  Discharge Plan: Anticipate discharge in 24-48 hrs to discharge location to be determined pending rehab evaluations.    Code Status: Level 2 - DNAR: but accepts endotracheal intubation    Subjective:   No complaints     Objective:     Vitals:   Temp (24hrs), Av.4 °F (36.9 °C), Min:98.1 °F (36.7 °C), Max:98.8 °F (37.1 °C)    Temp:  [98.1 °F (36.7 °C)-98.8 °F (37.1 °C)] 98.3 °F (36.8 °C)  HR:  [56-63] 63  Resp:  [16-18] 17  BP: (107-130)/(55-70) 128/57  SpO2:  [92 %-93 %] 92 %  Body mass index is 35.9 kg/m².     Input and Output Summary (last 24 hours):   No intake or output data in the 24 hours ending 24 1109    Physical Exam:   Physical Exam  Vitals and nursing note reviewed.   Constitutional:       General: She is not in acute distress.     Appearance: She is well-developed.   HENT:      Head: Normocephalic and atraumatic.   Eyes:      Conjunctiva/sclera: Conjunctivae normal.   Cardiovascular:      Rate and Rhythm: Normal rate and regular rhythm.      Heart sounds: No murmur heard.  Pulmonary:      Effort: Pulmonary effort is normal. No respiratory distress.      Breath sounds: Normal breath sounds.   Abdominal:      Palpations: Abdomen is soft.      Tenderness: There is no abdominal tenderness.   Musculoskeletal:         General: No swelling.      Cervical back: Neck supple.   Skin:     General: Skin is warm and dry.      Capillary Refill:  Capillary refill takes less than 2 seconds.   Neurological:      Mental Status: She is alert.   Psychiatric:         Mood and Affect: Mood normal.          Additional Data:     Labs:  Results from last 7 days   Lab Units 07/27/24  0950   WBC Thousand/uL 7.21   HEMOGLOBIN g/dL 12.0   HEMATOCRIT % 39.0   PLATELETS Thousands/uL 180   SEGS PCT % 71   LYMPHO PCT % 17   MONO PCT % 9   EOS PCT % 3     Results from last 7 days   Lab Units 07/28/24  0621 07/27/24  0950 07/25/24  1241   SODIUM mmol/L 140   < > 141   POTASSIUM mmol/L 4.3   < > 4.5   CHLORIDE mmol/L 106   < > 107   CO2 mmol/L 28   < > 30   BUN mg/dL 18   < > 22   CREATININE mg/dL 1.13   < > 1.29   ANION GAP mmol/L 6   < > 4   CALCIUM mg/dL 9.1   < > 9.7   ALBUMIN g/dL  --   --  3.4*   TOTAL BILIRUBIN mg/dL  --   --  0.47   ALK PHOS U/L  --   --  69   ALT U/L  --   --  6*   AST U/L  --   --  13   GLUCOSE RANDOM mg/dL 90   < > 157*    < > = values in this interval not displayed.                       Lines/Drains:  Invasive Devices       Peripheral Intravenous Line  Duration             Peripheral IV 07/27/24 Distal;Dorsal (posterior);Right Forearm 1 day                      Telemetry:  Telemetry Orders (From admission, onward)               24 Hour Telemetry Monitoring  Continuous x 24 Hours (Telem)        Question:  Reason for 24 Hour Telemetry  Answer:  Arrhythmias requiring acute medical intervention / PPM or ICD malfunction                             Imaging: Reviewed radiology reports from this admission including: chest xray    Recent Cultures (last 7 days):         Last 24 Hours Medication List:   Current Facility-Administered Medications   Medication Dose Route Frequency Provider Last Rate    acetaminophen  650 mg Oral Q6H PRN Polo Pardon MD      apixaban  5 mg Oral Q12H Polo Padron MD      dofetilide  125 mcg Oral Q12H Polo Padron MD      gabapentin  600 mg Oral TID Polo Padron MD      levothyroxine  125 mcg Oral Early Morning Polo Padron MD       losartan  25 mg Oral QAM Polo Padron MD      magnesium Oxide  400 mg Oral BID Polo Padron MD      magnesium sulfate  4 g Intravenous Once Mini Howe PA-C      metoprolol succinate  25 mg Oral Q12H Polo Padron MD      pravastatin  10 mg Oral Daily With Dinner Polo Padron MD      sodium chloride (PF)  3 mL Intravenous Q1H PRN Alberto Godinez MD      venlafaxine  75 mg Oral TID Polo Padron MD          Today, Patient Was Seen By: Polo Padron MD    **Please Note: This note may have been constructed using a voice recognition system.**

## 2024-07-28 NOTE — CASE MANAGEMENT
Case Management Assessment & Discharge Planning Note    Patient name Laura Sarkar  Location CW2 214/CW2 214-01 MRN [id number]  : 1945 Date 2024       Current Admission Date: 2024  Current Admission Diagnosis:ICD (implantable cardioverter-defibrillator) discharge   Patient Active Problem List    Diagnosis Date Noted Date Diagnosed    Ambulatory dysfunction 2024     ICD (implantable cardioverter-defibrillator) discharge 2024     Presence of heart assist device ([facility]) 2024     Skin ulcer, limited to breakdown of skin ([facility]) 2024     Secondary hyperparathyroidism of renal origin ([facility]) 2024     Basal cell carcinoma (BCC) of forehead 10/23/2023     Primary osteoarthritis of both shoulders 02/10/2023     Nonischemic cardiomyopathy ([facility]) 2022     Abnormal SPEP 2022     Pulmonary fibrosis ([facility]) 2022     Hypothyroidism 2022     Hypotension 2022     Lifevest discharge 2022     CKD (chronic kidney disease) 2022     Left knee pain 2022     Torn rotator cuff 2022     Left bundle branch block (LBBB) 2022     Moderate mitral regurgitation 2022     DNR (do not resuscitate) 2022     Depression 2021     Chronic combined systolic and diastolic heart failure ([facility]) 2021     Elevated troponin 2021     Stage 4 chronic kidney disease ([facility]) 2021     Type 2 diabetes mellitus, without long-term current use of insulin ([facility]) 2019     Diabetic peripheral neuropathy ([facility]) 2018     Class 3 severe obesity in adult ([facility]) 2018     Primary osteoarthritis of both knees 2018     Aortic stenosis 2018     Hyperlipidemia 10/04/2017     Atrial fibrillation ([facility]) 2015     Coronary artery disease 2013       LOS (days): 1  Geometric Mean LOS (GMLOS) (days):   Days to GMLOS:     OBJECTIVE:    Risk of Unplanned Readmission Score: 11.75         Current admission status:  Inpatient       [facility]   [id number] [place] - [id number] [id number] [facility] [address]  Phone: [phone] Fax: [phone]    Primary Care Provider: Kalpana Siegel DO    [facility]  Secondary Insurance:     ASSESSMENT:  Active Health Care Proxies       Elo Galan [facility] Care Representative - Grandchild   Primary Phone: [phone] (Mobile)                 Patient Information  Admitted from:: Home  Mental Status: Alert  During Assessment patient was accompanied by: Not accompanied during assessment  Assessment information provided by:: Patient  Primary Caregiver: Self  Support Systems: Family members  County of Residence: [place]  What city do you live in?: [place]  Home entry access options. Select all that apply.: Ramp  Type of Current Residence: 2 story home  Upon entering residence, is there a bedroom on the main floor (no further steps)?: Yes (sleeps in recliner on 1st floor)  Upon entering residence, is there a bathroom on the main floor (no further steps)?: Yes  Living Arrangements: Lives Alone  Is patient a ?: No    Activities of Daily Living Prior to Admission  Functional Status: Independent  Completes ADLs independently?: Yes  Ambulates independently?: Yes  Does patient use assisted devices?: Yes  Assisted Devices (DME) used: Walker, Wheelchair, Shower Chair  Does patient have a history of Outpatient Therapy (PT/OT)?: No  Does the patient have a history of Short-Term Rehab?: No  Does patient have a history of HHC?: No  Does patient currently have HHC?: No    Patient Information Continued  Income Source: Pension/alf  Does patient have prescription coverage?: Yes  Does patient receive dialysis treatments?: No  Does patient have a history of substance abuse?: No  Does patient have a history of Mental Health Diagnosis?: No    Means of Transportation  Means of Transport  to Appts:: Family transport      Social Determinants of Health (SDOH)      Flowsheet Row Most Recent Value   Housing Stability    In the last 12 months, was there a time when you were not able to pay the mortgage or rent on time? N   In the past 12 months, how many times have you moved where you were living? 0   At any time in the past 12 months, were you homeless or living in a shelter (including now)? N   Transportation Needs    In the past 12 months, has lack of transportation kept you from medical appointments or from getting medications? no   In the past 12 months, has lack of transportation kept you from meetings, work, or from getting things needed for daily living? No   Food Insecurity    Within the past 12 months, you worried that your food would run out before you got the money to buy more. Never true   Within the past 12 months, the food you bought just didn't last and you didn't have money to get more. Never true   Utilities    In the past 12 months has the electric, gas, oil, or water company threatened to shut off services in your home? No            DISCHARGE DETAILS:    Discharge planning discussed with:: Patient  Freedom of Choice: Yes  Comments - Freedom of Choice: Discussed FOC  CM contacted family/caregiver?: No- see comments (declined)       CM reviewed d/c planning process including the following: identifying help at home, patient preference for d/c planning needs, Discharge Lounge, [facility] Meds to Bed program, availability of treatment team to discuss questions or concerns patient and/or family may have regarding understanding medications and recognizing signs and symptoms once discharged.  CM also encouraged patient to follow up with all recommended appointments after discharge. Patient advised of importance for patient and family to participate in managing patient’s medical well being.    This CM introduced self and role.  Patient lives alone in 2 story home, ramp access, has full bath on  1st floor and sleeps in recliner on 1st floor.  IADLS, uses walker, and WC (uses mostly).  Sister drives patient to appointments

## 2024-07-28 NOTE — CONSULTS
Consultation - Electrophysiology-Cardiology (EP)   Laura Sarkar 78 y.o. female MRN: [id number]  Unit/Bed#: 2 214-01 Encounter: [id number]    Assessment & Plan     CRT-D shocks in the setting of A-fib with RVR  -Currently AV paced  -Repeat echocardiogram ordered  -Continue home metoprolol succinate 25 mg every 12 hours  -Continue dofetilide  -Will obtain formal device interrogation tomorrow  -If A-fib continues to be a problem may benefit from AVJ    Hypomagnesemia  -Magnesium 1.6 on arrival  -Continue repletion  -Will need p.o. magnesium for chronic supplementation given chronic hypomagnesemia  -Potassium is within normal limits    History of Present Illness   Physician Requesting Consult: Polo Padron MD  Reason for Consult / Principal Problem: ICD shocks in the setting of A-fib with RVR, hypomagnesemia      HPI: Laura Sarkar is a 78 y.o. year old female with a past medical history of paroxysmal atrial fibrillation with at times RVR, nonischemic cardiomyopathy status post BiV ICD implant, and hypomagnesemia who was initially sent to the emergency room after an ICD shock occurred at home.  She was seen in the emergency room at which time limited exam levels were identified and she was noted to not be on beta-blocker therapy as she had ran out of the medication.  She was discharged home, only told to return to the ER after it was found that she may have had an inappropriate shock due to A-fib with RVR versus atrial flutter.    Since returning to the hospital, she has been stable.  EKGs and revealed AV pacing.  Magnesium was once again found to be low at 1.6 and is being repleted.    Today she feels well.  She notes she was in her wheelchair when the ICD shock went off and it made her jump in her chair.  We discussed her hypomagnesemia and she denies chronic diarrhea, excessive urination, excessive intake of artificial sweeteners.    We discussed her home metoprolol which she admits she ran out of this  medication has been off of it for a few months.  We discussed the plan for magnesium repletion, reinitiation of metoprolol, formal device report, and repeat echocardiogram.  She is in agreement and had no questions.    Inpatient consult to Electrophysiology  Consult performed by: David Landa MD  Consult ordered by: Polo Padron MD          Review of Systems a complete review of systems was performed and was negative intended 10 systems are as listed above.    Historical Information   Past Medical History:   Diagnosis Date    Aortic stenosis     Arthritis     Asthma     Basal cell carcinoma 10/24/2023    right lateral forehead, mohs    Basal cell carcinoma 10/24/2023    right medial forehead, mohs    Coronary artery disease     Diabetes mellitus (HCC)     Diabetic peripheral neuropathy (HCC)     Last assessed - 1/27/16    Gallbladder disease     Heart attack (HCC) 07/1999    Hemorrhoids     Last assessed - 11/16/12    Hypoglycemia 12/23/2021    Myocardial infarction (HCC)     Old myocardial infarction     Type 2 diabetes mellitus with autonomic neuropathy (HCC)     Unspec long term insulin use status, Last assessed - 6/8/17     Past Surgical History:   Procedure Laterality Date    BUNIONECTOMY      CARDIAC CATHETERIZATION      Carotid Artery, Resolved - 7/1/13    CARDIAC CATHETERIZATION N/A 12/27/2021    Procedure: CARDIAC CATHETERIZATION ;  Surgeon: Chucky Tadeo MD;  [facility];  Service: Cardiology    CARDIAC CATHETERIZATION N/A 12/27/2021    Procedure: Cardiac Coronary Angiogram;  Surgeon: Chucky Tadeo MD;  [facility];  Service: Cardiology    CARDIAC ELECTROPHYSIOLOGY PROCEDURE N/A 01/18/2022    Procedure: Cardiac biv icd implant;  Surgeon: Harley Rollins DO;  Location: [facility];  Service: Cardiology    CARDIOVASCULAR STRESS TEST  09/1999    CHOLECYSTECTOMY      COLONOSCOPY  2017    FOOT ARTHRODESIS, MODIFIED DONOHUE  2016    GALLBLADDER SURGERY  1970     "HEMORROIDECTOMY      KNEE ARTHROSCOPY Left 2004    MOHS SURGERY Right 01/10/2024    right lateral and medial forehead (combined), Dr Berman    [facility] COLONOSCOPY FLX DX W/COLLJ SPEC WHEN PFRMD N/A 08/21/2017    Procedure: COLONOSCOPY;  Surgeon: Ady Wilkes MD;  Location:  GI LAB;  Service: Colorectal    REPAIR KNEE LIGAMENT      REPAIR KNEE LIGAMENT Left 1986    REPAIR KNEE LIGAMENT Right 1988     Social History     Substance and Sexual Activity   Alcohol Use Never    Comment: Social per [employer]      Social History     Substance and Sexual Activity   Drug Use Never     E-Cigarette/Vaping    E-Cigarette Use Never User      E-Cigarette/Vaping Substances    Nicotine No     THC No     CBD No     Flavoring No     Other No     Unknown No      Social History     Tobacco Use   Smoking Status Never    Passive exposure: Never   Smokeless Tobacco Never     Family History: non-contributory    Meds/Allergies   all current active meds have been reviewed  Allergies   Allergen Reactions    Lyrica [Pregabalin] Swelling    Sulfa Antibiotics Swelling       Objective   Vitals: Blood pressure 127/55, pulse 56, temperature 98.6 °F (37 °C), resp. rate 18, height 5' 1\" (1.549 m), weight 86.2 kg (190 lb), SpO2 93%.  Orthostatic Blood Pressures      Flowsheet Row Most Recent Value   Blood Pressure 127/55 filed at 07/27/2024 2334   Patient Position - Orthostatic VS Sitting filed at 07/27/2024 1035            No intake or output data in the 24 hours ending 07/28/24 0639    Invasive Devices       Peripheral Intravenous Line  Duration             Peripheral IV 07/27/24 Distal;Dorsal (posterior);Right Forearm 1 day                    Physical Exam  General: No acute distress.  Resting comfortably.  HEENT: Normocephalic, atraumatic.  Cardiovascular: Regular rate and rhythm.  Distant heart sounds.  No rubs, murmurs, gallops.  Pulmonary: Clear to auscultation on anterior exam without rales, Reca, wheezes  Abdomen: Protuberant, soft, " nontender  Lower extremities: Warm, well-perfused, no edema  Psych: Answers all questions appropriately

## 2024-07-28 NOTE — ASSESSMENT & PLAN NOTE
Maintained on Toprol 25mg BID and Tikosyn   Eliquis for AC.   Rates currently controlled.  Possible ablation pending TTE and ICD interrogation

## 2024-07-28 NOTE — ASSESSMENT & PLAN NOTE
Interrogation 7/25 : 2 VF CLASSIFIED NOTED & TREATED WITH 5 ATP & 1 SHOCK. AVAIL EGRAMS PRESENT AS RVR & PROBABLE VT. 3 AT/AF NOTED; LONGEST 5.31 HRS; 13% BURDEN   EP consult  Reports running of her metoporolol for 1 and half week and couldn't get refill  Continue metoprolol and tikosyn   Obtain TTE  Optimize K and Mg  Telemetry  EP following

## 2024-07-29 ENCOUNTER — ANESTHESIA (INPATIENT)
Dept: NON INVASIVE DIAGNOSTICS | Facility: HOSPITAL | Age: 79
DRG: 274 | End: 2024-07-29
Payer: COMMERCIAL

## 2024-07-29 LAB
ANION GAP SERPL CALCULATED.3IONS-SCNC: 7 MMOL/L (ref 4–13)
ATRIAL RATE: 416 BPM
ATRIAL RATE: 67 BPM
BUN SERPL-MCNC: 17 MG/DL (ref 5–25)
CALCIUM SERPL-MCNC: 9.2 MG/DL (ref 8.4–10.2)
CHLORIDE SERPL-SCNC: 104 MMOL/L (ref 96–108)
CO2 SERPL-SCNC: 26 MMOL/L (ref 21–32)
CREAT SERPL-MCNC: 1.11 MG/DL (ref 0.6–1.3)
ERYTHROCYTE [DISTWIDTH] IN BLOOD BY AUTOMATED COUNT: 13 % (ref 11.6–15.1)
GFR SERPL CREATININE-BSD FRML MDRD: 47 ML/MIN/1.73SQ M
GLUCOSE SERPL-MCNC: 104 MG/DL (ref 65–140)
HCT VFR BLD AUTO: 37 % (ref 34.8–46.1)
HGB BLD-MCNC: 11.5 G/DL (ref 11.5–15.4)
INR PPP: 1.32 (ref 0.84–1.19)
MAGNESIUM SERPL-MCNC: 2.2 MG/DL (ref 1.9–2.7)
MCH RBC QN AUTO: 31 PG (ref 26.8–34.3)
MCHC RBC AUTO-ENTMCNC: 31.1 G/DL (ref 31.4–37.4)
MCV RBC AUTO: 100 FL (ref 82–98)
P AXIS: 67 DEGREES
PLATELET # BLD AUTO: 193 THOUSANDS/UL (ref 149–390)
PMV BLD AUTO: 11.2 FL (ref 8.9–12.7)
POTASSIUM SERPL-SCNC: 4.4 MMOL/L (ref 3.5–5.3)
PR INTERVAL: 120 MS
PR INTERVAL: 136 MS
PROTHROMBIN TIME: 16.2 SECONDS (ref 11.6–14.5)
QRS AXIS: 116 DEGREES
QRS AXIS: 255 DEGREES
QRSD INTERVAL: 118 MS
QRSD INTERVAL: 166 MS
QT INTERVAL: 416 MS
QT INTERVAL: 480 MS
QTC INTERVAL: 439 MS
QTC INTERVAL: 553 MS
RBC # BLD AUTO: 3.71 MILLION/UL (ref 3.81–5.12)
SODIUM SERPL-SCNC: 137 MMOL/L (ref 135–147)
T WAVE AXIS: -43 DEGREES
T WAVE AXIS: 63 DEGREES
VENTRICULAR RATE: 67 BPM
VENTRICULAR RATE: 80 BPM
WBC # BLD AUTO: 8.38 THOUSAND/UL (ref 4.31–10.16)

## 2024-07-29 PROCEDURE — 83735 ASSAY OF MAGNESIUM: CPT | Performed by: INTERNAL MEDICINE

## 2024-07-29 PROCEDURE — 76937 US GUIDE VASCULAR ACCESS: CPT | Performed by: INTERNAL MEDICINE

## 2024-07-29 PROCEDURE — 93010 ELECTROCARDIOGRAM REPORT: CPT | Performed by: INTERNAL MEDICINE

## 2024-07-29 PROCEDURE — 93005 ELECTROCARDIOGRAM TRACING: CPT

## 2024-07-29 PROCEDURE — 85610 PROTHROMBIN TIME: CPT | Performed by: INTERNAL MEDICINE

## 2024-07-29 PROCEDURE — 93650 ICAR CATH ABLTJ AV NODE FUNC: CPT | Performed by: INTERNAL MEDICINE

## 2024-07-29 PROCEDURE — 93603 RIGHT VENTRICULAR RECORDING: CPT | Performed by: INTERNAL MEDICINE

## 2024-07-29 PROCEDURE — 93287 PERI-PX DEVICE EVAL & PRGR: CPT | Performed by: INTERNAL MEDICINE

## 2024-07-29 PROCEDURE — C1733 CATH, EP, OTHR THAN COOL-TIP: HCPCS | Performed by: INTERNAL MEDICINE

## 2024-07-29 PROCEDURE — 02583ZZ DESTRUCTION OF CONDUCTION MECHANISM, PERCUTANEOUS APPROACH: ICD-10-PCS | Performed by: INTERNAL MEDICINE

## 2024-07-29 PROCEDURE — C1894 INTRO/SHEATH, NON-LASER: HCPCS | Performed by: INTERNAL MEDICINE

## 2024-07-29 PROCEDURE — 99232 SBSQ HOSP IP/OBS MODERATE 35: CPT | Performed by: PHYSICIAN ASSISTANT

## 2024-07-29 PROCEDURE — 80048 BASIC METABOLIC PNL TOTAL CA: CPT | Performed by: INTERNAL MEDICINE

## 2024-07-29 PROCEDURE — C1893 INTRO/SHEATH, FIXED,NON-PEEL: HCPCS | Performed by: INTERNAL MEDICINE

## 2024-07-29 PROCEDURE — 85027 COMPLETE CBC AUTOMATED: CPT | Performed by: INTERNAL MEDICINE

## 2024-07-29 RX ORDER — FENTANYL CITRATE 50 UG/ML
INJECTION, SOLUTION INTRAMUSCULAR; INTRAVENOUS AS NEEDED
Status: DISCONTINUED | OUTPATIENT
Start: 2024-07-29 | End: 2024-07-29

## 2024-07-29 RX ORDER — KETAMINE HCL IN NACL, ISO-OSM 100MG/10ML
SYRINGE (ML) INJECTION AS NEEDED
Status: DISCONTINUED | OUTPATIENT
Start: 2024-07-29 | End: 2024-07-29

## 2024-07-29 RX ORDER — LIDOCAINE HYDROCHLORIDE 10 MG/ML
INJECTION, SOLUTION EPIDURAL; INFILTRATION; INTRACAUDAL; PERINEURAL CODE/TRAUMA/SEDATION MEDICATION
Status: DISCONTINUED | OUTPATIENT
Start: 2024-07-29 | End: 2024-07-29 | Stop reason: HOSPADM

## 2024-07-29 RX ORDER — CEFAZOLIN SODIUM 1 G/3ML
INJECTION, POWDER, FOR SOLUTION INTRAMUSCULAR; INTRAVENOUS AS NEEDED
Status: DISCONTINUED | OUTPATIENT
Start: 2024-07-29 | End: 2024-07-29

## 2024-07-29 RX ORDER — METOPROLOL SUCCINATE 25 MG/1
25 TABLET, EXTENDED RELEASE ORAL DAILY
Status: DISCONTINUED | OUTPATIENT
Start: 2024-07-30 | End: 2024-07-31 | Stop reason: HOSPADM

## 2024-07-29 RX ORDER — PROPOFOL 10 MG/ML
INJECTION, EMULSION INTRAVENOUS CONTINUOUS PRN
Status: DISCONTINUED | OUTPATIENT
Start: 2024-07-29 | End: 2024-07-29

## 2024-07-29 RX ORDER — SODIUM CHLORIDE 9 MG/ML
INJECTION, SOLUTION INTRAVENOUS CONTINUOUS PRN
Status: DISCONTINUED | OUTPATIENT
Start: 2024-07-29 | End: 2024-07-29

## 2024-07-29 RX ADMIN — APIXABAN 5 MG: 5 TABLET, FILM COATED ORAL at 20:36

## 2024-07-29 RX ADMIN — GABAPENTIN 600 MG: 300 CAPSULE ORAL at 09:24

## 2024-07-29 RX ADMIN — LEVOTHYROXINE SODIUM 125 MCG: 125 TABLET ORAL at 05:59

## 2024-07-29 RX ADMIN — SODIUM CHLORIDE: 0.9 INJECTION, SOLUTION INTRAVENOUS at 11:10

## 2024-07-29 RX ADMIN — VENLAFAXINE 75 MG: 37.5 TABLET ORAL at 20:37

## 2024-07-29 RX ADMIN — APIXABAN 5 MG: 5 TABLET, FILM COATED ORAL at 09:24

## 2024-07-29 RX ADMIN — ACETAMINOPHEN 650 MG: 325 TABLET, FILM COATED ORAL at 09:24

## 2024-07-29 RX ADMIN — NOREPINEPHRINE BITARTRATE 4 MCG: 1 INJECTION, SOLUTION, CONCENTRATE INTRAVENOUS at 11:42

## 2024-07-29 RX ADMIN — PRAVASTATIN SODIUM 10 MG: 10 TABLET ORAL at 17:38

## 2024-07-29 RX ADMIN — DOFETILIDE 125 MCG: 0.12 CAPSULE ORAL at 09:24

## 2024-07-29 RX ADMIN — FENTANYL CITRATE 25 MCG: 50 INJECTION INTRAMUSCULAR; INTRAVENOUS at 12:19

## 2024-07-29 RX ADMIN — GABAPENTIN 600 MG: 300 CAPSULE ORAL at 20:36

## 2024-07-29 RX ADMIN — MAGNESIUM OXIDE TAB 400 MG (241.3 MG ELEMENTAL MG) 400 MG: 400 (241.3 MG) TAB at 17:38

## 2024-07-29 RX ADMIN — FENTANYL CITRATE 25 MCG: 50 INJECTION INTRAMUSCULAR; INTRAVENOUS at 12:37

## 2024-07-29 RX ADMIN — PROPOFOL 50 MCG/KG/MIN: 10 INJECTION, EMULSION INTRAVENOUS at 11:22

## 2024-07-29 RX ADMIN — VENLAFAXINE 75 MG: 37.5 TABLET ORAL at 09:25

## 2024-07-29 RX ADMIN — VENLAFAXINE 75 MG: 37.5 TABLET ORAL at 17:38

## 2024-07-29 RX ADMIN — FENTANYL CITRATE 25 MCG: 50 INJECTION INTRAMUSCULAR; INTRAVENOUS at 11:22

## 2024-07-29 RX ADMIN — NOREPINEPHRINE BITARTRATE 4 MCG: 1 INJECTION, SOLUTION, CONCENTRATE INTRAVENOUS at 11:44

## 2024-07-29 RX ADMIN — Medication 15 MG: at 11:22

## 2024-07-29 RX ADMIN — VENLAFAXINE 75 MG: 37.5 TABLET ORAL at 02:25

## 2024-07-29 RX ADMIN — GABAPENTIN 600 MG: 300 CAPSULE ORAL at 17:38

## 2024-07-29 RX ADMIN — NOREPINEPHRINE BITARTRATE 4 MCG: 1 INJECTION, SOLUTION, CONCENTRATE INTRAVENOUS at 11:48

## 2024-07-29 RX ADMIN — MAGNESIUM OXIDE TAB 400 MG (241.3 MG ELEMENTAL MG) 400 MG: 400 (241.3 MG) TAB at 09:24

## 2024-07-29 RX ADMIN — METOPROLOL SUCCINATE 50 MG: 50 TABLET, EXTENDED RELEASE ORAL at 05:59

## 2024-07-29 RX ADMIN — CEFAZOLIN 2000 MG: 1 INJECTION, POWDER, FOR SOLUTION INTRAMUSCULAR; INTRAVENOUS at 11:34

## 2024-07-29 NOTE — OCCUPATIONAL THERAPY NOTE
Occupational Therapy Cancel        Patient Name: Laura Sarkar  Today's Date: 7/29/2024 07/29/24 1230   OT Last Visit   OT Visit Date 07/29/24   Note Type   Note type Cancelled Session   Cancel Reasons Patient off floor/test   Additional Comments Pt at cath lab for cardiac eps/av node ablation. Pt will likely have post procedural bed rest orders. OT will continue to follow pt for evaluation as appropriate.       WILVER Redmond, OTR/L

## 2024-07-29 NOTE — PLAN OF CARE
Problem: PAIN - ADULT  Goal: Verbalizes/displays adequate comfort level or baseline comfort level  Description: Interventions:  - Encourage patient to monitor pain and request assistance  - Assess pain using appropriate pain scale  - Administer analgesics based on type and severity of pain and evaluate response  - Implement non-pharmacological measures as appropriate and evaluate response  - Consider cultural and social influences on pain and pain management  - Notify physician/advanced practitioner if interventions unsuccessful or patient reports new pain  Outcome: Progressing     Problem: SAFETY ADULT  Goal: Patient will remain free of falls  Description: INTERVENTIONS:  - Educate patient/family on patient safety including physical limitations  - Instruct patient to call for assistance with activity   - Consult OT/PT to assist with strengthening/mobility   - Keep Call bell within reach  - Keep bed low and locked with side rails adjusted as appropriate  - Keep care items and personal belongings within reach  - Initiate and maintain comfort rounds  - Make Fall Risk Sign visible to staff  - Offer Toileting every 2 Hours, in advance of need  - Initiate/Maintain bed alarm  - Apply yellow socks and bracelet for high fall risk patients  - Consider moving patient to room near nurses station  Outcome: Progressing  Goal: Maintain or return to baseline ADL function  Description: INTERVENTIONS:  -  Assess patient's ability to carry out ADLs; assess patient's baseline for ADL function and identify physical deficits which impact ability to perform ADLs (bathing, care of mouth/teeth, toileting, grooming, dressing, etc.)  - Assess/evaluate cause of self-care deficits   - Assess range of motion  - Assess patient's mobility; develop plan if impaired  - Assess patient's need for assistive devices and provide as appropriate  - Encourage maximum independence but intervene and supervise when necessary  - Involve family in performance  of ADLs  - Assess for home care needs following discharge   - Consider OT consult to assist with ADL evaluation and planning for discharge  - Provide patient education as appropriate  Outcome: Progressing  Goal: Maintains/Returns to pre admission functional level  Description: INTERVENTIONS:  - Perform AM-PAC 6 Click Basic Mobility/ Daily Activity assessment daily.  - Set and communicate daily mobility goal to care team and patient/family/caregiver.   - Collaborate with rehabilitation services on mobility goals if consulted  - Perform Range of Motion 4 times a day.  - Reposition patient every 2 hours.  - Dangle patient 4 times a day  - Stand patient 4 times a day  - Ambulate patient 4 times a day  - Out of bed to chair 4 times a day   - Out of bed for meals 4 times a day  - Out of bed for toileting  - Record patient progress and toleration of activity level   Outcome: Progressing     Problem: DISCHARGE PLANNING  Goal: Discharge to home or other facility with appropriate resources  Description: INTERVENTIONS:  - Identify barriers to discharge w/patient and caregiver  - Arrange for needed discharge resources and transportation as appropriate  - Identify discharge learning needs (meds, wound care, etc.)  - Arrange for interpretive services to assist at discharge as needed  - Refer to Case Management Department for coordinating discharge planning if the patient needs post-hospital services based on physician/advanced practitioner order or complex needs related to functional status, cognitive ability, or social support system  Outcome: Progressing     Problem: Knowledge Deficit  Goal: Patient/family/caregiver demonstrates understanding of disease process, treatment plan, medications, and discharge instructions  Description: Complete learning assessment and assess knowledge base.  Interventions:  - Provide teaching at level of understanding  - Provide teaching via preferred learning methods  Outcome: Progressing      Problem: Prexisting or High Potential for Compromised Skin Integrity  Goal: Skin integrity is maintained or improved  Description: INTERVENTIONS:  - Identify patients at risk for skin breakdown  - Assess and monitor skin integrity  - Assess and monitor nutrition and hydration status  - Monitor labs   - Assess for incontinence   - Turn and reposition patient  - Assist with mobility/ambulation  - Relieve pressure over bony prominences  - Avoid friction and shearing  - Provide appropriate hygiene as needed including keeping skin clean and dry  - Evaluate need for skin moisturizer/barrier cream  - Collaborate with interdisciplinary team   - Patient/family teaching  - Consider wound care consult   Outcome: Progressing

## 2024-07-29 NOTE — ANESTHESIA POSTPROCEDURE EVALUATION
Post-Op Assessment Note    CV Status:  Stable  Pain Score: 0    Pain management: adequate       Mental Status:  Alert and awake   Hydration Status:  Euvolemic   PONV Controlled:  Controlled   Airway Patency:  Patent     Post Op Vitals Reviewed: Yes    No anethesia notable event occurred.    Staff: Anesthesiologist, CRNA               BP   99/51   Temp   N/a   Pulse  78   Resp   16   SpO2   100

## 2024-07-29 NOTE — ANESTHESIA PREPROCEDURE EVALUATION
Procedure:  Cardiac eps/av node ablation (Chest)    Admitted 7/27 for AICD defibrillation on 7/25  Interrogation of pacer/AICD:  VF treated with ATP x 5 and shock, Noted Afib 13% of time    Noted that patient did not take metoprolol x 1.5 weeks prior to this    Baseline vitals: Hr 50ws  -130/50-80    Patient suspends DNR for procedure and immediate post procedure time.    Relevant Problems   CARDIO   (+) Aortic stenosis   (+) Atrial fibrillation (HCC)   (+) Coronary artery disease   (+) Hyperlipidemia   (+) Left bundle branch block (LBBB)   (+) Moderate mitral regurgitation      ENDO   (+) Hypothyroidism   (+) Secondary hyperparathyroidism of renal origin ([facility])   (+) Type 2 diabetes mellitus, without long-term current use of insulin (HCC)      /RENAL   (+) CKD (chronic kidney disease)   (+) Stage 4 chronic kidney disease (HCC)      MUSCULOSKELETAL   (+) Primary osteoarthritis of both knees   (+) Primary osteoarthritis of both shoulders      NEURO/PSYCH   (+) Depression   (+) Diabetic peripheral neuropathy (HCC)      Care Coordination   (+) Ambulatory dysfunction   (+) DNR (do not resuscitate)      Cardiovascular/Peripheral Vascular   (+) Chronic combined systolic and diastolic heart failure (HCC)   (+) Nonischemic cardiomyopathy (HCC)      Respiratory/Allergy   (+) Pulmonary fibrosis (HCC)      Other   (+) ICD (implantable cardioverter-defibrillator) discharge       TTE 7/28:       Left Ventricle: Left ventricular cavity size is normal. Wall thickness is normal. The left ventricular ejection fraction is 45%. Systolic function is mildly reduced. There is mild global hypokinesis. Diastolic function is normal.    Right Ventricle: Right ventricular cavity size is normal. Systolic function is normal.    Aortic Valve: There is aortic valve sclerosis.    Tricuspid Valve: There is mild regurgitation.    Aorta: The aortic root is normal in size. The ascending aorta is mildly dilated.     Physical  Exam    Airway    Mallampati score: II  TM Distance: >3 FB  Neck ROM: full     Dental       Cardiovascular      Pulmonary      Other Findings  post-pubertal.      Anesthesia Plan  ASA Score- 4     Anesthesia Type- IV sedation with anesthesia with ASA Monitors.         Additional Monitors:     Airway Plan:     Comment: Recent labs personally reviewed:  Lab Results       Component                Value               Date                       WBC                      8.38                07/29/2024                 HGB                      11.5                07/29/2024                 PLT                      193                 07/29/2024            Lab Results       Component                Value               Date                       NA                       143                 09/19/2015                 K                        4.4                 07/29/2024                 BUN                      17                  07/29/2024                 CREATININE               1.11                07/29/2024                 GLUCOSE                  116                 09/19/2015            Lab Results       Component                Value               Date                       PTT                      50 (H)              01/17/2022             Lab Results       Component                Value               Date                       INR                      1.32 (H)            07/29/2024              Blood type     I, Melanie Quijano MD, have personally seen and evaluated the patient prior to anesthetic care.  I have reviewed the pre-anesthetic record, medical history, allergies, medications and any other medical records if appropriate to the anesthetic care.  If a CRNA is involved in the case, I have reviewed the CRNA assessment, if present, and agree. Patient consented for IV Sedation, general anesthesia as back up. Discussed risks of aspiration, IV infiltration, indications for conversion to general anesthesia. All  questions and concerns addressed.     .       Plan Factors-Exercise tolerance (METS): <4 METS.    Chart reviewed. EKG reviewed. Imaging results reviewed. Existing labs reviewed. Patient summary reviewed.    Patient is not a current smoker.  Patient did not smoke on day of surgery.    Obstructive sleep apnea risk education given perioperatively.        Induction- intravenous.    Postoperative Plan-         Informed Consent- Anesthetic plan and risks discussed with patient.  I personally reviewed this patient with the CRNA. Discussed and agreed on the Anesthesia Plan with the CRNA..

## 2024-07-29 NOTE — UTILIZATION REVIEW
Initial Clinical Review    Admission: Date/Time/Statement:   Admission Orders (From admission, onward)       Ordered        07/27/24 1008  INPATIENT ADMISSION  Once                          Orders Placed This Encounter   Procedures    INPATIENT ADMISSION     Standing Status:   Standing     Number of Occurrences:   1     Order Specific Question:   Level of Care     Answer:   Med Surg [16]     Order Specific Question:   Estimated length of stay     Answer:   More than 2 Midnights     Order Specific Question:   Certification     Answer:   I certify that inpatient services are medically necessary for this patient for a duration of greater than two midnights. See H&P and MD Progress Notes for additional information about the patient's course of treatment.     ED Arrival Information       Expected   7/26/2024     Arrival   7/27/2024 09:25    Acuity   Urgent              Means of arrival   Ambulance    Escorted by   [facility]    Service   Hospitalist    Admission type   Emergency              Arrival complaint   ICD Discharge             Chief Complaint   Patient presents with    AICD Problem     Pt defibrillator fired 7/25.  Pt was evaluated by [facility].  Pt cardiologist called and said to come back to ED for eval.  Pt has no complaInts        Initial Presentation: 78 y.o. female Admitted Inpatient to [facility] Unit d/t ICD Discharge.  PMHx: Afib, NICM, LBBB s/p ICD, T2DM.   Event occurred on 7/25 where she was seen in the ED and was eventually sent home with f/u with her cardiologist. She presented back to ED today at the request of her cardiologist. Interrogation showed 2 VF CLASSIFIED NOTED & TREATED WITH 5 ATP & 1 SHOCK. On presentation asymptomatics and HD stable.   Per pt sister, pt BB dose was increased from 25 mg daily to BID but they ran out of her med ~ 1 1/2 wk ago and couldn't get a refill.     Plan: EP consulted. Telemetry. Continue metoprolol and tikosyn currently. TTE, CXR ordered. Monitor and optimize K and  Mg. Diet controlled DM, ok to monitor off SSI. Continue Eliquis.      Date: 7/28   Day 2:   EP Consult -- RT-D shocks in the setting of A-fib with RVR. Currently AV paced. Repeat echo pending. Continue home metoprolol succinate 25 mg q12h. Continue dofetilide. Will obtain formal device interrogation tomorrow. If A-fib continues to be a problem may benefit from AVJ. Mag 1.6 on arrival, continue repletion and monitor. Will need po mag for chronic supplementation given chronic hypomagnesemia     Date: 7/29  Day 3: Has surpassed a 2nd midnight with active treatments and services for ablation today.  Continue telemetry, po meds. Tikosyn d/c'd. Metoprolol dosage adjusted. PT/OT evals for d/c assist.        ED Triage Vitals [07/27/24 0931]   Temperature Pulse Respirations Blood Pressure SpO2 Pain Score   97.8 °F (36.6 °C) 72 18 130/61 96 % No Pain     Weight (last 2 days)       Date/Time Weight    07/28/24 1410 86.2 (190)    07/27/24 11:14:21 86.2 (190)            Vital Signs (last 3 days)       Date/Time Temp Pulse Resp BP MAP (mmHg) SpO2 O2 Device Patient Position - Orthostatic VS Elenita Coma Scale Score Pain    07/29/24 1600 -- 81 -- 118/78 91 97 % -- -- -- --    07/29/24 1500 -- 82 -- 117/71 86 98 % -- Lying -- --    07/29/24 1430 -- 82 -- 103/65 78 94 % -- Lying -- --    07/29/24 1400 -- 80 -- 108/65 79 95 % None (Room air) -- -- --    07/29/24 1345 -- 80 -- 104/63 77 94 % None (Room air) -- -- --    07/29/24 1330 -- 80 -- 105/61 76 92 % None (Room air) -- -- --    07/29/24 13:04:16 -- 81 -- 105/60 75 92 % -- -- -- --    07/29/24 0924 -- -- -- -- -- -- -- -- -- 8    07/29/24 0920 -- -- -- -- -- -- -- -- 15 --    07/29/24 07:18:55 98.5 °F (36.9 °C) 56 18 101/61 74 95 % -- -- -- --    07/29/24 0559 -- 62 -- -- -- -- -- -- -- --    07/29/24 03:06:21 98.3 °F (36.8 °C) 59 -- 103/61 75 93 % -- -- -- --    07/28/24 2225 98.5 °F (36.9 °C) 60 17 103/61 75 94 % None (Room air) Lying -- --    07/28/24 21:12:48 98.6 °F (37 °C)  60 18 102/62 75 95 % -- -- -- --    07/28/24 2005 -- -- -- -- -- -- -- -- 15 --    07/28/24 19:25:49 -- 113 -- 128/81 97 92 % -- -- -- --    07/28/24 18:54:58 -- 126 -- 132/83 99 93 % -- -- -- --    07/28/24 18:12:10 98.9 °F (37.2 °C) 117 17 119/69 86 93 % None (Room air) Lying -- --    07/28/24 15:08:50 98.3 °F (36.8 °C) 60 16 115/57 76 93 % None (Room air) Lying -- --    07/28/24 1410 -- 61 -- 125/60 -- -- -- -- -- --    07/28/24 11:16:40 98.2 °F (36.8 °C) 61 16 125/60 82 92 % None (Room air) Lying -- --    07/28/24 0800 -- -- -- -- -- -- -- -- 15 --    07/28/24 07:32:59 98.3 °F (36.8 °C) 63 17 128/57 81 92 % -- -- -- --    07/27/24 23:34:49 98.6 °F (37 °C) 56 18 127/55 79 93 % -- -- -- --    07/27/24 21:20:56 98.7 °F (37.1 °C) 63 16 107/55 72 93 % -- -- -- --    07/27/24 2000 -- -- -- -- -- -- None (Room air) -- 15 No Pain    07/27/24 18:45:54 98.8 °F (37.1 °C) 60 -- 130/69 89 92 % -- -- -- --    07/27/24 15:45:42 98.1 °F (36.7 °C) 62 -- 128/70 89 93 % -- -- -- --    07/27/24 11:14:21 98.1 °F (36.7 °C) 58 16 126/62 83 93 % -- -- -- --    07/27/24 1035 -- 60 18 118/58 -- 96 % None (Room air) Sitting -- No Pain    07/27/24 0953 -- -- -- -- -- -- None (Room air) -- 15 --    07/27/24 0931 97.8 °F (36.6 °C) 72 18 130/61 -- 96 % None (Room air) Sitting -- No Pain    07/27/24 0800 -- -- -- -- -- -- -- -- 15 --              Pertinent Labs/Diagnostic Test Results:   Radiology:  XR chest portable   Final Interpretation by Sandra Dao MD (07/28 0822)      ICD leads intact and well-positioned.      Mild pulmonary fibrosis.            Workstation performed: [id number]           Cardiology:  ECG 12 lead   Final Result by Nikos Cronin MD (07/29 1542)   AV dual-paced rhythm   Abnormal ECG   When compared with ECG of 28-JUL-2024 18:06,   Vent. rate has decreased BY  42 BPM   Confirmed by Nikos Cronin ([id number]) on 7/29/2024 3:42:55 PM      Cardiac ep lab eps/ablations   Final Result by Jose Carter MD (07/29  1425)      [place] [address]       INVASIVE CARDIOLOGY- AV Node ABLATION    REFERRING:      PT NAME: Laura Sarkar is a 78 y.o. female   MRN: [id number]   : 1945         PERFORMING/ATTENDING PHY: DR. ERWIN GILLILAND MD         DATE OF PROCEDURE: 24         PREOPERATIVE DIAGNOSIS:   Atrial fibrillation with difficult to control rates         POSTOPERATIVE DIAGNOSIS Successful ablation AV node with complete heart    block acheived      PROCEDURES PERFORMED    1. AV Leatha Junctional ablation    2. Reprogramming of Pacemaker prior to procedure and after procedure.   3. Bundle of HIS and RV recording.             ANESTHESIA General anesthesia.    PREOPERATIVE MEDICATIONS: None      INFORMED CONSENT: Risks, benefits, and alternatives to AV leatha ablation    for atrial fibrillation a associated procedures discussed. The patient    understood risks include but not limited to death, bleeding and vascular    complication, and bleeding around the heart.         DESCRIPTION OF PROCEDURE The patient was draped in normal sterile fashion.    An SRO 8F sheath was placed in the right femoral vein with ultrasound    guidance. Ablation was performed with an 8mm [place] EPT blazer.   Ablation was performed at 60Watts <60 degrees. Ablation catheter was    placed on the AV groove near the bundle of HIS with RV recording, ablation    was just inferior and proximal to there.       HV:40 ms         Pacemaker was reprogrammed to VOO 30bpm before the ablation. After the    ablation it was reprogrammed to VVIR 80bpm. We observed for 30 minutes    after ablation without return of conduction of atrial fibrillation      COMPLICATIONS: None   EBL: Minimal.    CONTRAST: none         FINAL IMPRESSION: Successful ablation of AV node with Complete heart    block.             PLAN Postoperative monitoring overnight.             ECG 12 lead   Final Result by Missael Zayas,  MD (07/28 2012)   Ventricular-paced rhythm with frequent AV dual-paced complexes   Abnormal ECG   When compared with ECG of 27-JUL-2024 23:32,   Vent. rate has increased BY  62 BPM   Confirmed by Missael Zayas ([id number]) on 7/28/2024 8:12:36 PM      Echo complete w/ contrast if indicated   Final Result by Tima Douglas MD (07/28 4582)        Left Ventricle: Left ventricular cavity size is normal. Wall thickness    is normal. The left ventricular ejection fraction is 45%. Systolic    function is mildly reduced. There is mild global hypokinesis. Diastolic    function is normal.     Right Ventricle: Right ventricular cavity size is normal. Systolic    function is normal.     Aortic Valve: There is aortic valve sclerosis.     Tricuspid Valve: There is mild regurgitation.     Aorta: The aortic root is normal in size. The ascending aorta is mildly    dilated.         ECG 12 lead   Final Result by Missael Zayas MD (07/28 0619)   AV dual-paced rhythm   Abnormal ECG   When compared with ECG of 27-JUL-2024 14:14, (unconfirmed)   No significant change was found   Confirmed by Missael Zayas ([id number]) on 7/28/2024 6:19:33 AM      ECG 12 lead   Final Result by Missael Zayas MD (07/28 0624)   AV dual-paced rhythm   Abnormal ECG   When compared with ECG of 27-JUL-2024 11:21, (unconfirmed)   Electronic ventricular pacemaker has replaced Electronic atrial pacemaker   Confirmed by Missael Zayas ([id number]) on 7/28/2024 6:24:31 AM      ECG 12 lead   Final Result by Missael Zayas MD (07/28 0625)   Atrial-paced rhythm   Nonspecific ST-t wave changes   Abnormal ECG   When compared with ECG of 25-JUL-2024 12:12,   Electronic atrial pacemaker has replaced Electronic ventricular pacemaker   Confirmed by Missael Zayas ([id number]) on 7/28/2024 6:25:34 AM      ECG 12 lead   Final Result by Nikos Cronin MD (07/29 8379)   Atrial-paced rhythm with Premature ventricular complexes   Low voltage QRS   Nonspecific ST and T  wave abnormality   Abnormal ECG   No previous ECGs available   Confirmed by Nikos Cronin ([id number]) on 7/29/2024 1:29:28 PM            Results from last 7 days   Lab Units 07/29/24  0459 07/27/24  0950 07/25/24  1241   WBC Thousand/uL 8.38 7.21 9.90   HEMOGLOBIN g/dL 11.5 12.0 12.4   HEMATOCRIT % 37.0 39.0 39.7   PLATELETS Thousands/uL 193 180 199   TOTAL NEUT ABS Thousands/µL  --  5.10 7.12         Results from last 7 days   Lab Units 07/29/24  0459 07/28/24  0621 07/27/24  0950 07/25/24  1241   SODIUM mmol/L 137 140 141 141   POTASSIUM mmol/L 4.4 4.3 4.6 4.5   CHLORIDE mmol/L 104 106 107 107   CO2 mmol/L 26 28 29 30   ANION GAP mmol/L 7 6 5 4   BUN mg/dL 17 18 21 22   CREATININE mg/dL 1.11 1.13 1.19 1.29   EGFR ml/min/1.73sq m 47 46 43 39   CALCIUM mg/dL 9.2 9.1 9.5 9.7   MAGNESIUM mg/dL 2.2 1.5* 1.6* 1.6*   PHOSPHORUS mg/dL  --  3.2  --   --      Results from last 7 days   Lab Units 07/25/24  1241   AST U/L 13   ALT U/L 6*   ALK PHOS U/L 69   TOTAL PROTEIN g/dL 5.8*   ALBUMIN g/dL 3.4*   TOTAL BILIRUBIN mg/dL 0.47     Results from last 7 days   Lab Units 07/28/24  1556   POC GLUCOSE mg/dl 117     Results from last 7 days   Lab Units 07/29/24  0459 07/28/24  0621 07/27/24  0950 07/25/24  1241   GLUCOSE RANDOM mg/dL 104 90 135 157*     Results from last 7 days   Lab Units 07/27/24  1146 07/27/24  0950 07/25/24  1430 07/25/24  1241   HS TNI 0HR ng/L  --  12  --  19   HS TNI 2HR ng/L 11  --  19  --    HSTNI D2 ng/L -1  --  0  --      Results from last 7 days   Lab Units 07/29/24  0459   PROTIME seconds 16.2*   INR  1.32*     Results from last 7 days   Lab Units 07/25/24  1241   TSH 3RD GENERATON uIU/mL 1.748       ED Treatment-Medication Administration from 07/26/2024 1614 to 07/27/2024 1045       None            Past Medical History:   Diagnosis Date    Aortic stenosis     Arthritis     Asthma     Basal cell carcinoma 10/24/2023    right lateral forehead, mohs    Basal cell carcinoma 10/24/2023    right medial  forehead, mohs    Coronary artery disease     Diabetes mellitus (HCC)     Diabetic peripheral neuropathy (HCC)     Last assessed - 1/27/16    Gallbladder disease     Heart attack (HCC) 07/1999    Hemorrhoids     Last assessed - 11/16/12    Hypoglycemia 12/23/2021    Myocardial infarction (HCC)     Old myocardial infarction     Type 2 diabetes mellitus with autonomic neuropathy (HCC)     Unspec long term insulin use status, Last assessed - 6/8/17     Present on Admission:   Atrial fibrillation (HCC)   Type 2 diabetes mellitus, without long-term current use of insulin (HCC)   Nonischemic cardiomyopathy (HCC)   Ambulatory dysfunction      Admitting Diagnosis: ICD (implantable cardioverter-defibrillator) discharge [Z45.02]  AICD discharge [Z45.02]  Age/Sex: 78 y.o. female  Admission Orders:  Scheduled Medications:  apixaban, 5 mg, Oral, Q12H  dofetilide 125 mcg Oral Q12H - Dc'd 7/29  gabapentin, 600 mg, Oral, TID  levothyroxine, 125 mcg, Oral, Early Morning  losartan, 25 mg, Oral, QAM  magnesium Oxide, 400 mg, Oral, BID  metoprolol succinate, 25 mg, Oral, Q12H, increased to 50 mg 7/28  metoprolol succinate, 50 mg, Oral, Q12H,  [START ON 7/30/2024] metoprolol succinate, 25 mg, Oral, Daily  pravastatin, 10 mg, Oral, Daily With Dinner  venlafaxine, 75 mg, Oral, TID     PRN Meds:  acetaminophen, 650 mg, Oral, Q6H PRN 7/29 x1          IP CONSULT TO ELECTROPHYSIOLOGY    Network Utilization Review Department  ATTENTION: Please call with any questions or concerns to [phone] and carefully listen to the prompts so that you are directed to the right person. All voicemails are confidential.   For Discharge needs, contact Care Management DC Support Team at [phone] opt. 2  Send all requests for admission clinical reviews, approved or denied determinations and any other requests to dedicated fax number below belonging to the campus where the patient is receiving treatment. List of dedicated fax numbers for the  Facilities:  FACILITY NAME UR FAX NUMBER   ADMISSION DENIALS (Administrative/Medical Necessity) [phone]   DISCHARGE SUPPORT TEAM (NETWORK) [phone]   PARENT CHILD HEALTH (Maternity/NICU/Pediatrics) [phone]   [facility] [phone]   [facility] [phone]   [facility] [phone]   [facility] [phone]   [facility] [phone]   [facility] [phone]   [facility] [phone]   [facility] [phone]   [facility] [phone]   Critical access hospital [phone]   [facility] [phone]   [facility] [phone]

## 2024-07-29 NOTE — PLAN OF CARE
Problem: SAFETY ADULT  Goal: Patient will remain free of falls  Description: INTERVENTIONS:  - Educate patient/family on patient safety including physical limitations  - Instruct patient to call for assistance with activity   - Consult OT/PT to assist with strengthening/mobility   - Keep Call bell within reach  - Keep bed low and locked with side rails adjusted as appropriate  - Keep care items and personal belongings within reach  - Initiate and maintain comfort rounds  - Make Fall Risk Sign visible to staff  - Offer Toileting every  Hours, in advance of need  - Initiate/Maintain \alarm  - Obtain necessary fall risk management equipment:   - Apply yellow socks and bracelet for high fall risk patients  - Consider moving patient to room near nurses station  Outcome: Progressing   Pt has been resting in bed throughout the shift. At the start of night (1900) pt heartrate was in the 130-150s afib with runs of vtach and pacing. Extra dose of metoprolol given and then evening medication of tikosyn given. Pt broke into paced rhythm AV paced 59-60 at 2110. Will continue to monitor.

## 2024-07-29 NOTE — ED ATTENDING ATTESTATION
7/26/2024  I, Josesito Humphries MD, saw and evaluated the patient. I have discussed the patient with the resident/non-physician practitioner and agree with the resident's/non-physician practitioner's findings, Plan of Care, and MDM as documented in the resident's/non-physician practitioner's note, except where noted. All available labs and Radiology studies were reviewed.  I was present for key portions of any procedure(s) performed by the resident/non-physician practitioner and I was immediately available to provide assistance.       At this point I agree with the current assessment done in the Emergency Department.  I have conducted an independent evaluation of this patient a history and physical is as follows:    ED Course       Patient is a 78-year-old female history of congestive heart failure who presents for reevaluation of symptoms patient recently seen and evaluated outside hospital for AICD firing.  Patient states she noted her AICD to fire once while in the bathroom prior to arrival.  She denies any chest pain or shortness of breath prior to AICD firing.  Patient also admits that she has not been taking one of her medications due to inability to access her medication.  Patient currently asymptomatic without complaints.  Vitals reviewed heart regular rate and rhythm without murmurs.  Lungs cross-stitch bilaterally.  Abdomen soft nontender nondistended normal bowel sounds.  Extremities no edema.    Impression: Encounter for AICD evaluation  Differential diagnosis: ACS MI arrhythmia device malfunction    Plan to check labs electrolytes interrogate device ECG discussed case electrophysiology    Case discussed with electrophysiology request patient be admitted to medicine service for further evaluation and management.        Critical Care Time  Procedures

## 2024-07-29 NOTE — ASSESSMENT & PLAN NOTE
Lab Results   Component Value Date    HGBA1C 6.5 01/24/2024       Recent Labs     07/28/24  1556   POCGLU 117       Blood Sugar Average: Last 72 hrs:  (P) 117Diet controlled  Ok to observe off SSI.

## 2024-07-29 NOTE — PROGRESS NOTES
[facility]  Progress Note  Name: Laura Sarkar I  MRN: [id number]  Unit/Bed#: CW2 214-01 I Date of Admission: 7/27/2024   Date of Service: 7/29/2024 I Hospital Day: 2    Assessment & Plan   * ICD (implantable cardioverter-defibrillator) discharge  Assessment & Plan  Interrogation 7/25 : 2 VF CLASSIFIED NOTED & TREATED WITH 5 ATP & 1 SHOCK. AVAIL EGRAMS PRESENT AS RVR & PROBABLE VT. 3 AT/AF NOTED; LONGEST 5.31 HRS; 13% BURDEN   EP consult  Reports running of her metoporolol for 1 and half week and couldn't get refill  Continue metoprolol and tikosyn   TTE noted  Optimize K and Mg  Telemetry  EP following and planning for ablation today    Atrial fibrillation (HCC)  Assessment & Plan  Maintained on Toprol 25mg BID and Tikosyn   Eliquis for AC.   Rates currently controlled.  Tentative ablation today     Ambulatory dysfunction  Assessment & Plan  PT/OT pending. Patient lives alone    Nonischemic cardiomyopathy (HCC)  Assessment & Plan  Continue current GDMT    Type 2 diabetes mellitus, without long-term current use of insulin (HCC)  Assessment & Plan  Lab Results   Component Value Date    HGBA1C 6.5 01/24/2024       Recent Labs     07/28/24  1556   POCGLU 117       Blood Sugar Average: Last 72 hrs:  (P) 117Diet controlled  Ok to observe off SSI.               VTE Pharmacologic Prophylaxis: VTE Score: 3 Moderate Risk (Score 3-4) - Pharmacological DVT Prophylaxis Ordered: apixaban (Eliquis).    Mobility:   Basic Mobility Inpatient Raw Score: 24  JH-HLM Goal: 8: Walk 250 feet or more  JH-HLM Achieved: 7: Walk 25 feet or more  JH-HLM Goal achieved. Continue to encourage appropriate mobility.    Patient Centered Rounds: I performed bedside rounds with nursing staff today.   Discussions with Specialists or Other Care Team Provider: EP, case management    Education and Discussions with Family / Patient: Updated  (granddaughter) via phone.    Total Time Spent on Date of  Encounter in care of patient: 35 mins. This time was spent on one or more of the following: performing physical exam; counseling and coordination of care; obtaining or reviewing history; documenting in the medical record; reviewing/ordering tests, medications or procedures; communicating with other healthcare professionals and discussing with patient's family/caregivers.    Current Length of Stay: 2 day(s)  Current Patient Status: Inpatient   Certification Statement: The patient will continue to require additional inpatient hospital stay due to ablation  Discharge Plan: Anticipate discharge in 24-48 hrs to discharge location to be determined pending rehab evaluations.    Code Status: Level 2 - DNAR: but accepts endotracheal intubation    Subjective:   Offers no complaints.     Objective:     Vitals:   Temp (24hrs), Av.5 °F (36.9 °C), Min:98.2 °F (36.8 °C), Max:98.9 °F (37.2 °C)    Temp:  [98.2 °F (36.8 °C)-98.9 °F (37.2 °C)] 98.5 °F (36.9 °C)  HR:  [] 56  Resp:  [16-18] 18  BP: (101-132)/(57-83) 101/61  SpO2:  [92 %-95 %] 95 %  Body mass index is 35.9 kg/m².     Input and Output Summary (last 24 hours):     Intake/Output Summary (Last 24 hours) at 2024 0946  Last data filed at 2024 1212  Gross per 24 hour   Intake 50 ml   Output --   Net 50 ml       Physical Exam:   Physical Exam  Constitutional:       Appearance: Normal appearance.   Cardiovascular:      Rate and Rhythm: Normal rate and regular rhythm.      Heart sounds: No murmur heard.  Pulmonary:      Effort: Pulmonary effort is normal.      Breath sounds: Normal breath sounds.   Abdominal:      General: Bowel sounds are normal. There is no distension.      Palpations: Abdomen is soft.      Tenderness: There is no abdominal tenderness.   Skin:     General: Skin is warm and dry.   Neurological:      General: No focal deficit present.      Mental Status: She is alert and oriented to person, place, and time.   Psychiatric:         Mood and  Affect: Mood normal.          Additional Data:     Labs:  Results from last 7 days   Lab Units 07/29/24  0459 07/27/24  0950   WBC Thousand/uL 8.38 7.21   HEMOGLOBIN g/dL 11.5 12.0   HEMATOCRIT % 37.0 39.0   PLATELETS Thousands/uL 193 180   SEGS PCT %  --  71   LYMPHO PCT %  --  17   MONO PCT %  --  9   EOS PCT %  --  3     Results from last 7 days   Lab Units 07/29/24  0459 07/27/24  0950 07/25/24  1241   SODIUM mmol/L 137   < > 141   POTASSIUM mmol/L 4.4   < > 4.5   CHLORIDE mmol/L 104   < > 107   CO2 mmol/L 26   < > 30   BUN mg/dL 17   < > 22   CREATININE mg/dL 1.11   < > 1.29   ANION GAP mmol/L 7   < > 4   CALCIUM mg/dL 9.2   < > 9.7   ALBUMIN g/dL  --   --  3.4*   TOTAL BILIRUBIN mg/dL  --   --  0.47   ALK PHOS U/L  --   --  69   ALT U/L  --   --  6*   AST U/L  --   --  13   GLUCOSE RANDOM mg/dL 104   < > 157*    < > = values in this interval not displayed.     Results from last 7 days   Lab Units 07/29/24  0459   INR  1.32*     Results from last 7 days   Lab Units 07/28/24  1556   POC GLUCOSE mg/dl 117               Lines/Drains:  Invasive Devices       Peripheral Intravenous Line  Duration             Peripheral IV 07/27/24 Distal;Dorsal (posterior);Right Forearm 2 days                      Telemetry:  Telemetry Orders (From admission, onward)               24 Hour Telemetry Monitoring  Continuous x 24 Hours (Telem)        Question:  Reason for 24 Hour Telemetry  Answer:  Arrhythmias requiring acute medical intervention / PPM or ICD malfunction                     Telemetry Reviewed:  paced  Indication for Continued Telemetry Use: Awaiting PCI/EP Study/CABG             Imaging: No pertinent imaging reviewed.    Recent Cultures (last 7 days):         Last 24 Hours Medication List:   Current Facility-Administered Medications   Medication Dose Route Frequency Provider Last Rate    acetaminophen  650 mg Oral Q6H PRN Polo Padron MD      apixaban  5 mg Oral Q12H Polo Padron MD      dofetilide  125 mcg Oral Q12H  Polo Padron MD      gabapentin  600 mg Oral TID Polo Padron MD      levothyroxine  125 mcg Oral Early Morning Pool Padron MD      losartan  25 mg Oral QAM Polo Padron MD      magnesium Oxide  400 mg Oral BID Polo Padron MD      metoprolol succinate  50 mg Oral Q12H Delores Robert MD      pravastatin  10 mg Oral Daily With Dinner Polo Padron MD      sodium chloride (PF)  3 mL Intravenous Q1H PRN Alberto Godinez MD      venlafaxine  75 mg Oral TID Polo Padron MD          Today, Patient Was Seen By: Sarika Plunkett PA-C    **Please Note: This note may have been constructed using a voice recognition system.**

## 2024-07-29 NOTE — ASSESSMENT & PLAN NOTE
Maintained on Toprol 25mg BID and Tikosyn   Eliquis for AC.   Rates currently controlled.  Tentative ablation today

## 2024-07-29 NOTE — DISCHARGE INSTR - AVS FIRST PAGE
Medication Plan:    Take:   Eliquis 5mg twice daily.  Do not stop blood thinner until discussion with provider at post op appointment.   Decrease Metoprolol to 25mg daily   Discontinue:  Tikosyn    Post Procedure Care instructions:    For 7 days:  You may shower after the first 24 hours   Allow gentle soap and water to wash over incision.  Do not scrub. Pat to dry   If chaffing with undergarments, wear bandage during the day to prevent irritation. Maximum 5 days and change bandage daily.  Do not use lotions/powders/creams    You may walk or go up/down stairs      Restrictions for 1 week:   Do not lift greater than 10lbs    Avoid running/jumping    No submerging wound in water (No bath/hot tubs/pools/etc)    Normal healing process   You may have bruising that extends into your genitalia and/or down your thigh toward the knee. This can take several weeks to resolve.  Pain may worsen over the first 48 hours as you increase your activity and then will improve   Tylenol (acetaminophen)  Ice   You may have a small pea sized lump   Your arrhythmia may reoccur in the next 3 months  This is due to inflammation/irritation from the procedure    When to call clinic:    Redness or swelling at incision site   Bleeding or expanding lump in groin    Opening and/or drainage from the incision   Temperature >100.4 F   If your arrhythmia reoccurs call if:  You are symptomatic (dizziness/lightheadedness/heart racing/etc)    If you have any questions:   [phone] 8:30am-5:30pm  [phone] After hours  [phone] Appointments

## 2024-07-29 NOTE — ASSESSMENT & PLAN NOTE
Interrogation 7/25 : 2 VF CLASSIFIED NOTED & TREATED WITH 5 ATP & 1 SHOCK. AVAIL EGRAMS PRESENT AS RVR & PROBABLE VT. 3 AT/AF NOTED; LONGEST 5.31 HRS; 13% BURDEN   EP consult  Reports running of her metoporolol for 1 and half week and couldn't get refill  Continue metoprolol and tikosyn   TTE noted  Optimize K and Mg  Telemetry  EP following and planning for ablation today

## 2024-07-29 NOTE — PHYSICAL THERAPY NOTE
Physical Therapy Cancellation Note    PT orders received chart review completed. Pt is currently off the floor in cath lab and not appropriate to participate in skilled PT at this time. PT will follow and eval as medically appropriate.     07/29/24 1130   Note Type   Note type Cancelled Session   Cancel Reasons Patient off floor/test       Angela Barrios, PT

## 2024-07-30 LAB
ANION GAP SERPL CALCULATED.3IONS-SCNC: 5 MMOL/L (ref 4–13)
BUN SERPL-MCNC: 18 MG/DL (ref 5–25)
CALCIUM SERPL-MCNC: 9.2 MG/DL (ref 8.4–10.2)
CHLORIDE SERPL-SCNC: 103 MMOL/L (ref 96–108)
CO2 SERPL-SCNC: 31 MMOL/L (ref 21–32)
CREAT SERPL-MCNC: 1.31 MG/DL (ref 0.6–1.3)
ERYTHROCYTE [DISTWIDTH] IN BLOOD BY AUTOMATED COUNT: 12.9 % (ref 11.6–15.1)
GFR SERPL CREATININE-BSD FRML MDRD: 38 ML/MIN/1.73SQ M
GLUCOSE SERPL-MCNC: 95 MG/DL (ref 65–140)
HCT VFR BLD AUTO: 38.2 % (ref 34.8–46.1)
HGB BLD-MCNC: 12.3 G/DL (ref 11.5–15.4)
MCH RBC QN AUTO: 31.9 PG (ref 26.8–34.3)
MCHC RBC AUTO-ENTMCNC: 32.2 G/DL (ref 31.4–37.4)
MCV RBC AUTO: 99 FL (ref 82–98)
PLATELET # BLD AUTO: 181 THOUSANDS/UL (ref 149–390)
PMV BLD AUTO: 11.3 FL (ref 8.9–12.7)
POTASSIUM SERPL-SCNC: 4.7 MMOL/L (ref 3.5–5.3)
RBC # BLD AUTO: 3.85 MILLION/UL (ref 3.81–5.12)
SODIUM SERPL-SCNC: 139 MMOL/L (ref 135–147)
WBC # BLD AUTO: 8.37 THOUSAND/UL (ref 4.31–10.16)

## 2024-07-30 PROCEDURE — NC001 PR NO CHARGE: Performed by: PHYSICIAN ASSISTANT

## 2024-07-30 PROCEDURE — 97163 PT EVAL HIGH COMPLEX 45 MIN: CPT

## 2024-07-30 PROCEDURE — 97167 OT EVAL HIGH COMPLEX 60 MIN: CPT

## 2024-07-30 PROCEDURE — 99232 SBSQ HOSP IP/OBS MODERATE 35: CPT | Performed by: PHYSICIAN ASSISTANT

## 2024-07-30 PROCEDURE — 85027 COMPLETE CBC AUTOMATED: CPT | Performed by: INTERNAL MEDICINE

## 2024-07-30 PROCEDURE — 80048 BASIC METABOLIC PNL TOTAL CA: CPT | Performed by: INTERNAL MEDICINE

## 2024-07-30 RX ORDER — SODIUM CHLORIDE 9 MG/ML
75 INJECTION, SOLUTION INTRAVENOUS CONTINUOUS
Status: DISPENSED | OUTPATIENT
Start: 2024-07-30 | End: 2024-07-30

## 2024-07-30 RX ORDER — METOPROLOL SUCCINATE 25 MG/1
25 TABLET, EXTENDED RELEASE ORAL DAILY
Qty: 30 TABLET | Refills: 0 | Status: SHIPPED | OUTPATIENT
Start: 2024-07-31

## 2024-07-30 RX ADMIN — VENLAFAXINE 75 MG: 37.5 TABLET ORAL at 17:39

## 2024-07-30 RX ADMIN — APIXABAN 5 MG: 5 TABLET, FILM COATED ORAL at 09:01

## 2024-07-30 RX ADMIN — GABAPENTIN 600 MG: 300 CAPSULE ORAL at 20:41

## 2024-07-30 RX ADMIN — MAGNESIUM OXIDE TAB 400 MG (241.3 MG ELEMENTAL MG) 400 MG: 400 (241.3 MG) TAB at 17:38

## 2024-07-30 RX ADMIN — MAGNESIUM OXIDE TAB 400 MG (241.3 MG ELEMENTAL MG) 400 MG: 400 (241.3 MG) TAB at 09:01

## 2024-07-30 RX ADMIN — PRAVASTATIN SODIUM 10 MG: 10 TABLET ORAL at 17:38

## 2024-07-30 RX ADMIN — VENLAFAXINE 75 MG: 37.5 TABLET ORAL at 20:41

## 2024-07-30 RX ADMIN — SODIUM CHLORIDE 75 ML/HR: 0.9 INJECTION, SOLUTION INTRAVENOUS at 09:00

## 2024-07-30 RX ADMIN — APIXABAN 5 MG: 5 TABLET, FILM COATED ORAL at 20:42

## 2024-07-30 RX ADMIN — VENLAFAXINE 75 MG: 37.5 TABLET ORAL at 10:56

## 2024-07-30 RX ADMIN — GABAPENTIN 600 MG: 300 CAPSULE ORAL at 17:38

## 2024-07-30 RX ADMIN — GABAPENTIN 600 MG: 300 CAPSULE ORAL at 09:01

## 2024-07-30 RX ADMIN — LEVOTHYROXINE SODIUM 125 MCG: 125 TABLET ORAL at 09:01

## 2024-07-30 NOTE — CASE MANAGEMENT
Case Management Discharge Planning Note    Patient name Laura Sarkar  Location CW2 214/CW2 214-01 MRN [id number]  : 1945 Date 2024       Current Admission Date: 2024  Current Admission Diagnosis:ICD (implantable cardioverter-defibrillator) discharge   Patient Active Problem List    Diagnosis Date Noted Date Diagnosed    Ambulatory dysfunction 2024     ICD (implantable cardioverter-defibrillator) discharge 2024     Presence of heart assist device ([facility]) 2024     Skin ulcer, limited to breakdown of skin ([facility]) 2024     Secondary hyperparathyroidism of renal origin ([facility]) 2024     Basal cell carcinoma (BCC) of forehead 10/23/2023     Primary osteoarthritis of both shoulders 02/10/2023     Nonischemic cardiomyopathy ([facility]) 2022     Abnormal SPEP 2022     Pulmonary fibrosis ([facility]) 2022     Hypothyroidism 2022     Hypotension 2022     [employer] discharge 2022     CKD (chronic kidney disease) 2022     Left knee pain 2022     Torn rotator cuff 2022     Left bundle branch block (LBBB) 2022     Moderate mitral regurgitation 2022     DNR (do not resuscitate) 2022     Depression 2021     Chronic combined systolic and diastolic heart failure ([facility]) 2021     Elevated troponin 2021     Stage 4 chronic kidney disease ([facility]) 2021     Type 2 diabetes mellitus, without long-term current use of insulin ([facility]) 2019     Diabetic peripheral neuropathy ([facility]) 2018     Class 3 severe obesity in adult ([facility]) 2018     Primary osteoarthritis of both knees 2018     Aortic stenosis 2018     Hyperlipidemia 10/04/2017     Atrial fibrillation ([facility]) 2015     Coronary artery disease 2013       LOS (days): 3  Geometric Mean LOS (GMLOS) (days): 1.2  Days to GMLOS:-1.9     OBJECTIVE:  Risk of Unplanned Readmission Score: 14.08         Current admission status: Inpatient    Preferred Pharmacy:   [id number] - [place] - [address] [place] [address]  Phone: [phone] Fax: [phone]    Primary Care Provider: Kalpana Siegel DO    Primary Insurance: [employer]  Secondary Insurance:     DISCHARGE DETAILS:    Discharge planning discussed with:: patient  Freedom of Choice: Yes  Comments - [place] of Choice: [facility] Referral prefers  if available.  CM contacted family/caregiver?: No- see comments  Were Treatment Team discharge recommendations reviewed with patient/caregiver?: Yes  Did patient/caregiver verbalize understanding of patient care needs?: Yes  Were patient/caregiver advised of the risks associated with not following Treatment Team discharge recommendations?: Yes       Other Referral/Resources/Interventions Provided:  Interventions: C    Treatment Team Recommendation: Home with Home Health Care  Discharge Destination Plan:: Home with Home Health Care

## 2024-07-30 NOTE — TELEPHONE ENCOUNTER
07/30/24 2:36 PM    Patient contacted post ED visit, second outreach attempt made. Message was left for patient to return a call to the V[facility] Department at [place]: Phone [phone].    Thank you.  Patricia Case MA  [facility]

## 2024-07-30 NOTE — QUICK NOTE
D/W Granddaughter Kathy. Family does not feel safe taking patient home. They feel that they cannot provide the support she needs at this time. Requesting referral to short term rehab. Case management made aware. Discharge cancelled.

## 2024-07-30 NOTE — OCCUPATIONAL THERAPY NOTE
Occupational Therapy Evaluation     Patient Name: Laura Sarkar  Today's Date: 7/30/2024  Problem List  Principal Problem:    ICD (implantable cardioverter-defibrillator) discharge  Active Problems:    Atrial fibrillation (HCC)    Type 2 diabetes mellitus, without long-term current use of insulin (HCC)    Nonischemic cardiomyopathy (HCC)    Ambulatory dysfunction    Past Medical History  Past Medical History:   Diagnosis Date    Aortic stenosis     Arthritis     Asthma     Basal cell carcinoma 10/24/2023    right lateral forehead, mohs    Basal cell carcinoma 10/24/2023    right medial forehead, mohs    Coronary artery disease     Diabetes mellitus (HCC)     Diabetic peripheral neuropathy (HCC)     Last assessed - 1/27/16    Gallbladder disease     Heart attack (HCC) 07/1999    Hemorrhoids     Last assessed - 11/16/12    Hypoglycemia 12/23/2021    Myocardial infarction (HCC)     Old myocardial infarction     Type 2 diabetes mellitus with autonomic neuropathy (HCC)     Unspec long term insulin use status, Last assessed - 6/8/17     Past Surgical History  Past Surgical History:   Procedure Laterality Date    BUNIONECTOMY      CARDIAC CATHETERIZATION      Carotid Artery, Resolved - 7/1/13    CARDIAC CATHETERIZATION N/A 12/27/2021    Procedure: CARDIAC CATHETERIZATION ;  Surgeon: Chucky Tadeo MD;  [facility];  Service: Cardiology    CARDIAC CATHETERIZATION N/A 12/27/2021    Procedure: Cardiac Coronary Angiogram;  Surgeon: Chucky Tadeo MD;  [facility];  Service: Cardiology    CARDIAC ELECTROPHYSIOLOGY PROCEDURE N/A 01/18/2022    Procedure: Cardiac biv icd implant;  Surgeon: Harley Rollins DO;  Location: [facility];  Service: Cardiology    CARDIAC ELECTROPHYSIOLOGY PROCEDURE N/A 7/29/2024    Procedure: Cardiac eps/av node ablation;  Surgeon: Jose Carter MD;  [facility]: [facility];  Service: Cardiology    CARDIOVASCULAR STRESS TEST  09/1999     CHOLECYSTECTOMY      COLONOSCOPY  2017    FOOT ARTHRODESIS, MODIFIED DONOHUE  2016    GALLBLADDER SURGERY  1970    HEMORROIDECTOMY      KNEE ARTHROSCOPY Left 2004    MOHS SURGERY Right 01/10/2024    right lateral and medial forehead (combined), Dr Berman    CT COLONOSCOPY FLX DX W/COLLJ SPEC WHEN PFRMD N/A 08/21/2017    Procedure: COLONOSCOPY;  Surgeon: Ady Wilkes MD;  Location:  GI LAB;  Service: Colorectal    REPAIR KNEE LIGAMENT      REPAIR KNEE LIGAMENT Left 1986    REPAIR KNEE LIGAMENT Right 1988 07/30/24 0820   OT Last Visit   OT Visit Date 07/30/24   Note Type   Note type Evaluation   Pain Assessment   Pain Assessment Tool 0-10   Pain Score No Pain   Restrictions/Precautions   Weight Bearing Precautions Per Order No   Other Precautions Chair Alarm;Bed Alarm;Telemetry;Fall Risk   Home Living   Type of Home House   Home Layout Two level;Performs ADLs on one level;Able to live on main level with bedroom/bathroom;Ramped entrance   Bathroom Shower/Tub Walk-in shower   Bathroom Toilet Raised   Bathroom Equipment Grab bars in shower;Shower chair;Toilet raiser   Bathroom Accessibility Accessible   Home Equipment Walker;Wheelchair-manual  (Pt reports using rw to transfer into w/c. lift chair)   Additional Comments Pt reports sleeping in lift chair at baseline   Prior Function   Level of [place] Independent with ADLs;Independent with functional mobility;Needs assistance with IADLS   Lives With Alone   Receives Help From Family  (supportive sister)   IADLs Family/Friend/Other provides transportation;Independent with medication management;Family/Friend/Other provides meals   Falls in the last 6 months 0   Vocational Full time employment   Lifestyle   Autonomy Pt reports (I) with ADLs and functional mobility using rw and w/c. Pt requires assistance from sister for IADLs. Pt -  and employed   Reciprocal Relationships family   Service to Others    ADL   Where Assessed Edge of bed    Eating Assistance 5  Supervision/Setup   Grooming Assistance 5  Supervision/Setup   UB Bathing Assistance 5  Supervision/Setup   LB Bathing Assistance 3  Moderate Assistance   UB Dressing Assistance 5  Supervision/Setup   LB Dressing Assistance 3  Moderate Assistance   Toileting Assistance  3  Moderate Assistance   Functional Assistance 4  Minimal Assistance   Bed Mobility   Supine to Sit 4  Minimal assistance   Additional items Assist x 1;Increased time required;Verbal cues;LE management   Transfers   Sit to Stand 5  Supervision   Additional items Armrests;Increased time required;Verbal cues   Stand to Sit 5  Supervision   Additional items Armrests;Increased time required;Verbal cues   Additional Comments with rw. Pt requires MOD a to complete tranfer from bed   Functional Mobility   Functional Mobility 4  Minimal assistance   Additional Comments Pt requires MIN A with rw to take steps from bed to bedside chair   Additional items Rolling walker   Balance   Static Sitting Fair +   Dynamic Sitting Fair   Static Standing Fair -   Dynamic Standing Poor +   Ambulatory Poor +   Activity Tolerance   Activity Tolerance Patient tolerated treatment well   Medical Staff Made Aware PT   Nurse Made Aware RN Cleared   RUE Assessment   RUE Assessment WFL   LUE Assessment   LUE Assessment WFL   Hand Function   Gross Motor Coordination Functional   Fine Motor Coordination Functional   Cognition   Overall Cognitive Status WFL   Arousal/Participation Alert;Responsive;Cooperative   Attention Within functional limits   Orientation Level Oriented X4   Memory Within functional limits   Following Commands Follows all commands and directions without difficulty   Comments Pt agreeable to therapy   Assessment   Limitation Decreased ADL status;Decreased UE strength;Decreased endurance;Decreased self-care trans;Decreased high-level ADLs   Prognosis Good   Assessment Pt is a 79 y/o female that was admitted to [facility] 7/27/2024 with  ICD discharge. Pt s/p cardiac eps/av node ablation . Pt  has a past medical history of Aortic stenosis, Arthritis, Asthma, Basal cell carcinoma, Basal cell carcinoma, Coronary artery disease, Diabetes mellitus (HCC), Diabetic peripheral neuropathy (HCC), Gallbladder disease, Heart attack (HCC), Hemorrhoids, Hypoglycemia, Myocardial infarction (HCC), Old myocardial infarction, and Type 2 diabetes mellitus with autonomic neuropathy (HCC). Pt lives alone in a two level house with ramped entrance, pt reports staying on the main level of the home. Pt with raised toilet and walk in shower with shower chair and grab bars. Pt reports using rw and w/c for mobility at baseline. Prior to admission pt (I) ADLs and functional mobility, pt requires assistance for IADLs. Pt currently requires supervision to transfer from chair and complete UB ADLs. Pt requires MIN A for bed mobility and to take steps with rw. Pt requires MOD A to complete LB ADLS and toileting. Pt limited by decreased ADL status, functional transfers, functional mobility, and activity tolerance. Pt supine in bed at begning of session, pt seated in bedside chair at end of session with alarm set and items within reach. The patient's raw score on the -PAC Daily Activity Inpatient Short Form is 19. A raw score of greater than or equal to 19 suggests the patient may benefit from discharge to home. Please refer to the recommendation of the Occupational Therapist for safe discharge planning.  Recommend Level III minimum intensity OT services  at d/c to maximize pt function.   Goals   Patient Goals to go home   LTG Time Frame 10-14   Plan   Treatment Interventions ADL retraining;Functional transfer training;UE strengthening/ROM;Endurance training;Patient/family training;Equipment evaluation/education;Compensatory technique education;Continued evaluation;Energy conservation;Activityengagement   Goal Expiration Date 08/13/24   OT Frequency 2-3x/wk   Discharge  Recommendation   Rehab Resource Intensity Level, OT III (Minimum Resource Intensity)   AM-PAC Daily Activity Inpatient   Lower Body Dressing 2   Bathing 3   Toileting 2   Upper Body Dressing 4   Grooming 4   Eating 4   Daily Activity Raw Score 19   Daily Activity Standardized Score (Calc for Raw Score >=11) 40.22   AM-PAC Applied Cognition Inpatient   Following a Speech/Presentation 4   Understanding Ordinary Conversation 4   Taking Medications 4   Remembering Where Things Are Placed or Put Away 4   Remembering List of 4-5 Errands 4   Taking Care of Complicated Tasks 4   Applied Cognition Raw Score 24   Applied Cognition Standardized Score 62.21   End of Consult   Education Provided Yes   Patient Position at End of Consult Bedside chair;Bed/Chair alarm activated;All needs within reach   Nurse Communication Nurse aware of consult    Goals:    Pt will complete functional transfers with MOD IND and appropriate AD to maximize pt safety.    Pt will complete bed mobility with MOD IND  to maximize pt safety.    Pt will complete grooming tasks with MOD IND to maximize pt independence.    Pt will complete LB ADLs with MOD IND  to maximize pt independence.    Pt will complete UB ADLs with MOD IND to maximize pt independence.    Pt will complete toileting with MOD IND to maximize pt independence.    Pt will complete functional household distance mobility with MOD IND and appropriate AD to maximize pt safety.    Pt will complete simulated IADL tasks with MIN A to maximize pt independence.     Pt will be able to tolerate 30 minutes of functional activity during therapy session.        WILVER Redmond, OTR/L

## 2024-07-30 NOTE — ASSESSMENT & PLAN NOTE
Continue current GDMT  Will remain on low dose BB. Cozaar held this am secondary to elevated creatinine, but will be restarted at discharge

## 2024-07-30 NOTE — ASSESSMENT & PLAN NOTE
Continue current GDMT  Will remain on low dose BB. Cozaar held this am secondary to elevated creatinine, will continue to hold due to borderline low BP.   F/u with OP Cardiology

## 2024-07-30 NOTE — ASSESSMENT & PLAN NOTE
PT/OT -was newly recommending home health care.  Patient's family did not feel safe discharging home.  Patient was kept overnight.  PT reevaluated today and is now recommending short-term rehab.  Patient adamantly refusing to go to short-term rehab.  She will go home with home health care.

## 2024-07-30 NOTE — CASE MANAGEMENT
Case Management Discharge Planning Note    Patient name Laura Sarkar  Location CW2 214/CW2 214-01 MRN [id number]  : 1945 Date 2024       Current Admission Date: 2024  Current Admission Diagnosis:ICD (implantable cardioverter-defibrillator) discharge   Patient Active Problem List    Diagnosis Date Noted Date Diagnosed    Ambulatory dysfunction 2024     ICD (implantable cardioverter-defibrillator) discharge 2024     Presence of heart assist device ([facility]) 2024     Skin ulcer, limited to breakdown of skin ([facility]) 2024     Secondary hyperparathyroidism of renal origin ([facility]) 2024     Basal cell carcinoma (BCC) of forehead 10/23/2023     Primary osteoarthritis of both shoulders 02/10/2023     Nonischemic cardiomyopathy ([facility]) 2022     Abnormal SPEP 2022     Pulmonary fibrosis ([facility]) 2022     Hypothyroidism 2022     Hypotension 2022     Lifevest discharge 2022     CKD (chronic kidney disease) 2022     Left knee pain 2022     Torn rotator cuff 2022     Left bundle branch block (LBBB) 2022     Moderate mitral regurgitation 2022     DNR (do not resuscitate) 2022     Depression 2021     Chronic combined systolic and diastolic heart failure ([facility]) 2021     Elevated troponin 2021     Stage 4 chronic kidney disease ([facility]) 2021     Type 2 diabetes mellitus, without long-term current use of insulin ([facility]) 2019     Diabetic peripheral neuropathy ([facility]) 2018     Class 3 severe obesity in adult ([facility]) 2018     Primary osteoarthritis of both knees 2018     Aortic stenosis 2018     Hyperlipidemia 10/04/2017     Atrial fibrillation ([facility]) 2015     Coronary artery disease 2013       LOS (days): 3  Geometric Mean LOS (GMLOS) (days): 1.2  Days to GMLOS:-1.9     OBJECTIVE:  Risk of Unplanned Readmission Score: 14.08         Current admission status: Inpatient    Preferred Pharmacy:   [id number] - [place] - [address] [id number] [facility] [place] [address]  Phone: [phone] Fax: [phone]    Primary Care Provider: Kalpana Siegel DO    Primary Insurance: [employer]  REP  Secondary Insurance:     DISCHARGE DETAILS:    Discharge planning discussed with:: patient  Freedom of Choice: Yes  Comments - Freedom of Choice: List discussed with patient-Chose [facility] VNA-Reserved  CM contacted family/caregiver?: No- see comments  Were Treatment Team discharge recommendations reviewed with patient/caregiver?: Yes  Did patient/caregiver verbalize understanding of patient care needs?: Yes  Were patient/caregiver advised of the risks associated with not following Treatment Team discharge recommendations?: Yes     Other Referral/Resources/Interventions Provided:  Interventions: [facility]       Treatment Team Recommendation: Home with Home Health Care  Discharge Destination Plan:: Home with Home Health Care   IMM Given (Date):: 07/30/24  IMM Given to:: Patient..IMM reviewed with patient, patient agrees with discharge determination.       ..CM reviewed d/c planning process including the following: identifying help at home, patient preference for d/c planning needs, Discharge Lounge, [facility] Meds to Bed program, availability of treatment team to discuss questions or concerns patient and/or family may have regarding understanding medications and recognizing signs and symptoms once discharged.  CM also encouraged patient to follow up with all recommended appointments after discharge. Patient advised of importance for patient and family to participate in managing patient’s medical well being.

## 2024-07-30 NOTE — ASSESSMENT & PLAN NOTE
PTA Maintained on Toprol 25mg BID and Tikosyn   Eliquis for AC.   Rates currently controlled.  S/p ablation 7/29  Tikosyn discontinued    Will remain on low dose BB for heart failure

## 2024-07-30 NOTE — ASSESSMENT & PLAN NOTE
Interrogation 7/25 : 2 VF CLASSIFIED NOTED & TREATED WITH 5 ATP & 1 SHOCK. AVAIL EGRAMS PRESENT AS RVR & PROBABLE VT. 3 AT/AF NOTED; LONGEST 5.31 HRS; 13% BURDEN   EP consult  Reports running of her metoporolol for 1 and half week and couldn't get refill  EP following and s/p ablation 7/29  Tikosyn discontinued. Toprol reduced to 25mg daily for HF  Stable for discharge per EP

## 2024-07-30 NOTE — DISCHARGE SUMMARY
[facility]  Discharge- Laura Sarkar 1945, 78 y.o. female MRN: [id number]  Unit/Bed#: CW2 214-01 Encounter: [id number]  Primary Care Provider: Kalpana Siegel DO   Date and time admitted to hospital: 7/27/2024  9:25 AM    * ICD (implantable cardioverter-defibrillator) discharge  Assessment & Plan  Interrogation 7/25 : 2 VF CLASSIFIED NOTED & TREATED WITH 5 ATP & 1 SHOCK. AVAIL EGRAMS PRESENT AS RVR & PROBABLE VT. 3 AT/AF NOTED; LONGEST 5.31 HRS; 13% BURDEN   EP consult  Reports running of her metoporolol for 1 and half week and couldn't get refill  EP following and s/p ablation 7/29  Tikosyn discontinued. Toprol reduced to 25mg daily for HF  Stable for discharge per EP    Atrial fibrillation (HCC)  Assessment & Plan  PTA Maintained on Toprol 25mg BID and Tikosyn   Eliquis for AC.   Rates currently controlled.  S/p ablation 7/29  Tikosyn discontinued    Will remain on low dose BB for heart failure    Ambulatory dysfunction  Assessment & Plan  PT/OT - recommending HHC. Patient lives alone    Nonischemic cardiomyopathy (HCC)  Assessment & Plan  Continue current GDMT  Will remain on low dose BB. Cozaar held this am secondary to elevated creatinine, but will be restarted at discharge    Type 2 diabetes mellitus, without long-term current use of insulin (HCC)  Assessment & Plan  Lab Results   Component Value Date    HGBA1C 6.5 01/24/2024       Recent Labs     07/28/24  1556   POCGLU 117         Blood Sugar Average: Last 72 hrs:  (P) 117Diet controlled  Ok to observe off SSI.        Medical Problems       Resolved Problems  Date Reviewed: 7/30/2024   None       Discharging Physician / Practitioner: Sarika Plunkett PA-C  PCP: Kalpana Siegel DO  Admission Date:   Admission Orders (From admission, onward)       Ordered        07/27/24 1008  INPATIENT ADMISSION  Once                          Discharge Date: 07/30/24    Consultations During Hospital  Stay:  EP    Procedures Performed:     CXR  ICD leads intact and well-positioned.     Mild pulmonary fibrosis.    Ablation  1. AV Leatha Junctional ablation   2. Reprogramming of Pacemaker prior to procedure and after procedure.  3. Bundle of HIS and RV recording.     Echo    Left Ventricle: Left ventricular cavity size is normal. Wall thickness is normal. The left ventricular ejection fraction is 45%. Systolic function is mildly reduced. There is mild global hypokinesis. Diastolic function is normal.    Right Ventricle: Right ventricular cavity size is normal. Systolic function is normal.    Aortic Valve: There is aortic valve sclerosis.    Tricuspid Valve: There is mild regurgitation.    Aorta: The aortic root is normal in size. The ascending aorta is mildly dilated.    Significant Findings / Test Results:   See above    Incidental Findings:   none     Test Results Pending at Discharge (will require follow up):   none     Outpatient Tests Requested:  BMP in 1 week    Complications:  none    Reason for Admission: ICD shock    Hospital Course:   Laura Sarkar is a 78 y.o. female patient who originally presented to the hospital on 7/27/2024 due to ICD shock. She was seen in consultation by EP. She was found to have PAF with RVR resulting in inappropriate ICD shock. She underwent AV node ablation on 7/29. Post procedure, her tikosyn was discontinued. Her Metoprolol succinate was decreased to 25mg daily. Her cozaar was held for a day secondary to mild creatinine elevation, but will be resumed at discharge. Patient will follow up with Cardiology. Would recommend a BMP in 1 week. She was seen by PT/OT with [facility] recommendations. VNA will be ordered. She will be discharged home in stable condition.     Please see above list of diagnoses and related plan for additional information.     Condition at Discharge: good    Discharge Day Visit / Exam:   Subjective:  Offers no complaints  Vitals: Blood Pressure: 103/66 (07/30/24  "0729)  Pulse: 82 (07/30/24 0729)  Temperature: 97.7 °F (36.5 °C) (07/30/24 0729)  Temp Source: Oral (07/30/24 0729)  Respirations: 20 (07/30/24 0255)  Height: 5' 1\" (154.9 cm) (07/28/24 1410)  Weight - Scale: 86.2 kg (190 lb) (07/28/24 1410)  SpO2: 93 % (07/30/24 0729)  Exam:   Physical Exam  Constitutional:       Appearance: Normal appearance. She is obese.   Cardiovascular:      Rate and Rhythm: Normal rate and regular rhythm.      Heart sounds: No murmur heard.  Pulmonary:      Effort: Pulmonary effort is normal.      Breath sounds: Normal breath sounds.   Abdominal:      General: Bowel sounds are normal. There is no distension.      Palpations: Abdomen is soft.      Tenderness: There is no abdominal tenderness.   Skin:     General: Skin is warm and dry.   Neurological:      General: No focal deficit present.      Mental Status: She is alert and oriented to person, place, and time.   Psychiatric:         Mood and Affect: Mood normal.          Discussion with Family: Attempted to update  (granddaughter) via phone. Left voicemail.     Discharge instructions/Information to patient and family:   See after visit summary for information provided to patient and family.      Provisions for Follow-Up Care:  See after visit summary for information related to follow-up care and any pertinent home health orders.      Mobility at time of Discharge:   Basic Mobility Inpatient Raw Score: 15  JH-HLM Goal: 4: Move to chair/commode  JH-HLM Achieved: 4: Move to chair/commode  HLM Goal NOT achieved. Continue to encourage mobility in post discharge setting.     Disposition:   Home with VNA Services (Reminder: Complete face to face encounter)    Planned Readmission: none     Discharge Statement:  I spent 45 minutes discharging the patient. This time was spent on the day of discharge. I had direct contact with the patient on the day of discharge. Greater than 50% of the total time was spent examining patient, answering " all patient questions, arranging and discussing plan of care with patient as well as directly providing post-discharge instructions.  Additional time then spent on discharge activities.    Discharge Medications:  See after visit summary for reconciled discharge medications provided to patient and/or family.      **Please Note: This note may have been constructed using a voice recognition system**

## 2024-07-30 NOTE — PHYSICAL THERAPY NOTE
Physical Therapy Evaluation    Patient's Name: Laura Sarkar    Admitting Diagnosis  ICD (implantable cardioverter-defibrillator) discharge [Z45.02]  AICD discharge [Z45.02]    Problem List  Patient Active Problem List   Diagnosis    Atrial fibrillation (HCC)    Coronary artery disease    Hyperlipidemia    Aortic stenosis    Primary osteoarthritis of both knees    Class 3 severe obesity in adult (HCC)    Diabetic peripheral neuropathy (HCC)    Type 2 diabetes mellitus, without long-term current use of insulin (HCC)    Stage 4 chronic kidney disease (HCC)    Elevated troponin    Chronic combined systolic and diastolic heart failure (HCC)    Depression    DNR (do not resuscitate)    Torn rotator cuff    Left bundle branch block (LBBB)    Moderate mitral regurgitation    Hypotension    Lifevest discharge    CKD (chronic kidney disease)    Left knee pain    Pulmonary fibrosis (HCC)    Hypothyroidism    Abnormal SPEP    Nonischemic cardiomyopathy (HCC)    Primary osteoarthritis of both shoulders    Basal cell carcinoma (BCC) of forehead    Presence of heart assist device (HCC)    Skin ulcer, limited to breakdown of skin (HCC)    Secondary hyperparathyroidism of renal origin (HCC)    ICD (implantable cardioverter-defibrillator) discharge    Ambulatory dysfunction       Past Medical History  Past Medical History:   Diagnosis Date    Aortic stenosis     Arthritis     Asthma     Basal cell carcinoma 10/24/2023    right lateral forehead, mohs    Basal cell carcinoma 10/24/2023    right medial forehead, mohs    Coronary artery disease     Diabetes mellitus (HCC)     Diabetic peripheral neuropathy (HCC)     Last assessed - 1/27/16    Gallbladder disease     Heart attack (HCC) 07/1999    Hemorrhoids     Last assessed - 11/16/12    Hypoglycemia 12/23/2021    Myocardial infarction (HCC)     Old myocardial infarction     Type 2 diabetes mellitus with autonomic neuropathy (HCC)     Unspec long term insulin use status, Last  assessed - 6/8/17       Past Surgical History  Past Surgical History:   Procedure Laterality Date    BUNIONECTOMY      CARDIAC CATHETERIZATION      Carotid Artery, Resolved - 7/1/13    CARDIAC CATHETERIZATION N/A 12/27/2021    Procedure: CARDIAC CATHETERIZATION ;  Surgeon: Chucky Tadeo MD;  [facility];  Service: Cardiology    CARDIAC CATHETERIZATION N/A 12/27/2021    Procedure: Cardiac Coronary Angiogram;  Surgeon: Chucky Tadeo MD;  [facility];  Service: Cardiology    CARDIAC ELECTROPHYSIOLOGY PROCEDURE N/A 01/18/2022    Procedure: Cardiac biv icd implant;  Surgeon: Harley Rollins DO;  [facility];  Service: Cardiology    CARDIAC ELECTROPHYSIOLOGY PROCEDURE N/A 7/29/2024    Procedure: Cardiac eps/av node ablation;  Surgeon: Jose Carter MD;  [facility];  Service: Cardiology    CARDIOVASCULAR STRESS TEST  09/1999    CHOLECYSTECTOMY      COLONOSCOPY  2017    FOOT ARTHRODESIS, MODIFIED DONOHUE  2016    GALLBLADDER SURGERY  1970    HEMORROIDECTOMY      KNEE ARTHROSCOPY Left 2004    MOHS SURGERY Right 01/10/2024    right lateral and medial forehead (combined), Dr Berman    OH COLONOSCOPY FLX DX W/COLLJ SPEC WHEN PFRMD N/A 08/21/2017    Procedure: COLONOSCOPY;  Surgeon: Ady Wilkes MD;  Location: [facility];  Service: Colorectal    REPAIR KNEE LIGAMENT      REPAIR KNEE LIGAMENT Left 1986    REPAIR KNEE LIGAMENT Right 1988 07/30/24 0821   PT Last Visit   PT Visit Date 07/30/24   Note Type   Note type Evaluation   Pain Assessment   Pain Assessment Tool 0-10   Pain Score No Pain   Restrictions/Precautions   Weight Bearing Precautions Per Order No   Other Precautions Chair Alarm;Bed Alarm;Fall Risk;Telemetry   Home Living   Type of Home House   Home Layout Two level;Performs ADLs on one level;Ramped entrance  (FFSU w/ full bathroom + sleeps in lift recliner)   Bathroom Shower/Tub Walk-in shower   Bathroom Equipment Grab bars in  shower;Shower chair;Toilet raiser   Home Equipment Walker;Wheelchair-manual;Life alert  (+ lift chair)   Prior Function   Level of [place] Independent with ADLs;Independent with functional mobility;Needs assistance with IADLS;Modified independent with wheelchair  (Anthony for transfers from surface<>W/C w/ RW, uses W/C as primary means of mobility)   Lives With Alone   Receives Help From Family  (supportive sister stops by daily + brings breakfast)   IADLs Family/Friend/Other provides transportation  (sister does grocery shopping + transports pt)   Falls in the last 6 months 0   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   RLE Assessment   RLE Assessment   (grossly  3+/5)   LLE Assessment   LLE Assessment   (grossly 3+/5)   Coordination   Movements are Fluid and Coordinated 1   Bed Mobility   Supine to Sit 4  Minimal assistance   Additional items Assist x 1;HOB elevated;Bedrails;Increased time required;Verbal cues   Additional Comments Pt greeted in supine.   Transfers   Sit to Stand 5  Supervision  (S from chair, ModAx1 from EOB)   Additional items Increased time required;Verbal cues;Armrests   Stand to Sit 5  Supervision   Additional items Armrests;Increased time required;Verbal cues   Additional Comments RW   Ambulation/Elevation   Gait pattern Excessively slow;Short stride;Decreased foot clearance;Improper Weight shift   Gait Assistance 4  Minimal assist   Additional items Assist x 1;Verbal cues;Tactile cues   Assistive Device Rolling walker   Distance 3' bed>chair   Balance   Static Sitting Fair +   Dynamic Sitting Fair   Static Standing Fair -   Dynamic Standing Poor +   Ambulatory Poor +  (RW)   Endurance Deficit   Endurance Deficit Yes   Endurance Deficit Description weakness, fatigue   Activity Tolerance   Activity Tolerance Patient tolerated  treatment well   Medical Staff Made Aware BRYAN Preston   Nurse Made Aware yes - cleared for therapy   Assessment   Prognosis Good   Problem List Decreased strength;Decreased endurance;Impaired balance;Decreased mobility;Obesity   Assessment Pt is 78 y.o. female seen for a PT evaluation s/p [facility] on [facility]. Pt presenting w/ PAF w/ RVR leading to inappropriate ICD discharge, s/p ablation 7/29/24. Please see above for other active problem list / PMH.     PT now consulted to assess functional mobility and needs for safe d/c planning. Prior to admission, pt was Anthony w/ t/f from surface<>W/C w/ RW, lives alone in a 2SH w/ FFSU + ramped entrance.     Currently pt requires ModA-S for functional transfers; MinAx1 for ambulation w/ RW. Pt presents functioning below baseline and w/ overall mobility deficits 2* to: decreased LE strength; generalized weakness/deconditioning; decreased endurance; impaired balance; gait deviations; fatigue; bed/chair alarms. These impairments place pt at risk for falls.     Pt will continue to benefit from skilled PT interventions to address stated impairments; to maximize functional potential; for ongoing pt/ family training; and DME needs. PT is currently recommending HHPT. Pt agreeable to home discharge, stated she feels she will be able to move better in her own environment + w/ her own equipment (lift chair, W/C).   Barriers to Discharge Decreased caregiver support   Goals   Patient Goals go home   STG Expiration Date 08/13/24   Short Term Goal #1 In 14 days pt will complete: 1) Functional transfers with Antohny to facilitate safe return to previous living environment. 2) Ambulation with RW 5' w/ Anthony without LOB for safe ambulation in home/community environment. 3) Improve balance scores by 1 grade to decrease fall risk. 5) Improve LE strength grades by 1 to increase independence w/ all functional mobility, transfers and gait. 5) PT for ongoing pt and family  education; DME needs and D/C planning to promote highest level of function in least restrictive environment.   PT Treatment Day 0   Plan   Treatment/Interventions Functional transfer training;LE strengthening/ROM;Therapeutic exercise;Endurance training;Patient/family training;Equipment eval/education;Gait training;Compensatory technique education;Spoke to nursing;Spoke to case management;OT   PT Frequency 3-5x/wk   Discharge Recommendation   Rehab Resource Intensity Level, PT III (Minimum Resource Intensity)   Equipment Recommended   (pt already has RW + W/C)   AM-PAC Basic Mobility Inpatient   Turning in Flat Bed Without Bedrails 3   Lying on Back to Sitting on Edge of Flat Bed Without Bedrails 3   Moving Bed to Chair 3   Standing Up From Chair Using Arms 3   Walk in Room 2   Climb 3-5 Stairs With Railing 1   Basic Mobility Inpatient Raw Score 15   Basic Mobility Standardized Score 36.97   [facility] Highest Level Of Mobility   -[facility] Goal 4: Move to chair/commode   -HLM Achieved 4: Move to chair/commode   End of Consult   Patient Position at End of Consult Bedside chair;Bed/Chair alarm activated;All needs within reach  (on waffle cushion)     Jessica Jurado, PT, DPT

## 2024-07-30 NOTE — PROGRESS NOTES
Progress Note - Electrophysiology-Cardiology (EP)   Laura Sarkar 78 y.o. female MRN: [id number]  Unit/Bed#: 2 214-01 Encounter: [id number]      Assessment:  Paroxysmal atrial fibrillation with rapid ventricular response leading to inappropriate ICD therapy, status post AV node ablation 7/29/2024 1/2022 - initially diagnosed after inappropriate shock from LifeVest; started on low dose dofetilide antiarrhythmic therapy  Afib burden has been largely <1% per device interrogations  Most recent interrogation showed increased burden since 7/22/2024 (13%) with rapid ventricular response leading to inappropriate therapy  Dofetilide to be discontinued in the post AV node ablation setting, Toprol XL continued given #2  Nonischemic cardiomyopathy with LVEF 45% per echo this admission  LVEF as low as 30% per echo 12/2021  No obstructive CAD per cardiac cath 12/2021  [employer]onic BIV ICD in situ (implanted 1/2022)  Maintained on Toprol XL and losartan as an outpatient  Chronic HFmEF  Hyperlipidemia  Baseline left bundle branch block  Obesity with BMI 35  Hypothyroidism      Plan:  She is doing well in the postablation setting, and telemetry shows sinus rhythm with biventricular pacing.  Right groin was stable, no suture in place, no evidence of hematoma or ongoing bleeding.    Dofetilide will be discontinued at this time, however given her mild cardiomyopathy she will remain on low-dose Toprol-XL.  She has had some softer blood pressures this admission thus will not attempt uptitration at this time, this can be considered moving forward.  Losartan has been held given mild increase in creatinine this morning, this should be restarted once stable.  Will defer to primary team for further management.  She is already maintained on long-term Eliquis anticoagulation, which will be continued.    All discharge instructions and restrictions were reviewed with the patient in detail.  She can follow-up with her general cardiologist moving  "forward, and I will arrange a device follow-up as well so that her lower rate can be changed from 80 to 70 bpm.    She is stable for discharge at this time from an EP standpoint, please contact us with questions or concerns.      Subjective/Objective   Chief Complaint: No acute complaints    Subjective: She is feeling well today, denies chest pain or pressure, shortness of breath, palpitations, dizziness, lightheadedness, or groin issues.  No significant issues reported overnight.      Objective:     Vitals: /66   Pulse 82   Temp 97.7 °F (36.5 °C)   Resp 20   Ht 5' 1\" (1.549 m)   Wt 86.2 kg (190 lb)   SpO2 93%   BMI 35.90 kg/m²   Vitals:    07/27/24 1114 07/28/24 1410   Weight: 86.2 kg (190 lb) 86.2 kg (190 lb)     Orthostatic Blood Pressures      Flowsheet Row Most Recent Value   Blood Pressure 103/66 filed at 07/30/2024 0729   Patient Position - Orthostatic VS Lying filed at 07/30/2024 0255              Intake/Output Summary (Last 24 hours) at 7/30/2024 0847  Last data filed at 7/29/2024 1242  Gross per 24 hour   Intake 400 ml   Output --   Net 400 ml       Invasive Devices       Peripheral Intravenous Line  Duration             Peripheral IV 07/27/24 Distal;Dorsal (posterior);Right Forearm 3 days                                Scheduled Meds:  Current Facility-Administered Medications   Medication Dose Route Frequency Provider Last Rate    acetaminophen  650 mg Oral Q6H PRN Polo Padron MD      apixaban  5 mg Oral Q12H Polo Padron MD      gabapentin  600 mg Oral TID Polo Padron MD      levothyroxine  125 mcg Oral Early Morning Polo Padron MD      magnesium Oxide  400 mg Oral BID Polo Padron MD      metoprolol succinate  25 mg Oral Daily Polo Padron MD      pravastatin  10 mg Oral Daily With Dinner Polo Padron MD      sodium chloride (PF)  3 mL Intravenous Q1H PRN Polo Padron MD      sodium chloride  75 mL/hr Intravenous Continuous Sarika Plunkett PA-C      venlafaxine  75 mg Oral TID " Polo Padron MD       Continuous Infusions:sodium chloride, 75 mL/hr      PRN Meds:.  acetaminophen    Insert peripheral IV **AND** sodium chloride (PF)    Review of Systems   Constitutional: Negative for fever and malaise/fatigue.   Cardiovascular:  Negative for chest pain, dyspnea on exertion, irregular heartbeat, leg swelling, near-syncope, orthopnea, palpitations, paroxysmal nocturnal dyspnea and syncope.   All other systems reviewed and are negative.        Physical Exam:   GEN: NAD, alert and oriented x 3, well appearing  SKIN: dry without significant lesions or rashes  HEENT: NCAT, PERRL, EOMs intact  NECK: No JVD appreciated  CARDIOVASCULAR: RRR, normal S1, S2 without murmurs, rubs, or gallops appreciated  LUNGS: Clear to auscultation bilaterally without wheezes, rhonchi, or rales  ABDOMEN: Soft, nontender, nondistended  EXTREMITIES/VASCULAR: perfused without clubbing, cyanosis, or LE edema b/l  PSYCH: Normal mood and affect  NEURO: CN ll-Xll grossly intact                Lab Results: I have personally reviewed pertinent lab results.    Results from last 7 days   Lab Units 07/30/24  0505 07/29/24  0459 07/27/24  0950   WBC Thousand/uL 8.37 8.38 7.21   HEMOGLOBIN g/dL 12.3 11.5 12.0   HEMATOCRIT % 38.2 37.0 39.0   PLATELETS Thousands/uL 181 193 180     Results from last 7 days   Lab Units 07/30/24  0505 07/29/24  0459 07/28/24  0621   POTASSIUM mmol/L 4.7 4.4 4.3   CHLORIDE mmol/L 103 104 106   CO2 mmol/L 31 26 28   BUN mg/dL 18 17 18   CREATININE mg/dL 1.31* 1.11 1.13   CALCIUM mg/dL 9.2 9.2 9.1     Results from last 7 days   Lab Units 07/29/24  0459   INR  1.32*     Results from last 7 days   Lab Units 07/29/24  0459 07/28/24  0621 07/27/24  0950   MAGNESIUM mg/dL 2.2 1.5* 1.6*         Imaging: I have personally reviewed pertinent reports.      ECHO: Results for orders placed in visit on 12/07/17    Echo complete with contrast if indicated    Narrative  [facility]  [address]  [place], [place] [id number] [phone]    Transthoracic Echocardiogram  2D, M-mode, Doppler, and Color Doppler    Study date:  07-Dec-2017    Patient: JOHN TYLER  MR number: [id number]  Account number: [id number]  : 1945  Age: 72 years  Gender: Female  Status: Outpatient  Location: [address] [place] [address]  Height: 62 in  Weight: 288 lb  BP: 98/ 64 mmHg    Indications: Dyspnea, edema    Diagnoses: R06.09 - Other forms of dyspnea, R60.9 - Edema, unspecified    Sonographer:  VERONICA Rose  Referring Physician:  Kalpana Siegel DO  Group:  [facility]  Interpreting Physician:  Olu Casillas DO    SUMMARY    LEFT VENTRICLE:  Systolic function was normal. Ejection fraction was estimated to be 60 %.  There were no regional wall motion abnormalities.  Wall thickness was mildly increased.  Doppler parameters were consistent with abnormal left ventricular relaxation (grade 1 diastolic dysfunction).    LEFT ATRIUM:  The atrium was mildly dilated.    MITRAL VALVE:  There was mild regurgitation.    AORTIC VALVE:  There was very mild stenosis.    HISTORY: PRIOR HISTORY: Hypertension, high cholesterol, CAD, atrial fibrillation, asthma, COPD, DM, edema, hypothyroidism    PROCEDURE: The study was performed in the [address] [place] [address]. This was a routine study. The transthoracic approach was used. The study included complete 2D imaging, M-mode, complete spectral Doppler, and color Doppler. This  was a technically difficult study.    LEFT VENTRICLE: Size was normal. Systolic function was normal. Ejection fraction was estimated to be 60 %. There were no regional wall motion abnormalities. Wall thickness was mildly increased. DOPPLER: Doppler parameters were consistent  with abnormal left ventricular relaxation (grade 1 diastolic dysfunction).    RIGHT VENTRICLE: The size was normal. Systolic function was normal. Wall thickness was normal.    LEFT ATRIUM:  The atrium was mildly dilated.    RIGHT ATRIUM: Size was normal.    MITRAL VALVE: Valve structure was normal. There was normal leaflet separation. DOPPLER: The transmitral velocity was within the normal range. There was no evidence for stenosis. There was mild regurgitation.    AORTIC VALVE: The valve was trileaflet. Leaflets exhibited mildly increased thickness, mild calcification, and lower normal cuspal separation. DOPPLER: Transaortic velocity was minimally increased. There was very mild stenosis. There was  no significant regurgitation.    TRICUSPID VALVE: The valve structure was normal. There was normal leaflet separation. DOPPLER: The transtricuspid velocity was within the normal range. There was no evidence for stenosis. There was no significant regurgitation.    PULMONIC VALVE: Leaflets exhibited normal thickness, no calcification, and normal cuspal separation. DOPPLER: The transpulmonic velocity was within the normal range. There was no significant regurgitation.    PERICARDIUM: There was no pericardial effusion. The pericardium was normal in appearance.    AORTA: The root exhibited normal size.    SYSTEMIC VEINS: IVC: The inferior vena cava was normal in size.    SYSTEM MEASUREMENT TABLES    2D  %FS: 29.48 %  AV Diam: 3.07 cm  EDV(Teich): 123.01 ml  EF(Cube): 64.93 %  EF(Teich): 56.15 %  ESV(Cube): 46.13 ml  ESV(Teich): 53.94 ml  IVSd: 1.13 cm  LA Area: 22.33 cm2  LA Diam: 3.71 cm  LVEDV MOD A4C: 143.23 ml  LVEF MOD A4C: 59.57 %  LVESV MOD A4C: 57.91 ml  LVIDd: 5.09 cm  LVIDs: 3.59 cm  LVLd A4C: 7.57 cm  LVLs A4C: 6.34 cm  LVOT Diam: 2.09 cm  LVPWd: 1.24 cm  RA Area: 15.34 cm2  RV Diam.: 3.79 cm  SV MOD A4C: 85.32 ml  SV(Cube): 85.41 ml  SV(Teich): 69.06 ml    CW  AV Env.Ti: 379.54 ms  AV SV: 364.79 ml  AV VTI: 49.28 cm  AV Vmax: 2.01 m/s  AV Vmean: 1.3 m/s  AV maxP.16 mmHg  AV meanP.79 mmHg    MM  TAPSE: 2.25 cm    PW  ANTONIO (VTI): 1.9 cm2  ANTONIO Vmax: 1.8 cm2  E': 0.05 m/s  E/E': 18.98  LVOT  Env.Ti: 423.91 ms  LVOT VTI: 27.28 cm  LVOT Vmax: 1.05 m/s  LVOT Vmean: 0.65 m/s  LVOT maxP.44 mmHg  LVOT meanP.03 mmHg  LVSV Dopp: 93.59 ml  MV A Nadeem: 0.92 m/s  MV Dec Borden: 5.65 m/s2  MV DecT: 163.82 ms  MV E Nadeem: 0.93 m/s  MV E/A Ratio: 1.01    [facility]    Prepared and electronically signed by    Olu Casillas DO  Signed 07-Dec-2017 13:53:25      Results for orders placed during the hospital encounter of 24    Echo complete w/ contrast if indicated    Interpretation Summary    Left Ventricle: Left ventricular cavity size is normal. Wall thickness is normal. The left ventricular ejection fraction is 45%. Systolic function is mildly reduced. There is mild global hypokinesis. Diastolic function is normal.    Right Ventricle: Right ventricular cavity size is normal. Systolic function is normal.    Aortic Valve: There is aortic valve sclerosis.    Tricuspid Valve: There is mild regurgitation.    Aorta: The aortic root is normal in size. The ascending aorta is mildly dilated.        EKG/TELEMETRY: Biventricular pacing at 80 bpm, no significant arrhythmias noted      VTE Pharmacologic Prophylaxis: Eliquis

## 2024-07-30 NOTE — PROGRESS NOTES
Patient:    MRN:  [id number]    Yolanda Request ID:  [id number]    Level of care reserved:  Home Health Agency    Partner Reserved:  [facility], [place] [address] [phone]    Clinical needs requested:    Geography searched:  [phone]    Start of Service:    Request sent:  12:50pm EDT on 7/30/2024 by Cindy Wheeler (maggie)    Partner reserved:  1:22pm EDT on 7/30/2024 by Cindy Wheeler (maggie)    Choice list shared:  1:22pm EDT on 7/30/2024 by Cindy Wheeler (maggie)

## 2024-07-30 NOTE — PLAN OF CARE
Problem: PAIN - ADULT  Goal: Verbalizes/displays adequate comfort level or baseline comfort level  Description: Interventions:  - Encourage patient to monitor pain and request assistance  - Assess pain using appropriate pain scale  - Administer analgesics based on type and severity of pain and evaluate response  - Implement non-pharmacological measures as appropriate and evaluate response  - Consider cultural and social influences on pain and pain management  - Notify physician/advanced practitioner if interventions unsuccessful or patient reports new pain  Outcome: Progressing     Problem: SAFETY ADULT  Goal: Patient will remain free of falls  Description: INTERVENTIONS:  - Educate patient/family on patient safety including physical limitations  - Instruct patient to call for assistance with activity   - Consult OT/PT to assist with strengthening/mobility   - Keep Call bell within reach  - Keep bed low and locked with side rails adjusted as appropriate  - Keep care items and personal belongings within reach  - Initiate and maintain comfort rounds  - Make Fall Risk Sign visible to staff  - Offer Toileting every Hours, in advance of need  - Initiate/Maintain alarm  - Obtain necessary fall risk management equipment:   - Apply yellow socks and bracelet for high fall risk patients  - Consider moving patient to room near nurses station  Outcome: Progressing   Pt had no problems throughout the night. R groin site was clean dry and intact. NV checks q4 wnl. Callbell and personal items are within reach, bed in low position, no needs or complaints at this time.

## 2024-07-30 NOTE — PLAN OF CARE
Problem: OCCUPATIONAL THERAPY ADULT  Goal: Performs self-care activities at highest level of function for planned discharge setting.  See evaluation for individualized goals.  Description: Treatment Interventions: ADL retraining, Functional transfer training, UE strengthening/ROM, Endurance training, Patient/family training, Equipment evaluation/education, Compensatory technique education, Continued evaluation, Energy conservation, Activityengagement          See flowsheet documentation for full assessment, interventions and recommendations.   Outcome: Progressing  Note: Limitation: Decreased ADL status, Decreased UE strength, Decreased endurance, Decreased self-care trans, Decreased high-level ADLs  Prognosis: Good  Assessment: Pt is a 79 y/o female that was admitted to [facility] 7/27/2024 with ICD discharge. Pt s/p cardiac eps/av node ablation . Pt  has a past medical history of Aortic stenosis, Arthritis, Asthma, Basal cell carcinoma, Basal cell carcinoma, Coronary artery disease, Diabetes mellitus (HCC), Diabetic peripheral neuropathy (HCC), Gallbladder disease, Heart attack (HCC), Hemorrhoids, Hypoglycemia, Myocardial infarction (HCC), Old myocardial infarction, and Type 2 diabetes mellitus with autonomic neuropathy ([facility]). Pt lives alone in a two level house with ramped entrance, pt reports staying on the main level of the home. Pt with raised toilet and walk in shower with shower chair and grab bars. Pt reports using rw and w/c for mobility at baseline. Prior to admission pt (I) ADLs and functional mobility, pt requires assistance for IADLs. Pt currently requires supervision to transfer from chair and complete UB ADLs. Pt requires MIN A for bed mobility and to take steps with rw. Pt requires MOD A to complete LB ADLS and toileting. Pt limited by decreased ADL status, functional transfers, functional mobility, and activity tolerance. Pt supine in bed at begning of session, pt seated in bedside chair  at end of session with alarm set and items within reach. The patient's raw score on the AM-PAC Daily Activity Inpatient Short Form is 19. A raw score of greater than or equal to 19 suggests the patient may benefit from discharge to home. Please refer to the recommendation of the Occupational Therapist for safe discharge planning.  Recommend Level III minimum intensity OT services  at d/c to maximize pt function.     Rehab Resource Intensity Level, OT: III (Minimum Resource Intensity)

## 2024-07-30 NOTE — PLAN OF CARE
Problem: PHYSICAL THERAPY ADULT  Goal: Performs mobility at highest level of function for planned discharge setting.  See evaluation for individualized goals.  Description: Treatment/Interventions: Functional transfer training, LE strengthening/ROM, Therapeutic exercise, Endurance training, Patient/family training, Equipment eval/education, Gait training, Compensatory technique education, Spoke to nursing, Spoke to case management, OT  Equipment Recommended:  (pt already has RW + W/C)       See flowsheet documentation for full assessment, interventions and recommendations.  Note: Prognosis: Good  Problem List: Decreased strength, Decreased endurance, Impaired balance, Decreased mobility, Obesity  Assessment: Pt is 78 y.o. female seen for a PT evaluation s/p admit to [facility] on 7/27/2024. Pt presenting w/ PAF w/ RVR leading to inappropriate ICD discharge, s/p ablation 7/29/24. Please see above for other active problem list / PMH. PT now consulted to assess functional mobility and needs for safe d/c planning. Prior to admission, pt was Anthony w/ t/f from surface<>W/C w/ RW, lives alone in a 2SH w/ FFSU + ramped entrance. Currently pt requires ModA-S for functional transfers; MinAx1 for ambulation w/ RW. Pt presents functioning below baseline and w/ overall mobility deficits 2* to: decreased LE strength; generalized weakness/deconditioning; decreased endurance; impaired balance; gait deviations; fatigue; bed/chair alarms. These impairments place pt at risk for falls. Pt will continue to benefit from skilled PT interventions to address stated impairments; to maximize functional potential; for ongoing pt/ family training; and DME needs. PT is currently recommending HHPT. Pt agreeable to home discharge, stated she feels she will be able to move better in her own environment + w/ her own equipment (lift chair, W/C).  Barriers to Discharge: Decreased caregiver support     Rehab Resource Intensity Level, PT: III  (Minimum Resource Intensity)    See flowsheet documentation for full assessment.

## 2024-07-31 ENCOUNTER — TELEPHONE (OUTPATIENT)
Dept: FAMILY MEDICINE CLINIC | Facility: CLINIC | Age: 79
End: 2024-07-31

## 2024-07-31 VITALS
RESPIRATION RATE: 15 BRPM | WEIGHT: 190 LBS | DIASTOLIC BLOOD PRESSURE: 65 MMHG | HEART RATE: 79 BPM | SYSTOLIC BLOOD PRESSURE: 126 MMHG | HEIGHT: 61 IN | BODY MASS INDEX: 35.87 KG/M2 | TEMPERATURE: 98.8 F | OXYGEN SATURATION: 95 %

## 2024-07-31 LAB
ANION GAP SERPL CALCULATED.3IONS-SCNC: 5 MMOL/L (ref 4–13)
BUN SERPL-MCNC: 19 MG/DL (ref 5–25)
CALCIUM SERPL-MCNC: 9.3 MG/DL (ref 8.4–10.2)
CHLORIDE SERPL-SCNC: 105 MMOL/L (ref 96–108)
CO2 SERPL-SCNC: 30 MMOL/L (ref 21–32)
CREAT SERPL-MCNC: 1.15 MG/DL (ref 0.6–1.3)
GFR SERPL CREATININE-BSD FRML MDRD: 45 ML/MIN/1.73SQ M
GLUCOSE SERPL-MCNC: 97 MG/DL (ref 65–140)
POTASSIUM SERPL-SCNC: 4.5 MMOL/L (ref 3.5–5.3)
SODIUM SERPL-SCNC: 140 MMOL/L (ref 135–147)

## 2024-07-31 PROCEDURE — 97116 GAIT TRAINING THERAPY: CPT

## 2024-07-31 PROCEDURE — 97530 THERAPEUTIC ACTIVITIES: CPT

## 2024-07-31 PROCEDURE — 80048 BASIC METABOLIC PNL TOTAL CA: CPT | Performed by: PHYSICIAN ASSISTANT

## 2024-07-31 PROCEDURE — 99239 HOSP IP/OBS DSCHRG MGMT >30: CPT | Performed by: PHYSICIAN ASSISTANT

## 2024-07-31 PROCEDURE — 97535 SELF CARE MNGMENT TRAINING: CPT

## 2024-07-31 RX ORDER — LOSARTAN POTASSIUM 25 MG/1
25 TABLET ORAL DAILY
Status: DISCONTINUED | OUTPATIENT
Start: 2024-08-01 | End: 2024-07-31 | Stop reason: HOSPADM

## 2024-07-31 RX ADMIN — LEVOTHYROXINE SODIUM 125 MCG: 125 TABLET ORAL at 06:41

## 2024-07-31 RX ADMIN — METOPROLOL SUCCINATE 25 MG: 25 TABLET, EXTENDED RELEASE ORAL at 09:11

## 2024-07-31 RX ADMIN — APIXABAN 5 MG: 5 TABLET, FILM COATED ORAL at 09:11

## 2024-07-31 RX ADMIN — GABAPENTIN 600 MG: 300 CAPSULE ORAL at 09:11

## 2024-07-31 RX ADMIN — MAGNESIUM OXIDE TAB 400 MG (241.3 MG ELEMENTAL MG) 400 MG: 400 (241.3 MG) TAB at 09:23

## 2024-07-31 RX ADMIN — VENLAFAXINE 75 MG: 37.5 TABLET ORAL at 09:12

## 2024-07-31 RX ADMIN — ACETAMINOPHEN 650 MG: 325 TABLET, FILM COATED ORAL at 09:11

## 2024-07-31 NOTE — PLAN OF CARE
Problem: OCCUPATIONAL THERAPY ADULT  Goal: Performs self-care activities at highest level of function for planned discharge setting.  See evaluation for individualized goals.  Description: Treatment Interventions: ADL retraining, Functional transfer training, UE strengthening/ROM, Endurance training, Patient/family training, Equipment evaluation/education, Compensatory technique education, Continued evaluation, Energy conservation, Activityengagement          See flowsheet documentation for full assessment, interventions and recommendations.   Outcome: Progressing  Note: Limitation: Decreased ADL status, Decreased UE strength, Decreased endurance, Decreased self-care trans, Decreased high-level ADLs  Prognosis: Good  Assessment: Pt seen for skilled OT treatment session on this date w/ interventions focusing on  ADL participation, activity tolerance, sitting tolerance, sitting balance, standing tolerance, standing balance, bed mobility , transfer skills, and fxnl mobility. Pt was agreeable and willing to participate in session. Pt engaged in the following tasks: Supervision UB ADLs, bed mobility, STS transfer; Min Ax1 functional mobility; Max Ax1 LB ADLs. In comparison to previous session, pt demonstrated improvements in functional mobility distance as she was able to ambulate ~10 feet. verbal cues for safety and verbal cues for pacing thru activity steps. Pt continues to be functioning below baseline level as occupational performance remains limited by decreased ADL status, decreased activity tolerance, decreased endurance, decreased sitting balance, decreased standing tolerance, decreased standing balance, decreased transfer skills, decreased fxnl mobility, decreased insight into deficits, and generalized weakness. The patient's raw score on the -PAC Daily Activity Inpatient Short Form is 16. A raw score of less than 19 suggests the patient may benefit from discharge to post-acute rehabilitation services.  Please refer to the recommendation of the Occupational Therapist for safe discharge planning. From OT standpoint, recommend Level II services at time of d/c. Pt will benefit from continued OT treatment while in acute care to address deficits as defined above and maximize level of functional independence with ADLs and functional mobility. Pt left OOB in chair w/ alarm activated and all needs met at end of session.     Rehab Resource Intensity Level, OT: (S) II (Moderate Resource Intensity)

## 2024-07-31 NOTE — CASE MANAGEMENT
Case Management Discharge Planning Note    Patient name Laura Sarkar  Location CW2 214/CW2 214-01 MRN [id number]  : 1945 Date 2024       Current Admission Date: 2024  Current Admission Diagnosis:ICD (implantable cardioverter-defibrillator) discharge   Patient Active Problem List    Diagnosis Date Noted Date Diagnosed    Ambulatory dysfunction 2024     ICD (implantable cardioverter-defibrillator) discharge 2024     Presence of heart assist device ([facility]) 2024     Skin ulcer, limited to breakdown of skin ([facility]) 2024     Secondary hyperparathyroidism of renal origin ([facility]) 2024     Basal cell carcinoma (BCC) of forehead 10/23/2023     Primary osteoarthritis of both shoulders 02/10/2023     Nonischemic cardiomyopathy ([facility]) 2022     Abnormal SPEP 2022     Pulmonary fibrosis ([facility]) 2022     Hypothyroidism 2022     Hypotension 2022     [employer] discharge 2022     CKD (chronic kidney disease) 2022     Left knee pain 2022     Torn rotator cuff 2022     Left bundle branch block (LBBB) 2022     Moderate mitral regurgitation 2022     DNR (do not resuscitate) 2022     Depression 2021     Chronic combined systolic and diastolic heart failure ([facility]) 2021     Elevated troponin 2021     Stage 4 chronic kidney disease ([facility]) 2021     Type 2 diabetes mellitus, without long-term current use of insulin ([facility]) 2019     Diabetic peripheral neuropathy ([facility]) 2018     Class 3 severe obesity in adult ([facility]) 2018     Primary osteoarthritis of both knees 2018     Aortic stenosis 2018     Hyperlipidemia 10/04/2017     Atrial fibrillation ([facility]) 2015     Coronary artery disease 2013       LOS (days): 4  Geometric Mean LOS (GMLOS) (days): 1.2  Days to GMLOS:-3     OBJECTIVE:  Risk of Unplanned Readmission Score: 12.29         Current admission status: Inpatient    [id number] [place], [place] - [id number] [facility] [address]  Phone: [phone] Fax: [phone]    Primary Care Provider: Kalpana Siegel DO    Primary Insurance: [employer]  REP  Secondary Insurance:     DISCHARGE DETAILS:                                Requested Home Health Care         Is the patient interested in HHC at discharge?: Yes  Home Health Discipline requested:: Nursing, Occupational Therapy, Physical Therapy  Home Health Agency Name:: [facility]  HHA External Referral Reason (only applicable if external HHA name selected): Patient has established relationship with provider  Home Health Follow-Up Provider:: PCP  Home Health Services Needed:: Post-Op Care and Assessment, Evaluate Functional Status and Safety, Gait/ADL Training, Strengthening/Theraputic Exercises to Improve Function  Homebound Criteria Met:: Requires the Assistance of Another Person for Safe Ambulation or to Leave the Home, Uses an Assist Device (i.e. cane, walker, etc)  Supporting Clincal Findings:: Fatigues Easliy in Short Distances, Limited Endurance                         Transport at Discharge : Wheelchair van        Transported by (Company and Unit #): [employer]  ETA of Transport (Date): 07/31/24  ETA of Transport (Time): 1530     Transfer Mode: Wheelchair  Accompanied by: Alone           Family notified:: granddaughter Elo aware of return home.  Additional Comments: Notified that 79yo female s/p ICD is refusing rehab and will need ride home. Referral was made yesterday to [facility] and accepted. Nsg/PT/OTAg [place] aware of discharge home. Needs w/c van, arranged for [employer] to transport today at 1530.RN aware.

## 2024-07-31 NOTE — DISCHARGE SUMMARY
[facility]  Discharge- Laura Sarkar 1945, 78 y.o. female MRN: [id number]  Unit/Bed#: CW2 214-01 Encounter: [id number]  Primary Care Provider: Kalpana Siegel DO   Date and time admitted to hospital: 7/27/2024  9:25 AM      Addendum to discharge summary dated 7/30.  Discharge held on 7/30 as family was not comfortable taking patient home.  Physical therapy reevaluated the patient and recommended short-term rehab.  Unfortunately, the patient is adamantly refusing to go to short-term rehab.  Patient will go home with home health care and family support.  Family agreeable.  No other events overnight.    * ICD (implantable cardioverter-defibrillator) discharge  Assessment & Plan  Interrogation 7/25 : 2 VF CLASSIFIED NOTED & TREATED WITH 5 ATP & 1 SHOCK. AVAIL EGRAMS PRESENT AS RVR & PROBABLE VT. 3 AT/AF NOTED; LONGEST 5.31 HRS; 13% BURDEN   EP consult  Reports running of her metoporolol for 1 and half week and couldn't get refill  EP following and s/p ablation 7/29  Tikosyn discontinued. Toprol reduced to 25mg daily for HF  Stable for discharge per EP    Atrial fibrillation (HCC)  Assessment & Plan  PTA Maintained on Toprol 25mg BID and Tikosyn   Eliquis for AC.   Rates currently controlled.  S/p ablation 7/29  Tikosyn discontinued    Will remain on low dose BB for heart failure    Ambulatory dysfunction  Assessment & Plan  PT/OT -was newly recommending home health care.  Patient's family did not feel safe discharging home.  Patient was kept overnight.  PT reevaluated today and is now recommending short-term rehab.  Patient adamantly refusing to go to short-term rehab.  She will go home with home health care.    Nonischemic cardiomyopathy (HCC)  Assessment & Plan  Continue current GDMT  Will remain on low dose BB. Cozaar held this am secondary to elevated creatinine, will continue to hold due to borderline low BP.   F/u with OP Cardiology    Type 2 diabetes mellitus,  without long-term current use of insulin ([facility])  Assessment & Plan  Lab Results   Component Value Date    HGBA1C 6.5 2024       Recent Labs     24  1556   POCGLU 117         Blood Sugar Average: Last 72 hrs:  (P) 117Diet controlled  Ok to observe off SSI.          VTE Pharmacologic Prophylaxis: VTE Score: 3 Moderate Risk (Score 3-4) - Pharmacological DVT Prophylaxis Ordered: apixaban (Eliquis).    Mobility:   Basic Mobility Inpatient Raw Score: 15  -HLM Goal: 4: Move to chair/commode  JH-HLM Achieved: 6: Walk 10 steps or more  JH-HLM Goal NOT achieved. Continue with multidisciplinary rounding and encourage appropriate mobility to improve upon JH-HLM goals.    Patient Centered Rounds: I performed bedside rounds with nursing staff today.   Discussions with Specialists or Other Care Team Provider: case management, PT    Education and Discussions with Family / Patient: Updated  (granddaughter) via phone.    Total Time Spent on Date of Encounter in care of patient: 40 mins. This time was spent on one or more of the following: performing physical exam; counseling and coordination of care; obtaining or reviewing history; documenting in the medical record; reviewing/ordering tests, medications or procedures; communicating with other healthcare professionals and discussing with patient's family/caregivers.    Current Length of Stay: 4 day(s)  Current Patient Status: Inpatient   Discharge Plan:  Discharge home with [facility]    Code Status: Level 2 - DNAR: but accepts endotracheal intubation    Subjective:   Patient without complaints.    Objective:     Vitals:   Temp (24hrs), Av.2 °F (36.8 °C), Min:97.7 °F (36.5 °C), Max:98.8 °F (37.1 °C)    Temp:  [97.7 °F (36.5 °C)-98.8 °F (37.1 °C)] 98.8 °F (37.1 °C)  HR:  [79-80] 79  Resp:  [15-18] 15  BP: ()/(52-65) 126/65  SpO2:  [94 %-95 %] 95 %  Body mass index is 35.9 kg/m².     Input and Output Summary (last 24 hours):     Intake/Output Summary (Last  24 hours) at 7/31/2024 1346  Last data filed at 7/31/2024 1100  Gross per 24 hour   Intake 700 ml   Output --   Net 700 ml       Physical Exam:   Physical Exam  Constitutional:       Appearance: Normal appearance. She is obese.   Cardiovascular:      Rate and Rhythm: Normal rate and regular rhythm.      Heart sounds: No murmur heard.  Pulmonary:      Effort: Pulmonary effort is normal.      Breath sounds: Normal breath sounds.   Abdominal:      General: Bowel sounds are normal. There is no distension.      Palpations: Abdomen is soft.      Tenderness: There is no abdominal tenderness.   Skin:     General: Skin is warm and dry.   Neurological:      General: No focal deficit present.      Mental Status: She is alert and oriented to person, place, and time.   Psychiatric:         Mood and Affect: Mood normal.          Additional Data:     Labs:  Results from last 7 days   Lab Units 07/30/24  0505 07/29/24  0459 07/27/24  0950   WBC Thousand/uL 8.37   < > 7.21   HEMOGLOBIN g/dL 12.3   < > 12.0   HEMATOCRIT % 38.2   < > 39.0   PLATELETS Thousands/uL 181   < > 180   SEGS PCT %  --   --  71   LYMPHO PCT %  --   --  17   MONO PCT %  --   --  9   EOS PCT %  --   --  3    < > = values in this interval not displayed.     Results from last 7 days   Lab Units 07/31/24  0459 07/27/24  0950 07/25/24  1241   SODIUM mmol/L 140   < > 141   POTASSIUM mmol/L 4.5   < > 4.5   CHLORIDE mmol/L 105   < > 107   CO2 mmol/L 30   < > 30   BUN mg/dL 19   < > 22   CREATININE mg/dL 1.15   < > 1.29   ANION GAP mmol/L 5   < > 4   CALCIUM mg/dL 9.3   < > 9.7   ALBUMIN g/dL  --   --  3.4*   TOTAL BILIRUBIN mg/dL  --   --  0.47   ALK PHOS U/L  --   --  69   ALT U/L  --   --  6*   AST U/L  --   --  13   GLUCOSE RANDOM mg/dL 97   < > 157*    < > = values in this interval not displayed.     Results from last 7 days   Lab Units 07/29/24  0459   INR  1.32*     Results from last 7 days   Lab Units 07/28/24  1556   POC GLUCOSE mg/dl 117                Lines/Drains:  Invasive Devices       None                         Imaging: No pertinent imaging reviewed.    Recent Cultures (last 7 days):         Last 24 Hours Medication List:   Current Facility-Administered Medications   Medication Dose Route Frequency Provider Last Rate    acetaminophen  650 mg Oral Q6H PRN Polo Padron MD      apixaban  5 mg Oral Q12H Polo Padron MD      gabapentin  600 mg Oral TID Polo Padron MD      levothyroxine  125 mcg Oral Early Morning Polo Padron MD      [START ON 8/1/2024] losartan  25 mg Oral Daily Sarika Plunkett PA-C      magnesium Oxide  400 mg Oral BID Polo Padron MD      metoprolol succinate  25 mg Oral Daily Polo Padron MD      pravastatin  10 mg Oral Daily With Dinner Polo Padron MD      sodium chloride (PF)  3 mL Intravenous Q1H PRN Polo Padron MD      venlafaxine  75 mg Oral TID Polo Padron MD          Today, Patient Was Seen By: Sarika Plunkett PA-C    **Please Note: This note may have been constructed using a voice recognition system.**

## 2024-07-31 NOTE — TELEPHONE ENCOUNTER
07/31/24 2:27 PM    Patient contacted post ED visit, phone outreaches were unsuccessful and a MyChart letter has been sent to the patient as follow-up.    Thank you.  Patricia Case MA  [facility]

## 2024-07-31 NOTE — TELEPHONE ENCOUNTER
07/31/24 2:25 PM    Patient contacted post ED visit, third outreach attempt made. Message was left for patient to return a call to either the [facility] Department at [place]: Phone [phone] or the PCP office.     Thank you.  Patricia Case MA  [facility]

## 2024-07-31 NOTE — PHYSICAL THERAPY NOTE
PHYSICAL THERAPY NOTE        Patient Name: Laura Sarkar  Today's Date: 7/31/2024 07/31/24 0852   PT Last Visit   PT Visit Date 07/31/24   Note Type   Note Type Treatment   Pain Assessment   Pain Assessment Tool 0-10   Pain Score No Pain   Restrictions/Precautions   Weight Bearing Precautions Per Order No   Other Precautions Chair Alarm;Bed Alarm;Fall Risk;Multiple lines   General   Chart Reviewed Yes   Response to Previous Treatment Patient with no complaints from previous session.   Family/Caregiver Present No   Cognition   Overall Cognitive Status WFL   Arousal/Participation Alert;Responsive;Cooperative   Attention Within functional limits   Orientation Level Oriented X4   Memory Within functional limits   Following Commands Follows one step commands without difficulty   Comments pt pleasant and cooperative. Decreased insight into current deficits   Bed Mobility   Supine to Sit 5  Supervision   Additional items HOB elevated;Increased time required;Verbal cues   Sit to Supine Unable to assess   Additional Comments Pt supine in bed upon arrival. Pt left sitting OOB in chair with call bell and all needs following PT session   Transfers   Sit to Stand 5  Supervision  (SUP from recliner chair elevated surface; Mod A from sitting EOB)   Additional items Assist x 1;Armrests;Increased time required;Verbal cues   Stand to Sit 5  Supervision   Additional items Assist x 1;Armrests;Increased time required;Verbal cues   Additional Comments Transfers w/ RW. Pt requires Mod Ax1 for initial STS from EOB. Progressed to SUP for STS from elevated surface w/ use of armrests for support   Ambulation/Elevation   Gait pattern Improper Weight shift;Wide JESSE;Decreased foot clearance;Shuffling;Short stride;Excessively slow   Gait Assistance 4  Minimal assist   Additional items Assist x 1;Verbal cues   Assistive Device Rolling walker   Distance 10'  (pt reports  this is baseline distance she walks at home)   Stair Management Assistance Not tested  (not a barrier to DC)   Balance   Static Sitting Fair +   Dynamic Sitting Fair   Static Standing Fair -   Dynamic Standing Poor +   Ambulatory Poor +   Endurance Deficit   Endurance Deficit Yes   Endurance Deficit Description Fatigue, weakness, deconditioning   Activity Tolerance   Activity Tolerance Patient tolerated treatment well   Medical Staff Made Aware OT Christiana walden treatment for DC planning. CM updated   Nurse Made Aware RN cleared   Assessment   Prognosis Good   Problem List Decreased strength;Decreased endurance;Impaired balance;Decreased mobility;Obesity   Assessment Pt seen for PT treatment session this date. Therapy session focused on transfer training, gait training and bed mobility in order to improve overall mobility and independence. Pt requires increased assistance for transfers this date and regressed to MOD Ax1 for initial STS from bed. Pt progressed to SUP for 2nd STS from chair w/ armrests support and elevated surface. Pt increased ambulation distance to 10' w/ RW which she reports is about the distance she typically walks at home. Pt making good progress toward goals. Pt was left sitting at the end of PT session with all needs in reach. Pt would benefit from continued PT services while in hospital to address remaining limitations. PT to continue treating pt and recommends STR. If pt were to refuse rehab, recommend DC home w/ HHPT and increased assistance from family. The patient's AM-PAC Basic Mobility Inpatient Short Form Raw Score is 15. A Raw score of less than or equal to 16 suggests the patient may benefit from discharge to post-acute rehabilitation services. Please also refer to the recommendation of the Physical Therapist for safe discharge planning.   Barriers to Discharge Decreased caregiver support   Goals   Patient Goals to go home   STG Expiration Date 08/13/24   PT Treatment Day 1   Plan    Treatment/Interventions Functional transfer training;LE strengthening/ROM;Therapeutic exercise;Endurance training;Patient/family training;Equipment eval/education;Bed mobility;Gait training;Spoke to case management;Spoke to nursing;OT   Progress Progressing toward goals   PT Frequency 3-5x/wk   Discharge Recommendation   Rehab Resource Intensity Level, PT (S)  II (Moderate Resource Intensity)  (changed from level III resources, CM updated. If pt were to refuse rehab recommendation, recommend HHPT and increased support from family)   Equipment Recommended Walker;Wheelchair  (pt owns)   AM-PAC Basic Mobility Inpatient   Turning in Flat Bed Without Bedrails 3   Lying on Back to Sitting on Edge of Flat Bed Without Bedrails 2   Moving Bed to Chair 2   Standing Up From Chair Using Arms 3   Walk in Room 3   Climb 3-5 Stairs With Railing 2   Basic Mobility Inpatient Raw Score 15   Basic Mobility Standardized Score 36.97   [facility] Highest Level Of Mobility   -HL Goal 4: Move to chair/commode   -HLM Achieved 6: Walk 10 steps or more   Education   Education Provided Mobility training;Assistive device   Patient Demonstrates acceptance/verbal understanding   End of Consult   Patient Position at End of Consult Bedside chair;Bed/Chair alarm activated;All needs within reach     Sheba Garcia PT, DPT

## 2024-07-31 NOTE — OCCUPATIONAL THERAPY NOTE
Occupational Therapy Progress Note     Patient Name: Laura Sarkar  Today's Date: 7/31/2024  Problem List  Principal Problem:    ICD (implantable cardioverter-defibrillator) discharge  Active Problems:    Atrial fibrillation (HCC)    Type 2 diabetes mellitus, without long-term current use of insulin (HCC)    Nonischemic cardiomyopathy (HCC)    Ambulatory dysfunction        07/31/24 0848   OT Last Visit   OT Visit Date 07/31/24   Note Type   Note Type Treatment   Pain Assessment   Pain Assessment Tool 0-10   Pain Score No Pain   Restrictions/Precautions   Weight Bearing Precautions Per Order No   Other Precautions Chair Alarm;Bed Alarm;Fall Risk;Multiple lines;Telemetry   Lifestyle   Autonomy Pt reports (I) with ADLs and functional mobility using rw and w/c. Pt requires assistance from sister for IADLs. Pt -  and employed   Reciprocal Relationships family   Service to Others    Intrinsic Gratification enjoys reading   ADL   Where Assessed Edge of bed   UB Dressing Assistance 5  Supervision/Setup   UB Dressing Deficit Setup;Supervision/safety;Increased time to complete   [facility]   LB Dressing Assistance 2  Maximal Assistance   LB Dressing Deficit Setup;Verbal cueing;Supervision/safety;Increased time to complete;Don/doff R sock;Don/doff L sock;Thread RLE into underwear;Thread LLE into underwear;Pull up over hips   LB Dressing Comments Pt requires Max A to don socks and thread BLE through underwear seated EOB and requires Max A to pull underwear over hips in standing   Functional Standing Tolerance   Time 1 minute   Activity LB dressing   Comments Pt stands CGA w/ RW as therapist assists w/ LB dressing   Bed Mobility   Supine to Sit 5  Supervision   Additional items HOB elevated;Increased time required;Verbal cues   Sit to Supine Unable to assess   Additional Comments Pt greeted supine in bed. Pt left sitting OOB in chair w/ alarm on and  "all needs within reach.   Transfers   Sit to Stand 5  Supervision   Additional items Assist x 1;Increased time required;Verbal cues;Armrests   Stand to Sit 5  Supervision   Additional items Assist x 1;Increased time required;Verbal cues;Armrests   Additional Comments w/ RW. Pt requires Min Ax1 for initial STS from EOB. Progresses to Supervision for STS from chair.   Functional Mobility   Functional Mobility 4  Minimal assistance   Additional Comments Pt performs short household distance mobility  Min Ax1 w/ RW.   Additional items Rolling walker   Subjective   Subjective \"I want to go home\"   Cognition   Overall Cognitive Status WFL   Arousal/Participation Alert;Responsive;Cooperative   Attention Within functional limits   Orientation Level Oriented X4   Memory Within functional limits   Following Commands Follows all commands and directions without difficulty   Comments Pt pleasant and cooperative w/ decreased insight into deficits   Activity Tolerance   Activity Tolerance Patient limited by fatigue   Medical Staff Made Aware Co-tx w/ PT due to medical complexity. Treatment for d/c plan, CM notified of changes. RN cleared and updated   Assessment   Assessment Pt seen for skilled OT treatment session on this date w/ interventions focusing on  ADL participation, activity tolerance, sitting tolerance, sitting balance, standing tolerance, standing balance, bed mobility , transfer skills, and fxnl mobility. Pt was agreeable and willing to participate in session. Pt engaged in the following tasks: Supervision UB ADLs, bed mobility, STS transfer; Min Ax1 functional mobility; Max Ax1 LB ADLs. In comparison to previous session, pt demonstrated improvements in functional mobility distance as she was able to ambulate ~10 feet. verbal cues for safety and verbal cues for pacing thru activity steps. Pt continues to be functioning below baseline level as occupational performance remains limited by decreased ADL status, decreased " activity tolerance, decreased endurance, decreased sitting balance, decreased standing tolerance, decreased standing balance, decreased transfer skills, decreased fxnl mobility, decreased insight into deficits, and generalized weakness. The patient's raw score on the -PAC Daily Activity Inpatient Short Form is 16. A raw score of less than 19 suggests the patient may benefit from discharge to post-acute rehabilitation services. Please refer to the recommendation of the Occupational Therapist for safe discharge planning. From OT standpoint, recommend Level II services at time of d/c. Pt will benefit from continued OT treatment while in acute care to address deficits as defined above and maximize level of functional independence with ADLs and functional mobility. Pt left OOB in chair w/ alarm activated and all needs met at end of session.   Plan   Treatment Interventions ADL retraining;Functional transfer training;UE strengthening/ROM;Endurance training;Cognitive reorientation;Patient/family training;Equipment evaluation/education;Compensatory technique education;Continued evaluation;Activityengagement;Energy conservation   Goal Expiration Date 08/13/24   OT Treatment Day 1   OT Frequency 2-3x/wk   Discharge Recommendation   Rehab Resource Intensity Level, OT (S)  II (Moderate Resource Intensity)   Additional Comments  (S)  Changed from Level III services. CM notified. If Pt were to refuse post-acute rehab services, recommend HHOT.   AM-PAC Daily Activity Inpatient   Lower Body Dressing 2   Bathing 2   Toileting 2   Upper Body Dressing 3   Grooming 3   Eating 4   Daily Activity Raw Score 16   Daily Activity Standardized Score (Calc for Raw Score >=11) 35.96   AM-PAC Applied Cognition Inpatient   Following a Speech/Presentation 4   Understanding Ordinary Conversation 4   Taking Medications 3   Remembering Where Things Are Placed or Put Away 4   Remembering List of 4-5 Errands 4   Taking Care of Complicated Tasks 3    Applied Cognition Raw Score 22   Applied Cognition Standardized Score 47.83   End of Consult   Education Provided Yes   Patient Position at End of Consult Bedside chair;Bed/Chair alarm activated;All needs within reach   Nurse Communication Nurse aware of consult       Lashonda Rg OTR/L

## 2024-07-31 NOTE — PLAN OF CARE
Problem: PHYSICAL THERAPY ADULT  Goal: Performs mobility at highest level of function for planned discharge setting.  See evaluation for individualized goals.  Description: Treatment/Interventions: Functional transfer training, LE strengthening/ROM, Therapeutic exercise, Endurance training, Patient/family training, Equipment eval/education, Gait training, Compensatory technique education, Spoke to nursing, Spoke to case management, OT  Equipment Recommended:  (pt already has RW + W/C)       See flowsheet documentation for full assessment, interventions and recommendations.  Outcome: Progressing  Note: Prognosis: Good  Problem List: Decreased strength, Decreased endurance, Impaired balance, Decreased mobility, Obesity  Assessment: Pt seen for PT treatment session this date. Therapy session focused on transfer training, gait training and bed mobility in order to improve overall mobility and independence. Pt requires increased assistance for transfers this date and regressed to MOD Ax1 for initial STS from bed. Pt progressed to SUP for 2nd STS from chair w/ armrests support and elevated surface. Pt increased ambulation distance to 10' w/ RW which she reports is about the distance she typically walks at home. Pt making good progress toward goals. Pt was left sitting at the end of PT session with all needs in reach. Pt would benefit from continued PT services while in hospital to address remaining limitations. PT to continue treating pt and recommends STR. If pt were to refuse rehab, recommend DC home w/ HHPT and increased assistance from family. The patient's AM-PAC Basic Mobility Inpatient Short Form Raw Score is 15. A Raw score of less than or equal to 16 suggests the patient may benefit from discharge to post-acute rehabilitation services. Please also refer to the recommendation of the Physical Therapist for safe discharge planning.  Barriers to Discharge: Decreased caregiver support     Rehab Resource Intensity  Level, PT: (S) II (Moderate Resource Intensity) (changed from level III resources, CM updated. If pt were to refuse rehab recommendation, recommend HHPT and increased support from family)    See flowsheet documentation for full assessment.

## 2024-08-01 ENCOUNTER — TRANSITIONAL CARE MANAGEMENT (OUTPATIENT)
Dept: FAMILY MEDICINE CLINIC | Facility: CLINIC | Age: 79
End: 2024-08-01

## 2024-08-01 ENCOUNTER — HOME CARE VISIT (OUTPATIENT)
Dept: HOME HEALTH SERVICES | Facility: HOME HEALTHCARE | Age: 79
End: 2024-08-01

## 2024-08-01 ENCOUNTER — PATIENT OUTREACH (OUTPATIENT)
Dept: CASE MANAGEMENT | Facility: OTHER | Age: 79
End: 2024-08-01

## 2024-08-01 DIAGNOSIS — Z71.89 COMPLEX CARE COORDINATION: Primary | ICD-10-CM

## 2024-08-01 NOTE — PROGRESS NOTES
Contacted patient for f/u post hospitalization for ICD discharge.  She had AV node ablation on 7/29.  She is doing well, lives alone but has good family support.  She denies any pain or discomfort. No c/o chest pain, sob or LE edema.  She does not weigh at home.  Her groin incision is without s/s of infection.  She is in a wheelchair and can ambulate short distances with walker.  She reports a diminished appetite but does eat consistently during the day.  She does not monitor BS.  Reviewed medications and she is taking all as prescribed.  Reinforced to schedule cardiology and PCP appointments.  No transportation issues.  She was referred to home health services but declined services when contacted.  She feels she is managing well at home and denies any needs.

## 2024-08-01 NOTE — TELEPHONE ENCOUNTER
Patient called requesting refill for Metoprolol 25 mg. Patient made aware medication was refilled on 7/30/24 for 30 with 0 refills to [facility] pharmacy. Patient instructed to contact the pharmacy to obtain refills of medication. Patient verbalized understanding.

## 2024-08-01 NOTE — UTILIZATION REVIEW
NOTIFICATION OF ADMISSION DISCHARGE   This is a Notification of Discharge from [facility]. Please be advised that this patient has been discharge from our facility. Below you will find the admission and discharge date and time including the patient’s disposition.   UTILIZATION REVIEW CONTACT:  Bi Aguiar  Utilization   Network Utilization Review Department  Phone: [phone] x carefully listen to the prompts. All voicemails are confidential.  Email: [email or web address]     ADMISSION INFORMATION  PRESENTATION DATE: 7/27/2024  9:25 AM  OBERVATION ADMISSION DATE: N/A  INPATIENT ADMISSION DATE: 7/27/24 10:08 AM   DISCHARGE DATE: 7/31/2024  4:29 PM   DISPOSITION:Home with Home Health Care    Network Utilization Review Department  ATTENTION: Please call with any questions or concerns to [phone] and carefully listen to the prompts so that you are directed to the right person. All voicemails are confidential.   For Discharge needs, contact Care Management DC Support Team at [phone] opt. 2  Send all requests for admission clinical reviews, approved or denied determinations and any other requests to dedicated fax number below belonging to the campus where the patient is receiving treatment. List of dedicated fax numbers for the Facilities:  FACILITY NAME UR FAX NUMBER   ADMISSION DENIALS (Administrative/Medical Necessity) [phone]   DISCHARGE SUPPORT TEAM ([facility]) [phone]   PARENT CHILD HEALTH (Maternity/NICU/Pediatrics) [phone]   [facility] [phone]   [facility] [phone]   [facility] [phone]   [facility] [phone]   [facility] [phone]   [facility] [phone]   [facility] [phone]   [facility]  [phone]   [facility] [phone]   [facility] [phone]   [facility] [phone]   [facility] [phone]

## 2024-08-03 DIAGNOSIS — E78.00 HYPERCHOLESTEROLEMIA: ICD-10-CM

## 2024-08-03 RX ORDER — PRAVASTATIN SODIUM 10 MG
TABLET ORAL
Qty: 90 TABLET | Refills: 1 | Status: SHIPPED | OUTPATIENT
Start: 2024-08-03

## 2024-08-06 NOTE — PROGRESS NOTES
Patient ID: Laura Sarkar is a 79 y.o. female.    HPI: 79 y.o.female presented to the office after her defibrillator went off this morning after an episode of V. tach.  Cardiac monitoring company contacted her cardiologist who called and asked that the patient be sent to the ER to be seen by an electrophysiologist since it was a while since she was evaluated by one.  Information was transmitted to the emergency room the patient will be met there by an electrophysiologist evaluated.  At present the patient denies any chest pain shortness of breath or palpitations.    SUBJECTIVE    Family History   Problem Relation Age of Onset    Hypertension Father     Diabetes Father     Cancer Father         Throat    Arthritis Father     Stroke Mother     Diabetes Maternal Grandmother     Heart disease Maternal Grandfather     Diabetes Son     Lymphoma Family         Head, Face and Neck      Social History     Socioeconomic History    Marital status:      Spouse name: Not on file    Number of children: Not on file    Years of education: Not on file    Highest education level: Not on file   Occupational History    Occupation: RETIRED   Tobacco Use    Smoking status: Never     Passive exposure: Never    Smokeless tobacco: Never   Vaping Use    Vaping status: Never Used   Substance and Sexual Activity    Alcohol use: Never     Comment: Social per [employer]     Drug use: Never    Sexual activity: Not Currently   Other Topics Concern    Not on file   Social History Narrative    Hx of daily cola consumption (unk cans/day)    Daily tea consumption (unk cups/day)    Multiple organ donor    Patient has living will    Power of  in existence    Uses safety equipment - Seatbelts      Social Determinants of Health     Financial Resource Strain: Low Risk  (4/25/2023)    Overall Financial Resource Strain (CARDIA)     Difficulty of Paying Living Expenses: Not hard at all   Food Insecurity: No Food Insecurity (7/28/2024)     Hunger Vital Sign     Worried About Running Out of Food in the Last Year: Never true     Ran Out of Food in the Last Year: Never true   Transportation Needs: No Transportation Needs (7/28/2024)    PRAPARE - Transportation     Lack of Transportation (Medical): No     Lack of Transportation (Non-Medical): No   Recent Concern: Transportation Needs - Unmet Transportation Needs (7/25/2024)    PRAPARE - Transportation     Lack of Transportation (Medical): Yes     Lack of Transportation (Non-Medical): Yes   Physical Activity: Not on file   Stress: Not on file   Social Connections: Not on file   Intimate Partner Violence: Not on file   Housing Stability: Low Risk  (7/28/2024)    Housing Stability Vital Sign     Unable to Pay for Housing in the Last Year: No     Number of Times Moved in the Last Year: 0     Homeless in the Last Year: No     Past Medical History:   Diagnosis Date    Aortic stenosis     Arthritis     Asthma     Basal cell carcinoma 10/24/2023    right lateral forehead, mohs    Basal cell carcinoma 10/24/2023    right medial forehead, mohs    Coronary artery disease     Diabetes mellitus (HCC)     Diabetic peripheral neuropathy (HCC)     Last assessed - 1/27/16    Gallbladder disease     Heart attack (HCC) 07/1999    Hemorrhoids     Last assessed - 11/16/12    Hypoglycemia 12/23/2021    Myocardial infarction (HCC)     Old myocardial infarction     Type 2 diabetes mellitus with autonomic neuropathy (HCC)     Unspec long term insulin use status, Last assessed - 6/8/17     Past Surgical History:   Procedure Laterality Date    BUNIONECTOMY      CARDIAC CATHETERIZATION      Carotid Artery, Resolved - 7/1/13    CARDIAC CATHETERIZATION N/A 12/27/2021    Procedure: CARDIAC CATHETERIZATION ;  Surgeon: Chucky Tadeo MD;  [facility];  Service: Cardiology    CARDIAC CATHETERIZATION N/A 12/27/2021    Procedure: Cardiac Coronary Angiogram;  Surgeon: Chucky Tadeo MD;  [facility];   Service: Cardiology    CARDIAC ELECTROPHYSIOLOGY PROCEDURE N/A 01/18/2022    Procedure: Cardiac biv icd implant;  Surgeon: Harley Rollins DO;  [facility]: [facility];  Service: Cardiology    CARDIAC ELECTROPHYSIOLOGY PROCEDURE N/A 7/29/2024    Procedure: Cardiac eps/av node ablation;  Surgeon: Jose Carter MD;  [facility];  Service: Cardiology    CARDIOVASCULAR STRESS TEST  09/1999    CHOLECYSTECTOMY      COLONOSCOPY  2017    FOOT ARTHRODESIS, MODIFIED DONOHUE  2016    GALLBLADDER SURGERY  1970    HEMORROIDECTOMY      KNEE ARTHROSCOPY Left 2004    MOHS SURGERY Right 01/10/2024    right lateral and medial forehead (combined), Dr Berman    WI COLONOSCOPY FLX DX W/COLLJ SPEC WHEN PFRMD N/A 08/21/2017    Procedure: COLONOSCOPY;  Surgeon: Ady Wilkes MD;  Location: [facility];  Service: Colorectal    REPAIR KNEE LIGAMENT      REPAIR KNEE LIGAMENT Left 1986    REPAIR KNEE LIGAMENT Right 1988     Allergies   Allergen Reactions    Lyrica [Pregabalin] Swelling     Pt does not remember    Sulfa Antibiotics Swelling     Pt does not remember       Current Outpatient Medications:     acetaminophen (TYLENOL) 325 mg tablet, Take 2 tablets by mouth every 6 (six) hours as needed  , Disp: , Rfl:     apixaban (Eliquis) 5 mg, Take 1 tablet (5 mg total) by mouth every 12 (twelve) hours, Disp: 60 tablet, Rfl: 3    gabapentin (NEURONTIN) 300 mg capsule, TAKE 3 CAPSULES BY MOUTH EVERY MORNING, TAKE 2 CAPSULES BY MOUTH EVERY AFTERNOON, TAKE 3 CAPSULES BY MOUTH EVERY EVENING, Disp: 240 capsule, Rfl: 5    levothyroxine 125 mcg tablet, TAKE 1 TABLET BY MOUTH EVERY MORNING, Disp: 30 tablet, Rfl: 5    losartan (COZAAR) 25 mg tablet, TAKE 1 TABLET BY MOUTH EVERY MORNING, Disp: 90 tablet, Rfl: 1    venlafaxine (EFFEXOR) 75 mg tablet, TAKE 1 TABLET BY MOUTH THREE TIMES DAILY, Disp: 90 tablet, Rfl: 1    metoprolol succinate (TOPROL-XL) 25 mg 24 hr tablet, Take 1 tablet (25 mg total) by mouth daily, Disp: 30 tablet,  "Rfl: 0    pravastatin (PRAVACHOL) 10 mg tablet, TAKE 1 TABLET BY MOUTH EVERY DAY, Disp: 90 tablet, Rfl: 1    Review of Systems  Constitutional:     Denies fever, chills ,fatigue ,weakness ,weight loss, weight gain     ENT: Denies earache ,loss of hearing ,nosebleed, nasal discharge,nasal congestion ,sore throat ,hoarseness  Pulmonary: Denies shortness of breath ,cough  ,dyspnea on exertion, orthopnea  ,PND   Cardiovascular:  Denies bradycardia , tachycardia  ,palpations, lower extremity edema leg, claudication  Breast:  Denies new or changing breast lumps ,nipple discharge ,nipple changes  Abdomen:  Denies abdominal pain , anorexia , indigestion, nausea, vomiting, constipation, diarrhea  Musculoskeletal: Denies myalgias, arthralgias, joint swelling, joint stiffness , limb pain, limb swelling  Gu: denies dysuria, polyuria  Skin: Denies skin rash, skin lesion, skin wound, itching, dry skin  Neuro: Denies headache, numbness, tingling, confusion, loss of consciousness, dizziness, vertigo  Psychiatric: Denies feelings of depression, suicidal ideation, anxiety, sleep disturbances    OBJECTIVE  BP 92/62   Pulse 70   Temp (!) 97.1 °F (36.2 °C)   Ht 5' 2\" (1.575 m)   SpO2 97%   BMI 36.07 kg/m²   Constitutional:   NAD, well appearing and well nourished      ENT:   Conjunctiva and lids: no injection, edema, or discharge     Pupils and iris: ANGELINE bilaterally    External inspection of ears and nose: normal without deformities or discharge.      Otoscopic exam: Canals patent without erythema.       Nasal mucosa, septum and turbinates: Normal or edema or discharge         Oropharynx:  Moist mucosa, normal tongue and tonsils without lesions. No erythema        Pulmonary:Respiratory effort normal rate and rhythm, no increased work of breathing. Auscultation of lungs:  Clear bilaterally with no adventitious breath sounds       Cardiovascular: regular rate and rhythm, S1 and S2, no murmur, no edema and/or varicosities of LE    "   Abdomen: Soft and non-distended     Positive bowel sounds      No heptomegaly or splenomegaly      Gu: no suprapubic tenderness or CVA tenderness, no urethral discharge  Lymphatic:  No anterior or posterior cervical lymphadenopathy         Musculoskeletal:  Gait and station: Normal gait      Digits and nails normal without clubbing or cyanosis       Inspection/palpation of joints, bones, and muscles:  No joint tenderness, swelling, full active and passive range of motion       Skin: Normal skin turgor and no rashes      Neuro:    Normal reflexes     Psych:   alert and oriented to person, place and time     normal mood and affect       Assessment/Plan:Diagnoses and all orders for this visit:    Ventricular tachycardia, nonsustained (HCC)  Comments:  Patient to be transferred to the ER for further evaluation by electrophysiologist.  Orders:  -     Transfer to other facility    Presence of heart assist device (HCC)    Other orders  -     Cancel: POCT hemoglobin A1c  -     Cancel: Albumin / creatinine urine ratio        Reviewed with patient plan to treat with above plan.  Patient instructed to call in 72 hours if not feeling better or if symptoms worsen

## 2024-08-07 DIAGNOSIS — G62.9 NEUROPATHY: ICD-10-CM

## 2024-08-07 RX ORDER — GABAPENTIN 300 MG/1
CAPSULE ORAL
Qty: 240 CAPSULE | Refills: 5 | Status: SHIPPED | OUTPATIENT
Start: 2024-08-07

## 2024-08-10 DIAGNOSIS — E03.9 HYPOTHYROIDISM, UNSPECIFIED TYPE: ICD-10-CM

## 2024-08-10 RX ORDER — LEVOTHYROXINE SODIUM 0.12 MG/1
TABLET ORAL
Qty: 30 TABLET | Refills: 5 | Status: SHIPPED | OUTPATIENT
Start: 2024-08-10

## 2024-08-17 DIAGNOSIS — I48.91 ATRIAL FIBRILLATION WITH RAPID VENTRICULAR RESPONSE (HCC): ICD-10-CM

## 2024-08-17 DIAGNOSIS — F32.A DEPRESSION, UNSPECIFIED DEPRESSION TYPE: ICD-10-CM

## 2024-08-18 RX ORDER — APIXABAN 5 MG/1
5 TABLET, FILM COATED ORAL EVERY 12 HOURS
Qty: 60 TABLET | Refills: 5 | Status: SHIPPED | OUTPATIENT
Start: 2024-08-18

## 2024-08-18 RX ORDER — VENLAFAXINE 75 MG/1
TABLET ORAL
Qty: 90 TABLET | Refills: 5 | Status: SHIPPED | OUTPATIENT
Start: 2024-08-18

## 2024-08-26 ENCOUNTER — OFFICE VISIT (OUTPATIENT)
Dept: CARDIOLOGY CLINIC | Facility: MEDICAL CENTER | Age: 79
End: 2024-08-26
Payer: COMMERCIAL

## 2024-08-26 VITALS
HEIGHT: 61 IN | BODY MASS INDEX: 35.9 KG/M2 | OXYGEN SATURATION: 93 % | SYSTOLIC BLOOD PRESSURE: 118 MMHG | DIASTOLIC BLOOD PRESSURE: 68 MMHG | HEART RATE: 82 BPM

## 2024-08-26 DIAGNOSIS — I50.42 CHRONIC COMBINED SYSTOLIC AND DIASTOLIC HEART FAILURE (HCC): ICD-10-CM

## 2024-08-26 DIAGNOSIS — E66.01 MORBID (SEVERE) OBESITY DUE TO EXCESS CALORIES (HCC): ICD-10-CM

## 2024-08-26 DIAGNOSIS — I42.8 NONISCHEMIC CARDIOMYOPATHY (HCC): Primary | ICD-10-CM

## 2024-08-26 DIAGNOSIS — I44.7 LEFT BUNDLE BRANCH BLOCK (LBBB): ICD-10-CM

## 2024-08-26 DIAGNOSIS — I34.0 MODERATE MITRAL REGURGITATION: ICD-10-CM

## 2024-08-26 DIAGNOSIS — I35.0 NONRHEUMATIC AORTIC VALVE STENOSIS: ICD-10-CM

## 2024-08-26 DIAGNOSIS — E78.2 MIXED HYPERLIPIDEMIA: ICD-10-CM

## 2024-08-26 DIAGNOSIS — I48.0 PAROXYSMAL ATRIAL FIBRILLATION (HCC): ICD-10-CM

## 2024-08-26 PROBLEM — Z45.02 ICD (IMPLANTABLE CARDIOVERTER-DEFIBRILLATOR) DISCHARGE: Status: RESOLVED | Noted: 2024-07-27 | Resolved: 2024-08-26

## 2024-08-26 PROBLEM — Z95.811 PRESENCE OF HEART ASSIST DEVICE (HCC): Status: RESOLVED | Noted: 2024-07-25 | Resolved: 2024-08-26

## 2024-08-26 PROBLEM — Z45.02 DEFIBRILLATOR DISCHARGE: Status: RESOLVED | Noted: 2022-01-16 | Resolved: 2024-08-26

## 2024-08-26 PROBLEM — I95.9 HYPOTENSION: Status: RESOLVED | Noted: 2022-01-16 | Resolved: 2024-08-26

## 2024-08-26 PROCEDURE — 99214 OFFICE O/P EST MOD 30 MIN: CPT | Performed by: INTERNAL MEDICINE

## 2024-08-26 RX ORDER — METOPROLOL SUCCINATE 25 MG/1
25 TABLET, EXTENDED RELEASE ORAL DAILY
Qty: 90 TABLET | Refills: 3 | Status: SHIPPED | OUTPATIENT
Start: 2024-08-26

## 2024-08-26 NOTE — ASSESSMENT & PLAN NOTE
Wt Readings from Last 3 Encounters:   07/28/24 86.2 kg (190 lb)   07/25/24 91 kg (200 lb 9.9 oz)   01/10/24 89.4 kg (197 lb 3.2 oz)     Ejection fraction noted to be 45% in July 2024.  Currently volume status appears stable with stable weight.  Continue current management.

## 2024-08-26 NOTE — PROGRESS NOTES
Cardiology Follow Up    Laura Sarkar  1945  [id number]  [facility] [place]  [address]  [address]  [place] [address] [phone]      1. Nonischemic cardiomyopathy (HCC)  Assessment & Plan:  NICM: cath in Dec 2021 on initial diagnosis was normal for CAD. S/p Bi-V ICD - opti-vol WNL on most recent interrogation.  EF noted to be 30% on diagnosis, now on repeat echo after medical therapy revealed low normal to mildly reduced LVEF with only trace MR as opposed to mild to moderate on diagnosis.  Continues to remain euvolemic on exam today.  S/p ICD discharge in July 2024 due to missing doses of Toprol, now back on it and dofetilide has stopped.  No changes made today.  2. Paroxysmal atrial fibrillation (HCC)  Assessment & Plan:  Afib: with inappropriate LifeVest discharge for afib with RVR - now s/p ICD.  Rate control with B-blocker and rhythm control with Tikosyn was initially used, now off Tikosyn with ICD discharge admission in July 2024 due to rapid afib.  Continued on Eliquis for AC.  Continues to have intermittent episodes of A-fib on device interrogations, but overall burden is low - now s/p AVJ ablation as well, so will no longer be inappropriately shocked.  Orders:  -     metoprolol succinate (TOPROL-XL) 25 mg 24 hr tablet; Take 1 tablet (25 mg total) by mouth daily  3. Nonrheumatic aortic valve stenosis  Assessment & Plan:  Aortic stenosis: most recent echo in July 2024 did not reveal significant stenosis, only sclerosis.  4. Left bundle branch block (LBBB)  Assessment & Plan:  LBBB: continued with Bi-V ICD.  5. Mixed hyperlipidemia  Assessment & Plan:  HLD: continued on statin.  LDL 86 in Aug 2021.  LDL 92 in May 2023.  LDL 84 in September 2023, and repeat levels have been ordered in her chart for this year.  6. Moderate mitral regurgitation  Assessment & Plan:  Noted only to be trace on most recent echocardiogram in July 2024.  7. Chronic combined  "systolic and diastolic heart failure (HCC)  Assessment & Plan:  Wt Readings from Last 3 Encounters:   07/28/24 86.2 kg (190 lb)   07/25/24 91 kg (200 lb 9.9 oz)   01/10/24 89.4 kg (197 lb 3.2 oz)     Ejection fraction noted to be 45% in July 2024.  Currently volume status appears stable with stable weight.  Continue current management.          8. Morbid (severe) obesity due to excess calories (HCC)       Chief Complaint   Patient presents with    Follow-up     1 year f/up       Interval History: Patient feels well, without complaints.  No reported chest pain, shortness of breath, palpitations, lightheadedness, syncope, LE edema, orthopnea, PND, or significant weight changes.  Patient remains active without any increased fatigue out of the ordinary.        Blood pressure 118/68, pulse 82, height 5' 1\" (1.549 m), SpO2 93%.      Review of Systems:  Review of Systems   Constitutional:  Negative for activity change, appetite change, chills, diaphoresis, fatigue and unexpected weight change.   HENT:  Negative for hearing loss, nosebleeds and sore throat.    Eyes:  Negative for photophobia and visual disturbance.   Respiratory:  Negative for cough, chest tightness, shortness of breath and wheezing.    Cardiovascular:  Negative for chest pain, palpitations and leg swelling.   Gastrointestinal:  Negative for abdominal pain, diarrhea, nausea and vomiting.   Endocrine: Negative for polyuria.   Genitourinary:  Negative for dysuria, frequency and hematuria.   Musculoskeletal:  Negative for arthralgias, back pain, gait problem and neck pain.   Skin:  Negative for pallor and rash.   Neurological:  Negative for dizziness, syncope and headaches.   Hematological:  Does not bruise/bleed easily.   Psychiatric/Behavioral:  Negative for behavioral problems and confusion.        Physical Exam:  Physical Exam  Vitals reviewed.   Constitutional:       Appearance: Normal appearance. She is well-developed. She is not ill-appearing or " diaphoretic.   HENT:      Head: Normocephalic and atraumatic.      Nose: Nose normal.   Eyes:      General: No scleral icterus.     Extraocular Movements: Extraocular movements intact.      Pupils: Pupils are equal, round, and reactive to light.   Neck:      Vascular: No JVD.   Cardiovascular:      Rate and Rhythm: Normal rate and regular rhythm.      Heart sounds: Normal heart sounds. No murmur heard.     No friction rub. No gallop.   Pulmonary:      Effort: Pulmonary effort is normal. No respiratory distress.      Breath sounds: Normal breath sounds. No wheezing or rales.   Abdominal:      General: Bowel sounds are normal. There is no distension.      Palpations: Abdomen is soft.      Tenderness: There is no abdominal tenderness.   Musculoskeletal:         General: No deformity. Normal range of motion.      Cervical back: Normal range of motion and neck supple.      Right lower leg: No edema.      Left lower leg: No edema.   Skin:     General: Skin is warm and dry.      Findings: No rash.   Neurological:      Mental Status: She is alert and oriented to person, place, and time.      Cranial Nerves: No cranial nerve deficit.   Psychiatric:         Mood and Affect: Mood normal.         Behavior: Behavior normal.         Patient Active Problem List   Diagnosis    Atrial fibrillation (HCC)    Hyperlipidemia    Aortic stenosis    Primary osteoarthritis of both knees    Class 3 severe obesity in adult ([facility])    Diabetic peripheral neuropathy ([facility])    Type 2 diabetes mellitus, without long-term current use of insulin ([facility])    Stage 4 chronic kidney disease ([facility])    Elevated troponin    Chronic combined systolic and diastolic heart failure ([facility])    Depression    DNR (do not resuscitate)    Torn rotator cuff    Left bundle branch block (LBBB)    Moderate mitral regurgitation    CKD (chronic kidney disease)    Left knee pain    Pulmonary fibrosis (HCC)    Hypothyroidism    Abnormal SPEP    Nonischemic cardiomyopathy ([facility])     Primary osteoarthritis of both shoulders    Basal cell carcinoma (BCC) of forehead    Skin ulcer, limited to breakdown of skin (HCC)    Secondary hyperparathyroidism of renal origin (HCC)    Ambulatory dysfunction     Past Medical History:   Diagnosis Date    Aortic stenosis     Arthritis     Asthma     Basal cell carcinoma 10/24/2023    right lateral forehead, mohs    Basal cell carcinoma 10/24/2023    right medial forehead, mohs    Coronary artery disease     Diabetes mellitus (HCC)     Diabetic peripheral neuropathy (HCC)     Last assessed - 1/27/16    Gallbladder disease     Heart attack (HCC) 07/1999    Hemorrhoids     Last assessed - 11/16/12    Hypoglycemia 12/23/2021    Myocardial infarction (HCC)     Old myocardial infarction     Type 2 diabetes mellitus with autonomic neuropathy (HCC)     Unspec long term insulin use status, Last assessed - 6/8/17     Social History     Socioeconomic History    Marital status:      Spouse name: Not on file    Number of children: Not on file    Years of education: Not on file    Highest education level: Not on file   Occupational History    Occupation: RETIRED   Tobacco Use    Smoking status: Never     Passive exposure: Never    Smokeless tobacco: Never   Vaping Use    Vaping status: Never Used   Substance and Sexual Activity    Alcohol use: Never     Comment: Social per [employer]     Drug use: Never    Sexual activity: Not Currently   Other Topics Concern    Not on file   Social History Narrative    Hx of daily cola consumption (unk cans/day)    Daily tea consumption (unk cups/day)    Multiple organ donor    Patient has living will    Power of  in existence    Uses safety equipment - Seatbelts      Social Determinants of Health     Financial Resource Strain: Low Risk  (4/25/2023)    Overall Financial Resource Strain (CARDIA)     Difficulty of Paying Living Expenses: Not hard at all   Food Insecurity: No Food Insecurity (7/28/2024)    Hunger Vital Sign      Worried About Running Out of Food in the Last Year: Never true     Ran Out of Food in the Last Year: Never true   Transportation Needs: No Transportation Needs (7/28/2024)    PRAPARE - Transportation     Lack of Transportation (Medical): No     Lack of Transportation (Non-Medical): No   Recent Concern: Transportation Needs - Unmet Transportation Needs (7/25/2024)    PRAPARE - Transportation     Lack of Transportation (Medical): Yes     Lack of Transportation (Non-Medical): Yes   Physical Activity: Not on file   Stress: Not on file   Social Connections: Not on file   Intimate Partner Violence: Not on file   Housing Stability: Low Risk  (7/28/2024)    Housing Stability Vital Sign     Unable to Pay for Housing in the Last Year: No     Number of Times Moved in the Last Year: 0     Homeless in the Last Year: No      Family History   Problem Relation Age of Onset    Hypertension Father     Diabetes Father     Cancer Father         Throat    Arthritis Father     Stroke Mother     Diabetes Maternal Grandmother     Heart disease Maternal Grandfather     Diabetes Son     Lymphoma Family         Head, Face and Neck      Past Surgical History:   Procedure Laterality Date    BUNIONECTOMY      CARDIAC CATHETERIZATION      Carotid Artery, Resolved - 7/1/13    CARDIAC CATHETERIZATION N/A 12/27/2021    Procedure: CARDIAC CATHETERIZATION ;  Surgeon: Chucky Tadeo MD;  [facility];  Service: Cardiology    CARDIAC CATHETERIZATION N/A 12/27/2021    Procedure: Cardiac Coronary Angiogram;  Surgeon: Chucky Tadeo MD;  [facility];  Service: Cardiology    CARDIAC ELECTROPHYSIOLOGY PROCEDURE N/A 01/18/2022    Procedure: Cardiac biv icd implant;  Surgeon: Harley Rollins DO;  Location: [facility];  Service: Cardiology    CARDIAC ELECTROPHYSIOLOGY PROCEDURE N/A 7/29/2024    Procedure: Cardiac eps/av node ablation;  Surgeon: Jose Carter MD;  [facility]: [facility];  Service:  Cardiology    CARDIOVASCULAR STRESS TEST  09/1999    CHOLECYSTECTOMY      COLONOSCOPY  2017    FOOT ARTHRODESIS, MODIFIED DONOHUE  2016    GALLBLADDER SURGERY  1970    HEMORROIDECTOMY      KNEE ARTHROSCOPY Left 2004    MOHS SURGERY Right 01/10/2024    right lateral and medial forehead (combined), Dr Berman    RI COLONOSCOPY FLX DX W/COLLJ SPEC WHEN PFRMD N/A 08/21/2017    Procedure: COLONOSCOPY;  Surgeon: Ady Wilkes MD;  Location: [facility];  Service: Colorectal    REPAIR KNEE LIGAMENT      REPAIR KNEE LIGAMENT Left 1986    REPAIR KNEE LIGAMENT Right 1988       Current Outpatient Medications:     acetaminophen (TYLENOL) 325 mg tablet, Take 2 tablets by mouth every 6 (six) hours as needed  , Disp: , Rfl:     apixaban (Eliquis) 5 mg, TAKE 1 TABLET BY MOUTH EVERY 12 HOURS, Disp: 60 tablet, Rfl: 5    gabapentin (NEURONTIN) 300 mg capsule, TAKE 3 CAPSULES BY MOUTH EVERY MORNING, TAKE 2 CAPSULES BY MOUTH EVERY AFTERNOON, AND TAKE 3 CAPSULES BY MOUTH EVERY EVENING, Disp: 240 capsule, Rfl: 5    levothyroxine 125 mcg tablet, TAKE 1 TABLET BY MOUTH EVERY MORNING, Disp: 30 tablet, Rfl: 5    losartan (COZAAR) 25 mg tablet, TAKE 1 TABLET BY MOUTH EVERY MORNING, Disp: 90 tablet, Rfl: 1    metoprolol succinate (TOPROL-XL) 25 mg 24 hr tablet, Take 1 tablet (25 mg total) by mouth daily, Disp: 90 tablet, Rfl: 3    pravastatin (PRAVACHOL) 10 mg tablet, TAKE 1 TABLET BY MOUTH EVERY DAY, Disp: 90 tablet, Rfl: 1    venlafaxine (EFFEXOR) 75 mg tablet, TAKE 1 TABLET BY MOUTH THREE TIMES DAILY, Disp: 90 tablet, Rfl: 5  Allergies   Allergen Reactions    Lyrica [Pregabalin] Swelling     Pt does not remember    Sulfa Antibiotics Swelling     Pt does not remember       Labs:  Admission on 07/27/2024, Discharged on 07/31/2024   Component Date Value    WBC 07/27/2024 7.21     RBC 07/27/2024 3.89     Hemoglobin 07/27/2024 12.0     Hematocrit 07/27/2024 39.0     MCV 07/27/2024 100 (H)     MCH 07/27/2024 30.8     MCHC 07/27/2024 30.8 (L)      RDW 07/27/2024 13.3     MPV 07/27/2024 11.3     Platelets 07/27/2024 180     nRBC 07/27/2024 0     Segmented % 07/27/2024 71     Immature Grans % 07/27/2024 0     Lymphocytes % 07/27/2024 17     Monocytes % 07/27/2024 9     Eosinophils Relative 07/27/2024 3     Basophils Relative 07/27/2024 0     Absolute Neutrophils 07/27/2024 5.10     Absolute Immature Grans 07/27/2024 0.03     Absolute Lymphocytes 07/27/2024 1.22     Absolute Monocytes 07/27/2024 0.61     Eosinophils Absolute 07/27/2024 0.22     Basophils Absolute 07/27/2024 0.03     Sodium 07/27/2024 141     Potassium 07/27/2024 4.6     Chloride 07/27/2024 107     CO2 07/27/2024 29     ANION GAP 07/27/2024 5     BUN 07/27/2024 21     Creatinine 07/27/2024 1.19     Glucose 07/27/2024 135     Calcium 07/27/2024 9.5     eGFR 07/27/2024 43     hs TnI 0hr 07/27/2024 12     Magnesium 07/27/2024 1.6 (L)     Triscuspid Valve Regurgi* 07/28/2024 22.0     TR Peak Nadeem 07/28/2024 2.4     A4C EF 07/28/2024 53     Tricuspid valve peak reg* 07/28/2024 2.36     Left ventricular stroke * 07/28/2024 57.00     IVSd 07/28/2024 1.00     Tricuspid annular plane * 07/28/2024 1.80     Ao root 07/28/2024 3.00     LVPWd 07/28/2024 1.20     LA size 07/28/2024 4.1     Asc Ao 07/28/2024 3.7     FS 07/28/2024 27     LVIDS 07/28/2024 3.50     IVS 07/28/2024 1     LVIDd 07/28/2024 4.80     LEFT VENTRICLE SYSTOLIC * 07/28/2024 51     LV DIASTOLIC VOLUME (MOD* 07/28/2024 108     MV E' Tissue Velocity La* 07/28/2024 9     MV E' Tissue Velocity Se* 07/28/2024 9     LVSV, 2D 07/28/2024 57     BSA 07/28/2024 1.85     LV EF 07/28/2024 45     Est. RA pres 07/28/2024 5.0     Right Ventricular Peak S* 07/28/2024 27.00     hs TnI 2hr 07/27/2024 11     Delta 2hr hsTnI 07/27/2024 -1     Ventricular Rate 07/27/2024 60     Atrial Rate 07/27/2024 60     TX Interval 07/27/2024 220     QRSD Interval 07/27/2024 86     QT Interval 07/27/2024 420     QTC Interval 07/27/2024 420     P Axis 07/27/2024 82     QRS  Axis 07/27/2024 -51     T Wave Axis 07/27/2024 -32     Ventricular Rate 07/27/2024 59     Atrial Rate 07/27/2024 59     PA Interval 07/27/2024 184     QRSD Interval 07/27/2024 116     QT Interval 07/27/2024 442     QTC Interval 07/27/2024 437     QRS Axis 07/27/2024 49     T Wave [place] 07/27/2024 -4     Ventricular Rate 07/27/2024 60     Atrial Rate 07/27/2024 60     PA Interval 07/27/2024 180     QRSD Interval 07/27/2024 120     QT Interval 07/27/2024 474     QTC Interval 07/27/2024 474     P Axis 07/27/2024 77     QRS Axis 07/27/2024 179     T Wave Axis 07/27/2024 40     Magnesium 07/28/2024 1.5 (L)     Sodium 07/28/2024 140     Potassium 07/28/2024 4.3     Chloride 07/28/2024 106     CO2 07/28/2024 28     ANION GAP 07/28/2024 6     BUN 07/28/2024 18     Creatinine 07/28/2024 1.13     Glucose 07/28/2024 90     Calcium 07/28/2024 9.1     eGFR 07/28/2024 46     Phosphorus 07/28/2024 3.2     POC Glucose 07/28/2024 117     Ventricular Rate 07/28/2024 122     Atrial Rate 07/28/2024 147     QRSD Interval 07/28/2024 146     QT Interval 07/28/2024 388     QTC Interval 07/28/2024 552     QRS Axis 07/28/2024 -36     T Wave [place] 07/28/2024 188     Magnesium 07/29/2024 2.2     WBC 07/29/2024 8.38     RBC 07/29/2024 3.71 (L)     Hemoglobin 07/29/2024 11.5     Hematocrit 07/29/2024 37.0     MCV 07/29/2024 100 (H)     MCH 07/29/2024 31.0     MCHC 07/29/2024 31.1 (L)     RDW 07/29/2024 13.0     Platelets 07/29/2024 193     MPV 07/29/2024 11.2     Sodium 07/29/2024 137     Potassium 07/29/2024 4.4     Chloride 07/29/2024 104     CO2 07/29/2024 26     ANION GAP 07/29/2024 7     BUN 07/29/2024 17     Creatinine 07/29/2024 1.11     Glucose 07/29/2024 104     Calcium 07/29/2024 9.2     eGFR 07/29/2024 47     Protime 07/29/2024 16.2 (H)     INR 07/29/2024 1.32 (H)     Ventricular Rate 07/27/2024 67     Atrial Rate 07/27/2024 67     PA Interval 07/27/2024 120     QRSD Interval 07/27/2024 118     QT Interval 07/27/2024 416     QTC  Interval 07/27/2024 439     P Axis 07/27/2024 67     QRS [place] 07/27/2024 116     T Wave [place] 07/27/2024 -43     Ventricular Rate 07/29/2024 80     Atrial Rate 07/29/2024 416     TN Interval 07/29/2024 136     QRSD Interval 07/29/2024 166     QT Interval 07/29/2024 480     QTC Interval 07/29/2024 553     QRS Axis 07/29/2024 255     T Wave [place] 07/29/2024 63     Sodium 07/30/2024 139     Potassium 07/30/2024 4.7     Chloride 07/30/2024 103     CO2 07/30/2024 31     ANION GAP 07/30/2024 5     BUN 07/30/2024 18     Creatinine 07/30/2024 1.31 (H)     Glucose 07/30/2024 95     Calcium 07/30/2024 9.2     eGFR 07/30/2024 38     WBC 07/30/2024 8.37     RBC 07/30/2024 3.85     Hemoglobin 07/30/2024 12.3     Hematocrit 07/30/2024 38.2     MCV 07/30/2024 99 (H)     MCH 07/30/2024 31.9     MCHC 07/30/2024 32.2     RDW 07/30/2024 12.9     Platelets 07/30/2024 181     MPV 07/30/2024 11.3     Sodium 07/31/2024 140     Potassium 07/31/2024 4.5     Chloride 07/31/2024 105     CO2 07/31/2024 30     ANION GAP 07/31/2024 5     BUN 07/31/2024 19     Creatinine 07/31/2024 1.15     Glucose 07/31/2024 97     Calcium 07/31/2024 9.3     eGFR 07/31/2024 45    Admission on 07/25/2024, Discharged on 07/25/2024   Component Date Value    WBC 07/25/2024 9.90     RBC 07/25/2024 3.94     Hemoglobin 07/25/2024 12.4     Hematocrit 07/25/2024 39.7     MCV 07/25/2024 101 (H)     MCH 07/25/2024 31.5     MCHC 07/25/2024 31.2 (L)     RDW 07/25/2024 13.3     MPV 07/25/2024 11.4     Platelets 07/25/2024 199     nRBC 07/25/2024 0     Segmented % 07/25/2024 71     Immature Grans % 07/25/2024 0     Lymphocytes % 07/25/2024 17     Monocytes % 07/25/2024 9     Eosinophils Relative 07/25/2024 2     Basophils Relative 07/25/2024 1     Absolute Neutrophils 07/25/2024 7.12     Absolute Immature Grans 07/25/2024 0.02     Absolute Lymphocytes 07/25/2024 1.64     Absolute Monocytes 07/25/2024 0.87     Eosinophils Absolute 07/25/2024 0.20     Basophils Absolute 07/25/2024  0.05     Sodium 2024 141     Potassium 2024 4.5     Chloride 2024 107     CO2 2024 30     ANION GAP 2024 4     BUN 2024 22     Creatinine 2024 1.29     Glucose 2024 157 (H)     Calcium 2024 9.7     Corrected Calcium 2024 10.2 (H)     AST 2024 13     ALT 2024 6 (L)     Alkaline Phosphatase 2024 69     Total Protein 2024 5.8 (L)     Albumin 2024 3.4 (L)     Total Bilirubin 2024 0.47     eGFR 2024 39     hs TnI 0hr 2024 19     Magnesium 2024 1.6 (L)     TSH 3RD GENERATON 2024 1.748     hs TnI 2hr 2024 19     Delta 2hr hsTnI 2024 0     Ventricular Rate 2024 60     Atrial Rate 2024 60     AL Interval 2024 168     QRSD Interval 2024 114     QT Interval 2024 452     QTC Interval 2024 452     P Axis 2024 71     QRS Axis 2024 161     T Wave [place] 2024 -8      Lab Results   Component Value Date    CHOL 184 2015    TRIG 90 09/15/2023    TRIG 113 2015    HDL 44 (L) 09/15/2023    HDL 47 2015     Imaging: Cardiac ep lab eps/ablations    Result Date: 2024  Narrative: [address] [address] INVASIVE CARDIOLOGY- AV Node ABLATION REFERRING: PT NAME: Laura Sarkar is a 78 y.o. female MRN: [id number] : 1945 PERFORMING/ATTENDING PHY: DR. ERWIN GILLILAND MD DATE OF PROCEDURE: 24 PREOPERATIVE DIAGNOSIS: Atrial fibrillation with difficult to control rates POSTOPERATIVE DIAGNOSIS Successful ablation AV node with complete heart block acheived PROCEDURES PERFORMED 1. AV Leatha Junctional ablation 2. Reprogramming of Pacemaker prior to procedure and after procedure. 3. Bundle of HIS and RV recording. ANESTHESIA General anesthesia. PREOPERATIVE MEDICATIONS: None INFORMED CONSENT: Risks, benefits, and alternatives to AV leatha ablation for atrial fibrillation a associated procedures  discussed. The patient understood risks include but not limited to death, bleeding and vascular complication, and bleeding around the heart. DESCRIPTION OF PROCEDURE The patient was draped in normal sterile fashion. An SRO 8F sheath was placed in the right femoral vein with ultrasound guidance. Ablation was performed with an 8mm [place] EPT blazer. Ablation was performed at 60Watts <60 degrees. Ablation catheter was placed on the AV groove near the bundle of HIS with RV recording, ablation was just inferior and proximal to there.  HV:40 ms Pacemaker was reprogrammed to VOO 30bpm before the ablation. After the ablation it was reprogrammed to VVIR 80bpm. We observed for 30 minutes after ablation without return of conduction of atrial fibrillation COMPLICATIONS: None EBL: Minimal. CONTRAST: none FINAL     Impression:  Successful ablation of AV node with Complete heart block. PLAN Postoperative monitoring overnight.     Echo complete w/ contrast if indicated    Result Date: 7/28/2024  Narrative:   Left Ventricle: Left ventricular cavity size is normal. Wall thickness is normal. The left ventricular ejection fraction is 45%. Systolic function is mildly reduced. There is mild global hypokinesis. Diastolic function is normal.   Right Ventricle: Right ventricular cavity size is normal. Systolic function is normal.   Aortic Valve: There is aortic valve sclerosis.   Tricuspid Valve: There is mild regurgitation.   Aorta: The aortic root is normal in size. The ascending aorta is mildly dilated.     XR chest portable    Result Date: 7/28/2024  Narrative: XR CHEST PORTABLE INDICATION: ICD discharge. COMPARISON: CXR 1/19/2022, chest CT 12/22/2021. FINDINGS: Low lung volumes. No acute disease. Increased interstitial markings corresponding with fibrosis on CT. No pneumothorax or pleural effusion. Mild cardiomegaly with intact left subclavian ICD leads in the right atrium, right ventricle, cardiac vein. Severe bilateral glenohumeral  degenerative disease. Upper abdomen normal. Cholecystectomy.     Impression: ICD leads intact and well-positioned. Mild pulmonary fibrosis. Workstation performed: [id number]

## 2024-08-26 NOTE — ASSESSMENT & PLAN NOTE
NICM: cath in Dec 2021 on initial diagnosis was normal for CAD. S/p Bi-V ICD - opti-vol WNL on most recent interrogation.  EF noted to be 30% on diagnosis, now on repeat echo after medical therapy revealed low normal to mildly reduced LVEF with only trace MR as opposed to mild to moderate on diagnosis.  Continues to remain euvolemic on exam today.  S/p ICD discharge in July 2024 due to missing doses of Toprol, now back on it and dofetilide has stopped.  No changes made today.

## 2024-08-26 NOTE — ASSESSMENT & PLAN NOTE
Afib: with inappropriate LifeVest discharge for afib with RVR - now s/p ICD.  Rate control with B-blocker and rhythm control with Tikosyn was initially used, now off Tikosyn with ICD discharge admission in July 2024 due to rapid afib.  Continued on Eliquis for AC.  Continues to have intermittent episodes of A-fib on device interrogations, but overall burden is low - now s/p AVJ ablation as well, so will no longer be inappropriately shocked.

## 2024-08-26 NOTE — ASSESSMENT & PLAN NOTE
Aortic stenosis: most recent echo in July 2024 did not reveal significant stenosis, only sclerosis.

## 2024-08-26 NOTE — ASSESSMENT & PLAN NOTE
HLD: continued on statin.  LDL 86 in Aug 2021.  LDL 92 in May 2023.  LDL 84 in September 2023, and repeat levels have been ordered in her chart for this year.

## 2024-09-04 ENCOUNTER — TELEPHONE (OUTPATIENT)
Age: 79
End: 2024-09-04

## 2024-09-04 NOTE — TELEPHONE ENCOUNTER
Kathy (grand daughter) would like orders placed for PT/OT, patient really wants to get started feels helpless. Kathy will also call insurance to see if can get nursing to come in as well.

## 2024-10-14 ENCOUNTER — REMOTE DEVICE CLINIC VISIT (OUTPATIENT)
Dept: CARDIOLOGY CLINIC | Facility: CLINIC | Age: 79
End: 2024-10-14
Payer: COMMERCIAL

## 2024-10-14 DIAGNOSIS — I47.20 VENTRICULAR TACHYCARDIA (HCC): ICD-10-CM

## 2024-10-14 DIAGNOSIS — I42.9 CARDIOMYOPATHY, UNSPECIFIED TYPE (HCC): Primary | ICD-10-CM

## 2024-10-14 PROCEDURE — 93295 DEV INTERROG REMOTE 1/2/MLT: CPT | Performed by: INTERNAL MEDICINE

## 2024-10-14 PROCEDURE — 93296 REM INTERROG EVL PM/IDS: CPT | Performed by: INTERNAL MEDICINE

## 2024-10-14 NOTE — PROGRESS NOTES
Results for orders placed or performed in visit on 10/14/24   Cardiac EP device report    Narrative    MDT BI-V ICD (DDDR MODE)/ ACTIVE SYSTEM IS MRI CONDITIONAL  [employer] TRANSMISSION: BATTERY VOLTAGE ADEQUATE (7.4 YRS). AP: 99.4%.  :99.8% + VSRP: <0.1%. ALL AVAILABLE LEAD PARAMETERS WITHIN NORMAL LIMITS. NO SIGNIFICANT HIGH RATE EPISODES. OPTI-VOL WITHIN NORMAL LIMITS. NORMAL DEVICE FUNCTION. CH

## 2024-10-15 ENCOUNTER — TELEPHONE (OUTPATIENT)
Age: 79
End: 2024-10-15

## 2024-10-15 NOTE — TELEPHONE ENCOUNTER
Found the form, but it has Dr. Crisostomo as PCP and also has an address that is not ours. Will need to rewrite address and have Dr. Siegel sign.

## 2024-10-15 NOTE — TELEPHONE ENCOUNTER
Caller is checking in on a fax sent on 9/18/24 and 10/2/24 and have not been yet received at their office  Fax back to [phone]

## 2024-10-19 DIAGNOSIS — I48.91 ATRIAL FIBRILLATION WITH RVR (HCC): ICD-10-CM

## 2024-10-21 RX ORDER — LOSARTAN POTASSIUM 25 MG/1
TABLET ORAL
Qty: 90 TABLET | Refills: 1 | Status: SHIPPED | OUTPATIENT
Start: 2024-10-21

## 2025-01-13 ENCOUNTER — REMOTE DEVICE CLINIC VISIT (OUTPATIENT)
Dept: CARDIOLOGY CLINIC | Facility: CLINIC | Age: 80
End: 2025-01-13
Payer: COMMERCIAL

## 2025-01-13 ENCOUNTER — RESULTS FOLLOW-UP (OUTPATIENT)
Dept: CARDIOLOGY CLINIC | Facility: CLINIC | Age: 80
End: 2025-01-13

## 2025-01-13 DIAGNOSIS — Z95.810 PRESENCE OF IMPLANTABLE CARDIOVERTER-DEFIBRILLATOR (ICD): Primary | ICD-10-CM

## 2025-01-13 PROCEDURE — 93296 REM INTERROG EVL PM/IDS: CPT | Performed by: INTERNAL MEDICINE

## 2025-01-13 PROCEDURE — 93295 DEV INTERROG REMOTE 1/2/MLT: CPT | Performed by: INTERNAL MEDICINE

## 2025-01-13 NOTE — PROGRESS NOTES
MDT BI-V ICD/ACTIVE SYSTEM IS MRI CONDITIONAL   CARELINK TRANSMISSION:  BATTERY VOLTAGE ADEQUATE (6.7 YR.).  AP 98.9% %.  ALL LEAD PARAMETERS WITHIN NORMAL LIMITS.  NO SIGNIFICANT HIGH RATE OR V SENSE EPISODES.  OPTI-VOL WITHIN NORMAL LIMITS.  NORMAL DEVICE FUNCTION.  RG

## 2025-01-17 ENCOUNTER — APPOINTMENT (EMERGENCY)
Dept: CT IMAGING | Facility: HOSPITAL | Age: 80
DRG: 605 | End: 2025-01-17
Payer: COMMERCIAL

## 2025-01-17 ENCOUNTER — APPOINTMENT (EMERGENCY)
Dept: RADIOLOGY | Facility: HOSPITAL | Age: 80
DRG: 605 | End: 2025-01-17
Payer: COMMERCIAL

## 2025-01-17 ENCOUNTER — HOSPITAL ENCOUNTER (INPATIENT)
Facility: HOSPITAL | Age: 80
LOS: 4 days | Discharge: NURSING, REHAB OR LONG-TERM CARE | DRG: 605 | End: 2025-01-21
Attending: EMERGENCY MEDICINE | Admitting: SURGERY
Payer: COMMERCIAL

## 2025-01-17 DIAGNOSIS — G62.9 NEUROPATHY: ICD-10-CM
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
## 2025-01-17 DIAGNOSIS — R26.2 AMBULATORY DYSFUNCTION: ICD-10-CM

## 2025-01-17 LAB
2HR DELTA HS TROPONIN: <-5 NG/L
4HR DELTA HS TROPONIN: 1 NG/L
ABO GROUP BLD BPU: NORMAL
ABO GROUP BLD: NORMAL
ABO GROUP BLD: NORMAL
ALBUMIN SERPL BCG-MCNC: 3 G/DL (ref 3.5–5)
ALP SERPL-CCNC: 79 U/L (ref 34–104)
ALT SERPL W P-5'-P-CCNC: 8 U/L (ref 7–52)
ANION GAP SERPL CALCULATED.3IONS-SCNC: 11 MMOL/L (ref 4–13)
APTT PPP: 29 SECONDS (ref 23–34)
AST SERPL W P-5'-P-CCNC: 17 U/L (ref 13–39)
ATRIAL RATE: 83 BPM
BASOPHILS # BLD AUTO: 0.05 THOUSANDS/ΜL (ref 0–0.1)
BASOPHILS NFR BLD AUTO: 0 % (ref 0–1)
BILIRUB SERPL-MCNC: 0.4 MG/DL (ref 0.2–1)
BLD GP AB SCN SERPL QL: NEGATIVE
BPU ID: NORMAL
BUN SERPL-MCNC: 35 MG/DL (ref 5–25)
CALCIUM ALBUM COR SERPL-MCNC: 9.7 MG/DL (ref 8.3–10.1)
CALCIUM SERPL-MCNC: 8.9 MG/DL (ref 8.4–10.2)
CARDIAC TROPONIN I PNL SERPL HS: 7 NG/L (ref ?–50)
CARDIAC TROPONIN I PNL SERPL HS: 8 NG/L (ref ?–50)
CARDIAC TROPONIN I PNL SERPL HS: <2 NG/L (ref ?–50)
CHLORIDE SERPL-SCNC: 107 MMOL/L (ref 96–108)
CO2 SERPL-SCNC: 24 MMOL/L (ref 21–32)
CREAT SERPL-MCNC: 1.51 MG/DL (ref 0.6–1.3)
EOSINOPHIL # BLD AUTO: 0.07 THOUSAND/ΜL (ref 0–0.61)
EOSINOPHIL NFR BLD AUTO: 1 % (ref 0–6)
ERYTHROCYTE [DISTWIDTH] IN BLOOD BY AUTOMATED COUNT: 13.2 % (ref 11.6–15.1)
GFR SERPL CREATININE-BSD FRML MDRD: 32 ML/MIN/1.73SQ M
GLUCOSE SERPL-MCNC: 193 MG/DL (ref 65–140)
GLUCOSE SERPL-MCNC: 213 MG/DL (ref 65–140)
HCT VFR BLD AUTO: 32 % (ref 34.8–46.1)
HGB BLD-MCNC: 10.1 G/DL (ref 11.5–15.4)
IMM GRANULOCYTES # BLD AUTO: 0.08 THOUSAND/UL (ref 0–0.2)
IMM GRANULOCYTES NFR BLD AUTO: 1 % (ref 0–2)
INR PPP: 1.4 (ref 0.85–1.19)
LYMPHOCYTES # BLD AUTO: 1.86 THOUSANDS/ΜL (ref 0.6–4.47)
LYMPHOCYTES NFR BLD AUTO: 14 % (ref 14–44)
MCH RBC QN AUTO: 31.5 PG (ref 26.8–34.3)
MCHC RBC AUTO-ENTMCNC: 31.6 G/DL (ref 31.4–37.4)
MCV RBC AUTO: 100 FL (ref 82–98)
MONOCYTES # BLD AUTO: 1.34 THOUSAND/ΜL (ref 0.17–1.22)
MONOCYTES NFR BLD AUTO: 10 % (ref 4–12)
NEUTROPHILS # BLD AUTO: 9.85 THOUSANDS/ΜL (ref 1.85–7.62)
NEUTS SEG NFR BLD AUTO: 74 % (ref 43–75)
NRBC BLD AUTO-RTO: 0 /100 WBCS
PLATELET # BLD AUTO: 190 THOUSANDS/UL (ref 149–390)
PMV BLD AUTO: 11.8 FL (ref 8.9–12.7)
POTASSIUM SERPL-SCNC: 5 MMOL/L (ref 3.5–5.3)
PROT SERPL-MCNC: 5.2 G/DL (ref 6.4–8.4)
PROTHROMBIN TIME: 17.9 SECONDS (ref 12.3–15)
QRS AXIS: 261 DEGREES
QRSD INTERVAL: 158 MS
QT INTERVAL: 446 MS
QTC INTERVAL: 518 MS
RBC # BLD AUTO: 3.21 MILLION/UL (ref 3.81–5.12)
RH BLD: POSITIVE
RH BLD: POSITIVE
SODIUM SERPL-SCNC: 142 MMOL/L (ref 135–147)
SPECIMEN EXPIRATION DATE: NORMAL
T WAVE AXIS: 69 DEGREES
UNIT DISPENSE STATUS: NORMAL
UNIT PRODUCT CODE: NORMAL
UNIT PRODUCT VOLUME: 500 ML
UNIT RH: NORMAL
VENTRICULAR RATE: 81 BPM
WBC # BLD AUTO: 13.25 THOUSAND/UL (ref 4.31–10.16)

## 2025-01-17 PROCEDURE — 99285 EMERGENCY DEPT VISIT HI MDM: CPT

## 2025-01-17 PROCEDURE — 74177 CT ABD & PELVIS W/CONTRAST: CPT

## 2025-01-17 PROCEDURE — 85025 COMPLETE CBC W/AUTO DIFF WBC: CPT | Performed by: STUDENT IN AN ORGANIZED HEALTH CARE EDUCATION/TRAINING PROGRAM

## 2025-01-17 PROCEDURE — 99285 EMERGENCY DEPT VISIT HI MDM: CPT | Performed by: EMERGENCY MEDICINE

## 2025-01-17 PROCEDURE — 86850 RBC ANTIBODY SCREEN: CPT | Performed by: STUDENT IN AN ORGANIZED HEALTH CARE EDUCATION/TRAINING PROGRAM

## 2025-01-17 PROCEDURE — NC001 PR NO CHARGE: Performed by: SURGERY

## 2025-01-17 PROCEDURE — 82948 REAGENT STRIP/BLOOD GLUCOSE: CPT

## 2025-01-17 PROCEDURE — 70450 CT HEAD/BRAIN W/O DYE: CPT

## 2025-01-17 PROCEDURE — 85730 THROMBOPLASTIN TIME PARTIAL: CPT | Performed by: STUDENT IN AN ORGANIZED HEALTH CARE EDUCATION/TRAINING PROGRAM

## 2025-01-17 PROCEDURE — 73590 X-RAY EXAM OF LOWER LEG: CPT

## 2025-01-17 PROCEDURE — 36430 TRANSFUSION BLD/BLD COMPNT: CPT

## 2025-01-17 PROCEDURE — 80053 COMPREHEN METABOLIC PANEL: CPT | Performed by: STUDENT IN AN ORGANIZED HEALTH CARE EDUCATION/TRAINING PROGRAM

## 2025-01-17 PROCEDURE — 30233H1 TRANSFUSION OF NONAUTOLOGOUS WHOLE BLOOD INTO PERIPHERAL VEIN, PERCUTANEOUS APPROACH: ICD-10-PCS | Performed by: SURGERY

## 2025-01-17 PROCEDURE — 93010 ELECTROCARDIOGRAM REPORT: CPT | Performed by: INTERNAL MEDICINE

## 2025-01-17 PROCEDURE — 86900 BLOOD TYPING SEROLOGIC ABO: CPT | Performed by: STUDENT IN AN ORGANIZED HEALTH CARE EDUCATION/TRAINING PROGRAM

## 2025-01-17 PROCEDURE — 96366 THER/PROPH/DIAG IV INF ADDON: CPT

## 2025-01-17 PROCEDURE — 84484 ASSAY OF TROPONIN QUANT: CPT | Performed by: STUDENT IN AN ORGANIZED HEALTH CARE EDUCATION/TRAINING PROGRAM

## 2025-01-17 PROCEDURE — 71260 CT THORAX DX C+: CPT

## 2025-01-17 PROCEDURE — 73610 X-RAY EXAM OF ANKLE: CPT

## 2025-01-17 PROCEDURE — 72125 CT NECK SPINE W/O DYE: CPT

## 2025-01-17 PROCEDURE — 36415 COLL VENOUS BLD VENIPUNCTURE: CPT | Performed by: STUDENT IN AN ORGANIZED HEALTH CARE EDUCATION/TRAINING PROGRAM

## 2025-01-17 PROCEDURE — 85610 PROTHROMBIN TIME: CPT | Performed by: STUDENT IN AN ORGANIZED HEALTH CARE EDUCATION/TRAINING PROGRAM

## 2025-01-17 PROCEDURE — 86901 BLOOD TYPING SEROLOGIC RH(D): CPT | Performed by: STUDENT IN AN ORGANIZED HEALTH CARE EDUCATION/TRAINING PROGRAM

## 2025-01-17 PROCEDURE — 96375 TX/PRO/DX INJ NEW DRUG ADDON: CPT

## 2025-01-17 PROCEDURE — 93005 ELECTROCARDIOGRAM TRACING: CPT

## 2025-01-17 PROCEDURE — 96361 HYDRATE IV INFUSION ADD-ON: CPT

## 2025-01-17 PROCEDURE — 96365 THER/PROPH/DIAG IV INF INIT: CPT

## 2025-01-17 PROCEDURE — P9010 WHOLE BLOOD FOR TRANSFUSION: HCPCS

## 2025-01-17 RX ORDER — TRANEXAMIC ACID 10 MG/ML
1000 INJECTION, SOLUTION INTRAVENOUS ONCE
Status: COMPLETED | OUTPATIENT
Start: 2025-01-17 | End: 2025-01-17

## 2025-01-17 RX ORDER — LEVOTHYROXINE SODIUM 125 UG/1
125 TABLET ORAL
Status: DISCONTINUED | OUTPATIENT
Start: 2025-01-18 | End: 2025-01-21 | Stop reason: HOSPADM

## 2025-01-17 RX ADMIN — SODIUM CHLORIDE, SODIUM LACTATE, POTASSIUM CHLORIDE, AND CALCIUM CHLORIDE 1000 ML: .6; .31; .03; .02 INJECTION, SOLUTION INTRAVENOUS at 18:59

## 2025-01-17 RX ADMIN — Medication 2000 UNITS: at 22:16

## 2025-01-17 RX ADMIN — TRANEXAMIC ACID 1000 MG: 10 INJECTION, SOLUTION INTRAVENOUS at 22:55

## 2025-01-17 RX ADMIN — IOHEXOL 100 ML: 350 INJECTION, SOLUTION INTRAVENOUS at 20:22

## 2025-01-17 RX ADMIN — SODIUM CHLORIDE 1000 ML: 0.9 INJECTION, SOLUTION INTRAVENOUS at 21:41
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
## 2025-01-18 PROBLEM — T14.8XXA SUBCUTANEOUS HEMATOMA: Status: ACTIVE | Noted: 2025-01-18

## 2025-01-18 PROBLEM — D62 ABLA (ACUTE BLOOD LOSS ANEMIA): Status: ACTIVE | Noted: 2025-01-18

## 2025-01-18 LAB
ABO GROUP BLD BPU: NORMAL
ALBUMIN SERPL BCG-MCNC: 2.9 G/DL (ref 3.5–5)
ALP SERPL-CCNC: 71 U/L (ref 34–104)
ALT SERPL W P-5'-P-CCNC: 8 U/L (ref 7–52)
ANION GAP SERPL CALCULATED.3IONS-SCNC: 8 MMOL/L (ref 4–13)
AST SERPL W P-5'-P-CCNC: 17 U/L (ref 13–39)
BASOPHILS # BLD AUTO: 0.04 THOUSANDS/ΜL (ref 0–0.1)
BASOPHILS NFR BLD AUTO: 0 % (ref 0–1)
BILIRUB SERPL-MCNC: 0.85 MG/DL (ref 0.2–1)
BPU ID: NORMAL
BUN SERPL-MCNC: 32 MG/DL (ref 5–25)
CALCIUM ALBUM COR SERPL-MCNC: 9.6 MG/DL (ref 8.3–10.1)
CALCIUM SERPL-MCNC: 8.7 MG/DL (ref 8.4–10.2)
CHLORIDE SERPL-SCNC: 108 MMOL/L (ref 96–108)
CO2 SERPL-SCNC: 26 MMOL/L (ref 21–32)
CREAT SERPL-MCNC: 1.27 MG/DL (ref 0.6–1.3)
EOSINOPHIL # BLD AUTO: 0.12 THOUSAND/ΜL (ref 0–0.61)
EOSINOPHIL NFR BLD AUTO: 1 % (ref 0–6)
ERYTHROCYTE [DISTWIDTH] IN BLOOD BY AUTOMATED COUNT: 14 % (ref 11.6–15.1)
EST. AVERAGE GLUCOSE BLD GHB EST-MCNC: 126 MG/DL
GFR SERPL CREATININE-BSD FRML MDRD: 39 ML/MIN/1.73SQ M
GLUCOSE SERPL-MCNC: 109 MG/DL (ref 65–140)
GLUCOSE SERPL-MCNC: 111 MG/DL (ref 65–140)
GLUCOSE SERPL-MCNC: 146 MG/DL (ref 65–140)
GLUCOSE SERPL-MCNC: 96 MG/DL (ref 65–140)
HBA1C MFR BLD: 6 %
HCT VFR BLD AUTO: 29.7 % (ref 34.8–46.1)
HGB BLD-MCNC: 10.4 G/DL (ref 11.5–15.4)
HGB BLD-MCNC: 9.2 G/DL (ref 11.5–15.4)
HGB BLD-MCNC: 9.4 G/DL (ref 11.5–15.4)
HGB BLD-MCNC: 9.4 G/DL (ref 11.5–15.4)
IMM GRANULOCYTES # BLD AUTO: 0.07 THOUSAND/UL (ref 0–0.2)
IMM GRANULOCYTES NFR BLD AUTO: 1 % (ref 0–2)
LYMPHOCYTES # BLD AUTO: 1.74 THOUSANDS/ΜL (ref 0.6–4.47)
LYMPHOCYTES NFR BLD AUTO: 14 % (ref 14–44)
MAGNESIUM SERPL-MCNC: 1.6 MG/DL (ref 1.9–2.7)
MCH RBC QN AUTO: 31.3 PG (ref 26.8–34.3)
MCHC RBC AUTO-ENTMCNC: 31.6 G/DL (ref 31.4–37.4)
MCV RBC AUTO: 99 FL (ref 82–98)
MONOCYTES # BLD AUTO: 1.49 THOUSAND/ΜL (ref 0.17–1.22)
MONOCYTES NFR BLD AUTO: 12 % (ref 4–12)
NEUTROPHILS # BLD AUTO: 9.4 THOUSANDS/ΜL (ref 1.85–7.62)
NEUTS SEG NFR BLD AUTO: 72 % (ref 43–75)
NRBC BLD AUTO-RTO: 0 /100 WBCS
PHOSPHATE SERPL-MCNC: 3.2 MG/DL (ref 2.3–4.1)
PLATELET # BLD AUTO: 147 THOUSANDS/UL (ref 149–390)
PMV BLD AUTO: 11.3 FL (ref 8.9–12.7)
POTASSIUM SERPL-SCNC: 5 MMOL/L (ref 3.5–5.3)
PROT SERPL-MCNC: 5 G/DL (ref 6.4–8.4)
RBC # BLD AUTO: 3 MILLION/UL (ref 3.81–5.12)
SODIUM SERPL-SCNC: 142 MMOL/L (ref 135–147)
UNIT DISPENSE STATUS: NORMAL
UNIT PRODUCT CODE: NORMAL
UNIT PRODUCT VOLUME: 500 ML
UNIT RH: NORMAL
WBC # BLD AUTO: 12.86 THOUSAND/UL (ref 4.31–10.16)

## 2025-01-18 PROCEDURE — 99223 1ST HOSP IP/OBS HIGH 75: CPT | Performed by: SURGERY

## 2025-01-18 PROCEDURE — 85018 HEMOGLOBIN: CPT | Performed by: NURSE PRACTITIONER

## 2025-01-18 PROCEDURE — 80053 COMPREHEN METABOLIC PANEL: CPT | Performed by: NURSE PRACTITIONER

## 2025-01-18 PROCEDURE — 85018 HEMOGLOBIN: CPT

## 2025-01-18 PROCEDURE — 83036 HEMOGLOBIN GLYCOSYLATED A1C: CPT | Performed by: NURSE PRACTITIONER

## 2025-01-18 PROCEDURE — 85025 COMPLETE CBC W/AUTO DIFF WBC: CPT | Performed by: NURSE PRACTITIONER

## 2025-01-18 PROCEDURE — 83735 ASSAY OF MAGNESIUM: CPT | Performed by: NURSE PRACTITIONER

## 2025-01-18 PROCEDURE — 82948 REAGENT STRIP/BLOOD GLUCOSE: CPT

## 2025-01-18 PROCEDURE — 84100 ASSAY OF PHOSPHORUS: CPT | Performed by: NURSE PRACTITIONER

## 2025-01-18 RX ORDER — ACETAMINOPHEN 325 MG/1
975 TABLET ORAL EVERY 8 HOURS PRN
Status: DISCONTINUED | OUTPATIENT
Start: 2025-01-18 | End: 2025-01-21 | Stop reason: HOSPADM

## 2025-01-18 RX ORDER — INSULIN LISPRO 100 [IU]/ML
1-6 INJECTION, SOLUTION INTRAVENOUS; SUBCUTANEOUS EVERY 6 HOURS SCHEDULED
Status: DISCONTINUED | OUTPATIENT
Start: 2025-01-18 | End: 2025-01-21 | Stop reason: HOSPADM

## 2025-01-18 RX ORDER — GUAIFENESIN 200 MG/10ML
LIQUID ORAL 3 TIMES DAILY
Status: DISCONTINUED | OUTPATIENT
Start: 2025-01-18 | End: 2025-01-21 | Stop reason: HOSPADM

## 2025-01-18 RX ADMIN — Medication: at 20:44

## 2025-01-18 RX ADMIN — Medication: at 18:23

## 2025-01-18 NOTE — ASSESSMENT & PLAN NOTE
Noted on CT with active extravasation   S/p 1 U whole blood in the ED  S/p PCC for reversal of eliquis   Continue abdominal binder  Serial monitoring of hematoma   Trend Hgb  Hold DVT ppx   If HD unstable will require operative intervention

## 2025-01-18 NOTE — ASSESSMENT & PLAN NOTE
Wt Readings from Last 3 Encounters:   01/17/25 93.7 kg (206 lb 9.1 oz)   07/28/24 86.2 kg (190 lb)   07/25/24 91 kg (200 lb 9.9 oz)       Plan:   -Monitor for fluid overload  - Hold diuretics in the setting of hypotension

## 2025-01-18 NOTE — ASSESSMENT & PLAN NOTE
Lab Results   Component Value Date    HGBA1C 6.5 01/24/2024       Recent Labs     01/17/25  1854   POCGLU 193*       Blood Sugar Average: Last 72 hrs:  (P) 193    Plan:  - Monitor blood sugar  - SSI

## 2025-01-18 NOTE — ASSESSMENT & PLAN NOTE
Lab Results   Component Value Date    EGFR 32 01/17/2025    EGFR 45 07/31/2024    EGFR 38 07/30/2024    CREATININE 1.51 (H) 01/17/2025    CREATININE 1.15 07/31/2024    CREATININE 1.31 (H) 07/30/2024   Baseline Cr appears 1.2-1.3   S/p 1 U whole blood   Monitor I/O and renal indices

## 2025-01-18 NOTE — ASSESSMENT & PLAN NOTE
In the setting of above subcutaneous hematoma with active extravasation  S/p 1 U whole blood in the ED  Q6H Hgb, transfuse for Hgb<7 or HD instability   S/p PCC for reversal of eliquis

## 2025-01-18 NOTE — ASSESSMENT & PLAN NOTE
Patient states she fell off of the toilet two times over the past two days   Trauma workup reveals R posterior thoracoabdominal hematoma with active bleed   PT/OT as appropriate   Case management consult  Fall precautions

## 2025-01-18 NOTE — ASSESSMENT & PLAN NOTE
Lab Results   Component Value Date    HGBA1C 6.5 01/24/2024       Recent Labs     01/17/25  1854   POCGLU 193*       Blood Sugar Average: Last 72 hrs:  (P) 193  Q6H glucose checks and SSI while NPO  Check A1C  Does not appear to be on insulin or oral diabetic regimen at home

## 2025-01-18 NOTE — ASSESSMENT & PLAN NOTE
Lab Results   Component Value Date    HGBA1C 6.0 (H) 01/18/2025       Recent Labs     01/18/25  1217 01/18/25  1752 01/19/25  0030 01/19/25  0609   POCGLU 96 111 127 135       Blood Sugar Average: Last 72 hrs:  (P) 128.5  SSI   Does not appear to be on insulin or oral diabetic regimen at home

## 2025-01-18 NOTE — ASSESSMENT & PLAN NOTE
Wt Readings from Last 3 Encounters:   01/17/25 93.7 kg (206 lb 9.1 oz)   07/28/24 86.2 kg (190 lb)   07/25/24 91 kg (200 lb 9.9 oz)   Most recent LVEF 45%, NICM s/p BiV ICD placement   Appears euvolemic on exam   Monitor I/O and daily weights   Hold home BB and ARB, will restart as appropriate

## 2025-01-18 NOTE — CONSULTS
Trauma Critical Care Acceptance Note - Critical Care/ICU   Name: Laura Sarkar 79 y.o. female I MRN: [id number]  Unit/Bed#: ICU 10 I Date of Admission: 1/17/2025   Date of Service: 1/18/2025 I Hospital Day: 1       Assessment & Plan  Fall  Patient states she fell off of the toilet two times over the past two days   Trauma workup reveals R posterior thoracoabdominal hematoma with active bleed   PT/OT as appropriate   Case management consult  Fall precautions   R posterior thoracoabdominal wall subcutaneous hematoma  Noted on CT with active extravasation   S/p 1 U whole blood in the ED  S/p PCC for reversal of eliquis   Continue abdominal binder  Serial monitoring of hematoma   Trend Hgb  Hold DVT ppx   If HD unstable will require operative intervention   ABLA (acute blood loss anemia)  In the setting of above subcutaneous hematoma with active extravasation  S/p 1 U whole blood in the ED  Q6H Hgb, transfuse for Hgb<7 or HD instability   S/p PCC for reversal of eliquis   Atrial fibrillation (HCC)  Currently rate controlled  Hold home metoprolol, restart as appropriate   Hold Eliquis in the setting of active bleeding  Monitor on tele   Hyperlipidemia  Hold home statin, restart as appropriate   Type 2 diabetes mellitus, without long-term current use of insulin (HCC)  Lab Results   Component Value Date    HGBA1C 6.5 01/24/2024       Recent Labs     01/17/25  1854   POCGLU 193*       Blood Sugar Average: Last 72 hrs:  (P) 193  Q6H glucose checks and SSI while NPO  Check A1C  Does not appear to be on insulin or oral diabetic regimen at home   Stage 4 chronic kidney disease (HCC)  Lab Results   Component Value Date    EGFR 32 01/17/2025    EGFR 45 07/31/2024    EGFR 38 07/30/2024    CREATININE 1.51 (H) 01/17/2025    CREATININE 1.15 07/31/2024    CREATININE 1.31 (H) 07/30/2024   Baseline Cr appears 1.2-1.3   S/p 1 U whole blood   Monitor I/O and renal indices   Chronic combined systolic and diastolic heart failure (HCC)  Wt  Readings from Last 3 Encounters:   01/17/25 93.7 kg (206 lb 9.1 oz)   07/28/24 86.2 kg (190 lb)   07/25/24 91 kg (200 lb 9.9 oz)   Most recent LVEF 45%, NICM s/p BiV ICD placement   Appears euvolemic on exam   Monitor I/O and daily weights   Hold home BB and ARB, will restart as appropriate   Depression  Restart home gabapentin and effexor as appropriate   Hypothyroidism  Continue home levothyroxine   Disposition: Stepdown Level 1    History of Present Illness   Laura Sarkar is a 79 y.o. who presents after syncopal episode at home with recent fall x2 over the past 2 days. Both times falling off of the toilet and onto the floor. She states she did not suffer any injuries, however was noted to have a large ecchymotic area on her R upper back. She has PMH AF on eliquis, CHF s/p BiV ICD, hypothyroidism, HLD, DM2, and depression. CT revealed R posterior thoracoabdominal hematoma with active extravasation. She was given 1 U whole blood in the ED for hypotension. Eliquis reversed with PCC. Abdominal binder placed. She will be admitted to [facility] for further monitoring and management.     History obtained from chart review and the patient.  Review of Systems: Review of Systems   Constitutional: Negative.    HENT: Negative.     Eyes: Negative.    Respiratory: Negative.     Cardiovascular: Negative.    Gastrointestinal: Negative.    Endocrine: Negative.    Genitourinary: Negative.    Musculoskeletal:  Positive for back pain.   Skin: Negative.    Allergic/Immunologic: Negative.    Neurological: Negative.    Hematological: Negative.    Psychiatric/Behavioral: Negative.         Historical Information   Past Medical History:  No date: Aortic stenosis  No date: Arthritis  No date: Asthma  10/24/2023: Basal cell carcinoma      Comment:  right lateral forehead, mohs  10/24/2023: Basal cell carcinoma      Comment:  right medial forehead, mohs  No date: Coronary artery disease  No date: Diabetes mellitus (HCC)  No date: Diabetic  peripheral neuropathy (HCC)      Comment:  Last assessed - 1/27/16  No date: Gallbladder disease  07/1999: Heart attack (HCC)  No date: Hemorrhoids      Comment:  Last assessed - 11/16/12 12/23/2021: Hypoglycemia  No date: Myocardial infarction (HCC)  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  No date: Type 2 diabetes mellitus with autonomic neuropathy (HCC)      Comment:  Unspec long term insulin use status, Last assessed -                6/8/17 Past Surgical History:  No date: BUNIONECTOMY  No date: CARDIAC CATHETERIZATION      Comment:  Carotid Artery, Resolved - 7/1/13 12/27/2021: CARDIAC CATHETERIZATION; N/A      Comment:  Procedure: CARDIAC CATHETERIZATION ;  Surgeon: Chucky Tadeo MD;  [facility];  Service:                Cardiology  12/27/2021: CARDIAC CATHETERIZATION; N/A      Comment:  Procedure: Cardiac Coronary Angiogram;  Surgeon: Chucky Tadeo MD;  [facility];  Service:                Cardiology  01/18/2022: CARDIAC ELECTROPHYSIOLOGY PROCEDURE; N/A      Comment:  Procedure: Cardiac biv icd implant;  Surgeon: Harley Rollins DO;  Location: [facility];  Service:                Cardiology  7/29/2024: CARDIAC ELECTROPHYSIOLOGY PROCEDURE; N/A      Comment:  Procedure: Cardiac eps/av node ablation;  Surgeon:                Jose Carter MD;  Location: [facility];                 Service: Cardiology  09/1999: CARDIOVASCULAR STRESS TEST  No date: CHOLECYSTECTOMY  2017: COLONOSCOPY  2016: FOOT ARTHRODESIS, MODIFIED DONOHUE  1970: GALLBLADDER SURGERY  No date: HEMORROIDECTOMY  2004: KNEE ARTHROSCOPY; Left  01/10/2024: MOHS SURGERY; Right      Comment:  right lateral and medial forehead (combined), Dr Berman  08/21/2017: IN COLONOSCOPY FLX DX W/COLLJ SPEC WHEN PFRMD; N/A      Comment:  Procedure: COLONOSCOPY;  Surgeon: Ady Wilkes MD;               Location: [facility];  Service: Colorectal  No date: REPAIR  KNEE LIGAMENT  1986: REPAIR KNEE LIGAMENT; Left  1988: REPAIR KNEE LIGAMENT; Right   Current Outpatient Medications   Medication Instructions    acetaminophen (TYLENOL) 325 mg tablet 2 tablets, Oral, Every 6 hours PRN    Eliquis 5 mg, Oral, Every 12 hours    gabapentin (NEURONTIN) 300 mg capsule TAKE 3 CAPSULES BY MOUTH EVERY MORNING, TAKE 2 CAPSULES BY MOUTH EVERY AFTERNOON, AND TAKE 3 CAPSULES BY MOUTH EVERY EVENING    levothyroxine 125 mcg tablet TAKE 1 TABLET BY MOUTH EVERY MORNING    losartan (COZAAR) 25 mg tablet TAKE 1 TABLET BY MOUTH EVERY MORNING    metoprolol succinate (TOPROL-XL) 25 mg, Oral, Daily    pravastatin (PRAVACHOL) 10 mg tablet TAKE 1 TABLET BY MOUTH EVERY DAY    venlafaxine (EFFEXOR) 75 mg tablet TAKE 1 TABLET BY MOUTH THREE TIMES DAILY    Allergies   Allergen Reactions    Lyrica [Pregabalin] Swelling     Pt does not remember    Sulfa Antibiotics Swelling     Pt does not remember      Social History     Tobacco Use    Smoking status: Never     Passive exposure: Never    Smokeless tobacco: Never   Vaping Use    Vaping status: Never Used   Substance Use Topics    Alcohol use: Never     Comment: Social per [employer]     Drug use: Never    Family History   Problem Relation Age of Onset    Hypertension Father     Diabetes Father     Cancer Father         Throat    Arthritis Father     Stroke Mother     Diabetes Maternal Grandmother     Heart disease Maternal Grandfather     Diabetes Son     Lymphoma Family         Head, Face and Neck           Objective :                   Vitals I/O      Most Recent Min/Max in 24hrs   Temp 97.8 °F (36.6 °C) Temp  Min: 97.2 °F (36.2 °C)  Max: 97.8 °F (36.6 °C)   Pulse 79 Pulse  Min: 57  Max: 86   Resp 13 Resp  Min: 13  Max: 20   /60 BP  Min: 76/46  Max: 130/63   O2 Sat 95 % SpO2  Min: 90 %  Max: 98 %      Intake/Output Summary (Last 24 hours) at 1/18/2025 0207  Last data filed at 1/17/2025 2214  Gross per 24 hour   Intake 0 ml   Output --   Net 0 ml       No  diet orders on file    Invasive Monitoring           Physical Exam   Physical Exam  Skin:     General: Skin is warm and dry.      Comments: R posterior ecchymotic area with soft hematoma    HENT:      Head: Normocephalic and atraumatic.   Cardiovascular:      Rate and Rhythm: Normal rate and regular rhythm.      Pulses: Normal pulses.      Heart sounds: Normal heart sounds.   Musculoskeletal:         General: Swelling present.      Right lower leg: Trace Edema present.      Left lower leg: Trace Edema present.   Abdominal: General: Bowel sounds are normal. There is distension.     Palpations: Abdomen is soft.      Tenderness: There is no abdominal tenderness.   Constitutional:       General: She is not in acute distress.     Appearance: She is well-developed and well-nourished. She is ill-appearing.   Pulmonary:      Effort: Pulmonary effort is normal. No respiratory distress.      Breath sounds: No wheezing, rhonchi or rales.   Neurological:      General: No focal deficit present.      Mental Status: She is alert and oriented to person, place and time. Mental status is at baseline.      Cranial Nerves: No facial asymmetry.      Sensory: No sensory deficit.      Motor: Strength full and intact in all extremities.          Diagnostic Studies        Lab Results: I have reviewed the following results:     Medications:  Scheduled PRN   insulin lispro, 1-6 Units, Q6H GEE  levothyroxine, 125 mcg, Early Morning          Continuous          Labs:   CBC    Recent Labs     01/17/25  1854 01/18/25  0016   WBC 13.25*  --    HGB 10.1* 10.4*   HCT 32.0*  --      --      BMP    Recent Labs     01/17/25  1854   SODIUM 142   K 5.0      CO2 24   AGAP 11   BUN 35*   CREATININE 1.51*   CALCIUM 8.9       Coags    Recent Labs     01/17/25  1854   INR 1.40*   PTT 29        Additional Electrolytes  No recent results       Blood Gas    No recent results  No recent results LFTs  Recent Labs     01/17/25  1854   ALT 8   AST 17    ALKPHOS 79   ALB 3.0*   TBILI 0.40       Infectious  No recent results  Glucose  Recent Labs     01/17/25  1854   GLUC 213*

## 2025-01-18 NOTE — ASSESSMENT & PLAN NOTE
Currently takes Metoprolol 25mg qd, eliquis 5mg BID.    Plan:  -Monitor on tele  - Hold home eliquis in the setting of expanding hematoma

## 2025-01-18 NOTE — ED PROVIDER NOTES
Time reflects when diagnosis was documented in both MDM as applicable and the Disposition within this note       Time User Action Codes Description Comment    1/17/2025 10:26 PM Nikos Walton [W19.XXXA] Fall, initial encounter     1/18/2025  3:13 AM Nikos Walton [R26.2] Ambulatory dysfunction     1/21/2025  4:14 PM Emmy Del Rosario Add [G62.9] Neuropathy           ED Disposition       None          Assessment & Plan       Medical Decision Making  Patient presents with:  Syncope: Arriving via EMS from home after a mechanical fall witnessed by her grand daughter. Denies head strike. Per EMS when helping patient up from the ground patient had a syncope episode. Takes Eliquis. Yesterday patient fell as well and has a bruise noted on her R ribcage. Complaining of L leg pain.   DDx includes: mechanical fall vs syncopal episode (consider cardiac vs reflexive vs other), TBI, CVA, ACS, PE, CHF, additionally considered rib fractures, hip fracture, tibia fx, medication side effect, volume depletion, coagulopathy in setting of presentation and HPI  Workup per ED course: Presentation concerning for active extravasation requiring anticoagulation reversal, and blood transfusion. Patient transferred to trauma service with admission to critical care team for further workup and resuscitation    Amount and/or Complexity of Data Reviewed  Labs: ordered. Decision-making details documented in ED Course.  Radiology: ordered.    Risk  Prescription drug management.        ED Course as of 01/23/25 1506   Fri Jan 17, 2025   1858 POC Glucose(!): 193   1902 WBC(!): 13.25   1902 Hemoglobin(!): 10.1       2051 Patient reassessed resting comfortably without acute distress  No new complaints at this time.    2051 Protime-INR(!)  Elevated   2052 GLUCOSE(!): 213  Elevated   2052 Creatinine(!): 1.51  New TIESHA   2200 CT chest demonstrating concern for hematoma of right chest wall concern for active extravasation.Findings were reviewed  with Trauma surgery and my attending directly.      CT chest notable for:     active extravasation of right chest wall  1. 10.3 x 5.7 x 9.2 cm hematoma right posterolateral thoracoabdominal wall with foci of increased density consistent with active bleeding. There is moderate edema and/or contusion of the adjacent chest/abdominal wall     2205 Called bedside by RN due to hypotension with SBP 70s.    Upon evaluation pt mentating appropriately, without respiratory distress or acute discomfort.     Trauma and ED attending at bedside for evaluation where her right chest wall hematoma was noticed to have significantly increased in size from an initial evaluation.    A chest binder was placed with Right chest wall pressure dressing over Hematoma to assist with hemostatic control.     CT findings and concern for expanding hematoma were reviewed with patient who provided verbal consent for initiation of blood transfusion.    Per discussion with Trauma surgery will DC IVF bolus and initiate transfusion of 1 units whole blood with plan to transfer to ICU for further monitoring and resuscitation pending possible surgical intervention   2215 Kcentra, TXA and 2 units of whole blood transfusion per discussion with trauma team    Pt Transferred to Trauma service for further management       Medications   lactated ringers bolus 1,000 mL (0 mL Intravenous Stopped 1/17/25 2149)   iohexol (OMNIPAQUE) 350 MG/ML injection (MULTI-DOSE) 100 mL (100 mL Intravenous Given 1/17/25 2022)   sodium chloride 0.9 % bolus 1,000 mL (0 mL Intravenous Stopped 1/17/25 2250)   prothrombin complex concentrate (human) (Kcentra) 2,000 Units (2,000 Units Intravenous Given 1/17/25 2216)   tranexamic acid (CYKLOKAPRON) 1000-0.7 MG/100ML-% injection 1,000 mg (has no administration in time range)       ED Risk Strat Scores                                              History of Present Illness       Chief Complaint   Patient presents with    Syncope      Arriving via EMS from home after a mechanical fall witnessed by her grand daughter. Denies head strike. Per EMS when helping patient up from the ground patient had a syncope episode. Takes Eliquis. Yesterday patient fell as well and has a bruise noted on her R ribcage. Complaining of L leg pain.        Past Medical History:   Diagnosis Date    Aortic stenosis     Arthritis     Asthma     Basal cell carcinoma 10/24/2023    right lateral forehead, mohs    Basal cell carcinoma 10/24/2023    right medial forehead, mohs    Coronary artery disease     Diabetes mellitus (HCC)     Diabetic peripheral neuropathy (HCC)     Last assessed - 1/27/16    Gallbladder disease     Heart attack (HCC) 07/1999    Hemorrhoids     Last assessed - 11/16/12    Hypoglycemia 12/23/2021    Myocardial infarction (HCC)     Old myocardial infarction     Type 2 diabetes mellitus with autonomic neuropathy (HCC)     Unspec long term insulin use status, Last assessed - 6/8/17      Past Surgical History:   Procedure Laterality Date    BUNIONECTOMY      CARDIAC CATHETERIZATION      Carotid Artery, Resolved - 7/1/13    CARDIAC CATHETERIZATION N/A 12/27/2021    Procedure: CARDIAC CATHETERIZATION ;  Surgeon: Chucky Tadeo MD;  [facility];  Service: Cardiology    CARDIAC CATHETERIZATION N/A 12/27/2021    Procedure: Cardiac Coronary Angiogram;  Surgeon: Chucky Tadeo MD;  [facility];  Service: Cardiology    CARDIAC ELECTROPHYSIOLOGY PROCEDURE N/A 01/18/2022    Procedure: Cardiac biv icd implant;  Surgeon: aHrley Rollins DO;  [facility]: [facility];  Service: Cardiology    CARDIAC ELECTROPHYSIOLOGY PROCEDURE N/A 7/29/2024    Procedure: Cardiac eps/av node ablation;  Surgeon: Jose Carter MD;  [facility];  Service: Cardiology    CARDIOVASCULAR STRESS TEST  09/1999    CHOLECYSTECTOMY      COLONOSCOPY  2017    FOOT ARTHRODESIS, MODIFIED CHARANJIT  2016    GALLBLADDER SURGERY  1970     HEMORROIDECTOMY      KNEE ARTHROSCOPY Left 2004    MOHS SURGERY Right 01/10/2024    right lateral and medial forehead (combined), Dr Berman    CO COLONOSCOPY FLX DX W/COLLJ SPEC WHEN PFRMD N/A 08/21/2017    Procedure: COLONOSCOPY;  Surgeon: Ady Wilkes MD;  Location: [facility];  Service: Colorectal    REPAIR KNEE LIGAMENT      REPAIR KNEE LIGAMENT Left 1986    REPAIR KNEE LIGAMENT Right 1988      Family History   Problem Relation Age of Onset    Hypertension Father     Diabetes Father     Cancer Father         Throat    Arthritis Father     Stroke Mother     Diabetes Maternal Grandmother     Heart disease Maternal Grandfather     Diabetes Son     Lymphoma Family         Head, Face and Neck       Social History     Tobacco Use    Smoking status: Never     Passive exposure: Never    Smokeless tobacco: Never   Vaping Use    Vaping status: Never Used   Substance Use Topics    Alcohol use: Never     Comment: Social per [employer]     Drug use: Never      E-Cigarette/Vaping    E-Cigarette Use Never User       E-Cigarette/Vaping Substances    Nicotine No     THC No     CBD No     Flavoring No     Other No     Unknown No       I have reviewed and agree with the history as documented.     Laura Sarkar is a 79 y.o. female who presents via EMS after syncopal event while toileting at home witnessed by her daughter. Denies HS, LOC, visual disturbance at time of incident. Upon EMS arrival experienced syncopal event when they were assisting her from the floor.  She notes similar unwitnessed episode 1 day ago while on toilet however unsure if she struck her head or lost consciousness, however refused EMS transfer to hospital at time of incident. She notes right chest wall pain/bruising and left buttock pain from prior fall (wound dressed by EMS) with new LLE anterior shin tenderness from today's episode. H/o DM2 on insulin, CKD, CHF, Afib on Eliquis with no missed doses.     Denies Fever/Chills, Nausea/vomiting,  Headache/vision changes, hemoptysis/hematochezia, GI/ symptoms, or any other complaint at this time.    All other systems reviewed and are negative    HPI    Review of Systems        Objective       ED Triage Vitals   Temperature Pulse Blood Pressure Respirations SpO2 Patient Position - Orthostatic VS   01/17/25 1817 01/17/25 1817 01/17/25 1817 01/17/25 1817 01/17/25 1817 01/17/25 1817   (!) 97.2 °F (36.2 °C) 57 98/54 18 95 % Sitting      Temp Source Heart Rate Source BP Location FiO2 (%) Pain Score    01/17/25 1817 01/17/25 1817 01/17/25 1817 -- 01/17/25 2317    Oral Monitor Right arm  No Pain      Vitals      Date and Time Temp Pulse SpO2 Resp BP Pain Score FACES Pain Rating User   01/21/25 1511 98 °F (36.7 °C) 82 99 % -- 99/54 -- -- DII   01/21/25 1509 98 °F (36.7 °C) 80 99 % 18 81/59 -- -- DII   01/21/25 1102 97 °F (36.1 °C) 80 99 % -- 108/55 -- -- DII   01/21/25 1102 97 °F (36.1 °C) 80 98 % -- 108/55 -- -- DII   01/21/25 1011 -- -- -- -- -- No Pain at rest -- AW   01/21/25 0900 -- -- -- -- -- No Pain -- PH   01/21/25 0749 97.9 °F (36.6 °C) 78 95 % 16 119/59 -- -- DII   01/21/25 0749 97.9 °F (36.6 °C) 83 96 % -- 98/52 -- -- DII   01/21/25 0308 98.1 °F (36.7 °C) 81 95 % -- 97/53 -- -- DII   01/20/25 2100 -- -- -- -- -- No Pain -- TESFAYE   01/20/25 1908 98.6 °F (37 °C) 80 98 % 16 108/61 -- -- DII   01/20/25 1510 97.8 °F (36.6 °C) 80 98 % 17 97/63 -- -- DII   01/20/25 1414 -- 82 95 % -- 99/59 -- -- DII   01/20/25 1414 -- -- -- -- -- No Pain -- RR   01/20/25 1410 -- 81 91 % -- 90/55 -- -- DII   01/20/25 1408 -- 80 98 % -- 96/52 -- -- DII   01/20/25 1051 98.2 °F (36.8 °C) 83 -- 16 110/64 No Pain -- ELF   01/20/25 0900 -- -- -- -- -- No Pain -- KE   01/20/25 0751 98.5 °F (36.9 °C) 82 97 % 16 120/67 -- -- DII   01/20/25 0544 -- 81 89 % -- 118/69 -- -- DII   01/19/25 2235 98.6 °F (37 °C) 80 98 % 14 104/47 -- -- DII   01/19/25 1948 -- -- 92 % -- -- No Pain -- PM   01/19/25 1912 98.1 °F (36.7 °C) 82 96 % 14 111/57 -- --  DII   01/19/25 1600 97.2 °F (36.2 °C) -- -- -- 123/65 -- -- DII   01/19/25 1437 -- 79 97 % 37 97/51 -- -- AS   01/19/25 1242 -- 79 96 % 28 84/54 -- -- AS   01/19/25 1100 98.1 °F (36.7 °C) 79 97 % 21 89/54 -- -- AC   01/19/25 0912 -- -- -- -- -- 4 -- JM   01/19/25 0800 -- -- -- -- -- No Pain -- AS   01/19/25 0700 -- 79 96 % 16 135/59 -- -- AS   01/19/25 0700 98.6 °F (37 °C) -- -- -- -- -- -- AC   01/19/25 0500 -- 79 93 % 16 129/61 -- -- TC   01/18/25 2300 -- 79 94 % 21 110/53 -- -- LM   01/18/25 2258 98.9 °F (37.2 °C) 81 95 % 21 130/60 -- -- LM   01/18/25 2100 -- 85 96 % 18 126/63 -- -- TC   01/18/25 2000 -- -- -- -- -- No Pain -- TC   01/18/25 1900 -- 80 94 % 11 107/54 -- -- JTP   01/18/25 1900 99.2 °F (37.3 °C) -- -- -- -- -- -- EW   01/18/25 1600 -- 80 96 % 13 113/56 -- -- JTP   01/18/25 1500 -- 82 96 % 16 103/55 -- -- JTP   01/18/25 1500 98.9 °F (37.2 °C) -- -- -- -- -- -- EW   01/18/25 1400 -- 79 95 % 19 106/51 -- -- JTP   01/18/25 1300 -- 79 94 % 23 115/55 -- -- JTP   01/18/25 1200 -- 79 96 % 16 109/52 -- -- JTP   01/18/25 1100 98.6 °F (37 °C) 79 95 % 18 105/53 -- -- BB   01/18/25 1008 -- 79 95 % 19 97/50 -- -- JTP   01/18/25 1000 -- 79 97 % 31 81/50 -- -- JTP   01/18/25 0945 -- 80 98 % 14 106/52 -- -- JTP   01/18/25 0930 -- 79 96 % 21 103/53 -- -- JTP   01/18/25 0915 -- 79 -- 24 106/53 -- -- JTP   01/18/25 0900 -- 80 -- 16 108/53 -- -- JTP   01/18/25 0845 -- 79 91 % 17 108/53 -- -- JTP   01/18/25 0830 -- 82 83 % 21 109/51 -- -- JTP   01/18/25 0815 -- 80 94 % 20 92/50 -- -- JTP   01/18/25 0800 -- 80 85 % 24 101/55 No Pain -- JTP   01/18/25 0745 -- 80 94 % 25 93/55 -- -- JTP   01/18/25 0730 -- 79 95 % 25 101/50 -- -- JTP   01/18/25 0715 -- 79 90 % 24 103/51 -- -- JTP   01/18/25 0700 -- -- -- -- 93/50 -- -- TC   01/18/25 0700 98.1 °F (36.7 °C) 80 84 % 26 -- -- -- BB   01/18/25 0500 -- 80 96 % 18 100/53 -- -- TC   01/18/25 0400 -- -- -- -- -- No Pain -- TC   01/18/25 0300 98.1 °F (36.7 °C) 79 95 % 19 110/54 -- --  LM   01/17/25 2330 -- -- -- -- -- No Pain -- TC   01/17/25 2317 -- -- -- -- -- No Pain -- TC   01/17/25 2317 -- 79 95 % 13 118/60 -- -- LM   01/17/25 2308 -- 86 -- 20 130/63 -- -- GF   01/17/25 2308 97.8 °F (36.6 °C) -- 95 % -- -- -- -- TC   01/17/25 2254 97.5 °F (36.4 °C) 80 98 % 18 107/58 -- -- RM   01/17/25 2245 97.5 °F (36.4 °C) 80 98 % 16 109/57 -- --    01/17/25 2229 97.6 °F (36.4 °C) 80 96 % 20 124/60 -- --    01/17/25 2219 97.5 °F (36.4 °C) 80 98 % 18 122/55 -- --    01/17/25 2214 97.2 °F (36.2 °C) 80 -- 18 119/58 -- --    01/17/25 2200 -- 80 -- -- 78/51 -- --    01/17/25 2145 -- 80 90 % 18  76/46 provider at bedside. -- --    01/17/25 2130 -- 80 97 % 18  94/50 provider aware. -- --    01/17/25 1959 -- 80 91 % -- 103/51 -- -- NP   01/17/25 1900 -- 80 95 % 18 101/49 -- -- SM   01/17/25 1817 97.2 °F (36.2 °C) 57 95 % 18 98/54 -- -- NP            Physical Exam    General appearance: Obese female, unconvertible without acute distress   HENT: Normocephalic, atraumatic, hearing grossly intact, and mucous membranes dry   Eyes: Conjunctiva normal,  Lungs/Chest: Respirations even and unlabored, speaking full sentences   - right chest wall tenderness with ecchymosis and mild swelling  Cardiovascular: Normal rate, Afib on tele  Abdomen: soft, protuberant at baseline, non-tender  Musculoskeletal: LLE tenderness and ecchymosis anterior mid tibia. Left buttocks with superficial abrasion with mild tenderness. Pelvis stable without tenderness  Skin: warm and dry   Neurologic: Awake and alert, no apparent focal deficit  Face symmetric, PERRL. EOM Intact  Speaks spontaneously, actively following commands.   Full strength, moving all extremities equally. Sensation intact to light touch B/L UE and LE.  Psych: Mood consistent with affect       Results Reviewed       Procedure Component Value Units Date/Time    Hemoglobin [[id number]]  (Abnormal) Collected: 01/18/25 1241    Lab Status: Final result Specimen: Blood  from Arm, Right Updated: 01/18/25 1250     Hemoglobin 9.4 g/dL     Hemoglobin [[id number]]  (Abnormal) Collected: 01/18/25 0016    Lab Status: Final result Specimen: Blood from Arm, Right Updated: 01/18/25 0028     Hemoglobin 10.4 g/dL     HS Troponin I 4hr [[id number]]  (Normal) Collected: 01/17/25 2253    Lab Status: Final result Specimen: Blood from Arm, Right Updated: 01/17/25 2327     hs TnI 4hr 8 ng/L      Delta 4hr hsTnI 1 ng/L     HS Troponin I 2hr [[id number]]  (Normal) Collected: 01/17/25 2106    Lab Status: Final result Specimen: Blood from Arm, Left Updated: 01/17/25 2134     hs TnI 2hr <2 ng/L      Delta 2hr hsTnI <-5 ng/L     HS Troponin 0hr (reflex protocol) [[id number]]  (Normal) Collected: 01/17/25 1854    Lab Status: Final result Specimen: Blood from Arm, Left Updated: 01/17/25 1926     hs TnI 0hr 7 ng/L     Comprehensive metabolic panel [[id number]]  (Abnormal) Collected: 01/17/25 1854    Lab Status: Final result Specimen: Blood from Arm, Left Updated: 01/17/25 1919     Sodium 142 mmol/L      Potassium 5.0 mmol/L      Chloride 107 mmol/L      CO2 24 mmol/L      ANION GAP 11 mmol/L      BUN 35 mg/dL      Creatinine 1.51 mg/dL      Glucose 213 mg/dL      Calcium 8.9 mg/dL      Corrected Calcium 9.7 mg/dL      AST 17 U/L      ALT 8 U/L      Alkaline Phosphatase 79 U/L      Total Protein 5.2 g/dL      Albumin 3.0 g/dL      Total Bilirubin 0.40 mg/dL      eGFR 32 ml/min/1.73sq m     Narrative:      [employer] guidelines for Chronic Kidney Disease (CKD):     Stage 1 with normal or high GFR (GFR > 90 mL/min/1.73 square meters)    Stage 2 Mild CKD (GFR = 60-89 mL/min/1.73 square meters)    Stage 3A Moderate CKD (GFR = 45-59 mL/min/1.73 square meters)    Stage 3B Moderate CKD (GFR = 30-44 mL/min/1.73 square meters)    Stage 4 Severe CKD (GFR = 15-29 mL/min/1.73 square meters)    Stage 5 End Stage CKD (GFR <15 mL/min/1.73 square meters)  Note: GFR calculation is accurate only with a  steady state creatinine    Protime-INR [[id number]]  (Abnormal) Collected: 01/17/25 1854    Lab Status: Final result Specimen: Blood from Arm, Left Updated: 01/17/25 1918     Protime 17.9 seconds      INR 1.40    Narrative:      INR Therapeutic Range    Indication                                             INR Range      Atrial Fibrillation                                               2.0-3.0  Hypercoagulable State                                    2.0.2.3  Left Ventricular Asist Device                            2.0-3.0  Mechanical Heart Valve                                  -    Aortic(with afib, MI, embolism, HF, LA enlargement,    and/or coagulopathy)                                     2.0-3.0 (2.5-3.5)     Mitral                                                             2.5-3.5  Prosthetic/Bioprosthetic Heart Valve               2.0-3.0  Venous thromboembolism (VTE: VT, PE        2.0-3.0    APTT [[id number]]  (Normal) Collected: 01/17/25 1854    Lab Status: Final result Specimen: Blood from Arm, Left Updated: 01/17/25 1918     PTT 29 seconds     CBC and differential [[id number]]  (Abnormal) Collected: 01/17/25 1854    Lab Status: Final result Specimen: Blood from Arm, Left Updated: 01/17/25 1900     WBC 13.25 Thousand/uL      RBC 3.21 Million/uL      Hemoglobin 10.1 g/dL      Hematocrit 32.0 %       fL      MCH 31.5 pg      MCHC 31.6 g/dL      RDW 13.2 %      MPV 11.8 fL      Platelets 190 Thousands/uL      nRBC 0 /100 WBCs      Segmented % 74 %      Immature Grans % 1 %      Lymphocytes % 14 %      Monocytes % 10 %      Eosinophils Relative 1 %      Basophils Relative 0 %      Absolute Neutrophils 9.85 Thousands/µL      Absolute Immature Grans 0.08 Thousand/uL      Absolute Lymphocytes 1.86 Thousands/µL      Absolute Monocytes 1.34 Thousand/µL      Eosinophils Absolute 0.07 Thousand/µL      Basophils Absolute 0.05 Thousands/µL     Fingerstick Glucose (POCT) [[id number]]  (Abnormal) Collected:  01/17/25 1854    Lab Status: Final result Specimen: Blood Updated: 01/17/25 1855     POC Glucose 193 mg/dl             CT head without contrast   Final Interpretation by Shorty Dickey DO (01/17 2112)      No acute intracranial abnormality.                  Workstation performed: [id number]         CT cervical spine without contrast   Final Interpretation by Shorty Dickey DO (01/17 2119)      No cervical spine fracture or traumatic malalignment.                  Workstation performed: [id number]         CT chest abdomen pelvis w contrast   Final Interpretation by Shorty Dickey DO (01/17 2154)      1. 10.3 x 5.7 x 9.2 cm hematoma right posterolateral thoracoabdominal wall with foci of increased density consistent with active bleeding. There is moderate edema and/or contusion of the adjacent chest/abdominal wall      2. No acute solid organ injury.      3. Mild pulmonary fibrosis.      4. Indeterminate 9 mm right hepatic lobe hypodensity too small to characterize.      Additional incidental findings as above.      I personally discussed this study with Dr. Connolly from trauma surgery on 1/17/2025 at 9:47 PM as I could not reach the ordering physician by phone after >10 minutes and multiple attempts. I was also finally able to reach Dr. SORAIDA WISE    regarding this finding at 9:50 PM.            Workstation performed: [id number]         XR tibia fibula 2 views LEFT   Final Interpretation by Bradley Landon Kocher, MD (01/18 7763)      No acute osseous abnormality.      Degenerative changes as above.      Computerized Assisted Algorithm (CAA) may have been used to analyze all applicable images.               Resident: Violet Kelley I, the attending radiologist, have reviewed the images and agree with the final report above.      Workstation performed: [id number]         XR ankle 3+ views LEFT   Final Interpretation by Bradley Landon Kocher, MD (01/18 9931)      No acute osseous  abnormality.      Degenerative changes as above.         Computerized Assisted Algorithm (CAA) may have been used to analyze all applicable images.               Resident: Violet Kelley I, the attending radiologist, have reviewed the images and agree with the final report above.      Workstation performed: [id number]             Procedures    ED Medication and Procedure Management   Prior to Admission Medications   Prescriptions Last Dose Informant Patient Reported? Taking?   acetaminophen (TYLENOL) 325 mg tablet 1/19/2025 Self Yes Yes   Sig: Take 2 tablets by mouth every 6 (six) hours as needed     apixaban (Eliquis) 5 mg 1/19/2025 Self No Yes   Sig: TAKE 1 TABLET BY MOUTH EVERY 12 HOURS   gabapentin (NEURONTIN) 300 mg capsule 1/18/2025 Self No Yes   Sig: TAKE 3 CAPSULES BY MOUTH EVERY MORNING, TAKE 2 CAPSULES BY MOUTH EVERY AFTERNOON, AND TAKE 3 CAPSULES BY MOUTH EVERY EVENING   levothyroxine 125 mcg tablet Not Taking Self No Yes   Sig: TAKE 1 TABLET BY MOUTH EVERY MORNING   losartan (COZAAR) 25 mg tablet Past Week  No Yes   Sig: TAKE 1 TABLET BY MOUTH EVERY MORNING   metoprolol succinate (TOPROL-XL) 25 mg 24 hr tablet 1/18/2025  No Yes   Sig: Take 1 tablet (25 mg total) by mouth daily   pravastatin (PRAVACHOL) 10 mg tablet 1/18/2025 Self No Yes   Sig: TAKE 1 TABLET BY MOUTH EVERY DAY   venlafaxine (EFFEXOR) 75 mg tablet Unknown Self No No   Sig: TAKE 1 TABLET BY MOUTH THREE TIMES DAILY      Facility-Administered Medications: None     Discharge Medication List as of 1/21/2025  4:21 PM        START taking these medications    Details   insulin lispro (HumALOG/ADMELOG) 100 units/mL injection Inject 1-6 Units under the skin every 6 (six) hours, Starting Tue 1/21/2025, Print      metoprolol tartrate (LOPRESSOR) 25 mg tablet Take 0.5 tablets (12.5 mg total) by mouth in the morning, Starting Wed 1/22/2025, Print      polyethylene glycol (MIRALAX) 17 g packet Take 17 g by mouth daily, Starting Wed 1/22/2025, Print       senna-docusate sodium (SENOKOT S) 8.6-50 mg per tablet Take 1 tablet by mouth daily at bedtime, Starting Tue 1/21/2025, Print      white petrolatum-mineral oil (EUCERIN,HYDROCERIN) cream Apply topically 3 (three) times a day, Starting Tue 1/21/2025, No Print           CONTINUE these medications which have CHANGED    Details   gabapentin (NEURONTIN) 100 mg capsule Take 7 capsules (700 mg total) by mouth 2 (two) times a day, Starting Tue 1/21/2025, Print      venlafaxine (EFFEXOR) 75 mg tablet Take 1 tablet (75 mg total) by mouth 2 (two) times a day with meals, Starting Tue 1/21/2025, Print           CONTINUE these medications which have NOT CHANGED    Details   apixaban (Eliquis) 5 mg TAKE 1 TABLET BY MOUTH EVERY 12 HOURS, Starting Sun 8/18/2024, Normal      levothyroxine 125 mcg tablet TAKE 1 TABLET BY MOUTH EVERY MORNING, Normal      losartan (COZAAR) 25 mg tablet TAKE 1 TABLET BY MOUTH EVERY MORNING, Normal      metoprolol succinate (TOPROL-XL) 25 mg 24 hr tablet Take 1 tablet (25 mg total) by mouth daily, Starting Mon 8/26/2024, Normal      pravastatin (PRAVACHOL) 10 mg tablet TAKE 1 TABLET BY MOUTH EVERY DAY, Normal           STOP taking these medications       acetaminophen (TYLENOL) 325 mg tablet Comments:   Reason for Stopping:             Outpatient Discharge Orders   Discharge Diet     TIESHA Pathway:  Medications to avoid: phosphate or magnesium based laxatives, NSAIDs     TIESHA Pathway: Maintain SBP between 120-140 mm/Hg     TIESHA Pathway: Call Nursing Home MD for decreased oral intake     TIESHA Pathway: Daily Weights     TIESHA Pathway: Strict I&O     TIESHA Pathway: Follow up bloodwork     Discharge Condition:  Improving     Patient Aware of Diagnosis: Yes     Free of Communicable Disease:   Yes     Occupational Therapy Eval and Treat     Physical Therapy Eval And Treat     Activity:  As Tolerated     No dressing needed     ED SEPSIS DOCUMENTATION   Time reflects when diagnosis was documented in both MDM as  applicable and the Disposition within this note       Time User Action Codes Description Comment    1/17/2025 10:26 PM Nikos Walton [W19.XXXA] Fall, initial encounter     1/18/2025  3:13 AM Nikos Walton [R26.2] Ambulatory dysfunction     1/21/2025  4:14 PM Emmy Del Rosario [G62.9] Neuropathy                  Arnaud Dsouza MD  01/23/25 1506

## 2025-01-18 NOTE — ASSESSMENT & PLAN NOTE
Pt sustained a mechanical fall onto her buttocks and back. Imaging showed 10.3 x 5.7 x 9.2 cm hematoma right posterolateral thoracoabdominal wall.    Plan:  - See hematoma plan  - PT/OT eval and treat

## 2025-01-18 NOTE — ASSESSMENT & PLAN NOTE
Lab Results   Component Value Date    EGFR 35 01/19/2025    EGFR 39 01/18/2025    EGFR 32 01/17/2025    CREATININE 1.38 (H) 01/19/2025    CREATININE 1.27 01/18/2025    CREATININE 1.51 (H) 01/17/2025   Baseline Cr appears 1.2-1.3   S/p 1 U whole blood   Monitor I/O and renal indices

## 2025-01-18 NOTE — H&P
H&P - Trauma   Name: Laura Sarkar 79 y.o. female I MRN: [id number]  Unit/Bed#: ICU 10 I Date of Admission: 1/17/2025   Date of Service: 1/18/2025 I Hospital Day: 1     Assessment & Plan  Fall  Pt sustained a mechanical fall onto her buttocks and back. Imaging showed 10.3 x 5.7 x 9.2 cm hematoma right posterolateral thoracoabdominal wall.    Plan:  - See hematoma plan  - PT/OT eval and treat  Atrial fibrillation (HCC)  Currently takes Metoprolol 25mg qd, eliquis 5mg BID.    Plan:  -Monitor on tele  - Hold home eliquis in the setting of expanding hematoma  Hyperlipidemia  Plan:  - Continue home pravastatin 10mg  Type 2 diabetes mellitus, without long-term current use of insulin (HCC)  Lab Results   Component Value Date    HGBA1C 6.5 01/24/2024       Recent Labs     01/17/25  1854   POCGLU 193*       Blood Sugar Average: Last 72 hrs:  (P) 193    Plan:  - Monitor blood sugar  - SSI  Stage 4 chronic kidney disease (HCC)  Lab Results   Component Value Date    EGFR 32 01/17/2025    EGFR 45 07/31/2024    EGFR 38 07/30/2024    CREATININE 1.51 (H) 01/17/2025    CREATININE 1.15 07/31/2024    CREATININE 1.31 (H) 07/30/2024     Plan:  -Monitor renal function  - Avoid nephrotoxic agents  Chronic combined systolic and diastolic heart failure (HCC)  Wt Readings from Last 3 Encounters:   01/17/25 93.7 kg (206 lb 9.1 oz)   07/28/24 86.2 kg (190 lb)   07/25/24 91 kg (200 lb 9.9 oz)       Plan:   -Monitor for fluid overload  - Hold diuretics in the setting of hypotension      Depression    Hypothyroidism  Plan:  -Continue home levothyroxine 125mcg  R posterior thoracoabdominal wall subcutaneous hematoma  Pt sustained a mechanical fall onto her buttocks and back. Imaging showed 10.3 x 5.7 x 9.2 cm hematoma right posterolateral thoracoabdominal wall. During her time in the ED her hematoma continued to expand. Kcentra was given to reverse her eliquis. She became hypotensive at one point for which she was given 1u on blood. Abdominal  binder applied to tamponade.     Plan:  - Monitor skin for skin breakdown  - Monitor vital signs  - Trend H/H  - If hematoma continues to expand consider evacuating the hematoma  ABLA (acute blood loss anemia)      Trauma Alert: Evaluation; trauma team notified at 10 via text   Model of Arrival: Ambulance    Trauma Team: Attending Dr. Connolly and Residents Dr. Walton  Consultants:     None     History of Present Illness   Chief Complaint: Syncope  Mechanism:Fall     Laura Sarkar is a 79 y.o. female who presents s/p fall. Earlier this evening Pt tripped and fell onto her back and buttocks. Her daughter was their and witnessed the fall. Denies head strike, LOC, N/V, abdominal pain. When EMS arrived and attempted to help her up she had a syncopal episode. Endorses left leg pain.    Review of Systems   Constitutional:  Negative for activity change, appetite change and fever.   Respiratory:  Negative for shortness of breath.    Cardiovascular:  Negative for chest pain.   Gastrointestinal:  Negative for abdominal pain, nausea and vomiting.   Musculoskeletal:  Positive for back pain and myalgias.   Neurological:  Positive for dizziness and syncope. Negative for weakness, numbness and headaches.     I have reviewed the patient's PMH, PSH, Social History, Family History, Meds, and Allergies  Immunization History   Administered Date(s) Administered    COVID-19 PFIZER VACCINE 0.3 ML IM 03/17/2021, 04/08/2021    Influenza Split High Dose Preservative Free IM 11/19/2012, 01/09/2014, 09/16/2014, 09/23/2015, 10/04/2016, 10/09/2017    Influenza, high dose seasonal 0.7 mL 09/20/2018, 09/12/2019, 12/23/2021    Influenza, seasonal, injectable 10/06/2011, 10/06/2011    Pneumococcal Conjugate 13-Valent 10/04/2016    Pneumococcal Conjugate Vaccine 20-valent (Pcv20), Polysace 01/24/2024    Pneumococcal Polysaccharide PPV23 09/23/2015    TD (adult) Preservative Free 09/12/2019    Tdap 02/04/2001    Tuberculin Skin Test 01/01/2022,  01/10/2022    influenza, trivalent, adjuvanted 09/02/2020     Last Tetanus: 9/12/2019    1. Before the illness or injury that brought you to the Emergency, did you need someone to help you on a regular basis? 0=No   2. Since the illness or injury that brought you to the Emergency, have you needed more help than usual to take care of yourself? 1=Yes   3. Have you been hospitalized for one or more nights during the past 6 months (excluding a stay in the Emergency Department)? 0=No   4. In general, do you see well? 0=Yes   5. In general, do you have serious problems with your memory? 0=No   6. Do you take more than three different medications everyday? 1=Yes   TOTAL   2     Did you order a geriatric consult if the score was 2 or greater?: yes       Objective :  Temp:  [97.2 °F (36.2 °C)-97.8 °F (36.6 °C)] 97.8 °F (36.6 °C)  HR:  [57-86] 79  BP: ()/(46-63) 118/60  Resp:  [13-20] 13  SpO2:  [90 %-98 %] 95 %  O2 Device: None (Room air)    Initial Vitals:   Temperature: (!) 97.2 °F (36.2 °C) (01/17/25 1817)  Pulse: 57 (01/17/25 1817)  Respirations: 18 (01/17/25 1817)  Blood Pressure: 98/54 (01/17/25 1817)    Primary Survey:   Airway:        Status: patent;        Pre-hospital Interventions: none        Hospital Interventions: none  Breathing:        Pre-hospital Interventions: none       Effort: normal       Right breath sounds: normal       Left breath sounds: normal  Circulation:        Rhythm: regular       Rate: regular   Right Pulses Left Pulses    R radial: 2+    R pedal: 2+     L radial: 2+    L pedal: 2+       Disability:        GCS: Eye: 4; Verbal: 5 Motor: 6 Total: 15       Right Pupil: 2 mm;  round;  reactive         Left Pupil:  2 mm;  round;  reactive      R Motor Strength L Motor Strength    R : 5/5  R dorsiflex: 5/5  R plantarflex: 5/5 L : 5/5  L dorsiflex: 5/5  L plantarflex: 5/5        Sensory:  No sensory deficit  Exposure:           Secondary Survey:  Physical Exam  Constitutional:        General: She is in acute distress.      Appearance: Normal appearance.   HENT:      Head: Normocephalic and atraumatic.      Nose: Nose normal. No rhinorrhea.      Mouth/Throat:      Mouth: Mucous membranes are moist.      Pharynx: Oropharynx is clear.   Eyes:      Extraocular Movements: Extraocular movements intact.      Conjunctiva/sclera: Conjunctivae normal.   Cardiovascular:      Rate and Rhythm: Normal rate. Rhythm irregular.      Pulses: Normal pulses.      Heart sounds: Normal heart sounds.   Pulmonary:      Effort: Pulmonary effort is normal.      Breath sounds: Normal breath sounds.   Abdominal:      General: Abdomen is flat.      Palpations: Abdomen is soft.   Musculoskeletal:      Cervical back: Normal range of motion and neck supple.   Skin:     General: Skin is warm and dry.      Capillary Refill: Capillary refill takes less than 2 seconds.      Findings: Bruising (right ribcage) present.          Neurological:      General: No focal deficit present.      Mental Status: She is alert and oriented to person, place, and time.             Lab Results: I have reviewed the following results:  Recent Labs     01/17/25  1854 01/17/25  2106 01/18/25  0016   WBC 13.25*  --   --    HGB 10.1*  --  10.4*   HCT 32.0*  --   --      --   --    SODIUM 142  --   --    K 5.0  --   --      --   --    CO2 24  --   --    BUN 35*  --   --    CREATININE 1.51*  --   --    GLUC 213*  --   --    AST 17  --   --    ALT 8  --   --    ALB 3.0*  --   --    TBILI 0.40  --   --    ALKPHOS 79  --   --    PTT 29  --   --    INR 1.40*  --   --    HSTNI0 7  --   --    HSTNI2  --  <2  --        Imaging Results: I have personally reviewed pertinent images saved in PACS. CT scan findings (and other pertinent positive findings on images) were discussed with radiology. My interpretation of the images/reports are as follows:  Chest Xray(s): N/A   FAST exam(s): negative for acute findings   CT Scan(s): positive for acute findings:  10.3 x 5.7 x 9.2 cm hematoma right posterolateral thoracoabdominal wall with foci of increased density    Additional Xray(s): negative for acute findings     Other Studies: Other Study Results Review: No additional pertinent studies reviewed.

## 2025-01-18 NOTE — ASSESSMENT & PLAN NOTE
Currently rate controlled  Hold home metoprolol, restart as appropriate   Hold Eliquis in the setting of active bleeding  Monitor on tele

## 2025-01-18 NOTE — PROGRESS NOTES
Ok to give 1 unit whole blood from trauma bay blood track due to SBP of 78 with large hematoma at the posterior thorax.

## 2025-01-18 NOTE — ASSESSMENT & PLAN NOTE
Pt sustained a mechanical fall onto her buttocks and back. Imaging showed 10.3 x 5.7 x 9.2 cm hematoma right posterolateral thoracoabdominal wall. During her time in the ED her hematoma continued to expand. Kcentra was given to reverse her eliquis. She became hypotensive at one point for which she was given 1u on blood. Abdominal binder applied to tamponade.     Plan:  - Monitor skin for skin breakdown  - Monitor vital signs  - Trend H/H  - If hematoma continues to expand consider evacuating the hematoma

## 2025-01-18 NOTE — ASSESSMENT & PLAN NOTE
Lab Results   Component Value Date    EGFR 32 01/17/2025    EGFR 45 07/31/2024    EGFR 38 07/30/2024    CREATININE 1.51 (H) 01/17/2025    CREATININE 1.15 07/31/2024    CREATININE 1.31 (H) 07/30/2024     Plan:  -Monitor renal function  - Avoid nephrotoxic agents

## 2025-01-19 LAB
ANION GAP SERPL CALCULATED.3IONS-SCNC: 6 MMOL/L (ref 4–13)
BUN SERPL-MCNC: 30 MG/DL (ref 5–25)
CA-I BLD-SCNC: 1.13 MMOL/L (ref 1.12–1.32)
CALCIUM SERPL-MCNC: 9.1 MG/DL (ref 8.4–10.2)
CHLORIDE SERPL-SCNC: 107 MMOL/L (ref 96–108)
CO2 SERPL-SCNC: 27 MMOL/L (ref 21–32)
CREAT SERPL-MCNC: 1.38 MG/DL (ref 0.6–1.3)
ERYTHROCYTE [DISTWIDTH] IN BLOOD BY AUTOMATED COUNT: 14.2 % (ref 11.6–15.1)
GFR SERPL CREATININE-BSD FRML MDRD: 35 ML/MIN/1.73SQ M
GLUCOSE SERPL-MCNC: 127 MG/DL (ref 65–140)
GLUCOSE SERPL-MCNC: 135 MG/DL (ref 65–140)
GLUCOSE SERPL-MCNC: 136 MG/DL (ref 65–140)
GLUCOSE SERPL-MCNC: 138 MG/DL (ref 65–140)
GLUCOSE SERPL-MCNC: 162 MG/DL (ref 65–140)
HCT VFR BLD AUTO: 27.9 % (ref 34.8–46.1)
HGB BLD-MCNC: 9.1 G/DL (ref 11.5–15.4)
HGB BLD-MCNC: 9.4 G/DL (ref 11.5–15.4)
MAGNESIUM SERPL-MCNC: 1.6 MG/DL (ref 1.9–2.7)
MCH RBC QN AUTO: 31.6 PG (ref 26.8–34.3)
MCHC RBC AUTO-ENTMCNC: 32.6 G/DL (ref 31.4–37.4)
MCV RBC AUTO: 97 FL (ref 82–98)
PHOSPHATE SERPL-MCNC: 2.7 MG/DL (ref 2.3–4.1)
PLATELET # BLD AUTO: 151 THOUSANDS/UL (ref 149–390)
PMV BLD AUTO: 11.3 FL (ref 8.9–12.7)
POTASSIUM SERPL-SCNC: 4.6 MMOL/L (ref 3.5–5.3)
RBC # BLD AUTO: 2.88 MILLION/UL (ref 3.81–5.12)
SODIUM SERPL-SCNC: 140 MMOL/L (ref 135–147)
WBC # BLD AUTO: 10.81 THOUSAND/UL (ref 4.31–10.16)

## 2025-01-19 PROCEDURE — 85027 COMPLETE CBC AUTOMATED: CPT

## 2025-01-19 PROCEDURE — 82948 REAGENT STRIP/BLOOD GLUCOSE: CPT

## 2025-01-19 PROCEDURE — 80048 BASIC METABOLIC PNL TOTAL CA: CPT

## 2025-01-19 PROCEDURE — 84100 ASSAY OF PHOSPHORUS: CPT

## 2025-01-19 PROCEDURE — 82330 ASSAY OF CALCIUM: CPT

## 2025-01-19 PROCEDURE — 99232 SBSQ HOSP IP/OBS MODERATE 35: CPT | Performed by: SURGERY

## 2025-01-19 PROCEDURE — 97167 OT EVAL HIGH COMPLEX 60 MIN: CPT

## 2025-01-19 PROCEDURE — 85018 HEMOGLOBIN: CPT

## 2025-01-19 PROCEDURE — 83735 ASSAY OF MAGNESIUM: CPT

## 2025-01-19 RX ORDER — GABAPENTIN 300 MG/1
600 CAPSULE ORAL
Status: DISCONTINUED | OUTPATIENT
Start: 2025-01-19 | End: 2025-01-21

## 2025-01-19 RX ORDER — METOPROLOL TARTRATE 25 MG/1
25 TABLET, FILM COATED ORAL DAILY
Status: DISCONTINUED | OUTPATIENT
Start: 2025-01-19 | End: 2025-01-19

## 2025-01-19 RX ORDER — VENLAFAXINE 37.5 MG/1
75 TABLET ORAL
Status: DISCONTINUED | OUTPATIENT
Start: 2025-01-19 | End: 2025-01-21

## 2025-01-19 RX ORDER — MAGNESIUM SULFATE HEPTAHYDRATE 40 MG/ML
2 INJECTION, SOLUTION INTRAVENOUS ONCE
Status: COMPLETED | OUTPATIENT
Start: 2025-01-19 | End: 2025-01-19

## 2025-01-19 RX ORDER — GABAPENTIN 300 MG/1
900 CAPSULE ORAL EVERY MORNING
Status: DISCONTINUED | OUTPATIENT
Start: 2025-01-19 | End: 2025-01-21

## 2025-01-19 RX ORDER — PRAVASTATIN SODIUM 10 MG
10 TABLET ORAL
Status: DISCONTINUED | OUTPATIENT
Start: 2025-01-19 | End: 2025-01-21 | Stop reason: HOSPADM

## 2025-01-19 RX ORDER — GABAPENTIN 300 MG/1
900 CAPSULE ORAL
Status: DISCONTINUED | OUTPATIENT
Start: 2025-01-19 | End: 2025-01-21

## 2025-01-19 RX ADMIN — Medication: at 21:25

## 2025-01-19 RX ADMIN — METOPROLOL TARTRATE 25 MG: 25 TABLET, FILM COATED ORAL at 10:08

## 2025-01-19 RX ADMIN — VENLAFAXINE 75 MG: 37.5 TABLET ORAL at 12:42

## 2025-01-19 RX ADMIN — APIXABAN 5 MG: 5 TABLET, FILM COATED ORAL at 17:14

## 2025-01-19 RX ADMIN — VENLAFAXINE 75 MG: 37.5 TABLET ORAL at 09:08

## 2025-01-19 RX ADMIN — PRAVASTATIN SODIUM 10 MG: 10 TABLET ORAL at 17:14

## 2025-01-19 RX ADMIN — GABAPENTIN 600 MG: 300 CAPSULE ORAL at 12:42

## 2025-01-19 RX ADMIN — INSULIN LISPRO 1 UNITS: 100 INJECTION, SOLUTION INTRAVENOUS; SUBCUTANEOUS at 12:51

## 2025-01-19 RX ADMIN — Medication: at 08:54

## 2025-01-19 RX ADMIN — APIXABAN 5 MG: 5 TABLET, FILM COATED ORAL at 10:08

## 2025-01-19 RX ADMIN — LEVOTHYROXINE SODIUM 125 MCG: 0.12 TABLET ORAL at 05:33

## 2025-01-19 RX ADMIN — VENLAFAXINE 75 MG: 37.5 TABLET ORAL at 17:14

## 2025-01-19 RX ADMIN — GABAPENTIN 900 MG: 300 CAPSULE ORAL at 21:25

## 2025-01-19 RX ADMIN — SODIUM CHLORIDE 500 ML: 0.9 INJECTION, SOLUTION INTRAVENOUS at 12:55

## 2025-01-19 RX ADMIN — GABAPENTIN 900 MG: 300 CAPSULE ORAL at 08:53

## 2025-01-19 RX ADMIN — MAGNESIUM SULFATE IN WATER FOR 2 G: 40 INJECTION INTRAVENOUS at 10:08

## 2025-01-19 NOTE — CASE MANAGEMENT
Case Management Assessment & Discharge Planning Note    Patient name Laura Sarkar  Location W /W -01 MRN [id number]  : 1945 Date 2025       Current Admission Date: 2025  Current Admission Diagnosis:R posterior thoracoabdominal wall subcutaneous hematoma   Patient Active Problem List    Diagnosis Date Noted Date Diagnosed    Fall 2025     R posterior thoracoabdominal wall subcutaneous hematoma 2025     ABLA (acute blood loss anemia) 2025     Ambulatory dysfunction 2024     Skin ulcer, limited to breakdown of skin (HCC) 2024     Secondary hyperparathyroidism of renal origin ([facility]) 2024     Basal cell carcinoma (BCC) of forehead 10/23/2023     Primary osteoarthritis of both shoulders 02/10/2023     Nonischemic cardiomyopathy ([facility]) 2022     Abnormal SPEP 2022     Pulmonary fibrosis ([facility]) 2022     Hypothyroidism 2022     CKD (chronic kidney disease) 2022     Left knee pain 2022     Torn rotator cuff 2022     Left bundle branch block (LBBB) 2022     Moderate mitral regurgitation 2022     DNR (do not resuscitate) 2022     Depression 2021     Chronic combined systolic and diastolic heart failure ([facility]) 2021     Elevated troponin 2021     Stage 4 chronic kidney disease ([facility]) 2021     Type 2 diabetes mellitus, without long-term current use of insulin ([facility]) 2019     Diabetic peripheral neuropathy ([facility]) 2018     Class 3 severe obesity in adult ([facility]) 2018     Primary osteoarthritis of both knees 2018     Aortic stenosis 2018     Hyperlipidemia 10/04/2017     Atrial fibrillation ([facility]) 2015       LOS (days): 2  Geometric Mean LOS (GMLOS) (days):   Days to GMLOS:     OBJECTIVE:    Risk of Unplanned Readmission Score: 20.01         Current admission status: Inpatient       Preferred Pharmacy:   [id number]  [place] - [id number]  [facility] [address]  [id number] [address]  Phone: [phone] Fax: [phone]    Primary Care Provider: Kalpana Siegel DO    Primary Insurance: JHONNY KATZ  Secondary Insurance:     ASSESSMENT:  Active Health Care Proxies       Elo Galan [facility] Care Representative - Grandchild   Primary Phone: [phone] (Mobile)                                Patient Information  Admitted from:: Home  Mental Status: Confused  During Assessment patient was accompanied by: Not accompanied during assessment  Assessment information provided by::  (Grand Daughter)  Primary Caregiver: Self  Support Systems: Family members  County of Residence: [place]  What city do you live in?: [place]  Home entry access options. Select all that apply.: No steps to enter home  Type of Current Residence: 2 story home  Upon entering residence, is there a bedroom on the main floor (no further steps)?: Yes  Upon entering residence, is there a bathroom on the main floor (no further steps)?: Yes  Living Arrangements: Lives Alone (Grand Daughter in process of moving in)    Activities of Daily Living Prior to Admission  Functional Status: Independent  Completes ADLs independently?: Yes  Ambulates independently?: Yes  Does patient use assisted devices?: Yes  Assisted Devices (DME) used: Walker, Wheelchair  Does patient currently own DME?: Yes  What DME does the patient currently own?: Walker, Wheelchair  Does patient have a history of Outpatient Therapy (PT/OT)?: No  Does the patient have a history of Short-Term Rehab?: Yes  Does patient have a history of HHC?: Yes  Does patient currently have HHC?: No         Patient Information Continued  Income Source: Self-employed  Does patient have prescription coverage?: Yes  Does patient receive dialysis treatments?: No  Does patient have a history of substance abuse?: No  Does patient have a history of Mental Health Diagnosis?: Yes (Depression)  Is  patient receiving treatment for mental health?: No. Patient declined treatment information.         Means of Transportation  Means of Transport to Appts:: Family transport          DISCHARGE DETAILS:    Discharge planning discussed with:: Patients Grand daughter - Kathy  Freedom of Choice: Yes  Comments - Freedom of Choice: CM spoke with Kathy to introduce self and role. Patient from home where she is independent with ADL's and ambulation. Kathy in process of moving into the home. Patient has first floor set up where she uses a walker and wheel chair. Patient able to self propel herself in wheel chair. She is also able to get out of wheel chair onto toilet and into chair where she sleeps. Patient has history of STR and HHC in the past. Kathy interested in STR referrals for patient due to increased weakness. Patient not disoriented at baseline and family has concerns for UTI due to some urinary incontinence. CM to advise provider and see if this is a concern.  CM submitted blanket referral into [facility].  CM contacted family/caregiver?: Yes  Were Treatment Team discharge recommendations reviewed with patient/caregiver?: Yes  Did patient/caregiver verbalize understanding of patient care needs?: Yes  Were patient/caregiver advised of the risks associated with not following Treatment Team discharge recommendations?: Yes    Contacts  Patient Contacts: Kathy- Grand Daughter  Relationship to Patient:: Family  Contact Method: Phone  Reason/Outcome: Discharge Planning    Requested Home Health Care         Is the patient interested in HHC at discharge?: No    DME Referral Provided  Referral made for DME?: No    Other Referral/Resources/Interventions Provided:  Interventions: Short Term Rehab  Referral Comments: STR- [facility] referral in [facility]

## 2025-01-19 NOTE — ASSESSMENT & PLAN NOTE
Currently takes Metoprolol 25mg qd, eliquis 5mg BID.    Plan:  -Monitor on tele  - Currently giving Metoprolol 12.5 mg due to decrease in pressure

## 2025-01-19 NOTE — PLAN OF CARE
Problem: Potential for Falls  Goal: Patient will remain free of falls  Description: INTERVENTIONS:  - Educate patient/family on patient safety including physical limitations  - Instruct patient to call for assistance with activity   - Consult OT/PT to assist with strengthening/mobility   - Keep Call bell within reach  - Keep bed low and locked with side rails adjusted as appropriate  - Keep care items and personal belongings within reach  - Initiate and maintain comfort rounds  - Make Fall Risk Sign visible to staff  - Offer Toileting every  Hours, in advance of need  - Initiate/Maintain alarm  - Obtain necessary fall risk management equipment  - Apply yellow socks and bracelet for high fall risk patients  - Consider moving patient to room near nurses station  Outcome: Progressing     Problem: Nutrition/Hydration-ADULT  Goal: Nutrient/Hydration intake appropriate for improving, restoring or maintaining nutritional needs  Description: Monitor and assess patient's nutrition/hydration status for malnutrition. Collaborate with interdisciplinary team and initiate plan and interventions as ordered.  Monitor patient's weight and dietary intake as ordered or per policy. Utilize nutrition screening tool and intervene as necessary. Determine patient's food preferences and provide high-protein, high-caloric foods as appropriate.     INTERVENTIONS:  - Monitor oral intake, urinary output, labs, and treatment plans  - Assess nutrition and hydration status and recommend course of action  - Evaluate amount of meals eaten  - Assist patient with eating if necessary   - Allow adequate time for meals  - Recommend/ encourage appropriate diets, oral nutritional supplements, and vitamin/mineral supplements  - Order, calculate, and assess calorie counts as needed  - Recommend, monitor, and adjust tube feedings and TPN/PPN based on assessed needs  - Assess need for intravenous fluids  - Provide specific nutrition/hydration education as  appropriate  - Include patient/family/caregiver in decisions related to nutrition  Outcome: Progressing     Problem: Prexisting or High Potential for Compromised Skin Integrity  Goal: Skin integrity is maintained or improved  Description: INTERVENTIONS:  - Identify patients at risk for skin breakdown  - Assess and monitor skin integrity  - Assess and monitor nutrition and hydration status  - Monitor labs   - Assess for incontinence   - Turn and reposition patient  - Assist with mobility/ambulation  - Relieve pressure over bony prominences  - Avoid friction and shearing  - Provide appropriate hygiene as needed including keeping skin clean and dry  - Evaluate need for skin moisturizer/barrier cream  - Collaborate with interdisciplinary team   - Patient/family teaching  - Consider wound care consult   Outcome: Progressing

## 2025-01-19 NOTE — ASSESSMENT & PLAN NOTE
Pt sustained a mechanical fall onto her buttocks and back. Imaging showed 10.3 x 5.7 x 9.2 cm hematoma right posterolateral thoracoabdominal wall. During her time in the ED her hematoma continued to expand. Kcentra was given to reverse her eliquis. She became hypotensive at one point for which she was given 1u on blood. Abdominal binder applied to tamponade.     Plan:  - Monitor skin for skin breakdown  - Monitor vital signs  - Trend H/H -hemoglobin 9.4 as of January 19 and trending down to 7.7 on January 20, confirmed by H&H of 7.5  - If hematoma continues to expand consider evacuating the hematoma

## 2025-01-19 NOTE — ASSESSMENT & PLAN NOTE
Patient states she fell off of the toilet two times over the past two days   Trauma workup reveals R posterior thoracoabdominal hematoma with active bleed   PT/OT today  Case management consult- planning to move in with family member, possible rehab  Fall precautions

## 2025-01-19 NOTE — PROGRESS NOTES
Progress Note - Critical Care/ICU   Name: Laura Sarkar 79 y.o. female I MRN: [id number]  Unit/Bed#: ICU 10 I Date of Admission: 1/17/2025   Date of Service: 1/19/2025 I Hospital Day: 2      Assessment & Plan  Fall  Patient states she fell off of the toilet two times over the past two days   Trauma workup reveals R posterior thoracoabdominal hematoma with active bleed   PT/OT today  Case management consult- planning to move in with family member, possible rehab  Fall precautions   R posterior thoracoabdominal wall subcutaneous hematoma  Noted on CT with active extravasation   S/p 1 U whole blood in the ED  S/p PCC for reversal of eliquis   Continue abdominal binder  Serial monitoring of hematoma   Trend Hgb  Hold DVT ppx   If HD unstable will require operative intervention   ABLA (acute blood loss anemia)  In the setting of above subcutaneous hematoma with active extravasation  S/p 1 U whole blood in the ED  Q6H Hgb, transfuse for Hgb<7 or HD instability   S/p PCC for reversal of eliquis   Atrial fibrillation (HCC)  Currently rate controlled  Hold home metoprolol, restart as appropriate   Hold Eliquis in the setting of active bleeding  Monitor on tele   Hyperlipidemia  Home statin  Type 2 diabetes mellitus, without long-term current use of insulin (HCC)  Lab Results   Component Value Date    HGBA1C 6.0 (H) 01/18/2025       Recent Labs     01/18/25  1217 01/18/25  1752 01/19/25  0030 01/19/25  0609   POCGLU 96 111 127 135       Blood Sugar Average: Last 72 hrs:  (P) 128.5  SSI   Does not appear to be on insulin or oral diabetic regimen at home   Stage 4 chronic kidney disease (HCC)  Lab Results   Component Value Date    EGFR 35 01/19/2025    EGFR 39 01/18/2025    EGFR 32 01/17/2025    CREATININE 1.38 (H) 01/19/2025    CREATININE 1.27 01/18/2025    CREATININE 1.51 (H) 01/17/2025   Baseline Cr appears 1.2-1.3   S/p 1 U whole blood   Monitor I/O and renal indices   Chronic combined systolic and diastolic heart failure  ([facility])  Wt Readings from Last 3 Encounters:   01/17/25 93.7 kg (206 lb 9.1 oz)   07/28/24 86.2 kg (190 lb)   07/25/24 91 kg (200 lb 9.9 oz)   Most recent LVEF 45%, NICM s/p BiV ICD placement   Appears euvolemic on exam   Monitor I/O and daily weights   Hold home BB and ARB, will restart as appropriate   Depression  Home gabapentin and effexor  Hypothyroidism  Continue home levothyroxine   Disposition: Stepdown Level 2    ICU Core Measures     A: Assess, Prevent, and Manage Pain Has pain been assessed? Yes  Need for changes to pain regimen? No   B: Both SAT/SAT  N/A   C: Choice of Sedation RASS Goal: 0 Alert and Calm  Need for changes to sedation or analgesia regimen? No   D: Delirium CAM-ICU: Negative   E: Early Mobility  Plan for early mobility? Yes   F: Family Engagement Plan for family engagement today? Yes         Prophylaxis:  VTE VTE covered by:    None       Stress Ulcer  not ordered         24 Hour Events : NAEON  Subjective       Objective :                   Vitals I/O      Most Recent Min/Max in 24hrs   Temp 98.6 °F (37 °C) Temp  Min: 98.6 °F (37 °C)  Max: 99.2 °F (37.3 °C)   Pulse 79 Pulse  Min: 79  Max: 85   Resp 16 Resp  Min: 11  Max: 31   /59 BP  Min: 81/50  Max: 135/59   O2 Sat 96 % SpO2  Min: 91 %  Max: 98 %      Intake/Output Summary (Last 24 hours) at 1/19/2025 0838  Last data filed at 1/18/2025 1913  Gross per 24 hour   Intake 180 ml   Output --   Net 180 ml       Diet Regular; Regular House    Invasive Monitoring           Physical Exam   Physical Exam  Vitals and nursing note reviewed.   Eyes:      Extraocular Movements: Extraocular movements intact.      Conjunctiva/sclera: Conjunctivae normal.   Skin:     General: Skin is warm and dry.   HENT:      Head: Normocephalic.      Right Ear: Hearing normal.      Left Ear: Hearing normal.      Nose: No congestion.      Mouth/Throat:      Mouth: Mucous membranes are dry.   Cardiovascular:      Rate and Rhythm: Normal rate and regular rhythm.       Pulses: Normal pulses.      Heart sounds: Normal heart sounds.   Abdominal:      Palpations: Abdomen is soft.   Constitutional:       General: She is not in acute distress.     Appearance: She is well-developed.   Pulmonary:      Effort: Pulmonary effort is normal.      Breath sounds: Normal breath sounds.   Neurological:      General: No focal deficit present.      Mental Status: She is alert and oriented to person, place and time. She is calm.      Motor: Strength full and intact in all extremities.          Diagnostic Studies        Lab Results: I have reviewed the following results:     Medications:  Scheduled PRN   gabapentin, 600 mg, Daily With Lunch  gabapentin, 900 mg, QAM  gabapentin, 900 mg, HS  insulin lispro, 1-6 Units, Q6H GEE  levothyroxine, 125 mcg, Early Morning  pravastatin, 10 mg, Daily With Dinner  venlafaxine, 75 mg, TID With Meals  white petrolatum-mineral oil, , TID      acetaminophen, 975 mg, Q8H PRN       Continuous          Labs:   CBC    Recent Labs     01/18/25  0531 01/18/25  1241 01/19/25  0013 01/19/25  0525   WBC 12.86*  --   --  10.81*   HGB 9.4*   < > 9.4* 9.1*   HCT 29.7*  --   --  27.9*   *  --   --  151    < > = values in this interval not displayed.     BMP    Recent Labs     01/18/25  0531 01/19/25  0525   SODIUM 142 140   K 5.0 4.6    107   CO2 26 27   AGAP 8 6   BUN 32* 30*   CREATININE 1.27 1.38*   CALCIUM 8.7 9.1       Coags    Recent Labs     01/17/25 1854   INR 1.40*   PTT 29        Additional Electrolytes  Recent Labs     01/18/25  0531 01/19/25  0525   MG 1.6* 1.6*   PHOS 3.2 2.7   CAIONIZED  --  1.13          Blood Gas    No recent results  No recent results LFTs  Recent Labs     01/17/25  1854 01/18/25  0531   ALT 8 8   AST 17 17   ALKPHOS 79 71   ALB 3.0* 2.9*   TBILI 0.40 0.85       Infectious  No recent results  Glucose  Recent Labs     01/17/25  1854 01/18/25  0531 01/19/25  0525   GLUC 213* 146* 138

## 2025-01-19 NOTE — ASSESSMENT & PLAN NOTE
Lab Results   Component Value Date    HGBA1C 6.0 (H) 01/18/2025       Recent Labs     01/19/25  1711 01/19/25  2359 01/20/25  0547 01/20/25  1144   POCGLU 136 98 109 169*       Blood Sugar Average: Last 72 hrs:  (P) 131.8085345281290642    Plan:  - Monitor blood sugar  - SSI

## 2025-01-19 NOTE — PLAN OF CARE
Problem: OCCUPATIONAL THERAPY ADULT  Goal: Performs self-care activities at highest level of function for planned discharge setting.  See evaluation for individualized goals.  Description: Treatment Interventions: ADL retraining, Functional transfer training, Endurance training, Cognitive reorientation, Patient/family training, Equipment evaluation/education, Compensatory technique education, Energy conservation, Activityengagement          See flowsheet documentation for full assessment, interventions and recommendations.   Note: Limitation: Decreased ADL status, Decreased Safe judgement during ADL, Decreased cognition, Decreased endurance, Decreased self-care trans, Decreased high-level ADLs (impaired balance, fxnl mobility, act mariah, fxnl reach, standing mariah, strength, attention to task, direction following, safety awareness, insight, pacing, problem solving, learning new tasks, response time)  Prognosis: Good  Assessment: Pt is a 79 y.o. female seen for OT evaluation s/p admission to [facility] on 1/17/2025 due to fall. Diagnosed with Subcutaneous hematoma. Personal and env factors supporting pt at time of IE include (I) PLOF, supportive sister + niece, accessible home environment, and FFSU. Personal and env factors inhibiting engagement in occupations include advanced age, lifestyle patterns, limited social support (LIVES ALONE), and difficulty completing IADLs. Performance deficits that affect the pt’s occupational performance can be seen above. Due to pt's current functional limitations and medical complications pt is functioning below baseline. Pt would benefit from continued skilled OT treatment in order to maximize safety, independence and overall performance with ADLs, functional mobility, functional transfers, and cognition in order to achieve highest level of function.     Rehab Resource Intensity Level, OT: II (Moderate Resource Intensity)

## 2025-01-19 NOTE — UTILIZATION REVIEW
Initial Clinical Review    Admission: Date/Time/Statement:   Admission Orders (From admission, onward)       Ordered        01/17/25 2227  Inpatient Admission  Once                          Orders Placed This Encounter   Procedures    Inpatient Admission     Standing Status:   Standing     Number of Occurrences:   1     Level of Care:   Critical Care [15]     Estimated length of stay:   More than 2 Midnights     Certification:   I certify that inpatient services are medically necessary for this patient for a duration of greater than two midnights. See H&P and MD Progress Notes for additional information about the patient's course of treatment.     ED Arrival Information       Expected   -    Arrival   1/17/2025 18:10    Acuity   Urgent              Means of arrival   Ambulance    Escorted by   [facility]    Service   Trauma    Admission type   Emergency              Arrival complaint   Syncope             Chief Complaint   Patient presents with    Syncope     Arriving via EMS from home after a mechanical fall witnessed by her grand daughter. Denies head strike. Per EMS when helping patient up from the ground patient had a syncope episode. Takes Eliquis. Yesterday patient fell as well and has a bruise noted on her R ribcage. Complaining of L leg pain.        Initial Presentation: 79 y.o. female to ED by EMS as Inpatient SD1 ICU Admission due to R posterior thoracoabdominal wall SQ Hematoma, ABLA, Fall  Presents  s/p 2nd fall. Earlier this evening Pt tripped and fell onto her back and buttocks. Her daughter witnessed the fall. Denies head strike, LOC, N/V, abdominal pain.  EMS arrived and attempted to help her up she had a syncopal episode. Endorses left leg pain.   PMH AF on eliquis, CHF s/p BiV ICD, hypothyroidism, HLD, DM2, and depression     EXAM  Hypotensive CT c/a/p reveals 10.3 x 5.7 x 9.2 cm hematoma right posterolateral thoracoabdominal wall expanding while in ED. Given K-Centra & 1 unit whole blood  Date:  1/18/2025   Day 2: ICU  PLAN Tele, ABD binder, serial monitoring of hematoma, trend HGB, hold AC/ DVT ppx, if HD unstable will require OR procedure; follow CR baseline (1.2-1.3), I/O, hold home BB/ARB  Date: 1/19/2025  Day 3: Has surpassed a 2nd midnight with active treatments and services.  ICU level of care  No adverse events, euvolemic   Continue abdominal binder  Serial monitoring of hematoma   Trend Hgb, Hold Eliquis & DVT ppx, cont tele  AFib Currently rate controlled  Hold home metoprolol, restart as appropriate   I/O daily wt holding home BB & ARB; SSI  For PT/OT evals today  Per Case management planning to move in w Family member, possible rehab     ED Treatment-Medication Administration from 01/17/2025 1810 to 01/17/2025 2307         Date/Time Order Dose Route Action     01/17/2025 1859 lactated ringers bolus 1,000 mL 1,000 mL Intravenous New Bag     01/17/2025 2022 iohexol (OMNIPAQUE) 350 MG/ML injection (MULTI-DOSE) 100 mL 100 mL Intravenous Given     01/17/2025 2141 sodium chloride 0.9 % bolus 1,000 mL 1,000 mL Intravenous New Bag     01/17/2025 2216 prothrombin complex concentrate (human) (Kcentra) 2,000 Units 2,000 Units Intravenous Given     01/17/2025 2255 tranexamic acid (CYKLOKAPRON) 1000-0.7 MG/100ML-% injection 1,000 mg 1,000 mg Intravenous New Bag            Scheduled Medications:  apixaban, 5 mg, Oral, BID  gabapentin, 600 mg, Oral, Daily With Lunch  gabapentin, 900 mg, Oral, QAM  gabapentin, 900 mg, Oral, HS  insulin lispro, 1-6 Units, Subcutaneous, Q6H GEE  levothyroxine, 125 mcg, Oral, Early Morning  [START ON 1/20/2025] metoprolol tartrate, 12.5 mg, Oral, Daily  pravastatin, 10 mg, Oral, Daily With Dinner  venlafaxine, 75 mg, Oral, TID With Meals  white petrolatum-mineral oil, , Topical, TID      Continuous IV Infusions:     PRN Meds:  acetaminophen, 975 mg, Oral, Q8H PRN      ED Triage Vitals   Temperature Pulse Respirations Blood Pressure SpO2 Pain Score   01/17/25 1817 01/17/25 1817  01/17/25 1817 01/17/25 1817 01/17/25 1817 01/17/25 2317   (!) 97.2 °F (36.2 °C) 57 18 98/54 95 % No Pain     Weight (last 2 days)       Date/Time Weight    01/17/25 2308 93.7 (206.57)            Vital Signs (last 3 days)       Date/Time Temp Pulse Resp BP MAP (mmHg) SpO2 O2 Device Patient Position - Orthostatic VS [place] Coma Scale Score Pain    01/19/25 1100 98.1 °F (36.7 °C) 79 21 89/54 66 97 % None (Room air) Lying -- --    01/19/25 0912 -- -- -- -- -- -- -- -- -- 4    01/19/25 0800 -- -- -- -- -- -- -- -- 15 No Pain    01/19/25 0700 98.6 °F (37 °C) 79 16 135/59 85 96 % None (Room air) Lying -- --    01/19/25 0500 -- 79 16 129/61 88 93 % -- -- -- --    01/19/25 0400 -- -- -- -- -- -- -- -- 15 --    01/18/25 2300 -- 79 21 110/53 76 94 % -- -- -- --    01/18/25 2258 98.9 °F (37.2 °C) 81 21 130/60 87 95 % -- -- -- --    01/18/25 2100 -- 85 18 126/63 88 96 % -- -- -- --    01/18/25 2000 -- -- -- -- -- -- -- -- 15 No Pain    01/18/25 1900 99.2 °F (37.3 °C) 80 11 107/54 76 94 % -- Lying -- --    01/18/25 1600 -- 80 13 113/56 81 96 % -- -- 15 --    01/18/25 1500 98.9 °F (37.2 °C) 82 16 103/55 76 96 % -- -- -- --    01/18/25 1400 -- 79 19 106/51 74 95 % -- -- -- --    01/18/25 1300 -- 79 23 115/55 79 94 % -- -- -- --    01/18/25 1200 -- 79 16 109/52 75 96 % -- -- 15 --    01/18/25 1100 98.6 °F (37 °C) 79 18 105/53 75 95 % None (Room air) Lying -- --    01/18/25 1008 -- 79 19 97/50 71 95 % -- -- -- --    01/18/25 1000 -- 79 31 81/50 58 97 % -- -- -- --    01/18/25 0945 -- 80 14 106/52 75 98 % -- -- -- --    01/18/25 0930 -- 79 21 103/53 75 96 % -- -- -- --    01/18/25 0915 -- 79 24 106/53 77 -- -- -- -- --    01/18/25 0900 -- 80 16 108/53 77 -- -- -- -- --    01/18/25 0845 -- 79 17 108/53 76 91 % -- -- -- --    01/18/25 0830 -- 82 21 109/51 73 83 % -- -- -- --    01/18/25 0815 -- 80 20 92/50 65 94 % -- -- -- --    01/18/25 0800 -- 80 24 101/55 74 85 % -- -- 15 No Pain    01/18/25 0745 -- 80 25 93/55 70 94 % -- -- -- --     01/18/25 0730 -- 79 25 101/50 70 95 % -- -- -- --    01/18/25 0715 -- 79 24 103/51 74 90 % -- -- -- --    01/18/25 0700 98.1 °F (36.7 °C) 80 26 93/50 71 84 % Nasal cannula Lying -- --    01/18/25 0500 -- 80 18 100/53 75 96 % -- -- -- --    01/18/25 0400 -- -- -- -- -- -- -- -- 15 No Pain    01/18/25 0300 98.1 °F (36.7 °C) 79 19 110/54 78 95 % -- -- -- --    01/17/25 2330 -- -- -- -- -- -- -- -- 15 No Pain    01/17/25 2317 -- 79 13 118/60 83 95 % -- -- -- No Pain    01/17/25 2308 97.8 °F (36.6 °C) 86 20 130/63 90 95 % None (Room air) Lying -- --    01/17/25 2254 97.5 °F (36.4 °C) 80 18 107/58 -- 98 % None (Room air) -- -- --    01/17/25 2245 97.5 °F (36.4 °C) 80 16 109/57 -- 98 % None (Room air) -- -- --    01/17/25 2229 97.6 °F (36.4 °C) 80 20 124/60 -- 96 % None (Room air) -- -- --    01/17/25 2219 97.5 °F (36.4 °C) 80 18 122/55 -- 98 % None (Room air) -- -- --    01/17/25 2214 97.2 °F (36.2 °C) 80 18 119/58 -- -- -- -- -- --    01/17/25 2200 -- 80 -- 78/51 59 -- -- -- -- --    01/17/25 2145 -- 80 18 76/46  57 90 % None (Room air) Lying -- --    01/17/25 2130 -- 80 18 94/50  69 97 % None (Room air) Lying -- --    01/17/25 1959 -- 80 -- 103/51 74 91 % -- -- -- --    01/17/25 1900 -- 80 18 101/49 71 95 % None (Room air) Lying -- --    01/17/25 1817 97.2 °F (36.2 °C) 57 18 98/54 -- 95 % None (Room air) Sitting -- --              Pertinent Labs/Diagnostic Test Results:   Radiology:  CT head without contrast   Final Interpretation by Shorty Dickey DO (01/17 2112)      No acute intracranial abnormality.         CT cervical spine without contrast   Final Interpretation by Shorty Dickey DO (01/17 2119)      No cervical spine fracture or traumatic malalignment.         CT chest abdomen pelvis w contrast   Final Interpretation by Shorty Dickey DO (01/17 2154)      1. 10.3 x 5.7 x 9.2 cm hematoma right posterolateral thoracoabdominal wall with foci of increased density consistent with active bleeding.  There is moderate edema and/or contusion of the adjacent chest/abdominal wall      2. No acute solid organ injury.      3. Mild pulmonary fibrosis.      4. Indeterminate 9 mm right hepatic lobe hypodensity too small to characterize.         XR tibia fibula 2 views LEFT   Final Interpretation by Bradley Landon Kocher, MD (01/18 1133)      No acute osseous abnormality.      Degenerative changes as above.            XR ankle 3+ views LEFT   Final Interpretation by Bradley Landon Kocher, MD (01/18 1306)      No acute osseous abnormality.      Degenerative changes as above.           Cardiology:  ECG 12 lead   Final Result by Francesca Pena MD (01/17 2033)   AV dual-paced rhythm   Abnormal ECG   When compared with ECG of 29-Jul-2024 13:25,   No significant change was found   Confirmed by Francesca Pena ([id number]) on 1/17/2025 8:33:23 PM        GI:  No orders to display           Results from last 7 days   Lab Units 01/19/25  0525 01/19/25  0013 01/18/25  1823 01/18/25  1241 01/18/25  0531 01/18/25  0016 01/17/25  1854   WBC Thousand/uL 10.81*  --   --   --  12.86*  --  13.25*   HEMOGLOBIN g/dL 9.1* 9.4* 9.2* 9.4* 9.4*   < > 10.1*   HEMATOCRIT % 27.9*  --   --   --  29.7*  --  32.0*   PLATELETS Thousands/uL 151  --   --   --  147*  --  190   TOTAL NEUT ABS Thousands/µL  --   --   --   --  9.40*  --  9.85*    < > = values in this interval not displayed.         Results from last 7 days   Lab Units 01/19/25  0525 01/18/25  0531 01/17/25  1854   SODIUM mmol/L 140 142 142   POTASSIUM mmol/L 4.6 5.0 5.0   CHLORIDE mmol/L 107 108 107   CO2 mmol/L 27 26 24   ANION GAP mmol/L 6 8 11   BUN mg/dL 30* 32* 35*   CREATININE mg/dL 1.38* 1.27 1.51*   EGFR ml/min/1.73sq m 35 39 32   CALCIUM mg/dL 9.1 8.7 8.9   CALCIUM, IONIZED mmol/L 1.13  --   --    MAGNESIUM mg/dL 1.6* 1.6*  --    PHOSPHORUS mg/dL 2.7 3.2  --      Results from last 7 days   Lab Units 01/18/25  0531 01/17/25  1854   AST U/L 17 17   ALT U/L 8 8   ALK PHOS U/L 71 79  "  TOTAL PROTEIN g/dL 5.0* 5.2*   ALBUMIN g/dL 2.9* 3.0*   TOTAL BILIRUBIN mg/dL 0.85 0.40     Results from last 7 days   Lab Units 01/19/25  1246 01/19/25  0609 01/19/25  0030 01/18/25  1752 01/18/25  1217 01/18/25  0535 01/17/25  1854   POC GLUCOSE mg/dl 162* 135 127 111 96 109 193*     Results from last 7 days   Lab Units 01/19/25  0525 01/18/25  0531 01/17/25  1854   GLUCOSE RANDOM mg/dL 138 146* 213*         Results from last 7 days   Lab Units 01/18/25  0531   HEMOGLOBIN A1C % 6.0*   EAG mg/dl 126     No results found for: \"BETA-HYDROXYBUTYRATE\"                   Results from last 7 days   Lab Units 01/17/25  2253 01/17/25  2106 01/17/25  1854   HS TNI 0HR ng/L  --   --  7   HS TNI 2HR ng/L  --  <2  --    HSTNI D2 ng/L  --  <-5  --    HS TNI 4HR ng/L 8  --   --    HSTNI D4 ng/L 1  --   --          Results from last 7 days   Lab Units 01/17/25  1854   PROTIME seconds 17.9*   INR  1.40*   PTT seconds 29       Results from last 7 days   Lab Units 01/18/25  0547 01/17/25  2211   UNIT PRODUCT CODE  L7370S18 [id number]   UNIT NUMBER  [id number]   UNITABO  O O   UNITRH  POS POS   UNIT DISPENSE STATUS  Presumed Trans Emergency Iss   UNIT PRODUCT VOL mL 500 500   .    Past Medical History:   Diagnosis Date    Aortic stenosis     Arthritis     Asthma     Basal cell carcinoma 10/24/2023    right lateral forehead, mohs    Basal cell carcinoma 10/24/2023    right medial forehead, mohs    Coronary artery disease     Diabetes mellitus (HCC)     Diabetic peripheral neuropathy (HCC)     Last assessed - 1/27/16    Gallbladder disease     Heart attack (HCC) 07/1999    Hemorrhoids     Last assessed - 11/16/12    Hypoglycemia 12/23/2021    Myocardial infarction (HCC)     Old myocardial infarction     Type 2 diabetes mellitus with autonomic neuropathy (HCC)     Unspec long term insulin use status, Last assessed - 6/8/17     Present on Admission:   Atrial fibrillation (HCC)   Stage 4 chronic kidney disease (HCC)   " Depression   Hypothyroidism   Type 2 diabetes mellitus, without long-term current use of insulin (HCC)   Chronic combined systolic and diastolic heart failure (HCC)   Hyperlipidemia      Admitting Diagnosis: Syncope [R55]  Age/Sex: 79 y.o. female    Network Utilization Review Department  ATTENTION: Please call with any questions or concerns to [phone] and carefully listen to the prompts so that you are directed to the right person. All voicemails are confidential.   For Discharge needs, contact Care Management DC Support Team at [phone] opt. 2  Send all requests for admission clinical reviews, approved or denied determinations and any other requests to dedicated fax number below belonging to the campus where the patient is receiving treatment. List of dedicated fax numbers for the Facilities:  FACILITY NAME UR FAX NUMBER   ADMISSION DENIALS (Administrative/Medical Necessity) [phone]   DISCHARGE SUPPORT TEAM (NETWORK) [phone]   PARENT CHILD HEALTH (Maternity/NICU/Pediatrics) [phone]   [facility] [phone]   [facility] [phone]   [facility] [phone]   [facility] [phone]   [facility] [phone]   [facility] [phone]   [facility] [phone]   [facility] [phone]   [facility] [phone]   [facility] [phone]   [facility] [phone]   [facility] [phone]

## 2025-01-19 NOTE — OCCUPATIONAL THERAPY NOTE
Occupational Therapy Evaluation     Patient Name: Laura Sarkar  Today's Date: 1/19/2025  Problem List  Principal Problem:    R posterior thoracoabdominal wall subcutaneous hematoma  Active Problems:    Atrial fibrillation (HCC)    Hyperlipidemia    Type 2 diabetes mellitus, without long-term current use of insulin (HCC)    Stage 4 chronic kidney disease (HCC)    Chronic combined systolic and diastolic heart failure (HCC)    Depression    Hypothyroidism    Fall    ABLA (acute blood loss anemia)    Past Medical History  Past Medical History:   Diagnosis Date    Aortic stenosis     Arthritis     Asthma     Basal cell carcinoma 10/24/2023    right lateral forehead, mohs    Basal cell carcinoma 10/24/2023    right medial forehead, mohs    Coronary artery disease     Diabetes mellitus (HCC)     Diabetic peripheral neuropathy (HCC)     Last assessed - 1/27/16    Gallbladder disease     Heart attack (HCC) 07/1999    Hemorrhoids     Last assessed - 11/16/12    Hypoglycemia 12/23/2021    Myocardial infarction (HCC)     Old myocardial infarction     Type 2 diabetes mellitus with autonomic neuropathy (HCC)     Unspec long term insulin use status, Last assessed - 6/8/17     Past Surgical History  Past Surgical History:   Procedure Laterality Date    BUNIONECTOMY      CARDIAC CATHETERIZATION      Carotid Artery, Resolved - 7/1/13    CARDIAC CATHETERIZATION N/A 12/27/2021    Procedure: CARDIAC CATHETERIZATION ;  Surgeon: Chucky Tadeo MD;  [facility];  Service: Cardiology    CARDIAC CATHETERIZATION N/A 12/27/2021    Procedure: Cardiac Coronary Angiogram;  Surgeon: Chucky Tadeo MD;  [facility];  Service: Cardiology    CARDIAC ELECTROPHYSIOLOGY PROCEDURE N/A 01/18/2022    Procedure: Cardiac biv icd implant;  Surgeon: Harley Rollins DO;  [facility]: [facility];  Service: Cardiology    CARDIAC ELECTROPHYSIOLOGY PROCEDURE N/A 7/29/2024    Procedure: Cardiac eps/av node ablation;   Surgeon: Jose Carter MD;  [facility];  Service: Cardiology    CARDIOVASCULAR STRESS TEST  09/1999    CHOLECYSTECTOMY      COLONOSCOPY  2017    FOOT ARTHRODESIS, MODIFIED DONOHUE  2016    GALLBLADDER SURGERY  1970    HEMORROIDECTOMY      KNEE ARTHROSCOPY Left 2004    MOHS SURGERY Right 01/10/2024    right lateral and medial forehead (combined), Dr Berman    MN COLONOSCOPY FLX DX W/COLLJ SPEC WHEN PFRMD N/A 08/21/2017    Procedure: COLONOSCOPY;  Surgeon: Ady Wilkes MD;  Location: [facility];  Service: Colorectal    REPAIR KNEE LIGAMENT      REPAIR KNEE LIGAMENT Left 1986    REPAIR KNEE LIGAMENT Right 1988 01/19/25 0912   OT Last Visit   OT Visit Date 01/19/25   Note Type   Note type Evaluation   Pain Assessment   Pain Assessment Tool 0-10   Pain Score 4   Pain Location/Orientation Orientation: Left;Orientation: Lower;Location: Leg   Hospital Pain Intervention(s) Repositioned;Ambulation/increased activity;Emotional support   Restrictions/Precautions   Weight Bearing Precautions Per Order No   Other Precautions Cognitive;Chair Alarm;Bed Alarm;Multiple lines;Fall Risk;Pain   Home Living   Type of Home House   Home Layout One level;Ramped entrance  (reports elevator in house is broken so she has to stay on 1st floor?)   Bathroom Shower/Tub Walk-in shower   Bathroom Toilet Standard   Bathroom Equipment Grab bars in shower;Hand-held shower   Bathroom Accessibility Accessible   Home Equipment Walker  (use of RW at baseline, sleeps in recliner lift chair)   Additional Comments use of RW at baseline   Prior Function   Level of [place] Independent with ADLs   Lives With (S)  Alone   Receives Help From Family   IADLs Independent with meal prep;Independent with medication management;Family/Friend/Other provides transportation  (sister drives, initially reports (I) with cooking/cleaning then later reports that sister completes?)   Falls in the last 6 months 1 to 4  (2)   Vocational  "Full time employment  ()   Comments Pt reports niece was supposed to move in before elvin - but has not moved in yet. Pt is a QUESTIONABLE historian - providing conflicting information during evaluation   Lifestyle   Autonomy PTA pt living alone in 2SH with FFSU, pt (I) with ADLs and IADLs, (+)falls, (-)drives, use of RW at baseline   Reciprocal Relationships supportive niece + sister   Service to Others reports that she works full time as an    Intrinsic Gratification working, \"I really like tax season\"   General   Additional Pertinent History Admit due to fall on buttocks resulting in large hematoma on R posterolateral thoracoabdominal wall. PMH: a fib, DM II, CKD, CHF   Family/Caregiver Present No   Subjective   Subjective \"I don't think I know what this place is called\" \"How did I end up with all this pain?\"   ADL   Eating Assistance 5  Supervision/Setup   Grooming Assistance 5  Supervision/Setup   UB Bathing Assistance 4  Minimal Assistance   LB Bathing Assistance 2  Maximal Assistance   UB Dressing Assistance 4  Minimal Assistance   LB Dressing Assistance 1  Total Assistance   LB Dressing Deficit Don/doff L sock;Don/doff R sock;Increased time to complete;Supervision/safety;Verbal cueing  (states \"I can't put those socks on, I don't wear ones like it\" Pt noted to have ankle socks from home on her feet and pt stating \"I don't know how I put those on\")   Toileting Assistance  2  Maximal Assistance   Toileting Deficit Perineal hygiene  (incontinent of urine and unaware of incontinence)   Bed Mobility   Supine to Sit 2  Maximal assistance   Additional items Assist x 1;Increased time required;Verbal cues;LE management   Additional Comments sitting EOB with (S)   Transfers   Sit to Stand 2  Maximal assistance   Additional items Assist x 1;Increased time required;Verbal cues   Stand to Sit 2  Maximal assistance   Additional items Assist x 1;Increased time required;Verbal cues  (pt descending " quickly into recliner chair - no use of UE for support when sitting despite education)   Additional Comments use of RW   Functional Mobility   Functional Mobility 2  Maximal assistance   Additional Comments Ax1, limited distance bed to recliner chair   Additional items Rolling walker   Balance   Static Sitting Fair +   Dynamic Sitting Fair   Static Standing Poor -   Dynamic Standing Poor -   Ambulatory Poor -   Activity Tolerance   Activity Tolerance Patient limited by fatigue;Patient limited by pain   Medical Staff Made Aware ALE Dunne, spoke to trauma team   RUE Assessment   RUE Assessment WFL   LUE Assessment   LUE Assessment WFL   Hand Function   Gross Motor Coordination Functional   Fine Motor Coordination Functional   Cognition   Overall Cognitive Status (S)  Impaired   Arousal/Participation Alert;Cooperative   Attention Attends with cues to redirect   Orientation Level Oriented to person;Oriented to time;Disoriented to place;Disoriented to situation  (unable to recall that she is in the hospital or name of hospital. Limited recall of current situation - confused as to why her R side is sore)   Memory Decreased short term memory;Decreased recall of recent events;Decreased recall of precautions   Following Commands Follows one step commands with increased time or repetition   Comments (S)  POOR insight into deficits,  limited problem solving. POOR historian - providing conflicting information during session. Would HIGHLY benefit from formal cognitive evaluation - especially considering that she continues to actively work on taxes   Assessment   Limitation Decreased ADL status;Decreased Safe judgement during ADL;Decreased cognition;Decreased endurance;Decreased self-care trans;Decreased high-level ADLs  (impaired balance, fxnl mobility, act mariah, fxnl reach, standing mariah, strength, attention to task, direction following, safety awareness, insight, pacing, problem solving, learning new tasks, response time)    Prognosis Good   Assessment Pt is a 79 y.o. female seen for OT evaluation s/p admission to [facility] on 1/17/2025 due to fall. Diagnosed with Subcutaneous hematoma. Personal and env factors supporting pt at time of IE include (I) PLOF, supportive sister + niece, accessible home environment, and FFSU. Personal and env factors inhibiting engagement in occupations include advanced age, lifestyle patterns, limited social support (LIVES ALONE), and difficulty completing IADLs. Performance deficits that affect the pt’s occupational performance can be seen above. Due to pt's current functional limitations and medical complications pt is functioning below baseline. Pt would benefit from continued skilled OT treatment in order to maximize safety, independence and overall performance with ADLs, functional mobility, functional transfers, and cognition in order to achieve highest level of function.   Goals   Patient Goals to go home today   LTG Time Frame 10-14   Long Term Goal see goals listed below   Plan   Treatment Interventions ADL retraining;Functional transfer training;Endurance training;Cognitive reorientation;Patient/family training;Equipment evaluation/education;Compensatory technique education;Energy conservation;Activityengagement   Goal Expiration Date 01/29/25   OT Treatment Day 0   OT Frequency 3-5x/wk   Discharge Recommendation   Rehab Resource Intensity Level, OT II (Moderate Resource Intensity)   AM-PAC Daily Activity Inpatient   Lower Body Dressing 1   Bathing 2   Toileting 1   Upper Body Dressing 3   Grooming 3   Eating 3   Daily Activity Raw Score 13   Daily Activity Standardized Score (Calc for Raw Score >=11) 32.03   AM-PAC Applied Cognition Inpatient   Following a Speech/Presentation 3   Understanding Ordinary Conversation 3   Taking Medications 1   Remembering Where Things Are Placed or Put Away 2   Remembering List of 4-5 Errands 1   Taking Care of Complicated Tasks 1   Applied Cognition Raw Score 11    Applied Cognition Standardized Score 27.03   End of Consult   Patient Position at End of Consult Bedside chair;All needs within reach;Bed/Chair alarm activated     GOALS:      -Patient will perform grooming tasks sitting in chair with overall Mod I in order to increase overall independence    -Patient will be Mod I with UB dressing using AE and AD as needed in order to increase (I) with ADLs    -Patient will be Mod I with UB bathing using AE and AD as needed in order to increase (I) with ADLs    -Patient will be Mod A  with LB dressing with use of AE and AD as needed in order to increase (I) with ADLs    -Patient will be Mod A  with LB bathing with use of AE and AD as needed in order to increase (I) with ADLs    -Patient will complete toileting w/ Mod A  w/ G hygiene/thoroughness in order to reduce caregiver burden    -Patient will demonstrate Mod A x 1 with bed mobility for ability to manage own comfort and initiate OOB tasks.     -Patient will perform functional transfers with Mod A x 1 to/from all surfaces using DME as needed in order to increase (I) with functional tasks    -Patient will be Mod A x 1 with functional mobility to/from BSC for increased independence with toileting tasks    -Patient will tolerate therapeutic activities for greater than 30 min, in order to increase tolerance for functional activities.     -Patient will engage in ongoing cognitive assessment in order to assist with safe discharge planning/recommendations.    -Patient will follow multi-step instructions with no VC in order to safely complete functional tasks     -Patient will demonstrate standing for 3 min in order to increase active participation in functional activities          The patient's raw score on the -PAC Daily Activity Inpatient Short Form is 13. A raw score of less than 19 suggests the patient may benefit from discharge to post-acute rehabilitation services. HOWEVER please refer to the recommendation of the  Occupational Therapist for safe discharge planning.      Jessica Brown MS, OTR/L

## 2025-01-20 PROBLEM — Z79.899 ENCOUNTER FOR MEDICATION REVIEW: Status: ACTIVE | Noted: 2025-01-20

## 2025-01-20 PROBLEM — R41.89 COGNITIVE IMPAIRMENT: Status: ACTIVE | Noted: 2025-01-20

## 2025-01-20 LAB
ANION GAP SERPL CALCULATED.3IONS-SCNC: 4 MMOL/L (ref 4–13)
BASOPHILS # BLD AUTO: 0.05 THOUSANDS/ΜL (ref 0–0.1)
BASOPHILS NFR BLD AUTO: 1 % (ref 0–1)
BUN SERPL-MCNC: 33 MG/DL (ref 5–25)
CALCIUM SERPL-MCNC: 8.9 MG/DL (ref 8.4–10.2)
CHLORIDE SERPL-SCNC: 109 MMOL/L (ref 96–108)
CO2 SERPL-SCNC: 26 MMOL/L (ref 21–32)
CREAT SERPL-MCNC: 1.29 MG/DL (ref 0.6–1.3)
EOSINOPHIL # BLD AUTO: 0.3 THOUSAND/ΜL (ref 0–0.61)
EOSINOPHIL NFR BLD AUTO: 4 % (ref 0–6)
ERYTHROCYTE [DISTWIDTH] IN BLOOD BY AUTOMATED COUNT: 13.9 % (ref 11.6–15.1)
ERYTHROCYTE [DISTWIDTH] IN BLOOD BY AUTOMATED COUNT: 13.9 % (ref 11.6–15.1)
GFR SERPL CREATININE-BSD FRML MDRD: 38 ML/MIN/1.73SQ M
GLUCOSE SERPL-MCNC: 103 MG/DL (ref 65–140)
GLUCOSE SERPL-MCNC: 108 MG/DL (ref 65–140)
GLUCOSE SERPL-MCNC: 109 MG/DL (ref 65–140)
GLUCOSE SERPL-MCNC: 169 MG/DL (ref 65–140)
GLUCOSE SERPL-MCNC: 84 MG/DL (ref 65–140)
GLUCOSE SERPL-MCNC: 98 MG/DL (ref 65–140)
HCT VFR BLD AUTO: 24.2 % (ref 34.8–46.1)
HCT VFR BLD AUTO: 25 % (ref 34.8–46.1)
HCT VFR BLD AUTO: 26.3 % (ref 34.8–46.1)
HGB BLD-MCNC: 7.7 G/DL (ref 11.5–15.4)
HGB BLD-MCNC: 7.8 G/DL (ref 11.5–15.4)
HGB BLD-MCNC: 8.2 G/DL (ref 11.5–15.4)
IMM GRANULOCYTES # BLD AUTO: 0.03 THOUSAND/UL (ref 0–0.2)
IMM GRANULOCYTES NFR BLD AUTO: 0 % (ref 0–2)
LYMPHOCYTES # BLD AUTO: 1.25 THOUSANDS/ΜL (ref 0.6–4.47)
LYMPHOCYTES NFR BLD AUTO: 14 % (ref 14–44)
MAGNESIUM SERPL-MCNC: 2 MG/DL (ref 1.9–2.7)
MCH RBC QN AUTO: 30.7 PG (ref 26.8–34.3)
MCH RBC QN AUTO: 30.8 PG (ref 26.8–34.3)
MCHC RBC AUTO-ENTMCNC: 31.2 G/DL (ref 31.4–37.4)
MCHC RBC AUTO-ENTMCNC: 31.2 G/DL (ref 31.4–37.4)
MCV RBC AUTO: 99 FL (ref 82–98)
MCV RBC AUTO: 99 FL (ref 82–98)
MONOCYTES # BLD AUTO: 1.11 THOUSAND/ΜL (ref 0.17–1.22)
MONOCYTES NFR BLD AUTO: 13 % (ref 4–12)
NEUTROPHILS # BLD AUTO: 5.94 THOUSANDS/ΜL (ref 1.85–7.62)
NEUTS SEG NFR BLD AUTO: 68 % (ref 43–75)
NRBC BLD AUTO-RTO: 0 /100 WBCS
PLATELET # BLD AUTO: 136 THOUSANDS/UL (ref 149–390)
PLATELET # BLD AUTO: 145 THOUSANDS/UL (ref 149–390)
PMV BLD AUTO: 11.1 FL (ref 8.9–12.7)
PMV BLD AUTO: 11.2 FL (ref 8.9–12.7)
POTASSIUM SERPL-SCNC: 4.1 MMOL/L (ref 3.5–5.3)
RBC # BLD AUTO: 2.53 MILLION/UL (ref 3.81–5.12)
RBC # BLD AUTO: 2.67 MILLION/UL (ref 3.81–5.12)
SODIUM SERPL-SCNC: 139 MMOL/L (ref 135–147)
VIT B12 SERPL-MCNC: 151 PG/ML (ref 180–914)
WBC # BLD AUTO: 8.65 THOUSAND/UL (ref 4.31–10.16)
WBC # BLD AUTO: 8.68 THOUSAND/UL (ref 4.31–10.16)

## 2025-01-20 PROCEDURE — 99232 SBSQ HOSP IP/OBS MODERATE 35: CPT | Performed by: SURGERY

## 2025-01-20 PROCEDURE — 82948 REAGENT STRIP/BLOOD GLUCOSE: CPT

## 2025-01-20 PROCEDURE — 97163 PT EVAL HIGH COMPLEX 45 MIN: CPT

## 2025-01-20 PROCEDURE — 82607 VITAMIN B-12: CPT | Performed by: FAMILY MEDICINE

## 2025-01-20 PROCEDURE — 99223 1ST HOSP IP/OBS HIGH 75: CPT | Performed by: FAMILY MEDICINE

## 2025-01-20 PROCEDURE — 80048 BASIC METABOLIC PNL TOTAL CA: CPT

## 2025-01-20 PROCEDURE — 85025 COMPLETE CBC W/AUTO DIFF WBC: CPT

## 2025-01-20 PROCEDURE — 97530 THERAPEUTIC ACTIVITIES: CPT

## 2025-01-20 PROCEDURE — 85014 HEMATOCRIT: CPT

## 2025-01-20 PROCEDURE — 85018 HEMOGLOBIN: CPT

## 2025-01-20 PROCEDURE — 83735 ASSAY OF MAGNESIUM: CPT

## 2025-01-20 PROCEDURE — 85027 COMPLETE CBC AUTOMATED: CPT

## 2025-01-20 RX ADMIN — INSULIN LISPRO 1 UNITS: 100 INJECTION, SOLUTION INTRAVENOUS; SUBCUTANEOUS at 12:14

## 2025-01-20 RX ADMIN — VENLAFAXINE 75 MG: 37.5 TABLET ORAL at 12:14

## 2025-01-20 RX ADMIN — Medication 12.5 MG: at 08:48

## 2025-01-20 RX ADMIN — Medication: at 08:48

## 2025-01-20 RX ADMIN — APIXABAN 5 MG: 5 TABLET, FILM COATED ORAL at 08:48

## 2025-01-20 RX ADMIN — VENLAFAXINE 75 MG: 37.5 TABLET ORAL at 17:14

## 2025-01-20 RX ADMIN — GABAPENTIN 900 MG: 300 CAPSULE ORAL at 22:01

## 2025-01-20 RX ADMIN — VENLAFAXINE 75 MG: 37.5 TABLET ORAL at 08:48

## 2025-01-20 RX ADMIN — GABAPENTIN 900 MG: 300 CAPSULE ORAL at 08:48

## 2025-01-20 RX ADMIN — GABAPENTIN 600 MG: 300 CAPSULE ORAL at 12:14

## 2025-01-20 RX ADMIN — LEVOTHYROXINE SODIUM 125 MCG: 0.12 TABLET ORAL at 05:44

## 2025-01-20 RX ADMIN — Medication: at 17:14

## 2025-01-20 RX ADMIN — PRAVASTATIN SODIUM 10 MG: 10 TABLET ORAL at 17:14

## 2025-01-20 RX ADMIN — Medication: at 22:02

## 2025-01-20 RX ADMIN — APIXABAN 5 MG: 5 TABLET, FILM COATED ORAL at 17:14

## 2025-01-20 NOTE — ASSESSMENT & PLAN NOTE
Currently rate controlled  Continue anticoagulation with Eliquis  Metoprolol was decreased to 12.5 mg daily  Monitor electrolytes

## 2025-01-20 NOTE — ASSESSMENT & PLAN NOTE
- Hemoglobin dropped from 9 on January 19th to 7.6 on the January 20th  - Confirmed by H and H of 7.5  - Afternoon Hemoglobin of 8.2   - Continue to monitor

## 2025-01-20 NOTE — ASSESSMENT & PLAN NOTE
Status post mechanical fall  Eliquis was resumed  Hemoglobin trending down  Recommend Tylenol 975 mg p.o. 3 times daily  PT/OT as tolerated  Manage as per primary team

## 2025-01-20 NOTE — ASSESSMENT & PLAN NOTE
Wt Readings from Last 3 Encounters:   01/17/25 93.7 kg (206 lb 9.1 oz)   07/28/24 86.2 kg (190 lb)   07/25/24 91 kg (200 lb 9.9 oz)     Currently euvolemic  Continue to monitor and manage as per primary team

## 2025-01-20 NOTE — PROGRESS NOTES
Progress Note - Trauma   Name: Laura Sarkar 79 y.o. female I MRN: [id number]  Unit/Bed#: W -01 I Date of Admission: 1/17/2025   Date of Service: 1/20/2025 I Hospital Day: 3    Assessment & Plan  Fall  Pt sustained a mechanical fall onto her buttocks and back. Imaging showed 10.3 x 5.7 x 9.2 cm hematoma right posterolateral thoracoabdominal wall.    Plan:  - See hematoma plan  - PT/OT eval and treat  Atrial fibrillation (HCC)  Currently takes Metoprolol 25mg qd, eliquis 5mg BID.    Plan:  -Monitor on tele  - Currently giving Metoprolol 12.5 mg due to decrease in pressure  Hyperlipidemia  Plan:  - Continue home pravastatin 10mg  Type 2 diabetes mellitus, without long-term current use of insulin ([facility])  Lab Results   Component Value Date    HGBA1C 6.0 (H) 01/18/2025       Recent Labs     01/19/25  1711 01/19/25  2359 01/20/25  0547 01/20/25  1144   POCGLU 136 98 109 169*       Blood Sugar Average: Last 72 hrs:  (P) 131.5053669855176477    Plan:  - Monitor blood sugar  - SSI  Stage 4 chronic kidney disease ([facility])  Lab Results   Component Value Date    EGFR 38 01/20/2025    EGFR 35 01/19/2025    EGFR 39 01/18/2025    CREATININE 1.29 01/20/2025    CREATININE 1.38 (H) 01/19/2025    CREATININE 1.27 01/18/2025     Plan:  -Monitor renal function  - Avoid nephrotoxic agents  Chronic combined systolic and diastolic heart failure (HCC)  Wt Readings from Last 3 Encounters:   01/17/25 93.7 kg (206 lb 9.1 oz)   07/28/24 86.2 kg (190 lb)   07/25/24 91 kg (200 lb 9.9 oz)     Plan:   - Monitor for fluid overload  - Hold diuretics in the setting of hypotension      Hypothyroidism  Plan:  -Continue home levothyroxine 125mcg  R posterior thoracoabdominal wall subcutaneous hematoma  Pt sustained a mechanical fall onto her buttocks and back. Imaging showed 10.3 x 5.7 x 9.2 cm hematoma right posterolateral thoracoabdominal wall. During her time in the ED her hematoma continued to expand. Kcentra was given to reverse her eliquis. She  became hypotensive at one point for which she was given 1u on blood. Abdominal binder applied to tamponade.     Plan:  - Monitor skin for skin breakdown  - Monitor vital signs  - Trend H/H -hemoglobin 9.4 as of January 19 and trending down to 7.7 on January 20, confirmed by H&H of 7.5  - If hematoma continues to expand consider evacuating the hematoma  ABLA (acute blood loss anemia)  - Hemoglobin dropped from 9 on January 19th to 7.6 on the January 20th  - Confirmed by H and H of 7.5  - Afternoon Hemoglobin of 8.2   - Continue to monitor     Bowel Regimen: none  VTE Prophylaxis:VTE covered by:  apixaban, Oral, 5 mg at 01/20/25 0848         Disposition: Awaiting clearance by physical therapy and Occupational Therapy.  Coordinating placement case management.  Please contact the SecureChat role for the Trauma service with any questions/concerns.    24 Hour Events : No overnight events reported.  Subjective : Patient is presently without complaint.    Objective :  Temp:  [97.2 °F (36.2 °C)-98.6 °F (37 °C)] 98.2 °F (36.8 °C)  HR:  [80-83] 82  BP: ()/(47-69) 99/59  Resp:  [14-16] 16  SpO2:  [89 %-98 %] 95 %  O2 Device: None (Room air)    I/O         01/18 0701  01/19 0700 01/19 0701  01/20 0700 01/20 0701  01/21 0700    P.O. 180 240     Blood       IV Piggyback  630     Total Intake(mL/kg) 180 (1.9) 870 (9.3)     Urine (mL/kg/hr) 71 (0)      Total Output 71      Net +109 +870            Unmeasured Urine Occurrence  1 x             Physical Exam  Vitals and nursing note reviewed.   Constitutional:       General: She is not in acute distress.     Appearance: She is not ill-appearing.   HENT:      Head: Normocephalic and atraumatic.   Eyes:      Pupils: Pupils are equal, round, and reactive to light.   Cardiovascular:      Rate and Rhythm: Normal rate and regular rhythm.   Pulmonary:      Effort: Pulmonary effort is normal.   Abdominal:      General: Abdomen is flat.   Musculoskeletal:         General: No swelling or  tenderness.   Skin:     General: Skin is warm and dry.      Findings: Bruising and lesion present.      Comments: No appreciated migration of the hematoma.   Neurological:      General: No focal deficit present.      Mental Status: Mental status is at baseline.   Psychiatric:         Mood and Affect: Mood normal.         Behavior: Behavior normal.               Lab Results: I have reviewed the following results:  Recent Labs     01/17/25  1854 01/17/25  2106 01/18/25  0016 01/18/25  0531 01/18/25  1241 01/19/25  0525 01/20/25  0534 01/20/25  0633 01/20/25  1328   WBC 13.25*  --   --  12.86*  --  10.81* 8.65  --  8.68   HGB 10.1*  --    < > 9.4*   < > 9.1* 7.8*   < > 8.2*   HCT 32.0*  --   --  29.7*  --  27.9* 25.0*   < > 26.3*     --   --  147*  --  151 136*  --  145*   SODIUM 142  --   --  142  --  140 139  --   --    K 5.0  --   --  5.0  --  4.6 4.1  --   --      --   --  108  --  107 109*  --   --    CO2 24  --   --  26  --  27 26  --   --    BUN 35*  --   --  32*  --  30* 33*  --   --    CREATININE 1.51*  --   --  1.27  --  1.38* 1.29  --   --    GLUC 213*  --   --  146*  --  138 103  --   --    CAIONIZED  --   --   --   --   --  1.13  --   --   --    MG  --   --    < > 1.6*  --  1.6* 2.0  --   --    PHOS  --   --    < > 3.2  --  2.7  --   --   --    AST 17  --   --  17  --   --   --   --   --    ALT 8  --   --  8  --   --   --   --   --    ALB 3.0*  --   --  2.9*  --   --   --   --   --    TBILI 0.40  --   --  0.85  --   --   --   --   --    ALKPHOS 79  --   --  71  --   --   --   --   --    PTT 29  --   --   --   --   --   --   --   --    INR 1.40*  --   --   --   --   --   --   --   --    HSTNI0 7  --   --   --   --   --   --   --   --    HSTNI2  --  <2  --   --   --   --   --   --   --     < > = values in this interval not displayed.

## 2025-01-20 NOTE — ASSESSMENT & PLAN NOTE
Lab Results   Component Value Date    EGFR 38 01/20/2025    EGFR 35 01/19/2025    EGFR 39 01/18/2025    CREATININE 1.29 01/20/2025    CREATININE 1.38 (H) 01/19/2025    CREATININE 1.27 01/18/2025     Plan:  -Monitor renal function  - Avoid nephrotoxic agents

## 2025-01-20 NOTE — ASSESSMENT & PLAN NOTE
Wt Readings from Last 3 Encounters:   01/17/25 93.7 kg (206 lb 9.1 oz)   07/28/24 86.2 kg (190 lb)   07/25/24 91 kg (200 lb 9.9 oz)     Plan:   - Monitor for fluid overload  - Hold diuretics in the setting of hypotension

## 2025-01-20 NOTE — CASE MANAGEMENT
Case Management Progress Note    Patient name Laura Sarkar  Location W /W -01 MRN [id number]  : 1945 Date 2025       LOS (days): 3  Geometric Mean LOS (GMLOS) (days): 2.6  Days to GMLOS:-0.2        OBJECTIVE:        Current admission status: Inpatient  Preferred Pharmacy:   [id number] - [address] [address]  Phone: [phone] Fax: [phone]    Primary Care Provider: Kalpana Siegel DO    Primary Insurance: [employer] REP  Secondary Insurance:     PROGRESS NOTE:    Cm contacted patients grand daughter Kathy to discuss facility choice. BRYAN emailed list to Kathy at [email or web address]. Kathy and her sister are going to look over list and advise of choice. Case Management to follow for facility choice.

## 2025-01-20 NOTE — PLAN OF CARE
Problem: PHYSICAL THERAPY ADULT  Goal: Performs mobility at highest level of function for planned discharge setting.  See evaluation for individualized goals.  Description: Treatment/Interventions: Functional transfer training, LE strengthening/ROM, Therapeutic exercise, Endurance training, Cognitive reorientation, Equipment eval/education, Patient/family training, Bed mobility, Gait training, Compensatory technique education          See flowsheet documentation for full assessment, interventions and recommendations.  Note:    Problem List: Decreased strength, Decreased endurance, Impaired balance, Decreased mobility, Decreased cognition, Decreased safety awareness, Obesity  Assessment: Pt tripped and fell onto her back and buttocks. Dx: cognitive impairment, acute blood loss anemia, heart failure, CKD, a-fib, and right posterior thoracoabdominal wall subcutaneous hematoma. order placed for PT eval and tx, w/ activity order of up in chair. pt presents w/ comorbidities of aortic stenosis, arthritis, asthma, CAD, DM, neuropathy, MI, and cardiac cath and personal factors of advanced age, mobilizing w/ assistive device, decreased cognition, positive fall history, and inability to perform IADLs. pt presents w/ weakness, decreased endurance, impaired cognition, impaired balance, decreased safety awareness, and fall risk. these impairments are evident in findings from physical examination (weakness), mobility assessment (need for max assist w/ bed mobility and tranfers, inability to mobilize to chair or ambulate, need for input for mobility technique), and Barthel Index: 35/100. pt needed input for task focus and mobility technique. pt is at risk for falls due to physical and safety awareness deficits. pt's clinical presentation is unstable/unpredictable (evident in anemia, poor blood pressure control, need for assist w/ bed mobility and transfers and inability to ambulate when usually mobilizing independently, and need  for input for task focus and mobility technique/safety). pt needs inpatient PT tx to improve mobility deficits and progress mobility training as appropriate.        Rehab Resource Intensity Level, PT: II (Moderate Resource Intensity)    See flowsheet documentation for full assessment.

## 2025-01-20 NOTE — PHYSICAL THERAPY NOTE
PHYSICAL THERAPY EVALUATION NOTE    Patient Name: Laura Sarkar  Today's Date: 1/20/2025  AGE:   79 y.o.  Mrn:   [id number]  ADMIT DX:  Syncope [R55]    Past Medical History:   Diagnosis Date    Aortic stenosis     Arthritis     Asthma     Basal cell carcinoma 10/24/2023    right lateral forehead, mohs    Basal cell carcinoma 10/24/2023    right medial forehead, mohs    Coronary artery disease     Diabetes mellitus (HCC)     Diabetic peripheral neuropathy (HCC)     Last assessed - 1/27/16    Gallbladder disease     Heart attack (HCC) 07/1999    Hemorrhoids     Last assessed - 11/16/12    Hypoglycemia 12/23/2021    Myocardial infarction (HCC)     Old myocardial infarction     Type 2 diabetes mellitus with autonomic neuropathy (HCC)     Unspec long term insulin use status, Last assessed - 6/8/17     Length Of Stay: 3  PHYSICAL THERAPY EVALUATION :    01/20/25 1414   PT Last Visit   PT Visit Date 01/20/25   Pain Assessment   Pain Assessment Tool 0-10   Pain Score No Pain   Restrictions/Precautions   Other Precautions Cognitive;Chair Alarm;Bed Alarm;Fall Risk;Hard of hearing  ([place])   Home Living   Type of Home House   Home Layout Two level;Able to live on main level with bedroom/bathroom;Other (Comment)  (no RUBI.)   Additional Comments lives alone. granddaughter is moving in w/ her. mobilizes w/ wheelchair or roller walker. independent w/ ADLs. receives assist w/ IADLs. + fall history. sister is supportive.  (pt was unable to provide consistent social history. obtained from previous documentation.)   General   Additional Pertinent History 1/19/25 at 12:42 blood pressure was 84/54. 1/20/25 at 6:33 hemoglobin was 7.7.   Family/Caregiver Present No   Cognition   Overall Cognitive Status Impaired   Arousal/Participation Cooperative   Attention Attends with cues to redirect   Orientation Level Oriented to person;Disoriented to  place;Disoriented to time;Disoriented to situation;Other (Comment)  (pt was identified w/ full name, birth date)   Following Commands Follows one step commands with increased time or repetition   Comments room air resting pulse ox 96%. blood pressure supine 96/52 and 80 BPM, seated 90/55 and 81 BPM, standing 99/59 and 82 BPM. pt was unable to maintain standing x 3 minutes.   Subjective   Subjective pt seen supine in bed. agreed to participate in PT eval. input was needed for task focus.   RLE Assessment   RLE Assessment WFL  (3 to 3+/5)   LLE Assessment   LLE Assessment WFL  (3 to 3+/5)   Light Touch   RLE Light Touch Grossly intact   LLE Light Touch Grossly intact   Bed Mobility   Supine to Sit 2  Maximal assistance   Additional items Assist x 1;HOB elevated;Bedrails;Increased time required;Verbal cues;LE management  (for bedrail use, trunk/LE positioning)   Transfers   Sit to Stand 1  Dependent   Additional items Assist x 1;Increased time required;Verbal cues  (for hand placement, LE positioning, task focus/safety)   Stand to Sit 2  Maximal assistance  (poorly controlled descent to sitting surface)   Additional items Assist x 1;Impulsive;Verbal cues  (for body positioning, safety)   Stand pivot Unable to assess   Additional Comments pt stood approximately 45 seconds w/ roller walker and maxx1. additional standing not possible due to fatigue. pt was unable to weight shift or mobilize to bedside chair.   Ambulation/Elevation   Gait pattern Not appropriate   Assistive Device Rolling walker   Balance   Static Sitting Fair +   Static Standing Poor -  (w/ roller walker)   Ambulatory Zero   Activity Tolerance   Activity Tolerance Patient limited by fatigue   Nurse Made Aware spoke to Arabella RICHMNOD   Assessment   Problem List Decreased strength;Decreased endurance;Impaired balance;Decreased mobility;Decreased cognition;Decreased safety awareness;Obesity   Assessment Pt tripped and fell onto her back and buttocks. Dx:  cognitive impairment, acute blood loss anemia, heart failure, CKD, a-fib, and right posterior thoracoabdominal wall subcutaneous hematoma. order placed for PT eval and tx, w/ activity order of up in chair. pt presents w/ comorbidities of aortic stenosis, arthritis, asthma, CAD, DM, neuropathy, MI, and cardiac cath and personal factors of advanced age, mobilizing w/ assistive device, decreased cognition, positive fall history, and inability to perform IADLs. pt presents w/ weakness, decreased endurance, impaired cognition, impaired balance, decreased safety awareness, and fall risk. these impairments are evident in findings from physical examination (weakness), mobility assessment (need for max assist w/ bed mobility and tranfers, inability to mobilize to chair or ambulate, need for input for mobility technique), and Barthel Index: 35/100. pt needed input for task focus and mobility technique. pt is at risk for falls due to physical and safety awareness deficits. pt's clinical presentation is unstable/unpredictable (evident in anemia, poor blood pressure control, need for assist w/ bed mobility and transfers and inability to ambulate when usually mobilizing independently, and need for input for task focus and mobility technique/safety). pt needs inpatient PT tx to improve mobility deficits and progress mobility training as appropriate.   Goals   Patient Goals I want to go home and see my parents.   STG Expiration Date 02/03/25   Short Term Goal #1 pt will: Increase bilateral LE strength 1/2 grade to facilitate independent mobility, Perform rolling and repositioning in bed w/ supervision to decrease caregiver burden, Perform supine <--> sitting edge of bed transition w/ minx1 to improve level of function, Perform all transfers w/ minx1 to improve independence, Ambulate 25 ft. with roller walker w/ modx1 w/o LOB to improve functional independence, Increase all balance 1 grade to decrease risk for falls, Tolerate 3 hr  OOB to faciliate upright tolerance, Tolerate seated at EOB 20 minutes modified independent to facilitate functional task performance, Tolerate standing 3 minutes w/ supervision to facilitate functional task performance, Improve Barthel Index score to 55 or greater to facilitate independence, and Complete Timed Up and Go or Comfortable Gait Speed to further assess mobility and monitor progress   PT Treatment Day 1   Plan   Treatment/Interventions Functional transfer training;LE strengthening/ROM;Therapeutic exercise;Endurance training;Cognitive reorientation;Equipment eval/education;Patient/family training;Bed mobility;Gait training;Compensatory technique education   PT Frequency 3-5x/wk   Discharge Recommendation   Rehab Resource Intensity Level, PT II (Moderate Resource Intensity)   AM-PAC Basic Mobility Inpatient   Turning in Flat Bed Without Bedrails 3   Lying on Back to Sitting on Edge of Flat Bed Without Bedrails 2   Moving Bed to Chair 1   Standing Up From Chair Using Arms 2   Walk in Room 1   Climb 3-5 Stairs With Railing 1   Basic Mobility Inpatient Raw Score 10   Turning Head Towards Sound 3   Follow Simple Instructions 3   Low Function Basic Mobility Raw Score  16   Low Function Basic Mobility Standardized Score  25.72   [facility] Highest Level Of Mobility   -[facility] Goal 4: Move to chair/commode   -[facility] Achieved 3: Sit at edge of bed   Barthel Index   Feeding 10   Bathing 0   Grooming Score 5   Dressing Score 5   Bladder Score 0   Bowels Score 5   Toilet Use Score 5   Transfers (Bed/Chair) Score 5   Mobility (Level Surface) Score 0   Stairs Score 0   Barthel Index Score 35   Additional Treatment Session   Start Time 1414   End Time 1424   Treatment Assessment Pt agreed to participate in PT intervention. Pt completed additional mobilization to address physical and mobility deficits noted during eval. Therapist provided education to pt including walker management and transfer technique. Sit < - > stand  transfer w/ maxx1. Pt stood 2 minutes w/ roller walker w/ mod to maxx1. Additional standing was not possible due to fatigue. Pt shows improvement w/ increased activity tolerance. Sit to supine transition w/ maxx1. Further inpatient PT intervention is necessary to maximize functional independence.   Equipment Use roller walker   Additional Treatment Day 1   End of Consult   Patient Position at End of Consult Supine;Bed/Chair alarm activated;All needs within reach     The patient's AM-PAC Basic Mobility Inpatient Short Form Raw Score is 10. A Raw score of less than or equal to 16 suggests the patient may benefit from discharge to post-acute rehabilitation services. Please also refer to the recommendation of the Physical Therapist for safe discharge planning.    Skilled PT recommended while in hospital and upon DC to progress pt toward treatment goals.     Aryan Karimi, PT

## 2025-01-20 NOTE — ASSESSMENT & PLAN NOTE
Currently stable, no behaviors noted.  Patient is alert oriented x 1 at the time of encounter and scored 2/5 on mini cog.  She needs assistance with most of her IADLs at baseline  CT head showed chronic microangiopathic changes  Will continue to provide supportive care, reorient as needed.  Patient is at high risk for delirium, will monitor closely and place on delirium precautions.  Maintain sleep/wake cycle.  Optimize pain regimen.  Monitor for constipation and urinary retention and manage as needed.   B12 level low- start supplements , TSH within normal limits  Encourage family to visit.  Encourage to wear glasses and hearing aids while awake.  Encourage po intake, assist with feeding if needed.   I would recommend supervision with medication management at discharge

## 2025-01-20 NOTE — ASSESSMENT & PLAN NOTE
Lab Results   Component Value Date    EGFR 38 01/20/2025    EGFR 35 01/19/2025    EGFR 39 01/18/2025    CREATININE 1.29 01/20/2025    CREATININE 1.38 (H) 01/19/2025    CREATININE 1.27 01/18/2025     Creatinine stable.  Will avoid nephrotoxic medication.  Encourage po hydration.  Will monitor BMP.   Decrease gabapentin to 700 mg twice daily and venlafaxine to 75 mg twice daily

## 2025-01-20 NOTE — ASSESSMENT & PLAN NOTE
Hemoglobin 8.3 today  Eliquis was resumed  Continue to monitor CBC and transfuse if hemoglobin less than 7.0

## 2025-01-20 NOTE — ASSESSMENT & PLAN NOTE
Patient uses a walker and a wheelchair for ambulation at baseline  She reports recent falls  Associated symptoms   Monitor orthostatic vital signs  Encourage p.o. hydration  Avoid hypotension and hypoglycemia   Continue PT/OT as tolerated  Decrease gabapentin to 700 mg twice daily and venlafaxine to 75 mg twice daily

## 2025-01-20 NOTE — ASSESSMENT & PLAN NOTE
Home medication reviewed with [facility] pharmacy    Gabapentin 900 mg in a.m. 600 mg in the afternoon 900 mg at bedtime  Eliquis 5 mg twice daily  Levothyroxine 125 mcg daily  Venlafaxine 75 mg 3 times daily  Metoprolol succinate 25 mg daily  Pravastatin 10 mg daily  Losartan 25 mg daily

## 2025-01-20 NOTE — CONSULTS
Consultation - Geriatric Medicine   Laura Sarkar 79 y.o. female MRN: [id number]  Unit/Bed#: W -01 Encounter: [id number]      Assessment & Plan     Encounter for medication review  Assessment & Plan  Home medication reviewed with [facility] pharmacy    Gabapentin 900 mg in a.m. 600 mg in the afternoon 900 mg at bedtime  Eliquis 5 mg twice daily  Levothyroxine 125 mcg daily  Venlafaxine 75 mg 3 times daily  Metoprolol succinate 25 mg daily  Pravastatin 10 mg daily  Losartan 25 mg daily    Cognitive impairment  Assessment & Plan  Currently stable, no behaviors noted.  Patient is alert oriented x 1 at the time of encounter and scored 2/5 on mini cog.  She needs assistance with most of her IADLs at baseline  CT head showed chronic microangiopathic changes  Will continue to provide supportive care, reorient as needed.  Patient is at high risk for delirium, will monitor closely and place on delirium precautions.  Maintain sleep/wake cycle.  Optimize pain regimen.  Monitor for constipation and urinary retention and manage as needed.  Check B12 level , TSH within normal limits  Encourage family to visit.  Encourage to wear glasses and hearing aids while awake.  Encourage po intake, assist with feeding if needed.   I would recommend supervision with medication management at discharge    ABLA (acute blood loss anemia)  Assessment & Plan  Hemoglobin 7.7 today  Eliquis was resumed  Continue to monitor CBC and transfuse if hemoglobin less than 7.0    Fall  Assessment & Plan  Patient uses a walker and a wheelchair for ambulation at baseline  She reports recent falls  Associated symptoms   Monitor orthostatic vital signs  Encourage p.o. hydration  Avoid hypotension and hypoglycemia   Continue PT/OT as tolerated  Decrease gabapentin to 700 mg twice daily and venlafaxine to 75 mg twice daily    CKD (chronic kidney disease)  Assessment & Plan  Lab Results   Component Value Date    EGFR 38 01/20/2025    EGFR 35 01/19/2025    EGFR  39 01/18/2025    CREATININE 1.29 01/20/2025    CREATININE 1.38 (H) 01/19/2025    CREATININE 1.27 01/18/2025     Creatinine stable.  Will avoid nephrotoxic medication.  Encourage po hydration.  Will monitor BMP.   Decrease gabapentin to 700 mg twice daily and venlafaxine to 75 mg twice daily    Chronic combined systolic and diastolic heart failure (HCC)  Assessment & Plan  Wt Readings from Last 3 Encounters:   01/17/25 93.7 kg (206 lb 9.1 oz)   07/28/24 86.2 kg (190 lb)   07/25/24 91 kg (200 lb 9.9 oz)     Currently euvolemic  Continue to monitor and manage as per primary team          Atrial fibrillation (HCC)  Assessment & Plan  Currently rate controlled  Continue anticoagulation with Eliquis  Metoprolol was decreased to 12.5 mg daily  Monitor electrolytes    * R posterior thoracoabdominal wall subcutaneous hematoma  Assessment & Plan  Status post mechanical fall  Eliquis was resumed  Hemoglobin trending down  Recommend Tylenol 975 mg p.o. 3 times daily  PT/OT as tolerated  Manage as per primary team              History of Present Illness   Physician Requesting Consult: Marquita Connolly DO  Reason for Consult / Principal Problem: fall, right posterior thoracoabdominal wall subcutaneous hematoma        HPI: Laura Sarkar is a 79 y.o. year old female who presented to the hospital status post fall at home.  On admission she was found to have right posterior thoracoabdominal wall subcutaneous hematoma and anemia.  At the time of encounter patient denies chest pain shortness of breath cough.  Reports intermittent back pain.  Denies dizziness or lightheadedness.  States that her appetite is good denies abdominal pain nausea vomiting diarrhea or constipation.  Denies dysuria or hematuria.  States she sleeps well at 9.  Patient lives at home alone, uses a walker and a wheelchair for ambulation.  She needs assistance with most of her IADLs at baseline.  Her sister is helping her with medication management.    Inpatient  consult to Gerontology  Consult performed by: Lakshmi Sprague MD  Consult ordered by: Nikos Walton MD          Review of Systems   Constitutional:  Negative for chills and fever.   HENT:  Negative for congestion and rhinorrhea.    Eyes:  Positive for visual disturbance.   Respiratory:  Negative for cough, shortness of breath and wheezing.    Cardiovascular:  Negative for chest pain, palpitations and leg swelling.   Gastrointestinal:  Negative for abdominal pain and constipation.   Endocrine: Negative for cold intolerance.   Genitourinary:  Negative for difficulty urinating, dysuria and hematuria.   Musculoskeletal:  Positive for arthralgias, back pain and gait problem.   Skin:  Negative for wound.   Allergic/Immunologic: Negative for environmental allergies.   Neurological:  Negative for dizziness.   Hematological:  Bruises/bleeds easily.   Psychiatric/Behavioral:  Negative for sleep disturbance.            Historical Information   Past Medical History:   Diagnosis Date    Aortic stenosis     Arthritis     Asthma     Basal cell carcinoma 10/24/2023    right lateral forehead, mohs    Basal cell carcinoma 10/24/2023    right medial forehead, mohs    Coronary artery disease     Diabetes mellitus (HCC)     Diabetic peripheral neuropathy (HCC)     Last assessed - 1/27/16    Gallbladder disease     Heart attack (HCC) 07/1999    Hemorrhoids     Last assessed - 11/16/12    Hypoglycemia 12/23/2021    Myocardial infarction (HCC)     Old myocardial infarction     Type 2 diabetes mellitus with autonomic neuropathy (HCC)     Unspec long term insulin use status, Last assessed - 6/8/17     Past Surgical History:   Procedure Laterality Date    BUNIONECTOMY      CARDIAC CATHETERIZATION      Carotid Artery, Resolved - 7/1/13    CARDIAC CATHETERIZATION N/A 12/27/2021    Procedure: CARDIAC CATHETERIZATION ;  Surgeon: Chucky Tadeo MD;  [facility];  Service: Cardiology    CARDIAC CATHETERIZATION N/A 12/27/2021     Procedure: Cardiac Coronary Angiogram;  Surgeon: Chucky Tadeo MD;  [facility];  Service: Cardiology    CARDIAC ELECTROPHYSIOLOGY PROCEDURE N/A 01/18/2022    Procedure: Cardiac biv icd implant;  Surgeon: Harley Rollins DO;  Location: [facility];  Service: Cardiology    CARDIAC ELECTROPHYSIOLOGY PROCEDURE N/A 7/29/2024    Procedure: Cardiac eps/av node ablation;  Surgeon: Jose Carter MD;  [facility]: [facility];  Service: Cardiology    CARDIOVASCULAR STRESS TEST  09/1999    CHOLECYSTECTOMY      COLONOSCOPY  2017    FOOT ARTHRODESIS, MODIFIED DONOHUE  2016    GALLBLADDER SURGERY  1970    HEMORROIDECTOMY      KNEE ARTHROSCOPY Left 2004    MOHS SURGERY Right 01/10/2024    right lateral and medial forehead (combined), Dr Berman    DE COLONOSCOPY FLX DX W/COLLJ SPEC WHEN PFRMD N/A 08/21/2017    Procedure: COLONOSCOPY;  Surgeon: Ady Wilkes MD;  Location: [facility];  Service: Colorectal    REPAIR KNEE LIGAMENT      REPAIR KNEE LIGAMENT Left 1986    REPAIR KNEE LIGAMENT Right 1988     Social History   Social History     Substance and Sexual Activity   Alcohol Use Never    Comment: Social per [employer]      Social History     Substance and Sexual Activity   Drug Use Never     Social History     Tobacco Use   Smoking Status Never    Passive exposure: Never   Smokeless Tobacco Never     Family History:   Family History   Problem Relation Age of Onset    Hypertension Father     Diabetes Father     Cancer Father         Throat    Arthritis Father     Stroke Mother     Diabetes Maternal Grandmother     Heart disease Maternal Grandfather     Diabetes Son     Lymphoma Family         Head, Face and Neck        Meds/Allergies   current meds:   Current Facility-Administered Medications:     acetaminophen (TYLENOL) tablet 975 mg, Q8H PRN    apixaban (ELIQUIS) tablet 5 mg, BID    gabapentin (NEURONTIN) capsule 600 mg, Daily With Lunch    gabapentin (NEURONTIN) capsule 900 mg, QAM     gabapentin (NEURONTIN) capsule 900 mg, HS    insulin lispro (HumALOG/ADMELOG) 100 units/mL subcutaneous injection 1-6 Units, Q6H GEE **AND** Fingerstick Glucose (POCT), Q6H    levothyroxine tablet 125 mcg, Early Morning    metoprolol tartrate (LOPRESSOR) partial tablet 12.5 mg, Daily    pravastatin (PRAVACHOL) tablet 10 mg, Daily With Dinner    venlafaxine (EFFEXOR) tablet 75 mg, TID With Meals    white petrolatum-mineral oil (EUCERIN,HYDROCERIN) cream, TID    Allergies   Allergen Reactions    Lyrica [Pregabalin] Swelling     Pt does not remember    Sulfa Antibiotics Swelling     Pt does not remember       Objective     Intake/Output Summary (Last 24 hours) at 1/20/2025 1147  Last data filed at 1/19/2025 1742  Gross per 24 hour   Intake 870 ml   Output --   Net 870 ml     Invasive Devices       Peripheral Intravenous Line  Duration             Peripheral IV 01/20/25 Distal;Right;Upper;Ventral (anterior) Arm <1 day                    Physical Exam  Vitals and nursing note reviewed.   Constitutional:       General: She is not in acute distress.     Appearance: She is well-developed. She is obese.   HENT:      Head: Normocephalic and atraumatic.      Mouth/Throat:      Mouth: Mucous membranes are moist.   Eyes:      Conjunctiva/sclera: Conjunctivae normal.   Cardiovascular:      Rate and Rhythm: Normal rate and regular rhythm.      Heart sounds: Heart sounds are distant. No murmur heard.  Pulmonary:      Effort: Pulmonary effort is normal. No respiratory distress.      Breath sounds: Normal breath sounds.   Abdominal:      Palpations: Abdomen is soft.      Tenderness: There is no abdominal tenderness.   Musculoskeletal:         General: No swelling.      Cervical back: Neck supple.      Right lower leg: No edema.      Left lower leg: No edema.   Skin:     General: Skin is warm and dry.      Capillary Refill: Capillary refill takes less than 2 seconds.      Findings: Bruising present.   Neurological:      Mental Status:  She is alert.      Comments: AAOx1   Psychiatric:         Mood and Affect: Mood normal.         Lab Results:   I have personally reviewed pertinent lab results including the following:  - Cbc Cmp TSH    I have personally reviewed the following imaging study reports in PACS:  - CT head, chest, abdomen and pelvis      Therapies:   PT: Recommending postacute rehab    VTE Prophylaxis: VTE covered by:  apixaban, Oral, 5 mg at 01/20/25 0848        Code Status: Level 2 - DNAR: but accepts endotracheal intubation  Advance Directive and Living Will: Yes    Power of :    POLST:      Family and Social Support:   Living Arrangements: Lives Alone (Grand Daughter in process of moving in)  Support Systems: Family members  Type of Current Residence: 2 story home  Discharge planning discussed with:: Patients Grand daughter - Kathy  Freedom of Choice: Yes      Goals of Care: DNR    Thank you for allowing me to participate in your patients' care. Please do not hesitate to call with any additional questions.  Lakshmi Sprague MD

## 2025-01-21 ENCOUNTER — TELEPHONE (OUTPATIENT)
Dept: OTHER | Facility: OTHER | Age: 80
End: 2025-01-21

## 2025-01-21 VITALS
TEMPERATURE: 98 F | SYSTOLIC BLOOD PRESSURE: 99 MMHG | WEIGHT: 206.57 LBS | HEART RATE: 82 BPM | OXYGEN SATURATION: 99 % | BODY MASS INDEX: 34.42 KG/M2 | HEIGHT: 65 IN | RESPIRATION RATE: 18 BRPM | DIASTOLIC BLOOD PRESSURE: 54 MMHG

## 2025-01-21 PROBLEM — E53.8 B12 DEFICIENCY: Status: ACTIVE | Noted: 2025-01-21

## 2025-01-21 LAB
BASOPHILS # BLD AUTO: 0.02 THOUSANDS/ΜL (ref 0–0.1)
BASOPHILS NFR BLD AUTO: 0 % (ref 0–1)
EOSINOPHIL # BLD AUTO: 0.29 THOUSAND/ΜL (ref 0–0.61)
EOSINOPHIL NFR BLD AUTO: 4 % (ref 0–6)
ERYTHROCYTE [DISTWIDTH] IN BLOOD BY AUTOMATED COUNT: 13.9 % (ref 11.6–15.1)
GLUCOSE SERPL-MCNC: 142 MG/DL (ref 65–140)
GLUCOSE SERPL-MCNC: 160 MG/DL (ref 65–140)
GLUCOSE SERPL-MCNC: 85 MG/DL (ref 65–140)
HCT VFR BLD AUTO: 26.3 % (ref 34.8–46.1)
HGB BLD-MCNC: 8.3 G/DL (ref 11.5–15.4)
IMM GRANULOCYTES # BLD AUTO: 0.06 THOUSAND/UL (ref 0–0.2)
IMM GRANULOCYTES NFR BLD AUTO: 1 % (ref 0–2)
LYMPHOCYTES # BLD AUTO: 1.14 THOUSANDS/ΜL (ref 0.6–4.47)
LYMPHOCYTES NFR BLD AUTO: 16 % (ref 14–44)
MCH RBC QN AUTO: 31.4 PG (ref 26.8–34.3)
MCHC RBC AUTO-ENTMCNC: 31.6 G/DL (ref 31.4–37.4)
MCV RBC AUTO: 100 FL (ref 82–98)
MONOCYTES # BLD AUTO: 0.74 THOUSAND/ΜL (ref 0.17–1.22)
MONOCYTES NFR BLD AUTO: 10 % (ref 4–12)
NEUTROPHILS # BLD AUTO: 4.97 THOUSANDS/ΜL (ref 1.85–7.62)
NEUTS SEG NFR BLD AUTO: 69 % (ref 43–75)
NRBC BLD AUTO-RTO: 0 /100 WBCS
PLATELET # BLD AUTO: 155 THOUSANDS/UL (ref 149–390)
PMV BLD AUTO: 10.8 FL (ref 8.9–12.7)
RBC # BLD AUTO: 2.64 MILLION/UL (ref 3.81–5.12)
WBC # BLD AUTO: 7.22 THOUSAND/UL (ref 4.31–10.16)

## 2025-01-21 PROCEDURE — NC001 PR NO CHARGE: Performed by: SURGERY

## 2025-01-21 PROCEDURE — 82948 REAGENT STRIP/BLOOD GLUCOSE: CPT

## 2025-01-21 PROCEDURE — 97535 SELF CARE MNGMENT TRAINING: CPT

## 2025-01-21 PROCEDURE — 99232 SBSQ HOSP IP/OBS MODERATE 35: CPT | Performed by: FAMILY MEDICINE

## 2025-01-21 PROCEDURE — 85025 COMPLETE CBC W/AUTO DIFF WBC: CPT

## 2025-01-21 PROCEDURE — 97129 THER IVNTJ 1ST 15 MIN: CPT

## 2025-01-21 PROCEDURE — 99239 HOSP IP/OBS DSCHRG MGMT >30: CPT | Performed by: PHYSICIAN ASSISTANT

## 2025-01-21 RX ORDER — POLYETHYLENE GLYCOL 3350 17 G/17G
17 POWDER, FOR SOLUTION ORAL DAILY
Status: DISCONTINUED | OUTPATIENT
Start: 2025-01-21 | End: 2025-01-21 | Stop reason: HOSPADM

## 2025-01-21 RX ORDER — AMOXICILLIN 250 MG
1 CAPSULE ORAL
Qty: 30 TABLET | Refills: 0 | Status: SHIPPED | OUTPATIENT
Start: 2025-01-21

## 2025-01-21 RX ORDER — AMOXICILLIN 250 MG
1 CAPSULE ORAL
Status: DISCONTINUED | OUTPATIENT
Start: 2025-01-21 | End: 2025-01-21 | Stop reason: HOSPADM

## 2025-01-21 RX ORDER — GUAIFENESIN 200 MG/10ML
LIQUID ORAL 3 TIMES DAILY
Start: 2025-01-21 | End: 2025-01-24

## 2025-01-21 RX ORDER — GABAPENTIN 100 MG/1
700 CAPSULE ORAL 2 TIMES DAILY
Qty: 60 CAPSULE | Refills: 0 | Status: SHIPPED | OUTPATIENT
Start: 2025-01-21 | End: 2025-01-24

## 2025-01-21 RX ORDER — METOPROLOL TARTRATE 25 MG/1
12.5 TABLET, FILM COATED ORAL DAILY
Qty: 15 TABLET | Refills: 0 | Status: SHIPPED | OUTPATIENT
Start: 2025-01-22 | End: 2025-01-24

## 2025-01-21 RX ORDER — VENLAFAXINE 37.5 MG/1
75 TABLET ORAL 2 TIMES DAILY WITH MEALS
Status: DISCONTINUED | OUTPATIENT
Start: 2025-01-21 | End: 2025-01-21 | Stop reason: HOSPADM

## 2025-01-21 RX ORDER — INSULIN LISPRO 100 [IU]/ML
1-6 INJECTION, SOLUTION INTRAVENOUS; SUBCUTANEOUS EVERY 6 HOURS SCHEDULED
Qty: 7.2 ML | Refills: 0 | Status: SHIPPED | OUTPATIENT
Start: 2025-01-21 | End: 2025-01-24

## 2025-01-21 RX ORDER — CYANOCOBALAMIN 1000 UG/ML
1000 INJECTION, SOLUTION INTRAMUSCULAR; SUBCUTANEOUS ONCE
Status: COMPLETED | OUTPATIENT
Start: 2025-01-21 | End: 2025-01-21

## 2025-01-21 RX ORDER — VENLAFAXINE 75 MG/1
75 TABLET ORAL 2 TIMES DAILY WITH MEALS
Qty: 60 TABLET | Refills: 0 | Status: SHIPPED | OUTPATIENT
Start: 2025-01-21

## 2025-01-21 RX ORDER — POLYETHYLENE GLYCOL 3350 17 G/17G
17 POWDER, FOR SOLUTION ORAL DAILY
Qty: 510 G | Refills: 0 | Status: SHIPPED | OUTPATIENT
Start: 2025-01-22

## 2025-01-21 RX ADMIN — GABAPENTIN 700 MG: 400 CAPSULE ORAL at 11:34

## 2025-01-21 RX ADMIN — INSULIN LISPRO 1 UNITS: 100 INJECTION, SOLUTION INTRAVENOUS; SUBCUTANEOUS at 11:36

## 2025-01-21 RX ADMIN — VENLAFAXINE 75 MG: 37.5 TABLET ORAL at 08:57

## 2025-01-21 RX ADMIN — VENLAFAXINE 75 MG: 37.5 TABLET ORAL at 17:16

## 2025-01-21 RX ADMIN — Medication 12.5 MG: at 08:58

## 2025-01-21 RX ADMIN — APIXABAN 5 MG: 5 TABLET, FILM COATED ORAL at 17:16

## 2025-01-21 RX ADMIN — LEVOTHYROXINE SODIUM 125 MCG: 0.12 TABLET ORAL at 05:12

## 2025-01-21 RX ADMIN — CYANOCOBALAMIN 1000 MCG: 1000 INJECTION, SOLUTION INTRAMUSCULAR; SUBCUTANEOUS at 11:34

## 2025-01-21 RX ADMIN — Medication: at 08:58

## 2025-01-21 RX ADMIN — APIXABAN 5 MG: 5 TABLET, FILM COATED ORAL at 08:57

## 2025-01-21 RX ADMIN — PRAVASTATIN SODIUM 10 MG: 10 TABLET ORAL at 17:16

## 2025-01-21 RX ADMIN — POLYETHYLENE GLYCOL 3350 17 G: 17 POWDER, FOR SOLUTION ORAL at 08:57

## 2025-01-21 RX ADMIN — GABAPENTIN 900 MG: 300 CAPSULE ORAL at 08:57

## 2025-01-21 RX ADMIN — Medication: at 17:16

## 2025-01-21 RX ADMIN — GABAPENTIN 700 MG: 400 CAPSULE ORAL at 17:16

## 2025-01-21 NOTE — ASSESSMENT & PLAN NOTE
- appreciate OT cognitive evaluation, scored 13/30 on MOCA- recommend no driving or living alone  - Discussed with geriatrics' possible polypharmacy contributing due to high doses of gabapentin and Effexor. Geriatrics recommends decreasing gabapentin dose to 700 mg twice daily and Effexor to 75 mg twice daily  - recommend f/u with PCP outpatient for monitoring and further adjustment as needed

## 2025-01-21 NOTE — PROGRESS NOTES
Progress Note - Geriatric Medicine   Name: Laura Sarkar 79 y.o. female I MRN: [id number]  Unit/Bed#: W -01 I Date of Admission: 1/17/2025   Date of Service: 1/21/2025 I Hospital Day: 4     Assessment & Plan  R posterior thoracoabdominal wall subcutaneous hematoma  Status post mechanical fall  Eliquis was resumed  Hemoglobin trending down  Recommend Tylenol 975 mg p.o. 3 times daily  PT/OT as tolerated  Manage as per primary team  Atrial fibrillation (HCC)  Currently rate controlled  Continue anticoagulation with Eliquis  Metoprolol was decreased to 12.5 mg daily  Monitor electrolytes  Hyperlipidemia    Type 2 diabetes mellitus, without long-term current use of insulin ([facility])  Lab Results   Component Value Date    HGBA1C 6.0 (H) 01/18/2025       Recent Labs     01/20/25  1144 01/20/25  1733 01/20/25  2358 01/21/25  0514   POCGLU 169* 84 108 85       Blood Sugar Average: Last 72 hrs:  (P) 117.5191418216042763    Stage 4 chronic kidney disease (HCC)  Lab Results   Component Value Date    EGFR 38 01/20/2025    EGFR 35 01/19/2025    EGFR 39 01/18/2025    CREATININE 1.29 01/20/2025    CREATININE 1.38 (H) 01/19/2025    CREATININE 1.27 01/18/2025     Chronic combined systolic and diastolic heart failure (HCC)  Wt Readings from Last 3 Encounters:   01/17/25 93.7 kg (206 lb 9.1 oz)   07/28/24 86.2 kg (190 lb)   07/25/24 91 kg (200 lb 9.9 oz)     Currently euvolemic  Continue to monitor and manage as per primary team        Depression    CKD (chronic kidney disease)  Lab Results   Component Value Date    EGFR 38 01/20/2025    EGFR 35 01/19/2025    EGFR 39 01/18/2025    CREATININE 1.29 01/20/2025    CREATININE 1.38 (H) 01/19/2025    CREATININE 1.27 01/18/2025     Creatinine stable.  Will avoid nephrotoxic medication.  Encourage po hydration.  Will monitor BMP.   Decrease gabapentin to 700 mg twice daily and venlafaxine to 75 mg twice daily  Hypothyroidism    Fall  Patient uses a walker and a wheelchair for ambulation at  baseline  She reports recent falls  Associated symptoms   Monitor orthostatic vital signs  Encourage p.o. hydration  Avoid hypotension and hypoglycemia   Continue PT/OT as tolerated  Decrease gabapentin to 700 mg twice daily and venlafaxine to 75 mg twice daily  ABLA (acute blood loss anemia)  Hemoglobin 8.3 today  Eliquis was resumed  Continue to monitor CBC and transfuse if hemoglobin less than 7.0  Cognitive impairment  Currently stable, no behaviors noted.  Patient is alert oriented x 1 at the time of encounter and scored 2/5 on mini cog.  She needs assistance with most of her IADLs at baseline  CT head showed chronic microangiopathic changes  Will continue to provide supportive care, reorient as needed.  Patient is at high risk for delirium, will monitor closely and place on delirium precautions.  Maintain sleep/wake cycle.  Optimize pain regimen.  Monitor for constipation and urinary retention and manage as needed.   B12 level low- start supplements , TSH within normal limits  Encourage family to visit.  Encourage to wear glasses and hearing aids while awake.  Encourage po intake, assist with feeding if needed.   I would recommend supervision with medication management at discharge  Encounter for medication review  Home medication reviewed with [facility] pharmacy    Gabapentin 900 mg in a.m. 600 mg in the afternoon 900 mg at bedtime  Eliquis 5 mg twice daily  Levothyroxine 125 mcg daily  Venlafaxine 75 mg 3 times daily  Metoprolol succinate 25 mg daily  Pravastatin 10 mg daily  Losartan 25 mg daily  B12 deficiency  B 12 level 151, goal would be >400.  Start B12 supplements      Subjective:   Patient seen and examined at bedside for geriatric follow-up.  At the time of encounter she denied any acute complaints, denies any pain states she slept well and her appetite is good.  Had a bowel movement this morning.    Review of Systems   Constitutional:  Negative for chills and fever.   HENT:  Negative for congestion  "and rhinorrhea.    Respiratory:  Negative for cough, shortness of breath and wheezing.    Cardiovascular:  Negative for chest pain, palpitations and leg swelling.   Gastrointestinal:  Negative for abdominal pain and constipation.   Genitourinary:  Negative for difficulty urinating, dysuria and hematuria.   Musculoskeletal:  Positive for gait problem.   Skin:  Negative for wound.   Neurological:  Negative for dizziness.   Hematological:  Does not bruise/bleed easily.   Psychiatric/Behavioral:  Negative for behavioral problems and sleep disturbance.          Objective:     Vitals: Blood pressure 108/55, pulse 80, temperature (!) 97 °F (36.1 °C), resp. rate 16, height 5' 5\" (1.651 m), weight 93.7 kg (206 lb 9.1 oz), SpO2 99%.,Body mass index is 34.38 kg/m².      Intake/Output Summary (Last 24 hours) at 1/21/2025 1115  Last data filed at 1/20/2025 1724  Gross per 24 hour   Intake 0 ml   Output --   Net 0 ml       Current Medications: Reviewed    Physical Exam:   Physical Exam  Vitals and nursing note reviewed.   Constitutional:       General: She is not in acute distress.     Appearance: She is well-developed. She is obese.   HENT:      Head: Normocephalic and atraumatic.      Ears:      Comments: Hard of hearing     Mouth/Throat:      Mouth: Mucous membranes are moist.   Eyes:      Conjunctiva/sclera: Conjunctivae normal.   Cardiovascular:      Rate and Rhythm: Normal rate and regular rhythm.      Heart sounds: Heart sounds are distant. No murmur heard.  Pulmonary:      Effort: Pulmonary effort is normal. No respiratory distress.      Breath sounds: Normal breath sounds.   Abdominal:      Palpations: Abdomen is soft.      Tenderness: There is no abdominal tenderness.   Musculoskeletal:         General: No swelling.      Cervical back: Neck supple.      Right lower leg: No edema.      Left lower leg: No edema.   Skin:     General: Skin is warm and dry.      Capillary Refill: Capillary refill takes less than 2 seconds. "   Neurological:      Mental Status: She is alert and oriented to person, place, and time.   Psychiatric:         Mood and Affect: Mood normal.          Invasive Devices       Peripheral Intravenous Line  Duration             Peripheral IV 01/20/25 Distal;Right;Upper;Ventral (anterior) Arm 1 day                    Lab, Imaging and other studies: Results Review Statement: I reviewed radiology reports from this admission including: xray(s). L ankle

## 2025-01-21 NOTE — ASSESSMENT & PLAN NOTE
Lab Results   Component Value Date    HGBA1C 6.0 (H) 01/18/2025       Recent Labs     01/20/25  1733 01/20/25  2358 01/21/25  0514 01/21/25  1135   POCGLU 84 108 85 160*         Blood Sugar Average: Last 72 hrs:  (P) 120.3614994564308284    - Monitor blood sugar  - SSI  - resume home regimen on discharge

## 2025-01-21 NOTE — DISCHARGE INSTR - AVS FIRST PAGE
Seek medical attn if you develop increased redness, pain, swelling of your hematoma, dizziness/lightheadedness, chest pain, shortness of breath, fatigue.   Take medications as directed.   Avoid strenuous physical activity: No heavy  lifting, pushing or pulling > 5 pounds. No bending, twisting or crawling, until cleared by trauma.   Take metoprolol at reduced dose of 12.5 mg daily, gabapentin at reduced dose of 700 mg twice daily and reduced dose of Effexor at 75 mg twice daily - f/u with your family doctor in 1-2 weeks to discuss further dose adjustments as needed. These medication reductions were due to low blood pressure and poor cognition (sore of 13/30 on MOCA cognitive assessment)

## 2025-01-21 NOTE — PLAN OF CARE
Problem: Potential for Falls  Goal: Patient will remain free of falls  Description: INTERVENTIONS:  - Educate patient/family on patient safety including physical limitations  - Instruct patient to call for assistance with activity   - Consult OT/PT to assist with strengthening/mobility   - Keep Call bell within reach  - Keep bed low and locked with side rails adjusted as appropriate  - Keep care items and personal belongings within reach  - Initiate and maintain comfort rounds  - Make Fall Risk Sign visible to staff  - Offer Toileting every  Hours, in advance of need  - Initiate/Maintain alarm  - Obtain necessary fall risk management equipment  - Apply yellow socks and bracelet for high fall risk patients  - Consider moving patient to room near nurses station  1/21/2025 1807 by Tessa Casey, RN  Outcome: Adequate for Discharge  1/21/2025 0948 by Tessa Casey, RN  Outcome: Progressing     Problem: Nutrition/Hydration-ADULT  Goal: Nutrient/Hydration intake appropriate for improving, restoring or maintaining nutritional needs  Description: Monitor and assess patient's nutrition/hydration status for malnutrition. Collaborate with interdisciplinary team and initiate plan and interventions as ordered.  Monitor patient's weight and dietary intake as ordered or per policy. Utilize nutrition screening tool and intervene as necessary. Determine patient's food preferences and provide high-protein, high-caloric foods as appropriate.     INTERVENTIONS:  - Monitor oral intake, urinary output, labs, and treatment plans  - Assess nutrition and hydration status and recommend course of action  - Evaluate amount of meals eaten  - Assist patient with eating if necessary   - Allow adequate time for meals  - Recommend/ encourage appropriate diets, oral nutritional supplements, and vitamin/mineral supplements  - Order, calculate, and assess calorie counts as needed  - Recommend, monitor, and adjust tube feedings and TPN/PPN based  on assessed needs  - Assess need for intravenous fluids  - Provide specific nutrition/hydration education as appropriate  - Include patient/family/caregiver in decisions related to nutrition  1/21/2025 1807 by Tessa Casey RN  Outcome: Adequate for Discharge  1/21/2025 0948 by Tessa Casey RN  Outcome: Progressing     Problem: Prexisting or High Potential for Compromised Skin Integrity  Goal: Skin integrity is maintained or improved  Description: INTERVENTIONS:  - Identify patients at risk for skin breakdown  - Assess and monitor skin integrity  - Assess and monitor nutrition and hydration status  - Monitor labs   - Assess for incontinence   - Turn and reposition patient  - Assist with mobility/ambulation  - Relieve pressure over bony prominences  - Avoid friction and shearing  - Provide appropriate hygiene as needed including keeping skin clean and dry  - Evaluate need for skin moisturizer/barrier cream  - Collaborate with interdisciplinary team   - Patient/family teaching  - Consider wound care consult   1/21/2025 1807 by Tessa Casey RN  Outcome: Adequate for Discharge  1/21/2025 0948 by Tessa Casey RN  Outcome: Progressing      No

## 2025-01-21 NOTE — ASSESSMENT & PLAN NOTE
Lab Results   Component Value Date    EGFR 38 01/20/2025    EGFR 35 01/19/2025    EGFR 39 01/18/2025    CREATININE 1.29 01/20/2025    CREATININE 1.38 (H) 01/19/2025    CREATININE 1.27 01/18/2025

## 2025-01-21 NOTE — ASSESSMENT & PLAN NOTE
Lab Results   Component Value Date    HGBA1C 6.0 (H) 01/18/2025       Recent Labs     01/20/25  1733 01/20/25  2358 01/21/25  0514 01/21/25  1135   POCGLU 84 108 85 160*       Blood Sugar Average: Last 72 hrs:  (P) 120.5417959469567106    Plan:  - Monitor blood sugar  - SSI

## 2025-01-21 NOTE — ASSESSMENT & PLAN NOTE
- Hemoglobin dropped from 9 on January 19th to 7.6 on the January 20th  - Confirmed by H and H of 7.5  - Afternoon Hemoglobin of 8.2   - Continue to monitor    Principal Discharge DX:	Abscess

## 2025-01-21 NOTE — ASSESSMENT & PLAN NOTE
Currently takes Metoprolol 25mg qd, eliquis 5mg BID.  On half dose of metoprolol due to lower BPs this admission. Recommend f/u with PCP outpatient for adjustment as needed.

## 2025-01-21 NOTE — DISCHARGE SUMMARY
Discharge Summary - Trauma   Name: Laura Sarkar 79 y.o. female I MRN: [id number]  Unit/Bed#: W -01 I Date of Admission: 1/17/2025   Date of Service: 1/21/2025 I Hospital Day: 4    Admission Date: 1/17/2025 1810  Discharge Date: 01/21/25  Admitting Diagnosis: Syncope [R55]  Discharge Diagnosis:   Medical Problems       Resolved Problems  Date Reviewed: 8/26/2024   None       Cognitive impairment  Assessment & Plan  - appreciate OT cognitive evaluation, scored 13/30 on MOCA- recommend no driving or living alone  - Discussed with geriatrics' possible polypharmacy contributing due to high doses of gabapentin and Effexor. Geriatrics recommends decreasing gabapentin dose to 700 mg twice daily and Effexor to 75 mg twice daily  - recommend f/u with PCP outpatient for monitoring and further adjustment as needed    ABLA (acute blood loss anemia)  Assessment & Plan  - Hypotensive on admission secondary to hematoma  - transfused 1 U PRBC with improvement in hemodynamics  - Hgb stable now. No signs of active bleeding.     Fall  Assessment & Plan  Pt sustained a mechanical fall onto her buttocks and back. Imaging showed 10.3 x 5.7 x 9.2 cm hematoma right posterolateral thoracoabdominal wall.  - See hematoma plan  - PT/OT eval and treat. Recommending rehab.   - d/c to inpatient rehab today at [facility]    Hypothyroidism  Assessment & Plan  -Continue home levothyroxine 125mcg    CKD (chronic kidney disease)  Assessment & Plan  Lab Results   Component Value Date    EGFR 38 01/20/2025    EGFR 35 01/19/2025    EGFR 39 01/18/2025    CREATININE 1.29 01/20/2025    CREATININE 1.38 (H) 01/19/2025    CREATININE 1.27 01/18/2025   - baseline Cr 1.1-1.3  - Cr at baseline  - f/u with PCP  - avoid nephrotoxins    Depression  Assessment & Plan  - continue home Effexor    Chronic combined systolic and diastolic heart failure (HCC)  Assessment & Plan  Wt Readings from Last 3 Encounters:   01/17/25 93.7 kg (206 lb 9.1 oz)  "  07/28/24 86.2 kg (190 lb)   07/25/24 91 kg (200 lb 9.9 oz)     - Euvolemic without signs of volume overload  - not on chronic diuretics  - f/u with PCP/Cardiology        Stage 4 chronic kidney disease (HCC)  Assessment & Plan  Lab Results   Component Value Date    EGFR 38 01/20/2025    EGFR 35 01/19/2025    EGFR 39 01/18/2025    CREATININE 1.29 01/20/2025    CREATININE 1.38 (H) 01/19/2025    CREATININE 1.27 01/18/2025     Plan:  -Monitor renal function  - Avoid nephrotoxic agents    Type 2 diabetes mellitus, without long-term current use of insulin ([facility])  Assessment & Plan  Lab Results   Component Value Date    HGBA1C 6.0 (H) 01/18/2025       Recent Labs     01/20/25  1733 01/20/25  2358 01/21/25  0514 01/21/25  1135   POCGLU 84 108 85 160*         Blood Sugar Average: Last 72 hrs:  (P) 120.1778887213527023    - Monitor blood sugar  - SSI  - resume home regimen on discharge    Hyperlipidemia  Assessment & Plan  - Continue home pravastatin 10mg    Atrial fibrillation (HCC)  Assessment & Plan  Currently takes Metoprolol 25mg qd, eliquis 5mg BID.  On half dose of metoprolol due to lower BPs this admission. Recommend f/u with PCP outpatient for adjustment as needed.     * R posterior thoracoabdominal wall subcutaneous hematoma  Assessment & Plan  Pt sustained a mechanical fall onto her buttocks and back. Imaging showed 10.3 x 5.7 x 9.2 cm hematoma right posterolateral thoracoabdominal wall. During her time in the ED her hematoma continued to expand. Kcentra was given to reverse her eliquis. She became hypotensive at one point for which she was given 1u on blood. Abdominal binder applied to tamponade.   - No signs of skin breakdown  - H/H stable. Eliquis has been resumed.   - F/u with trauma in 2 weeks for hematoma check        HPI: As documented by Dr. Walton who evaluated the patient on admission, \"Laura Sarkar is a 79 y.o. female who presents s/p fall. Earlier this evening Pt tripped and fell onto her back and " "buttocks. Her daughter was their and witnessed the fall. Denies head strike, LOC, N/V, abdominal pain. When EMS arrived and attempted to help her up she had a syncopal episode. Endorses left leg pain. \"    Procedures Performed: No orders of the defined types were placed in this encounter.      Summary of Hospital Course:  Patient was placed on the trauma service following fall with flank hematoma. She takes Eliquis for a h/o A. Fib. She was found to be hypotensive. A compression dressing was applied to her hematoma in the ED and her Eliquis was reversed with K-Centra. She was transfused 1 U PRBC. She had improvement in her BP. She had no further signs of ongoing bleeding. Her home metoprolol dose was reduced to 12.5 mg daily from 25 mg due to lower BPs which she was asymptomatic with . She was monitored in the ICU and transferred out after she showed no further signs of bleeding . Her pain was controlled with a multi modal regimen. She worked with PT/OT who recommended discharge to inpatient rehab. She underwent cognitive testing and scored 13/30 on MOCA. She is recommended no driving and not to live alone. Dosages of her home gabapentin and Effexor were decreased as stated above in the event this is contributing to her cognition. She is recommended to f/u with her PCP upon discharge to discuss medication dose adjustments as stated. Her Eliquis was resumed and Hgb stable. She is to f/u with trauma in 2 weeks for hematoma check.     Significant Findings, Care, Treatment and Services Provided:   XR ankle 3+ views LEFT  Result Date: 1/18/2025  Impression: No acute osseous abnormality. Degenerative changes as above. Computerized Assisted Algorithm (CAA) may have been used to analyze all applicable images. Resident: Violet Kelley I, the attending radiologist, have reviewed the images and agree with the final report above. Workstation performed: [id number]     XR tibia fibula 2 views LEFT  Result Date: " 1/18/2025  Impression: No acute osseous abnormality. Degenerative changes as above. Computerized Assisted Algorithm (CAA) may have been used to analyze all applicable images. Resident: Violet Kelley I, the attending radiologist, have reviewed the images and agree with the final report above. Workstation performed: [id number]     CT chest abdomen pelvis w contrast  Result Date: 1/17/2025  Impression: 1. 10.3 x 5.7 x 9.2 cm hematoma right posterolateral thoracoabdominal wall with foci of increased density consistent with active bleeding. There is moderate edema and/or contusion of the adjacent chest/abdominal wall 2. No acute solid organ injury. 3. Mild pulmonary fibrosis. 4. Indeterminate 9 mm right hepatic lobe hypodensity too small to characterize. Additional incidental findings as above. I personally discussed this study with Dr. Connolly from trauma surgery on 1/17/2025 at 9:47 PM as I could not reach the ordering physician by phone after >10 minutes and multiple attempts. I was also finally able to reach Dr. SORAIDA WISE regarding this finding at 9:50 PM. Workstation performed: [id number]     CT cervical spine without contrast  Result Date: 1/17/2025  Impression: No cervical spine fracture or traumatic malalignment. Workstation performed: [id number]     CT head without contrast  Result Date: 1/17/2025  Impression: No acute intracranial abnormality. Workstation performed: [id number]         Complications: none    Condition at Discharge: good       Discharge instructions/Information to patient and family:   See After Visit Summary (AVS) for information provided to patient and family.      Provisions for Follow-Up Care:  See after visit summary for information related to follow-up care and any pertinent home health orders.      PCP: Kalpana Siegel DO    Disposition: See After Visit Summary for discharge disposition information.    Planned Readmission: No     Discharge Medications:  See after visit  summary for reconciled discharge medications provided to patient and family.      Discharge Statement:  I have spent a total time of 25 minutes in caring for this patient on the day of the visit/encounter. .

## 2025-01-21 NOTE — INCIDENTAL FINDINGS
The following findings require follow up:  Radiographic finding   Finding: Mild bilateral reticular thickening and scattered groundglass density may reflect mild pulmonary fibrosis. No honeycombing.     9 mm right hepatic lobe hypodensity is too small to characterize, indeterminate     Calcified splenic granuloma.     Mild fatty replacement of the pancreas without acute findings.     Tiny fat-containing umbilical hernia     1.7 cm sclerotic focus right S1 segment, nonspecific but statistically most likely to represent small bone island(s). Additional small bone islands may be present within the left iliac bone and T6 vertebral body.    Follow up required: family doctor   Follow up should be done within 2 week(s)      Incidental finding results were discussed with the Patient's POA by Emmy Del Rosario PA-C on 01/21/25.   They expressed understanding and all questions answered.

## 2025-01-21 NOTE — OCCUPATIONAL THERAPY NOTE
"  Occupational Therapy Progress Note     Patient Name: Laura Sarkar  Today's Date: 1/21/2025  Problem List  Principal Problem:    R posterior thoracoabdominal wall subcutaneous hematoma  Active Problems:    Atrial fibrillation (HCC)    Hyperlipidemia    Type 2 diabetes mellitus, without long-term current use of insulin (HCC)    Stage 4 chronic kidney disease (HCC)    Chronic combined systolic and diastolic heart failure (HCC)    Depression    CKD (chronic kidney disease)    Hypothyroidism    Fall    ABLA (acute blood loss anemia)    Cognitive impairment    Encounter for medication review    B12 deficiency        01/21/25 1011   OT Last Visit   OT Visit Date 01/21/25   Note Type   Note Type Treatment for insurance authorization   Pain Assessment   Pain Assessment Tool 0-10   Pain Score No Pain  (at rest)   Restrictions/Precautions   Weight Bearing Precautions Per Order No   Other Precautions Cognitive;Chair Alarm;Bed Alarm;Multiple lines;Fall Risk   Lifestyle   Autonomy PTA pt living alone in 2SH with FFSU, pt (I) with ADLs and IADLs, (+)falls, (-)drives, use of RW at baseline   Reciprocal Relationships supportive niece + sister   Service to Others reports that she works full time as an    Intrinsic Gratification working, \"I really like tax season\"   ADL   Where Assessed Edge of bed   Eating Assistance 7  Independent   Eating Deficit Setup;Beverage management   Eating Comments from bedside table   Toileting Assistance  2  Maximal Assistance   Toileting Deficit Setup;Steadying;Verbal cueing;Supervison/safety;Bedside commode;Perineal hygiene   Toileting Comments grossly incontinent of bladder upoon arrival, max A for hygiene in stance. Additional BM on BSC. continues to require Max A for hygiene   Functional Standing Tolerance   Time 1 minute   Activity hygiene s/p incontinence   Comments min A  x 1 steadying in stance w/ RW   Bed Mobility   Supine to Sit 4  Minimal assistance   Additional items Assist x " "1;HOB elevated;Bedrails;Increased time required;Verbal cues;LE management  (A to advance hips to EOB)   Additional Comments Able to maintain sitting EOB w/ S; OOB to recliner at end of session   Transfers   Sit to Stand 3  Moderate assistance   Additional items Assist x 1;Increased time required;Verbal cues   Stand to Sit 3  Moderate assistance   Additional items Assist x 1;Increased time required;Verbal cues   Stand pivot 4  Minimal assistance   Additional items Assist x 1;Increased time required;Verbal cues  (from EOB>recliner to pt L)   Additional Comments to RW from EOB, progressing to min A x 1 for additional STS from recliner and BSC   Functional Mobility   Functional Mobility 4  Minimal assistance   Additional Comments Few steps from recliner<>BSC w/ RW and min A x 1. Limited by fatigue.   Additional items Rolling walker   Toilet Transfers   Toilet Transfer From   (recliner)   Toilet Transfer Type To and from   Toilet Transfer to Standard bedside commode   Toilet Transfer Technique Ambulating   Toilet Transfers Minimal assistance;Verbal cues   Toilet Transfers Comments Few steps from EOB<>BSC w/ RW and min A x 1   Subjective   Subjective \"Oh gosh I have no idea\"   Cognition   Overall Cognitive Status (S)  Impaired   Arousal/Participation Alert;Cooperative   Attention Attends with cues to redirect   Orientation Level Oriented to person;Oriented to place;Oriented to time  (grossly oriented to month/year)   Memory Decreased short term memory;Decreased recall of recent events;Decreased recall of precautions   Following Commands Follows one step commands without difficulty   Comments Pleasant and agreeable. Pt continues to be poor historian, VC for initiation, sequencing and safety during basic ADLs. Trauma AP Emmy notified of inability to recognize incontinence and sensation for need to have BM. See MoCA below for scoring and recommendations.   Cognition Assessment Tools (S)  MOCA   Score (S)  13   Activity " Tolerance   Activity Tolerance Patient limited by fatigue  (cognition)   Medical Staff Made Aware Spoke to ALE Zarate, Trauma AP Emmy   Assessment   Assessment Pt seen for OT treatment on 1/21 s/p admit to [facility] w/ Subcutaneous hematoma. Pt agreeable to OT treatment session, upon arrival patient was found supine in bed. Patient participated in skilled OT interventions to address ADL tasks, functional transfers, and overall activity tolerance and participation. In comparison to previous session, Laura demonstrated improvements in overall performance of ADLs including toileting, ADL transfer and mobility short distances requiring significantly less physical A but is continuing to perform below baseline due to impaired cognition, decreased activity tolerance, balance, functional reach and independence w/ ADLs/mobility. Pt engaged in MoCA this session indicating moderate cognitive impairment. Personal factors that continue to impact DC include: current habits and behavioral patterns, limited social support, inaccessible home environment, inaccessible bathroom environment, difficulty completing ADLs, and difficulty completing IADLs. Patient to benefit from continued IPOT treatment to address deficits as defined above and maximize level of functional independence with ADLs and functional mobility. Goals remain appropriate. Continue per POC.   Plan   Treatment Interventions ADL retraining;Functional transfer training;Endurance training;Cognitive reorientation;Patient/family training;Equipment evaluation/education;Compensatory technique education;Continued evaluation;Energy conservation;Activityengagement   Goal Expiration Date 01/29/25   OT Treatment Day 1   OT Frequency 3-5x/wk   Discharge Recommendation   Rehab Resource Intensity Level, OT II (Moderate Resource Intensity)   Additional Comments  The patient's raw score on the AM-PAC Daily Activity Inpatient Short Form is 16. A raw score of less than 19 suggests the  "patient may benefit from discharge to post-acute rehabilitation services. Please refer to the recommendation of the Occupational Therapist for safe discharge planning.   AM-PAC Daily Activity Inpatient   Lower Body Dressing 2   Bathing 2   Toileting 2   Upper Body Dressing 3   Grooming 3   Eating 4   Daily Activity Raw Score 16   Daily Activity Standardized Score (Calc for Raw Score >=11) 35.96   AM-PAC Applied Cognition Inpatient   Following a Speech/Presentation 3   Understanding Ordinary Conversation 4   Taking Medications 2   Remembering Where Things Are Placed or Put Away 3   Remembering List of 4-5 Errands 2   Taking Care of Complicated Tasks 2   Applied Cognition Raw Score 16   Applied Cognition Standardized Score 35.03   MOCA   Version 8.1   Visuopatial/Executive 1  (despite instruction pt did not start at A and mylene additional line connecting E-A; missing single line for cube draw; did not label all numbers, hands drawn to 11, 12, and 2)   Naming 2  (\"hippo\")   Memory 0  (2/5 first trial; 4/5 second trial)   Attention: Digits 2   Attention: Letters 1   Attention: Serial 2  (93, 84, 73, 60, 53)   Language: Repeat 1   Language: Fluency (S)  0  (able to state 4 total words; states \"where is that mind today\")   Abstraction 2   Delayed Recall 0  (x 4 multiple choice selections and x 1 category cue)   Orientation 2  (Jan 20, 2024; \"[place]\" for city)   Does patient have less than or equal to 12 years of education? 0   MOCA Total Score 13   MOCA Comments Assessment completed in quiet, distraction free environment w/ adequate lighting. Pt reports not having her reading glasses present however confirmed ability to see text accurately. Pt confirms comprehension of multistep commands however during assessment (nikolay. During visuospatial/executive tasks), pt did not complete all steps of command indicating poor processing and recall. Pt noted to largely be limited by working memory, instant recall and delayed recall " during items. Pt required encouragement to continue task, at times limited by impaired sustained attention during items. Pt scored a 13/30 on this assessment indicating moderate cognitive impairment. Pt at this level are recommended f/u w/ OPOT for continued cognitive assessment and participation in fitness to drive evaluation.   End of Consult   Patient Position at End of Consult Bedside chair;Bed/Chair alarm activated;All needs within reach   Nurse Communication Nurse aware of consult     GOALS:      -Patient will perform grooming tasks sitting in chair with overall Mod I in order to increase overall independence     -Patient will be Mod I with UB dressing using AE and AD as needed in order to increase (I) with ADLs     -Patient will be Mod I with UB bathing using AE and AD as needed in order to increase (I) with ADLs     -Patient will be Mod A  with LB dressing with use of AE and AD as needed in order to increase (I) with ADLs     -Patient will be Mod A  with LB bathing with use of AE and AD as needed in order to increase (I) with ADLs     -Patient will complete toileting w/ Mod A  w/ G hygiene/thoroughness in order to reduce caregiver burden (PROGRESSING)     -Patient will demonstrate Mod A x 1 with bed mobility for ability to manage own comfort and initiate OOB tasks.  (MET: upgrade to S)     -Patient will perform functional transfers with Mod A x 1 to/from all surfaces using DME as needed in order to increase (I) with functional tasks (MET: upgrade to S)     -Patient will be Mod A x 1 with functional mobility to/from BSC for increased independence with toileting tasks (MET: upgrade to S to bathroom)     -Patient will tolerate therapeutic activities for greater than 30 min, in order to increase tolerance for functional activities. (PROGRESSING)     -Patient will engage in ongoing cognitive assessment in order to assist with safe discharge planning/recommendations. (MoCA completed)     -Patient will follow  multi-step instructions with no VC in order to safely complete functional tasks (PROGRESSING)     -Patient will demonstrate standing for 3 min in order to increase active participation in functional activities (PROGRESSING)     CRISTY Sun, OTR/L  [place] License [id number]  [place] License [id number]

## 2025-01-21 NOTE — ASSESSMENT & PLAN NOTE
Pt sustained a mechanical fall onto her buttocks and back. Imaging showed 10.3 x 5.7 x 9.2 cm hematoma right posterolateral thoracoabdominal wall.  - See hematoma plan  - PT/OT eval and treat. Recommending rehab.   - d/c to inpatient rehab today at [facility]

## 2025-01-21 NOTE — CASE MANAGEMENT
Case Management Discharge Planning Note    Patient name Laura Sarkar  Location W /W -01 MRN [id number]  : 1945 Date 2025       Current Admission Date: 2025  Current Admission Diagnosis:R posterior thoracoabdominal wall subcutaneous hematoma   Patient Active Problem List    Diagnosis Date Noted Date Diagnosed    B12 deficiency 2025     Cognitive impairment 2025     Encounter for medication review 2025     Fall 2025     R posterior thoracoabdominal wall subcutaneous hematoma 2025     ABLA (acute blood loss anemia) 2025     Ambulatory dysfunction 2024     Skin ulcer, limited to breakdown of skin ([facility]) 2024     Secondary hyperparathyroidism of renal origin ([facility]) 2024     Basal cell carcinoma (BCC) of forehead 10/23/2023     Primary osteoarthritis of both shoulders 02/10/2023     Nonischemic cardiomyopathy ([facility]) 2022     Abnormal SPEP 2022     Pulmonary fibrosis ([facility]) 2022     Hypothyroidism 2022     CKD (chronic kidney disease) 2022     Left knee pain 2022     Torn rotator cuff 2022     Left bundle branch block (LBBB) 2022     Moderate mitral regurgitation 2022     DNR (do not resuscitate) 2022     Depression 2021     Chronic combined systolic and diastolic heart failure ([facility]) 2021     Elevated troponin 2021     Stage 4 chronic kidney disease ([facility]) 2021     Type 2 diabetes mellitus, without long-term current use of insulin ([facility]) 2019     Diabetic peripheral neuropathy ([facility]) 2018     Class 3 severe obesity in adult ([facility]) 2018     Primary osteoarthritis of both knees 2018     Aortic stenosis 2018     Hyperlipidemia 10/04/2017     Atrial fibrillation ([facility]) 2015       LOS (days): 4  Geometric Mean LOS (GMLOS) (days): 2.6  Days to GMLOS:-1.1     OBJECTIVE:  Risk of Unplanned Readmission Score: 17.13         Current  admission status: Inpatient   Preferred Pharmacy:   [id number] - [place] - [address]  [address] [place] [address]  Phone: [phone] Fax: [phone]    Primary Care Provider: Kalpana Siegel DO    Primary Insurance: [employer]  REP  Secondary Insurance:     DISCHARGE DETAILS:    Transport at Discharge : [facility]     Number/Name of Dispatcher: Roundtrip  Transported by (Company and Unit #): [facility]  ETA of Transport (Date): 01/21/25  ETA of Transport (Time): 1900     Additional Comments: Per Support Team, auth was approved for [facility]. S transport confirmed with a 7:00 PM pickup via [facility], pt's granddaughter, RN, [place] [place], and [facility] aware of transportation time. CMN in paper chart.    Accepting Facility Name, City & State : [facility]  Receiving Facility/Agency Phone Number: [phone]  Facility/Agency Fax Number: [phone]

## 2025-01-21 NOTE — PLAN OF CARE
Problem: Potential for Falls  Goal: Patient will remain free of falls  Description: INTERVENTIONS:  - Educate patient/family on patient safety including physical limitations  - Instruct patient to call for assistance with activity   - Consult OT/PT to assist with strengthening/mobility   - Keep Call bell within reach  - Keep bed low and locked with side rails adjusted as appropriate  - Keep care items and personal belongings within reach  - Initiate and maintain comfort rounds  - Make Fall Risk Sign visible to staff  - Offer Toileting every  Hours, in advance of need  - Initiate/Maintain alarm  - Obtain necessary fall risk management equipment:  - Apply yellow socks and bracelet for high fall risk patients  - Consider moving patient to room near nurses station  Outcome: Progressing     Problem: Nutrition/Hydration-ADULT  Goal: Nutrient/Hydration intake appropriate for improving, restoring or maintaining nutritional needs  Description: Monitor and assess patient's nutrition/hydration status for malnutrition. Collaborate with interdisciplinary team and initiate plan and interventions as ordered.  Monitor patient's weight and dietary intake as ordered or per policy. Utilize nutrition screening tool and intervene as necessary. Determine patient's food preferences and provide high-protein, high-caloric foods as appropriate.     INTERVENTIONS:  - Monitor oral intake, urinary output, labs, and treatment plans  - Assess nutrition and hydration status and recommend course of action  - Evaluate amount of meals eaten  - Assist patient with eating if necessary   - Allow adequate time for meals  - Recommend/ encourage appropriate diets, oral nutritional supplements, and vitamin/mineral supplements  - Order, calculate, and assess calorie counts as needed  - Recommend, monitor, and adjust tube feedings and TPN/PPN based on assessed needs  - Assess need for intravenous fluids  - Provide specific nutrition/hydration education as  appropriate  - Include patient/family/caregiver in decisions related to nutrition  Outcome: Progressing     Problem: Prexisting or High Potential for Compromised Skin Integrity  Goal: Skin integrity is maintained or improved  Description: INTERVENTIONS:  - Identify patients at risk for skin breakdown  - Assess and monitor skin integrity  - Assess and monitor nutrition and hydration status  - Monitor labs   - Assess for incontinence   - Turn and reposition patient  - Assist with mobility/ambulation  - Relieve pressure over bony prominences  - Avoid friction and shearing  - Provide appropriate hygiene as needed including keeping skin clean and dry  - Evaluate need for skin moisturizer/barrier cream  - Collaborate with interdisciplinary team   - Patient/family teaching  - Consider wound care consult   Outcome: Progressing

## 2025-01-21 NOTE — CASE MANAGEMENT
[place] [facility] received request for authorization from Care Manager.  Authorization request submitted for: SNF  Facility Name:  [facility] NPI: [id number]  Facility MD:  Dr. Idalia Nichols  NPI: [id number]  Authorization initiated by contacting insurance:  humana   Via: [employer] Portal   Clinicals submitted via [employer] attachment   Pending Reference #: [id number]     Care Manager notified: nataliia moore     Updates to authorization status will be noted in chart. Please reach out to CM for updates on any clinical information.

## 2025-01-21 NOTE — ASSESSMENT & PLAN NOTE
- Hypotensive on admission secondary to hematoma  - transfused 1 U PRBC with improvement in hemodynamics  - Hgb stable now. No signs of active bleeding.

## 2025-01-21 NOTE — PROGRESS NOTES
Progress Note - Trauma   Name: Laura Sarkar 79 y.o. female I MRN: [id number]  Unit/Bed#: W -01 I Date of Admission: 1/17/2025   Date of Service: 1/21/2025 I Hospital Day: 4    Assessment & Plan  Fall  Pt sustained a mechanical fall onto her buttocks and back. Imaging showed 10.3 x 5.7 x 9.2 cm hematoma right posterolateral thoracoabdominal wall.    Plan:  - See hematoma plan  - PT/OT eval and treat  Atrial fibrillation (HCC)  Currently takes Metoprolol 25mg qd, eliquis 5mg BID.    Plan:  -Monitor on tele  - Currently giving Metoprolol 12.5 mg due to decrease in pressure  Hyperlipidemia  Plan:  - Continue home pravastatin 10mg  Type 2 diabetes mellitus, without long-term current use of insulin ([facility])  Lab Results   Component Value Date    HGBA1C 6.0 (H) 01/18/2025       Recent Labs     01/20/25  1733 01/20/25  2358 01/21/25  0514 01/21/25  1135   POCGLU 84 108 85 160*       Blood Sugar Average: Last 72 hrs:  (P) [id number]    Plan:  - Monitor blood sugar  - SSI  Stage 4 chronic kidney disease (HCC)  Lab Results   Component Value Date    EGFR 38 01/20/2025    EGFR 35 01/19/2025    EGFR 39 01/18/2025    CREATININE 1.29 01/20/2025    CREATININE 1.38 (H) 01/19/2025    CREATININE 1.27 01/18/2025     Plan:  -Monitor renal function  - Avoid nephrotoxic agents  Chronic combined systolic and diastolic heart failure (HCC)  Wt Readings from Last 3 Encounters:   01/17/25 93.7 kg (206 lb 9.1 oz)   07/28/24 86.2 kg (190 lb)   07/25/24 91 kg (200 lb 9.9 oz)     Plan:   - Monitor for fluid overload  - Hold diuretics in the setting of hypotension      Hypothyroidism  Plan:  -Continue home levothyroxine 125mcg  R posterior thoracoabdominal wall subcutaneous hematoma  Pt sustained a mechanical fall onto her buttocks and back. Imaging showed 10.3 x 5.7 x 9.2 cm hematoma right posterolateral thoracoabdominal wall. During her time in the ED her hematoma continued to expand. Kcentra was given to reverse her eliquis. She  became hypotensive at one point for which she was given 1u on blood. Abdominal binder applied to tamponade.     Plan:  - Monitor skin for skin breakdown  - Monitor vital signs  - Trend H/H -hemoglobin 9.4 as of January 19 and trending down to 7.7 on January 20, confirmed by H&H of 7.5  - If hematoma continues to expand consider evacuating the hematoma  ABLA (acute blood loss anemia)  - Hemoglobin dropped from 9 on January 19th to 7.6 on the January 20th  - Confirmed by H and H of 7.5  - Afternoon Hemoglobin of 8.2   - Continue to monitor     Bowel Regimen: Miralax and Sennakot S  VTE Prophylaxis:VTE covered by:  apixaban, Oral, 5 mg at 01/21/25 0857         Disposition: Coordinating placement with case management  Please contact the SecureChat role for the Trauma service with any questions/concerns.    24 Hour Events : no overnight events reported  Subjective :  Patient pleasant and without complaint. Unsure of last BM. Denies nausea, vomiting, abdominal pain.     Objective :  Temp:  [97 °F (36.1 °C)-98.6 °F (37 °C)] 97 °F (36.1 °C)  HR:  [78-83] 80  BP: ()/(52-63) 108/55  Resp:  [16-17] 16  SpO2:  [91 %-99 %] 99 %  O2 Device: None (Room air)    I/O         01/19 0701  01/20 0700 01/20 0701  01/21 0700 01/21 0701  01/22 0700    P.O. 240 0     IV Piggyback 630      Total Intake(mL/kg) 870 (9.3) 0 (0)     Urine (mL/kg/hr)       Total Output       Net +870 0            Unmeasured Urine Occurrence 1 x 1 x     Unmeasured Stool Occurrence  1 x             Physical Exam  Vitals and nursing note reviewed.   Constitutional:       General: She is not in acute distress.     Appearance: She is not ill-appearing.   HENT:      Head: Normocephalic and atraumatic.   Eyes:      Pupils: Pupils are equal, round, and reactive to light.   Cardiovascular:      Rate and Rhythm: Normal rate and regular rhythm.   Pulmonary:      Effort: Pulmonary effort is normal.   Abdominal:      General: Abdomen is flat.   Musculoskeletal:          General: No swelling or tenderness.   Skin:     General: Skin is warm and dry.   Neurological:      General: No focal deficit present.      Mental Status: Mental status is at baseline.   Psychiatric:         Mood and Affect: Mood normal.         Behavior: Behavior normal.              Lab Results: I have reviewed the following results:  Recent Labs     01/19/25  0525 01/20/25  0534 01/20/25  0633 01/21/25  1008   WBC 10.81* 8.65   < > 7.22   HGB 9.1* 7.8*   < > 8.3*   HCT 27.9* 25.0*   < > 26.3*    136*   < > 155   SODIUM 140 139  --   --    K 4.6 4.1  --   --     109*  --   --    CO2 27 26  --   --    BUN 30* 33*  --   --    CREATININE 1.38* 1.29  --   --    GLUC 138 103  --   --    CAIONIZED 1.13  --   --   --    MG 1.6* 2.0  --   --    PHOS 2.7  --   --   --     < > = values in this interval not displayed.

## 2025-01-21 NOTE — PLAN OF CARE
Problem: OCCUPATIONAL THERAPY ADULT  Goal: Performs self-care activities at highest level of function for planned discharge setting.  See evaluation for individualized goals.  Description: Treatment Interventions: ADL retraining, Functional transfer training, Endurance training, Cognitive reorientation, Patient/family training, Equipment evaluation/education, Compensatory technique education, Energy conservation, Activityengagement          See flowsheet documentation for full assessment, interventions and recommendations.   Outcome: Progressing  Note: Limitation: Decreased ADL status, Decreased Safe judgement during ADL, Decreased cognition, Decreased endurance, Decreased self-care trans, Decreased high-level ADLs (impaired balance, fxnl mobility, act mariah, fxnl reach, standing mariah, strength, attention to task, direction following, safety awareness, insight, pacing, problem solving, learning new tasks, response time)  Prognosis: Good  Assessment: Pt seen for OT treatment on 1/21 s/p admit to [facility] w/ Subcutaneous hematoma. Pt agreeable to OT treatment session, upon arrival patient was found supine in bed. Patient participated in skilled OT interventions to address ADL tasks, functional transfers, and overall activity tolerance and participation. In comparison to previous session, Laura demonstrated improvements in overall performance of ADLs including toileting, ADL transfer and mobility short distances requiring significantly less physical A but is continuing to perform below baseline due to impaired cognition, decreased activity tolerance, balance, functional reach and independence w/ ADLs/mobility. Pt engaged in MoCA this session indicating moderate cognitive impairment. Personal factors that continue to impact DC include: current habits and behavioral patterns, limited social support, inaccessible home environment, inaccessible bathroom environment, difficulty completing ADLs, and difficulty completing IADLs.  Patient to benefit from continued IPOT treatment to address deficits as defined above and maximize level of functional independence with ADLs and functional mobility. Goals remain appropriate. Continue per POC.     Rehab Resource Intensity Level, OT: II (Moderate Resource Intensity)     CRISTY Sun, OTR/L  [place] License [id number]  [place] License [id number]

## 2025-01-21 NOTE — CASE MANAGEMENT
[facility] has received APPROVED authorization.  Insurance:   [employer]   Auth obtained via Insurance Rep: jose P#[phone]  Authorization received for: SNF  Facility: [facility]   Authorization #:[id number]   Start of Care:1/21  Next Review Date:1/24  Continued Stay Care Coordinator:  hiram LEE#: [phone]  Submit next review to: fax [phone]     Care Manager notified:nataliia moore     Please reach out to CM for updates on any clinical information.

## 2025-01-21 NOTE — ASSESSMENT & PLAN NOTE
Wt Readings from Last 3 Encounters:   01/17/25 93.7 kg (206 lb 9.1 oz)   07/28/24 86.2 kg (190 lb)   07/25/24 91 kg (200 lb 9.9 oz)     - Euvolemic without signs of volume overload  - not on chronic diuretics  - f/u with PCP/Cardiology

## 2025-01-21 NOTE — ASSESSMENT & PLAN NOTE
Pt sustained a mechanical fall onto her buttocks and back. Imaging showed 10.3 x 5.7 x 9.2 cm hematoma right posterolateral thoracoabdominal wall. During her time in the ED her hematoma continued to expand. Kcentra was given to reverse her eliquis. She became hypotensive at one point for which she was given 1u on blood. Abdominal binder applied to tamponade.   - No signs of skin breakdown  - H/H stable. Eliquis has been resumed.   - F/u with trauma in 2 weeks for hematoma check

## 2025-01-21 NOTE — CASE MANAGEMENT
Case Management Discharge Planning Note    Patient name Laura Sarkar  Location W /W -01 MRN [id number]  : 1945 Date 2025       Current Admission Date: 2025  Current Admission Diagnosis:R posterior thoracoabdominal wall subcutaneous hematoma   Patient Active Problem List    Diagnosis Date Noted Date Diagnosed    B12 deficiency 2025     Cognitive impairment 2025     Encounter for medication review 2025     Fall 2025     R posterior thoracoabdominal wall subcutaneous hematoma 2025     ABLA (acute blood loss anemia) 2025     Ambulatory dysfunction 2024     Skin ulcer, limited to breakdown of skin ([facility]) 2024     Secondary hyperparathyroidism of renal origin ([facility]) 2024     Basal cell carcinoma (BCC) of forehead 10/23/2023     Primary osteoarthritis of both shoulders 02/10/2023     Nonischemic cardiomyopathy ([facility]) 2022     Abnormal SPEP 2022     Pulmonary fibrosis ([facility]) 2022     Hypothyroidism 2022     CKD (chronic kidney disease) 2022     Left knee pain 2022     Torn rotator cuff 2022     Left bundle branch block (LBBB) 2022     Moderate mitral regurgitation 2022     DNR (do not resuscitate) 2022     Depression 2021     Chronic combined systolic and diastolic heart failure ([facility]) 2021     Elevated troponin 2021     Stage 4 chronic kidney disease ([facility]) 2021     Type 2 diabetes mellitus, without long-term current use of insulin ([facility]) 2019     Diabetic peripheral neuropathy ([facility]) 2018     Class 3 severe obesity in adult ([facility]) 2018     Primary osteoarthritis of both knees 2018     Aortic stenosis 2018     Hyperlipidemia 10/04/2017     Atrial fibrillation ([facility]) 2015       LOS (days): 4  Geometric Mean LOS (GMLOS) (days): 2.6  Days to GMLOS:-1     OBJECTIVE:  Risk of Unplanned Readmission Score: 17.13         Current  admission status: Inpatient   Preferred Pharmacy:   [id number] - [place], [place] - [id number] [facility] [address]  Phone: [phone] Fax: [phone]    Primary Care Provider: Kalpana Siegel DO    Primary Insurance: [employer] REP  Secondary Insurance:     DISCHARGE DETAILS:    Additional Comments: Auth request sent to Support Team via BRYAN Guerrero department to follow for determination.

## 2025-01-21 NOTE — PLAN OF CARE
Problem: Potential for Falls  Goal: Patient will remain free of falls  Description: INTERVENTIONS:  - Educate patient/family on patient safety including physical limitations  - Instruct patient to call for assistance with activity   - Consult OT/PT to assist with strengthening/mobility   - Keep Call bell within reach  - Keep bed low and locked with side rails adjusted as appropriate  - Keep care items and personal belongings within reach  - Initiate and maintain comfort rounds  - Make Fall Risk Sign visible to staff  - Offer Toileting every 2 Hours, in advance of need  - Initiate/Maintain bed alarm  - Obtain necessary fall risk management equipment:   - Apply yellow socks and bracelet for high fall risk patients  - Consider moving patient to room near nurses station  Outcome: Progressing     Problem: Nutrition/Hydration-ADULT  Goal: Nutrient/Hydration intake appropriate for improving, restoring or maintaining nutritional needs  Description: Monitor and assess patient's nutrition/hydration status for malnutrition. Collaborate with interdisciplinary team and initiate plan and interventions as ordered.  Monitor patient's weight and dietary intake as ordered or per policy. Utilize nutrition screening tool and intervene as necessary. Determine patient's food preferences and provide high-protein, high-caloric foods as appropriate.     INTERVENTIONS:  - Monitor oral intake, urinary output, labs, and treatment plans  - Assess nutrition and hydration status and recommend course of action  - Evaluate amount of meals eaten  - Assist patient with eating if necessary   - Allow adequate time for meals  - Recommend/ encourage appropriate diets, oral nutritional supplements, and vitamin/mineral supplements  - Order, calculate, and assess calorie counts as needed  - Recommend, monitor, and adjust tube feedings and TPN/PPN based on assessed needs  - Assess need for intravenous fluids  - Provide specific nutrition/hydration  education as appropriate  - Include patient/family/caregiver in decisions related to nutrition  Outcome: Progressing     Problem: Prexisting or High Potential for Compromised Skin Integrity  Goal: Skin integrity is maintained or improved  Description: INTERVENTIONS:  - Identify patients at risk for skin breakdown  - Assess and monitor skin integrity  - Assess and monitor nutrition and hydration status  - Monitor labs   - Assess for incontinence   - Turn and reposition patient  - Assist with mobility/ambulation  - Relieve pressure over bony prominences  - Avoid friction and shearing  - Provide appropriate hygiene as needed including keeping skin clean and dry  - Evaluate need for skin moisturizer/barrier cream  - Collaborate with interdisciplinary team   - Patient/family teaching  - Consider wound care consult   Outcome: Progressing

## 2025-01-21 NOTE — ASSESSMENT & PLAN NOTE
Lab Results   Component Value Date    HGBA1C 6.0 (H) 01/18/2025       Recent Labs     01/20/25  1144 01/20/25  1733 01/20/25  2358 01/21/25  0514   POCGLU 169* 84 108 85       Blood Sugar Average: Last 72 hrs:  (P) [id number]

## 2025-01-21 NOTE — PROGRESS NOTES
Patient:    MRN:  [id number]    Gonz[place] Request ID:  [id number]    Level of care reserved:  Skilled Nursing Facility    Partner Reserved:  [facility], [place] [id number] [phone]    Clinical needs requested:    Geography searched:  15 miles around [address]    Start of Service:    Request sent:  4:54pm EST on 1/19/2025 by Sherita Prajapati    Partner reserved:  9:33am EST on 1/21/2025 by Doris Jesus    Choice list shared:  5:56pm EST on 1/20/2025 by Sherita Prajapati

## 2025-01-21 NOTE — ASSESSMENT & PLAN NOTE
Lab Results   Component Value Date    EGFR 38 01/20/2025    EGFR 35 01/19/2025    EGFR 39 01/18/2025    CREATININE 1.29 01/20/2025    CREATININE 1.38 (H) 01/19/2025    CREATININE 1.27 01/18/2025   - baseline Cr 1.1-1.3  - Cr at baseline  - f/u with PCP  - avoid nephrotoxins

## 2025-01-22 ENCOUNTER — PATIENT OUTREACH (OUTPATIENT)
Dept: CASE MANAGEMENT | Facility: OTHER | Age: 80
End: 2025-01-22

## 2025-01-22 ENCOUNTER — TRANSITIONAL CARE MANAGEMENT (OUTPATIENT)
Dept: FAMILY MEDICINE CLINIC | Facility: CLINIC | Age: 80
End: 2025-01-22

## 2025-01-22 ENCOUNTER — NURSING HOME VISIT (OUTPATIENT)
Dept: GERIATRICS | Facility: OTHER | Age: 80
End: 2025-01-22
Payer: COMMERCIAL

## 2025-01-22 VITALS
BODY MASS INDEX: 34.32 KG/M2 | WEIGHT: 206 LBS | RESPIRATION RATE: 18 BRPM | HEART RATE: 81 BPM | TEMPERATURE: 97.8 F | SYSTOLIC BLOOD PRESSURE: 113 MMHG | HEIGHT: 65 IN | OXYGEN SATURATION: 97 % | DIASTOLIC BLOOD PRESSURE: 52 MMHG

## 2025-01-22 DIAGNOSIS — E11.42 TYPE 2 DIABETES MELLITUS WITH DIABETIC POLYNEUROPATHY, WITHOUT LONG-TERM CURRENT USE OF INSULIN (HCC): ICD-10-CM

## 2025-01-22 DIAGNOSIS — R26.2 AMBULATORY DYSFUNCTION: ICD-10-CM

## 2025-01-22 DIAGNOSIS — E03.9 HYPOTHYROIDISM, UNSPECIFIED TYPE: ICD-10-CM

## 2025-01-22 DIAGNOSIS — Z71.89 ENCOUNTER FOR MEDICATION REVIEW AND COUNSELING: ICD-10-CM

## 2025-01-22 DIAGNOSIS — R41.89 COGNITIVE IMPAIRMENT: ICD-10-CM

## 2025-01-22 DIAGNOSIS — N18.32 STAGE 3B CHRONIC KIDNEY DISEASE (HCC): ICD-10-CM

## 2025-01-22 DIAGNOSIS — I42.8 NONISCHEMIC CARDIOMYOPATHY (HCC): ICD-10-CM

## 2025-01-22 DIAGNOSIS — I44.7 LEFT BUNDLE BRANCH BLOCK (LBBB): ICD-10-CM

## 2025-01-22 DIAGNOSIS — Z66 DNR (DO NOT RESUSCITATE): ICD-10-CM

## 2025-01-22 DIAGNOSIS — I48.0 PAROXYSMAL ATRIAL FIBRILLATION (HCC): Chronic | ICD-10-CM
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
## 2025-01-22 PROCEDURE — 99305 1ST NF CARE MODERATE MDM 35: CPT | Performed by: INTERNAL MEDICINE

## 2025-01-22 NOTE — PROGRESS NOTES
Outpatient Care Management [facility]/SNF Pathway. Discharged 1/21/25 to [facility]. Email sent to facility to inform them the patient is on the T[facility] Pathway and I will be following them during their skilled stay.  This Admin Coordinator will continue to monitor via chart review.

## 2025-01-22 NOTE — ASSESSMENT & PLAN NOTE
Patient currently taking Eliquis 5 mg orally twice daily gabapentin capsules 100 mg take 7 capsule by mouth twice a day, polyethylene glycol 17 g by mouth once daily, patient does have Glucagon Emergency Kit, he is on Humalog sliding scale, levothyroxine 125 mcg orally daily, losartan 25 mg orally daily, metoprolol succinate ER takes 25 mg is taking half a tablet by mouth every 12 hours with hold parameters for heart rate less than 60, Pravachol 10 mg orally daily, senna S tablet 1 tablet by mouth at bedtime and venlafaxine 75 mg orally twice a day for depression.

## 2025-01-22 NOTE — ASSESSMENT & PLAN NOTE
Lab Results   Component Value Date    EGFR 38 01/20/2025    EGFR 35 01/19/2025    EGFR 39 01/18/2025    CREATININE 1.29 01/20/2025    CREATININE 1.38 (H) 01/19/2025    CREATININE 1.27 01/18/2025   With history of chronic kidney disease with a creatinine 1.29 and a GFR 38

## 2025-01-22 NOTE — TELEPHONE ENCOUNTER
"Salima AGUILERA stated, \"I would like to verify some orders with on call physician.\"     On call provider notified via secure chat   "

## 2025-01-22 NOTE — PROGRESS NOTES
[facility]  [facility]  History and physical dictated at [place] [facility]  Time spent in evaluation 70 minutes 20 minutes evaluating chart 35 minutes evaluating patient and 15 minutes dictating note.    NAME: Laura Sarkar  AGE: 79 y.o. SEX: female [phone]    DATE OF ENCOUNTER: 1/22/2025    Assessment and Plan     Problem List Items Addressed This Visit          Cardiovascular and Mediastinum    Atrial fibrillation (HCC) (Chronic)    History of atrial fibs currently on Eliquis 5 mg orally twice a day.         Left bundle branch block (LBBB)    Patient with history of left bundle branch block-atrial fibs patient is a dual pacemaker noted.         Nonischemic cardiomyopathy (HCC)    Patient noted to have an ejection fraction of 40%.  No evidence of mitral regurgitation noted.            Endocrine    Type 2 diabetes mellitus, without long-term current use of insulin ([facility])      Lab Results   Component Value Date    HGBA1C 6.0 (H) 01/18/2025   Patient with history of diabetes mellitus type 2 currently controlled with diet and insulin coverage at present at facility last hemoglobin A1c was 6 with an estimated average sugar 126 this was approximately 4 days ago.         Hypothyroidism    Patient currently on levothyroxine 125 mcg orally daily TSH within normal range.            Genitourinary    CKD (chronic kidney disease)    Lab Results   Component Value Date    EGFR 38 01/20/2025    EGFR 35 01/19/2025    EGFR 39 01/18/2025    CREATININE 1.29 01/20/2025    CREATININE 1.38 (H) 01/19/2025    CREATININE 1.27 01/18/2025   With history of chronic kidney disease with a creatinine 1.29 and a GFR 38            Care Coordination    DNR (do not resuscitate)    Patient is DNR/DNI         Ambulatory dysfunction    Patient with history of ambulatory dysfunction mainly confined to wheelchair.            Neurology/Sleep    Cognitive impairment    Patient with evidence of cognitive  impairment scoring a MoCA 13 out of 30 patient will need supervision she cannot live by herself.  Patient will need someone to give her medications and assist her with her activities of daily living.            Other    Fall - Primary    Patient with history of recurrent falls on oral anticoagulant         Encounter for medication review and counseling    Patient currently taking Eliquis 5 mg orally twice daily gabapentin capsules 100 mg take 7 capsule by mouth twice a day, polyethylene glycol 17 g by mouth once daily, patient does have Glucagon Emergency Kit, he is on Humalog sliding scale, levothyroxine 125 mcg orally daily, losartan 25 mg orally daily, metoprolol succinate ER takes 25 mg is taking half a tablet by mouth every 12 hours with hold parameters for heart rate less than 60, Pravachol 10 mg orally daily, senna S tablet 1 tablet by mouth at bedtime and venlafaxine 75 mg orally twice a day for depression.            All medications and routine orders were reviewed and updated as needed.    Plan discussed with: Patient    Chief Complaint     No chief complaint on file.  Recurrent falls    History of Present Illness     Patient is an 79-year-old  female who presents to the hospital after sustaining a fall in her bathroom.  Apparently this was her second fall she was evaluated after her first fall and referred back home.  After her second fall she was brought in by ambulance and found to have a right posterior thoracoabdominal wall subcutaneous hematoma with subsequent anemia.  Patient does take Eliquis for her history of atrial fibrillation and was noted to be hypotensive.  A compression dressing was applied to her hematoma in the ED and her Eliquis was reversed with Kcentra.  Patient was transfused 1 unit of packed red blood cells with improvement in her blood pressure.  No further signs of ongoing bleeding were noted.  Her home dose of metoprolol was reduced to 12.5 mg.  Patient initially was  monitored in the ICU and transferred subsequently to the regular floor.  Pain was managed with multimodal regimen.  Patient had PT and OT eval she did undergo cognitive testing and scored a 13 out of 30 on her MoCA.  Patient was recommended not to drive and not to live alone.  Patient states that she lives by herself.  She does use a walker but mainly a wheelchair and needs assistance with her instrumental actives of daily living.  She does have a sister that helps her with her medication and shopping.  Patient cognitively tells me she has noted that she cannot remember things, she takes her meals and uses the microwave to warm her food.  She denied history of stool incontinence but has had some urinary incontinence if she cannot get to the bathroom in time.  She tells me that they are working on getting the patient's granddaughter to move in with her to help her she lives in a residence at this [address] [place] home.  Patient can no longer live by herself given the fact that she is taking medications that if not taking appropriately can cause harm.  Patient will need close monitoring and assistance.  Patient's other comorbidities include diabetes mellitus type 2, hypothyroidism, left bundle branch block, moderate mitral regurg, pulmonary fibrosis, chronic renal insufficiency stage IIIb, depression.  She tells me that she had 2 children one of them passed away at age 56 from poorly controlled diabetes mellitus and the daughter passed in her sleep from sudden death apparently she did have cognitive issues.          HISTORY:  Past Surgical History:   Procedure Laterality Date    BUNIONECTOMY      CARDIAC CATHETERIZATION      Carotid Artery, Resolved - 7/1/13    CARDIAC CATHETERIZATION N/A 12/27/2021    Procedure: CARDIAC CATHETERIZATION ;  Surgeon: Chucky Tadeo MD;  [facility];  Service: Cardiology    CARDIAC CATHETERIZATION N/A 12/27/2021    Procedure: Cardiac Coronary Angiogram;  Surgeon: Chucky  MD Carlyle;  [facility];  Service: Cardiology    CARDIAC ELECTROPHYSIOLOGY PROCEDURE N/A 01/18/2022    Procedure: Cardiac biv icd implant;  Surgeon: Harley Rollins DO;  Location: [facility];  Service: Cardiology    CARDIAC ELECTROPHYSIOLOGY PROCEDURE N/A 7/29/2024    Procedure: Cardiac eps/av node ablation;  Surgeon: Jose Carter MD;  [facility];  Service: Cardiology    CARDIOVASCULAR STRESS TEST  09/1999    CHOLECYSTECTOMY      COLONOSCOPY  2017    FOOT ARTHRODESIS, MODIFIED DONOHUE  2016    GALLBLADDER SURGERY  1970    HEMORROIDECTOMY      KNEE ARTHROSCOPY Left 2004    MOHS SURGERY Right 01/10/2024    right lateral and medial forehead (combined), Dr Berman    PA COLONOSCOPY FLX DX W/COLLJ SPEC WHEN PFRMD N/A 08/21/2017    Procedure: COLONOSCOPY;  Surgeon: Ady Wilkes MD;  Location: [facility];  Service: Colorectal    REPAIR KNEE LIGAMENT      REPAIR KNEE LIGAMENT Left 1986    REPAIR KNEE LIGAMENT Right 1988      Past Medical History:   Diagnosis Date    Aortic stenosis     Arthritis     Asthma     Basal cell carcinoma 10/24/2023    right lateral forehead, mohs    Basal cell carcinoma 10/24/2023    right medial forehead, mohs    Coronary artery disease     Diabetes mellitus (HCC)     Diabetic peripheral neuropathy (HCC)     Last assessed - 1/27/16    Gallbladder disease     Heart attack (HCC) 07/1999    Hemorrhoids     Last assessed - 11/16/12    Hypoglycemia 12/23/2021    Myocardial infarction (HCC)     Old myocardial infarction     Type 2 diabetes mellitus with autonomic neuropathy (HCC)     Unspec long term insulin use status, Last assessed - 6/8/17     Family History   Problem Relation Age of Onset    Hypertension Father     Diabetes Father     Cancer Father         Throat    Arthritis Father     Stroke Mother     Diabetes Maternal Grandmother     Heart disease Maternal Grandfather     Diabetes Son     Lymphoma Family         Head, Face and Neck       Social History     Socioeconomic History    Marital status:      Spouse name: None    Number of children: None    Years of education: None    Highest education level: None   Occupational History    Occupation: RETIRED   Tobacco Use    Smoking status: Never     Passive exposure: Never    Smokeless tobacco: Never   Vaping Use    Vaping status: Never Used   Substance and Sexual Activity    Alcohol use: Never     Comment: Social per [employer]     Drug use: Never    Sexual activity: Not Currently   Other Topics Concern    None   Social History Narrative    Hx of daily cola consumption (unk cans/day)    Daily tea consumption (unk cups/day)    Multiple organ donor    Patient has living will    Power of  in existence    Uses safety equipment - Seatbelts      Social Drivers of Health     Financial Resource Strain: Low Risk  (4/25/2023)    Overall Financial Resource Strain (CARDIA)     Difficulty of Paying Living Expenses: Not hard at all   Food Insecurity: No Food Insecurity (1/19/2025)    Nursing - Inadequate Food Risk Classification     Worried About Running Out of Food in the Last Year: Never true     Ran Out of Food in the Last Year: Never true     Ran Out of Food in the Last Year: Never true   Transportation Needs: No Transportation Needs (1/19/2025)    Nursing - Transportation Risk Classification     Lack of Transportation: Not on file     Lack of Transportation: No   Physical Activity: Not on file   Stress: Not on file   Social Connections: Not on file   Intimate Partner Violence: Unknown (1/19/2025)    Nursing IPS     Feels Physically and Emotionally Safe: Not on file     Physically Hurt by Someone: Not on file     Humiliated or Emotionally Abused by Someone: Not on file     Physically Hurt by Someone: No     Hurt or Threatened by Someone: No   Housing Stability: Unknown (1/19/2025)    Nursing: Inadequate Housing Risk Classification     Has Housing: Not on file     Worried About Losing Housing:  Not on file     Unable to Get Utilities: Not on file     Unable to Pay for Housing in the Last Year: No     Has Housin       Allergies:  Allergies   Allergen Reactions    Lyrica [Pregabalin] Swelling     Pt does not remember    Sulfa Antibiotics Swelling     Pt does not remember       Review of Systems     Review of Systems   Constitutional: Negative.    HENT: Negative.     Eyes:  Positive for visual disturbance.   Respiratory: Negative.     Cardiovascular: Negative.    Gastrointestinal: Negative.    Endocrine: Negative.    Genitourinary: Negative.    Musculoskeletal:  Positive for arthralgias and gait problem.   Skin:  Positive for pallor.   Allergic/Immunologic: Negative.    Hematological: Negative.    Psychiatric/Behavioral:  Positive for confusion and decreased concentration.        PHQ-2/9 Depression Screening             Medications and orders       Current Outpatient Medications:     apixaban (Eliquis) 5 mg, TAKE 1 TABLET BY MOUTH EVERY 12 HOURS, Disp: 60 tablet, Rfl: 5    gabapentin (NEURONTIN) 100 mg capsule, Take 7 capsules (700 mg total) by mouth 2 (two) times a day, Disp: 60 capsule, Rfl: 0    insulin lispro (HumALOG/ADMELOG) 100 units/mL injection, Inject 1-6 Units under the skin every 6 (six) hours, Disp: 7.2 mL, Rfl: 0    levothyroxine 125 mcg tablet, TAKE 1 TABLET BY MOUTH EVERY MORNING, Disp: 30 tablet, Rfl: 5    losartan (COZAAR) 25 mg tablet, TAKE 1 TABLET BY MOUTH EVERY MORNING, Disp: 90 tablet, Rfl: 1    metoprolol succinate (TOPROL-XL) 25 mg 24 hr tablet, Take 1 tablet (25 mg total) by mouth daily, Disp: 90 tablet, Rfl: 3    metoprolol tartrate (LOPRESSOR) 25 mg tablet, Take 0.5 tablets (12.5 mg total) by mouth in the morning, Disp: 15 tablet, Rfl: 0    polyethylene glycol (MIRALAX) 17 g packet, Take 17 g by mouth daily, Disp: 510 g, Rfl: 0    pravastatin (PRAVACHOL) 10 mg tablet, TAKE 1 TABLET BY MOUTH EVERY DAY, Disp: 90 tablet, Rfl: 1    senna-docusate sodium (SENOKOT S) 8.6-50 mg per  "tablet, Take 1 tablet by mouth daily at bedtime, Disp: 30 tablet, Rfl: 0    venlafaxine (EFFEXOR) 75 mg tablet, Take 1 tablet (75 mg total) by mouth 2 (two) times a day with meals, Disp: 60 tablet, Rfl: 0    white petrolatum-mineral oil (EUCERIN,HYDROCERIN) cream, Apply topically 3 (three) times a day, Disp: , Rfl:   No current facility-administered medications for this visit.       Objective     Vitals:   Vitals:    01/22/25 1500   BP: 113/52   Pulse: 81   Resp: 18   Temp: 97.8 °F (36.6 °C)   SpO2: 97%   Weight: 93.4 kg (206 lb)   Height: 5' 5\" (1.651 m)       Physical Exam  Constitutional:       Appearance: She is obese.   HENT:      Head: Normocephalic and atraumatic.      Nose: Nose normal.      Mouth/Throat:      Mouth: Mucous membranes are dry.   Eyes:      Conjunctiva/sclera: Conjunctivae normal.      Pupils: Pupils are equal, round, and reactive to light.   Cardiovascular:      Rate and Rhythm: Normal rate. Rhythm irregular.      Pulses: Normal pulses.      Heart sounds: Normal heart sounds.   Abdominal:      General: Bowel sounds are normal.      Palpations: Abdomen is soft.   Musculoskeletal:         General: Normal range of motion.      Cervical back: Neck supple.   Skin:     General: Skin is dry.   Neurological:      Mental Status: She is oriented to person, place, and time. Mental status is at baseline.   Psychiatric:         Mood and Affect: Mood normal.         Behavior: Behavior normal.         Pertinent Laboratory/Diagnostic Studies:   The following labs/studies were reviewed please see facility chart for details.  Labs were reviewed            "

## 2025-01-22 NOTE — UTILIZATION REVIEW
NOTIFICATION OF ADMISSION DISCHARGE   This is a Notification of Discharge from [facility]. Please be advised that this patient has been discharge from our facility. Below you will find the admission and discharge date and time including the patient’s disposition.   UTILIZATION REVIEW CONTACT:  Coco Pineda  Utilization   Network Utilization Review Department  Phone: [phone] x carefully listen to the prompts. All voicemails are confidential.  Email: [email or web address]     ADMISSION INFORMATION  PRESENTATION DATE: 1/17/2025  6:10 PM  OBERVATION ADMISSION DATE: N/A  INPATIENT ADMISSION DATE: 1/17/25 10:27 PM   DISCHARGE DATE: 1/21/2025  7:34 PM   DISPOSITION:[facility]    Network Utilization Review Department  ATTENTION: Please call with any questions or concerns to [phone] and carefully listen to the prompts so that you are directed to the right person. All voicemails are confidential.   For Discharge needs, contact Care Management DC Support Team at [phone] opt. 2  Send all requests for admission clinical reviews, approved or denied determinations and any other requests to dedicated fax number below belonging to the campus where the patient is receiving treatment. List of dedicated fax numbers for the Facilities:  FACILITY NAME UR FAX NUMBER   ADMISSION DENIALS (Administrative/Medical Necessity) [phone]   DISCHARGE SUPPORT TEAM ([facility]) [phone]   PARENT CHILD HEALTH (Maternity/NICU/Pediatrics) [phone]   [facility] [phone]   [facility] [phone]   [facility] [phone]   [facility] [phone]   [facility] [phone]   [facility] [phone]   [facility] [phone]   [facility]  [phone]   [facility] [phone]   [facility] [phone]   [facility] [phone]   [facility] [phone]

## 2025-01-22 NOTE — ASSESSMENT & PLAN NOTE
Lab Results   Component Value Date    HGBA1C 6.0 (H) 01/18/2025   Patient with history of diabetes mellitus type 2 currently controlled with diet and insulin coverage at present at facility last hemoglobin A1c was 6 with an estimated average sugar 126 this was approximately 4 days ago.

## 2025-01-22 NOTE — ASSESSMENT & PLAN NOTE
Patient with evidence of cognitive impairment scoring a MoCA 13 out of 30 patient will need supervision she cannot live by herself.  Patient will need someone to give her medications and assist her with her activities of daily living.

## 2025-01-23 NOTE — ASSESSMENT & PLAN NOTE
HR regular and controlled on exam   Continue Eliquis 5 mg BID for AC  Home med Metoprolol Succinate 25 mg daily  was changed to Lopressor 12.5 mg BID while inpatient due to hypotension  Monitor vitals and adjust medications accordingly   OP f/u with PCP and Cardiology upon d/c from rehab

## 2025-01-23 NOTE — ASSESSMENT & PLAN NOTE
Recent MoCa 13/30  AAOx2-3 on exam , forgetful , clear coherent speech, able to make her needs known  She was evaluated by speech at rehab with BCAT score 40/50 with STM deficits and deficits in processing  Provide redirection, reorientation, distraction techniques  Fall Precautions  Assist with ADLs/IADLs  Avoid deliriogenic medications such as tramadol, benzodiazepines, anticholinergics, benadryl  Encourage Hydration/ Nutrition  Implement sleep hygiene, limit night time interuptions   Encourage participation in group activities when appropriate

## 2025-01-23 NOTE — PROGRESS NOTES
[facility]  [address] [address] [phone]  FACILITY: [facility]  Code 31 (STR)      NAME: Laura Sarkar  AGE: 79 y.o. SEX: female CODE STATUS: CPR    DATE OF ENCOUNTER: 1/24/2025    Assessment and Plan     1. Cognitive impairment  Assessment & Plan:  Recent MoCa 13/30  AAOx2-3 on exam , forgetful , clear coherent speech, able to make her needs known  She was evaluated by speech at rehab with BCAT score 40/50 with STM deficits and deficits in processing  Provide redirection, reorientation, distraction techniques  Fall Precautions  Assist with ADLs/IADLs  Avoid deliriogenic medications such as tramadol, benzodiazepines, anticholinergics, benadryl  Encourage Hydration/ Nutrition  Implement sleep hygiene, limit night time interuptions   Encourage participation in group activities when appropriate   2. Stage 4 chronic kidney disease (HCC)  Assessment & Plan:  Lab Results   Component Value Date    EGFR 38 01/20/2025    EGFR 35 01/19/2025    EGFR 39 01/18/2025    CREATININE 1.29 01/20/2025    CREATININE 1.38 (H) 01/19/2025    CREATININE 1.27 01/18/2025     Baseline creatinine around 1.1-1.3  Monitor BMP's at rehab  Encourage fluids  Avoid hypotension, Avoid NSAIDs  Renally dose medications as appropriate   3. Type 2 diabetes mellitus with diabetic polyneuropathy, without long-term current use of insulin (HCC)  Assessment & Plan:    Lab Results   Component Value Date    HGBA1C 6.0 (H) 01/18/2025     Per geriatric guidelines, goal HgA1c is > 7.5 to prevent hypoglycemic event in the older adult with cognitive decline  A1c acceptable   Continue Gabapentin but decrease to 600 mg BID (previously on 700 mg BID)  D/C SSI  Continue Diet controlled   Continue Accu checks daily x 1 week     Patient with history of diabetes mellitus type 2 currently controlled with diet and insulin coverage at present at facility last hemoglobin A1c was 6 with an estimated average sugar 126 this was  approximately 4 days ago.  Orders:  -     gabapentin (NEURONTIN) 300 mg capsule; Take 2 capsules (600 mg total) by mouth 2 (two) times a day  4. Hypothyroidism, unspecified type  Assessment & Plan:  TSH stable   Continue Levothyroxine 125 mcg daily   5. Nonischemic cardiomyopathy (HCC)  Assessment & Plan:  EF 40%   S/P Bi-V ICD (placed Jan. 2022)  Denies any CP/SOB/Palpitations   6. Paroxysmal atrial fibrillation (HCC)  Assessment & Plan:  HR regular and controlled on exam   Continue Eliquis 5 mg BID for AC  Home med Metoprolol Succinate 25 mg daily  was changed to Lopressor 12.5 mg BID while inpatient due to hypotension  Monitor vitals and adjust medications accordingly   OP f/u with PCP and Cardiology upon d/c from rehab  7. Fall, initial encounter  -     metoprolol tartrate (LOPRESSOR) 25 mg tablet; Take 0.5 tablets (12.5 mg total) by mouth every 12 (twelve) hours  8. R posterior thoracoabdominal wall subcutaneous hematoma  Assessment & Plan:  S/p fall at home onto her buttocks and back   Imaging revealed 10.3 x 5.7 x 9.2 cm hematoma of right posterolateral thoracoabdominal wall   Kcentra given in ED to reverse her Eliquis   Abdominal binder applied in ED   She did require 1 units PRBC's during this hospital say and was hypotensive with adjustment of her medications  Resumed on Eliquis, Metoprolol decreased  Check CBC at reheab  OP f/u with trauma clinic in 2 weeks for hematoma check (scheduled for 2/4/25)       All medications and routine orders were reviewed and updated as needed.    Chief Complaint     STR follow up visit    Past Medical and Surgica History      Past Medical History:   Diagnosis Date    Aortic stenosis     Arthritis     Asthma     Basal cell carcinoma 10/24/2023    right lateral forehead, mohs    Basal cell carcinoma 10/24/2023    right medial forehead, mohs    Coronary artery disease     Diabetes mellitus (HCC)     Diabetic peripheral neuropathy (HCC)     Last assessed - 1/27/16     Gallbladder disease     Heart attack (HCC) 07/1999    Hemorrhoids     Last assessed - 11/16/12    Hypoglycemia 12/23/2021    Myocardial infarction (HCC)     Old myocardial infarction     Type 2 diabetes mellitus with autonomic neuropathy (HCC)     Unspec long term insulin use status, Last assessed - 6/8/17     Past Surgical History:   Procedure Laterality Date    BUNIONECTOMY      CARDIAC CATHETERIZATION      Carotid Artery, Resolved - 7/1/13    CARDIAC CATHETERIZATION N/A 12/27/2021    Procedure: CARDIAC CATHETERIZATION ;  Surgeon: Chucky Tadeo MD;  [facility];  Service: Cardiology    CARDIAC CATHETERIZATION N/A 12/27/2021    Procedure: Cardiac Coronary Angiogram;  Surgeon: Chucky Tadeo MD;  [facility];  Service: Cardiology    CARDIAC ELECTROPHYSIOLOGY PROCEDURE N/A 01/18/2022    Procedure: Cardiac biv icd implant;  Surgeon: Harley Rollins DO;  Location: [facility];  Service: Cardiology    CARDIAC ELECTROPHYSIOLOGY PROCEDURE N/A 7/29/2024    Procedure: Cardiac eps/av node ablation;  Surgeon: Jose Carter MD;  [facility]: [facility];  Service: Cardiology    CARDIOVASCULAR STRESS TEST  09/1999    CHOLECYSTECTOMY      COLONOSCOPY  2017    FOOT ARTHRODESIS, MODIFIED DONOHUE  2016    GALLBLADDER SURGERY  1970    HEMORROIDECTOMY      KNEE ARTHROSCOPY Left 2004    MOHS SURGERY Right 01/10/2024    right lateral and medial forehead (combined), Dr Berman    MI COLONOSCOPY FLX DX W/COLLJ SPEC WHEN PFRMD N/A 08/21/2017    Procedure: COLONOSCOPY;  Surgeon: Ady Wilkes MD;  Location: [facility];  Service: Colorectal    REPAIR KNEE LIGAMENT      REPAIR KNEE LIGAMENT Left 1986    REPAIR KNEE LIGAMENT Right 1988     Allergies   Allergen Reactions    Lyrica [Pregabalin] Swelling     Pt does not remember    Sulfa Antibiotics Swelling     Pt does not remember          History of Present Illness     Laura Sarkar is a 79 y.o. female admitted to [facility]  "Acute Rehab following hospital stay for mechanical fall. Patient has a past medical Hx including but not limited to DM2, Afib on Eliquis, Hypothyroidism, Pulm Fibrosis, Chronic Renal insufficiency, depression . Patient is seen in collaboration with nursing for medical mgmt and STR follow up.     Patient initially presented to hospital s/p recurrent falls, she had sustained a fall went to ED sent home, where she lives alone, she fell again and returned to ED after a fall in her bathroom after calling EMS. Imaging revealed right posterior thoracoabdominal wall subcutaneous hematoma, she also was anemic. Patient on Eliquis for Aifb, was hypotensive in ED as well. A compression dressing applied around abdomen and Eliquis was revered with Kcentra and she was transfused with 1 unit PRBCs with improvement of her blood pressure.  Her home dose of metoprolol was decreased to 12.5 mg. Patient had MocA test done by therapy and scored 13/30. She was recommended to stop driving and not safe to live alone. She uses a walker at times but mostly w/c for mobility at home, she needs assistance with her IADLs. Her sister assists with shopping and medication management. She was recommended discharge to post acute rehab.     Seen and examined at bedside today. Patient is able to provide a limited reliable history due to some short term memory loss. She is AAOx2-3 on exam, could not remember \"rehab\" but states she's at a \"facility to get stronger\". She is pleasant and cooperative, able to make her needs known, clear coherent speech. She is c/o \"pain below my knees\", would like to take Tylenol. Otherwise she is asking to read a book at her bedside table. She offers no other complaints or concerns at this time. She denies CP/SOB/N/V/D. Denies lightheadedness, dizziness, headaches, vision changes. Patient states she is eating well and staying hydrated. Denies any bowel or bladder issues. Patient is actively participating in therapy. No " concerns from nursing at this time.                      The patient's allergies, past medical, surgical, social and family history were reviewed and unchanged.    Review of Systems     Review of Systems   All other systems reviewed and are negative.        Objective     Vitals:   Vitals:    01/24/25 1520   BP: 130/60   Pulse: 75   Resp: 16   Temp: (!) 97.3 °F (36.3 °C)   SpO2: 96%         Physical Exam  Vitals and nursing note reviewed.   Constitutional:       General: She is not in acute distress.     Appearance: Normal appearance. She is obese.   HENT:      Head: Normocephalic and atraumatic.      Nose: No congestion or rhinorrhea.      Mouth/Throat:      Mouth: Mucous membranes are moist.   Eyes:      General: No scleral icterus.     Conjunctiva/sclera: Conjunctivae normal.      Pupils: Pupils are equal, round, and reactive to light.   Cardiovascular:      Rate and Rhythm: Normal rate and regular rhythm.      Pulses: Normal pulses.      Heart sounds: Normal heart sounds. No murmur heard.  Pulmonary:      Effort: Pulmonary effort is normal. No respiratory distress.      Breath sounds: Normal breath sounds. No wheezing, rhonchi or rales.   Abdominal:      General: Bowel sounds are normal. There is no distension.      Palpations: Abdomen is soft. There is no mass.      Tenderness: There is no abdominal tenderness.      Hernia: No hernia is present.   Musculoskeletal:         General: No swelling or tenderness.   Lymphadenopathy:      Cervical: No cervical adenopathy.   Skin:     General: Skin is warm and dry.      Coloration: Skin is not pale.      Findings: Bruising (b/l lower extremities, right flank/buttocks) present. No rash.   Neurological:      General: No focal deficit present.      Mental Status: She is alert and oriented to person, place, and time. Mental status is at baseline.      Motor: Weakness present.      Gait: Gait abnormal.   Psychiatric:         Mood and Affect: Mood normal.         Behavior:  "Behavior normal.         Pertinent Laboratory/Diagnostic Studies:     Reviewed in facility chart      Current Medications   Medications reviewed and updated see facility MAR for details.      Current Outpatient Medications:     apixaban (Eliquis) 5 mg, TAKE 1 TABLET BY MOUTH EVERY 12 HOURS, Disp: 60 tablet, Rfl: 5    gabapentin (NEURONTIN) 300 mg capsule, Take 2 capsules (600 mg total) by mouth 2 (two) times a day, Disp: , Rfl:     levothyroxine 125 mcg tablet, TAKE 1 TABLET BY MOUTH EVERY MORNING, Disp: 30 tablet, Rfl: 5    losartan (COZAAR) 25 mg tablet, TAKE 1 TABLET BY MOUTH EVERY MORNING, Disp: 90 tablet, Rfl: 1    metoprolol tartrate (LOPRESSOR) 25 mg tablet, Take 0.5 tablets (12.5 mg total) by mouth every 12 (twelve) hours, Disp: , Rfl:     polyethylene glycol (MIRALAX) 17 g packet, Take 17 g by mouth daily, Disp: 510 g, Rfl: 0    pravastatin (PRAVACHOL) 10 mg tablet, TAKE 1 TABLET BY MOUTH EVERY DAY, Disp: 90 tablet, Rfl: 1    senna-docusate sodium (SENOKOT S) 8.6-50 mg per tablet, Take 1 tablet by mouth daily at bedtime, Disp: 30 tablet, Rfl: 0    venlafaxine (EFFEXOR) 75 mg tablet, Take 1 tablet (75 mg total) by mouth 2 (two) times a day with meals, Disp: 60 tablet, Rfl: 0     Please note:  Voice-recognition software may have been used in the preparation of this document.  Occasional wrong word or \"sound-alike\" substitutions may have occurred due to the inherent limitations of voice recognition software.  Interpretation should be guided by context.         PONCE Huynh  1/24/2025  3:57 PM    "

## 2025-01-23 NOTE — ASSESSMENT & PLAN NOTE
Lab Results   Component Value Date    HGBA1C 6.0 (H) 01/18/2025     Per geriatric guidelines, goal HgA1c is > 7.5 to prevent hypoglycemic event in the older adult with cognitive decline  A1c acceptable   Continue Gabapentin but decrease to 600 mg BID (previously on 700 mg BID)  D/C SSI  Continue Diet controlled   Continue Accu checks daily x 1 week     Patient with history of diabetes mellitus type 2 currently controlled with diet and insulin coverage at present at facility last hemoglobin A1c was 6 with an estimated average sugar 126 this was approximately 4 days ago.

## 2025-01-24 ENCOUNTER — NURSING HOME VISIT (OUTPATIENT)
Dept: GERIATRICS | Facility: OTHER | Age: 80
End: 2025-01-24
Payer: COMMERCIAL

## 2025-01-24 VITALS
HEART RATE: 75 BPM | OXYGEN SATURATION: 96 % | SYSTOLIC BLOOD PRESSURE: 130 MMHG | BODY MASS INDEX: 33.12 KG/M2 | WEIGHT: 199 LBS | DIASTOLIC BLOOD PRESSURE: 60 MMHG | TEMPERATURE: 97.3 F | RESPIRATION RATE: 16 BRPM

## 2025-01-24 DIAGNOSIS — N18.4 STAGE 4 CHRONIC KIDNEY DISEASE (HCC): ICD-10-CM

## 2025-01-24 DIAGNOSIS — I42.8 NONISCHEMIC CARDIOMYOPATHY (HCC): ICD-10-CM

## 2025-01-24 DIAGNOSIS — I48.0 PAROXYSMAL ATRIAL FIBRILLATION (HCC): Chronic | ICD-10-CM

## 2025-01-24 DIAGNOSIS — E03.9 HYPOTHYROIDISM, UNSPECIFIED TYPE: ICD-10-CM

## 2025-01-24 DIAGNOSIS — E11.42 TYPE 2 DIABETES MELLITUS WITH DIABETIC POLYNEUROPATHY, WITHOUT LONG-TERM CURRENT USE OF INSULIN (HCC): ICD-10-CM
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
## 2025-01-24 DIAGNOSIS — T14.8XXA SUBCUTANEOUS HEMATOMA: ICD-10-CM

## 2025-01-24 DIAGNOSIS — R41.89 COGNITIVE IMPAIRMENT: Primary | ICD-10-CM

## 2025-01-24 PROCEDURE — 99309 SBSQ NF CARE MODERATE MDM 30: CPT | Performed by: NURSE PRACTITIONER

## 2025-01-24 RX ORDER — METOPROLOL TARTRATE 25 MG/1
12.5 TABLET, FILM COATED ORAL EVERY 12 HOURS SCHEDULED
Start: 2025-01-24

## 2025-01-24 RX ORDER — GABAPENTIN 300 MG/1
600 CAPSULE ORAL 2 TIMES DAILY
Start: 2025-01-24

## 2025-01-24 NOTE — ASSESSMENT & PLAN NOTE
S/p fall at home onto her buttocks and back   Imaging revealed 10.3 x 5.7 x 9.2 cm hematoma of right posterolateral thoracoabdominal wall   Kcentra given in ED to reverse her Eliquis   Abdominal binder applied in ED   She did require 1 units PRBC's during this hospital say and was hypotensive with adjustment of her medications  Resumed on Eliquis, Metoprolol decreased  Check CBC at reheab  OP f/u with trauma clinic in 2 weeks for hematoma check (scheduled for 2/4/25)

## 2025-01-28 ENCOUNTER — NURSING HOME VISIT (OUTPATIENT)
Dept: GERIATRICS | Facility: OTHER | Age: 80
End: 2025-01-28
Payer: COMMERCIAL

## 2025-01-28 ENCOUNTER — PATIENT OUTREACH (OUTPATIENT)
Dept: CASE MANAGEMENT | Facility: OTHER | Age: 80
End: 2025-01-28

## 2025-01-28 VITALS
WEIGHT: 199 LBS | BODY MASS INDEX: 33.12 KG/M2 | TEMPERATURE: 97.5 F | OXYGEN SATURATION: 98 % | HEART RATE: 78 BPM | RESPIRATION RATE: 16 BRPM | SYSTOLIC BLOOD PRESSURE: 122 MMHG | DIASTOLIC BLOOD PRESSURE: 74 MMHG

## 2025-01-28 DIAGNOSIS — N18.4 STAGE 4 CHRONIC KIDNEY DISEASE (HCC): ICD-10-CM

## 2025-01-28 DIAGNOSIS — E53.8 B12 DEFICIENCY: ICD-10-CM

## 2025-01-28 DIAGNOSIS — I48.0 PAROXYSMAL ATRIAL FIBRILLATION (HCC): Chronic | ICD-10-CM

## 2025-01-28 DIAGNOSIS — E03.9 HYPOTHYROIDISM, UNSPECIFIED TYPE: ICD-10-CM

## 2025-01-28 DIAGNOSIS — E11.42 TYPE 2 DIABETES MELLITUS WITH DIABETIC POLYNEUROPATHY, WITHOUT LONG-TERM CURRENT USE OF INSULIN (HCC): ICD-10-CM

## 2025-01-28 DIAGNOSIS — F32.A DEPRESSION, UNSPECIFIED DEPRESSION TYPE: ICD-10-CM

## 2025-01-28 DIAGNOSIS — T14.8XXA SUBCUTANEOUS HEMATOMA: ICD-10-CM

## 2025-01-28 DIAGNOSIS — R41.89 COGNITIVE IMPAIRMENT: ICD-10-CM

## 2025-01-28 DIAGNOSIS — I42.8 NONISCHEMIC CARDIOMYOPATHY (HCC): Primary | ICD-10-CM

## 2025-01-28 PROCEDURE — 99309 SBSQ NF CARE MODERATE MDM 30: CPT | Performed by: NURSE PRACTITIONER

## 2025-01-28 NOTE — ASSESSMENT & PLAN NOTE
HR regular and controlled on exam   Continue Eliquis 5 mg BID for AC  Continue Metoprolol Succ 25 mg daily with hold parameters- monitor for hypotension as this med was initally changed to lopressor 12/5 mg bid while inpatient   Monitor vitals and adjust medications accordingly   OP f/u with PCP and Cardiology upon d/c from rehab

## 2025-01-28 NOTE — ASSESSMENT & PLAN NOTE
Mood stable on exam   Denies SI/HI  Continue home med Effexor 75 mg BID ( recent dose reduction while inpatient)

## 2025-01-28 NOTE — ASSESSMENT & PLAN NOTE
S/p fall at home onto her buttocks and back   Imaging revealed 10.3 x 5.7 x 9.2 cm hematoma of right posterolateral thoracoabdominal wall   Kcentra given in ED to reverse her Eliquis   Abdominal binder applied in ED   She did require 1 units PRBC's during this hospital say and was hypotensive with adjustment of her medications  Resumed on Eliquis  Check CBC at rehab---> ordered for Thus 1/30  OP f/u with trauma clinic in 2 weeks for hematoma check (scheduled for 2/4/25)

## 2025-01-28 NOTE — PROGRESS NOTES
[facility]  [address] [place] [address]  [phone]  FACILITY: [facility]  Code 31 (STR)      NAME: Laura Sarkar  AGE: 79 y.o. SEX: female CODE STATUS: CPR    DATE OF ENCOUNTER: 1/28/2025    Assessment and Plan     1. Nonischemic cardiomyopathy (HCC)  Assessment & Plan:  EF 40%   S/P Bi-V ICD (placed Jan. 2022)  Denies any CP/SOB/Palpitations   2. Paroxysmal atrial fibrillation (HCC)  Assessment & Plan:  HR regular and controlled on exam   Continue Eliquis 5 mg BID for AC  Continue Metoprolol Succ 25 mg daily with hold parameters- monitor for hypotension as this med was initally changed to lopressor 12/5 mg bid while inpatient   Monitor vitals and adjust medications accordingly   OP f/u with PCP and Cardiology upon d/c from rehab  3. Type 2 diabetes mellitus with diabetic polyneuropathy, without long-term current use of insulin (HCC)  Assessment & Plan:    Lab Results   Component Value Date    HGBA1C 6.0 (H) 01/18/2025     Per geriatric guidelines, goal HgA1c is > 7.5 to prevent hypoglycemic event in the older adult with cognitive decline  A1c acceptable   Continue Gabapentin 600 mg BID (previously on 700 mg BID)  Continue Diet controlled   Continue Accu checks daily x 1 week ---->BS ranging     4. Hypothyroidism, unspecified type  Assessment & Plan:  TSH stable   Continue Levothyroxine 125 mcg daily   5. Stage 4 chronic kidney disease (HCC)  Assessment & Plan:    Lab Results   Component Value Date    EGFR 38 01/20/2025    EGFR 35 01/19/2025    EGFR 39 01/18/2025    CREATININE 1.29 01/20/2025    CREATININE 1.38 (H) 01/19/2025    CREATININE 1.27 01/18/2025     Baseline creatinine around 1.1-1.3  Monitor BMP's at rehab---> ordered for Thus 1/30  Encourage fluids  Avoid hypotension, Avoid NSAIDs  Renally dose medications as appropriate   6. Cognitive impairment  Assessment & Plan:  Recent MoCa 13/30  AAOx2-3 on exam , forgetful , clear coherent speech, able to make her needs  known  She was evaluated by speech at rehab with BCAT score 40/50 with STM deficits and deficits in processing  Provide redirection, reorientation, distraction techniques  Fall Precautions  Assist with ADLs/IADLs  Avoid deliriogenic medications such as tramadol, benzodiazepines, anticholinergics, benadryl  Encourage Hydration/ Nutrition  Implement sleep hygiene, limit night time interuptions   Encourage participation in group activities when appropriate   7. Depression, unspecified depression type  Assessment & Plan:  Mood stable on exam   Denies SI/HI  Continue home med Effexor 75 mg BID ( recent dose reduction while inpatient)  8. B12 deficiency  Assessment & Plan:  Seen by Geriatrics while inpatient with B12 level of 151  Recommended to have goal B12 level >400  Start Vitamin B12 1000 mcg daily   9. R posterior thoracoabdominal wall subcutaneous hematoma  Assessment & Plan:  S/p fall at home onto her buttocks and back   Imaging revealed 10.3 x 5.7 x 9.2 cm hematoma of right posterolateral thoracoabdominal wall   Kcentra given in ED to reverse her Eliquis   Abdominal binder applied in ED   She did require 1 units PRBC's during this hospital say and was hypotensive with adjustment of her medications  Resumed on Eliquis  Check CBC at rehab---> ordered for Thus 1/30  OP f/u with trauma clinic in 2 weeks for hematoma check (scheduled for 2/4/25)       All medications and routine orders were reviewed and updated as needed.    Chief Complaint     STR follow up visit    Past Medical and Surgica History      Past Medical History:   Diagnosis Date    Aortic stenosis     Arthritis     Asthma     Basal cell carcinoma 10/24/2023    right lateral forehead, mohs    Basal cell carcinoma 10/24/2023    right medial forehead, mohs    Coronary artery disease     Diabetes mellitus (HCC)     Diabetic peripheral neuropathy (HCC)     Last assessed - 1/27/16    Gallbladder disease     Heart attack (HCC) 07/1999    Hemorrhoids     Last  assessed - 11/16/12    Hypoglycemia 12/23/2021    Myocardial infarction (HCC)     Old myocardial infarction     Type 2 diabetes mellitus with autonomic neuropathy (HCC)     Unspec long term insulin use status, Last assessed - 6/8/17     Past Surgical History:   Procedure Laterality Date    BUNIONECTOMY      CARDIAC CATHETERIZATION      Carotid Artery, Resolved - 7/1/13    CARDIAC CATHETERIZATION N/A 12/27/2021    Procedure: CARDIAC CATHETERIZATION ;  Surgeon: Chucky Tadeo MD;  [facility];  Service: Cardiology    CARDIAC CATHETERIZATION N/A 12/27/2021    Procedure: Cardiac Coronary Angiogram;  Surgeon: Chucky Tadeo MD;  [facility];  Service: Cardiology    CARDIAC ELECTROPHYSIOLOGY PROCEDURE N/A 01/18/2022    Procedure: Cardiac biv icd implant;  Surgeon: Harley Rollins DO;  [facility];  Service: Cardiology    CARDIAC ELECTROPHYSIOLOGY PROCEDURE N/A 7/29/2024    Procedure: Cardiac eps/av node ablation;  Surgeon: Jose Carter MD;  [facility];  Service: Cardiology    CARDIOVASCULAR STRESS TEST  09/1999    CHOLECYSTECTOMY      COLONOSCOPY  2017    FOOT ARTHRODESIS, MODIFIED DONOHUE  2016    GALLBLADDER SURGERY  1970    HEMORROIDECTOMY      KNEE ARTHROSCOPY Left 2004    MOHS SURGERY Right 01/10/2024    right lateral and medial forehead (combined), Dr Berman    ID COLONOSCOPY FLX DX W/COLLJ SPEC WHEN PFRMD N/A 08/21/2017    Procedure: COLONOSCOPY;  Surgeon: Ady Wilkes MD;  Location: [facility];  Service: Colorectal    REPAIR KNEE LIGAMENT      REPAIR KNEE LIGAMENT Left 1986    REPAIR KNEE LIGAMENT Right 1988     Allergies   Allergen Reactions    Lyrica [Pregabalin] Swelling     Pt does not remember    Sulfa Antibiotics Swelling     Pt does not remember          History of Present Illness     Laura Sarkar is a 79 y.o. female admitted to [facility] following hospital stay for mechanical fall. Patient has a past  medical Hx including but not limited to DM2, Afib on Eliquis, Hypothyroidism, Pulm Fibrosis, Chronic Renal insufficiency, depression . Patient is seen in collaboration with nursing for medical mgmt and STR follow up.     Patient initially presented to hospital s/p recurrent falls, she had sustained a fall went to ED sent home, where she lives alone, she fell again and returned to ED after a fall in her bathroom after calling EMS. Imaging revealed right posterior thoracoabdominal wall subcutaneous hematoma, she also was anemic. Patient on Eliquis for Aifb, was hypotensive in ED as well. A compression dressing applied around abdomen and Eliquis was revered with Kcentra and she was transfused with 1 unit PRBCs with improvement of her blood pressure.  Her home dose of metoprolol was decreased to 12.5 mg. Patient had MocA test done by therapy and scored 13/30. She was recommended to stop driving and not safe to live alone. She uses a walker at times but mostly w/c for mobility at home, she needs assistance with her IADLs. Her sister assists with shopping and medication management. She was recommended discharge to post acute rehab.     Seen and examined at bedside today. Patient is able to provide a limited reliable history due to some short term memory loss. She is AAOx2-3 on exam. She is resting comfortably, does not appear in distress. Still with c/o neuropathy, trying to work with therapy. No concerns from nursing at this time.                      The patient's allergies, past medical, surgical, social and family history were reviewed and unchanged.    Review of Systems     Review of Systems   All other systems reviewed and are negative.        Objective     Vitals:   Vitals:    01/28/25 1046   BP: 122/74   Pulse: 78   Resp: 16   Temp: 97.5 °F (36.4 °C)   SpO2: 98%         Physical Exam  Vitals and nursing note reviewed.   Constitutional:       General: She is not in acute distress.     Appearance: Normal appearance.  She is obese.   HENT:      Head: Normocephalic and atraumatic.      Nose: No congestion or rhinorrhea.      Mouth/Throat:      Mouth: Mucous membranes are moist.   Eyes:      General: No scleral icterus.     Conjunctiva/sclera: Conjunctivae normal.      Pupils: Pupils are equal, round, and reactive to light.   Cardiovascular:      Rate and Rhythm: Normal rate and regular rhythm.      Pulses: Normal pulses.      Heart sounds: Normal heart sounds. No murmur heard.  Pulmonary:      Effort: Pulmonary effort is normal. No respiratory distress.      Breath sounds: Normal breath sounds. No wheezing, rhonchi or rales.   Abdominal:      General: Bowel sounds are normal. There is no distension.      Palpations: Abdomen is soft. There is no mass.      Tenderness: There is no abdominal tenderness.      Hernia: No hernia is present.   Musculoskeletal:         General: No swelling or tenderness.   Lymphadenopathy:      Cervical: No cervical adenopathy.   Skin:     General: Skin is warm and dry.      Coloration: Skin is not pale.      Findings: Bruising (b/l lower extremities, right flank/buttocks) present. No rash.   Neurological:      General: No focal deficit present.      Mental Status: She is alert and oriented to person, place, and time. Mental status is at baseline.      Motor: Weakness present.      Gait: Gait abnormal.   Psychiatric:         Mood and Affect: Mood normal.         Behavior: Behavior normal.         Pertinent Laboratory/Diagnostic Studies:     Reviewed in facility chart      Current Medications   Medications reviewed and updated see facility MAR for details.      Current Outpatient Medications:     apixaban (Eliquis) 5 mg, TAKE 1 TABLET BY MOUTH EVERY 12 HOURS, Disp: 60 tablet, Rfl: 5    gabapentin (NEURONTIN) 300 mg capsule, Take 2 capsules (600 mg total) by mouth 2 (two) times a day, Disp: , Rfl:     levothyroxine 125 mcg tablet, TAKE 1 TABLET BY MOUTH EVERY MORNING, Disp: 30 tablet, Rfl: 5    losartan  "(COZAAR) 25 mg tablet, TAKE 1 TABLET BY MOUTH EVERY MORNING, Disp: 90 tablet, Rfl: 1    metoprolol tartrate (LOPRESSOR) 25 mg tablet, Take 0.5 tablets (12.5 mg total) by mouth every 12 (twelve) hours, Disp: , Rfl:     polyethylene glycol (MIRALAX) 17 g packet, Take 17 g by mouth daily, Disp: 510 g, Rfl: 0    pravastatin (PRAVACHOL) 10 mg tablet, TAKE 1 TABLET BY MOUTH EVERY DAY, Disp: 90 tablet, Rfl: 1    senna-docusate sodium (SENOKOT S) 8.6-50 mg per tablet, Take 1 tablet by mouth daily at bedtime, Disp: 30 tablet, Rfl: 0    venlafaxine (EFFEXOR) 75 mg tablet, Take 1 tablet (75 mg total) by mouth 2 (two) times a day with meals, Disp: 60 tablet, Rfl: 0     Please note:  Voice-recognition software may have been used in the preparation of this document.  Occasional wrong word or \"sound-alike\" substitutions may have occurred due to the inherent limitations of voice recognition software.  Interpretation should be guided by context.         PONCE Huynh  1/28/2025  2:25 PM    "

## 2025-01-28 NOTE — ASSESSMENT & PLAN NOTE
Lab Results   Component Value Date    HGBA1C 6.0 (H) 01/18/2025     Per geriatric guidelines, goal HgA1c is > 7.5 to prevent hypoglycemic event in the older adult with cognitive decline  A1c acceptable   Continue Gabapentin 600 mg BID (previously on 700 mg BID)  Continue Diet controlled   Continue Accu checks daily x 1 week ---->BS ranging

## 2025-01-28 NOTE — ASSESSMENT & PLAN NOTE
Lab Results   Component Value Date    EGFR 38 01/20/2025    EGFR 35 01/19/2025    EGFR 39 01/18/2025    CREATININE 1.29 01/20/2025    CREATININE 1.38 (H) 01/19/2025    CREATININE 1.27 01/18/2025     Baseline creatinine around 1.1-1.3  Monitor BMP's at rehab---> ordered for Thus 1/30  Encourage fluids  Avoid hypotension, Avoid NSAIDs  Renally dose medications as appropriate

## 2025-01-28 NOTE — ASSESSMENT & PLAN NOTE
Seen by Geriatrics while inpatient with B12 level of 151  Recommended to have goal B12 level >400  Start Vitamin B12 1000 mcg daily

## 2025-02-03 ENCOUNTER — PATIENT OUTREACH (OUTPATIENT)
Dept: CASE MANAGEMENT | Facility: OTHER | Age: 80
End: 2025-02-03

## 2025-02-03 DIAGNOSIS — E78.00 HYPERCHOLESTEROLEMIA: ICD-10-CM

## 2025-02-04 ENCOUNTER — NURSING HOME VISIT (OUTPATIENT)
Dept: GERIATRICS | Facility: OTHER | Age: 80
End: 2025-02-04
Payer: COMMERCIAL

## 2025-02-04 DIAGNOSIS — I42.8 NONISCHEMIC CARDIOMYOPATHY (HCC): Primary | ICD-10-CM

## 2025-02-04 DIAGNOSIS — R41.89 COGNITIVE IMPAIRMENT: ICD-10-CM

## 2025-02-04 DIAGNOSIS — E11.42 TYPE 2 DIABETES MELLITUS WITH DIABETIC POLYNEUROPATHY, WITHOUT LONG-TERM CURRENT USE OF INSULIN (HCC): ICD-10-CM

## 2025-02-04 DIAGNOSIS — F32.A DEPRESSION, UNSPECIFIED DEPRESSION TYPE: ICD-10-CM

## 2025-02-04 DIAGNOSIS — I50.42 CHRONIC COMBINED SYSTOLIC AND DIASTOLIC HEART FAILURE (HCC): ICD-10-CM

## 2025-02-04 DIAGNOSIS — I48.0 PAROXYSMAL ATRIAL FIBRILLATION (HCC): Chronic | ICD-10-CM

## 2025-02-04 DIAGNOSIS — N18.4 STAGE 4 CHRONIC KIDNEY DISEASE (HCC): ICD-10-CM

## 2025-02-04 PROCEDURE — 99309 SBSQ NF CARE MODERATE MDM 30: CPT | Performed by: NURSE PRACTITIONER

## 2025-02-04 RX ORDER — PRAVASTATIN SODIUM 10 MG
10 TABLET ORAL DAILY
Qty: 30 TABLET | Refills: 0 | Status: SHIPPED | OUTPATIENT
Start: 2025-02-04

## 2025-02-10 ENCOUNTER — PATIENT OUTREACH (OUTPATIENT)
Dept: CASE MANAGEMENT | Facility: OTHER | Age: 80
End: 2025-02-10

## 2025-02-10 NOTE — ASSESSMENT & PLAN NOTE
Lab Results   Component Value Date    HGBA1C 6.0 (H) 01/18/2025     Per geriatric guidelines, goal HgA1c is > 7.5 to prevent hypoglycemic event in the older adult with cognitive decline  A1c acceptable   Continue Gabapentin 600 mg BID (previously on 700 mg BID)  Continue Diet controlled   Continue Accu checks daily

## 2025-02-10 NOTE — ASSESSMENT & PLAN NOTE
Lab Results   Component Value Date    EGFR 38 01/20/2025    EGFR 35 01/19/2025    EGFR 39 01/18/2025    CREATININE 1.29 01/20/2025    CREATININE 1.38 (H) 01/19/2025    CREATININE 1.27 01/18/2025     Baseline creatinine around 1.1-1.3  Monitor BMP's at rehab---> creat 1.3-- stable   Encourage fluids  Avoid hypotension, Avoid NSAIDs  Renally dose medications as appropriate

## 2025-02-10 NOTE — ASSESSMENT & PLAN NOTE
Wt Readings from Last 3 Encounters:   01/28/25 90.3 kg (199 lb)   01/24/25 90.3 kg (199 lb)   01/22/25 93.4 kg (206 lb)     Appears euvolemic on exam   Not on diuretics   Monitor weights, vitals, BMP at rehab

## 2025-02-10 NOTE — PROGRESS NOTES
[facility]  [address] [place] [address]  [phone]  FACILITY: [facility]  Code 31 (STR)      NAME: Laura Sarkar  AGE: 79 y.o. SEX: female CODE STATUS: CPR    DATE OF ENCOUNTER: 2/4/2025    Assessment and Plan     1. Nonischemic cardiomyopathy (HCC)  Assessment & Plan:  EF 40%   S/P Bi-V ICD (placed Jan. 2022)  Denies any CP/SOB/Palpitations   2. Chronic combined systolic and diastolic heart failure (HCC)  Assessment & Plan:  Wt Readings from Last 3 Encounters:   01/28/25 90.3 kg (199 lb)   01/24/25 90.3 kg (199 lb)   01/22/25 93.4 kg (206 lb)     Appears euvolemic on exam   Not on diuretics   Monitor weights, vitals, BMP at rehab          3. Paroxysmal atrial fibrillation (HCC)  Assessment & Plan:  HR regular and controlled on exam   Continue Eliquis 5 mg BID for AC  Continue Metoprolol Succ 25 mg daily with hold parameters- monitor for hypotension as this med was initally changed to lopressor 12.5 mg bid while inpatient   Monitor vitals and adjust medications accordingly   OP f/u with PCP and Cardiology upon d/c from rehab  4. Type 2 diabetes mellitus with diabetic polyneuropathy, without long-term current use of insulin (HCC)  Assessment & Plan:    Lab Results   Component Value Date    HGBA1C 6.0 (H) 01/18/2025     Per geriatric guidelines, goal HgA1c is > 7.5 to prevent hypoglycemic event in the older adult with cognitive decline  A1c acceptable   Continue Gabapentin 600 mg BID (previously on 700 mg BID)  Continue Diet controlled   Continue Accu checks daily     5. Stage 4 chronic kidney disease (HCC)  Assessment & Plan:    Lab Results   Component Value Date    EGFR 38 01/20/2025    EGFR 35 01/19/2025    EGFR 39 01/18/2025    CREATININE 1.29 01/20/2025    CREATININE 1.38 (H) 01/19/2025    CREATININE 1.27 01/18/2025     Baseline creatinine around 1.1-1.3  Monitor BMP's at rehab---> creat 1.3-- stable   Encourage fluids  Avoid hypotension, Avoid NSAIDs  Renally dose medications  as appropriate   6. Depression, unspecified depression type  Assessment & Plan:  Mood stable on exam   Denies SI/HI  Continue home med Effexor 75 mg BID ( recent dose reduction while inpatient)  7. Cognitive impairment  Assessment & Plan:  Recent MoCa 13/30  AAOx2-3 on exam , forgetful , clear coherent speech, able to make her needs known  She was evaluated by speech at rehab with BCAT score 40/50 with STM deficits and deficits in processing  Provide redirection, reorientation, distraction techniques  Fall Precautions  Assist with ADLs/IADLs  Avoid deliriogenic medications such as tramadol, benzodiazepines, anticholinergics, benadryl  Encourage Hydration/ Nutrition  Implement sleep hygiene, limit night time interuptions   Encourage participation in group activities when appropriate        All medications and routine orders were reviewed and updated as needed.    Chief Complaint     STR follow up visit    Past Medical and Surgica History      Past Medical History:   Diagnosis Date    Aortic stenosis     Arthritis     Asthma     Basal cell carcinoma 10/24/2023    right lateral forehead, mohs    Basal cell carcinoma 10/24/2023    right medial forehead, mohs    Coronary artery disease     Diabetes mellitus (HCC)     Diabetic peripheral neuropathy (HCC)     Last assessed - 1/27/16    Gallbladder disease     Heart attack (HCC) 07/1999    Hemorrhoids     Last assessed - 11/16/12    Hypoglycemia 12/23/2021    Myocardial infarction (HCC)     Old myocardial infarction     Type 2 diabetes mellitus with autonomic neuropathy (HCC)     Unspec long term insulin use status, Last assessed - 6/8/17     Past Surgical History:   Procedure Laterality Date    BUNIONECTOMY      CARDIAC CATHETERIZATION      Carotid Artery, Resolved - 7/1/13    CARDIAC CATHETERIZATION N/A 12/27/2021    Procedure: CARDIAC CATHETERIZATION ;  Surgeon: Chucky Tadeo MD;  [facility];  Service: Cardiology    CARDIAC CATHETERIZATION N/A  12/27/2021    Procedure: Cardiac Coronary Angiogram;  Surgeon: Chucky Tadeo MD;  [facility];  Service: Cardiology    CARDIAC ELECTROPHYSIOLOGY PROCEDURE N/A 01/18/2022    Procedure: Cardiac biv icd implant;  Surgeon: Harley Rollins DO;  [facility];  Service: Cardiology    CARDIAC ELECTROPHYSIOLOGY PROCEDURE N/A 7/29/2024    Procedure: Cardiac eps/av node ablation;  Surgeon: Jose Carter MD;  [facility];  Service: Cardiology    CARDIOVASCULAR STRESS TEST  09/1999    CHOLECYSTECTOMY      COLONOSCOPY  2017    FOOT ARTHRODESIS, MODIFIED DONOHUE  2016    GALLBLADDER SURGERY  1970    HEMORROIDECTOMY      KNEE ARTHROSCOPY Left 2004    MOHS SURGERY Right 01/10/2024    right lateral and medial forehead (combined), Dr Berman    ME COLONOSCOPY FLX DX W/COLLJ SPEC WHEN PFRMD N/A 08/21/2017    Procedure: COLONOSCOPY;  Surgeon: Ady Wilkes MD;  Location: [facility];  Service: Colorectal    REPAIR KNEE LIGAMENT      REPAIR KNEE LIGAMENT Left 1986    REPAIR KNEE LIGAMENT Right 1988     Allergies   Allergen Reactions    Lyrica [Pregabalin] Swelling     Pt does not remember    Sulfa Antibiotics Swelling     Pt does not remember          History of Present Illness     Laura Sarkar is a 79 y.o. female admitted to [facility] following hospital stay for mechanical fall. Patient has a past medical Hx including but not limited to DM2, Afib on Eliquis, Hypothyroidism, Pulm Fibrosis, Chronic Renal insufficiency, depression . Patient is seen in collaboration with nursing for medical mgmt and STR follow up.     Patient initially presented to hospital s/p recurrent falls, she had sustained a fall went to ED sent home, where she lives alone, she fell again and returned to ED after a fall in her bathroom after calling EMS. Imaging revealed right posterior thoracoabdominal wall subcutaneous hematoma, she also was anemic. Patient on Eliquis for Aifb, was  hypotensive in ED as well. A compression dressing applied around abdomen and Eliquis was revered with Kcentra and she was transfused with 1 unit PRBCs with improvement of her blood pressure.  Her home dose of metoprolol was decreased to 12.5 mg. Patient had MocA test done by therapy and scored 13/30. She was recommended to stop driving and not safe to live alone. She uses a walker at times but mostly w/c for mobility at home, she needs assistance with her IADLs. Her sister assists with shopping and medication management. She was recommended discharge to post acute rehab.     Seen and examined at bedside today. Patient is oob in w/c, working with PT today. Patient is able to provide a limited reliable history due to some short term memory loss. She is AAOx2-3 on exam. She does not appear in distress. She states therapy is going well, she offers no complaints on exam.  No concerns from nursing at this time.                    The patient's allergies, past medical, surgical, social and family history were reviewed and unchanged.    Review of Systems     Review of Systems   All other systems reviewed and are negative.        Objective     Vitals:   There were no vitals filed for this visit.        Physical Exam  Vitals and nursing note reviewed.   Constitutional:       General: She is not in acute distress.     Appearance: Normal appearance. She is obese.   HENT:      Head: Normocephalic and atraumatic.      Nose: No congestion or rhinorrhea.      Mouth/Throat:      Mouth: Mucous membranes are moist.   Eyes:      General: No scleral icterus.     Conjunctiva/sclera: Conjunctivae normal.      Pupils: Pupils are equal, round, and reactive to light.   Cardiovascular:      Rate and Rhythm: Normal rate and regular rhythm.      Pulses: Normal pulses.      Heart sounds: Normal heart sounds. No murmur heard.  Pulmonary:      Effort: Pulmonary effort is normal. No respiratory distress.      Breath sounds: Normal breath sounds. No  wheezing, rhonchi or rales.   Abdominal:      General: Bowel sounds are normal. There is no distension.      Palpations: Abdomen is soft. There is no mass.      Tenderness: There is no abdominal tenderness.      Hernia: No hernia is present.   Musculoskeletal:         General: No swelling or tenderness.   Lymphadenopathy:      Cervical: No cervical adenopathy.   Skin:     General: Skin is warm and dry.      Coloration: Skin is not pale.      Findings: Bruising (b/l lower extremities, right flank/buttocks) present. No rash.   Neurological:      General: No focal deficit present.      Mental Status: She is alert and oriented to person, place, and time. Mental status is at baseline.      Motor: Weakness present.      Gait: Gait abnormal.   Psychiatric:         Mood and Affect: Mood normal.         Behavior: Behavior normal.       Pertinent Laboratory/Diagnostic Studies:     Reviewed in facility chart      Current Medications   Medications reviewed and updated see facility MAR for details.      Current Outpatient Medications:     apixaban (Eliquis) 5 mg, TAKE 1 TABLET BY MOUTH EVERY 12 HOURS, Disp: 60 tablet, Rfl: 5    gabapentin (NEURONTIN) 300 mg capsule, Take 2 capsules (600 mg total) by mouth 2 (two) times a day, Disp: , Rfl:     levothyroxine 125 mcg tablet, TAKE 1 TABLET BY MOUTH EVERY MORNING, Disp: 30 tablet, Rfl: 5    losartan (COZAAR) 25 mg tablet, TAKE 1 TABLET BY MOUTH EVERY MORNING, Disp: 90 tablet, Rfl: 1    metoprolol tartrate (LOPRESSOR) 25 mg tablet, Take 0.5 tablets (12.5 mg total) by mouth every 12 (twelve) hours, Disp: , Rfl:     polyethylene glycol (MIRALAX) 17 g packet, Take 17 g by mouth daily, Disp: 510 g, Rfl: 0    pravastatin (PRAVACHOL) 10 mg tablet, TAKE 1 TABLET BY MOUTH EVERY DAY, Disp: 30 tablet, Rfl: 0    senna-docusate sodium (SENOKOT S) 8.6-50 mg per tablet, Take 1 tablet by mouth daily at bedtime, Disp: 30 tablet, Rfl: 0    venlafaxine (EFFEXOR) 75 mg tablet, Take 1 tablet (75 mg  "total) by mouth 2 (two) times a day with meals, Disp: 60 tablet, Rfl: 0     Please note:  Voice-recognition software may have been used in the preparation of this document.  Occasional wrong word or \"sound-alike\" substitutions may have occurred due to the inherent limitations of voice recognition software.  Interpretation should be guided by context.         PONCE Huynh  2/4/2024    "

## 2025-02-10 NOTE — ASSESSMENT & PLAN NOTE
HR regular and controlled on exam   Continue Eliquis 5 mg BID for AC  Continue Metoprolol Succ 25 mg daily with hold parameters- monitor for hypotension as this med was initally changed to lopressor 12.5 mg bid while inpatient   Monitor vitals and adjust medications accordingly   OP f/u with PCP and Cardiology upon d/c from rehab

## 2025-02-11 NOTE — PROGRESS NOTES
[facility]  [address] [address]  [phone]  DISCHARGE SUMMARY  FACILITY: [facility]  Code 31    NAME: Laura Sarkar  AGE: 79 y.o. SEX: female   CODE STATUS: CPR    DATE OF ADMISSION: 1/21/2025   DATE OF DISCHARGE: 2/15/2025   DISCHARGE DISPOSITION: Stable for discharge to home with family support and home health PT/OT/SN services.       Reason for Admission: Patient was admitted to [facility] for rehabilitation after hospitalization for mechanical fall.    Past Medical and Surgical History:   Past Medical History:   Diagnosis Date    Aortic stenosis     Arthritis     Asthma     Basal cell carcinoma 10/24/2023    right lateral forehead, mohs    Basal cell carcinoma 10/24/2023    right medial forehead, mohs    Coronary artery disease     Diabetes mellitus (HCC)     Diabetic peripheral neuropathy (HCC)     Last assessed - 1/27/16    Gallbladder disease     Heart attack (HCC) 07/1999    Hemorrhoids     Last assessed - 11/16/12    Hypoglycemia 12/23/2021    Myocardial infarction (HCC)     Old myocardial infarction     Type 2 diabetes mellitus with autonomic neuropathy (HCC)     Unspec long term insulin use status, Last assessed - 6/8/17      Past Surgical History:   Procedure Laterality Date    BUNIONECTOMY      CARDIAC CATHETERIZATION      Carotid Artery, Resolved - 7/1/13    CARDIAC CATHETERIZATION N/A 12/27/2021    Procedure: CARDIAC CATHETERIZATION ;  Surgeon: Chucky Tadeo MD;  [facility]: [facility];  Service: Cardiology    CARDIAC CATHETERIZATION N/A 12/27/2021    Procedure: Cardiac Coronary Angiogram;  Surgeon: Chucky Tadeo MD;  [facility]: [facility];  Service: Cardiology    CARDIAC ELECTROPHYSIOLOGY PROCEDURE N/A 01/18/2022    Procedure: Cardiac biv icd implant;  Surgeon: Harley Rollins DO;  Location: [facility];  Service: Cardiology    CARDIAC ELECTROPHYSIOLOGY PROCEDURE N/A 7/29/2024    Procedure: Cardiac eps/av node  ablation;  Surgeon: Jose Carter MD;  Location: [facility];  Service: Cardiology    CARDIOVASCULAR STRESS TEST  09/1999    CHOLECYSTECTOMY      COLONOSCOPY  2017    FOOT ARTHRODESIS, MODIFIED DONOHUE  2016    GALLBLADDER SURGERY  1970    HEMORROIDECTOMY      KNEE ARTHROSCOPY Left 2004    MOHS SURGERY Right 01/10/2024    right lateral and medial forehead (combined), Dr Berman    CO COLONOSCOPY FLX DX W/COLLJ SPEC WHEN PFRMD N/A 08/21/2017    Procedure: COLONOSCOPY;  Surgeon: Ady Wilkes MD;  Location: [facility] GI [facility];  Service: Colorectal    REPAIR KNEE LIGAMENT      REPAIR KNEE LIGAMENT Left 1986    REPAIR KNEE LIGAMENT Right 1988       Course of stay:   Laura Sarkar is a 79 y.o. female admitted to [facility] following hospital stay for mechanical fall. Patient has a past medical Hx including but not limited to DM2, Afib on Eliquis, Hypothyroidism, Pulm Fibrosis, Chronic Renal insufficiency, depression . Patient is seen in collaboration with nursing for medical mgmt and discharge visit.     Patient initially presented to hospital s/p recurrent falls, she had sustained a fall went to ED sent home, where she lives alone, she fell again and returned to ED after a fall in her bathroom after calling EMS. Imaging revealed right posterior thoracoabdominal wall subcutaneous hematoma, she also was anemic. Patient on Eliquis for Aifb, was hypotensive in ED as well. A compression dressing applied around abdomen and Eliquis was revered with Kcentra and she was transfused with 1 unit PRBCs with improvement of her blood pressure.  Her home dose of metoprolol was decreased to 12.5 mg. Patient had MocA test done by therapy and scored 13/30. She was recommended to stop driving and not safe to live alone. She uses a walker at times but mostly w/c for mobility at home, she needs assistance with her IADLs. Her sister assists with shopping and medication management. She was recommended discharge to  post acute rehab.     Seen and examined at bedside today. Patient is oob in w/c, at her bedside. She has been observed working with PT. Patient states she is eager to go home this weekend.Patient is able to provide a limited reliable history due to some short term memory loss. She is AAOx2-3 on exam. She does not appear in distress. She states, overall she is doing well, she states therapy is going well, she offers no complaints on exam.  No concerns from nursing at this time.                    ROS:  Review of Systems   All other systems reviewed and are negative.      PHYSICAL EXAM:  VITALS:   Vitals:    02/13/25 1258   BP: 122/69   Pulse: 80   Resp: 16   Temp: 97.6 °F (36.4 °C)   SpO2: 96%        Physical Exam  Vitals and nursing note reviewed.   Constitutional:       General: She is not in acute distress.     Appearance: Normal appearance. She is obese.   HENT:      Head: Normocephalic and atraumatic.      Nose: No congestion or rhinorrhea.      Mouth/Throat:      Mouth: Mucous membranes are moist.   Eyes:      General: No scleral icterus.     Conjunctiva/sclera: Conjunctivae normal.      Pupils: Pupils are equal, round, and reactive to light.   Cardiovascular:      Rate and Rhythm: Normal rate and regular rhythm.      Pulses: Normal pulses.      Heart sounds: Normal heart sounds. No murmur heard.  Pulmonary:      Effort: Pulmonary effort is normal. No respiratory distress.      Breath sounds: Normal breath sounds. No wheezing, rhonchi or rales.   Abdominal:      General: Bowel sounds are normal. There is no distension.      Palpations: Abdomen is soft. There is no mass.      Tenderness: There is no abdominal tenderness.      Hernia: No hernia is present.   Musculoskeletal:         General: No swelling or tenderness.   Lymphadenopathy:      Cervical: No cervical adenopathy.   Skin:     General: Skin is warm and dry.      Coloration: Skin is not pale.      Findings: Bruising (b/l lower extremities, right  flank/buttocks) present. No rash.   Neurological:      General: No focal deficit present.      Mental Status: She is alert and oriented to person, place, and time. Mental status is at baseline.      Motor: Weakness present.      Gait: Gait abnormal.   Psychiatric:         Mood and Affect: Mood normal.         Behavior: Behavior normal.         Admission Diagnoses:   1. Nonischemic cardiomyopathy (HCC)  Assessment & Plan:  EF 40%   S/P Bi-V ICD (placed Jan. 2022)  Denies any CP/SOB/Palpitations   Orders:  -     metoprolol succinate (TOPROL-XL) 25 mg 24 hr tablet; Take 1 tablet (25 mg total) by mouth daily  2. Chronic combined systolic and diastolic heart failure (HCC)  Assessment & Plan:  Wt Readings from Last 3 Encounters:   02/13/25 89.7 kg (197 lb 12.8 oz)   01/28/25 90.3 kg (199 lb)   01/24/25 90.3 kg (199 lb)     Appears euvolemic on exam   Not on diuretics   Monitor weights, vitals, BMP at rehab          Orders:  -     metoprolol succinate (TOPROL-XL) 25 mg 24 hr tablet; Take 1 tablet (25 mg total) by mouth daily  3. Paroxysmal atrial fibrillation (HCC)  Assessment & Plan:  HR regular and controlled on exam   Continue Eliquis 5 mg BID for AC  Continue Metoprolol Succ 25 mg daily with hold parameters- monitor for hypotension as this med was initally changed to lopressor 12.5 mg bid while inpatient   Monitor vitals and adjust medications accordingly   OP f/u with PCP and Cardiology upon d/c from rehab  Orders:  -     metoprolol succinate (TOPROL-XL) 25 mg 24 hr tablet; Take 1 tablet (25 mg total) by mouth daily  4. Type 2 diabetes mellitus with diabetic polyneuropathy, without long-term current use of insulin (HCC)  Assessment & Plan:    Lab Results   Component Value Date    HGBA1C 6.0 (H) 01/18/2025     Per geriatric guidelines, goal HgA1c is > 7.5 to prevent hypoglycemic event in the older adult with cognitive decline  A1c acceptable   Continue Gabapentin 600 mg BID (previously on 700 mg BID)  Continue Diet  controlled   Continue Accu checks daily     Orders:  -     gabapentin (NEURONTIN) 300 mg capsule; Take 2 capsules (600 mg total) by mouth 2 (two) times a day  5. Hypothyroidism, unspecified type  Assessment & Plan:  TSH stable   Continue Levothyroxine 125 mcg daily   6. R posterior thoracoabdominal wall subcutaneous hematoma  Assessment & Plan:  S/p fall at home onto her buttocks and back   Imaging revealed 10.3 x 5.7 x 9.2 cm hematoma of right posterolateral thoracoabdominal wall   Kcentra given in ED to reverse her Eliquis   Abdominal binder applied in ED   She did require 1 units PRBC's during this hospital say and was hypotensive with adjustment of her medications  Resumed on Eliquis  CBC remains stable at rehab  7. Stage 4 chronic kidney disease (HCC)  Assessment & Plan:    Lab Results   Component Value Date    EGFR 38 01/20/2025    EGFR 35 01/19/2025    EGFR 39 01/18/2025    CREATININE 1.29 01/20/2025    CREATININE 1.38 (H) 01/19/2025    CREATININE 1.27 01/18/2025     Baseline creatinine around 1.1-1.3  Monitor BMP's at rehab---> creat 1.3-- stable   Encourage fluids  Avoid hypotension, Avoid NSAIDs  Renally dose medications as appropriate   8. Depression, unspecified depression type  Assessment & Plan:  Mood stable on exam   Denies SI/HI  Continue home med Effexor 75 mg BID ( recent dose reduction while inpatient)  Orders:  -     venlafaxine (EFFEXOR) 75 mg tablet; Take 1 tablet (75 mg total) by mouth 2 (two) times a day with meals  9. Cognitive impairment  Assessment & Plan:  Recent MoCa 13/30  AAOx2-3 on exam , forgetful , clear coherent speech, able to make her needs known  She was evaluated by speech at rehab with BCAT score 40/50 with STM deficits and deficits in processing  Provide redirection, reorientation, distraction techniques  Fall Precautions  Assist with ADLs/IADLs  Avoid deliriogenic medications such as tramadol, benzodiazepines, anticholinergics, benadryl  Encourage Hydration/  Nutrition  Implement sleep hygiene, limit night time interuptions   Encourage participation in group activities when appropriate        Follow-up Recommendations:    Outpatient Follow up with PCP in the next 2 weeks  Home Health PT/OT/SN services     Labs and testing performed during stay:    Reviewed in chart    Discharge Medications: See discharge medication list which was reviewed and signed.      Current Outpatient Medications:     gabapentin (NEURONTIN) 300 mg capsule, Take 2 capsules (600 mg total) by mouth 2 (two) times a day, Disp: 120 capsule, Rfl: 0    metoprolol succinate (TOPROL-XL) 25 mg 24 hr tablet, Take 1 tablet (25 mg total) by mouth daily, Disp: 30 tablet, Rfl: 0    polyethylene glycol (MIRALAX) 17 g packet, Take 17 g by mouth daily, Disp: 510 g, Rfl: 0    pravastatin (PRAVACHOL) 10 mg tablet, TAKE 1 TABLET BY MOUTH EVERY DAY, Disp: 30 tablet, Rfl: 0    senna-docusate sodium (SENOKOT S) 8.6-50 mg per tablet, Take 1 tablet by mouth daily at bedtime, Disp: 30 tablet, Rfl: 0    venlafaxine (EFFEXOR) 75 mg tablet, Take 1 tablet (75 mg total) by mouth 2 (two) times a day with meals, Disp: 60 tablet, Rfl: 0    apixaban (Eliquis) 5 mg, TAKE 1 TABLET BY MOUTH EVERY 12 HOURS, Disp: 60 tablet, Rfl: 5    levothyroxine 125 mcg tablet, TAKE 1 TABLET BY MOUTH EVERY MORNING, Disp: 30 tablet, Rfl: 5    losartan (COZAAR) 25 mg tablet, TAKE 1 TABLET BY MOUTH EVERY MORNING, Disp: 90 tablet, Rfl: 1     Discussion with patient/family and further instructions:  -Fall precautions  -Aspiration precautions  -Bleeding precautions  -Monitor for signs/symptoms of infection  -Medication list was reviewed and signed  -DME form was completed    Status at time of discharge: Stable      Billing based on time. Time spent on unit, 40 minutes. Time spent counseling pt on debility/condition, 30 minutes.      Please note:  Voice-recognition software may have been used in the preparation of this document.  Occasional wrong word or  "\"sound-alike\" substitutions may have occurred due to the inherent limitations of voice recognition software.  Interpretation should be guided by context.        PONCE Huyhn  2/13/2025   "

## 2025-02-12 ENCOUNTER — NURSING HOME VISIT (OUTPATIENT)
Dept: GERIATRICS | Facility: OTHER | Age: 80
End: 2025-02-12
Payer: COMMERCIAL

## 2025-02-12 DIAGNOSIS — N18.4 STAGE 4 CHRONIC KIDNEY DISEASE (HCC): ICD-10-CM

## 2025-02-12 DIAGNOSIS — I50.42 CHRONIC COMBINED SYSTOLIC AND DIASTOLIC HEART FAILURE (HCC): ICD-10-CM

## 2025-02-12 DIAGNOSIS — R41.89 COGNITIVE IMPAIRMENT: ICD-10-CM

## 2025-02-12 DIAGNOSIS — I48.0 PAROXYSMAL ATRIAL FIBRILLATION (HCC): Chronic | ICD-10-CM

## 2025-02-12 DIAGNOSIS — I42.8 NONISCHEMIC CARDIOMYOPATHY (HCC): Primary | ICD-10-CM

## 2025-02-12 DIAGNOSIS — T14.8XXA SUBCUTANEOUS HEMATOMA: ICD-10-CM

## 2025-02-12 DIAGNOSIS — F32.A DEPRESSION, UNSPECIFIED DEPRESSION TYPE: ICD-10-CM

## 2025-02-12 DIAGNOSIS — E03.9 HYPOTHYROIDISM, UNSPECIFIED TYPE: ICD-10-CM

## 2025-02-12 DIAGNOSIS — E11.42 TYPE 2 DIABETES MELLITUS WITH DIABETIC POLYNEUROPATHY, WITHOUT LONG-TERM CURRENT USE OF INSULIN (HCC): ICD-10-CM

## 2025-02-12 PROCEDURE — 99316 NF DSCHRG MGMT 30 MIN+: CPT | Performed by: NURSE PRACTITIONER

## 2025-02-13 ENCOUNTER — HOME HEALTH ADMISSION (OUTPATIENT)
Dept: HOME HEALTH SERVICES | Facility: HOME HEALTHCARE | Age: 80
End: 2025-02-13
Payer: COMMERCIAL

## 2025-02-13 VITALS
WEIGHT: 197.8 LBS | BODY MASS INDEX: 32.92 KG/M2 | TEMPERATURE: 97.6 F | DIASTOLIC BLOOD PRESSURE: 69 MMHG | SYSTOLIC BLOOD PRESSURE: 122 MMHG | HEART RATE: 80 BPM | RESPIRATION RATE: 16 BRPM | OXYGEN SATURATION: 96 %

## 2025-02-13 RX ORDER — VENLAFAXINE 75 MG/1
75 TABLET ORAL 2 TIMES DAILY WITH MEALS
Qty: 60 TABLET | Refills: 0 | Status: SHIPPED | OUTPATIENT
Start: 2025-02-13

## 2025-02-13 RX ORDER — METOPROLOL SUCCINATE 25 MG/1
25 TABLET, EXTENDED RELEASE ORAL DAILY
Qty: 30 TABLET | Refills: 0 | Status: SHIPPED | OUTPATIENT
Start: 2025-02-13

## 2025-02-13 RX ORDER — GABAPENTIN 300 MG/1
600 CAPSULE ORAL 2 TIMES DAILY
Qty: 120 CAPSULE | Refills: 0 | Status: SHIPPED | OUTPATIENT
Start: 2025-02-13

## 2025-02-13 NOTE — ASSESSMENT & PLAN NOTE
S/p fall at home onto her buttocks and back   Imaging revealed 10.3 x 5.7 x 9.2 cm hematoma of right posterolateral thoracoabdominal wall   Kcentra given in ED to reverse her Eliquis   Abdominal binder applied in ED   She did require 1 units PRBC's during this hospital say and was hypotensive with adjustment of her medications  Resumed on Eliquis  CBC remains stable at rehab

## 2025-02-13 NOTE — ASSESSMENT & PLAN NOTE
Wt Readings from Last 3 Encounters:   02/13/25 89.7 kg (197 lb 12.8 oz)   01/28/25 90.3 kg (199 lb)   01/24/25 90.3 kg (199 lb)     Appears euvolemic on exam   Not on diuretics   Monitor weights, vitals, BMP at rehab

## 2025-02-17 ENCOUNTER — HOME CARE VISIT (OUTPATIENT)
Dept: HOME HEALTH SERVICES | Facility: HOME HEALTHCARE | Age: 80
End: 2025-02-17
Payer: COMMERCIAL

## 2025-02-17 VITALS
RESPIRATION RATE: 20 BRPM | SYSTOLIC BLOOD PRESSURE: 120 MMHG | TEMPERATURE: 97 F | HEART RATE: 72 BPM | DIASTOLIC BLOOD PRESSURE: 60 MMHG

## 2025-02-17 PROCEDURE — G0299 HHS/HOSPICE OF RN EA 15 MIN: HCPCS

## 2025-02-17 PROCEDURE — 400013 VN SOC

## 2025-02-18 ENCOUNTER — PATIENT OUTREACH (OUTPATIENT)
Dept: CASE MANAGEMENT | Facility: OTHER | Age: 80
End: 2025-02-18

## 2025-02-18 ENCOUNTER — TELEPHONE (OUTPATIENT)
Age: 80
End: 2025-02-18

## 2025-02-18 ENCOUNTER — HOME CARE VISIT (OUTPATIENT)
Dept: HOME HEALTH SERVICES | Facility: HOME HEALTHCARE | Age: 80
End: 2025-02-18
Payer: COMMERCIAL

## 2025-02-18 PROCEDURE — G0151 HHCP-SERV OF PT,EA 15 MIN: HCPCS

## 2025-02-18 NOTE — PROGRESS NOTES
Update obtained form PCC the patient discharged 2/15/25 to Home with [facility]. I have removed myself off of the care team and sent an inbasket to the appropriate care  to notifying them of the SNF discharge and TIESHA/SNF Pathway.   Nursing home visit in EPIC under encounters on 2/10/25.

## 2025-02-18 NOTE — TELEPHONE ENCOUNTER
[facility] calls to request a verbal order for a home health  to look into options for care giver assistance.    Please call  with the verbal order.

## 2025-02-19 ENCOUNTER — PATIENT OUTREACH (OUTPATIENT)
Dept: CASE MANAGEMENT | Facility: OTHER | Age: 80
End: 2025-02-19

## 2025-02-19 ENCOUNTER — PATIENT OUTREACH (OUTPATIENT)
Dept: CASE MANAGEMENT | Facility: HOSPITAL | Age: 80
End: 2025-02-19

## 2025-02-19 ENCOUNTER — HOME CARE VISIT (OUTPATIENT)
Dept: HOME HEALTH SERVICES | Facility: HOME HEALTHCARE | Age: 80
End: 2025-02-19
Payer: COMMERCIAL

## 2025-02-20 ENCOUNTER — HOME CARE VISIT (OUTPATIENT)
Dept: HOME HEALTH SERVICES | Facility: HOME HEALTHCARE | Age: 80
End: 2025-02-20
Payer: COMMERCIAL

## 2025-02-20 ENCOUNTER — TELEPHONE (OUTPATIENT)
Age: 80
End: 2025-02-20

## 2025-02-20 ENCOUNTER — PATIENT OUTREACH (OUTPATIENT)
Dept: CASE MANAGEMENT | Facility: HOSPITAL | Age: 80
End: 2025-02-20

## 2025-02-20 VITALS
DIASTOLIC BLOOD PRESSURE: 80 MMHG | HEART RATE: 72 BPM | SYSTOLIC BLOOD PRESSURE: 116 MMHG | TEMPERATURE: 97.8 F | RESPIRATION RATE: 18 BRPM

## 2025-02-20 DIAGNOSIS — N17.9 AKI (ACUTE KIDNEY INJURY) (HCC): Primary | ICD-10-CM

## 2025-02-20 PROCEDURE — G0300 HHS/HOSPICE OF LPN EA 15 MIN: HCPCS

## 2025-02-20 NOTE — TELEPHONE ENCOUNTER
Scheduled patient for virtual (patient having ambulation issues)  for Monday 3 pm 30 min, unable to do as TCM because it passed 48 hours from discharge. Granddaughter is noticing some decline in memory and has some concerns. She will come on screen first to discuss and then bring patient into visit.    1

## 2025-02-20 NOTE — TELEPHONE ENCOUNTER
Patients grandchild Kathy calls in.  Patient was just discharged from rehab after being hospitalized.    Kathy would like to make an appointment to go over plan of care.    Patient needs TCM appointment.    Please advise and call Kathy with appointment details.

## 2025-02-20 NOTE — PROGRESS NOTES
TIESHA/SNF Geriatric Pathway handoff/referral received. Chart reviewed. [facility] -. Patient sustained right thoracoabdominal wall subcutaneous hematoma. Cognitively impaired due to possible polypharmacy with gabapentin and effexor.      Patient discharged to [facility] on  and discharged to home with [facility] on 2/15.    Call placed to patient and spoke with patients granddaughter Kathy. Kathy states she and her 5 year old daughter just moves in with patient and she is helping care for her. She says patient is doing okay but is week and is unable to get around very well. She is able to pivot into a wc. She says that patient has been some what confused at times. She says she has been managing her medications since she got home. We reviewed patients medications and she says patient is taking them as directed. She has not needed the Miralax or Senna ordered. She has all other meds and is taking them as directed. She denies any questions or concerns at this time regarding. Kathy say patient breathing has been good. No c/o sob, cp or chest tightness. No swelling. She says that the visiting nurse was out today and listened to her lungs and said she had a little wheezing otherwise they were clear.   We discussed TIESHA and referral received and follow up. She says she is concerned about patients kidney function. She says her grandfather  of kidney disease and was on hemodialysis. She says that her urine out put has been less then usual. She says she has not notified the dr yet. She is aware patient needs to be seen by PCP and she says she will call and schedule. She states she is unsure if they will be able to get pt out to the car because she isnt getting around easily. We discussed seeing if pcp could do a Virtual Apt. I told her I would message pcp regarding decreased urine out put and virtual apt. She will call as well.  She is aware BW was ordered and [facility] can draw  it.    Goals/Interventions  PCP follow up 7 days of dc from SNF-grandshiva Chavez to call and schedule-IB also sent to PCP  Nephrology follow up 7 days of dc from SNF (if consulted IP or seen as OP prior)-patient does not have Nephrologist and no Nephrology consult as IP    Goals:  Medications Reviewed: Yes-reviewed with Kathy 2/20  Monitor BP (goal for SBP not to drop below 130)-Kathy is aware and says she will start checking daily  Weight daily (first weight at home will be baseline)-Kathy is aware and says she will start checking daily  Monitor temperature-Kathy is aware and says she will start checking daily   Appetite/hydration-okay  Monitor for edema/swelling-none at this time  No NSAIDS-Kathy states understanding.    BMP ordered and to be drawn by [facility]-msg sent to Dorys Olea LPN regarding request.    An IB message sent to PCP and office staff regarding decreased urine OP and need for Virtual apt if possible.    Patient granddaaddis Chavez was appreciative of call and is agreeable to CCM services and follow up.    RNCM to f/u next week.

## 2025-02-21 ENCOUNTER — TELEPHONE (OUTPATIENT)
Age: 80
End: 2025-02-21

## 2025-02-21 ENCOUNTER — HOME CARE VISIT (OUTPATIENT)
Dept: HOME HEALTH SERVICES | Facility: HOME HEALTHCARE | Age: 80
End: 2025-02-21
Payer: COMMERCIAL

## 2025-02-21 NOTE — TELEPHONE ENCOUNTER
Aaliyah visiting  nurse called regarding  a fax that was sent to the office yesterday requesting a order for a bedside commode. Please advise.

## 2025-02-24 ENCOUNTER — HOME CARE VISIT (OUTPATIENT)
Dept: HOME HEALTH SERVICES | Facility: HOME HEALTHCARE | Age: 80
End: 2025-02-24
Payer: COMMERCIAL

## 2025-02-24 ENCOUNTER — TELEMEDICINE (OUTPATIENT)
Dept: FAMILY MEDICINE CLINIC | Facility: CLINIC | Age: 80
End: 2025-02-24
Payer: COMMERCIAL

## 2025-02-24 ENCOUNTER — APPOINTMENT (OUTPATIENT)
Dept: LAB | Facility: CLINIC | Age: 80
End: 2025-02-24
Payer: COMMERCIAL

## 2025-02-24 ENCOUNTER — TRANSCRIBE ORDERS (OUTPATIENT)
Dept: LAB | Facility: CLINIC | Age: 80
End: 2025-02-24

## 2025-02-24 VITALS — DIASTOLIC BLOOD PRESSURE: 70 MMHG | SYSTOLIC BLOOD PRESSURE: 115 MMHG

## 2025-02-24 VITALS
HEIGHT: 65 IN | BODY MASS INDEX: 31.82 KG/M2 | SYSTOLIC BLOOD PRESSURE: 117 MMHG | WEIGHT: 191 LBS | DIASTOLIC BLOOD PRESSURE: 71 MMHG

## 2025-02-24 VITALS
OXYGEN SATURATION: 98 % | SYSTOLIC BLOOD PRESSURE: 98 MMHG | DIASTOLIC BLOOD PRESSURE: 62 MMHG | HEART RATE: 82 BPM | RESPIRATION RATE: 16 BRPM | TEMPERATURE: 97.8 F

## 2025-02-24 DIAGNOSIS — I48.0 PAROXYSMAL ATRIAL FIBRILLATION (HCC): Primary | Chronic | ICD-10-CM

## 2025-02-24 DIAGNOSIS — I50.42 CHRONIC COMBINED SYSTOLIC AND DIASTOLIC HEART FAILURE (HCC): ICD-10-CM

## 2025-02-24 DIAGNOSIS — N17.9 ACUTE RENAL FAILURE, UNSPECIFIED ACUTE RENAL FAILURE TYPE (HCC): ICD-10-CM

## 2025-02-24 DIAGNOSIS — N18.4 STAGE 4 CHRONIC KIDNEY DISEASE (HCC): ICD-10-CM

## 2025-02-24 DIAGNOSIS — E11.42 TYPE 2 DIABETES MELLITUS WITH DIABETIC POLYNEUROPATHY, WITHOUT LONG-TERM CURRENT USE OF INSULIN (HCC): ICD-10-CM

## 2025-02-24 DIAGNOSIS — N17.9 ACUTE RENAL FAILURE, UNSPECIFIED ACUTE RENAL FAILURE TYPE (HCC): Primary | ICD-10-CM

## 2025-02-24 LAB
ANION GAP SERPL CALCULATED.3IONS-SCNC: 7 MMOL/L (ref 4–13)
BUN SERPL-MCNC: 25 MG/DL (ref 5–25)
CALCIUM SERPL-MCNC: 9.2 MG/DL (ref 8.4–10.2)
CHLORIDE SERPL-SCNC: 105 MMOL/L (ref 96–108)
CO2 SERPL-SCNC: 27 MMOL/L (ref 21–32)
CREAT SERPL-MCNC: 1.29 MG/DL (ref 0.6–1.3)
GFR SERPL CREATININE-BSD FRML MDRD: 38 ML/MIN/1.73SQ M
GLUCOSE P FAST SERPL-MCNC: 165 MG/DL (ref 65–99)
POTASSIUM SERPL-SCNC: 4.9 MMOL/L (ref 3.5–5.3)
SODIUM SERPL-SCNC: 139 MMOL/L (ref 135–147)

## 2025-02-24 PROCEDURE — 36415 COLL VENOUS BLD VENIPUNCTURE: CPT

## 2025-02-24 PROCEDURE — G2211 COMPLEX E/M VISIT ADD ON: HCPCS | Performed by: FAMILY MEDICINE

## 2025-02-24 PROCEDURE — 99213 OFFICE O/P EST LOW 20 MIN: CPT | Performed by: FAMILY MEDICINE

## 2025-02-24 PROCEDURE — 80048 BASIC METABOLIC PNL TOTAL CA: CPT

## 2025-02-24 PROCEDURE — G0152 HHCP-SERV OF OT,EA 15 MIN: HCPCS

## 2025-02-24 PROCEDURE — G0299 HHS/HOSPICE OF RN EA 15 MIN: HCPCS

## 2025-02-25 ENCOUNTER — HOME CARE VISIT (OUTPATIENT)
Dept: HOME HEALTH SERVICES | Facility: HOME HEALTHCARE | Age: 80
End: 2025-02-25
Payer: COMMERCIAL

## 2025-02-25 VITALS — HEART RATE: 82 BPM | SYSTOLIC BLOOD PRESSURE: 88 MMHG | DIASTOLIC BLOOD PRESSURE: 62 MMHG | OXYGEN SATURATION: 97 %

## 2025-02-25 PROCEDURE — G0180 MD CERTIFICATION HHA PATIENT: HCPCS | Performed by: FAMILY MEDICINE

## 2025-02-25 PROCEDURE — G0156 HHCP-SVS OF AIDE,EA 15 MIN: HCPCS

## 2025-02-25 PROCEDURE — G0151 HHCP-SERV OF PT,EA 15 MIN: HCPCS

## 2025-02-26 ENCOUNTER — HOME CARE VISIT (OUTPATIENT)
Dept: HOME HEALTH SERVICES | Facility: HOME HEALTHCARE | Age: 80
End: 2025-02-26
Payer: COMMERCIAL

## 2025-02-26 ENCOUNTER — RESULTS FOLLOW-UP (OUTPATIENT)
Dept: FAMILY MEDICINE CLINIC | Facility: CLINIC | Age: 80
End: 2025-02-26

## 2025-02-26 PROCEDURE — G0155 HHCP-SVS OF CSW,EA 15 MIN: HCPCS

## 2025-02-27 ENCOUNTER — HOME CARE VISIT (OUTPATIENT)
Dept: HOME HEALTH SERVICES | Facility: HOME HEALTHCARE | Age: 80
End: 2025-02-27
Payer: COMMERCIAL

## 2025-02-27 ENCOUNTER — APPOINTMENT (OUTPATIENT)
Dept: LAB | Facility: CLINIC | Age: 80
End: 2025-02-27
Payer: COMMERCIAL

## 2025-02-27 ENCOUNTER — PATIENT OUTREACH (OUTPATIENT)
Dept: CASE MANAGEMENT | Facility: HOSPITAL | Age: 80
End: 2025-02-27

## 2025-02-27 VITALS
TEMPERATURE: 97.5 F | DIASTOLIC BLOOD PRESSURE: 68 MMHG | HEART RATE: 83 BPM | RESPIRATION RATE: 16 BRPM | SYSTOLIC BLOOD PRESSURE: 114 MMHG | OXYGEN SATURATION: 96 %

## 2025-02-27 DIAGNOSIS — N17.9 AKI (ACUTE KIDNEY INJURY) (HCC): ICD-10-CM

## 2025-02-27 LAB
CREAT UR-MCNC: 100.6 MG/DL
MICROALBUMIN UR-MCNC: <7 MG/L

## 2025-02-27 PROCEDURE — G0299 HHS/HOSPICE OF RN EA 15 MIN: HCPCS

## 2025-02-27 PROCEDURE — 82043 UR ALBUMIN QUANTITATIVE: CPT

## 2025-02-27 PROCEDURE — 82570 ASSAY OF URINE CREATININE: CPT

## 2025-02-27 PROCEDURE — G0156 HHCP-SVS OF AIDE,EA 15 MIN: HCPCS

## 2025-02-27 NOTE — PROGRESS NOTES
TIESHA/SNF Geriatric Pathway follow up. (TIESHA 30 day Surveillance Period 1/22-2/21)    Call placed to patients granddaughter Kathy. She says she is having good days and bad days. She says she is not weighing patient because she is too unsteady on feet. She says patient has had no issues with breathing and no swelling. Her BP has been good. She checks it on days the [facility] is not there. Today BP was 113/68. She says she is avoiding NSAID's. BW was drawn by [facility]. She says they are pushing the fluids to keep patient hydrated. BMP-drawn 2/24: BUN 25 Creat 1.29 GFR 38. Kathy says that everything seems to be under control at this point and declines any needs. She has all patients medications and she is taking them as directed. Medications reviewed at last outreach. She is aware this will be my last outreach. I provided her with my contact information and encouraged her to call with any questions or concerns.       Goals/Interventions  Virtual PCP Apt: 2/24-completed-per notes patient with decreased urine output and pt adamant about not wanting dialysis if it comes down to it   Nephrology: N/A (does not have Nephrologist and no Nephrology consult as IP)

## 2025-02-27 NOTE — ASSESSMENT & PLAN NOTE
Wt Readings from Last 3 Encounters:   02/24/25 86.6 kg (191 lb)   02/13/25 89.7 kg (197 lb 12.8 oz)   01/28/25 90.3 kg (199 lb)   Continue with daily weights, low sodium diet.

## 2025-02-27 NOTE — ASSESSMENT & PLAN NOTE
Lab Results   Component Value Date    EGFR 38 02/24/2025    EGFR 38 01/20/2025    EGFR 35 01/19/2025    CREATININE 1.29 02/24/2025    CREATININE 1.29 01/20/2025    CREATININE 1.38 (H) 01/19/2025   Will check chemistry.

## 2025-02-27 NOTE — ADDENDUM NOTE
Addended by: MAXIMILIANO YARBROUGH on: 2/27/2025 07:20 AM     Modules accepted: Level of Service

## 2025-02-27 NOTE — PROGRESS NOTES
Virtual Regular VisitName: Laura Sarkar      : 1945      MRN: [id number]  Encounter Provider: Kalpana Siegel DO  Encounter Date: 2025   Encounter department: [facility]  :  Assessment & Plan  Type 2 diabetes mellitus with diabetic polyneuropathy, without long-term current use of insulin (HCC)  Well controlled at present time.    Lab Results   Component Value Date    HGBA1C 6.0 (H) 2025            Chronic combined systolic and diastolic heart failure (HCC)  Wt Readings from Last 3 Encounters:   25 86.6 kg (191 lb)   25 89.7 kg (197 lb 12.8 oz)   25 90.3 kg (199 lb)   Continue with daily weights, low sodium diet.         Stage 4 chronic kidney disease (HCC)  Lab Results   Component Value Date    EGFR 38 2025    EGFR 38 2025    EGFR 35 2025    CREATININE 1.29 2025    CREATININE 1.29 2025    CREATININE 1.38 (H) 2025   Will check chemistry.         Paroxysmal atrial fibrillation (HCC)  Continue with eliquis therapy and rate control.             History of Present Illness     The patient presents to discuss having labs to check on her renal status.  Her gradndaughters are concerned because she doesn't seem to urinate much.  She is now mostly wheel chair bound and needs help transitioning.  We discussed continuing with in home physical therpay , occupational therapy and getting some labs to check on status of her renal function.  Pt is adamant that she is not interested in dialysis if it would come down to it.        Review of Systems   Constitutional:  Negative for appetite change, chills and fever.   HENT:  Negative for ear pain, facial swelling, rhinorrhea, sinus pain, sore throat and trouble swallowing.    Eyes:  Negative for discharge and redness.   Respiratory:  Negative for chest tightness, shortness of breath and wheezing.    Cardiovascular:  Negative for chest pain and palpitations.   Gastrointestinal:   "Negative for abdominal pain, diarrhea, nausea and vomiting.   Endocrine: Negative for polyuria.   Genitourinary:  Negative for dysuria and urgency.        + oliguria   Musculoskeletal:  Positive for arthralgias and gait problem. Negative for back pain.        Weakness of her legs/deconditioning   Skin:  Negative for rash.   Neurological:  Positive for weakness. Negative for dizziness and headaches.   Hematological:  Negative for adenopathy.   Psychiatric/Behavioral:  Negative for behavioral problems, confusion and sleep disturbance.    All other systems reviewed and are negative.      Objective   /71   Ht 5' 5\" (1.651 m)   Wt 86.6 kg (191 lb)   BMI 31.78 kg/m²     Physical Exam  Constitutional:       Appearance: Normal appearance. She is not ill-appearing.   Eyes:      General:         Right eye: No discharge.         Left eye: No discharge.   Musculoskeletal:         General: No swelling.   Skin:     Findings: No rash.   Neurological:      General: No focal deficit present.      Mental Status: She is alert and oriented to person, place, and time. Mental status is at baseline.   Psychiatric:         Mood and Affect: Mood normal.         Behavior: Behavior normal.         Thought Content: Thought content normal.         Judgment: Judgment normal.         Administrative Statements   Encounter provider Kalpana Siegel, DO    The Patient is located at Home and in the following state in which I hold an active license [place].    The patient was identified by name and date of birth. Laura ALINA Sarkar was informed that this is a telemedicine visit and that the visit is being conducted through the Epic Embedded platform. She agrees to proceed..  My office door was closed. No one else was in the room.  She acknowledged consent and understanding of privacy and security of the video platform. The patient has agreed to participate and understands they can discontinue the visit at any time.    I have spent a total time " of 15 minutes in caring for this patient on the day of the visit/encounter including Diagnostic results, Prognosis, Risks and benefits of tx options, Instructions for management, Patient and family education, Importance of tx compliance, and Risk factor reductions.

## 2025-02-28 ENCOUNTER — HOME CARE VISIT (OUTPATIENT)
Dept: HOME HEALTH SERVICES | Facility: HOME HEALTHCARE | Age: 80
End: 2025-02-28
Payer: COMMERCIAL

## 2025-02-28 VITALS — HEART RATE: 80 BPM | OXYGEN SATURATION: 95 % | DIASTOLIC BLOOD PRESSURE: 78 MMHG | SYSTOLIC BLOOD PRESSURE: 136 MMHG

## 2025-02-28 VITALS — HEART RATE: 76 BPM | OXYGEN SATURATION: 98 % | DIASTOLIC BLOOD PRESSURE: 70 MMHG | SYSTOLIC BLOOD PRESSURE: 108 MMHG

## 2025-02-28 PROCEDURE — G0151 HHCP-SERV OF PT,EA 15 MIN: HCPCS

## 2025-02-28 PROCEDURE — G0158 HHC OT ASSISTANT EA 15: HCPCS

## 2025-03-03 ENCOUNTER — HOME CARE VISIT (OUTPATIENT)
Dept: HOME HEALTH SERVICES | Facility: HOME HEALTHCARE | Age: 80
End: 2025-03-03
Payer: COMMERCIAL

## 2025-03-03 PROCEDURE — G0152 HHCP-SERV OF OT,EA 15 MIN: HCPCS

## 2025-03-04 ENCOUNTER — HOME CARE VISIT (OUTPATIENT)
Dept: HOME HEALTH SERVICES | Facility: HOME HEALTHCARE | Age: 80
End: 2025-03-04
Payer: COMMERCIAL

## 2025-03-04 VITALS — OXYGEN SATURATION: 96 % | HEART RATE: 81 BPM | DIASTOLIC BLOOD PRESSURE: 70 MMHG | SYSTOLIC BLOOD PRESSURE: 125 MMHG

## 2025-03-04 VITALS — HEART RATE: 80 BPM

## 2025-03-04 PROCEDURE — G0156 HHCP-SVS OF AIDE,EA 15 MIN: HCPCS

## 2025-03-04 PROCEDURE — G0151 HHCP-SERV OF PT,EA 15 MIN: HCPCS

## 2025-03-06 ENCOUNTER — HOME CARE VISIT (OUTPATIENT)
Dept: HOME HEALTH SERVICES | Facility: HOME HEALTHCARE | Age: 80
End: 2025-03-06
Payer: COMMERCIAL

## 2025-03-06 VITALS
HEART RATE: 68 BPM | SYSTOLIC BLOOD PRESSURE: 122 MMHG | RESPIRATION RATE: 18 BRPM | TEMPERATURE: 97.8 F | OXYGEN SATURATION: 95 % | DIASTOLIC BLOOD PRESSURE: 64 MMHG

## 2025-03-06 DIAGNOSIS — E78.00 HYPERCHOLESTEROLEMIA: ICD-10-CM

## 2025-03-06 PROCEDURE — G0300 HHS/HOSPICE OF LPN EA 15 MIN: HCPCS

## 2025-03-06 PROCEDURE — G0156 HHCP-SVS OF AIDE,EA 15 MIN: HCPCS

## 2025-03-07 ENCOUNTER — HOME CARE VISIT (OUTPATIENT)
Dept: HOME HEALTH SERVICES | Facility: HOME HEALTHCARE | Age: 80
End: 2025-03-07
Payer: COMMERCIAL

## 2025-03-07 VITALS — DIASTOLIC BLOOD PRESSURE: 70 MMHG | OXYGEN SATURATION: 96 % | HEART RATE: 84 BPM | SYSTOLIC BLOOD PRESSURE: 110 MMHG

## 2025-03-07 PROCEDURE — G0152 HHCP-SERV OF OT,EA 15 MIN: HCPCS

## 2025-03-07 RX ORDER — PRAVASTATIN SODIUM 10 MG
10 TABLET ORAL DAILY
Qty: 30 TABLET | Refills: 0 | Status: SHIPPED | OUTPATIENT
Start: 2025-03-07

## 2025-03-07 NOTE — TELEPHONE ENCOUNTER
Refill must be reviewed and completed by the office or provider. The refill is unable to be approved or denied by the medication management team.    Courtesy refill previously given.  Patient needs lipid bw

## 2025-03-10 ENCOUNTER — HOME CARE VISIT (OUTPATIENT)
Dept: HOME HEALTH SERVICES | Facility: HOME HEALTHCARE | Age: 80
End: 2025-03-10
Payer: COMMERCIAL

## 2025-03-10 VITALS — DIASTOLIC BLOOD PRESSURE: 80 MMHG | HEART RATE: 82 BPM | OXYGEN SATURATION: 97 % | SYSTOLIC BLOOD PRESSURE: 125 MMHG

## 2025-03-10 PROCEDURE — G0152 HHCP-SERV OF OT,EA 15 MIN: HCPCS

## 2025-03-11 ENCOUNTER — PATIENT MESSAGE (OUTPATIENT)
Dept: FAMILY MEDICINE CLINIC | Facility: CLINIC | Age: 80
End: 2025-03-11

## 2025-03-11 ENCOUNTER — HOME CARE VISIT (OUTPATIENT)
Dept: HOME HEALTH SERVICES | Facility: HOME HEALTHCARE | Age: 80
End: 2025-03-11
Payer: COMMERCIAL

## 2025-03-11 VITALS — HEART RATE: 88 BPM | DIASTOLIC BLOOD PRESSURE: 70 MMHG | SYSTOLIC BLOOD PRESSURE: 102 MMHG

## 2025-03-11 PROCEDURE — G0151 HHCP-SERV OF PT,EA 15 MIN: HCPCS

## 2025-03-11 PROCEDURE — G0156 HHCP-SVS OF AIDE,EA 15 MIN: HCPCS

## 2025-03-13 ENCOUNTER — HOME CARE VISIT (OUTPATIENT)
Dept: HOME HEALTH SERVICES | Facility: HOME HEALTHCARE | Age: 80
End: 2025-03-13
Payer: COMMERCIAL

## 2025-03-13 VITALS — OXYGEN SATURATION: 99 % | SYSTOLIC BLOOD PRESSURE: 118 MMHG | DIASTOLIC BLOOD PRESSURE: 72 MMHG | HEART RATE: 72 BPM

## 2025-03-13 VITALS
TEMPERATURE: 97 F | OXYGEN SATURATION: 100 % | SYSTOLIC BLOOD PRESSURE: 130 MMHG | RESPIRATION RATE: 16 BRPM | DIASTOLIC BLOOD PRESSURE: 76 MMHG | HEART RATE: 84 BPM

## 2025-03-13 PROCEDURE — G0299 HHS/HOSPICE OF RN EA 15 MIN: HCPCS

## 2025-03-13 PROCEDURE — G0151 HHCP-SERV OF PT,EA 15 MIN: HCPCS

## 2025-03-13 PROCEDURE — G0156 HHCP-SVS OF AIDE,EA 15 MIN: HCPCS

## 2025-03-14 ENCOUNTER — HOME CARE VISIT (OUTPATIENT)
Dept: HOME HEALTH SERVICES | Facility: HOME HEALTHCARE | Age: 80
End: 2025-03-14
Payer: COMMERCIAL

## 2025-03-14 VITALS — DIASTOLIC BLOOD PRESSURE: 70 MMHG | OXYGEN SATURATION: 94 % | SYSTOLIC BLOOD PRESSURE: 115 MMHG | HEART RATE: 71 BPM

## 2025-03-14 PROCEDURE — G0152 HHCP-SERV OF OT,EA 15 MIN: HCPCS

## 2025-03-17 ENCOUNTER — HOME CARE VISIT (OUTPATIENT)
Dept: HOME HEALTH SERVICES | Facility: HOME HEALTHCARE | Age: 80
End: 2025-03-17
Payer: COMMERCIAL

## 2025-03-17 VITALS — OXYGEN SATURATION: 94 % | HEART RATE: 71 BPM | SYSTOLIC BLOOD PRESSURE: 122 MMHG | DIASTOLIC BLOOD PRESSURE: 80 MMHG

## 2025-03-17 PROCEDURE — G0157 HHC PT ASSISTANT EA 15: HCPCS

## 2025-03-18 ENCOUNTER — HOME CARE VISIT (OUTPATIENT)
Dept: HOME HEALTH SERVICES | Facility: HOME HEALTHCARE | Age: 80
End: 2025-03-18
Payer: COMMERCIAL

## 2025-03-18 VITALS — DIASTOLIC BLOOD PRESSURE: 80 MMHG | OXYGEN SATURATION: 94 % | SYSTOLIC BLOOD PRESSURE: 120 MMHG | HEART RATE: 81 BPM

## 2025-03-18 DIAGNOSIS — F32.A DEPRESSION, UNSPECIFIED DEPRESSION TYPE: ICD-10-CM

## 2025-03-18 DIAGNOSIS — I50.42 CHRONIC COMBINED SYSTOLIC AND DIASTOLIC HEART FAILURE (HCC): ICD-10-CM

## 2025-03-18 DIAGNOSIS — I42.8 NONISCHEMIC CARDIOMYOPATHY (HCC): ICD-10-CM

## 2025-03-18 DIAGNOSIS — I48.0 PAROXYSMAL ATRIAL FIBRILLATION (HCC): Chronic | ICD-10-CM

## 2025-03-18 DIAGNOSIS — E11.42 TYPE 2 DIABETES MELLITUS WITH DIABETIC POLYNEUROPATHY, WITHOUT LONG-TERM CURRENT USE OF INSULIN (HCC): ICD-10-CM

## 2025-03-18 PROCEDURE — G0152 HHCP-SERV OF OT,EA 15 MIN: HCPCS

## 2025-03-18 PROCEDURE — G0156 HHCP-SVS OF AIDE,EA 15 MIN: HCPCS

## 2025-03-18 RX ORDER — VENLAFAXINE 75 MG/1
75 TABLET ORAL 2 TIMES DAILY WITH MEALS
Qty: 60 TABLET | Refills: 0 | Status: SHIPPED | OUTPATIENT
Start: 2025-03-18

## 2025-03-18 RX ORDER — METOPROLOL SUCCINATE 25 MG/1
25 TABLET, EXTENDED RELEASE ORAL DAILY
Qty: 30 TABLET | Refills: 0 | Status: SHIPPED | OUTPATIENT
Start: 2025-03-18

## 2025-03-18 RX ORDER — GABAPENTIN 300 MG/1
600 CAPSULE ORAL 2 TIMES DAILY
Qty: 120 CAPSULE | Refills: 0 | Status: SHIPPED | OUTPATIENT
Start: 2025-03-18

## 2025-03-18 NOTE — TELEPHONE ENCOUNTER
Reason for call:   [x] Refill   [] Prior Auth  [] Other:     Office:   [x] PCP/Provider - Kalpana Siegel,    [] Specialty/Provider -       Medication: gabapentin 300 mg / 2 caps bid / 360 caps                      metoprolol 25 mg / 1 tab daily / 90 tabs                      Venlafaxine 75 mg / 1 tab bid / 180 tabs        Pharmacy: [id number] - [place], [place] - [address] [place] [address] Pharmacy   Does the patient have enough for 3 days?   [] Yes   [x] No - Send as HP to POD    Mail Away Pharmacy   Does the patient have enough for 10 days?   [] Yes   [] No - Send as HP to POD

## 2025-03-20 ENCOUNTER — HOME CARE VISIT (OUTPATIENT)
Dept: HOME HEALTH SERVICES | Facility: HOME HEALTHCARE | Age: 80
End: 2025-03-20
Payer: COMMERCIAL

## 2025-03-20 VITALS — HEART RATE: 81 BPM | DIASTOLIC BLOOD PRESSURE: 70 MMHG | SYSTOLIC BLOOD PRESSURE: 115 MMHG | OXYGEN SATURATION: 97 %

## 2025-03-20 PROCEDURE — G0156 HHCP-SVS OF AIDE,EA 15 MIN: HCPCS

## 2025-03-20 PROCEDURE — G0152 HHCP-SERV OF OT,EA 15 MIN: HCPCS

## 2025-03-21 ENCOUNTER — HOME CARE VISIT (OUTPATIENT)
Dept: HOME HEALTH SERVICES | Facility: HOME HEALTHCARE | Age: 80
End: 2025-03-21
Payer: COMMERCIAL

## 2025-03-21 VITALS — SYSTOLIC BLOOD PRESSURE: 110 MMHG | DIASTOLIC BLOOD PRESSURE: 72 MMHG

## 2025-03-21 PROCEDURE — G0151 HHCP-SERV OF PT,EA 15 MIN: HCPCS

## 2025-04-09 DIAGNOSIS — E11.42 TYPE 2 DIABETES MELLITUS WITH DIABETIC POLYNEUROPATHY, WITHOUT LONG-TERM CURRENT USE OF INSULIN (HCC): ICD-10-CM

## 2025-04-09 DIAGNOSIS — I50.42 CHRONIC COMBINED SYSTOLIC AND DIASTOLIC HEART FAILURE (HCC): ICD-10-CM

## 2025-04-09 DIAGNOSIS — E78.00 HYPERCHOLESTEROLEMIA: ICD-10-CM

## 2025-04-09 DIAGNOSIS — I48.91 ATRIAL FIBRILLATION WITH RAPID VENTRICULAR RESPONSE (HCC): ICD-10-CM

## 2025-04-09 DIAGNOSIS — F32.A DEPRESSION, UNSPECIFIED DEPRESSION TYPE: ICD-10-CM

## 2025-04-09 DIAGNOSIS — I42.8 NONISCHEMIC CARDIOMYOPATHY (HCC): ICD-10-CM

## 2025-04-09 DIAGNOSIS — I48.0 PAROXYSMAL ATRIAL FIBRILLATION (HCC): Chronic | ICD-10-CM

## 2025-04-09 RX ORDER — VENLAFAXINE 75 MG/1
75 TABLET ORAL 2 TIMES DAILY WITH MEALS
Qty: 60 TABLET | Refills: 5 | Status: SHIPPED | OUTPATIENT
Start: 2025-04-09

## 2025-04-09 RX ORDER — METOPROLOL SUCCINATE 25 MG/1
25 TABLET, EXTENDED RELEASE ORAL DAILY
Qty: 30 TABLET | Refills: 5 | Status: SHIPPED | OUTPATIENT
Start: 2025-04-09

## 2025-04-09 RX ORDER — GABAPENTIN 300 MG/1
600 CAPSULE ORAL 2 TIMES DAILY
Qty: 120 CAPSULE | Refills: 5 | Status: SHIPPED | OUTPATIENT
Start: 2025-04-09

## 2025-04-09 RX ORDER — APIXABAN 5 MG/1
5 TABLET, FILM COATED ORAL 2 TIMES DAILY
Qty: 60 TABLET | Refills: 5 | Status: SHIPPED | OUTPATIENT
Start: 2025-04-09

## 2025-04-09 RX ORDER — PRAVASTATIN SODIUM 10 MG
10 TABLET ORAL DAILY
Qty: 30 TABLET | Refills: 5 | Status: SHIPPED | OUTPATIENT
Start: 2025-04-09

## 2025-04-11 ENCOUNTER — RA CDI HCC (OUTPATIENT)
Dept: OTHER | Facility: HOSPITAL | Age: 80
End: 2025-04-11

## 2025-04-11 PROBLEM — E11.22 TYPE 2 DIABETES MELLITUS WITH CHRONIC KIDNEY DISEASE (HCC): Status: ACTIVE | Noted: 2025-04-11

## 2025-04-11 NOTE — PROGRESS NOTES
HCC coding opportunities          Chart Reviewed number of suggestions sent to Provider: 3  E11.22  I42.8  I13.0     Patients Insurance     Medicare Insurance: [employer]

## 2025-04-14 ENCOUNTER — TELEPHONE (OUTPATIENT)
Dept: CARDIOLOGY CLINIC | Facility: CLINIC | Age: 80
End: 2025-04-14

## 2025-04-14 ENCOUNTER — REMOTE DEVICE CLINIC VISIT (OUTPATIENT)
Dept: CARDIOLOGY CLINIC | Facility: CLINIC | Age: 80
End: 2025-04-14
Payer: COMMERCIAL

## 2025-04-14 DIAGNOSIS — I42.8 OTHER CARDIOMYOPATHY (HCC): Primary | ICD-10-CM

## 2025-04-14 DIAGNOSIS — I47.20 VENTRICULAR TACHYCARDIA (HCC): ICD-10-CM

## 2025-04-14 PROCEDURE — 93295 DEV INTERROG REMOTE 1/2/MLT: CPT | Performed by: INTERNAL MEDICINE

## 2025-04-14 PROCEDURE — 93296 REM INTERROG EVL PM/IDS: CPT | Performed by: INTERNAL MEDICINE

## 2025-04-14 NOTE — PROGRESS NOTES
Results for orders placed or performed in visit on 04/14/25   Cardiac EP device report    Narrative    MDT BI-V ICD/ACTIVE SYSTEM IS MRI CONDITIONAL  CARELINK TRANSMISSION: BATTERY VOLTAGE ADEQUATE (6 YRS). AP: 1.6%. : 99.9% + VSRP: <0.1%. ALL AVAILABLE LEAD PARAMETERS WITHIN NORMAL LIMITS. NO SIGNIFICANT HIGH RATE EPISODES. OPTI-VOL FLUID THRESHOLD CROSSED 1/27. PT DOES NOT TAKE ANY DIURETICS. PT TAKES ELIQUIS, METPROLOL SUCC. EF: 45% (ECHO 7/28/24). TASK TO HF TEAM. NORMAL DEVICE FUNCTION. CH

## 2025-04-14 NOTE — TELEPHONE ENCOUNTER
F/u call to patient. Left a message with c/b number & request for patient to return call to office.

## 2025-04-14 NOTE — TELEPHONE ENCOUNTER
Pt doing fine. Has no CHF symptoms presently.  Put her in for 6 mth F/u virtual appt. Pt can not get out of the house.  She will call if any cardiac issues occur.

## 2025-04-14 NOTE — TELEPHONE ENCOUNTER
Kathy granddaughter is reaching out, got the message to call.     Went over and explain the reason for the call. Per pt she feels fine, contacted  and warm transferred to [place].

## 2025-04-16 ENCOUNTER — RESULTS FOLLOW-UP (OUTPATIENT)
Dept: NON INVASIVE DIAGNOSTICS | Facility: HOSPITAL | Age: 80
End: 2025-04-16

## 2025-05-02 DIAGNOSIS — E03.9 HYPOTHYROIDISM, UNSPECIFIED TYPE: ICD-10-CM

## 2025-05-02 RX ORDER — LEVOTHYROXINE SODIUM 125 UG/1
125 TABLET ORAL EVERY MORNING
Qty: 30 TABLET | Refills: 5 | Status: SHIPPED | OUTPATIENT
Start: 2025-05-02

## 2025-05-14 ENCOUNTER — TELEMEDICINE (OUTPATIENT)
Dept: CARDIOLOGY CLINIC | Facility: CLINIC | Age: 80
End: 2025-05-14
Payer: COMMERCIAL

## 2025-05-14 ENCOUNTER — RESULTS FOLLOW-UP (OUTPATIENT)
Dept: CARDIOLOGY CLINIC | Facility: CLINIC | Age: 80
End: 2025-05-14

## 2025-05-14 ENCOUNTER — REMOTE DEVICE CLINIC VISIT (OUTPATIENT)
Dept: CARDIOLOGY CLINIC | Facility: CLINIC | Age: 80
End: 2025-05-14
Payer: COMMERCIAL

## 2025-05-14 VITALS — BODY MASS INDEX: 31.62 KG/M2 | WEIGHT: 190 LBS

## 2025-05-14 DIAGNOSIS — I50.42 CHRONIC COMBINED SYSTOLIC AND DIASTOLIC HEART FAILURE (HCC): ICD-10-CM

## 2025-05-14 DIAGNOSIS — E78.2 MIXED HYPERLIPIDEMIA: ICD-10-CM

## 2025-05-14 DIAGNOSIS — I34.0 MODERATE MITRAL REGURGITATION: ICD-10-CM

## 2025-05-14 DIAGNOSIS — I35.0 NONRHEUMATIC AORTIC VALVE STENOSIS: ICD-10-CM

## 2025-05-14 DIAGNOSIS — Z95.810 PRESENCE OF IMPLANTABLE CARDIOVERTER-DEFIBRILLATOR (ICD): Primary | ICD-10-CM

## 2025-05-14 DIAGNOSIS — I44.7 LEFT BUNDLE BRANCH BLOCK (LBBB): ICD-10-CM

## 2025-05-14 DIAGNOSIS — I42.8 NONISCHEMIC CARDIOMYOPATHY (HCC): Primary | ICD-10-CM

## 2025-05-14 DIAGNOSIS — I48.0 PAROXYSMAL ATRIAL FIBRILLATION (HCC): Chronic | ICD-10-CM

## 2025-05-14 PROCEDURE — 93297 REM INTERROG DEV EVAL ICPMS: CPT | Performed by: INTERNAL MEDICINE

## 2025-05-14 PROCEDURE — 99214 OFFICE O/P EST MOD 30 MIN: CPT | Performed by: INTERNAL MEDICINE

## 2025-05-14 NOTE — ASSESSMENT & PLAN NOTE
HLD: continued on statin.    LDL 86 in Aug 2021.    LDL 92 in May 2023.    LDL 84 in September 2023, and repeat levels have been ordered in her chart for this year.  Encouraged to complete testing

## 2025-05-14 NOTE — ASSESSMENT & PLAN NOTE
NICM: cath in Dec 2021 on initial diagnosis was normal for CAD. S/p Bi-V ICD - opti-vol WNL on most recent interrogation.   EF noted to be 30% on diagnosis, now on repeat echo after medical therapy revealed low normal to mildly reduced LVEF with only trace MR as opposed to mild to moderate on diagnosis.    Continues to remain euvolemic on exam today.    S/p ICD discharge in July 2024 due to missing doses of Toprol, now back on it and dofetilide has stopped.   No changes required today.

## 2025-05-14 NOTE — PROGRESS NOTES
Cardiology Follow Up    Laura Romerovicki  1945  [id number]  [facility] [place]  [address]  [address]  [facility] [address] [phone]      Assessment & Plan  Nonischemic cardiomyopathy (HCC)  NICM: cath in Dec 2021 on initial diagnosis was normal for CAD. S/p Bi-V ICD - opti-vol WNL on most recent interrogation.   EF noted to be 30% on diagnosis, now on repeat echo after medical therapy revealed low normal to mildly reduced LVEF with only trace MR as opposed to mild to moderate on diagnosis.    Continues to remain euvolemic on exam today.    S/p ICD discharge in July 2024 due to missing doses of Toprol, now back on it and dofetilide has stopped.   No changes required today.  Moderate mitral regurgitation  Noted only to be trace on most recent echocardiogram in July 2024.  Left bundle branch block (LBBB)  LBBB: continued with Bi-V ICD.  Chronic combined systolic and diastolic heart failure (HCC)  Wt Readings from Last 3 Encounters:   05/14/25 86.2 kg (190 lb)   02/24/25 86.6 kg (191 lb)   02/13/25 89.7 kg (197 lb 12.8 oz)   Ejection fraction noted to be 45% in July 2024.    OptiVol fluid index noted to be threshold crossed since January 2025  Currently not on any maintenance diuretic  Weight has reduced since January by approximately 7 pounds  Currently asymptomatic  Paroxysmal atrial fibrillation (HCC)  Afib: with inappropriate LifeVest discharge for afib with RVR - now s/p ICD.    Rate control with B-blocker and rhythm control with Tikosyn was initially used, now off Tikosyn with ICD discharge admission in July 2024 due to rapid afib.    Continued on Eliquis for AC.    Continues to have intermittent episodes of A-fib on device interrogations, but overall burden is low - now s/p AVJ ablation as well, so will no longer be inappropriately shocked.  Nonrheumatic aortic valve stenosis  Aortic stenosis: most recent echo in July 2024 did not reveal significant stenosis,  only sclerosis.  Mixed hyperlipidemia  HLD: continued on statin.    LDL 86 in Aug 2021.    LDL 92 in May 2023.    LDL 84 in September 2023, and repeat levels have been ordered in her chart for this year.  Encouraged to complete testing    Chief Complaint   Patient presents with    Follow-up     No cardiac concerns or complaints        Interval History: Patient feels well, without complaints.  No reported chest pain, shortness of breath, palpitations, lightheadedness, syncope, LE edema, orthopnea, PND, or significant weight changes.  Patient remains active without any increased fatigue out of the ordinary.         Weight 86.2 kg (190 lb).      Review of Systems:  Review of Systems   Constitutional:  Negative for activity change, appetite change, chills, diaphoresis, fatigue and unexpected weight change.   HENT:  Negative for hearing loss, nosebleeds and sore throat.    Eyes:  Negative for photophobia and visual disturbance.   Respiratory:  Negative for cough, chest tightness, shortness of breath and wheezing.    Cardiovascular:  Negative for chest pain, palpitations and leg swelling.   Gastrointestinal:  Negative for abdominal pain, diarrhea, nausea and vomiting.   Endocrine: Negative for polyuria.   Genitourinary:  Negative for dysuria, frequency and hematuria.   Musculoskeletal:  Negative for arthralgias, back pain, gait problem and neck pain.   Skin:  Negative for pallor and rash.   Neurological:  Negative for dizziness, syncope and headaches.   Hematological:  Does not bruise/bleed easily.   Psychiatric/Behavioral:  Negative for behavioral problems and confusion.        Physical Exam:  Physical Exam  Vitals and nursing note reviewed.   Constitutional:       General: She is not in acute distress.     Appearance: She is well-developed. She is not diaphoretic.   HENT:      Head: Normocephalic and atraumatic.      Nose: Nose normal.   Neck:      Vascular: No JVD.   Pulmonary:      Effort: Pulmonary effort is normal.  No respiratory distress.   Abdominal:      General: There is no distension.     Musculoskeletal:      Cervical back: Normal range of motion.     Skin:     Findings: No rash.     Neurological:      Mental Status: She is alert and oriented to person, place, and time.     Psychiatric:         Behavior: Behavior normal.         Thought Content: Thought content normal.         Judgment: Judgment normal.         Patient Active Problem List   Diagnosis    Atrial fibrillation (HCC)    Hyperlipidemia    Aortic stenosis    Primary osteoarthritis of both knees    Class 3 severe obesity in adult    Diabetic peripheral neuropathy (HCC)    Type 2 diabetes mellitus, without long-term current use of insulin (HCC)    Stage 4 chronic kidney disease (HCC)    Elevated troponin    Chronic combined systolic and diastolic heart failure (HCC)    Depression    DNR (do not resuscitate)    Torn rotator cuff    Left bundle branch block (LBBB)    Moderate mitral regurgitation    CKD (chronic kidney disease)    Left knee pain    Pulmonary fibrosis (HCC)    Hypothyroidism    Abnormal SPEP    Nonischemic cardiomyopathy (HCC)    Primary osteoarthritis of both shoulders    Basal cell carcinoma (BCC) of forehead    Skin ulcer, limited to breakdown of skin (HCC)    Secondary hyperparathyroidism of renal origin (HCC)    Ambulatory dysfunction    Fall    R posterior thoracoabdominal wall subcutaneous hematoma    ABLA (acute blood loss anemia)    Cognitive impairment    Encounter for medication review    B12 deficiency    Encounter for medication review and counseling    Type 2 diabetes mellitus with chronic kidney disease (HCC)     Past Medical History:   Diagnosis Date    Aortic stenosis     Arthritis     Asthma     Basal cell carcinoma 10/24/2023    right lateral forehead, mohs    Basal cell carcinoma 10/24/2023    right medial forehead, mohs    Coronary artery disease     Diabetes mellitus (HCC)     Diabetic peripheral neuropathy (HCC)     Last  assessed - 1/27/16    Gallbladder disease     Heart attack (HCC) 07/1999    Hemorrhoids     Last assessed - 11/16/12    Hypoglycemia 12/23/2021    Myocardial infarction (HCC)     Old myocardial infarction     Type 2 diabetes mellitus with autonomic neuropathy (HCC)     Unspec long term insulin use status, Last assessed - 6/8/17     Social History     Socioeconomic History    Marital status:      Spouse name: Not on file    Number of children: Not on file    Years of education: Not on file    Highest education level: Not on file   Occupational History    Occupation: RETIRED   Tobacco Use    Smoking status: Never     Passive exposure: Never    Smokeless tobacco: Never   Vaping Use    Vaping status: Never Used   Substance and Sexual Activity    Alcohol use: Never     Comment: Social per [employer]     Drug use: Never    Sexual activity: Not Currently   Other Topics Concern    Not on file   Social History Narrative    Hx of daily cola consumption (unk cans/day)    Daily tea consumption (unk cups/day)    Multiple organ donor    Patient has living will    Power of  in existence    Uses safety equipment - Seatbelts      Social Drivers of Health     Financial Resource Strain: Low Risk  (4/25/2023)    Overall Financial Resource Strain (CARDIA)     Difficulty of Paying Living Expenses: Not hard at all   Food Insecurity: No Food Insecurity (1/19/2025)    Nursing - Inadequate Food Risk Classification     Worried About Running Out of Food in the Last Year: Never true     Ran Out of Food in the Last Year: Never true     Ran Out of Food in the Last Year: Never true   Transportation Needs: No Transportation Needs (3/21/2025)    OASIS : Transportation     Lack of Transportation (Medical): No     Lack of Transportation (Non-Medical): No     Patient Unable or Declines to Respond: No   Physical Activity: Not on file   Stress: Not on file   Social Connections: Not on file   Intimate Partner Violence: Unknown  (2025)    Nursing IPS     Feels Physically and Emotionally Safe: Not on file     Physically Hurt by Someone: Not on file     Humiliated or Emotionally Abused by Someone: Not on file     Physically Hurt by Someone: No     Hurt or Threatened by Someone: No   Housing Stability: Unknown (2025)    Nursing: Inadequate Housing Risk Classification     Has Housing: Not on file     Worried About Losing Housing: Not on file     Unable to Get Utilities: Not on file     Unable to Pay for Housing in the Last Year: No     Has Housin      Family History   Problem Relation Age of Onset    Hypertension Father     Diabetes Father     Cancer Father         Throat    Arthritis Father     Stroke Mother     Diabetes Maternal Grandmother     Heart disease Maternal Grandfather     Diabetes Son     Lymphoma Family         Head, Face and Neck      Past Surgical History:   Procedure Laterality Date    BUNIONECTOMY      CARDIAC CATHETERIZATION      Carotid Artery, Resolved - 13    CARDIAC CATHETERIZATION N/A 2021    Procedure: CARDIAC CATHETERIZATION ;  Surgeon: Chucky Tadeo MD;  [facility];  Service: Cardiology    CARDIAC CATHETERIZATION N/A 2021    Procedure: Cardiac Coronary Angiogram;  Surgeon: Chucky Tadeo MD;  [facility];  Service: Cardiology    CARDIAC ELECTROPHYSIOLOGY PROCEDURE N/A 2022    Procedure: Cardiac biv icd implant;  Surgeon: Harley Rollins DO;  Location: [facility];  Service: Cardiology    CARDIAC ELECTROPHYSIOLOGY PROCEDURE N/A 2024    Procedure: Cardiac eps/av node ablation;  Surgeon: Jose Carter MD;  [facility];  Service: Cardiology    CARDIOVASCULAR STRESS TEST  1999    CHOLECYSTECTOMY      COLONOSCOPY  2017    FOOT ARTHRODESIS, MODIFIED DONOHUE  2016    GALLBLADDER SURGERY  1970    HEMORROIDECTOMY      KNEE ARTHROSCOPY Left     MOHS SURGERY Right 01/10/2024    right lateral and medial forehead  (combined), Dr Berman    NV COLONOSCOPY FLX DX W/COLLJ SPEC WHEN PFRMD N/A 08/21/2017    Procedure: COLONOSCOPY;  Surgeon: Ady Wilkes MD;  Location: [facility];  Service: Colorectal    REPAIR KNEE LIGAMENT      REPAIR KNEE LIGAMENT Left 1986    REPAIR KNEE LIGAMENT Right 1988       Current Outpatient Medications:     Eliquis 5 MG, TAKE 1 TABLET BY MOUTH EVERY 12 HOURS, Disp: 60 tablet, Rfl: 5    gabapentin (NEURONTIN) 300 mg capsule, TAKE 2 CAPSULES BY MOUTH TWO TIMES DAILY, Disp: 120 capsule, Rfl: 5    levothyroxine 125 mcg tablet, TAKE 1 TABLET BY MOUTH EVERY MORNING, Disp: 30 tablet, Rfl: 5    losartan (COZAAR) 25 mg tablet, TAKE 1 TABLET BY MOUTH EVERY MORNING, Disp: 90 tablet, Rfl: 1    metoprolol succinate (TOPROL-XL) 25 mg 24 hr tablet, TAKE 1 TABLET BY MOUTH EVERY DAY, Disp: 30 tablet, Rfl: 5    polyethylene glycol (MIRALAX) 17 g packet, Take 17 g by mouth daily (Patient taking differently: Take 17 g by mouth if needed), Disp: 510 g, Rfl: 0    pravastatin (PRAVACHOL) 10 mg tablet, TAKE 1 TABLET BY MOUTH EVERY DAY, Disp: 30 tablet, Rfl: 5    senna-docusate sodium (SENOKOT S) 8.6-50 mg per tablet, Take 1 tablet by mouth daily at bedtime (Patient taking differently: Take 1 tablet by mouth daily at bedtime As needed), Disp: 30 tablet, Rfl: 0    venlafaxine (EFFEXOR) 75 mg tablet, TAKE 1 TABLET BY MOUTH TWO TIMES DAILY WITH MEALS, Disp: 60 tablet, Rfl: 5  Allergies   Allergen Reactions    Lyrica [Pregabalin] Swelling     Pt does not remember    Sulfa Antibiotics Swelling     Pt does not remember       Labs:  Appointment on 02/27/2025   Component Date Value    Creatinine, Ur 02/27/2025 100.6     Albumin,U,Random 02/27/2025 <7.0     Albumin Creat Ratio 02/27/2025     Appointment on 02/24/2025   Component Date Value    Sodium 02/24/2025 139     Potassium 02/24/2025 4.9     Chloride 02/24/2025 105     CO2 02/24/2025 27     ANION GAP 02/24/2025 7     BUN 02/24/2025 25     Creatinine 02/24/2025 1.29     Glucose,  Fasting 2025 165 (H)     Calcium 2025 9.2     eGFR 2025 38    No results displayed because visit has over 200 results.        Lab Results   Component Value Date    CHOL 184 2015    TRIG 90 09/15/2023    TRIG 113 2015    HDL 44 (L) 09/15/2023    HDL 47 2015     Imaging: Cardiac ep lab eps/ablations    Result Date: 2024  Narrative: [place] [address] INVASIVE CARDIOLOGY- AV Node ABLATION REFERRING: PT NAME: Laura Sarkar is a 78 y.o. female MRN: [id number] : 1945 PERFORMING/ATTENDING PHY: DR. ERWIN GILLILAND MD DATE OF PROCEDURE: 24 PREOPERATIVE DIAGNOSIS: Atrial fibrillation with difficult to control rates POSTOPERATIVE DIAGNOSIS Successful ablation AV node with complete heart block acheived PROCEDURES PERFORMED 1. AV Leatha Junctional ablation 2. Reprogramming of Pacemaker prior to procedure and after procedure. 3. Bundle of HIS and RV recording. ANESTHESIA General anesthesia. PREOPERATIVE MEDICATIONS: None INFORMED CONSENT: Risks, benefits, and alternatives to AV leatha ablation for atrial fibrillation a associated procedures discussed. The patient understood risks include but not limited to death, bleeding and vascular complication, and bleeding around the heart. DESCRIPTION OF PROCEDURE The patient was draped in normal sterile fashion. An SRO 8F sheath was placed in the right femoral vein with ultrasound guidance. Ablation was performed with an 8mm [place] EPT blazer. Ablation was performed at 60Watts <60 degrees. Ablation catheter was placed on the AV groove near the bundle of HIS with RV recording, ablation was just inferior and proximal to there.  HV:40 ms Pacemaker was reprogrammed to VOO 30bpm before the ablation. After the ablation it was reprogrammed to VVIR 80bpm. We observed for 30 minutes after ablation without return of conduction of atrial fibrillation COMPLICATIONS: None EBL: Minimal. CONTRAST:  none FINAL     Impression:  Successful ablation of AV node with Complete heart block. PLAN Postoperative monitoring overnight.     Echo complete w/ contrast if indicated    Result Date: 7/28/2024  Narrative:   Left Ventricle: Left ventricular cavity size is normal. Wall thickness is normal. The left ventricular ejection fraction is 45%. Systolic function is mildly reduced. There is mild global hypokinesis. Diastolic function is normal.   Right Ventricle: Right ventricular cavity size is normal. Systolic function is normal.   Aortic Valve: There is aortic valve sclerosis.   Tricuspid Valve: There is mild regurgitation.   Aorta: The aortic root is normal in size. The ascending aorta is mildly dilated.     XR chest portable    Result Date: 7/28/2024  Narrative: XR CHEST PORTABLE INDICATION: ICD discharge. COMPARISON: CXR 1/19/2022, chest CT 12/22/2021. FINDINGS: Low lung volumes. No acute disease. Increased interstitial markings corresponding with fibrosis on CT. No pneumothorax or pleural effusion. Mild cardiomegaly with intact left subclavian ICD leads in the right atrium, right ventricle, cardiac vein. Severe bilateral glenohumeral degenerative disease. Upper abdomen normal. Cholecystectomy.     Impression: ICD leads intact and well-positioned. Mild pulmonary fibrosis. Workstation performed: [id number]

## 2025-05-14 NOTE — PROGRESS NOTES
MDT BI-V ICD/ACTIVE SYSTEM IS MRI CONDITIONAL   CARELINK TRANSMISSION - OPTI-VOL ONLY: OPTI-VOL FLUID THRESHOLD REMAINS CROSSED & ONGOING SINCE 1/27/25. NO DIURETICS. TASK TO HF CLINICAL TEAM. RECHECK @ [facility]INIC [facility]RO SCHEDULED 6/16/25. BATTERY VOLTAGE ADEQUATE (6 YR). AP 1.6% BVP 99.9% + VSR PACE 0.1%. SOME RA/FIB WAVE UNDERSENSING NOTED ON STORED EGM'S (0.5MV/PROGRAMMED 0.15MV).  ALL AVAILABLE LEAD PARAMETERS WITHIN NORMAL LIMITS. NO SIGNIFICANT HIGH RATE EPISODES. 1 PERSISTENT AF EPISODE IN PROGRESS - BURDEN 100%. PATIENT IS S/P AV NODE ABL, TAKING ELIQUIS, METOPROLOL SUCC. NORMAL DEVICE FUNCTION.   ES

## 2025-05-14 NOTE — ASSESSMENT & PLAN NOTE
Wt Readings from Last 3 Encounters:   05/14/25 86.2 kg (190 lb)   02/24/25 86.6 kg (191 lb)   02/13/25 89.7 kg (197 lb 12.8 oz)   Ejection fraction noted to be 45% in July 2024.    OptiVol fluid index noted to be threshold crossed since January 2025  Currently not on any maintenance diuretic  Weight has reduced since January by approximately 7 pounds  Currently asymptomatic

## 2025-05-22 DIAGNOSIS — I48.91 ATRIAL FIBRILLATION WITH RVR (HCC): ICD-10-CM

## 2025-05-22 RX ORDER — LOSARTAN POTASSIUM 25 MG/1
25 TABLET ORAL EVERY MORNING
Qty: 90 TABLET | Refills: 1 | Status: SHIPPED | OUTPATIENT
Start: 2025-05-22

## 2025-06-16 ENCOUNTER — RESULTS FOLLOW-UP (OUTPATIENT)
Dept: NON INVASIVE DIAGNOSTICS | Facility: HOSPITAL | Age: 80
End: 2025-06-16

## 2025-06-16 ENCOUNTER — REMOTE DEVICE CLINIC VISIT (OUTPATIENT)
Dept: CARDIOLOGY CLINIC | Facility: CLINIC | Age: 80
End: 2025-06-16
Payer: COMMERCIAL

## 2025-06-16 ENCOUNTER — TELEPHONE (OUTPATIENT)
Dept: CARDIOLOGY CLINIC | Facility: CLINIC | Age: 80
End: 2025-06-16

## 2025-06-16 DIAGNOSIS — I42.8 OTHER CARDIOMYOPATHY (HCC): ICD-10-CM

## 2025-06-16 DIAGNOSIS — I47.20 VENTRICULAR TACHYCARDIA (HCC): Primary | ICD-10-CM

## 2025-06-16 PROCEDURE — 93297 REM INTERROG DEV EVAL ICPMS: CPT | Performed by: INTERNAL MEDICINE

## 2025-06-16 NOTE — TELEPHONE ENCOUNTER
----- Message from [email or web address] sent at 6/16/2025 12:42 PM EDT -----  Regarding: opti-vol remains crossed  Pts opti-vol crossed since 1/27/25, pt does not take any diuretics, pt takes eliquis, metoprolol succ. Ef: 45% (echo 7/28/24).Thanks,~[employer]ines[employer]CARELINK TRANSMISSION - OPTI-VOL ONLY: BATTERY VOLTAGE ADEQUATE (5.7 YRS). AP: 1.9%. : 99.7% + VSRP: 0.35. ALL AVAILABLE LEAD PARAMETERS WITHIN NORMAL LIMITS. NO SIGNIFICANT HIGH RATE EPISODES. AF BURDEN: 100%. PT TAKES ELIQUIS, METOPROLOL SUCC. EF: 45% (ECHO 7/28/24). OPTI-VOL FLUID THRESHOLD CROSSED 1/27, PT DOES NOT TAKE ANY DIURETICS. TASK TO HF TEAM. NORMAL DEVICE FUNCTION. CH

## 2025-06-16 NOTE — PROGRESS NOTES
Results for orders placed or performed in visit on 06/16/25   Cardiac EP device report    Narrative    MDT BI-V ICD/ACTIVE SYSTEM IS MRI CONDITIONAL  CARELINK TRANSMISSION - OPTI-VOL ONLY: BATTERY VOLTAGE ADEQUATE (5.7 YRS). AP: 1.9%. : 99.7% + VSRP: 0.35. ALL AVAILABLE LEAD PARAMETERS WITHIN NORMAL LIMITS. NO SIGNIFICANT HIGH RATE EPISODES. AF BURDEN: 100%. PT TAKES ELIQUIS, METOPROLOL SUCC. EF: 45% (ECHO 7/28/24). OPTI-VOL FLUID THRESHOLD CROSSED 1/27, PT DOES NOT TAKE ANY DIURETICS. TASK TO HF TEAM. NORMAL DEVICE FUNCTION. CH

## 2025-06-16 NOTE — TELEPHONE ENCOUNTER
Spoke with granddaughter, Kathy. Pt denies any CHF symptoms or wt gain. They will call the office with any cardiac questions or concerns.

## 2025-06-30 ENCOUNTER — TELEPHONE (OUTPATIENT)
Age: 80
End: 2025-06-30

## 2025-06-30 NOTE — TELEPHONE ENCOUNTER
Patients granddaughter calls in stating patient has dementia and her symptoms are worsening . Stating sometimes patient can have a conversation and other times patient  is confused.    Please call granddaughter to schedule a virtual appointment with Dr Siegel. Unable to schedule at this time.

## 2025-07-31 ENCOUNTER — HOSPITAL ENCOUNTER (EMERGENCY)
Facility: HOSPITAL | Age: 80
Discharge: HOME | End: 2025-07-31
Attending: EMERGENCY MEDICINE
Payer: COMMERCIAL

## 2025-07-31 ENCOUNTER — HOSPITAL ENCOUNTER (EMERGENCY)
Facility: HOSPITAL | Age: 80
Discharge: HOME | End: 2025-07-31
Attending: EMERGENCY MEDICINE | Admitting: EMERGENCY MEDICINE
Payer: COMMERCIAL

## 2025-08-01 ENCOUNTER — PATIENT OUTREACH (OUTPATIENT)
Dept: CASE MANAGEMENT | Facility: OTHER | Age: 80
End: 2025-08-01

## 2025-08-01 ENCOUNTER — VBI (OUTPATIENT)
Dept: FAMILY MEDICINE CLINIC | Facility: CLINIC | Age: 80
End: 2025-08-01

## 2025-08-04 ENCOUNTER — VBI (OUTPATIENT)
Dept: ADMINISTRATIVE | Facility: OTHER | Age: 80
End: 2025-08-04

## 2025-08-13 ENCOUNTER — REMOTE DEVICE CLINIC VISIT (OUTPATIENT)
Dept: CARDIOLOGY CLINIC | Facility: CLINIC | Age: 80
End: 2025-08-13
Payer: COMMERCIAL

## 2025-08-21 PROBLEM — L98.491 SKIN ULCER, LIMITED TO BREAKDOWN OF SKIN (HCC): Status: RESOLVED | Noted: 2024-07-25 | Resolved: 2025-08-21

## (undated) DEVICE — CATH DIAG 5FR IMPULSE 100CM FR4

## (undated) DEVICE — Device: Brand: WEBSTER

## (undated) DEVICE — RUNTHROUGH NS EXTRA FLOPPY PTCA GUIDEWIRE: Brand: RUNTHROUGH

## (undated) DEVICE — GUIDE SHEATH 7FR 90 D ATTAIN SELECT II SUREVALVE

## (undated) DEVICE — DGW .035 FC J3MM 260CM TEF: Brand: EMERALD

## (undated) DEVICE — PINNACLE INTRODUCER SHEATH: Brand: PINNACLE

## (undated) DEVICE — INTRO SHEATH PEEL AWAY 9 FR

## (undated) DEVICE — GUIDE SHEATH 9FR ATTAIN COMMAND 9FR EH CURVE

## (undated) DEVICE — CATH ABLATION SAFIRE TX 8MM LRG CURL

## (undated) DEVICE — GUIDE SHEATH SRO 8.5 FR

## (undated) DEVICE — INTRO SHEATH PEEL AWAY 7FR

## (undated) DEVICE — SLITTER ADJUSTABLE

## (undated) DEVICE — GLIDESHEATH SLENDER STAINLESS STEEL KIT: Brand: GLIDESHEATH SLENDER

## (undated) DEVICE — RADIFOCUS OPTITORQUE ANGIOGRAPHIC CATHETER: Brand: OPTITORQUE

## (undated) DEVICE — GUIDEWIRE WHOLEY HI TORQUE INTERM MOD J .035 145CM

## (undated) DEVICE — TR BAND RADIAL ARTERY COMPRESSION DEVICE: Brand: TR BAND